# Patient Record
Sex: MALE | Race: WHITE
[De-identification: names, ages, dates, MRNs, and addresses within clinical notes are randomized per-mention and may not be internally consistent; named-entity substitution may affect disease eponyms.]

---

## 2022-08-12 ENCOUNTER — HOSPITAL ENCOUNTER (INPATIENT)
Age: 67
LOS: 6 days | Discharge: HOME HEALTH | DRG: 240 | End: 2022-08-18
Payer: MEDICARE

## 2022-08-12 VITALS
OXYGEN SATURATION: 92 % | DIASTOLIC BLOOD PRESSURE: 67 MMHG | TEMPERATURE: 98.06 F | HEART RATE: 102 BPM | SYSTOLIC BLOOD PRESSURE: 110 MMHG | RESPIRATION RATE: 20 BRPM

## 2022-08-12 VITALS
RESPIRATION RATE: 20 BRPM | OXYGEN SATURATION: 92 % | HEART RATE: 108 BPM | DIASTOLIC BLOOD PRESSURE: 60 MMHG | TEMPERATURE: 97.88 F | SYSTOLIC BLOOD PRESSURE: 110 MMHG

## 2022-08-12 VITALS
RESPIRATION RATE: 19 BRPM | SYSTOLIC BLOOD PRESSURE: 122 MMHG | DIASTOLIC BLOOD PRESSURE: 74 MMHG | OXYGEN SATURATION: 95 % | TEMPERATURE: 99.32 F | HEART RATE: 99 BPM

## 2022-08-12 VITALS
SYSTOLIC BLOOD PRESSURE: 101 MMHG | DIASTOLIC BLOOD PRESSURE: 66 MMHG | OXYGEN SATURATION: 92 % | RESPIRATION RATE: 24 BRPM | HEART RATE: 109 BPM

## 2022-08-12 VITALS
OXYGEN SATURATION: 95 % | RESPIRATION RATE: 20 BRPM | HEART RATE: 108 BPM | SYSTOLIC BLOOD PRESSURE: 123 MMHG | DIASTOLIC BLOOD PRESSURE: 73 MMHG

## 2022-08-12 VITALS
RESPIRATION RATE: 16 BRPM | OXYGEN SATURATION: 90 % | SYSTOLIC BLOOD PRESSURE: 93 MMHG | HEART RATE: 104 BPM | DIASTOLIC BLOOD PRESSURE: 37 MMHG

## 2022-08-12 VITALS
HEART RATE: 109 BPM | SYSTOLIC BLOOD PRESSURE: 107 MMHG | RESPIRATION RATE: 27 BRPM | OXYGEN SATURATION: 89 % | DIASTOLIC BLOOD PRESSURE: 60 MMHG

## 2022-08-12 VITALS
TEMPERATURE: 97.9 F | SYSTOLIC BLOOD PRESSURE: 112 MMHG | RESPIRATION RATE: 18 BRPM | HEART RATE: 98 BPM | DIASTOLIC BLOOD PRESSURE: 65 MMHG | OXYGEN SATURATION: 94 %

## 2022-08-12 VITALS — HEART RATE: 96 BPM

## 2022-08-12 VITALS
SYSTOLIC BLOOD PRESSURE: 115 MMHG | OXYGEN SATURATION: 92 % | HEART RATE: 105 BPM | DIASTOLIC BLOOD PRESSURE: 65 MMHG | TEMPERATURE: 98.1 F | RESPIRATION RATE: 18 BRPM

## 2022-08-12 VITALS
HEART RATE: 99 BPM | DIASTOLIC BLOOD PRESSURE: 47 MMHG | OXYGEN SATURATION: 94 % | RESPIRATION RATE: 21 BRPM | SYSTOLIC BLOOD PRESSURE: 116 MMHG

## 2022-08-12 VITALS
SYSTOLIC BLOOD PRESSURE: 114 MMHG | OXYGEN SATURATION: 96 % | TEMPERATURE: 99.32 F | RESPIRATION RATE: 18 BRPM | DIASTOLIC BLOOD PRESSURE: 66 MMHG | HEART RATE: 104 BPM

## 2022-08-12 VITALS
RESPIRATION RATE: 16 BRPM | DIASTOLIC BLOOD PRESSURE: 52 MMHG | HEART RATE: 107 BPM | SYSTOLIC BLOOD PRESSURE: 91 MMHG | OXYGEN SATURATION: 91 %

## 2022-08-12 VITALS — RESPIRATION RATE: 21 BRPM | OXYGEN SATURATION: 93 %

## 2022-08-12 VITALS
OXYGEN SATURATION: 90 % | HEART RATE: 107 BPM | DIASTOLIC BLOOD PRESSURE: 62 MMHG | RESPIRATION RATE: 18 BRPM | SYSTOLIC BLOOD PRESSURE: 110 MMHG

## 2022-08-12 VITALS — HEART RATE: 108 BPM

## 2022-08-12 VITALS — BODY MASS INDEX: 35.2 KG/M2 | BODY MASS INDEX: 35 KG/M2 | BODY MASS INDEX: 35.9 KG/M2

## 2022-08-12 VITALS — SYSTOLIC BLOOD PRESSURE: 132 MMHG | DIASTOLIC BLOOD PRESSURE: 72 MMHG | HEART RATE: 105 BPM

## 2022-08-12 VITALS
DIASTOLIC BLOOD PRESSURE: 78 MMHG | OXYGEN SATURATION: 95 % | SYSTOLIC BLOOD PRESSURE: 114 MMHG | HEART RATE: 99 BPM | RESPIRATION RATE: 20 BRPM | TEMPERATURE: 99.5 F

## 2022-08-12 VITALS — OXYGEN SATURATION: 92 % | RESPIRATION RATE: 19 BRPM

## 2022-08-12 VITALS — OXYGEN SATURATION: 94 %

## 2022-08-12 VITALS — HEART RATE: 102 BPM

## 2022-08-12 VITALS — HEART RATE: 101 BPM

## 2022-08-12 VITALS — OXYGEN SATURATION: 92 %

## 2022-08-12 VITALS — HEART RATE: 106 BPM

## 2022-08-12 DIAGNOSIS — E66.9: ICD-10-CM

## 2022-08-12 DIAGNOSIS — E78.5: ICD-10-CM

## 2022-08-12 DIAGNOSIS — F32.A: ICD-10-CM

## 2022-08-12 DIAGNOSIS — N18.31: ICD-10-CM

## 2022-08-12 DIAGNOSIS — E11.622: ICD-10-CM

## 2022-08-12 DIAGNOSIS — Z95.5: ICD-10-CM

## 2022-08-12 DIAGNOSIS — E11.22: ICD-10-CM

## 2022-08-12 DIAGNOSIS — I50.32: ICD-10-CM

## 2022-08-12 DIAGNOSIS — E11.51: ICD-10-CM

## 2022-08-12 DIAGNOSIS — Z87.891: ICD-10-CM

## 2022-08-12 DIAGNOSIS — I25.10: ICD-10-CM

## 2022-08-12 DIAGNOSIS — L97.829: ICD-10-CM

## 2022-08-12 DIAGNOSIS — Z92.3: ICD-10-CM

## 2022-08-12 DIAGNOSIS — J44.9: ICD-10-CM

## 2022-08-12 DIAGNOSIS — E87.70: ICD-10-CM

## 2022-08-12 DIAGNOSIS — I74.3: Primary | ICD-10-CM

## 2022-08-12 DIAGNOSIS — Z79.82: ICD-10-CM

## 2022-08-12 DIAGNOSIS — Z79.02: ICD-10-CM

## 2022-08-12 DIAGNOSIS — E11.42: ICD-10-CM

## 2022-08-12 DIAGNOSIS — Z95.1: ICD-10-CM

## 2022-08-12 DIAGNOSIS — Z79.01: ICD-10-CM

## 2022-08-12 DIAGNOSIS — I95.9: ICD-10-CM

## 2022-08-12 DIAGNOSIS — K21.9: ICD-10-CM

## 2022-08-12 DIAGNOSIS — Z79.84: ICD-10-CM

## 2022-08-12 DIAGNOSIS — I13.0: ICD-10-CM

## 2022-08-12 DIAGNOSIS — Z85.118: ICD-10-CM

## 2022-08-12 LAB
ANION GAP: 7 (ref 5–15)
APTT PPP: 45.7 SECONDS (ref 24.1–36.2)
APTT PPP: 48.4 SECONDS (ref 24.1–36.2)
APTT PPP: 52.9 SECONDS (ref 24.1–36.2)
APTT PPP: 55.6 SECONDS (ref 24.1–36.2)
BUN SERPL-MCNC: 15 MG/DL (ref 7–18)
BUN/CREAT RATIO: 11.5 RATIO (ref 10–20)
CALCIUM SERPL-MCNC: 8.6 MG/DL (ref 8.5–10.1)
CARBON DIOXIDE: 22 MMOL/L (ref 21–32)
CHLORIDE: 104 MMOL/L (ref 98–107)
CHOLEST SERPL-MCNC: 134 MG/DL
DEPRECATED RDW RBC: 52.4 FL (ref 35.1–43.9)
ERYTHROCYTE [DISTWIDTH] IN BLOOD: 19.1 % (ref 11.6–14.6)
EST GLOM FILT RATE - AFR AMER: 71 ML/MIN (ref 60–?)
ESTIMATED CREATININE CLEARANCE: 51.12 ML/MIN
GLUCOSE: 174 MG/DL (ref 74–106)
HCT VFR BLD AUTO: 36 % (ref 40–54)
HEMOGLOBIN: 11 G/DL (ref 13–16.5)
HGB BLD-MCNC: 11 G/DL (ref 13–16.5)
IMMATURE GRANULOCYTES COUNT: 0.03 X10^3/UL (ref 0–0)
MAGNESIUM: 2.3 MG/DL (ref 1.6–2.6)
MCV RBC: 76.4 FL (ref 80–94)
MEAN CORP HGB CONC: 30.6 G/DL (ref 32–36)
MEAN PLATELET VOL.: 9.8 FL (ref 6.2–12)
NRBC FLAGGED BY ANALYZER: 0 % (ref 0–5)
PLATELET # BLD: 163 K/MM3 (ref 150–450)
PLATELET COUNT: 163 K/MM3 (ref 150–450)
POTASSIUM: 4.3 MMOL/L (ref 3.5–5.1)
PROTHROMBIN TIME (PROTIME)PT.: 14.7 SECONDS (ref 11.7–14.9)
RBC # BLD AUTO: 4.71 M/MM3 (ref 4.6–6.2)
RBC DISTRIBUTION WIDTH CV: 19.1 % (ref 11.6–14.6)
RBC DISTRIBUTION WIDTH SD: 52.4 FL (ref 35.1–43.9)
TRIGLYCERIDES: 208 MG/DL
VLDLC SERPL-MCNC: 42 MG/DL (ref 5–40)
WBC # BLD AUTO: 8.1 K/MM3 (ref 4.4–11)
WHITE BLOOD COUNT: 8.1 K/MM3 (ref 4.4–11)

## 2022-08-12 PROCEDURE — 80048 BASIC METABOLIC PNL TOTAL CA: CPT

## 2022-08-12 PROCEDURE — 80053 COMPREHEN METABOLIC PANEL: CPT

## 2022-08-12 PROCEDURE — 86900 BLOOD TYPING SEROLOGIC ABO: CPT

## 2022-08-12 PROCEDURE — 88307 TISSUE EXAM BY PATHOLOGIST: CPT

## 2022-08-12 PROCEDURE — 88311 DECALCIFY TISSUE: CPT

## 2022-08-12 PROCEDURE — 94762 N-INVAS EAR/PLS OXIMTRY CONT: CPT

## 2022-08-12 PROCEDURE — 85025 COMPLETE CBC W/AUTO DIFF WBC: CPT

## 2022-08-12 PROCEDURE — 97162 PT EVAL MOD COMPLEX 30 MIN: CPT

## 2022-08-12 PROCEDURE — 71045 X-RAY EXAM CHEST 1 VIEW: CPT

## 2022-08-12 PROCEDURE — 36415 COLL VENOUS BLD VENIPUNCTURE: CPT

## 2022-08-12 PROCEDURE — A4216 STERILE WATER/SALINE, 10 ML: HCPCS

## 2022-08-12 PROCEDURE — 36600 WITHDRAWAL OF ARTERIAL BLOOD: CPT

## 2022-08-12 PROCEDURE — 83036 HEMOGLOBIN GLYCOSYLATED A1C: CPT

## 2022-08-12 PROCEDURE — 86901 BLOOD TYPING SEROLOGIC RH(D): CPT

## 2022-08-12 PROCEDURE — 80061 LIPID PANEL: CPT

## 2022-08-12 PROCEDURE — 82803 BLOOD GASES ANY COMBINATION: CPT

## 2022-08-12 PROCEDURE — 86920 COMPATIBILITY TEST SPIN: CPT

## 2022-08-12 PROCEDURE — 86965 POOLING BLOOD PLATELETS: CPT

## 2022-08-12 PROCEDURE — 83880 ASSAY OF NATRIURETIC PEPTIDE: CPT

## 2022-08-12 PROCEDURE — 86922 COMPATIBILITY TEST ANTIGLOB: CPT

## 2022-08-12 PROCEDURE — 93005 ELECTROCARDIOGRAM TRACING: CPT

## 2022-08-12 PROCEDURE — 82962 GLUCOSE BLOOD TEST: CPT

## 2022-08-12 PROCEDURE — 86850 RBC ANTIBODY SCREEN: CPT

## 2022-08-12 PROCEDURE — P9035 PLATELET PHERES LEUKOREDUCED: HCPCS

## 2022-08-12 PROCEDURE — 85610 PROTHROMBIN TIME: CPT

## 2022-08-12 PROCEDURE — 94002 VENT MGMT INPAT INIT DAY: CPT

## 2022-08-12 PROCEDURE — 97802 MEDICAL NUTRITION INDIV IN: CPT

## 2022-08-12 PROCEDURE — 97530 THERAPEUTIC ACTIVITIES: CPT

## 2022-08-12 PROCEDURE — 84100 ASSAY OF PHOSPHORUS: CPT

## 2022-08-12 PROCEDURE — 87641 MR-STAPH DNA AMP PROBE: CPT

## 2022-08-12 PROCEDURE — 94003 VENT MGMT INPAT SUBQ DAY: CPT

## 2022-08-12 PROCEDURE — 94640 AIRWAY INHALATION TREATMENT: CPT

## 2022-08-12 PROCEDURE — 97803 MED NUTRITION INDIV SUBSEQ: CPT

## 2022-08-12 PROCEDURE — 97166 OT EVAL MOD COMPLEX 45 MIN: CPT

## 2022-08-12 PROCEDURE — 97110 THERAPEUTIC EXERCISES: CPT

## 2022-08-12 PROCEDURE — 83735 ASSAY OF MAGNESIUM: CPT

## 2022-08-12 PROCEDURE — 85730 THROMBOPLASTIN TIME PARTIAL: CPT

## 2022-08-13 VITALS
RESPIRATION RATE: 22 BRPM | OXYGEN SATURATION: 92 % | DIASTOLIC BLOOD PRESSURE: 70 MMHG | SYSTOLIC BLOOD PRESSURE: 129 MMHG | TEMPERATURE: 99.14 F | HEART RATE: 113 BPM

## 2022-08-13 VITALS
RESPIRATION RATE: 18 BRPM | TEMPERATURE: 98.78 F | DIASTOLIC BLOOD PRESSURE: 53 MMHG | OXYGEN SATURATION: 94 % | SYSTOLIC BLOOD PRESSURE: 101 MMHG | HEART RATE: 105 BPM

## 2022-08-13 VITALS — RESPIRATION RATE: 23 BRPM | OXYGEN SATURATION: 93 %

## 2022-08-13 VITALS
RESPIRATION RATE: 21 BRPM | OXYGEN SATURATION: 89 % | SYSTOLIC BLOOD PRESSURE: 106 MMHG | HEART RATE: 116 BPM | DIASTOLIC BLOOD PRESSURE: 92 MMHG | TEMPERATURE: 99.14 F

## 2022-08-13 VITALS
RESPIRATION RATE: 24 BRPM | HEART RATE: 105 BPM | OXYGEN SATURATION: 92 % | DIASTOLIC BLOOD PRESSURE: 37 MMHG | SYSTOLIC BLOOD PRESSURE: 99 MMHG

## 2022-08-13 VITALS
TEMPERATURE: 99 F | HEART RATE: 114 BPM | DIASTOLIC BLOOD PRESSURE: 49 MMHG | SYSTOLIC BLOOD PRESSURE: 130 MMHG | RESPIRATION RATE: 18 BRPM | OXYGEN SATURATION: 96 %

## 2022-08-13 VITALS
OXYGEN SATURATION: 95 % | DIASTOLIC BLOOD PRESSURE: 65 MMHG | RESPIRATION RATE: 19 BRPM | HEART RATE: 106 BPM | SYSTOLIC BLOOD PRESSURE: 107 MMHG

## 2022-08-13 VITALS
RESPIRATION RATE: 22 BRPM | OXYGEN SATURATION: 89 % | SYSTOLIC BLOOD PRESSURE: 127 MMHG | HEART RATE: 114 BPM | DIASTOLIC BLOOD PRESSURE: 112 MMHG | TEMPERATURE: 98.7 F

## 2022-08-13 VITALS
SYSTOLIC BLOOD PRESSURE: 103 MMHG | HEART RATE: 108 BPM | OXYGEN SATURATION: 94 % | DIASTOLIC BLOOD PRESSURE: 41 MMHG | RESPIRATION RATE: 21 BRPM

## 2022-08-13 VITALS
OXYGEN SATURATION: 96 % | HEART RATE: 116 BPM | TEMPERATURE: 98.6 F | DIASTOLIC BLOOD PRESSURE: 65 MMHG | RESPIRATION RATE: 18 BRPM | SYSTOLIC BLOOD PRESSURE: 117 MMHG

## 2022-08-13 VITALS — HEART RATE: 113 BPM | SYSTOLIC BLOOD PRESSURE: 114 MMHG | DIASTOLIC BLOOD PRESSURE: 62 MMHG

## 2022-08-13 VITALS
SYSTOLIC BLOOD PRESSURE: 109 MMHG | OXYGEN SATURATION: 94 % | TEMPERATURE: 99.8 F | RESPIRATION RATE: 18 BRPM | HEART RATE: 104 BPM | DIASTOLIC BLOOD PRESSURE: 54 MMHG

## 2022-08-13 VITALS
RESPIRATION RATE: 23 BRPM | OXYGEN SATURATION: 93 % | SYSTOLIC BLOOD PRESSURE: 101 MMHG | DIASTOLIC BLOOD PRESSURE: 70 MMHG | HEART RATE: 109 BPM

## 2022-08-13 VITALS
DIASTOLIC BLOOD PRESSURE: 60 MMHG | HEART RATE: 101 BPM | OXYGEN SATURATION: 90 % | RESPIRATION RATE: 20 BRPM | SYSTOLIC BLOOD PRESSURE: 100 MMHG

## 2022-08-13 VITALS
HEART RATE: 118 BPM | DIASTOLIC BLOOD PRESSURE: 68 MMHG | OXYGEN SATURATION: 96 % | SYSTOLIC BLOOD PRESSURE: 116 MMHG | RESPIRATION RATE: 17 BRPM | TEMPERATURE: 98.9 F

## 2022-08-13 VITALS
DIASTOLIC BLOOD PRESSURE: 62 MMHG | SYSTOLIC BLOOD PRESSURE: 114 MMHG | RESPIRATION RATE: 25 BRPM | OXYGEN SATURATION: 92 % | HEART RATE: 113 BPM

## 2022-08-13 VITALS
TEMPERATURE: 99.32 F | SYSTOLIC BLOOD PRESSURE: 92 MMHG | RESPIRATION RATE: 20 BRPM | DIASTOLIC BLOOD PRESSURE: 69 MMHG | HEART RATE: 113 BPM | OXYGEN SATURATION: 92 %

## 2022-08-13 VITALS — RESPIRATION RATE: 19 BRPM | OXYGEN SATURATION: 90 %

## 2022-08-13 VITALS
SYSTOLIC BLOOD PRESSURE: 116 MMHG | DIASTOLIC BLOOD PRESSURE: 69 MMHG | TEMPERATURE: 99.1 F | HEART RATE: 118 BPM | RESPIRATION RATE: 26 BRPM | OXYGEN SATURATION: 89 %

## 2022-08-13 VITALS — RESPIRATION RATE: 29 BRPM | OXYGEN SATURATION: 95 %

## 2022-08-13 VITALS
RESPIRATION RATE: 17 BRPM | HEART RATE: 111 BPM | OXYGEN SATURATION: 94 % | TEMPERATURE: 98.96 F | DIASTOLIC BLOOD PRESSURE: 60 MMHG | SYSTOLIC BLOOD PRESSURE: 122 MMHG

## 2022-08-13 VITALS — RESPIRATION RATE: 24 BRPM | OXYGEN SATURATION: 91 %

## 2022-08-13 VITALS — OXYGEN SATURATION: 91 % | RESPIRATION RATE: 27 BRPM

## 2022-08-13 VITALS — HEART RATE: 118 BPM

## 2022-08-13 VITALS — HEART RATE: 107 BPM | RESPIRATION RATE: 20 BRPM | OXYGEN SATURATION: 92 %

## 2022-08-13 VITALS — OXYGEN SATURATION: 90 % | RESPIRATION RATE: 24 BRPM

## 2022-08-13 VITALS — HEART RATE: 108 BPM | DIASTOLIC BLOOD PRESSURE: 41 MMHG | SYSTOLIC BLOOD PRESSURE: 103 MMHG

## 2022-08-13 VITALS — RESPIRATION RATE: 20 BRPM | OXYGEN SATURATION: 92 %

## 2022-08-13 VITALS — HEART RATE: 109 BPM

## 2022-08-13 VITALS — OXYGEN SATURATION: 93 %

## 2022-08-13 LAB
APTT PPP: 57.5 SECONDS (ref 24.1–36.2)
APTT PPP: 60.8 SECONDS (ref 24.1–36.2)

## 2022-08-14 VITALS
DIASTOLIC BLOOD PRESSURE: 80 MMHG | SYSTOLIC BLOOD PRESSURE: 130 MMHG | HEART RATE: 102 BPM | RESPIRATION RATE: 22 BRPM | OXYGEN SATURATION: 92 %

## 2022-08-14 VITALS
RESPIRATION RATE: 23 BRPM | SYSTOLIC BLOOD PRESSURE: 121 MMHG | OXYGEN SATURATION: 92 % | TEMPERATURE: 98.8 F | HEART RATE: 106 BPM | DIASTOLIC BLOOD PRESSURE: 70 MMHG

## 2022-08-14 VITALS
RESPIRATION RATE: 20 BRPM | DIASTOLIC BLOOD PRESSURE: 82 MMHG | HEART RATE: 114 BPM | OXYGEN SATURATION: 95 % | SYSTOLIC BLOOD PRESSURE: 143 MMHG | TEMPERATURE: 98.96 F

## 2022-08-14 VITALS — RESPIRATION RATE: 22 BRPM | OXYGEN SATURATION: 90 %

## 2022-08-14 VITALS
OXYGEN SATURATION: 91 % | TEMPERATURE: 99.1 F | RESPIRATION RATE: 19 BRPM | DIASTOLIC BLOOD PRESSURE: 68 MMHG | HEART RATE: 102 BPM | SYSTOLIC BLOOD PRESSURE: 118 MMHG

## 2022-08-14 VITALS — HEART RATE: 119 BPM | DIASTOLIC BLOOD PRESSURE: 82 MMHG | SYSTOLIC BLOOD PRESSURE: 143 MMHG

## 2022-08-14 VITALS — RESPIRATION RATE: 24 BRPM | OXYGEN SATURATION: 92 %

## 2022-08-14 VITALS
SYSTOLIC BLOOD PRESSURE: 119 MMHG | RESPIRATION RATE: 21 BRPM | TEMPERATURE: 98.9 F | DIASTOLIC BLOOD PRESSURE: 80 MMHG | HEART RATE: 101 BPM | OXYGEN SATURATION: 91 %

## 2022-08-14 VITALS
RESPIRATION RATE: 23 BRPM | HEART RATE: 113 BPM | DIASTOLIC BLOOD PRESSURE: 105 MMHG | OXYGEN SATURATION: 96 % | TEMPERATURE: 99.14 F | SYSTOLIC BLOOD PRESSURE: 131 MMHG

## 2022-08-14 VITALS
SYSTOLIC BLOOD PRESSURE: 141 MMHG | DIASTOLIC BLOOD PRESSURE: 85 MMHG | HEART RATE: 113 BPM | OXYGEN SATURATION: 91 % | RESPIRATION RATE: 20 BRPM | TEMPERATURE: 98.78 F

## 2022-08-14 VITALS
SYSTOLIC BLOOD PRESSURE: 121 MMHG | RESPIRATION RATE: 17 BRPM | OXYGEN SATURATION: 95 % | HEART RATE: 114 BPM | TEMPERATURE: 99.3 F | DIASTOLIC BLOOD PRESSURE: 106 MMHG

## 2022-08-14 VITALS
OXYGEN SATURATION: 95 % | RESPIRATION RATE: 19 BRPM | HEART RATE: 112 BPM | SYSTOLIC BLOOD PRESSURE: 123 MMHG | DIASTOLIC BLOOD PRESSURE: 53 MMHG | TEMPERATURE: 97.7 F

## 2022-08-14 VITALS
SYSTOLIC BLOOD PRESSURE: 123 MMHG | DIASTOLIC BLOOD PRESSURE: 68 MMHG | HEART RATE: 110 BPM | OXYGEN SATURATION: 96 % | TEMPERATURE: 98.6 F | RESPIRATION RATE: 19 BRPM

## 2022-08-14 VITALS
HEART RATE: 102 BPM | DIASTOLIC BLOOD PRESSURE: 80 MMHG | RESPIRATION RATE: 22 BRPM | SYSTOLIC BLOOD PRESSURE: 130 MMHG | OXYGEN SATURATION: 93 % | TEMPERATURE: 99 F

## 2022-08-14 VITALS — HEART RATE: 129 BPM

## 2022-08-14 VITALS
DIASTOLIC BLOOD PRESSURE: 61 MMHG | SYSTOLIC BLOOD PRESSURE: 107 MMHG | TEMPERATURE: 98.24 F | OXYGEN SATURATION: 98 % | HEART RATE: 126 BPM | RESPIRATION RATE: 22 BRPM

## 2022-08-14 VITALS
OXYGEN SATURATION: 93 % | TEMPERATURE: 99.32 F | SYSTOLIC BLOOD PRESSURE: 118 MMHG | HEART RATE: 108 BPM | RESPIRATION RATE: 19 BRPM | DIASTOLIC BLOOD PRESSURE: 59 MMHG

## 2022-08-14 VITALS — RESPIRATION RATE: 21 BRPM | OXYGEN SATURATION: 91 %

## 2022-08-14 VITALS
TEMPERATURE: 98.9 F | HEART RATE: 96 BPM | RESPIRATION RATE: 23 BRPM | SYSTOLIC BLOOD PRESSURE: 123 MMHG | OXYGEN SATURATION: 92 % | DIASTOLIC BLOOD PRESSURE: 53 MMHG

## 2022-08-14 VITALS — RESPIRATION RATE: 23 BRPM | OXYGEN SATURATION: 90 %

## 2022-08-14 VITALS
RESPIRATION RATE: 21 BRPM | TEMPERATURE: 99.32 F | OXYGEN SATURATION: 93 % | SYSTOLIC BLOOD PRESSURE: 121 MMHG | DIASTOLIC BLOOD PRESSURE: 106 MMHG | HEART RATE: 118 BPM

## 2022-08-14 VITALS
TEMPERATURE: 98.9 F | SYSTOLIC BLOOD PRESSURE: 112 MMHG | RESPIRATION RATE: 21 BRPM | OXYGEN SATURATION: 91 % | HEART RATE: 101 BPM | DIASTOLIC BLOOD PRESSURE: 75 MMHG

## 2022-08-14 VITALS — RESPIRATION RATE: 27 BRPM | HEART RATE: 119 BPM | OXYGEN SATURATION: 91 %

## 2022-08-14 VITALS — RESPIRATION RATE: 24 BRPM | OXYGEN SATURATION: 97 %

## 2022-08-14 VITALS — OXYGEN SATURATION: 92 %

## 2022-08-14 VITALS — HEART RATE: 107 BPM

## 2022-08-14 VITALS — HEART RATE: 105 BPM

## 2022-08-14 VITALS
HEART RATE: 106 BPM | OXYGEN SATURATION: 92 % | TEMPERATURE: 98.9 F | RESPIRATION RATE: 22 BRPM | DIASTOLIC BLOOD PRESSURE: 77 MMHG | SYSTOLIC BLOOD PRESSURE: 123 MMHG

## 2022-08-14 VITALS — OXYGEN SATURATION: 96 %

## 2022-08-14 VITALS — HEART RATE: 98 BPM

## 2022-08-14 VITALS — OXYGEN SATURATION: 97 %

## 2022-08-14 VITALS — HEART RATE: 111 BPM

## 2022-08-14 VITALS — OXYGEN SATURATION: 91 %

## 2022-08-14 LAB
ANION GAP: 8 (ref 5–15)
APTT PPP: 59.6 SECONDS (ref 24.1–36.2)
BUN SERPL-MCNC: 12 MG/DL (ref 7–18)
BUN/CREAT RATIO: 10.6 RATIO (ref 10–20)
CALCIUM SERPL-MCNC: 8.6 MG/DL (ref 8.5–10.1)
CARBON DIOXIDE: 21 MMOL/L (ref 21–32)
CHLORIDE: 105 MMOL/L (ref 98–107)
DEPRECATED RDW RBC: 50.2 FL (ref 35.1–43.9)
ERYTHROCYTE [DISTWIDTH] IN BLOOD: 18.7 % (ref 11.6–14.6)
EST GLOM FILT RATE - AFR AMER: 84 ML/MIN (ref 60–?)
ESTIMATED CREATININE CLEARANCE: 58.81 ML/MIN
GLUCOSE: 158 MG/DL (ref 74–106)
HCT VFR BLD AUTO: 31.8 % (ref 40–54)
HEMOGLOBIN: 10 G/DL (ref 13–16.5)
HGB BLD-MCNC: 10 G/DL (ref 13–16.5)
IMMATURE GRANULOCYTES COUNT: 0.04 X10^3/UL (ref 0–0)
MCV RBC: 73.6 FL (ref 80–94)
MEAN CORP HGB CONC: 31.4 G/DL (ref 32–36)
MEAN PLATELET VOL.: 8.8 FL (ref 6.2–12)
NRBC FLAGGED BY ANALYZER: 0 % (ref 0–5)
PLATELET # BLD: 239 K/MM3 (ref 150–450)
PLATELET COUNT: 239 K/MM3 (ref 150–450)
POTASSIUM: 3.8 MMOL/L (ref 3.5–5.1)
RBC # BLD AUTO: 4.32 M/MM3 (ref 4.6–6.2)
RBC DISTRIBUTION WIDTH CV: 18.7 % (ref 11.6–14.6)
RBC DISTRIBUTION WIDTH SD: 50.2 FL (ref 35.1–43.9)
WBC # BLD AUTO: 8.9 K/MM3 (ref 4.4–11)
WHITE BLOOD COUNT: 8.9 K/MM3 (ref 4.4–11)

## 2022-08-15 VITALS
OXYGEN SATURATION: 92 % | TEMPERATURE: 98.4 F | RESPIRATION RATE: 18 BRPM | SYSTOLIC BLOOD PRESSURE: 107 MMHG | DIASTOLIC BLOOD PRESSURE: 67 MMHG | HEART RATE: 96 BPM

## 2022-08-15 VITALS
TEMPERATURE: 98.3 F | OXYGEN SATURATION: 98 % | RESPIRATION RATE: 20 BRPM | DIASTOLIC BLOOD PRESSURE: 69 MMHG | SYSTOLIC BLOOD PRESSURE: 122 MMHG | HEART RATE: 108 BPM

## 2022-08-15 VITALS
TEMPERATURE: 100.58 F | HEART RATE: 113 BPM | SYSTOLIC BLOOD PRESSURE: 122 MMHG | DIASTOLIC BLOOD PRESSURE: 68 MMHG | OXYGEN SATURATION: 92 % | RESPIRATION RATE: 17 BRPM

## 2022-08-15 VITALS
DIASTOLIC BLOOD PRESSURE: 83 MMHG | SYSTOLIC BLOOD PRESSURE: 134 MMHG | OXYGEN SATURATION: 90 % | RESPIRATION RATE: 24 BRPM | HEART RATE: 111 BPM

## 2022-08-15 VITALS — DIASTOLIC BLOOD PRESSURE: 69 MMHG | HEART RATE: 110 BPM | SYSTOLIC BLOOD PRESSURE: 114 MMHG

## 2022-08-15 VITALS — RESPIRATION RATE: 25 BRPM | OXYGEN SATURATION: 97 %

## 2022-08-15 VITALS
SYSTOLIC BLOOD PRESSURE: 143 MMHG | TEMPERATURE: 98.42 F | DIASTOLIC BLOOD PRESSURE: 77 MMHG | RESPIRATION RATE: 16 BRPM | HEART RATE: 109 BPM | OXYGEN SATURATION: 95 %

## 2022-08-15 VITALS — RESPIRATION RATE: 16 BRPM | OXYGEN SATURATION: 96 % | HEART RATE: 105 BPM

## 2022-08-15 VITALS
RESPIRATION RATE: 24 BRPM | TEMPERATURE: 97.9 F | OXYGEN SATURATION: 97 % | SYSTOLIC BLOOD PRESSURE: 99 MMHG | HEART RATE: 94 BPM | DIASTOLIC BLOOD PRESSURE: 68 MMHG

## 2022-08-15 VITALS — RESPIRATION RATE: 20 BRPM | OXYGEN SATURATION: 92 %

## 2022-08-15 VITALS
RESPIRATION RATE: 18 BRPM | OXYGEN SATURATION: 96 % | DIASTOLIC BLOOD PRESSURE: 76 MMHG | HEART RATE: 113 BPM | SYSTOLIC BLOOD PRESSURE: 133 MMHG

## 2022-08-15 VITALS — HEART RATE: 113 BPM | RESPIRATION RATE: 20 BRPM | OXYGEN SATURATION: 93 % | TEMPERATURE: 97.88 F

## 2022-08-15 VITALS — HEART RATE: 116 BPM

## 2022-08-15 VITALS — RESPIRATION RATE: 21 BRPM | HEART RATE: 81 BPM

## 2022-08-15 VITALS — OXYGEN SATURATION: 99 %

## 2022-08-15 VITALS
TEMPERATURE: 98.3 F | SYSTOLIC BLOOD PRESSURE: 97 MMHG | OXYGEN SATURATION: 97 % | HEART RATE: 110 BPM | RESPIRATION RATE: 18 BRPM | DIASTOLIC BLOOD PRESSURE: 56 MMHG

## 2022-08-15 VITALS — HEART RATE: 106 BPM

## 2022-08-15 VITALS
DIASTOLIC BLOOD PRESSURE: 82 MMHG | RESPIRATION RATE: 18 BRPM | HEART RATE: 112 BPM | SYSTOLIC BLOOD PRESSURE: 118 MMHG | OXYGEN SATURATION: 95 %

## 2022-08-15 VITALS
SYSTOLIC BLOOD PRESSURE: 128 MMHG | HEART RATE: 112 BPM | RESPIRATION RATE: 24 BRPM | DIASTOLIC BLOOD PRESSURE: 67 MMHG | OXYGEN SATURATION: 99 % | TEMPERATURE: 98.06 F

## 2022-08-15 VITALS
HEART RATE: 110 BPM | OXYGEN SATURATION: 95 % | DIASTOLIC BLOOD PRESSURE: 76 MMHG | SYSTOLIC BLOOD PRESSURE: 110 MMHG | RESPIRATION RATE: 18 BRPM

## 2022-08-15 VITALS — HEART RATE: 113 BPM

## 2022-08-15 VITALS
HEART RATE: 112 BPM | OXYGEN SATURATION: 86 % | DIASTOLIC BLOOD PRESSURE: 70 MMHG | SYSTOLIC BLOOD PRESSURE: 124 MMHG | RESPIRATION RATE: 24 BRPM

## 2022-08-15 VITALS — OXYGEN SATURATION: 87 %

## 2022-08-15 VITALS — OXYGEN SATURATION: 92 % | RESPIRATION RATE: 18 BRPM

## 2022-08-15 VITALS — OXYGEN SATURATION: 95 %

## 2022-08-15 VITALS — OXYGEN SATURATION: 93 % | HEART RATE: 112 BPM | RESPIRATION RATE: 20 BRPM

## 2022-08-15 VITALS — OXYGEN SATURATION: 96 %

## 2022-08-15 VITALS — OXYGEN SATURATION: 91 %

## 2022-08-15 LAB
ANION GAP: 8 (ref 5–15)
APTT PPP: 48.2 SECONDS (ref 24.1–36.2)
BASE EXCESS BLDV CALC-SCNC: -3 MMOL/L
BNP,B-TYPE NATRIURETIC PEPTIDE: 6.9 PG/ML (ref 0–100)
BUN SERPL-MCNC: 13 MG/DL (ref 7–18)
BUN/CREAT RATIO: 11.2 RATIO (ref 10–20)
CALCIUM SERPL-MCNC: 8.3 MG/DL (ref 8.5–10.1)
CARBON DIOXIDE: 22 MMOL/L (ref 21–32)
CHLORIDE: 106 MMOL/L (ref 98–107)
DEPRECATED RDW RBC: 51.2 FL (ref 35.1–43.9)
ERYTHROCYTE [DISTWIDTH] IN BLOOD: 18.6 % (ref 11.6–14.6)
EST GLOM FILT RATE - AFR AMER: 81 ML/MIN (ref 60–?)
ESTIMATED CREATININE CLEARANCE: 57.29 ML/MIN
FI02: 55
GLUCOSE: 150 MG/DL (ref 74–106)
HCT VFR BLD AUTO: 33.6 % (ref 40–54)
HEMOGLOBIN: 10.4 G/DL (ref 13–16.5)
HGB BLD-MCNC: 10.4 G/DL (ref 13–16.5)
IMMATURE GRANULOCYTES COUNT: 0.05 X10^3/UL (ref 0–0)
MCV RBC: 75.3 FL (ref 80–94)
MEAN CORP HGB CONC: 31 G/DL (ref 32–36)
MEAN PLATELET VOL.: 9.2 FL (ref 6.2–12)
NRBC FLAGGED BY ANALYZER: 0 % (ref 0–5)
PLATELET # BLD: 283 K/MM3 (ref 150–450)
PLATELET COUNT: 283 K/MM3 (ref 150–450)
PO2 BLDA: 85 MMHG (ref 75–100)
POTASSIUM: 3.8 MMOL/L (ref 3.5–5.1)
RBC # BLD AUTO: 4.46 M/MM3 (ref 4.6–6.2)
RBC DISTRIBUTION WIDTH CV: 18.6 % (ref 11.6–14.6)
RBC DISTRIBUTION WIDTH SD: 51.2 FL (ref 35.1–43.9)
SO2: 96 % (ref 95–99)
WBC # BLD AUTO: 8.7 K/MM3 (ref 4.4–11)
WHITE BLOOD COUNT: 8.7 K/MM3 (ref 4.4–11)

## 2022-08-15 PROCEDURE — 0Y6J0Z3 DETACHMENT AT LEFT LOWER LEG, LOW, OPEN APPROACH: ICD-10-PCS | Performed by: SURGERY

## 2022-08-16 VITALS
HEART RATE: 116 BPM | RESPIRATION RATE: 26 BRPM | SYSTOLIC BLOOD PRESSURE: 119 MMHG | TEMPERATURE: 99.68 F | OXYGEN SATURATION: 95 % | DIASTOLIC BLOOD PRESSURE: 76 MMHG

## 2022-08-16 VITALS
SYSTOLIC BLOOD PRESSURE: 115 MMHG | HEART RATE: 99 BPM | DIASTOLIC BLOOD PRESSURE: 78 MMHG | OXYGEN SATURATION: 98 % | RESPIRATION RATE: 23 BRPM

## 2022-08-16 VITALS
DIASTOLIC BLOOD PRESSURE: 62 MMHG | SYSTOLIC BLOOD PRESSURE: 117 MMHG | TEMPERATURE: 98.42 F | OXYGEN SATURATION: 96 % | HEART RATE: 113 BPM | RESPIRATION RATE: 20 BRPM

## 2022-08-16 VITALS
RESPIRATION RATE: 21 BRPM | OXYGEN SATURATION: 94 % | HEART RATE: 112 BPM | TEMPERATURE: 99.1 F | SYSTOLIC BLOOD PRESSURE: 101 MMHG | DIASTOLIC BLOOD PRESSURE: 80 MMHG

## 2022-08-16 VITALS
DIASTOLIC BLOOD PRESSURE: 59 MMHG | OXYGEN SATURATION: 96 % | TEMPERATURE: 97.88 F | RESPIRATION RATE: 20 BRPM | HEART RATE: 107 BPM | SYSTOLIC BLOOD PRESSURE: 106 MMHG

## 2022-08-16 VITALS
HEART RATE: 107 BPM | DIASTOLIC BLOOD PRESSURE: 63 MMHG | RESPIRATION RATE: 26 BRPM | SYSTOLIC BLOOD PRESSURE: 93 MMHG | OXYGEN SATURATION: 95 %

## 2022-08-16 VITALS
RESPIRATION RATE: 21 BRPM | DIASTOLIC BLOOD PRESSURE: 96 MMHG | SYSTOLIC BLOOD PRESSURE: 122 MMHG | HEART RATE: 110 BPM | OXYGEN SATURATION: 98 %

## 2022-08-16 VITALS
OXYGEN SATURATION: 96 % | HEART RATE: 104 BPM | DIASTOLIC BLOOD PRESSURE: 57 MMHG | RESPIRATION RATE: 19 BRPM | SYSTOLIC BLOOD PRESSURE: 111 MMHG | TEMPERATURE: 97.6 F

## 2022-08-16 VITALS
TEMPERATURE: 99.1 F | HEART RATE: 117 BPM | SYSTOLIC BLOOD PRESSURE: 103 MMHG | RESPIRATION RATE: 25 BRPM | DIASTOLIC BLOOD PRESSURE: 72 MMHG | OXYGEN SATURATION: 92 %

## 2022-08-16 VITALS
HEART RATE: 94 BPM | OXYGEN SATURATION: 96 % | TEMPERATURE: 97.9 F | RESPIRATION RATE: 24 BRPM | DIASTOLIC BLOOD PRESSURE: 69 MMHG | SYSTOLIC BLOOD PRESSURE: 95 MMHG

## 2022-08-16 VITALS — HEART RATE: 109 BPM

## 2022-08-16 VITALS
SYSTOLIC BLOOD PRESSURE: 82 MMHG | OXYGEN SATURATION: 95 % | HEART RATE: 106 BPM | TEMPERATURE: 97.52 F | RESPIRATION RATE: 12 BRPM | DIASTOLIC BLOOD PRESSURE: 50 MMHG

## 2022-08-16 VITALS — OXYGEN SATURATION: 93 %

## 2022-08-16 VITALS — RESPIRATION RATE: 14 BRPM | OXYGEN SATURATION: 94 % | HEART RATE: 115 BPM

## 2022-08-16 VITALS — HEART RATE: 114 BPM | OXYGEN SATURATION: 93 % | RESPIRATION RATE: 14 BRPM

## 2022-08-16 VITALS — RESPIRATION RATE: 22 BRPM | OXYGEN SATURATION: 95 % | HEART RATE: 105 BPM

## 2022-08-16 VITALS — HEART RATE: 108 BPM | RESPIRATION RATE: 19 BRPM

## 2022-08-16 VITALS — HEART RATE: 113 BPM | SYSTOLIC BLOOD PRESSURE: 117 MMHG | DIASTOLIC BLOOD PRESSURE: 62 MMHG

## 2022-08-16 VITALS — OXYGEN SATURATION: 89 % | SYSTOLIC BLOOD PRESSURE: 89 MMHG | DIASTOLIC BLOOD PRESSURE: 43 MMHG

## 2022-08-16 VITALS — OXYGEN SATURATION: 80 %

## 2022-08-16 VITALS — SYSTOLIC BLOOD PRESSURE: 94 MMHG | DIASTOLIC BLOOD PRESSURE: 62 MMHG

## 2022-08-16 VITALS — HEART RATE: 110 BPM | OXYGEN SATURATION: 95 % | RESPIRATION RATE: 23 BRPM

## 2022-08-16 VITALS — OXYGEN SATURATION: 97 %

## 2022-08-16 VITALS — HEART RATE: 105 BPM

## 2022-08-16 LAB
ANION GAP: 9 (ref 5–15)
BUN SERPL-MCNC: 17 MG/DL (ref 7–18)
BUN/CREAT RATIO: 12.1 RATIO (ref 10–20)
CALCIUM SERPL-MCNC: 8.5 MG/DL (ref 8.5–10.1)
CARBON DIOXIDE: 23 MMOL/L (ref 21–32)
CHLORIDE: 105 MMOL/L (ref 98–107)
DEPRECATED RDW RBC: 51.1 FL (ref 35.1–43.9)
ERYTHROCYTE [DISTWIDTH] IN BLOOD: 18.8 % (ref 11.6–14.6)
EST GLOM FILT RATE - AFR AMER: 65 ML/MIN (ref 60–?)
ESTIMATED CREATININE CLEARANCE: 47.13 ML/MIN
GLUCOSE: 136 MG/DL (ref 74–106)
HCT VFR BLD AUTO: 28.5 % (ref 40–54)
HEMOGLOBIN: 9 G/DL (ref 13–16.5)
HGB BLD-MCNC: 9 G/DL (ref 13–16.5)
IMMATURE GRANULOCYTES COUNT: 0.04 X10^3/UL (ref 0–0)
MCV RBC: 75.2 FL (ref 80–94)
MEAN CORP HGB CONC: 31.6 G/DL (ref 32–36)
MEAN PLATELET VOL.: 9.2 FL (ref 6.2–12)
NRBC FLAGGED BY ANALYZER: 0 % (ref 0–5)
PLATELET # BLD: 363 K/MM3 (ref 150–450)
PLATELET COUNT: 363 K/MM3 (ref 150–450)
POTASSIUM: 3.7 MMOL/L (ref 3.5–5.1)
RBC # BLD AUTO: 3.79 M/MM3 (ref 4.6–6.2)
RBC DISTRIBUTION WIDTH CV: 18.8 % (ref 11.6–14.6)
RBC DISTRIBUTION WIDTH SD: 51.1 FL (ref 35.1–43.9)
WBC # BLD AUTO: 8.2 K/MM3 (ref 4.4–11)
WHITE BLOOD COUNT: 8.2 K/MM3 (ref 4.4–11)

## 2022-08-17 VITALS
DIASTOLIC BLOOD PRESSURE: 53 MMHG | RESPIRATION RATE: 13 BRPM | TEMPERATURE: 98.4 F | SYSTOLIC BLOOD PRESSURE: 114 MMHG | OXYGEN SATURATION: 96 % | HEART RATE: 117 BPM

## 2022-08-17 VITALS
TEMPERATURE: 98.78 F | OXYGEN SATURATION: 96 % | DIASTOLIC BLOOD PRESSURE: 58 MMHG | RESPIRATION RATE: 18 BRPM | SYSTOLIC BLOOD PRESSURE: 108 MMHG | HEART RATE: 112 BPM

## 2022-08-17 VITALS — HEART RATE: 118 BPM

## 2022-08-17 VITALS — DIASTOLIC BLOOD PRESSURE: 57 MMHG | SYSTOLIC BLOOD PRESSURE: 117 MMHG | HEART RATE: 117 BPM

## 2022-08-17 VITALS
HEART RATE: 115 BPM | TEMPERATURE: 99.6 F | SYSTOLIC BLOOD PRESSURE: 115 MMHG | RESPIRATION RATE: 19 BRPM | OXYGEN SATURATION: 95 % | DIASTOLIC BLOOD PRESSURE: 68 MMHG

## 2022-08-17 VITALS
RESPIRATION RATE: 22 BRPM | HEART RATE: 107 BPM | DIASTOLIC BLOOD PRESSURE: 67 MMHG | OXYGEN SATURATION: 97 % | SYSTOLIC BLOOD PRESSURE: 106 MMHG | TEMPERATURE: 97.52 F

## 2022-08-17 VITALS
TEMPERATURE: 97.6 F | SYSTOLIC BLOOD PRESSURE: 110 MMHG | HEART RATE: 109 BPM | OXYGEN SATURATION: 94 % | DIASTOLIC BLOOD PRESSURE: 72 MMHG | RESPIRATION RATE: 25 BRPM

## 2022-08-17 VITALS — HEART RATE: 113 BPM

## 2022-08-17 VITALS
OXYGEN SATURATION: 91 % | DIASTOLIC BLOOD PRESSURE: 60 MMHG | SYSTOLIC BLOOD PRESSURE: 107 MMHG | TEMPERATURE: 97.52 F | RESPIRATION RATE: 23 BRPM | HEART RATE: 114 BPM

## 2022-08-17 VITALS
TEMPERATURE: 98.96 F | OXYGEN SATURATION: 94 % | DIASTOLIC BLOOD PRESSURE: 72 MMHG | HEART RATE: 117 BPM | SYSTOLIC BLOOD PRESSURE: 103 MMHG | RESPIRATION RATE: 19 BRPM

## 2022-08-17 VITALS — HEART RATE: 115 BPM | OXYGEN SATURATION: 95 % | RESPIRATION RATE: 25 BRPM

## 2022-08-17 VITALS — HEART RATE: 111 BPM | OXYGEN SATURATION: 91 % | RESPIRATION RATE: 27 BRPM

## 2022-08-17 VITALS
DIASTOLIC BLOOD PRESSURE: 57 MMHG | RESPIRATION RATE: 18 BRPM | TEMPERATURE: 98.96 F | OXYGEN SATURATION: 91 % | HEART RATE: 117 BPM | SYSTOLIC BLOOD PRESSURE: 117 MMHG

## 2022-08-17 VITALS — RESPIRATION RATE: 12 BRPM | OXYGEN SATURATION: 100 % | HEART RATE: 115 BPM

## 2022-08-17 VITALS — DIASTOLIC BLOOD PRESSURE: 66 MMHG | SYSTOLIC BLOOD PRESSURE: 116 MMHG | HEART RATE: 114 BPM

## 2022-08-17 VITALS — HEART RATE: 112 BPM

## 2022-08-17 VITALS — HEART RATE: 110 BPM | RESPIRATION RATE: 12 BRPM

## 2022-08-17 VITALS — HEART RATE: 114 BPM

## 2022-08-17 VITALS
OXYGEN SATURATION: 97 % | TEMPERATURE: 98 F | HEART RATE: 95 BPM | RESPIRATION RATE: 16 BRPM | DIASTOLIC BLOOD PRESSURE: 57 MMHG | SYSTOLIC BLOOD PRESSURE: 103 MMHG

## 2022-08-17 VITALS
TEMPERATURE: 99.68 F | SYSTOLIC BLOOD PRESSURE: 120 MMHG | OXYGEN SATURATION: 93 % | HEART RATE: 113 BPM | DIASTOLIC BLOOD PRESSURE: 73 MMHG | RESPIRATION RATE: 19 BRPM

## 2022-08-17 VITALS — HEART RATE: 101 BPM

## 2022-08-17 VITALS — HEART RATE: 112 BPM | RESPIRATION RATE: 22 BRPM

## 2022-08-17 VITALS — RESPIRATION RATE: 22 BRPM | OXYGEN SATURATION: 95 %

## 2022-08-17 VITALS — OXYGEN SATURATION: 93 %

## 2022-08-17 LAB
ALANINE AMINOTRANSFER ALT/SGPT: 24 U/L (ref 16–61)
ALBUMIN SERPL-MCNC: 2.3 G/DL (ref 3.2–5)
ALKALINE PHOSPHATASE: 63 U/L (ref 45–117)
ANION GAP: 8 (ref 5–15)
APTT PPP: 42.7 SECONDS (ref 24.1–36.2)
APTT PPP: 65.5 SECONDS (ref 24.1–36.2)
AST(SGOT): 36 U/L (ref 15–37)
BUN SERPL-MCNC: 18 MG/DL (ref 7–18)
BUN/CREAT RATIO: 14.4 RATIO (ref 10–20)
CALCIUM SERPL-MCNC: 8.7 MG/DL (ref 8.5–10.1)
CARBON DIOXIDE: 26 MMOL/L (ref 21–32)
CHLORIDE: 103 MMOL/L (ref 98–107)
DEPRECATED RDW RBC: 49.4 FL (ref 35.1–43.9)
ERYTHROCYTE [DISTWIDTH] IN BLOOD: 18.5 % (ref 11.6–14.6)
EST GLOM FILT RATE - AFR AMER: 74 ML/MIN (ref 60–?)
ESTIMATED CREATININE CLEARANCE: 53.17 ML/MIN
GLOBULIN: 4.6 G/DL (ref 2.2–4.2)
GLUCOSE: 150 MG/DL (ref 74–106)
HCT VFR BLD AUTO: 28.2 % (ref 40–54)
HEMOGLOBIN: 8.9 G/DL (ref 13–16.5)
HGB BLD-MCNC: 8.9 G/DL (ref 13–16.5)
IMMATURE GRANULOCYTES COUNT: 0.08 X10^3/UL (ref 0–0)
MCV RBC: 74.2 FL (ref 80–94)
MEAN CORP HGB CONC: 31.6 G/DL (ref 32–36)
MEAN PLATELET VOL.: 9.2 FL (ref 6.2–12)
NRBC FLAGGED BY ANALYZER: 0 % (ref 0–5)
PLATELET # BLD: 409 K/MM3 (ref 150–450)
PLATELET COUNT: 409 K/MM3 (ref 150–450)
POTASSIUM: 3.4 MMOL/L (ref 3.5–5.1)
PROTHROMBIN TIME (PROTIME)PT.: 14.9 SECONDS (ref 11.7–14.9)
RBC # BLD AUTO: 3.8 M/MM3 (ref 4.6–6.2)
RBC DISTRIBUTION WIDTH CV: 18.5 % (ref 11.6–14.6)
RBC DISTRIBUTION WIDTH SD: 49.4 FL (ref 35.1–43.9)
WBC # BLD AUTO: 10.3 K/MM3 (ref 4.4–11)
WHITE BLOOD COUNT: 10.3 K/MM3 (ref 4.4–11)

## 2022-08-18 VITALS
HEART RATE: 98 BPM | OXYGEN SATURATION: 96 % | RESPIRATION RATE: 14 BRPM | DIASTOLIC BLOOD PRESSURE: 67 MMHG | TEMPERATURE: 97.9 F | SYSTOLIC BLOOD PRESSURE: 107 MMHG

## 2022-08-18 VITALS
RESPIRATION RATE: 15 BRPM | DIASTOLIC BLOOD PRESSURE: 85 MMHG | HEART RATE: 100 BPM | OXYGEN SATURATION: 97 % | SYSTOLIC BLOOD PRESSURE: 105 MMHG | TEMPERATURE: 97.9 F

## 2022-08-18 VITALS
SYSTOLIC BLOOD PRESSURE: 118 MMHG | HEART RATE: 106 BPM | TEMPERATURE: 98.06 F | DIASTOLIC BLOOD PRESSURE: 41 MMHG | OXYGEN SATURATION: 95 % | RESPIRATION RATE: 20 BRPM

## 2022-08-18 VITALS — OXYGEN SATURATION: 94 %

## 2022-08-18 VITALS — OXYGEN SATURATION: 86 %

## 2022-08-18 VITALS — HEART RATE: 101 BPM

## 2022-08-18 VITALS — HEART RATE: 99 BPM

## 2022-08-18 VITALS — RESPIRATION RATE: 14 BRPM | OXYGEN SATURATION: 95 %

## 2022-08-18 VITALS — HEART RATE: 103 BPM

## 2022-08-18 LAB
ALANINE AMINOTRANSFER ALT/SGPT: 25 U/L (ref 16–61)
ALBUMIN SERPL-MCNC: 2.2 G/DL (ref 3.2–5)
ALKALINE PHOSPHATASE: 65 U/L (ref 45–117)
ANION GAP: 7 (ref 5–15)
APTT PPP: 47.2 SECONDS (ref 24.1–36.2)
APTT PPP: 51.9 SECONDS (ref 24.1–36.2)
AST(SGOT): 37 U/L (ref 15–37)
BUN SERPL-MCNC: 19 MG/DL (ref 7–18)
BUN/CREAT RATIO: 14.4 RATIO (ref 10–20)
CALCIUM SERPL-MCNC: 9.1 MG/DL (ref 8.5–10.1)
CARBON DIOXIDE: 26 MMOL/L (ref 21–32)
CHLORIDE: 104 MMOL/L (ref 98–107)
DEPRECATED RDW RBC: 51.7 FL (ref 35.1–43.9)
ERYTHROCYTE [DISTWIDTH] IN BLOOD: 18.6 % (ref 11.6–14.6)
EST GLOM FILT RATE - AFR AMER: 70 ML/MIN (ref 60–?)
ESTIMATED CREATININE CLEARANCE: 50.35 ML/MIN
GLOBULIN: 4.5 G/DL (ref 2.2–4.2)
GLUCOSE: 138 MG/DL (ref 74–106)
HCT VFR BLD AUTO: 27.7 % (ref 40–54)
HEMOGLOBIN: 8.9 G/DL (ref 13–16.5)
HGB BLD-MCNC: 8.9 G/DL (ref 13–16.5)
IMMATURE GRANULOCYTES COUNT: 0.09 X10^3/UL (ref 0–0)
MCV RBC: 75.3 FL (ref 80–94)
MEAN CORP HGB CONC: 32.1 G/DL (ref 32–36)
MEAN PLATELET VOL.: 9.1 FL (ref 6.2–12)
NRBC FLAGGED BY ANALYZER: 0 % (ref 0–5)
PLATELET # BLD: 421 K/MM3 (ref 150–450)
PLATELET COUNT: 421 K/MM3 (ref 150–450)
POTASSIUM: 3.6 MMOL/L (ref 3.5–5.1)
RBC # BLD AUTO: 3.68 M/MM3 (ref 4.6–6.2)
RBC DISTRIBUTION WIDTH CV: 18.6 % (ref 11.6–14.6)
RBC DISTRIBUTION WIDTH SD: 51.7 FL (ref 35.1–43.9)
WBC # BLD AUTO: 11 K/MM3 (ref 4.4–11)
WHITE BLOOD COUNT: 11 K/MM3 (ref 4.4–11)

## 2022-08-29 ENCOUNTER — HOSPITAL ENCOUNTER (OUTPATIENT)
Age: 67
Setting detail: OBSERVATION
LOS: 1 days | Discharge: HOME HEALTH | DRG: 863 | End: 2022-08-30
Payer: MEDICARE

## 2022-08-29 VITALS
RESPIRATION RATE: 20 BRPM | DIASTOLIC BLOOD PRESSURE: 64 MMHG | TEMPERATURE: 98.3 F | SYSTOLIC BLOOD PRESSURE: 125 MMHG | OXYGEN SATURATION: 94 % | HEART RATE: 98 BPM

## 2022-08-29 VITALS
SYSTOLIC BLOOD PRESSURE: 111 MMHG | RESPIRATION RATE: 16 BRPM | TEMPERATURE: 97.6 F | DIASTOLIC BLOOD PRESSURE: 74 MMHG | OXYGEN SATURATION: 94 % | HEART RATE: 106 BPM

## 2022-08-29 VITALS
OXYGEN SATURATION: 98 % | DIASTOLIC BLOOD PRESSURE: 64 MMHG | HEART RATE: 98 BPM | SYSTOLIC BLOOD PRESSURE: 125 MMHG | RESPIRATION RATE: 18 BRPM | TEMPERATURE: 97.88 F

## 2022-08-29 VITALS — OXYGEN SATURATION: 96 %

## 2022-08-29 VITALS — BODY MASS INDEX: 31.4 KG/M2

## 2022-08-29 VITALS — HEART RATE: 97 BPM

## 2022-08-29 DIAGNOSIS — F32.A: ICD-10-CM

## 2022-08-29 DIAGNOSIS — J44.9: ICD-10-CM

## 2022-08-29 DIAGNOSIS — Z95.1: ICD-10-CM

## 2022-08-29 DIAGNOSIS — I25.10: ICD-10-CM

## 2022-08-29 DIAGNOSIS — T81.41XA: Primary | ICD-10-CM

## 2022-08-29 DIAGNOSIS — Z87.891: ICD-10-CM

## 2022-08-29 DIAGNOSIS — E11.51: ICD-10-CM

## 2022-08-29 DIAGNOSIS — K21.9: ICD-10-CM

## 2022-08-29 DIAGNOSIS — Z89.512: ICD-10-CM

## 2022-08-29 DIAGNOSIS — E11.22: ICD-10-CM

## 2022-08-29 DIAGNOSIS — Z79.02: ICD-10-CM

## 2022-08-29 DIAGNOSIS — E66.9: ICD-10-CM

## 2022-08-29 DIAGNOSIS — F41.9: ICD-10-CM

## 2022-08-29 DIAGNOSIS — N18.31: ICD-10-CM

## 2022-08-29 DIAGNOSIS — Z79.899: ICD-10-CM

## 2022-08-29 DIAGNOSIS — Z79.84: ICD-10-CM

## 2022-08-29 LAB
ANION GAP: 9 (ref 5–15)
BUN SERPL-MCNC: 23 MG/DL (ref 7–18)
BUN/CREAT RATIO: 14 RATIO (ref 10–20)
CALCIUM SERPL-MCNC: 9.5 MG/DL (ref 8.5–10.1)
CARBON DIOXIDE: 23 MMOL/L (ref 21–32)
CHLORIDE: 102 MMOL/L (ref 98–107)
EST GLOM FILT RATE - AFR AMER: 54 ML/MIN (ref 60–?)
ESTIMATED CREATININE CLEARANCE: 41.98 ML/MIN
GLUCOSE: 98 MG/DL (ref 74–106)
POTASSIUM: 4.4 MMOL/L (ref 3.5–5.1)

## 2022-08-29 PROCEDURE — 36415 COLL VENOUS BLD VENIPUNCTURE: CPT

## 2022-08-29 PROCEDURE — 96376 TX/PRO/DX INJ SAME DRUG ADON: CPT

## 2022-08-29 PROCEDURE — 96375 TX/PRO/DX INJ NEW DRUG ADDON: CPT

## 2022-08-29 PROCEDURE — 71045 X-RAY EXAM CHEST 1 VIEW: CPT

## 2022-08-29 PROCEDURE — 80048 BASIC METABOLIC PNL TOTAL CA: CPT

## 2022-08-29 PROCEDURE — 96366 THER/PROPH/DIAG IV INF ADDON: CPT

## 2022-08-29 PROCEDURE — 97162 PT EVAL MOD COMPLEX 30 MIN: CPT

## 2022-08-29 PROCEDURE — 99218: CPT

## 2022-08-29 PROCEDURE — 82962 GLUCOSE BLOOD TEST: CPT

## 2022-08-29 PROCEDURE — 96365 THER/PROPH/DIAG IV INF INIT: CPT

## 2022-08-29 PROCEDURE — 85025 COMPLETE CBC W/AUTO DIFF WBC: CPT

## 2022-08-29 PROCEDURE — 99251: CPT

## 2022-08-29 PROCEDURE — G0378 HOSPITAL OBSERVATION PER HR: HCPCS

## 2022-08-29 PROCEDURE — 96367 TX/PROPH/DG ADDL SEQ IV INF: CPT

## 2022-08-29 PROCEDURE — 97166 OT EVAL MOD COMPLEX 45 MIN: CPT

## 2022-08-29 PROCEDURE — 97802 MEDICAL NUTRITION INDIV IN: CPT

## 2022-08-29 PROCEDURE — 96372 THER/PROPH/DIAG INJ SC/IM: CPT

## 2022-08-29 PROCEDURE — G0463 HOSPITAL OUTPT CLINIC VISIT: HCPCS

## 2022-08-29 PROCEDURE — 36569 INSJ PICC 5 YR+ W/O IMAGING: CPT

## 2022-08-30 VITALS
DIASTOLIC BLOOD PRESSURE: 71 MMHG | TEMPERATURE: 98.06 F | SYSTOLIC BLOOD PRESSURE: 131 MMHG | RESPIRATION RATE: 18 BRPM | OXYGEN SATURATION: 95 % | HEART RATE: 78 BPM

## 2022-08-30 VITALS
OXYGEN SATURATION: 96 % | TEMPERATURE: 98.24 F | DIASTOLIC BLOOD PRESSURE: 68 MMHG | SYSTOLIC BLOOD PRESSURE: 91 MMHG | RESPIRATION RATE: 16 BRPM | HEART RATE: 99 BPM

## 2022-08-30 VITALS — HEART RATE: 95 BPM

## 2022-08-30 VITALS
RESPIRATION RATE: 16 BRPM | OXYGEN SATURATION: 96 % | HEART RATE: 95 BPM | SYSTOLIC BLOOD PRESSURE: 105 MMHG | TEMPERATURE: 98.2 F | DIASTOLIC BLOOD PRESSURE: 75 MMHG

## 2022-08-30 VITALS
OXYGEN SATURATION: 96 % | RESPIRATION RATE: 16 BRPM | SYSTOLIC BLOOD PRESSURE: 131 MMHG | DIASTOLIC BLOOD PRESSURE: 76 MMHG | HEART RATE: 66 BPM | TEMPERATURE: 98.6 F

## 2022-08-30 LAB
ANION GAP: 8 (ref 5–15)
BUN SERPL-MCNC: 21 MG/DL (ref 7–18)
BUN/CREAT RATIO: 13.6 RATIO (ref 10–20)
CALCIUM SERPL-MCNC: 9.5 MG/DL (ref 8.5–10.1)
CARBON DIOXIDE: 20 MMOL/L (ref 21–32)
CHLORIDE: 107 MMOL/L (ref 98–107)
DEPRECATED RDW RBC: 53.1 FL (ref 35.1–43.9)
ERYTHROCYTE [DISTWIDTH] IN BLOOD: 20 % (ref 11.6–14.6)
EST GLOM FILT RATE - AFR AMER: 59 ML/MIN (ref 60–?)
ESTIMATED CREATININE CLEARANCE: 44.71 ML/MIN
GLUCOSE: 116 MG/DL (ref 74–106)
HCT VFR BLD AUTO: 37.1 % (ref 40–54)
HEMOGLOBIN: 11.3 G/DL (ref 13–16.5)
HGB BLD-MCNC: 11.3 G/DL (ref 13–16.5)
IMMATURE GRANULOCYTES COUNT: 0.07 X10^3/UL (ref 0–0)
MCV RBC: 76.2 FL (ref 80–94)
MEAN CORP HGB CONC: 30.5 G/DL (ref 32–36)
MEAN PLATELET VOL.: 8.5 FL (ref 6.2–12)
NRBC FLAGGED BY ANALYZER: 0 % (ref 0–5)
PLATELET # BLD: 548 K/MM3 (ref 150–450)
PLATELET COUNT: 548 K/MM3 (ref 150–450)
POTASSIUM: 4.2 MMOL/L (ref 3.5–5.1)
RBC # BLD AUTO: 4.87 M/MM3 (ref 4.6–6.2)
RBC DISTRIBUTION WIDTH CV: 20 % (ref 11.6–14.6)
RBC DISTRIBUTION WIDTH SD: 53.1 FL (ref 35.1–43.9)
WBC # BLD AUTO: 10.4 K/MM3 (ref 4.4–11)
WHITE BLOOD COUNT: 10.4 K/MM3 (ref 4.4–11)

## 2022-12-28 ENCOUNTER — HOSPITAL ENCOUNTER (OUTPATIENT)
Dept: HOSPITAL 100 - SDC | Age: 67
End: 2022-12-28
Payer: MEDICARE

## 2022-12-28 VITALS — HEART RATE: 105 BPM | RESPIRATION RATE: 12 BRPM | OXYGEN SATURATION: 75 %

## 2022-12-28 VITALS
SYSTOLIC BLOOD PRESSURE: 157 MMHG | TEMPERATURE: 97.52 F | RESPIRATION RATE: 16 BRPM | DIASTOLIC BLOOD PRESSURE: 67 MMHG | OXYGEN SATURATION: 91 % | HEART RATE: 106 BPM

## 2022-12-28 VITALS
SYSTOLIC BLOOD PRESSURE: 166 MMHG | HEART RATE: 120 BPM | DIASTOLIC BLOOD PRESSURE: 105 MMHG | OXYGEN SATURATION: 75 % | RESPIRATION RATE: 35 BRPM

## 2022-12-28 VITALS
HEART RATE: 136 BPM | RESPIRATION RATE: 20 BRPM | DIASTOLIC BLOOD PRESSURE: 136 MMHG | SYSTOLIC BLOOD PRESSURE: 131 MMHG | OXYGEN SATURATION: 74 %

## 2022-12-28 VITALS
DIASTOLIC BLOOD PRESSURE: 56 MMHG | OXYGEN SATURATION: 81 % | RESPIRATION RATE: 104 BRPM | SYSTOLIC BLOOD PRESSURE: 74 MMHG | HEART RATE: 104 BPM

## 2022-12-28 VITALS
SYSTOLIC BLOOD PRESSURE: 97 MMHG | RESPIRATION RATE: 18 BRPM | HEART RATE: 111 BPM | OXYGEN SATURATION: 95 % | DIASTOLIC BLOOD PRESSURE: 74 MMHG | TEMPERATURE: 96.98 F

## 2022-12-28 VITALS — HEART RATE: 105 BPM | RESPIRATION RATE: 16 BRPM | OXYGEN SATURATION: 77 %

## 2022-12-28 VITALS
RESPIRATION RATE: 25 BRPM | OXYGEN SATURATION: 95 % | DIASTOLIC BLOOD PRESSURE: 95 MMHG | HEART RATE: 83 BPM | SYSTOLIC BLOOD PRESSURE: 174 MMHG

## 2022-12-28 VITALS — HEART RATE: 101 BPM | RESPIRATION RATE: 18 BRPM

## 2022-12-28 VITALS — BODY MASS INDEX: 33.9 KG/M2 | BODY MASS INDEX: 33.8 KG/M2

## 2022-12-28 VITALS
DIASTOLIC BLOOD PRESSURE: 148 MMHG | OXYGEN SATURATION: 79 % | HEART RATE: 105 BPM | SYSTOLIC BLOOD PRESSURE: 175 MMHG | RESPIRATION RATE: 27 BRPM

## 2022-12-28 VITALS — HEART RATE: 105 BPM

## 2022-12-28 VITALS
OXYGEN SATURATION: 80 % | HEART RATE: 77 BPM | DIASTOLIC BLOOD PRESSURE: 84 MMHG | SYSTOLIC BLOOD PRESSURE: 107 MMHG | RESPIRATION RATE: 26 BRPM

## 2022-12-28 VITALS
RESPIRATION RATE: 12 BRPM | SYSTOLIC BLOOD PRESSURE: 157 MMHG | OXYGEN SATURATION: 77 % | DIASTOLIC BLOOD PRESSURE: 90 MMHG | HEART RATE: 112 BPM

## 2022-12-28 VITALS — HEART RATE: 131 BPM | DIASTOLIC BLOOD PRESSURE: 99 MMHG | SYSTOLIC BLOOD PRESSURE: 131 MMHG

## 2022-12-28 DIAGNOSIS — I12.9: ICD-10-CM

## 2022-12-28 DIAGNOSIS — J32.9: ICD-10-CM

## 2022-12-28 DIAGNOSIS — J96.01: ICD-10-CM

## 2022-12-28 DIAGNOSIS — Z89.519: ICD-10-CM

## 2022-12-28 DIAGNOSIS — I25.2: ICD-10-CM

## 2022-12-28 DIAGNOSIS — I49.9: ICD-10-CM

## 2022-12-28 DIAGNOSIS — Z85.118: ICD-10-CM

## 2022-12-28 DIAGNOSIS — I46.9: ICD-10-CM

## 2022-12-28 DIAGNOSIS — T87.44: Primary | ICD-10-CM

## 2022-12-28 DIAGNOSIS — Z98.61: ICD-10-CM

## 2022-12-28 DIAGNOSIS — I25.10: ICD-10-CM

## 2022-12-28 DIAGNOSIS — Z95.1: ICD-10-CM

## 2022-12-28 DIAGNOSIS — B95.62: ICD-10-CM

## 2022-12-28 DIAGNOSIS — J44.9: ICD-10-CM

## 2022-12-28 DIAGNOSIS — E11.51: ICD-10-CM

## 2022-12-28 DIAGNOSIS — Z87.891: ICD-10-CM

## 2022-12-28 DIAGNOSIS — N18.30: ICD-10-CM

## 2022-12-28 DIAGNOSIS — Z86.718: ICD-10-CM
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
## 2022-12-28 DIAGNOSIS — E78.5: ICD-10-CM

## 2022-12-28 LAB
ALANINE AMINOTRANSFER ALT/SGPT: 80 U/L (ref 16–61)
ALBUMIN SERPL-MCNC: 2.8 G/DL (ref 3.2–5)
ALKALINE PHOSPHATASE: 81 U/L (ref 45–117)
ANION GAP: 10 (ref 5–15)
ANION GAP: 14 (ref 5–15)
AST(SGOT): 102 U/L (ref 15–37)
BASE EXCESS BLDV CALC-SCNC: -15 MMOL/L
BASE EXCESS BLDV CALC-SCNC: -15 MMOL/L
BUN SERPL-MCNC: 20 MG/DL (ref 7–18)
BUN SERPL-MCNC: 21 MG/DL (ref 7–18)
BUN/CREAT RATIO: 12.4 RATIO (ref 10–20)
BUN/CREAT RATIO: 14.1 RATIO (ref 10–20)
CALCIUM SERPL-MCNC: 8.5 MG/DL (ref 8.5–10.1)
CALCIUM SERPL-MCNC: 9.6 MG/DL (ref 8.5–10.1)
CARBON DIOXIDE: 18 MMOL/L (ref 21–32)
CARBON DIOXIDE: 22 MMOL/L (ref 21–32)
CHLORIDE: 102 MMOL/L (ref 98–107)
CHLORIDE: 106 MMOL/L (ref 98–107)
D-DIMER QUANTITATIVE (DVT/PE): 2.76 FEU/UG/M (ref 0.27–0.49)
DEPRECATED RDW RBC: 49.9 FL (ref 35.1–43.9)
DEPRECATED RDW RBC: 52.3 FL (ref 35.1–43.9)
ERYTHROCYTE [DISTWIDTH] IN BLOOD: 18.2 % (ref 11.6–14.6)
ERYTHROCYTE [DISTWIDTH] IN BLOOD: 18.6 % (ref 11.6–14.6)
EST GLOM FILT RATE - AFR AMER: 56 ML/MIN (ref 60–?)
EST GLOM FILT RATE - AFR AMER: 61 ML/MIN (ref 60–?)
ESTIMATED CREATININE CLEARANCE: 42.77 ML/MIN
ESTIMATED CREATININE CLEARANCE: 46.21 ML/MIN
FI02: 100
GLOBULIN: 3.4 G/DL (ref 2.2–4.2)
GLUCOSE: 165 MG/DL (ref 74–106)
GLUCOSE: 255 MG/DL (ref 74–106)
HCT VFR BLD AUTO: 41.9 % (ref 40–54)
HCT VFR BLD AUTO: 42.8 % (ref 40–54)
HEMOGLOBIN: 13.2 G/DL (ref 13–16.5)
HEMOGLOBIN: 13.7 G/DL (ref 13–16.5)
HGB BLD-MCNC: 13.2 G/DL (ref 13–16.5)
HGB BLD-MCNC: 13.7 G/DL (ref 13–16.5)
IMMATURE GRANULOCYTES COUNT: 0.51 X10^3/UL (ref 0–0)
MCV RBC: 76.9 FL (ref 80–94)
MCV RBC: 80.5 FL (ref 80–94)
MEAN CORP HGB CONC: 30.8 G/DL (ref 32–36)
MEAN CORP HGB CONC: 32.7 G/DL (ref 32–36)
MEAN PLATELET VOL.: 8.9 FL (ref 6.2–12)
MEAN PLATELET VOL.: 9.1 FL (ref 6.2–12)
NRBC FLAGGED BY ANALYZER: 0.7 % (ref 0–5)
PEEP: 13
PLATELET # BLD: 243 K/MM3 (ref 150–450)
PLATELET # BLD: 333 K/MM3 (ref 150–450)
PLATELET COUNT: 243 K/MM3 (ref 150–450)
PLATELET COUNT: 333 K/MM3 (ref 150–450)
PO2 BLDA: 125 MMHG (ref 75–100)
PO2 BLDA: 53 MMHG (ref 75–100)
POTASSIUM: 3.5 MMOL/L (ref 3.5–5.1)
POTASSIUM: 4.8 MMOL/L (ref 3.5–5.1)
RBC # BLD AUTO: 5.32 M/MM3 (ref 4.6–6.2)
RBC # BLD AUTO: 5.45 M/MM3 (ref 4.6–6.2)
RBC DISTRIBUTION WIDTH CV: 18.2 % (ref 11.6–14.6)
RBC DISTRIBUTION WIDTH CV: 18.6 % (ref 11.6–14.6)
RBC DISTRIBUTION WIDTH SD: 49.9 FL (ref 35.1–43.9)
RBC DISTRIBUTION WIDTH SD: 52.3 FL (ref 35.1–43.9)
RR: 12
SO2: 70 % (ref 95–99)
SO2: 97 % (ref 95–99)
TROPONIN-I HS: 23 PG/ML (ref 3–78)
WBC # BLD AUTO: 11.9 K/MM3 (ref 4.4–11)
WBC # BLD AUTO: 9.5 K/MM3 (ref 4.4–11)
WHITE BLOOD COUNT: 11.9 K/MM3 (ref 4.4–11)
WHITE BLOOD COUNT: 9.5 K/MM3 (ref 4.4–11)

## 2022-12-28 PROCEDURE — U0005 INFEC AGEN DETEC AMPLI PROBE: HCPCS

## 2022-12-28 PROCEDURE — 93005 ELECTROCARDIOGRAM TRACING: CPT

## 2022-12-28 PROCEDURE — 36620 INSERTION CATHETER ARTERY: CPT

## 2022-12-28 PROCEDURE — 87635 SARS-COV-2 COVID-19 AMP PRB: CPT

## 2022-12-28 PROCEDURE — 94640 AIRWAY INHALATION TREATMENT: CPT

## 2022-12-28 PROCEDURE — 82962 GLUCOSE BLOOD TEST: CPT

## 2022-12-28 PROCEDURE — 87102 FUNGUS ISOLATION CULTURE: CPT

## 2022-12-28 PROCEDURE — 94799 UNLISTED PULMONARY SVC/PX: CPT

## 2022-12-28 PROCEDURE — 31720 CLEARANCE OF AIRWAYS: CPT

## 2022-12-28 PROCEDURE — 87205 SMEAR GRAM STAIN: CPT

## 2022-12-28 PROCEDURE — 80048 BASIC METABOLIC PNL TOTAL CA: CPT

## 2022-12-28 PROCEDURE — 85027 COMPLETE CBC AUTOMATED: CPT

## 2022-12-28 PROCEDURE — 87070 CULTURE OTHR SPECIMN AEROBIC: CPT

## 2022-12-28 PROCEDURE — 85025 COMPLETE CBC W/AUTO DIFF WBC: CPT

## 2022-12-28 PROCEDURE — 92950 HEART/LUNG RESUSCITATION CPR: CPT

## 2022-12-28 PROCEDURE — 83036 HEMOGLOBIN GLYCOSYLATED A1C: CPT

## 2022-12-28 PROCEDURE — 87015 SPECIMEN INFECT AGNT CONCNTJ: CPT

## 2022-12-28 PROCEDURE — 87116 MYCOBACTERIA CULTURE: CPT

## 2022-12-28 PROCEDURE — 36600 WITHDRAWAL OF ARTERIAL BLOOD: CPT

## 2022-12-28 PROCEDURE — U0003 INFECTIOUS AGENT DETECTION BY NUCLEIC ACID (DNA OR RNA); SEVERE ACUTE RESPIRATORY SYNDROME CORONAVIRUS 2 (SARS-COV-2) (CORONAVIRUS DISEASE [COVID-19]), AMPLIFIED PROBE TECHNIQUE, MAKING USE OF HIGH THROUGHPUT TECHNOLOGIES AS DESCRIBED BY CMS-2020-01-R: HCPCS

## 2022-12-28 PROCEDURE — 82803 BLOOD GASES ANY COMBINATION: CPT

## 2022-12-28 PROCEDURE — 87186 SC STD MICRODIL/AGAR DIL: CPT

## 2022-12-28 PROCEDURE — 87075 CULTR BACTERIA EXCEPT BLOOD: CPT

## 2022-12-28 PROCEDURE — 84484 ASSAY OF TROPONIN QUANT: CPT

## 2022-12-28 PROCEDURE — 87206 SMEAR FLUORESCENT/ACID STAI: CPT

## 2022-12-28 PROCEDURE — 80053 COMPREHEN METABOLIC PANEL: CPT

## 2022-12-28 PROCEDURE — 94002 VENT MGMT INPAT INIT DAY: CPT

## 2022-12-28 PROCEDURE — 85379 FIBRIN DEGRADATION QUANT: CPT

## 2022-12-28 PROCEDURE — 11044 DBRDMT BONE 1ST 20 SQ CM/<: CPT

## 2022-12-28 PROCEDURE — 71045 X-RAY EXAM CHEST 1 VIEW: CPT

## 2022-12-28 PROCEDURE — 87077 CULTURE AEROBIC IDENTIFY: CPT

## 2022-12-28 PROCEDURE — 31500 INSERT EMERGENCY AIRWAY: CPT

## 2022-12-28 RX ADMIN — PROPOFOL 5.9 MG: 10 INJECTION, EMULSION INTRAVENOUS at 15:00

## 2022-12-28 RX ADMIN — AMIODARONE HYDROCHLORIDE 33.3 MG: 50 INJECTION, SOLUTION INTRAVENOUS at 15:44

## 2022-12-28 RX ADMIN — VANCOMYCIN HYDROCHLORIDE 1000 MG: 1 INJECTION, POWDER, LYOPHILIZED, FOR SOLUTION INTRAVENOUS at 13:03

## 2022-12-28 RX ADMIN — CEFAZOLIN 150 GM: 10 INJECTION, POWDER, FOR SOLUTION INTRAVENOUS at 12:31

## 2022-12-28 NOTE — PCM.OP.PRO
Procedure Report
Date of Procedure: 12/28/22
Arterial line placement

Date of procedure 12/28/2022

Timeout: Emergent

Diagnosis: CPA

Right femoral artery was identified with ultrasound.  Area cleaned with chlorhexidine.  Sterile gloves placed after hands washed.  Sterile dressing placed in area reprepped with chlorhexidine.  Probe cover placed on ultrasound.  Right femoral artery 
was reidentified with ultrasound and finder needle placed into right femoral artery with pulsatile flow.  Using Seldinger technique wire was placed and femoral catheter placed over top with removal of wire.  Pulsatile flow continue to be noted.  Art 
line was sutured in an area was recleaned after was placed over end of arterial line.  Area preprepped and dressing placed sterilely after art line was hooked up to pressure bag with good arterial waveform.

Procedures Hospitalists
Procedures: [id number] Insertion Catheter Artery

## 2022-12-28 NOTE — CHAPLAIN
Type of Pastoral Visit

___ Initial Visit
___ Follow-up Visit
___ On-call Visit
___ General Patient Visit
___ Spiritual Assessment
___ Family Conference
___ Bereavement
___ Rapid Response
_x__ Code Blue
___ Other (describe below)



Pastoral Care Referral From

___ Patient
___ Family
___ Nurse
___ Physician
___ 
___ 
_x__ Other (describe below)



Sacrament/Intervention

_x__ Active listening
___ Anointing
___ Anabaptist
___ Bereavement
___ Communion
___ Anum exploration
___ 
_x__ Life review
_x__ Prayer
___ Reconciliation
___ Sacrament of Sick
_x__ Supportive presence
___ Wedding
___ Other (describe below)


Pastoral Comments
was in ICU when Code Blue was called for this patient; went into waiting room to escort spouse back to room; gave supportive presence and offer of prayer for patient; was with spouse along with SW to assist in support as team worked on patient and 
in follow through; pt pulse recovered; assisted in spouse calling her children to come to the hospital; offered water and food as desired; SW notified security of arrival of other family members in about an hour

## 2022-12-28 NOTE — RAD_ITS
STUDY:   X-RAY CHEST  
  
REASON FOR EXAM:   Male, 67 years old.  post CPR  
  
TECHNIQUE:   Single frontal view of the chest.  
  
COMPARISON:   December 28, 2022  
___________________________________  
  
FINDINGS:  
Enteric tube in the stomach unchanged. Fractured sternotomy wires noted  
again.  
  
Mild to moderate bilateral interstitial infiltrates unchanged.  There is no  
demonstrated pleural abnormality.  
  
Cardiomegaly.   Normal mediastinum and jessica.  Normal visualized pulmonary  
arteries.  Normal visualized aortic arch and descending thoracic aorta.  
  
Normal visualized thoracic spine.  Normal visualized ribs, clavicles, and  
shoulders.  
  
There is no demonstrated abnormality of the visualized soft tissue  
structures of the upper abdomen.  
___________________________________  
  
ORDER #: [id number] RAD/Chest 1 View (Portable)  
IMPRESSION:  
Moderate bilateral interstitial infiltrates unchanged  
 
  
Electronically Signed:  
Irwin Coleman MD  
2022/12/28 at 17:16 EST  
Reading Location ID and State: [id number] / [place]  
Tel [phone], Service support  [phone], Fax [phone]

## 2022-12-28 NOTE — PCM.OPRPT
Report of Operation
Date of Procedure: 12/28/22
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
Post-Operative Diagnosis: same
Surgery/Procedure Performed:: Irrigation/debridement left below knee amputation 5mm x 5mm, bone biopsy, placement Axiofill
Description of Surgical Findings:: 
purulent drainage, sinus tract down to tibia
Surgeon: Ed Mendoza
Type of Anesthesia: General
Specimen's removed: bone
Estimated Blood Loss (mL): 5
Description of Procedure: 
HPI: Patient is a 67-year-old male with extensive peripheral vascular disease history of prior left below-knee amputation.  He had a chronic draining sinus from the mid aspect of the wound that has intermittently been positive for MRSA.  He had 
plain x-ray which revealed a sinus tract that appeared to extend down to the cut edge of the tibia.  He is taken now for debridement irrigation and application of axiofil in hopes of promoting health the healing environment.  Plan is also to biopsy 
the bone and if positive for anaerobic growth plan for longer-term antibiotics.

Description of procedure: Upon obtaining form consent and verification.  Patient procedure site he was taken to the operating room where he was placed under general anesthesia.  He was then positioned prepped and draped in usual sterile fashion a 
time was performed.  A curette was used to sharply debride the sinus tract down to the bone.  All nonviable and exudative tissue were removed, and healthy bleeding edge visualized throughout.  A rongeur was then used to take bone sample for culture, 
and debride back flush with the base of the cavity.  The wound was then copiously irrigated with vancomycin irrigation and inspected for hemostasis. Axiofil placenta derived topical stem cell adjunct was then placed in the sinus tract and packed 
down to the base.  Adaptic nonadherent dressing was then applied followed by dry gauze.  Patient was then awake from anesthesia and taken recovery room with anticipated discharged home.

## 2022-12-28 NOTE — XMS RPT_ITS
Comprehensive CCD (C-CDA v2.1)  
                          Created on:2022  
Patient:Mr. Willow Bradshaw  
Sex:Male  
:1955  
External Reference #:CDR,PersonID:[id number]  
  
  
Demographics  
  
  
  
                          Address                   [address] [place] [address]  
   
                          Home Phone                [phone]  
   
                          Mobile Phone              [phone]  
   
                          Preferred Language        en  
   
                          Marital Status              
   
                          Yazidism Affiliation     Unknown  
   
                          Race                      White  
   
                          Ethnic Group              Not  or   
  
  
  
  
Author  
  
  
  
                          Organization              [facility]  
  
  
  
  
Care Team Providers  
  
  
  
                    Name                Role                Phone  
   
                    UNKNOWN, PROVIDER   Unavailable         Unavailable  
   
                    MIN, PAYTON L     Unavailable         Unavailable  
   
                    MIN, PAYTON L     Unavailable         Unavailable  
   
                    Min, Payton       Primary Care Provider [phone]  
   
                    Sav Servando       Primary Care Provider [phone]  
   
                    Sav Servando       Primary Care Provider [phone]  
   
                    Sav Servando       Primary Care Provider [phone]  
   
                    Tijerina APRN - CNP, Hector Primary Care Provider [phone]  
   
                    Elizabeth Miles MD Unavailable         [phone]  
   
                    Blayne Lorenz Unavailable         [phone]  
   
                    Benjamin APRN.CNP, [facility] Primary Care Provider [phone]  
   
                    Tijerina APRN - CNP, Hector J Primary Care Provider [phone]  
   
                    Elizabeth Miles MD Unavailable         [phone]  
   
                    Blayne Lorenz Unavailable         [phone]  
   
                    Benjamin APRN.CNP, [facility]ector Primary Care Provider [phone]  
   
                    Tijerina APRN - CNP, Hector J Primary Care Provider [phone]  
   
                    Blayne Lorenz Unavailable         [phone]  
   
                    Tijerina APRN.CNP, Hector Primary Care Provider [phone]  
   
                    Tijerina APRN - CNP, [facility]ector J Primary Care Provider [phone]  
   
                    ED BREEN        Attending           Unavailable  
   
                    PROVIDER, UNKNOWN   Referring           Unavailable  
   
                    Tijerina, Hector      Primary Care        Unavailable  
   
                    PROVIDER, UNKNOWN   Referring           Unavailable  
   
                    Tijerina, Hector      Primary Care        Unavailable  
   
                    Odell Bryan Attending           Unavailable  
   
                    ED BREEN        Attending           Unavailable  
   
                    PROVIDER, UNKNOWN   Referring           Unavailable  
   
                    Tijerina, Hector      Primary Care        Unavailable  
   
                    ED BREEN        Attending           Unavailable  
   
                    PROVIDER, UNKNOWN   Referring           Unavailable  
   
                    Tijerina, Hector      Primary Care        Unavailable  
   
                    JAMSHID, ED        Attending           Unavailable  
   
                    Folk, Servando       Primary Care        Unavailable  
   
                    PROVIDER, UNKNOWN   Referring           Unavailable  
   
                    PROVIDER, UNKNOWN   Referring           Unavailable  
   
                    Deng Gray     Attending           Unavailable  
   
                    Folk, Servando       Primary Care        Unavailable  
   
                    Tijerina, Hector      Primary Care        Unavailable  
   
                    PROVIDER, UNKNOWN   Referring           Unavailable  
   
                    Tramaine Fountain      Attending           Unavailable  
   
                    PROVIDER, UNKNOWN   Referring           Unavailable  
   
                    Tijerina, Hector      Primary Care        Unavailable  
   
                    Pietrolungo, Odell Attending           Unavailable  
   
                    PROVIDER, UNKNOWN   Referring           Unavailable  
   
                    Tijerina, Hector      Primary Care        Unavailable  
   
                    Pietrolungo, Odell Attending           Unavailable  
   
                    PROVIDER, UNKNOWN   Referring           Unavailable  
   
                    Pietrolungo, Odell Attending           Unavailable  
   
                    Tijerina, Hector      Primary Care        Unavailable  
   
                    PROVIDER, UNKNOWN   Referring           Unavailable  
   
                    Mariann Tan       Attending           Unavailable  
   
                    Tijerina, Hector      Primary Care        Unavailable  
   
                    JAMSHID, ED        Attending           Unavailable  
   
                    Folk, Servando       Primary Care        Unavailable  
   
                    PROVIDER, UNKNOWN   Referring           Unavailable  
   
                    JAMSHID, ED        Attending           Unavailable  
   
                    Tijerina, Hector      Primary Care        Unavailable  
   
                    PROVIDER, UNKNOWN   Referring           Unavailable  
   
                    PROVIDER, UNKNOWN   Referring           Unavailable  
   
                    Tijerina, Hector      Primary Care        Unavailable  
   
                    Pietrolungo, Odell Attending           Unavailable  
   
                    JAMSHID, ED        Attending           Unavailable  
   
                    PROVIDER, UNKNOWN   Referring           Unavailable  
   
                    Tijerina, Hector      Primary Care        Unavailable  
   
                    JAMSHID, ED        Attending           Unavailable  
   
                    Folk, Servando       Primary Care        Unavailable  
   
                    PROVIDER, UNKNOWN   Referring           Unavailable  
   
                    Tijerina, Hector      Primary Care        Unavailable  
   
                    Donald Vigil        Attending           Unavailable  
   
                    PROVIDER, UNKNOWN   Referring           Unavailable  
   
                    Bridget Mendieta      Attending           Unavailable  
   
                    Tijerina, Hector      Primary Care        Unavailable  
   
                    PROVIDER, UNKNOWN   Referring           Unavailable  
   
                    Judson Akhtar       Attending           Unavailable  
   
                    PROVIDER, UNKNOWN   Referring           Unavailable  
   
                    Tijerina, Hector      Primary Care        Unavailable  
   
                    PROVIDER, UNKNOWN   Attending           Unavailable  
   
                    Tijerina, Hector      Primary Care        Unavailable  
   
                    PROVIDER, UNKNOWN   Referring           Unavailable  
   
                    PROVIDER, UNKNOWN   Referring           Unavailable  
   
                    Deng Gray     Attending           Unavailable  
   
                    Folk, Servando       Primary Care        Unavailable  
   
                    Jd HASKINS, Elizabeth SOW Unavailable         [phone]  
   
                    Blayne Lorenz Unavailable         [phone]  
   
                    Ad LEAL Charity      Primary Care Provider [phone]  
   
                    ALLI DAWSON      Attending           Unavailable  
   
                    MAYORS, DEBORA        Attending           Unavailable  
   
                    CURT, ALLI      Attending           Unavailable  
   
                    CURT, ALLI      Attending           Unavailable  
   
                    CURT, ALLI      Attending           Unavailable  
   
                    CURT, ALLI      Attending           Unavailable  
   
                    CURT, ALLI      Attending           Unavailable  
   
                    CURT, ALLI      Attending           Unavailable  
   
                    ADAM SMITH     Attending           Unavailable  
   
                    MAYORS, DEBORA        Attending           Unavailable  
   
                    CURT, ALLI      Attending           Unavailable  
   
                    CURT, ALLI      Attending           Unavailable  
   
                    JUDSON AKHTAR       Attending           Unavailable  
   
                    MAYORS, DEBORA        Attending           Unavailable  
   
                    CURT, ALLI      Attending           Unavailable  
   
                    CURT, ALLI      Attending           Unavailable  
   
                    MAYORS, DEBORA        Attending           Unavailable  
   
                    ALLI DAWSON      Referring           Unavailable  
   
                    ALLI DAWSON      Attending           Unavailable  
   
                    NICKMONISHAODELL MARTINEZ Attending           Unavailable  
   
                    CURT, ALLI      Attending           Unavailable  
   
                    SVETA, DEBORA        Attending           Unavailable  
   
                    ALLI DAWSON      Attending           Unavailable  
   
                    CURT, ALLI      Attending           Unavailable  
   
                    SVETA, DEBORA        Attending           Unavailable  
   
                    ELIZABETH MILES Referring           Unavailable  
   
                    TIJERINA, HECTOR      Primary Care        Unavailable  
   
                    ELIZABETH MILES M Attending           Unavailable  
   
                    JD ELIZABETH M Referring           Unavailable  
   
                    TIJERINA, HECTOR      Primary Care        Unavailable  
   
                    TIJERINA, HECTOR      Attending           Unavailable  
   
                    TIJERINA, HECTOR      Referring           Unavailable  
   
                    TIJERINA, HECTOR      Primary Care        Unavailable  
   
                    AD, CHARITY         Primary Care        Unavailable  
   
                    DULCE SALVADOR     Attending           Unavailable  
   
                    JD, ELIZABETH M Referring           Unavailable  
   
                    AD, CHARITY         Primary Care        Unavailable  
   
                    ELIZABETH MILES M Attending           Unavailable  
   
                    FOLK, SERVANDO       Primary Care        Unavailable  
   
                    TIJERINA, HECTOR      Attending           Unavailable  
   
                    TIJERINA, HECTOR      Primary Care        Unavailable  
  
  
  
  
Allergies  
  
  
  
           Allergy    Reported   Allergy Type Date of    Reaction(s) Facility  
  
           Classification Allergen(s)            Onset                   
   
           Menthol    Menthol    Drug Allergy   Rash       [facility]  
  
           (5 sources)                       9                       
   
                      Menthol;   Drug Allergy   Rash       [facility]  
  
           (20 sources) Translations:            9                     [facility]  
[facility],  
  
                      [MENTHOL]                                   [place]  
   
                      Mint Chocolate Propensity to   Other (See [facility]  
  
           (20 sources) Chip Flavor; adverse    7          Comments), [facility] [place]  
,  
  
                      Translations: reactions to            Other: See [place]  
  
                      [MINT CHOCOLATE drug                  Comments     
  
                      CHIP FLAVOR]                                    
  
  
  
Medications  
Current Medications  
  
  
  
  
             Medication   Drug Class(es) Dates        Sig (Normalized) Sig (Orig  
inal)  
   
             acetaminophen 325 mg oral tablet              Start:                 
     acetaminophen  
  
             (20 sources)              2022                (TYLENOL) table  
t 650  
  
                                                                 mg  
  
  
  
  
                    Start: 2022   take 650 mg by mouth every four 650 mg,   
Oral, EVERY 4 HOURS   
PRN,  
  
                                        hours as needed, then take 4000 Starting  
 on 22 at 1132,  
  
                                        mg by mouth every twenty-four Until Disc  
ontinued, Pain Mild  
  
                                        hours as needed     (1-3), Fever, Fever   
>100.5 F (38  
  
                                                            C) Maximum dose of a  
cetaminophen  
  
                                                            is 4000 mg from all   
sources in 24  
  
                                                            hours. Recovery(Cath  
)  
   
                    Start: 2022                       acetaminophen (TYLEN  
OL) tablet  
  
                                                            1,000 mg  
   
                    Start: 2022   take 1 dose by mouth three 1,000 mg, Ora  
l, EVERY 8 HOURS  
  
                                        times daily         SCHEDULED (3 times p  
er day), First  
  
                                                            dose (after last mod  
ification) on  
  
                                                            Thu 3/3/22 at 2200 M  
aximum dose of  
  
                                                            acetaminophen is 400  
0 mg from all  
  
                                                            sources in 24 hours.  
   
                    Start: 2022   take 1000 mg by mouth every six 1,000 mg  
, Oral, EVERY 6 HOURS,  
  
                    End: 2022     hours, then take 4000 mg by First dose o  
n 22 at 2100  
  
                                        mouth every twenty-four hours Maximum do  
se of acetaminophen is  
  
                                                            4000 mg from all rios  
rces in 24  
  
                                                            hours.  
   
                    Start: 2022   take 2 tablets by mouth three acetaminop  
hen (TYLENOL) 500 MG  
  
                                        times daily as needed for pain tablet Ta  
ke 2 tablets by mouth 3  
  
                                                            times daily as neede  
d for Pain 180  
  
                                                            tablet 0 2022   
Active  
   
                    Start: 2021   take 650 mg by mouth every six 650 mg, O  
ral, EVERY 6 HOURS,   
First  
  
                                        hours, then take 4000 mg by dose on 21 at 1930  
  
                                        mouth every twenty-four hours Maximum do  
se of acetaminophen is  
  
                                                            4000 mg from all rios  
rces in 24  
  
                                                            hours.  
   
                    Start: 2020                       acetaminophen (TYLEN  
OL) tablet 650  
  
                                                            mg  
  
  
  
  
                    acetaminophen 325 mg / oxyCODONE hydrochloride 5 mg oral tab  
let Opioid Agonist        
Start: 2021                                   oxyCODONE-acetaminophen  
  
             (3 sources)               End: 2021              (PERCOCET) 5  
-325 MG per tablet  
  
                                                                 Indications: Is  
chemia of left  
  
                                                                 lower extremity  
 Take 1 tablet  
  
                                                                 by mouth every   
6 hours as  
  
                                                                 needed for Pain  
 for up to 7  
  
                                                                 days. Intended   
supply: 7 days.  
  
                                                                 Take lowest dos  
e possible to  
  
                                                                 manage pain 28   
tablet 0  
  
                                                                 2021 07/0  
2021 Active  
  
  
  
  
                    Start: 2020   take 1 tablet by mouth oxyCODONE-acetami  
nophen (PERCOCET)   
5-325  
  
                    End: 2020     every six hours as needed MG per tablet   
Indications: Gluteal  
  
                                        for pain, then take 1 abscess Take 1 tab  
let by mouth every 6  
  
                                        tablet by mouth as needed hours as neede  
d for Pain for up to 3  
  
                                        for pain            days. Intended suppl  
y: 3 days. Take  
  
                                                            lowest dose possible  
 to manage pain 12  
  
                                                            tablet 0 2020 Active  
  
  
  
  
             ALPRAZolam 0.25 mg disintegrating oral tablet Benzodiazepine Start:  
 2022                
ALPRAZolam  
  
             (1 source)                                          (NIRAVAM)  
  
                                                                 dissolvable tab  
let  
  
                                                                 0.25 mg  
   
             aspirin 81 mg chewable tablet Platelet Aggregation Start:               aspirin 81  
MG  
  
             (20 sources) Inhibitor, Nonsteroidal                           chew  
able tablet  
  
                          Anti-inflammatory Drug                           Take   
1 tablet by  
  
                                                                 mouth daily  
  
                                                                 Continue for 1   
week  
  
                                                                 only, then stop  
 30  
  
                                                                 tablet 3 2022  
  
                                                                 Active  
  
  
  
  
                    Start: 2022                       aspirin tablet 325 m  
g  
   
                    Start: 2022   take 81 mg by mouth once daily 81 mg, Or  
al, DAILY, First dose   
on  
  
                    End: 2022                         Sat 22 at 0900   
Do not crush  
  
                                                            or break.  
   
                    Start: 2021   take 81 mg by mouth once daily 81 mg, Or  
al, DAILY, First dose   
on  
  
                    End: 2022                         Wed 22 at 1200,  
 Until  
  
                                                            Discontinued, Recove  
ry(Cath)  
   
                    Start: 10-                       aspirin EC tablet 81  
 mg  
   
                    Start: 2020   take 81 mg by mouth once daily 81 mg, Or  
al, DAILY, First dose   
on  
  
                                                            Sun 20 at 1630  
   
                                        take 1 tablet by mouth once daily aspiri  
n 81 MG tablet Take 81 mg  
  
                                                            by mouth daily 0 Act  
franky  
  
  
  
  
             atorvastatin 80 mg oral tablet HMG-CoA      Start:       take 1      
   atorvastatin (LIPITOR) 80  
  
             (20 sources) Reductase    10-   tablet by    MG tablet Indic  
ations:  
  
                          Inhibitor                 mouth once   Pure hyperchole  
sterolemia  
  
                                                    daily        Take 1 tablet b  
y mouth  
  
                                                                 daily 90 tablet  
 3  
  
                                                                 2021 Acti  
ve  
  
  
  
  
                          Comment on above:         Take 80 mg by mouth once audra  
ly.  
  
  
  
  
                Blood Glucose Monitoring Suppl (ONETOUCH VERIO) w/Device KIT      
             Start: 2021   
                                        Blood Glucose Monitoring Suppl  
  
             (20 sources)                                        (ONETOUCH VERIO  
) w/Device KIT  
  
                                                                 Indications: Ty  
pe 2 diabetes  
  
                                                                 mellitus with h  
yperglycemia,  
  
                                                                 without long-te  
rm current use of  
  
                                                                 insulin (HCC) o  
nce 1 kit 0  
  
                                                                 2021 Susp  
ended  
  
  
  
  
                    Start: 2021                       Blood Glucose Monito  
ring Suppl (ONETOUCH VERIO) w/Device KIT  
  
                                                            Indications: Type 2   
diabetes mellitus with hyperglycemia, without  
  
                                                            long-term current us  
e of insulin (HCC) once 1 kit 0 2021  
  
                                                            Active  
  
  
  
  
                    cholecalciferol 9.52 unt/ml / glucose 357 mg/ml oral gel Vit  
amin D           Start:   
2022                                          Glucose (TRUEPLUS) oral  
  
             (3 sources)                                         gel 15 g  
  
  
  
  
                    Start: 2022                       Glucose (TRUEPLUS) o  
ral gel 15 g  
   
                    Start: 2022                       15 g, Oral, PRN, Low  
 blood sugar, Starting on Thu 22 at   
4  
  
                                                            If blood glucose les  
s than 50 mg/dL and patient ALERT and  
  
                                                            TOLERATING PO, give   
2 tubes glucose gel.&nbsp;If blood glucose  
  
                                                            less than 70 mg/dL a  
nd patient ALERT and TOLERATING PO, give 1  
  
                                                            tube glucose gel.&nb  
sp;Repeat blood glucose in 15 minutes. If  
  
                                                            blood glucose is les  
s than 70 mg/dL, repeat treatment and recheck  
  
                                                            blood glucose in 15   
minutes x2 and notify provider.  
  
  
  
  
             collagenase 0.25 unt/mg topical ointment Collagen-specific Start:    
                  collagenase  
  
             (1 source)   Enzyme       2022                ointment  
   
             dabigatran etexilate 150 mg oral capsule              Start:         
take 15-30 mL ANTICOAGULANT!  
  
             (20 sources)              2022   by mouth at  Renal dose is 7  
5  
  
                                                    mealtime     mg for CrCl 15-  
30  
  
                                                                 mL/min. 150 mg,  
  
                                                                 Oral, 2 TIMES  
  
                                                                 DAILY, First do  
se  
  
                                                                 on Thu 22   
at  
  
                                                                 0900, Until  
  
                                                                 Discontinued  
  
                                                                 Administer with  
 a  
  
                                                                 full glass of  
  
                                                                 water without  
  
                                                                 regard to meals  
.  
  
                                                                 Do not break,  
  
                                                                 chew, or open  
  
                                                                 capsules, as th  
is  
  
                                                                 will lead to 75  
%  
  
                                                                 increase in  
  
                                                                 absorption and  
  
                                                                 potentially  
  
                                                                 serious adverse  
  
                                                                 reactions.  
  
                                                                 Recovery(Cath)  
  
  
  
  
                    Start: 2022   take 1 capsule by mouth twice dabigatran  
 (PRADAXA) 150 MG   
capsule  
  
                                        daily               Take 1 capsule by mo  
uth 2 times  
  
                                                            daily 180 capsule 3   
2022  
  
                                                            Active  
   
                    Start: 2022   take 1 capsule by mouth twice dabigatran  
 (PRADAXA) 150 MG   
capsule  
  
                                        daily               Take 1 capsule by mo  
uth 2 times  
  
                                                            daily 60 capsule 1 0  
3/21/2022  
  
                                                            Active  
   
                    Start: 2022   take 1 capsule by mouth twice dabigatran  
 (PRADAXA) 150 MG   
capsule  
  
                                        daily               Take 1 capsule by mo  
uth 2 times  
  
                                                            daily 60 capsule 1 0  
3/21/2022  
  
                                                            Active  
   
                                        take 150 mg by mouth twice daily dabigat  
ran etexilate (PRADAXA) 150  
  
                                                            mg Take 150 mg by mo  
uth twice  
  
                                                            daily. 0 Active  
  
  
  
  
                          Comment on above:         Take 150 mg by mouth twice d  
aily.  
  
  
  
  
                    1 ml diphenhydrAMINE hydrochloride 50 mg/ml cartridge Histam  
ine-1         Start:   
2022                                          diphenhydrAMINE  
  
             (3 sources)  Receptor Antagonist End: 2022              (BLU  
DRYL) injection  
  
                                                                 12.5 mg  
  
  
  
  
                    Start: 2022                       25 mg, IntraVENous,   
EVERY 6 HOURS PRN, Itching, Starting on   
Sun  
  
                                                            22 at 0152  
   
                    Start: 2022                       25 mg, IntraVENous,   
ONCE, On Sat 22 at 1815, For 1 dose  
  
                    End: 2022                           
  
  
  
  
                    doxycycline hyclate 100 mg oral tablet Tetracycline-class Dr  
nawaf Start: 2020  
                          take 1                    doxycycline  
  
             (1 source)                End: 2020 tablet by    hyclate  
  
                                                    mouth twice  (VIBRA-TABS) 10  
0  
  
                                                    daily        MG tablet Take   
1  
  
                                                                 tablet by mouth  
 2  
  
                                                                 times daily for  
 10  
  
                                                                 days 20 tablet   
0  
  
                                                                 2020 Acti  
ve  
   
             Drug or medicament (substance)              Start:                   
     
  
             (1 source)                2021                  
   
             Elastic Bandages & Supports MISC              Start:                 
     Elastic Bandages &  
  
             (20 sources)              2019                Supports MISC  
  
                                                                 Indications:  
  
                                                                 Venous  
  
                                                                 insufficiency K  
nee  
  
                                                                 high stockings  
  
                                                                 15-20 mm Hg 1 e  
ach  
  
                                                                 1 2019  
  
                                                                 Suspended  
  
  
  
  
                    Start: 2019                       Elastic Bandages & S  
upports MISC Indications: Venous  
  
                                                            insufficiency Knee h  
igh stocking 15-20 mm Hg 1 each 1 2019  
  
                                                            Suspended  
   
                    Start: 2019                       Elastic Bandages & S  
upports MISC Indications: Venous  
  
                                                            insufficiency Knee h  
igh stockings 15-20 mm Hg 1 each 1 2019  
  
                                                            Active  
   
                    Start: 2019                       Elastic Bandages & S  
upports MISC Indications: Venous  
  
                                                            insufficiency Knee h  
igh stocking 15-20 mm Hg 1 each 1 2019  
  
                                                            Active  
  
  
  
  
             empagliflozin 25 mg oral tablet Sodium-Glucose Start:       take 1   
      empagliflozin  
  
             (20 sources) Cotransporter 2 2022   tablet by    (JARDIANCE)   
25 MG  
  
                          Inhibitor                 mouth once   tablet Indicati  
ons:  
  
                                                    daily        Type 2 diabetes  
  
                                                                 mellitus with  
  
                                                                 hyperglycemia,  
  
                                                                 without long-te  
rm  
  
                                                                 current use of  
  
                                                                 insulin (HCC) T  
jadon  
  
                                                                 1 tablet by al  
th  
  
                                                                 daily 90 tablet  
 3  
  
                                                                 2022 Acti  
ve  
  
  
  
  
                    Start: 2021   take 25 mg by mouth once 25 mg, Oral, DA  
AJMES, First dose on  
  
                                        daily               22 at 1200,  
 Recovery(Cath)  
   
                    Start: 2021   take 1 tablet by mouth once empagliflozi  
n (JARDIANCE) 25 MG  
  
                                        daily               tablet Indications:   
Type 2 diabetes  
  
                                                            mellitus with hyperg  
lycemia, without  
  
                                                            long-term current us  
e of insulin  
  
                                                            (HCC) Take 25 mg by   
mouth daily 90  
  
                                                            tablet 3 2021   
Active  
   
                    Start: 2021   take 1 tablet by mouth once empagliflozi  
n (JARDIANCE) 25 MG  
  
                    End: 2021     daily               tablet Take 25 mg by  
 mouth daily 90  
  
                                                            tablet 3 2021   
Active  
   
                    Start: 2021                       JARDIANCE 10 mg tabl  
et  
  
                    End: 2022                           
  
  
  
  
                          Comment on above:         Take 25 mg by mouth daily wi  
th breakfast.  
  
  
  
  
             fenofibrate 160 mg oral tablet Peroxisome   Start:       take 1      
   fenofibrate (TRIGLIDE)  
  
             (20 sources) Proliferator 2019   tablet by    160 MG tablet  
  
                          Receptor alpha              mouth once   Indications:   
Coronary  
  
                          Agonist                   daily        artery disease   
involving  
  
                                                                 native coronary  
 artery of  
  
                                                                 native heart wi  
thout  
  
                                                                 angina pectoris  
 , Pure  
  
                                                                 hypercholestero  
lemia TAKE  
  
                                                                 ONE (1) TABLET   
BY MOUTH  
  
                                                                 DAILY 90 tablet  
 3  
  
                                                                 2022 Acti  
ve  
  
  
  
  
                          Comment on above:         Take 160 mg by mouth.  
   
                                                    Take 160 mg by mouth once da  
james.  
  
  
  
  
             2 ml fentaNYL 0.05 mg/ml injection Opioid Agonist Start: 2022  
              fentaNYL   
(SUBLIMAZE)  
  
             (2 sources)                                         injection 50 mc  
g  
  
  
  
  
                    Start: 2022                       fentaNYL (SUBLIMAZE)  
 injection 25 mcg  
  
  
  
  
                    fluticasone propionate 0.05 mg/actuat metered dose nasal spr  
ay Corticosteroid        
Start: 2018                                   fluticasone (FLONASE)  
  
             (12 sources)                                        50 MCG/ACT nasa  
l  
  
                                                                 spray 2 sprays   
by  
  
                                                                 Each Nare route  
 daily  
  
                                                                 3 Bottle 0   
  
                                                                 Active  
   
             gabapentin 300 mg oral capsule Anti-epileptic Agent Start: 20                
gabapentin  
  
             (18 sources)              End: 2022              (NEURONTIN)   
300 MG  
  
                                                                 capsule Indicat  
ions:  
  
                                                                 Ischemia of lef  
t  
  
                                                                 lower extremity  
 , H/O  
  
                                                                 fasciotomy Take  
 1  
  
                                                                 capsule by mout  
h 3  
  
                                                                 times daily for  
 30  
  
                                                                 days. Intended  
  
                                                                 supply: 30 days  
 90  
  
                                                                 capsule 1   
  
                                                                 Active  
  
  
  
  
                    Start: 2022                       gabapentin (NEURONTI  
N) capsule 100  
  
                    End: 2022                         mg  
   
                    Start: 2022   take 100 mg by mouth three 100 mg, Oral,  
 3 TIMES DAILY, First  
  
                                        times daily         dose on Thu 3/3/22 a  
t 1545  
  
  
  
  
             glucagon (rdna) 1 mg injection Antihypoglycemic Agent Start: 2022                
glucagon (rDNA)  
  
             (4 sources)                                         injection 1 mg  
  
  
  
  
                    Start: 2022                       glucagon (rDNA) inje  
ction 1 mg  
   
                    Start: 2022   take 1 mL intravenously every hour 1 mg,  
 IntraMUSCular, PRN,   
Low  
  
                                                            blood sugar, Blood g  
lucose less  
  
                                                            than 70 mg/dL and pa  
tient NOT  
  
                                                            ALERT or NPO and rivera  
s not have  
  
                                                            IV access., Starting  
 on 22 at  Afte  
r  
  
                                                            administration, atte  
mpt  
  
                                                            intravenous access a  
nd start D5W  
  
                                                            at 100 mL/hr. Repeat  
 blood  
  
                                                            glucose in 15 minute  
s x2 and  
  
                                                            notify provider.  
   
                    Start: 2021                       glucagon (rDNA) inje  
ction 1 mg  
  
  
  
  
             150 ml glucose 50 mg/ml injection              Start: 2022     
           dextrose 50 % IV solution  
  
             (9 sources)                                           
  
  
  
  
                    Start: 2022                       dextrose 5 % solutio  
n  
   
                    Start: 2022                       dextrose 50 % IV sol  
ution  
   
                    Start: 2022                       dextrose 5 % solutio  
n  
   
                    Start: 2022                       12.5 g, IntraVENous,  
 PRN, Low blood sugar, Blood glucose less  
  
                                                            than 70 mg/dL and pa  
tient NOT ALERT or NPO., Starting on 22 at  If p  
atient does not respond within 5 minutes,  
  
                                                            repeat dose x1. Star  
t D5W at 100 mL/hour until ordering provider  
  
                                                            can be reached. Repe  
at blood glucose in 15 minutes. If blood  
  
                                                            glucose is less than  
 70 mg/dL, repeat treatment and recheck blood  
  
                                                            glucose in 15 minute  
s x2. If using Glucostabilizer, dose as  
  
                                                            instructed per feroz rodriguez  
   
                    Start: 2022                       100 mL/hr, IntraVENo  
us, PRN, Low blood sugar, Starting on 22 at 4 Star  
t infusion following administration of  
  
                                                            dextrose 50% or gluc  
agon.  
   
                    Start: 2021                       glucose (GLUTOSE) 40  
 % oral gel 15 g  
   
                    Start: 2021                       dextrose 50 % IV sol  
ution  
   
                    Start: 2021                       dextrose 5 % solutio  
n  
  
  
  
  
             Handicap Placard MISC              Start: 2021              H  
andicap Placard MISC by Does not   
apply  
  
             (20 sources)                                        route Expires  1 each 0 2021  
  
                                                                 Suspended  
  
  
  
  
                    Start: 2021                       Handicap Placard MIS  
C by Does not apply route Expires 2026   
1  
  
                                                            each 0 2021 Ac  
tive  
  
  
  
  
                    250 ml heparin sodium, porcine 100 unt/ml injection Unfracti  
onated Heparin,   
Start: 08-                                   heparin (porcine)  
  
             (17 sources) Anti-coagulant                           injection 4,0  
70  
  
                                                                 Units  
  
  
  
  
                    Start: 08-                       heparin (porcine) in  
jection  
  
                                                            8,140 Units  
   
                    Start: 2022                       heparin 25,000 units  
 in  
  
                    End: 2022                         dextrose 5 % 250 mL   
infusion  
  
                                                            (rate based)  
   
                    Start: 2022                       4,000 Units, IntraVE  
Nous, ONCE,  
  
                    End: 2022                         On Wed 3/2/22 at 101  
5, For 1  
  
                                                            dose Initial one enoch  
e bolus  
   
                    Start: 2022                       4,000 Units, IntraVE  
Nous, PRN,  
  
                    End: 2022                         Other, heparin dosin  
g  
  
                                                            algorithm, Starting   
on Wed  
  
                                                            3/2/22 at 0953 Full   
dose  
  
                                                            re-bolus based on ph  
armacy  
  
                                                            algorithm  
   
                    Start: 2022                       4,000 Units, IntraVE  
Nous, ONCE,  
  
                    End: 2022                         On u 22 at 21  
00, For 1  
  
                                                            dose Initial one enoch  
e bolus  
   
                    Start: 2022   take 11 [IU] intravenously every 11 Unit  
s/kg/hr 90.7 kg (9.977  
  
                    End: 2022     hour                mL/hr, rounded to 10  
 mL/hr),  
  
                                                            IntraVENous, CONTINU  
OUS,  
  
                                                            Starting on Wed 3/2/  
22 at 1015  
  
                                                            LOW Dose Heparin Tuan  
ght Based  
  
                                                            Dosing  
  
                                                            (ACS/MI/ECMO)&nbsp;&  
nbsp;Bolus  
  
                                                            60 units/kg IV x 1 (  
max 4000  
  
                                                            units), then 12 unit  
s/kg/hr  
  
                                                            UNLESS due to patien  
t weight  
  
                                                            rate exceeds 1000 un  
its/hour  
  
                                                            (max initial rate)&n  
bsp;Initial  
  
                                                            rate for this  
  
                                                            patient:&nbsp;&nbsp;  
  
                                                            11&nbsp;&nbsp;units/  
kg/hr&nbsp;  
  
                                                            &nbsp;aPTT < 30 Hepa  
rin Full  
  
                                                            re-bolus Increase in  
fusion by 2  
  
                                                            units/kg/hr; notify  
  
                                                            prescriber&nbsp;aPTT  
 30-49.9  
  
                                                            Heparin Half re-bolu  
s Increase  
  
                                                            infusion by 1  
  
                                                            unit/kg/hr&amp;nbsp;  
aPTT  
  
                                                            50-70.9 No bolus No  
  
                                                            change&nbsp;aPTT  
  
                                                            71-95.9&nbsp;&nbsp;   
Hold  
  
                                                            heparin for 60 min D  
ecrease  
  
                                                            infusion by 1  
  
                                                            units/kg/hr&nbsp;aPT  
T > 95.9  
  
                                                            Hold heparin for 60   
min  
  
                                                            Decrease infusion by  
 2  
  
                                                            units/kg/hr; notify   
prescriber  
  
                                                            &nbsp;&nbsp;Check aP  
TT 6 hours  
  
                                                            after initiation and  
 6 hours  
  
                                                            after every dose good  
nge; every  
  
                                                            12 hrs x 1 day after  
 2  
  
                                                            consecutive therapeu  
tic aPTT;  
  
                                                            daily after every  
  
                                                            12&nbsp;&nbsp;hrs x   
1 day is  
  
                                                            completed.  
   
                    Start: 2022                       2,000 Units, IntraVE  
Nous, PRN,  
  
                    End: 2022                         Other, heparin dosin  
g  
  
                                                            algorithm, Starting   
on Wed  
  
                                                            3/2/22 at 0953 Half   
dose  
  
                                                            re-bolus based on ph  
armacy  
  
                                                            algorithm  
   
                    Start: 2021                       heparin 25,000 units  
 in  
  
                    End: 2021                         dextrose 5 % 250 mL   
infusion  
  
                                                            (rate based)  
   
                    Start: 2021                       heparin (porcine) in  
jection  
  
                    End: 2021                         7,910 Units  
  
  
  
  
             1 ml hydrALAZINE hydrochloride 20 mg/ml injection Arteriolar   Star  
t:                    10 mg,  
  
             (1 source)   Vasodilator  2022                IntraVENous,  
  
                                                                 EVERY 1 HOUR OH  
N,  
  
                                                                 High Blood  
  
                                                                 Pressure,  
  
                                                                 Starting on Th22 at   
  
                                                                 2nd Line: Give  
  
                                                                 for SBP greater  
  
                                                                 than 140 mmHG a  
nd  
  
                                                                 HR <110 BPM  
   
             HYDROmorphone (DILAUDID) 30 mg in sodium chloride 0.9 % 30 mL PCA    
            Start:                      
HYDROmorphone  
  
             (1 source)                2022                (DILAUDID) 30 m  
g  
  
                                                                 in sodium  
  
                                                                 chloride 0.9 %   
30  
  
                                                                 mL PCA  
   
                1 ml hydrOXYzine hydrochloride 50 mg/ml injection Antihistamine   
  Start:          inject 25  
 mg by                                  25 mg,  
  
             (1 source)                2022   intramuscular IntraMUSCular,  
  
                                                    injection every EVERY 6 HOUR  
S  
  
                                                    six hours as PRN, Anxiety,  
  
                                                    needed for   Starting on Sun  
  
                                                    anxiety      22 at 1220  
   
             insulin lispro 100 unt/ml injectable solution Insulin Analog Start:  
                    insulin   
lispro  
  
             (11 sources)              2022                (HUMALOG)  
  
                                                                 injection vial  
  
                                                                 0-4 Units  
  
  
  
  
                    Start: 2022                       insulin lispro (LISA  
LOG) injection vial 0-3 Units  
   
                    Start: 2022                       insulin lispro (LISA  
LOG) injection vial 0-12 Units  
   
                    Start: 2022                       0-6 Units, SubCUTAne  
ous, 3 TIMES DAILY WITH MEALS, First dose  
 on  
  
                                                            Sat 22 at 0800   
**Corrective Low Dose  
  
                                                            Algorithm**&nbsp;Glu  
cose:  
  
                                                            Dose:&nbsp;&nb  
sp;&nbsp;&nbsp;&nbsp;&amp;nbsp;&nbsp;&nbsp;&n  
  
                                                            bsp;&nbsp;&nbsp;&nbs  
p;&nbsp; No Insulin&nbsp;140-199 1  
  
                                                            Unit&nbsp;200-249 2   
Units&nbsp;250-299 3 Units&nbsp;300-349 4  
  
                                                            Units&nbsp;350-399 5  
 Units&nbsp;Over 399 6 Units  
   
                    Start: 2022                       0-3 Units, SubCUTAne  
ous, NIGHTLY, First dose on 22   
at  
  
                                                            2100 If continuous t  
ube feedings/TPN/NPO, give correction dose  
  
                                                            based on result, no   
reduction in dose.&nbsp;&nbsp;If eating or  
  
                                                            bolus tube feeding:   
&nbsp;**Corrective Bedtime (50%) Low Dose  
  
                                                            Algorithm**&nbsp;Glu  
cose:  
  
                                                            Dose:&nbsp;&nb  
sp;&nbsp;&nbsp;&nbsp;&nbsp;&nbsp;&nbsp;&nbsp;  
  
                                                            &nbsp;&nbsp;&nbsp;&n  
bsp; No Insulin&nbsp;140-249 1  
  
                                                            Unit&nbsp;250-349 2   
Units&nbsp;Over 350 3 Units  
   
                    Start: 06-                       0-6 Units, Subcutane  
ous, NIGHTLY, First dose on 21   
at  
  
                                                            2100 If continuous t  
ube feedings/TPN/NPO, give correction dose  
  
                                                            based on result, no   
reduction in dose.&nbsp;&nbsp;If eating or  
  
                                                            bolus tube feeding:&  
nbsp;**Medium Dose Corrective  
  
                                                            Algorithm**&nbsp;Glu  
cose: Dose:&nbsp;If  
  
                                                            <139&nbsp;&nbsp;&nbs  
p;&nbsp;&nbsp;&nbsp;&nbsp;&nbsp;&nbsp;&nbsp;&  
  
                                                            nbsp;&nbsp; No  
  
                                                            Insulin&nbsp;140-199  
&nbsp;&nbsp;&nbsp;&nbsp;&nbsp;&amp;nbsp;&nbsp  
  
                                                            ;&nbsp;&nbsp;&nbsp;   
1  
  
                                                            Unit&nbsp;200-249&nb  
sp;&nbsp;&nbsp;&nbsp;&nbsp;&nbsp;&nbsp;&nbsp;  
  
                                                            &nbsp;&nbsp; 2  
  
                                                            Units&nbsp;250-299&a  
mp;nbsp;&nbsp;&nbsp;&nbsp;&nbsp;&nbsp;&nbsp;&  
  
                                                            nbsp;&nbsp;&nbsp; 3  
  
                                                            Units&nbsp;300-349&n  
bsp;&nbsp;&nbsp;&nbsp;&nbsp;&nbsp;&nbsp;&nbsp  
  
                                                            ;&amp;nbsp;&nbsp; 4  
  
                                                            Units&nbsp;350-400&n  
bsp;&nbsp;&nbsp;&nbsp;&nbsp;&nbsp;&nbsp;&nbsp  
  
                                                            ;&nbsp;&nbsp; 5 Unit  
s&nbsp;Above  
  
                                                            400&nbsp;&nbsp;&nbsp  
;&amp;nbsp;&nbsp;&nbsp; 6 Units  
   
                    Start: 2021                       0-12 Units, Subcutan  
eous, 3 TIMES DAILY WITH MEALS, First   
dose on  
  
                                                            21 at 1930   
**Medium Dose Corrective  
  
                                                            Algorithm**&nbsp;Glu  
cose: Dose:&nbsp;If  
  
                                                            <139&nbsp;&nbsp;&nbs  
p;&nbsp;&nbsp;&nbsp;&nbsp;&nbsp;&nbsp;&nbsp;&  
  
                                                            nbsp;&nbsp; No Insul  
in&nbsp;140-199 2 Units&nbsp;200-249 4  
  
                                                            Units&nbsp;250-299 6  
 Units&nbsp;300-349 8 Units&nbsp;350-400 10  
  
                                                            Units&nbsp;Above 400  
&nbsp;&nbsp;&nbsp;&nbsp;&nbsp;&nbsp; 12 Units  
   
                    Start: 2020                       0-3 Units, Subcutane  
ous, NIGHTLY, First dose on Sun 20   
at  
  
                                                            2100 If continuous t  
ube feedings/TPN/NPO, give correction dose  
  
                                                            based on result, no   
reduction in dose.&nbsp;&nbsp;If eating or  
  
                                                            bolus tube feeding:   
&nbsp;**Corrective Bedtime (50%) Low Dose  
  
                                                            Algorithm**&nbsp;Glu  
cose:  
  
                                                            Dose:&nbsp;&nb  
sp;&nbsp;&nbsp;&nbsp;&nbsp;&nbsp;&nbsp;&nbsp;  
  
                                                            &nbsp;&nbsp;&nbsp;&n  
bsp; No Insulin&nbsp;140-249 1  
  
                                                            Unit&nbsp;250-349 2   
Units&nbsp;Over 350 3 Units  
   
                    Start: 2020                       0-6 Units, Subcutane  
ous, 3 TIMES DAILY WITH MEALS, First dose  
 on  
  
                                                            Sun 20 at 1700   
**Corrective Low Dose  
  
                                                            Algorithm**&nbsp;Glu  
cose:  
  
                                                            Dose:&nbsp;&nb  
sp;&nbsp;&nbsp;&nbsp;&amp;nbsp;&nbsp;&nbsp;&n  
  
                                                            bsp;&nbsp;&nbsp;&nbs  
p;&nbsp; No Insulin&nbsp;140-199 1  
  
                                                            Unit&nbsp;200-249 2   
Units&nbsp;250-299 3 Units&nbsp;300-349 4  
  
                                                            Units&nbsp;350-399 5  
 Units&nbsp;Over 399 6 Units  
  
  
  
  
                    labetalol hydrochloride 5 mg/ml injectable solution beta-Adr  
energic Blocker   
Start: 2022                                   10 mg, IntraVENous,  
  
             (1 source)                                          EVERY 2 HOURS P  
RN,  
  
                                                                 High Blood Pres  
sure,  
  
                                                                 Starting on u  
  
                                                                 22 at   
 1st  
  
                                                                 Line: Give for   
SBP  
  
                                                                 greater than 14  
0  
  
                                                                 mmHG and HR >60  
 BPM  
   
             labetalol (NORMODYNE;TRANDATE) injection 10 mg              Start:   
2022              labetalol  
  
             (1 source)                                          (NORMODYNE;MONTEJO  
DATE)  
  
                                                                 injection 10 mg  
   
             lidocaine 0.04 mg/mg medicated patch Antiarrhythmic, Amide Start: 0  
2022                
lidocaine 4 %  
  
             (2 sources)  Local Anesthetic                           external pa  
tch 2  
  
                                                                 patch  
  
  
  
  
                    Start: 2022                       lidocaine PF 1 % inj  
ection 1 mL  
  
                    End: 2022                           
  
  
  
  
                    1 ml meperidine hydrochloride 25 mg/ml cartridge Opioid Agon  
ist      Start:   
2022                                          meperidine (DEMEROL)  
  
             (1 source)                                          injection 12.5   
mg  
   
                    24 hr metFORMIN hydrochloride 500 mg extended release oral t  
ablet Biguanide             
Start: 2021                                     
  
             (20 sources)                                          
  
  
  
  
                    Start: 2021   take 2 tablets by mouth at metFORMIN (GL  
UCOPHAGE-XR) 500 MG  
  
                    End: 2022     breakfast, then take 1 tablet extended r  
elease tablet  
  
                                        by mouth at dinner  Indications: Type 2   
diabetes  
  
                                                            mellitus with hyperg  
lycemia,  
  
                                                            without long-term cu  
rrent use of  
  
                                                            insulin (HCC) Take 1  
000mg by mouth  
  
                                                            with breakfast and 5  
00mg by mouth  
  
                                                            with dinner. Resume   
Saturday. 270  
  
                                                            tablet 5 2022   
Active  
   
                    Start: 2021   take 2 tablets by mouth at metFORMIN (GL  
UCOPHAGE-XR) 500 MG  
  
                                        breakfast, then take 1 tablet extended r  
elease tablet  
  
                                        by mouth at dinner  Indications: Type 2   
diabetes  
  
                                                            mellitus with hyperg  
lycemia,  
  
                                                            without long-term cu  
rrent use of  
  
                                                            insulin (HCC) Take 1  
000mg by mouth  
  
                                                            with breakfast and 5  
00mg by mouth  
  
                                                            with dinner --Resume  
 on 21 270  
  
                                                            tablet 1 2021   
Active  
   
                    Start: 2021   take 2 tablets by mouth at metFORMIN (GL  
UCOPHAGE-XR) 500 MG  
  
                                        breakfast, then take 1 tablet extended r  
elease tablet  
  
                                        by mouth at dinner  Indications: Type 2   
diabetes  
  
                                                            mellitus with hyperg  
lycemia,  
  
                                                            without long-term cu  
rrent use of  
  
                                                            insulin (HCC) Take 1  
000mg by mouth  
  
                                                            with breakfast and 5  
00mg by mouth  
  
                                                            with dinner 270 tabl  
et 1 2021  
  
                                                            Active  
   
                    Start: 2021   take 2 tablets by mouth at metFORMIN (GL  
UCOPHAGE-XR) 500 MG  
  
                                        breakfast, then take 1 tablet extended r  
elease tablet Take 1000mg  
  
                                        by mouth at dinner  by mouth with breakf  
ast and 500mg  
  
                                                            by mouth with dinner  
 270 tablet 1  
  
                                                            2021 Active  
   
                    Start: 10-   take 2 tablets by mouth at metFORMIN (GL  
UCOPHAGE-XR) 500 MG  
  
                                        breakfast, then take 1 tablet extended r  
elease tablet Take 1000mg  
  
                                        by mouth at dinner  by mouth with breakf  
ast and 500mg  
  
                                                            by mouth with dinner  
 270 tablet 1  
  
                                                            10/25/2020 Active  
   
                    Start: 10-   take 2 tablets by mouth at metFORMIN (GL  
UCOPHAGE-XR) 500 MG  
  
                                        breakfast, then take 1 tablet extended r  
elease tablet Take 1000mg  
  
                                        by mouth at dinner  by mouth with breakf  
ast and 500mg  
  
                                                            by mouth with dinner  
 270 tablet 1  
  
                                                            10/25/2020 Active  
   
                    Start: 2020   take 2 tablets by mouth at metFORMIN (GL  
UCOPHAGE-XR) 500 MG  
  
                    End: 10-     breakfast, then take 1 tablet extended r  
elease tablet Take   
1000mg  
  
                                        by mouth at dinner  by mouth with breakf  
ast and 500mg  
  
                                                            by mouth with dinner  
 270 tablet 1  
  
                                                            2020 10/23/202  
0 Discontinued  
  
                                                            (REORDER)  
   
                    Start: 2020   take 2 tablets by mouth at metFORMIN (GL  
UCOPHAGE-XR) 500 MG  
  
                                        breakfast, then take 1 tablet extended r  
elease tablet Take 1000mg  
  
                                        by mouth at dinner  by mouth with breakf  
ast and 500mg  
  
                                                            by mouth with dinner  
 270 tablet 1  
  
                                                            2020 Active  
   
                    Start: 2019   take 2 tablets by mouth at metFORMIN (GL  
UCOPHAGE-XR) 500 MG  
  
                                        breakfast, then take 1 tablet extended r  
elease tablet Take 1000mg  
  
                                        by mouth at dinner  by mouth with breakf  
ast and 500mg  
  
                                                            by mouth with dinner  
 270 tablet 1  
  
                                                            2019 Active  
  
  
  
  
                          Comment on above:         Take 1000mg by mouth with br  
eakfast and 500mg by mouth with   
dinner  
  
  
  
  
             methocarbamol 500 mg oral tablet Muscle Relaxant Start: 2022   
               
methocarbamol  
  
             (1 source)                                          (ROBAXIN) table  
t  
  
                                                                 1,000 mg  
   
                metoprolol tartrate 25 mg oral tablet beta-Adrenergic Start:  take 1   
tablet                                  metoprolol tartrate  
  
             (20 sources) Blocker                   by mouth     (LOPRESSOR) 25   
MG  
  
                                                    twice daily  tablet Take 1 t  
ablet  
  
                                                                 by mouth 2 time  
s  
  
                                                                 daily 180 table  
t 3  
  
                                                                 2021 Acti  
ve  
  
  
  
  
                    Start: 11-   take 1 tablet by mouth twice metoprolol   
tartrate (LOPRESSOR)   
25  
  
                                        daily               MG tablet Take 1 tab  
let by mouth 2  
  
                                                            times daily 180 tabl  
et 3 11/10/2020  
  
                                                            Active  
   
                    Start: 2020   take 25 mg by mouth twice daily 25 mg, O  
ral, 2 TIMES DAILY,   
First  
  
                                                            dose on Sun 20   
at 2100  
   
                    Start: 2019                       metoprolol tartrate   
(LOPRESSOR)  
  
                                                            tablet 25 mg  
   
                    Start: 10-   take 1 tablet by mouth twice metoprolol   
tartrate (LOPRESSOR)   
25  
  
                                        daily               MG tablet Take 1 tab  
let by mouth 2  
  
                                                            times daily 180 tabl  
et 3 10/24/2018  
  
                                                            Active  
   
                                        take 1 tablet by mouth twice metoprolol   
succinate ER (TOPROL XL)  
  
                                        daily               25 mg 24 hr tablet T  
jadon 25 mg by  
  
                                                            mouth twice daily. 0  
 Active  
   
                                        take 1 tablet by mouth every metoprolol   
succinate ER (TOPROL XL)  
  
                                        twenty-four hours   25 mg 24 hr tablet T  
jadon 25 mg by  
  
                                                            mouth. 0 Active  
  
  
  
  
                          Comment on above:         Take 25 mg by mouth.  
   
                                                    Take 25 mg by mouth twice da  
james.  
  
  
  
  
             morphine injection 2 mg              Start: 2021               
 morphine injection 2  
  
             (1 source)                                          mg  
   
             naloxone 0.4 mg in 10 mL sodium chloride syringe              Start  
: 2022              naloxone   
0.4 mg in 10  
  
             (1 source)                                          mL sodium chlor  
symone  
  
                                                                 syringe  
   
             nicotine 2 mg oral lozenge Cholinergic Nicotinic Start: 2021   
             nicotine   
polacrilex  
  
             (3 sources)  Agonist                                (NICORETTE) 2 M  
G  
  
                                                                 lozenge Take 1  
  
                                                                 lozenge by mout  
h as  
  
                                                                 needed for Smok  
ing  
  
                                                                 cessation 100 e  
ach 3  
  
                                                                 2021 Acti  
ve  
  
  
  
  
                    Start: 2021   apply 1 dose transdermal route nicotine   
(NICODERM CQ) 14  
  
                    End: 2021     once daily at bedtime MG/24HR Place 1 pa  
tch onto the  
  
                                                            skin daily Remove at  
 bedtime 30  
  
                                                            patch 0 2021 0  
2021  
  
                                                            Active  
  
  
  
  
             2 ml ondansetron 2 mg/ml injection Serotonin-3 Receptor Start:                 
ondansetron (ZOFRAN)  
  
             (1 source)   Antagonist   End: 2022              injection 4   
mg  
   
             ondansetron (ZOFRAN-ODT) disintegrating tablet 4 mg              St  
art: 2022                
ondansetron  
  
             (2 sources)                                         (ZOFRAN-ODT)  
  
                                                                 disintegrating   
tablet  
  
                                                                 4 mg  
  
  
  
  
                    Start: 2021                       ondansetron (ZOFRAN-  
ODT) disintegrating tablet 4 mg  
  
  
  
  
             oxyCODONE hydrochloride 5 mg oral tablet Opioid Agonist Start:                 
oxyCODONE (ROXICODONE) 5  
  
             (20 sources)              End: 2022              MG immediate  
 release  
  
                                                                 tablet Indicati  
ons:  
  
                                                                 Fall, initial e  
ncounter  
  
                                                                 , S/P below kne  
e  
  
                                                                 amputation, lef  
t (HCC)  
  
                                                                 Take 1 tablet b  
y mouth  
  
                                                                 every 6 hours a  
s needed  
  
                                                                 for Pain for up  
 to 3  
  
                                                                 days. Intended   
supply: 3  
  
                                                                 days. Take lowe  
st dose  
  
                                                                 possible to man  
age pain  
  
                                                                 12 tablet 0 2022 Acti  
ve  
  
  
  
  
                    Start: 2022                       oxyCODONE (ROXICODON  
E) immediate  
  
                    End: 2022                         release tablet 5 mg  
   
                    Start: 2022   take 1 tablet by mouth every oxyCODONE I  
R (ROXICODONE) 5 mg  
  
                    End: 2022     six hours as needed immediate release ta  
blet Take 5 mg  
  
                                                            by mouth every 6 ayla  
rs as needed. 0  
  
                                                            2022 Active  
   
                    Start: 2021                       oxyCODONE (ROXICODON  
E) immediate  
  
                                                            release tablet 5 mg  
  
  
  
  
                          Comment on above:         Take 5 mg by mouth every 6 h  
ours as needed.  
  
  
  
  
                    polyethylene glycol 3350 [id number] mg powder for oral solution O  
smotic Laxative      
Start: 2022                                   polyethylene glycol  
  
             (6 sources)                                         (GLYCOLAX) pack  
et 17 g  
  
  
  
  
                    Start: 2022                       polyethylene glycol   
3350 (MIRALAX, GLYCOLAX) 17 gram/dose   
powder  
  
                    End: 2022                         Take 17 g by mouth.   
0 2022 Active  
   
                    Start: 2020                       17 g, Oral, DAILY OH  
N, Constipation, Starting Sun 20 at  
  
                                                            1608 First line ther  
apy for constipation  
  
  
  
  
                          Comment on above:         Take 17 g by mouth.  
  
  
  
  
                    microencapsulated potassium chloride 10 meq extended release  
 oral tablet                     Start:  
                                                    40 mEq, Oral, 2  
  
             (1 source)                2022                TIMES DAILY,  
  
                                                                 First dose on T  
hu  
  
                                                                 3/3/22 at 0900   
Do  
  
                                                                 not crush, chew  
,  
  
                                                                 or suck on  
  
                                                                 tablet. Tablet  
  
                                                                 may also be  
  
                                                                 broken in half  
  
                                                                 and each half  
  
                                                                 swallowed  
  
                                                                 separately.  
   
             prasugrel 10 mg oral tablet [id number] Platelet Start: 2022 take 1  
       prasugrel  
  
             (20 sources) Inhibitor    End: 2023 tablet by    (EFFIENT) 10  
 MG  
  
                                                    mouth once   TABS Take 1  
  
                                                    daily        tablet by mouth  
  
                                                                 daily 90 tablet  
 3  
  
                                                                 2022 Acti  
ve  
  
  
  
  
                    Start: 2022   take 60 mg by mouth once 60 mg, Oral, ON  
CE, On Wed 3/2/22 at   
0800,  
  
                                                            For 1 dose  
  
  
  
  
                          Comment on above:         Take 10 mg by mouth once audra  
ly.  
  
  
  
  
             Promethazine Phenothiazine Start:                    promethazine  
  
             (1 source)                2020                (PHENERGAN) tab  
let  
  
                                                                 12.5 mg  
   
                rivaroxaban 15 mg oral tablet Factor Xa Inhibitor Start:  take 1   
tablet                                  rivaroxaban  
  
             (9 sources)               End: 2021 by mouth twice (XARELTO)   
15 MG  
  
                                                    daily at     TABS tablet Adeel  
e 1  
  
                                                    mealtime     tablet by mouth  
 2  
  
                                                                 times daily (wi  
th  
  
                                                                 meals) for 21 d  
ays  
  
                                                                 Then will begin  
  
                                                                 Xarelto 20 mg  
  
                                                                 daily on   
1  
  
                                                                 (will need new  
  
                                                                 prescription at  
  
                                                                 that time) 42  
  
                                                                 tablet 0  
  
                                                                 2021 Acti  
ve  
  
  
  
  
                    Start: 2021   take 1 tablet by mouth twice rivaroxaban  
 (XARELTO) 2.5 MG TABS  
  
                                        daily               tablet Take 1 tablet  
 by mouth 2  
  
                                                            times daily 180 tabl  
et 3 2021  
  
                                                            Active  
   
                    Start: 2021                       rivaroxaban 15 & 20   
MG Starter Pack  
  
                    End: 2021                         Take as directed on   
package. 1  
  
                                                            Package 0 2021  
  
                                                            Discontinued (Stop T  
aking at  
  
                                                            Discharge)  
   
                    Start: 2021   take 1 tablet by mouth twice rivaroxaban  
 (XARELTO) 2.5 MG TABS  
  
                                        daily               tablet Take 1 tablet  
 by mouth 2  
  
                                                            times daily 60 table  
t 1 2021  
  
                                                            Active  
   
                                        take 1 tablet by mouth once rivaroxaban   
(XARELTO) 15 MG TABS  
  
                                        daily               tablet Take 15 mg by  
 mouth daily 0  
  
                                                            Active  
  
  
  
  
             5 ml sodium chloride 9 mg/ml injection              Start: 20              0.9 % sodium chloride  
 infusion  
  
             (20 sources)                                          
  
  
  
  
                    Start: 2022                       sodium chloride flus  
h 0.9 %  
  
                                                            injection 5-40 mL  
   
                    Start: 2022                       0.9 % sodium chlorid  
e infusion  
  
                    End: 08-                           
   
                    Start: 2022   take 1 dose intravenously twice 5-40 mL,  
 IntraVENous, EVERY 12  
  
                                        daily               HOURS SCHEDULED (2 t  
imes per  
  
                                                            day), First dose on   
22  
  
                                                            at 2100, Until Disco  
ntinued For  
  
                                                            Line Patency: Periph  
eral IV = 5  
  
                                                            mL; Midline or Centr  
al Line = 10  
  
                                                            mL/lumen.&amp;nbsp;&  
nbsp;If  
  
                                                            following IV push me  
dication,  
  
                                                            administer flush at   
same rate as  
  
                                                            the IV push. Flush v  
olume is  
  
                                                            determined by type o  
f infusion  
  
                                                            therapy being  
  
                                                            given.&nbsp;&nbsp;Fo  
r  
  
                                                            non-viscous solution  
s  
  
                                                            use:&nbsp;Peripheral  
 IV = 5  
  
                                                            mL&nbsp;Midline or C  
entral Line  
  
                                                            = 10 mL/lumen&nbsp;&  
amp;nbsp;For  
  
                                                            viscous solutions (i  
.e. blood  
  
                                                            components, parenter  
al  
  
                                                            nutrition, contrast   
media, or  
  
                                                            after obtaining bloo  
d sample)  
  
                                                            use:&nbsp;Peripheral  
 IV = 10  
  
                                                            mL&nbsp;Midline or C  
entral Line  
  
                                                            = 20 mL/lumen Recove  
ry(Cath)  
   
                    Start: 2022   take 25 mL intravenously every 25 mL, In  
traVENous, at 100  
  
                                        hour as needed      mL/hr, PRN, If patie  
nt receiving  
  
                                                            piggyback infusions   
without  
  
                                                            ordered maintenance   
IV fluids or  
  
                                                            with frequent/long d  
uration  
  
                                                            piggyback infusions,  
 Starting on  
  
                                                            22 at 1132   
Administer  
  
                                                            at the same rate as   
the  
  
                                                            piggyback being infu  
sed.  
  
                                                            Recovery(Cath)  
   
                    Start: 2022                       0.9 % sodium chlorid  
e infusion  
   
                    Start: 2022                       5-40 mL, IntraVENous  
, PRN,  
  
                                                            Starting on  at 1132,  
  
                                                            Until Discontinued,   
Line Care  
  
                                                            For Line Patency: Pe  
ripheral IV  
  
                                                            = 5 mL; Midline or C  
entral Line  
  
                                                            = 10 mL/lumen.&nbsp;  
&nbsp;If  
  
                                                            following IV push me  
dication,  
  
                                                            administer flush at   
same rate as  
  
                                                            the IV push. Flush v  
olume is  
  
                                                            determined by type o  
f infusion  
  
                                                            therapy being  
  
                                                            given.&nbsp;&nbsp;Fo  
r  
  
                                                            non-viscous solution  
s  
  
                                                            use:&nbsp;Peripheral  
 IV = 5  
  
                                                            mL&nbsp;Midline or C  
entral Line  
  
                                                            = 10 mL/lumen&nbsp;&  
nbsp;For  
  
                                                            viscous solutions (i  
.e. blood  
  
                                                            components, parenter  
al  
  
                                                            nutrition, contrast   
media, or  
  
                                                            after obtaining bloo  
d sample)  
  
                                                            use:&amp;nbsp;Periph  
eral IV = 10  
  
                                                            mL&nbsp;Midline or C  
entral Line  
  
                                                            = 20 mL/lumen Recove  
ry(Cath)  
   
                    Start: 2022                       0.9 % sodium chlorid  
e bolus  
   
                    Start: 2022                       0.9 % sodium chlorid  
e infusion  
   
                    Start: 2022                       sodium chloride flus  
h 0.9 %  
  
                                                            injection 5-40 mL  
   
                    Start: 2022                       1,000 mL, Irrigation  
, CONTINUOUS  
  
                                                            PRN, for irrigation   
with Wound  
  
                                                            V.A.C device, Starti  
ng on Wed  
  
                                                            3/2/22 at 1210  
   
                    Start: 2022                       5-40 mL, IntraVENous  
, PRN, Line  
  
                    End: 2022                         Care, Starting on Tu  
e 3/1/22 at  
  
                                                            2310 For Line Patenc  
y:  
  
                                                            Peripheral IV = 5 mL  
; Midline or  
  
                                                            Central Line = 10  
  
                                                            mL/lumen.&nbsp;&nbsp  
;If  
  
                                                            following IV push me  
dication,  
  
                                                            administer flush at   
same rate as  
  
                                                            the IV push. Flush v  
olume is  
  
                                                            determined by type o  
f infusion  
  
                                                            therapy being  
  
                                                            given.&nbsp;&nbsp;Fo  
r  
  
                                                            non-viscous solution  
s  
  
                                                            use:&nbsp;Peripheral  
 IV = 5  
  
                                                            mL&nbsp;Midline or C  
entral Line  
  
                                                            = 10 mL/lumen&nbsp;&  
nbsp;For  
  
                                                            viscous solutions (i  
.e. blood  
  
                                                            components, parenter  
al  
  
                                                            nutrition, contrast   
media, or  
  
                                                            after obtaining bloo  
d sample)  
  
                                                            use:&nbsp;Peripheral  
 IV = 10  
  
                                                            mL&nbsp;Midline or C  
entral Line  
  
                                                            = 20 mL/lumen Recove  
ry(Cath)  
   
                    Start: 2022   take 25 mL intravenously every 25 mL, In  
traVENous, at 100  
  
                                        hour as needed      mL/hr, PRN, If patie  
nt receiving  
  
                                                            piggyback infusions   
without  
  
                                                            ordered maintenance   
IV fluids or  
  
                                                            with frequent/long d  
uration  
  
                                                            piggyback infusions,  
 Starting on  
  
                                                            u 22 at    
Administer  
  
                                                            at the same rate as   
the  
  
                                                            piggyback being infu  
sed.  
   
                    Start: 2022   take 1 dose intravenously twice 5-40 mL,  
 IntraVENous, EVERY 12  
  
                    End: 2022     daily               HOURS SCHEDULED (2 t  
imes per  
  
                                                            day), First dose on   
Tue 3/1/22  
  
                                                            at 2330 For Line Pat  
ency:  
  
                                                            Peripheral IV = 5 mL  
; Midline or  
  
                                                            Central Line = 10  
  
                                                            mL/lumen.&nbsp;&nbsp  
;If  
  
                                                            following IV push me  
dication,  
  
                                                            administer flush at   
same rate as  
  
                                                            the IV push. Flush v  
olume is  
  
                                                            determined by type o  
f infusion  
  
                                                            therapy being  
  
                                                            given.&nbsp;&nbsp;Fo  
r  
  
                                                            non-viscous solution  
s  
  
                                                            use:&nbsp;Peripheral  
 IV = 5  
  
                                                            mL&nbsp;Midline or C  
entral Line  
  
                                                            = 10 mL/lumen&nbsp;&  
nbsp;For  
  
                                                            viscous solutions (i  
.e. blood  
  
                                                            components, parenter  
al  
  
                                                            nutrition, contrast   
media, or  
  
                                                            after obtaining bloo  
d sample)  
  
                                                            use:&nbsp;Peripheral  
 IV = 10  
  
                                                            mL&nbsp;Midline or C  
entral Line  
  
                                                            = 20 mL/lumen Recove  
ry(Cath)  
   
                    Start: 2022   take 10 mL intravenously once 10 mL, Int  
raVENous, PRN, Line  
  
                                                            Care, Starting on   
u 22 at  
  
                                                            4 After every IV   
line use  
   
                    Start: 2021                       sodium chloride flus  
h 0.9 %  
  
                                                            injection 5-40 mL  
   
                    Start: 2021                       0.9 % sodium chlorid  
e infusion  
  
                    End: 2021                           
   
                    Start: 2021                       10 mL, Intravenous,   
EVERY 12  
  
                                                            HOURS SCHEDULED (2 t  
imes per  
  
                                                            day), First dose on   
21  
  
                                                            at 2100  
   
                    Start: 2021   take 10 mL intravenously once as 10 mL,   
Intravenous, PRN, Line  
  
                                        needed              Care, After every IV  
 line use,  
  
                                                            Starting on  at 1906  
   
                    Start: 10-                       sodium chloride flus  
h 0.9 %  
  
                                                            injection 10 mL  
   
                    Start: 10-                       0.9 % sodium chlorid  
e infusion  
  
                    End: 10-                           
   
                    Start: 2020                       10 mL, Intravenous,   
EVERY 12  
  
                                                            HOURS SCHEDULED (2 t  
imes per  
  
                                                            day), First dose on   
Sun 20  
  
                                                            at 2100  
   
                    Start: 2020   take 10 mL intravenous route once 10 mL,  
 Intravenous, PRN,   
Line  
  
                                        as needed           Care, After every IV  
 line use,  
  
                                                            Starting Sun 20  
 at 1608  
  
  
  
  
                                                                   
  
             (1 source)                                            
   
                                       Start: 2021                
  
             (1 source)                                            
   
                                       Start: 2021                
  
             (1 source)                                            
   
                                       Start: 2021                
  
             (1 source)                                            
   
                                       Start: 2022              [Order 1 S  
tart] Name: oxyCODONE (ROXICODONE)  
  
             (2 sources)                                         immediate relea  
se tablet 5 mg Signed Summary: 5  
  
                                                                 mg, Oral, EVERY  
 4 HOURS PRN, Pain Moderate (4-6),  
  
                                                                 Starting on Thu  
 22 at  [Order 1 End]  
  
                                                                 [Order 2 Start]  
 Name: oxyCODONE (ROXICODONE)  
  
                                                                 immediate relea  
se tablet 10 mg Signed Summary: 10  
  
                                                                 mg, Oral, EVERY  
 4 HOURS PRN, Pain Severe (7-10),  
  
                                                                 Starting on Thu  
 22 at  [Order 2 End]  
  
  
  
  
                    Start: 2022                       [Order 1 Start] Name  
: HYDROmorphone (DILAUDID) injection 0.5   
mg  
  
                                                            Signed Summary: 0.5   
mg, IntraVENous, EVERY 3 HOURS PRN, Pain  
  
                                                            Moderate (4-6), Star  
ting on Thu 22 at  If oral and IV  
  
                                                            narcotics ordered, u  
se oral first and only use IV if oral is  
  
                                                            ineffective or canno  
t take oral.&nbsp;&nbsp;Do Not give oral and  
  
                                                            IV within 1 hour of   
each other unless specifically ordered.  
  
                                                            [Order 1 End] [Order  
 2 Start] Name: HYDROmorphone (DILAUDID)  
  
                                                            injection 1 mg Dipti  
d Summary: 1 mg, IntraVENous, EVERY 3 HOURS  
  
                                                            PRN, Pain Severe (7-  
10), Starting on Thu 22 at  If oral  
  
                                                            and IV narcotics ord  
ered, use oral first and only use IV if oral  
  
                                                            is ineffective or ca  
nnot take oral.&nbsp;&nbsp;Do Not give oral  
  
                                                            and IV within 1 hour  
 of each other unless specifically ordered.  
  
                                                            [Order 2 End]  
  
  
  
Completed/Discontinued Medications  
  
  
  
  
             Medication   Drug Class(es) Dates        Sig (Normalized) Sig (Orig  
inal)  
   
             1 ml alirocumab 75 mg/ml auto-injector [id number] Inhibitor Start:        
 inject 75 mg by   
alirocumab (PRALUENT  
  
             (20 sources)              2021   subcutaneous PEN) 75 mg/mL p  
en  
  
                                                    injection every Inject 75 mg  
  
                                                    other week   subcutaneously   
every  
  
                                                                 other week. 0  
  
                                                                 2021 Acti  
ve  
  
  
  
  
                    Start: 2021                       alirocumab (PRALUENT  
) 75 MG/ML SOAJ injection pen Inject 1 mL  
  
                    End: 10-                         into the skin every   
14 days 6 mL 3 2021 10/14/2022  
  
                                                            Discontinued (CHRISTINE EUGENE)  
  
  
  
  
                          Comment on above:         Inject 75 mg subcutaneously.  
   
                                                    Inject 75 mg subcutaneously   
every other week.  
  
  
  
  
                    alteplase (CATHFLO) 12.5 mg in sodium chloride 0.9 % 250 mL   
infusion                     Start:   
2022                                          alteplase  
  
             (1 source)                End: 08-              (CATHFLO) 12  
.5 mg  
  
                                                                 in sodium chlor  
symone  
  
                                                                 0.9 % 250 mL  
  
                                                                 infusion  
   
                    12 hr buPROPion hydrochloride 150 mg extended release oral t  
ablet Aminoketone           
Start: 12-         take 1 tablet             buPROPion SR  
  
             (20 sources)                           by mouth once (ZYBAN SR;  
  
                                                    daily        WELLBUTRIN SR)   
150  
  
                                                                 mg 12 hr tablet  
  
                                                                 TAKE 1 TABLET B  
Y  
  
                                                                 MOUTH ONCE DEJA  
Y  
  
                                                                 90 tablet 3  
  
                                                                 12/15/2022 Acti  
ve  
  
  
  
  
                    Start: 2022                       buPROPion (WELLBUTRI  
N XL) extended  
  
                                                            release tablet 150 m  
g  
   
                    Start: 2022   take 150 mg by mouth twice 150 mg, Oral,  
 2 TIMES DAILY, First  
  
                                        daily               dose on 22   
at 1200, Until  
  
                                                            Discontinued, Recove  
ry(Cath)  
   
                    Start: 2022   take 1 tablet by mouth once buPROPion HC  
L, smoking deter, 150  
  
                    End: 2023     daily               mg tab ER 12 hr Take  
 1 tablet by  
  
                                                            mouth once daily. 90  
 tablet 3  
  
                                                            2022 03/30/202  
3 Active  
   
                    Start: 2022   take 150 mg by mouth twice 150 mg, Oral,  
 2 TIMES DAILY, First  
  
                                        daily               dose on 22   
at 2100  
   
                    Start: 2021   take 1 tablet by mouth twice buPROPion (  
ZYBAN) 150 MG extended  
  
                    End: 2022     daily               release tablet Take   
1 tablet by  
  
                                                            mouth 2 times daily   
180 tablet 1  
  
                                                            2021 Active  
   
                                        take 1 tablet by mouth twice buPROPion (  
WELLBUTRIN SR) 150 MG  
  
                                        daily               extended release tab  
let Take 150 mg  
  
                                                            by mouth 2 times audra  
ly 0 Suspended  
  
  
  
  
                          Comment on above:         Take 1 tablet by mouth once   
daily.  
   
                                                    Take 150 mg by mouth.  
   
                                                    TAKE 1 TABLET BY MOUTH ONCE   
DAILY  
  
  
  
  
                                        calcium chloride 0.0014 meq/ml / potassi  
um chloride 0.004 meq/ml / sodium   
chloride 0.103 meq/ml / sodium lactate 0.028 meq/ml injectable solution           
                  Start:   
2022                                          IntraVENous, at 100 mL/hr,  
  
             (3 sources)               End: 2022              CONTINUOUS,   
Starting on Tue  
  
                                                                 3/1/22 at 0800  
  
  
  
  
                    Start: 2021                       Intravenous, at 100   
mL/hr, CONTINUOUS, Starting on 21  
  
                    End: 2021                         at 1930  
  
  
  
  
             ceFAZolin 2000 mg injection Cephalosporin Start: 2022          
      ceFAZolin (ANCEF)  
  
             (2 sources)  Antibacterial End: 2022              2000 mg in   
dextrose  
  
                                                                 4 % 100 mL IVPB  
  
                                                                 (premix)  
  
  
  
  
                    Start: 2022                       2,000 mg, IntraVENou  
s, EVERY 8 HOURS, First dose on 22  
  
                                                            at 1700, Until Disco  
ntinued  
  
  
  
  
             cilostazol 100 mg oral tablet Phosphodiesterase 3 Start:       take  
 1       cilostazol  
  
             (20 sources) Inhibitor    10-   tablet by    (PLETAL) 100 mg  
  
                                                    mouth once   tablet Take 100  
  
                                                    daily        mg by mouth onc  
e  
  
                                                                 daily. 0  
  
                                                                 10/24/2018  
  
                                                                 Active  
  
  
  
  
                    Start: 10-   take 100 mg by mouth twice 100 mg, Oral,  
 2 TIMES DAILY BEFORE  
  
                                        daily 30 minutes after mealtime MEALS, F  
irst dose on 22 at  
  
                                                            1600, Until Disconti  
nued give 30mins  
  
                                                            before or 2 hours af  
ter meals  
  
                                                            Recovery(Cath)  
  
  
  
  
                          Comment on above:         Take 100 mg by mouth.  
   
                                                    Take 100 mg by mouth once da  
james.  
  
  
  
  
             clopidogrel 75 mg oral tablet [id number] Platelet Start: 2021 take  
 75 mg by 75   
mg, Oral,  
  
             (20 sources) Inhibitor    End: 2022 mouth once   DAILY, First  
  
                                                    daily        dose on 22 at 0900  
  
  
  
  
                    Start: 2020   take 1 tablet by mouth once clopidogrel   
(PLAVIX) 75 MG tablet  
  
                                        daily               Indications: Coronar  
y artery disease  
  
                                                            involving native cor  
onary artery of  
  
                                                            native heart without  
 angina pectoris  
  
                                                            Take 1 tablet by al  
th daily 90  
  
                                                            tablet 3 2020   
Active  
   
                    Start: 10-                       clopidogrel (PLAVIX)  
 tablet 600 mg  
   
                    Start: 2019                       clopidogrel (PLAVIX)  
 tablet 75 mg  
   
                    Start: 02-   take 1 tablet by mouth once clopidogrel   
(PLAVIX) 75 MG tablet  
  
                                        daily               Take 1 tablet by al  
th daily 90  
  
                                                            tablet 3 02/15/2019   
Active  
  
  
  
  
             ubidecarenone 10 mg oral capsule                                     
     ubidecarenone Q-10 (COENZYME Q-10) 10 mg cap  
  
             (20 sources)                                        Take by mouth o  
nce daily. 0 Active  
  
  
  
  
                                        take 10 tablets by mouth once daily Flor  
zyme Q10 100 MG TABS Take 100 mg by   
mouth  
  
                                                            daily 0 Active  
  
  
  
  
                          Comment on above:         Take by mouth.  
   
                                                    Take by mouth once daily.  
  
  
  
  
                    1 ml enoxaparin sodium 100 mg/ml prefilled syringe Low Molec  
ular Weight Start:   
2022                                          90 mg (rounded from 90.7  
  
             (16 sources) Heparin                                mg = 1 mg/kg 90  
.7 kg),  
  
                                                                 SubCUTAneous, 2  
 TIMES  
  
                                                                 DAILY, First do  
se on Thu  
  
                                                                 3/3/22 at 1445  
  
  
  
  
                    Start: 2022                       enoxaparin (LOVENOX)  
 100 mg/mL  
  
                    End: 2022                         syrg INJECT 1 ML INT  
O THE SKIN 2  
  
                                                            TIMES DAILY 0 2022 Discontin  
ued  
   
                    Start: 2020   inject 40 mg by subcutaneous 40 mg, Subc  
utaneous, DAILY, First  
  
                                        injection once daily dose on Sun 20  
 at 1630  
  
  
  
  
                          Comment on above:         INJECT 1 ML INTO THE SKIN 2   
TIMES DAILY  
  
  
  
  
             famotidine 20 mg oral tablet Histamine-2 Receptor Start: 2022  
              famotidine   
(PEPCID)  
  
             (1 source)   Antagonist   End: 2022              tablet 20 mg  
  
  
  
  
                    Start: 2022                       famotidine (PEPCID)   
tablet 20 mg  
  
                    End: 2022                           
  
  
  
  
             4 ml furosemide 10 mg/ml injection Loop Diuretic Start: 2022   
             40 mg,   
IntraVENous, ONCE,  
  
             (3 sources)               End: 2022              On Fri 3/4/2  
2 at 1330,  
  
                                                                 For 1 dose  
  
  
  
  
                    Start: 2022                       40 mg, IntraVENous,   
ONCE, On Thu 3/3/22 at 1445, For 1 dose  
  
                    End: 2022                         Hold for SBP<100  
   
                    Start: 2022                       40 mg, IntraVENous,   
ONCE, On Wed 3/2/22 at 1315, For 1 dose  
  
                    End: 2022                         Hold for SBP<100  
  
  
  
  
                    1 ml HYDROmorphone hydrochloride 1 mg/ml cartridge Opioid Ag  
onist      Start:   
2022                                          HYDROmorphone  
  
             (1 source)                End: 08-              (DILAUDID)  
  
                                                                 injection 0.5 m  
g  
   
             icosapent ethyl 1000 mg oral capsule              Start: 2021  
 take 1 capsule VASCEPA   
capsule  
  
             (20 sources)                           by mouth twice Take 2 g by m  
outh  
  
                                                    daily at     twice daily wit  
h  
  
                                                    mealtime     meals. 0 2021  
  
                                                                 Active  
  
  
  
  
                    Start: 2020                       Icosapent Ethyl (VAS  
CEPA) 1 g CAPS  
  
                                                            capsule Indications:  
 Coronary artery  
  
                                                            disease involving na  
tive coronary  
  
                                                            artery of native hea  
rt without angina  
  
                                                            pectoris , Pure hype  
rcholesterolemia  
  
                                                            Take 2 capsules by m  
outh 2 times daily  
  
                                                            360 capsule 3 2022 Active  
   
                    Start: 2020   take 2 capsules by mouth Icosapent Ethyl  
 (VASCEPA) 1 g CAPS  
  
                                        twice daily, then take 1 capsule Indicat  
ions: Coronary artery  
  
                                        capsule by mouth    disease involving na  
tive coronary  
  
                                                            artery of native hea  
rt without angina  
  
                                                            pectoris , Pure hype  
rcholesterolemia  
  
                                                            Take 2 capsules by m  
outh 2 times daily  
  
                                                            120 capsule 3 2020 Active  
  
  
  
  
                          Comment on above:         TAKE 2 CAPSULES BY MOUTH 2 T  
IMES DAILY  
   
                                                    Take 2 g by mouth twice deja  
y with meals.  
  
  
  
  
             Iopamidol    Radiographic Contrast Start: 08-              i  
opamidol  
  
             (1 source)   Agent        End: 08-              (ISOVUE-370)  
 76 %  
  
                                                                 injection 75 mL  
   
             iopamidol (ISOVUE-370) 76 % injection 100 mL              Start:               iopamidol  
  
             (1 source)                End: 2021              (ISOVUE-370)  
 76 %  
  
                                                                 injection 100 m  
L  
   
             1 ml ketorolac tromethamine 15 mg/ml cartridge Nonsteroidal Start:   
2020                
ketorolac  
  
             (1 source)   Anti-inflammatory End: 2020              (TORADO  
L)  
  
                          Drug, Cyclooxygenase                           injecti  
on 15 mg  
  
                          Inhibitor                                
   
             lisinopril 5 mg oral tablet Angiotensin  Start:       take 2.5 mg    
2.5 mg, Oral,  
  
             (20 sources) Converting Enzyme 2022   by mouth     DAILY, Fir  
st dose  
  
                          Inhibitor                 once daily   on 22   
at  
  
                                                                 1200, Until  
  
                                                                 Discontinued,  
  
                                                                 Recovery(Cath)  
  
  
  
  
                    Start: 2021   take 1 tablet by mouth once lisinopril (  
PRINIVIL;ZESTRIL) 2.5   
MG  
  
                                        daily               tablet Take 1 tablet  
 by mouth daily 90  
  
                                                            tablet 3 2021   
Suspended  
   
                    Start: 11-   take 1 tablet by mouth once lisinopril (  
PRINIVIL;ZESTRIL) 2.5   
MG  
  
                                        daily               tablet Take 1 tablet  
 by mouth daily 90  
  
                                                            tablet 3 11/10/2020   
Active  
   
                    Start: 10-   take 1 tablet by mouth once lisinopril (  
PRINIVIL;ZESTRIL) 5 MG  
  
                                        daily               tablet Take 1 tablet  
 by mouth daily 30  
  
                                                            tablet 3 10/24/2020   
Active  
   
                    Start: 10-                       lisinopril (PRINIVIL  
;ZESTRIL) tablet 5  
  
                                                            mg  
   
                    Start: 2020   take 2.5 mg by mouth once 2.5 mg, Oral,   
DAILY, First dose on   
Sun  
  
                                        daily               20 at 1630  
   
                    Start: 2019   take 1 tablet by mouth once lisinopril (  
PRINIVIL;ZESTRIL) 2.5   
MG  
  
                    End: 10-     daily               tablet Take 1 tablet  
 by mouth daily 90  
  
                                                            tablet 3 2019   
10/23/2020  
  
                                                            Discontinued (Stop T  
aking at Discharge)  
  
  
  
  
                          Comment on above:         Take 2.5 mg by mouth.  
   
                                                    Take 2.5 mg by mouth once da  
james.  
  
  
  
  
             Magnesium                              take 1 tablet by mouth once   
Magnesium 250 mg tab Take 250 mg by  
  
             (20 sources)                           daily        mouth once deja  
y. 0 Active  
  
  
  
  
                                                            Magnesium 250 mg tab  
 Take 250 mg by mouth. 0  
  
                                                            Active  
   
                                        take 1 tablet by mouth once daily magnes  
ium (MAGNESIUM-OXIDE) 250 MG TABS   
tablet  
  
                                                            Take 250 mg by mouth  
 daily 0 Suspended  
   
                                        take 1 tablet by mouth once daily magnes  
ium (MAGNESIUM-OXIDE) 250 MG TABS   
tablet  
  
                                                            Take 250 mg by mouth  
 daily 0 Active  
  
  
  
  
                          Comment on above:         Take 250 mg by mouth.  
   
                                                    Take 250 mg by mouth once da  
james.  
  
  
  
  
             nitroglycerin 0.4 mg sublingual tablet Nitrate Vasodilator Start:                 
nitroGLYCERIN  
  
             (20 sources)                                        (NITROSTAT) 0.4  
 MG SL  
  
                                                                 tablet  
  
  
  
  
                    Start: 10-                       nitroglycerin sublin  
gual (NITROQUICK) 0.4 mg SL tablet   
Dissolve  
  
                                                            0.4 mg under the ton  
chan every 5 minutes as needed. 0 10/13/2020  
  
                                                            Active  
  
  
  
  
                          Comment on above:         Dissolve 0.4 mg under the to  
ngue.  
   
                                                    Dissolve 0.4 mg under the to  
ngue every 5 minutes as needed.  
  
  
  
  
             ONETOUCH VERIO REFLECT METER              Start: 08-          
      ONETOUCH VERIO  
  
             (14 sources)                                        REFLECT METER  
   
                pantoprazole 40 mg delayed release oral tablet Proton Pump     S  
tart: 2021   
take 1 tablet                           pantoprazole DR  
  
             (20 sources) Inhibitor                 by mouth once (PROTONIX) 40   
mg  
  
                                                    daily        tablet Take 40   
mg  
  
                                                                 by mouth once  
  
                                                                 daily. 0 2021  
  
                                                                 Active  
  
  
  
  
                          Comment on above:         Take 40 mg by mouth once audra  
ly.  
  
  
  
  
                    piperacillin 3000 mg / tazobactam 375 mg injection Penicilli  
n-class    Start:   
2022                                          3,375 mg,  
  
             (1 source)   Antibacterial, beta End: 2022              Intra  
VENous, EVERY 8  
  
                          Lactamase Inhibitor                           HOURS, F  
irst dose on  
  
                                                                 Sun 22 at   
0845,  
  
                                                                 Until Discontin  
ued  
   
             sildenafil 100 mg oral tablet Phosphodiesterase 5 Start: 10-  
              sildenafil   
(VIAGRA)  
  
             (20 sources) Inhibitor                              100 mg tablet T  
jadon  
  
                                                                 100 mg by mouth  
 as  
  
                                                                 needed. 0 10/24  
/2018  
  
                                                                 Active  
  
  
  
  
                    Start: 10-                         
  
  
  
  
                          Comment on above:         Take 100 mg by mouth.  
   
                                                    Take 100 mg by mouth as need  
ed.  
  
  
  
  
             200 ml vancomycin 5 mg/ml injection Glycopeptide Start: 2022   
             1,000 mg (11  
  
             (1 source)   Antibacterial End: 2022              mg/kg),  
  
                                                                 IntraVENous, at  
  
                                                                 200 mL/hr,  
  
                                                                 Administer over  
 60  
  
                                                                 Minutes, EVERY   
12  
  
                                                                 HOURS, First do  
se  
  
                                                                 on Sun 22   
at  
  
                                                                 0900  
   
                varenicline 0.5 mg oral tablet Partial Cholinergic Start: 2019 take 1   
tablet                                  varenicline  
  
             (3 sources)  Nicotinic Agonist End: 2020 by mouth     (CHANTI  
X STARTING  
  
                                                    once, then   MONTH LIZET) 0.5   
MG  
  
                                                    take 2       X 11 & 1 MG X 4  
2  
  
                                                    tablets by   tablet  
  
                                                    mouth        Indications:  
  
                                                                 Tobacco abuse T  
jadon  
  
                                                                 by mouth. 1 box  
 0  
  
                                                                 2019  
  
                                                                 Discontinued (L  
IST  
  
                                                                 CLEANUP)  
  
  
  
  
                    Start: 10-   take 2 tablets by mouth twice vareniclin  
e (CHANTIX) 0.5 MG   
tablet  
  
                                        daily               Take 1-2 tablets by   
mouth See Admin  
  
                                                            Instructions 0.5mg D  
AILY for 3 days  
  
                                                            followed by 0.5mg TW  
ICE DAILY for 4  
  
                                                            days followed by 1mg  
 TWICE DAILY 57  
  
                                                            tablet 0 10/02/2019   
Active  
  
  
  
  
             VITAMIN E ORAL                                        VITAMIN E ORA  
L Take by mouth once daily. 0 Active  
  
             (5 sources)                                           
  
  
  
  
                          Comment on above:         Take by mouth once daily.  
  
  
  
Problems  
Active Problems  
  
  
  
             Problem Classification Problem      Date         Documented Date Ep  
isodic/Chronic  
   
             Acute myocardial infarction Myocardial infarction;              10-  
   Chronic  
  
             (20 sources) Translations: [Acute                             
  
                          myocardial infarction,                             
  
                          unspecified]                             
   
             Administrative/social admission Persons encountering Onset:          
            Episodic  
  
             (2 sources)  health services in 03-                   
  
                          other specified 2                           
  
                          circumstances;                             
  
                          Translations: [Other                             
  
                          specified counseling]                             
   
             Allergic reactions Allergy status to Onset:                    Epis  
odic  
  
             (4 sources)  other drugs,                    
  
                          medicaments and 2                           
  
                          biological substances                             
  
                          status; Translations:                             
  
                          [Other allergy status,                             
  
                          other than to drugs                             
  
                          and biological                             
  
                          substances]                              
   
                Aortic and peripheral arterial embolism or thrombosis Acute occl  
usion of Onset:            
2022                              Chronic  
  
             (20 sources) artery of lower limb                    
  
                          due to thrombosis; 2                           
  
                          Translations:                             
  
                          [Embolism and                             
  
                          thrombosis of arteries                             
  
                          of the lower                             
  
                          extremities]                             
   
             Cancer of bronchus; lung Primary malignant Onset:       2020   
  Chronic  
  
             (20 sources) neoplasm of bronchus                    
  
                          of left upper lobe; 9                           
  
                          Translations:                             
  
                          [Malignant neoplasm of                             
  
                          upper lobe, left                             
  
                          bronchus or lung]                             
   
             Cancer of bronchus; lung Personal history of Onset:                  
    Episodic  
  
             (2 sources)  other malignant                    
  
                          neoplasm of bronchus 2                           
  
                          and lung;                                
  
                          Translations:                             
  
                          [Personal history of                             
  
                          malignant neoplasm of                             
  
                          bronchus and lung]                             
   
             Cancer; other and unspecified primary Personal history of            
    2019   Episodic  
  
             (20 sources) malignant neoplasm of                             
  
                          other organs and                             
  
                          systems; Translations:                             
  
                          [History of squamous                             
  
                          cell carcinoma]                             
   
             Chronic kidney disease Chronic kidney disease Onset:       20  
18   Chronic  
  
             (20 sources) stage 3; Translations:                    
  
                          [CKD (chronic kidney 8                           
  
                          disease), stage III]                             
   
             Chronic kidney disease Chronic kidney Onset:                      
  
             (2 sources)  disease; Translations:                    
  
                          [Chronic kidney 2                           
  
                          disease, stage 3                             
  
                          unspecified]                             
   
                    Chronic obstructive pulmonary disease and bronchiectasis Chr  
onic obstructive   
Onset:                    06-                Chronic  
  
             (20 sources) lung disease; 06-                   
  
                          Translations: [Chronic 7                           
  
                          obstructive pulmonary                             
  
                          disease, unspecified]                             
   
             Chronic ulcer of skin Necrosis of lower leg Onset:       2022  
   Chronic  
  
             (20 sources) muscle co-occurrent                    
  
                          and due to chronic 1                           
  
                          ulcer of lower leg;                             
  
                          Translations:                             
  
                          [Non-pressure chronic                             
  
                          ulcer of unspecified                             
  
                          part of left lower leg                             
  
                          with necrosis of                             
  
                          muscle]                                  
   
             Complication of device; implant or graft Arteriosclerosis of Onset:  
                    Chronic  
  
             (20 sources) coronary artery bypass                    
  
                          graft; Translations: 1                           
  
                          [Occlusion of                             
  
                          femoropopliteal bypass                             
  
                          graft]                                   
   
             Complication of device; implant or graft Breakdown (mechanical) Ons  
et:                    Episodic  
  
             (6 sources)  of other cardiac and                    
  
                          vascular devices and 2                           
  
                          implants, initial                             
  
                          encounter;                               
  
                          Translations:                             
  
                          [Stenosis of                             
  
                          peripheral vascular                             
  
                          stent, initial                             
  
                          encounter]                               
   
                Complications of surgical procedures or medical care Trauma and   
     Onset:            
06-                              Episodic  
  
             (20 sources) postoperative 06-                   
  
                          pulmonary    7                           
  
                          insufficiency;                             
  
                          Translations: [Acute                             
  
                          postprocedural                             
  
                          respiratory failure]                             
   
                Congestive heart failure; nonhypertensive Acute heart failure; O  
nset:          2022  
                                        Chronic  
  
             (20 sources) Translations: [Heart                    
  
                          failure, unspecified] 2                           
   
             Coronary atherosclerosis and other heart disease Coronary     Onset  
:       2017     
Chronic  
  
             (20 sources) arteriosclerosis in 05-                   
  
                          native artery; 7                           
  
                          Translations:                             
  
                          [Coronary                                
  
                          arteriosclerosis]                             
   
                Coronary atherosclerosis and other heart disease History of pascale  
nary Onset:            
06-                              Episodic  
  
             (18 sources) artery bypass 10-                   
  
                          grafting;    0                           
  
                          Translations: [History                             
  
                          of placement of stent                             
  
                          for coronary artery                             
  
                          disease]                                 
   
             Diabetes mellitus with complications Type 2 diabetes Onset:       0  
3-   Chronic  
  
             (20 sources) mellitus with                    
  
                          peripheral angiopathy; 9                           
  
                          Translations: [Type 2                             
  
                          diabetes mellitus with                             
  
                          diabetic peripheral                             
  
                          angiopathy without                             
  
                          gangrene]                                
   
             Diabetes mellitus without complication Type 2 diabetes Onset:        
 05-   Chronic  
  
             (20 sources) mellitus;    05-                   
  
                          Translations: 7                           
  
                          [Diabetes mellitus]                             
   
             Diseases of white blood cells Leukocytosis;              2017  
   Chronic  
  
             (20 sources) Translations:                             
  
                          [Elevated white blood                             
  
                          cell count,                              
  
                          unspecified]                             
   
             Disorders of lipid metabolism Pure         Onset:       05-   
  Chronic  
  
             (20 sources) hypercholesterolemia; 05-                   
  
                          Translations: 7                           
  
                          [Hypercholesterolemia]                             
   
             E Codes: Fall Fall; Translations: Onset:                    Episodi  
c  
  
             (3 sources)  [Unspecified fall,                    
  
                          initial encounter] 2                           
   
             Essential hypertension Hypertensive disorder; Onset: 05- 07-  
     
Chronic  
  
             (20 sources) Translations: Resolved: 2020                
  
                          [Essential                               
  
                          hypertension]                             
   
             Gangrene     Atherosclerosis of Onset:                    Chronic  
  
             (2 sources)  native arteries of                    
  
                          extremities with 1                           
  
                          gangrene, left leg;                             
  
                          Translations: [Athscl                             
  
                          native arteries of                             
  
                          extremities w                             
  
                          gangrene, left leg]                             
   
                    Hypertension with complications and secondary hypertension H  
ypertensive chronic   
Onset:                                              Chronic  
  
             (4 sources)  kidney disease with                    
  
                          stage 1 through stage 2                           
  
                          4 chronic kidney                             
  
                          disease, or                              
  
                          unspecified chronic                             
  
                          kidney disease;                             
  
                          Translations:                             
  
                          [Hypertensive heart                             
  
                          and chronic kidney                             
  
                          disease with heart                             
  
                          failure and stage 1                             
  
                          through stage 4                             
  
                          chronic kidney                             
  
                          disease, or                              
  
                          unspecified chronic                             
  
                          kidney disease]                             
   
             Immunizations and screening for infectious disease Encounter for On  
set:                      
Episodic  
  
             (1 source)   immunization;                    
  
                          Translations: 2                           
  
                          [Encounter for                             
  
                          immunization]                             
   
                Malignant neoplasm without specification of site Malignant neopl  
astic                   
2019                              Chronic  
  
             (20 sources) disease; Translations:                             
  
                          [Malignant (primary)                             
  
                          neoplasm, unspecified]                             
   
             Mood disorders Mood disorders; Onset:                      
  
             (2 sources)  Translations:                    
  
                          [Depression, 1                           
  
                          unspecified]                             
   
             Nutritional deficiencies Malnutrition Onset:                    Chr  
onic  
  
             (20 sources) (calorie);                      
  
                          Translations: 2                           
  
                          [Moderate                                
  
                          protein-calorie                             
  
                          malnutrition]                             
   
             Other aftercare Patient encounter                           Episodi  
c  
  
             (3 sources)  status; Translations:                             
  
                          [Other long term                             
  
                          (current) drug                             
  
                          therapy]                                 
   
             Other aftercare Post-discharge                           Episodic  
  
             (1 source)   follow-up;                               
  
                          Translations:                             
  
                          [Encounter for                             
  
                          follow-up examination                             
  
                          after completed                             
  
                          treatment for                             
  
                          conditions other than                             
  
                          malignant neoplasm]                             
   
             Other aftercare Encounter for other Onset:                    Episo  
dic  
  
             (2 sources)  specified aftercare;                    
  
                          Translations: 2                           
  
                          [Encounter for other                             
  
                          specified aftercare]                             
   
             Other aftercare Long term (current) Onset:                    Episo  
dic  
  
             (2 sources)  use of oral                     
  
                          hypoglycemic drugs; 2                           
  
                          Translations: [Long                             
  
                          term (current) use of                             
  
                          oral hypoglycemic                             
  
                          drugs]                                   
   
             Other aftercare Long term (current) Onset:                    Episo  
dic  
  
             (2 sources)  use of anticoagulants;                    
  
                          Translations: [Long 2                           
  
                          term (current) use of                             
  
                          anticoagulants]                             
   
                Other bone disease and musculoskeletal deformities History of am  
putation Onset:            
                                        Chronic  
  
             (8 sources)  of left leg through                    
  
                          tibia and fibula; 2                           
  
                          Translations:                             
  
                          [Acquired absence of                             
  
                          left leg below knee]                             
   
             Other bone disease and musculoskeletal deformities Acquired absence  
 of Onset:                      
Chronic  
  
             (2 sources)  left leg below knee;                    
  
                          Translations: 2                           
  
                          [Acquired absence of                             
  
                          left leg below knee]                             
   
             Other circulatory disease Peripheral vascular Onset:                 
     Chronic  
  
             (2 sources)  angioplasty status                    
  
                          with implants and 2                           
  
                          grafts; Translations:                             
  
                          [Peripheral vascular                             
  
                          angioplasty status w                             
  
                          implants and grafts]                             
   
             Other circulatory disease Low blood pressure;              20  
17   Episodic  
  
             (20 sources) Translations:                             
  
                          [Hypotension,                             
  
                          unspecified]                             
   
             Other connective tissue disease Intermittent                         
    Episodic  
  
             (1 source)   claudication;                             
  
                          Translations: [Pain in                             
  
                          leg, unspecified]                             
   
             Other gastrointestinal disorders Occult blood in                     
        Episodic  
  
             (1 source)   stools; Translations:                             
  
                          [Other fecal                             
  
                          abnormalities]                             
   
             Other gastrointestinal disorders Other fecal  Onset:                 
     Episodic  
  
             (1 source)   abnormalities;                    
  
                          Translations: 2                           
  
                          [Positive FIT (fecal                             
  
                          immunochemical test)]                             
   
             Other lower respiratory disease Cough; Translations: Onset:          
            Episodic  
  
             (3 sources)  [COUGH]                         
  
                                       8                           
   
             Other lower respiratory disease Respiratory                 
7   Episodic  
  
             (20 sources) insufficiency;                             
  
                          Translations: [Other                             
  
                          abnormalities of                             
  
                          breathing]                               
   
             Other lower respiratory disease Solitary pulmonary Onset:            
          Episodic  
  
             (2 sources)  nodule; Translations:                    
  
                          [Solitary pulmonary 2                           
  
                          nodule]                                  
   
             Other lower respiratory disease Other disorders of Onset:            
          Episodic  
  
             (2 sources)  lung; Translations:                    
  
                          [Other disorders of 2                           
  
                          lung]                                    
   
             Other nervous system disorders Postoperative pain ;                  
           Episodic  
  
             (18 sources) Translations: [Other                             
  
                          acute postprocedural                             
  
                          pain]                                    
   
                Other nutritional; endocrine; and metabolic disorders Obese clas  
s II; Onset:            
2018                              Chronic  
  
             (20 sources) Translations:                    
  
                          [Obesity, unspecified] 8                           
   
                    Other nutritional; endocrine; and metabolic disorders Obesit  
y, unspecified;   
Onset:                                              Chronic  
  
             (2 sources)  Translations:                    
  
                          [Obesity, unspecified] 2                           
   
                    Other nutritional; endocrine; and metabolic disorders Body m  
ass index (BMI)   
Onset:                                              Chronic  
  
             (2 sources)  30.0-30.9, adult;                    
  
                          Translations: [Body 2                           
  
                          mass index [BMI]                             
  
                          30.0-30.9, adult]                             
   
                    Other nutritional; endocrine; and metabolic disorders Body m  
ass index (BMI)   
Onset:                                              Chronic  
  
             (2 sources)  33.0-33.9, adult;                    
  
                          Translations: [Body 2                           
  
                          mass index [BMI]                             
  
                          33.0-33.9, adult]                             
   
                    Other nutritional; endocrine; and metabolic disorders Body m  
ass index (BMI)   
Onset:                                              Chronic  
  
             (2 sources)  31.0-31.9, adult;                    
  
                          Translations: [Body 2                           
  
                          mass index [BMI]                             
  
                          31.0-31.9, adult]                             
   
                Other nutritional; endocrine; and metabolic disorders Feeding pr  
oblem;                   
2017                              Episodic  
  
             (20 sources) Translations: [Other                             
  
                          specified health                             
  
                          status]                                  
   
                Other nutritional; endocrine; and metabolic disorders Obese clas  
s II; Onset:            
2018                                
  
             (8 sources)  Translations:                    
  
                          [Obesity, Class II, 8                           
  
                          BMI 35-39.9]                             
   
                                        Other screening for suspected conditions  
 (not mental disorders or infectious   
disease)        Thyroid function tests Onset:                          Episodic  
  
             (3 sources)  abnormal;                       
  
                          Translations: [Patient 2                           
  
                          encounter status]                             
   
             Other upper respiratory infections Acute upper                       
       Episodic  
  
             (1 source)   respiratory infection;                             
  
                          Translations: [Acute                             
  
                          upper respiratory                             
  
                          infection,                               
  
                          unspecified]                             
   
                Peripheral and visceral atherosclerosis Peripheral vascular Onse  
t: 05-   
01-                              Chronic  
  
             (20 sources) disease; Translations: Resolved: 2020             
     
  
                          [Atherosclerosis of                             
  
                          native arteries of the                             
  
                          extremities]                             
   
             Peripheral and visceral atherosclerosis Intermittent Onset:       0  
2019     
  
             (5 sources)  claudication of                    
  
                          bilateral lower limbs 9                           
  
                          co-occurrent and due                             
  
                          to atherosclerosis;                             
  
                          Translations:                             
  
                          [Intermittent                             
  
                          claudication of both                             
  
                          lower extremities due                             
  
                          to atherosclerosis                             
  
                          (HCC)]                                   
   
                Phlebitis; thrombophlebitis and thromboembolism Acute deep venou  
s Onset:            
2017                                
  
             (8 sources)  thrombosis of right                    
  
                          calf; Translations: 7                           
  
                          [Acute deep vein                             
  
                          thrombosis (DVT) of                             
  
                          distal end of right                             
  
                          lower extremity]                             
   
                          Pneumonia (except that caused by tuberculosis or sexua  
lly transmitted disease)   
Community acquired  Onset: 2018          Episodic  
  
             (20 sources) pneumonia;   Resolved: 2020                
  
                          Translations:                             
  
                          [Infective pneumonia]                             
   
             Residual codes; unclassified Tobacco user; Onset:       2018   
  Chronic  
  
             (8 sources)  Translations: [Tobacco                    
  
                          abuse]       8                           
   
             Residual codes; unclassified Restlessness and Onset:                 
     Chronic  
  
             (2 sources)  agitation;                      
  
                          Translations: 2                           
  
                          [Restlessness and                             
  
                          agitation]                               
   
             Residual codes; unclassified Postprocedural state Onset:             
         Episodic  
  
             (3 sources)  finding; Translations:                    
  
                          [Other specified 2                           
  
                          postprocedural states]                             
   
             Residual codes; unclassified Pain, unspecified; Onset:               
       Episodic  
  
             (2 sources)  Translations: [Pain,                    
  
                          unspecified] 2                           
   
             Residual codes; unclassified Altered mental status, Onset:           
           Episodic  
  
             (2 sources)  unspecified;                    
  
                          Translations: [Altered 2                           
  
                          mental status,                             
  
                          unspecified]                             
   
             Residual codes; unclassified Other specified Onset:                  
    Episodic  
  
             (2 sources)  postprocedural states;                    
  
                          Translations: [Other 2                           
  
                          specified                                
  
                          postprocedural states]                             
   
                          Screening and history of mental health and substance a  
buse codes Personal   
history of          Onset:                                  Episodic  
  
             (3 sources)  nicotine dependence;                    
  
                          Translations: 2                           
  
                          [Encounter for                             
  
                          screening for                             
  
                          depression]                              
   
             Septicemia (except in labor) Sepsis; Translations: Onset:          Episodic  
  
             (20 sources) [Pneumonia,                     
  
                          unspecified organism] 8                           
   
             Skin and subcutaneous tissue infections Abscess of buttock; Onset:   
                   Episodic  
  
             (5 sources)  Translations: [Other                    
  
                          specified local 2                           
  
                          infections of the skin                             
  
                          and subcutaneous                             
  
                          tissue]                                  
   
             Substance-related disorders Nicotine dependence, Onset:          Chronic  
  
             (20 sources) unspecified,                    
  
                          uncomplicated; 8                           
  
                          Translations: [Tobacco                             
  
                          dependence syndrome]                             
   
             Thyroid disorders Subclinical                            Chronic  
  
             (1 source)   hypothyroidism;                             
  
                          Translations:                             
  
                          [Subclinical                             
  
                          hypothyroidism]                             
   
             Unclassified Unknown / UNK(Unknown) Onset:                      
  
             (1 source)                                   
  
                                       8                           
   
             Unclassified Family history of Onset:       06-     
  
             (8 sources)  operative procedure; 06-                   
  
                          Translations: [FH: 7                           
  
                          CABG (coronary artery                             
  
                          bypass surgery)]                             
   
             Unclassified Wound Care;  Onset:                      
  
             (2 sources)  Translations: [Wound                    
  
                          Care]        2                           
   
             Unclassified Transition Of Care Onset:                      
  
             (1 source)                                   
  
                                       2                           
  
  
Past or Other Problems  
  
  
  
             Problem Classification Problem      Date         Documented Date Ep  
isodic/Chronic  
   
             Acute and unspecified renal failure Acute renal failure Onset:       
  2019     
Episodic  
  
             (20 sources) syndrome;    2019                  
  
                          Translations: [Acute                             
  
                          kidney failure,                             
  
                          unspecified]                             
   
             Acute posthemorrhagic anemia Acute posthemorrhagic Onset:            
          Episodic  
  
             (2 sources)  anemia; Translations: 2021                  
  
                          [Acute                                   
  
                          posthemorrhagic                             
  
                          anemia]                                  
   
             Crushing injury or internal injury Injury of kidney; Onset:       0  
2019   Episodic  
  
             (2 sources)  Translations: [Acute 2019                  
  
                          kidney injury                             
  
                          superimposed on                             
  
                          chronic kidney                             
  
                          disease]                                 
   
             Deficiency and other anemia Anemia, unspecified; Onset:              
        Episodic  
  
             (2 sources)  Translations: 2022                  
  
                          [Anemia, unspecified]                             
   
             Gangrene     Gangrene, not Onset:                    Episodic  
  
             (2 sources)  elsewhere classified; 2022                  
  
                          Translations:                             
  
                          [Gangrene, not                             
  
                          elsewhere classified]                             
   
             Malaise and fatigue Other malaise; Onset:                    Episod  
ic  
  
             (4 sources)  Translations: 2021                  
  
                          [Weakness]                               
   
                Neoplasms of unspecified nature or uncertain behavior Neoplasm o  
f lung ; Onset:            
                                        Episodic  
  
             (3 sources)  Translations: 2022                  
  
                          [Neoplasm of                             
  
                          unspecified behavior                             
  
                          of respiratory                             
  
                          system]                                  
   
             Nonspecific chest pain Chest pain;  Onset:       2020   Episo  
dic  
  
             (20 sources) Translations: [Chest 2020                  
  
                          pain, unspecified]                             
   
             Other aftercare Long term (current) Onset:                    Episo  
dic  
  
             (2 sources)  use of       2022                  
  
                          antithrombotics/antip                             
  
                          latelets;                                
  
                          Translations: [Long                             
  
                          term (current) use of                             
  
                          antithrombotics/antip                             
  
                          latelets]                                
   
             Other aftercare Long term (current) Onset:                    Episo  
dic  
  
             (2 sources)  use of opiate 2022                  
  
                          analgesic;                               
  
                          Translations: [Long                             
  
                          term (current) use of                             
  
                          opiate analgesic]                             
   
             Other aftercare Encounter for Onset:                    Episodic  
  
             (3 sources)  follow-up examination 2022                  
  
                          after completed                             
  
                          treatment for                             
  
                          conditions other than                             
  
                          malignant neoplasm;                             
  
                          Translations: [Encntr                             
  
                          for f/u exam aft                             
  
                          trtmt for cond oth                             
  
                          than malig neoplm]                             
   
             Other circulatory disease Ischemia of left Onset:                    
  Episodic  
  
             (20 sources) lower extremity; 2021                  
  
                          Translations: [Other                             
  
                          disorder of                              
  
                          circulatory system]                             
   
             Other circulatory disease History of peripheral Onset:       2021   Episodic  
  
             (20 sources) vascular angioplasty; 2021                  
  
                          Translations:                             
  
                          [Peripheral vascular                             
  
                          angioplasty status]                             
   
             Other circulatory disease Personal history of Onset:                 
     Episodic  
  
             (2 sources)  transient ischemic 2022                  
  
                          attack (TIA), and                             
  
                          cerebral infarction                             
  
                          without residual                             
  
                          deficits;                                
  
                          Translations: [Prsnl                             
  
                          hx of TIA (TIA), and                             
  
                          cereb infrc w/o resid                             
  
                          deficits]                                
   
             Other circulatory disease Peripheral vascular Onset:                 
     Episodic  
  
             (2 sources)  angioplasty status; 2022                  
  
                          Translations:                             
  
                          [Peripheral vascular                             
  
                          angioplasty status]                             
   
             Other circulatory disease Other disorder of Onset:                   
   Episodic  
  
             (2 sources)  circulatory system; 2022                  
  
                          Translations: [Other                             
  
                          disorder of                              
  
                          circulatory system]                             
   
             Other circulatory disease Hypotension, Onset:                    Ep  
isodic  
  
             (2 sources)  unspecified; 2021                  
  
                          Translations:                             
  
                          [Hypotension,                             
  
                          unspecified]                             
   
             Other connective tissue disease Disorder of lower                    
         Episodic  
  
             (1 source)   extremity;                               
  
                          Translations: [Muscle                             
  
                          spasms of both lower                             
  
                          extremities]                             
   
             Other lower respiratory disease Lung mass;   Onset:          Episodic  
  
             (20 sources) Translations: [Other 2017                  
  
                          nonspecific abnormal                             
  
                          finding of lung                             
  
                          field]                                   
   
             Other lower respiratory disease Other nonspecific Onset:             
         Episodic  
  
             (2 sources)  abnormal finding of 2022                  
  
                          lung field;                              
  
                          Translations: [Other                             
  
                          nonspecific abnormal                             
  
                          finding of lung                             
  
                          field]                                   
   
             Other lower respiratory disease Hypoxemia;   Onset:                  
    Episodic  
  
             (2 sources)  Translations: 2021                  
  
                          [Hypoxemia]                              
   
             Other nervous system disorders Other acute  Onset:                   
   Episodic  
  
             (2 sources)  postprocedural pain; 2021                  
  
                          Translations: [Other                             
  
                          acute postprocedural                             
  
                          pain]                                    
   
             Other non-epithelial cancer of skin Personal history of Onset:       
               Episodic  
  
             (2 sources)  other malignant 2022                  
  
                          neoplasm of skin;                             
  
                          Translations:                             
  
                          [Personal history of                             
  
                          other malignant                             
  
                          neoplasm of skin]                             
   
             Phlebitis; thrombophlebitis and thromboembolism Deep venous  Onset:  
       2017     
Episodic  
  
             (20 sources) thrombosis;  2017                  
  
                          Translations: [H/O:                             
  
                          thrombosis]                              
   
             Residual codes; unclassified Delirium;    Onset:       06-    
 Episodic  
  
             (20 sources) Translations: 06-                  
  
                          [Disorientation,                             
  
                          unspecified]                             
   
             Residual codes; unclassified Family history of Onset:       06-10-2  
017   Episodic  
  
             (20 sources) operative procedure; 06-                  
  
                          Translations: [Family                             
  
                          history of ischemic                             
  
                          heart disease and                             
  
                          other diseases of the                             
  
                          circulatory system]                             
   
             Residual codes; unclassified Tobacco user; Onset:       2018   
  Episodic  
  
             (20 sources) Translations: 2018                  
  
                          [Tobacco use]                             
   
             Residual codes; unclassified Personal history of Onset:              
        Episodic  
  
             (2 sources)  irradiation; 2022                  
  
                          Translations:                             
  
                          [Personal history of                             
  
                          irradiation]                             
   
                Respiratory failure; insufficiency; arrest (adult) Acute respira  
tory Onset:            
2018                              Episodic  
  
             (20 sources) failure;     2018                  
  
                          Translations: [Acute                             
  
                          respiratory failure                             
  
                          with hypoxia]                             
   
             Shock        Shock, unspecified; Onset:                    Episodic  
  
             (2 sources)  Translations: [Shock, 2021                  
  
                          unspecified]                             
   
             Unclassified COUGH, ADULT Onset:                      
  
             (1 source)                2018                  
  
  
  
Results  
  
  
  
             Test Name    Value        Interpretation Reference Range Facility  
  
  
  
  
                                        [facility]  on 2022  
  
  
  
  
             [facility]       Follow-up Appointments Normal                    [facility]  
  
                          Wound # 2 Location: Amputation Site - Below Knee; Left  
                             
  
                                        Return Appointment in 1 week. Should you  
 experience any significant changes in   
                                                      
  
                                                    your wound(s) or have any qu  
estions regarding your home care instructions   
please                                                        
  
                          contact the wound center at [phone] Mon-Fri 8-4  
30p. If after regular                             
  
                          business hours, please call your family doctor or [facility] emergency room.                             
  
                          Continue Antibiotics as directed                        
       
  
                          Bathing/ Shower/Hygiene                             
  
                          Cleanse affected area with antibacterial soap such as   
Dial                             
  
                          Additional Orders / Instructions                        
       
  
                          Wound # 2 Location: Amputation Site - Below Knee; Left  
                             
  
                                        Follow diet per physicians instructions   
- such as increasing protein intake to   
                                                      
  
                          promote wound healing                             
  
                                        monitor blood sugars to help with wound   
healing - high blood sugars can affect   
                                                      
  
                          wound healing                             
  
                          Other:                                   
  
                          NOTES : take pain medications as prescribed             
                  
  
                          Home Health                              
  
                          Wound # 2 Location: Amputation Site - [place]; Left  
                             
  
                          Continue [facility] fax [phone]  
[phone]                             
  
                          Provider Orders - Wound Treatment                       
        
  
                          Wound # 2 Location: Amputation Site - Below [place]nee; Left  
                             
  
                          Primary Dressing - collagen then packing gauze          
                     
  
                                        Secondary Dressing - CovaWound Silicone,  
 4x12 (in/in) - Frequency: 1 x Per Day   
                                                      
  
                                                    Compression Wrap - Single La  
phylicia Tubigrip Size G, 4.5x11 (in/yd) - Frequency: 1   
x                                                             
  
                          Per Day                                  
  
                          Add-Ons - Gauze Sponges 4x4 - Frequency: 1 x Per Day    
                           
  
  
  
  
                                        Progress Note  on 2022  
  
  
  
  
             Progress Note Subjective   Normal                    [facility]  
ystem  
  
                          Patient ID: Willow Bradshaw is a 67 y.o. male   
who presents for Wound                             
[facility]  
  
                          Care (Left BKA).                             
  
                                                    HPI Recheck stump wound. Sta  
rudy he was checked for prosthesis and told he needs  
                                                              
  
                          some physical therapy. Also, saw surgeon and is schedu  
led for a procedure to                             
  
                          evaluate wounds next week.                             
  
                          Review of Systems                             
  
                          Constitutional: Negative for chills, fatigue (no unusu  
al fatigue) and fever.                             
  
                                        Skin: Positive for wound. Negative for c  
olor change (from typical for patient)   
                                                      
  
                          and rash (no local irritation or redness).              
                 
  
                          Objective                                
  
                          Physical Exam                             
  
                          Vitals reviewed.                             
  
                          Constitutional:                             
  
                          General: He is not in acute distress.                   
            
  
                          Appearance: Normal appearance. He is obese.             
                  
  
                          HENT:                                    
  
                          Ears:                                    
  
                          Comments: Hearing to conversational voice is normal.    
                           
  
                          Pulmonary:                               
  
                          Effort: Pulmonary effort is normal.                     
          
  
                          Breath sounds: No wheezing (no audible wheeze).         
                      
  
                          Skin:                                    
  
                          General: Skin is warm and dry.                          
     
  
                          Findings: Wound present. No ecchymosis, erythema (no p  
eriwound erythema) or                             
  
                          rash (no visible rash or local irritatiion).            
                   
  
                          Nails: There is no clubbing.                            
   
  
                          Comments: Wound area unchanged. Still edema locally. S  
till palpable bone in                             
  
                          central open area. Little drainage. No odor.            
                   
  
                          Neurological:                             
  
                          Mental Status: He is alert and oriented to person, benjamin  
ce, and time.                             
  
                          Psychiatric:                             
  
                          Mood and Affect: Mood normal.                           
    
  
                          Behavior: Behavior normal.                             
  
                          Assessment/Plan                             
  
                          Wound Assessment:                             
  
                          Wound/Incision 22 Diabetic Ulcer Amputation site  
 - below knee Left                             
  
                          (Active)                                 
  
                          Wound Image 22 1318                             
  
                          Site Assessment Sloughing;Red;Pink;Pale 22 1332   
                            
  
                          Rosa-Wound Assessment Edema 22 1332               
                
  
                          Wound Length (cm) 9.5 cm 22 1330                  
             
  
                          Wound Width (cm) 0.7 cm 22 1330                   
            
  
                          Wound Surface Area (cm^2) 6.65 cm^2 22 1330       
                        
  
                          Wound Depth (cm) 1.7 cm 22 1330                   
            
  
                          Wound Volume (cm^3) 11.305 cm^3 22 1330           
                    
  
                          Wound Healing % -43 22 1330                       
        
  
                          Drainage Description Serosanguineous 22 1330      
                         
  
                          Odor None 22 1319                             
  
                          Drainage Amount Moderate 22 1330                  
             
  
                          Treatments Packing 22 1409                        
       
  
                          Primary Dressing Collagen Ag;Plain Packing strips  1413                             
  
                          Secondary Dressing 4x4 gauze 22 1413              
                 
  
                          Secured with Kerlex;Silk tape 22 1413             
                  
  
                          Compression Single layer tubigrip 22 1413         
                      
  
                          Dressing Status New dressing applied;Clean, dry & inta  
ct 22 1413                             
  
                          State of Healing Non-healing 22 1330              
                 
  
                          Wound Bed Granulation (%) 10 % 22 1400            
                   
  
                          Wound Bed Slough (%) 90 % 22 1400                 
              
  
                          Margins Attached edges;Well-defined edges 22 133  
0                             
  
                          Pt ed, reassurance to pt and wife, present for entire   
visit.                             
  
                          Reviewed xray result which doesn't show definite osteo  
.                             
  
                          They will follow with surgeon. And follow with us afte  
r upcoming surgery.                             
  
                          Recheck sooner prn                             
  
                          Pt and wife agree with plan.                            
   
  
                          Problem List Items Addressed This Visit                 
              
  
                          None                                     
  
                          Visit Diagnoses                             
  
                          Wound dehiscence                             
  
                          Diabetes mellitus with skin ulcer (HCC)                 
              
  
                          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  
 leg with fat layer                             
  
                          exposed (HCC)                             
  
  
  
  
                                        Office Visit  on 2022  
  
  
  
  
             Follow-up visit [id number] Willow Bradshaw [address]                    [facility]  
  
                          Date Provider Department Center                         
      
  
                          2022 [id number]  
[address]                             
  
                          No family history on file                             
  
                          Level of Service:40378 [place] OFFICE/OUTPATIENT LITZY UNDERWOOD MDM 20-29 MIN                             
  
                          Reason for Visit and Comments:                          
     
  
                          Follow-up [019879] - 4 mo.                             
  
                          Hospital Follow-up [832] - [facility] -, Angio   
2                             
  
  
  
  
                                        Progress Note  on 2022  
  
  
  
  
             Progress Note [facility] Normal               
       [facility]  
  
                          Cardiology Note                           [facility]  
  
                          DATE of SERVICE:22                             
  
                          TIME of SERVICE: 4:18 PM                             
  
                          Chief Complaint:                             
  
                          Chief Complaint                             
  
                          Patient presents with                             
  
                          Follow-up                                
  
                          4 mo.                                    
  
                          Hospital Follow-up                             
  
                          ACH -, Angio 22                            
   
  
                          History of PresentIllness:                             
  
                                Willow Bradshaw is a 67 y.o. male male w  
ith a history of DVT, tobacco                   
                                          
  
                                                    use, COPD, DM ll, HTN, hyper  
lipidemia,adenocarcinoma of Lung s/p radiation,   
PAD,                                                          
  
                          and CAD s/p PCI , CABG x3 2017. Coronary disease  
 became active again.                             
  
                                                    Cardiac cath 10/22/2020 with  
 occluded SVG-D1, patent LIMA-LAD/Dg, patent SVG to  
                                                              
  
                                OM3 with identified stenosis of the RCA. Interve  
ntion of the distal RCA by Dr Vyas with 3 overlapping SYDNIE and angioplasty of jailed R  
PL with good results.                             
  
                          He has significant PAD with previous bilateral iliac s  
tents.                             
  
                          New interventions to the left leg                       
        
  
                          1. Left femoral endarterectomy on 19.              
                 
  
                          2. Aortogram on 2019, bilateral TRAVIS stents were p  
atent with 30% ostial                             
  
                          LCIA disease and 50-60% R ostial stent narrowing (jeffy  
rit lesion).                             
  
                          Remainder of the iliac system and the L CF endarterect  
floresita profunda looked                             
  
                          excellent and widely patent. L distal SFA mild disease  
. Two vessel runoff L                             
  
                                                    leg. PTA L and R TRAVIS stent o  
stial up to 8 mm balloon. Repeat resting PVR's were  
                                                              
  
                          normal on 2019. However after exercise both of t  
hem dropped into the                             
  
                          severe range. Left claudication > Right.                
               
  
                          He had an occluded L SFA that was revascularized -  
21                             
  
                          Revascularization: Lesion crossed with Glide Cath and   
0.035  wire ROSS 6 EPD                             
  
                                                    deployed L P2 Pop. Minaya Rot  
orex rotational and excisional atherectomy...vessel  
                                                              
  
                          reopened. CBA Minaya Ultrascore 5X 100 balloon. 2 long   
inflations. Stenting L                             
  
                          SFA 2 Supera 5 x 120 stents with 1 cm. Perclose R CFA   
successful.                             
  
                                Had Fem Pop by pass by Dr Breen 2022 after   
acute thrombosis of the L SFA                   
                                          
  
                          atherectomy and with multiple return to OR and endovas  
cular repair of graft.                             
  
                          3-1-22: forth revascularization                         
      
  
                          Dx Procedure: LLE angiogram up to 3rd order vessel fro  
m RCFA approach                             
  
                          Findings: Occluded native SFA and popliteal artery.     
                          
  
                          Occluded SVG Fem AK popliteal bypass graft. Occluded    
                           
  
                          TPT Peroneal, AT and PT. Severe small vessel disease.   
                            
  
                          Access : Contralateral sheath placement (6F Ajay valadez  
th)                             
  
                          Native SFA crossed with 5F angled Arbuckle cath and Benst  
on                             
  
                          wire                                     
  
                          Ross 6 EPD placed L PT                             
  
                          Intervention: Rotarex 6F 2 passes entire SFA into TPT.  
                             
  
                          ROSS 6 filter packed and no flow. Filter retrieved and   
                            
  
                          removed. Lost access. Re crossed with Stiff Glide Wire  
.                             
  
                          Transitioned to 0.014 Command Wire in PT                
               
  
                          PTA of the SFA and Pop 6 x 250 mm Minaya Balloon long    
                           
  
                          inflations ( 5 min)                             
  
                          Inadequate effect in popliteal artery                   
            
  
                          Viabahn stent graft 6 x 100 mm deployed in the distal   
                            
  
                          SAF into proximal TPT. Excellent result.                
               
  
                          Angio showed residual TPT and proximal PT narrowing.    
                           
  
                          IVUS confirmed Severe in apparent disease on the TPT a  
nd                             
  
                          prox PT with small lumen.                             
  
                          SYDNIE placed Xience 3 mm x 38 mm brought to 3.2 mm with   
                            
  
                                                    excellent result. Wore withd  
rawn and used to cross into the AT brought to foot.  
                                                              
  
                          POBA with a 2 mm x 300 mm Cook [id number]. Vessel opened   
to the foot. Excellent                             
  
                          flow.                                    
  
                          Calf debiedimont performed by Dr Breen. R CFA sheat\h  
 removed and pressure                             
  
                          held.                                    
  
                                                    PVR 3/28/22 showed his resti  
ng PVR was completely normal with an NENA of 1 on   
the                                                           
  
                          left leg and 0.89 on the right.                         
      
  
                                        LHC 22 SVG to RCA occcluded and LMT   
stenosis (protected) worse. PCI to mid   
                                                      
  
                          LMCA Dr Tan 22.                             
  
                          LLE arterial duplex 22. There is a modestly eleva  
rere velocity in the                             
  
                          proximal left superficial femoral artery. NENA was 0.9.  
                             
  
                                -ACH -, Angio 22. Ultimately requir  
ed a LLE amp that was done by                   
                                          
  
                          Dr. Breen. Has been recovering. Had wound healing iss  
ues. Received                             
  
                          hyperbaric O2.                             
  
                                        Main reason for today's visit is recurre  
nt cheat pain. Comes at rest and after   
                                                      
  
                                exertion. A  pain  across the chest. Ntg relieve  
s the pain. Unsure if similar                   
                                          
  
                                        to prior anginal pain. Had another PCI i  
n  to RCA. Has been free of angina   
                                                      
  
                          since.                                   
  
                          Past Medical History:                             
  
                          Past Medical History:                             
  
                          Diagnosis Date                             
  
                          Adenocarcinoma of lung (HCC)                            
   
  
                          COPD (chronic obstructive pulmonary disease) (HCC)      
                         
  
                          Coronary artery disease                             
  
                          Diabetes mellitus (HCC)                             
  
                          DVT (deep venous thrombosis) (HCC)                      
         
  
                          Hypertension                             
  
                          Melanoma (HCC)                             
  
                          face and arms                             
  
                          Myocardial infarction (CMS/HCC) (HCC)                   
            
  
                          x2                                       
  
                          Peripheral arterial disease (HCC)                       
        
  
                          with claudication                             
  
                          Sleep apnea                              
  
                          Squamous cell carcinoma of arm, unspecified laterality  
                             
  
                          and face                                 
  
                          Past Surgical History                             
  
                          Past Surgical History:                             
  
                          Procedure Laterality Date                             
  
                          LEG AMPUTATION Left                             
  
                          Family History                             
  
                          No family history on file.                             
  
                          Social History                             
  
                          Social History                             
  
                          Tobacco Use                              
  
                          Smoking status: Former                             
  
                          Types: Cigarettes                             
  
                          Substance Use Topics                             
  
                          Alcohol use: Yes                             
  
                          Drug use: Not Currently                             
  
                          Allergies:                               
  
                          Allergies                                
  
                          Allergen Reactions                             
  
                          Field Mint [Mentha]                             
  
                          Mint Chocolate Chip Flavor Unknown                      
         
  
                          Mouth burning with mint flavor                          
     
  
                          Just MINT no chocolate chip                             
  
                          Menthol Rash                             
  
                          Topical like vicks vapor                             
  
                          Medicatio (more content not included)...                
               
  
  
  
  
                                        ADDENDUMNOTE  on 2022  
  
  
  
  
                    ADDENDUMNOTE        Encounter addended by: JABIER Ortiz on: 2022 11:07 AM   
[facility]  
  
                          Actions taken: Order Reconciliation Section accessed,   
Pharmacy for encounter                             
  
                          modified, Medication List reviewed                      
         
   
                    ADDENDUMNOTE        Encounter addended by: Dawn Boles RN on: 2022 9:10 AM   
Normal                                              [facility]  
  
                          Actions taken: Order Reconciliation Section accessed    
                           
  
  
  
  
                                        CT CHEST WO IVCON  on 2022  
  
  
  
  
             CT           * * *Final Report* * * Invalid                   [place]  
  
             CHEST        DATE OF EXAM: Dec 5 2022 8:40AM Interpretation          
      General  
  
             [facility] 0541 - CT CHEST WO IVCON / ACCESSION # [id number] C  
ode                      Medical  
  
             IVCON        PROCEDURE REASON: Neoplasm of lung                      
       Center  
  
                                                                   
  
                          * * * * Physician Interpretation * * * *                
               
  
                          EXAMINATION: CHEST CT WITHOUT CONTRAST                  
             
  
                          CLINICAL HISTORY: Neoplasm of lung                      
         
  
                          Technique: Spiral CT acquisition of the chest from the  
 thoracic inlet to                             
  
                          the upper abdomen without contrast.                     
          
  
                          MQ: [id number]                              
  
                          CT Radiation dose: Integrated Dose-length product (DLP  
) for this visit =                             
  
                          296.76 mGy*cm                             
  
                          CT Dose Reduction Employed: Automated exposure control  
(AEC) and iterative                             
  
                          recon                                    
  
                          Comparison: 2022 chest CT                           
    
  
                          RESULT:                                  
  
                          Limitations: None.                             
  
                          Lines, tubes, and devices: None.                        
       
  
                          Lung parenchyma and airways: No consolidation. The brianda  
tral airways are                             
  
                          patent. Bilateral centrilobular emphysematous changes.  
                             
  
                          Right lung:                              
  
                          * Stable 4 mm nodule in the right lateral upper lobe (  
3/77)                             
  
                          * Stable 7 mm nodule noted in the inferior right upper  
 lobe (3/109)                             
  
                          * Significant yet partial improvement of the right low  
er lobe                             
  
                          pleural-based groundglass opacity (3/122)               
                
  
                          Left lung:                               
  
                          * 10 mm nodule in the left lateral upper lobe immediat  
ely superior to                             
  
                          the area of architectural distortion. This seems to ha  
ve slightly                             
  
                          increased over multiple prior studies (3/93)            
                   
  
                          * Stable other 10 mm nodule in the left lateral upper   
lobe within the                             
  
                          area of architectural distortion (3/106).               
                
  
                          * Stable 4 mm nodule in the right lower medial lobe (3  
/154)                             
  
                          Pleural space: No pleural effusion. No pleural thicken  
ing.                             
  
                          Lower neck, lymph nodes, and mediastinum: The imaged t  
hyroid gland is                             
  
                          normal. No lymphadenopathy in the supraclavicular, axi  
llary,                             
  
                          mediastinal, or hilar regions.                          
     
  
                          Heart, pericardium, and thoracic vessels: The thoracic  
 aorta and main                             
  
                          pulmonary artery are normal in caliber. The cardiac ch  
ambers are normal                             
  
                          in size. Scattered aortic and coronary artery atherosc  
lerotic                             
  
                          calcifications are noted, although the study is not op  
timized for                             
  
                          coronary assessment. No pericardial effusion or thicke  
divine.                             
  
                          Bones and soft tissues: No destructive bone lesion. Re  
demonstrated                             
  
                          chronic deformity of the left lateral fifth rib. Degen  
erative changes                             
  
                          Upper abdomen: No abnormality in the imaged upper abdo  
men.                             
  
                           (topogram) images: No additional findings.        
                       
  
                          IMPRESSION:                              
  
                          1. A 10 mm nodule in the left lateral upper lobe immed  
iately superior to                             
  
                          the area of architecture distortion, seems to have sli  
ghtly increased in                             
  
                          size over multiple prior studies. Recommend short-term  
 follow-up CT chest                             
  
                          in 3 months, or PET/CT.                             
  
                          2. Stable other 10 mm nodule within the same area of a  
rchitectural                             
  
                          distortion.                              
  
                          3. Interval significant, yet partial, improvement in t  
he groundglass                             
  
                          opacity noted in the right lower lobe.                  
             
  
                          4. Stable other pulmonary nodules.                      
         
  
                          ACTIONABLE RESULT: FOLLOW-UP                            
   
  
                          Acuity: Actionable                             
  
                          Findings: Thoracic-Lung nodules Routing code: RI_1      
                         
  
                          Recommendation: Unlisted Recommendation (see report)    
                           
  
                          Time Frame: Additional evaluation as described in the   
impression                             
  
                          COMMUNICATION: Results will be communicated with the o  
[facility] provider                             
  
                          via Epic staff message or phone message by [employer]  
port Services                             
  
                          within 2 business days of report finalization.          
                     
  
                          ====================================                    
           
  
                          Algorithms for management of incidental imaging findin  
gs can be found on                             
  
                          the [facility] Intranet Sharepoint site at:       
                        
  
                          http://spo.cc.org/documentation/[email or web address]  
[email or web address]                             
  
                          [email or web address]                  
             
  
                          : MALLORY                             
  
                          Transcribe Date/Time: Dec 6 2022 8:37A                  
             
  
                          Dictated by : BIBI OLVERA MD                   
            
  
                          This examination was interpreted and the report review  
ed and                             
  
                          electronically signed by:                             
  
                          BIBI OLVERA MD on Dec 6 2022 8:57AM EST        
                       
  
                          [id number]                             
  
                          ACTIONABLE                               
  
  
  
  
                                        PATINS  on 2022  
  
  
  
  
             PATINS       Call Dr. Breen to schedule an appointment Normal       
               [facility]  
  
                          Follow-up Appointments                             
  
                          Wound # 2 Location: Amputation Site - Below Knee; Left  
                             
  
                                        Return Appointment in 1 week. Should you  
 experience any significant changes in   
                                                      
  
                                                    your wound(s) or have any qu  
estions regarding your home care instructions   
please                                                        
  
                          contact the wound center at [phone] Mon-Fri 8-4  
30p. If after regular                             
  
                          business hours, please call your family doctor or [facility] emergency room.                             
  
                          Get X-ray done as soon as possible                      
         
  
                          Continue Antibiotics as directed                        
       
  
                          Bathing/ Shower/Hygiene                             
  
                          Cleanse affected area with antibacterial soap such as   
Dial                             
  
                          Additional Orders / Instructions                        
       
  
                          Wound # 2 Location: Amputation Site - Below Knee; Left  
                             
  
                                        Follow diet per physicians instructions   
- such as increasing protein intake to   
                                                      
  
                          promote wound healing                             
  
                                        monitor blood sugars to help with wound   
healing - high blood sugars can affect   
                                                      
  
                          wound healing                             
  
                          Other:                                   
  
                          NOTES : take pain medications as prescribed             
                  
  
                          Home Health                              
  
                          Wound # 2 Location: Amputation Site - Below Knee; Left  
                             
  
                          Continue [facility] fax [phone]                             
  
                          Provider Orders - Wound Treatment                       
        
  
                          Wound # 2 Location: Amputation Site - Below Knee; Left  
                             
  
                          Primary Dressing - collagen then packing gauze          
                     
  
                                        Secondary Dressing - CovaWound Silicone,  
 4x12 (in/in) - Frequency: 1 x Per Day   
                                                      
  
                                                    Compression Wrap - Single La  
phylicia Tubigrip Size G, 4.5x11 (in/yd) - Frequency: 1   
x                                                             
  
                          Per Day                                  
  
                          Add-Ons - Gauze Sponges 4x4 - Frequency: 1 x Per Day    
                           
  
  
  
  
                                        Progress Note  on 2022  
  
  
  
  
             Progress Note Subjective   Normal                    [facility]  
  
                          Patient ID: Willow Bradshaw is a 67 y.o. male   
who presents for Wound                             
  
                          Care (Left BKA amp site).                             
  
                          HPI recheck L BKA stump wound. Feels main part of woun  
d is narrowing. Less                             
  
                                        drainage. Lateral portion of wound still  
 very tender. No odor. Has been taking   
                                                      
  
                          the doxycycline ordered last week. Has a few days left  
.                             
  
                          Review of Systems                             
  
                          Constitutional: Negative for chills, fatigue (no unusu  
al fatigue) and fever.                             
  
                                        Skin: Positive for wound. Negative for c  
olor change (from typical for patient)   
                                                      
  
                          and rash (no local irritation or redness).              
                 
  
                          Objective                                
  
                          Physical Exam                             
  
                          Vitals reviewed.                             
  
                          Constitutional:                             
  
                          General: He is not in acute distress.                   
            
  
                          Appearance: Normal appearance. He is normal weight.     
                          
  
                          HENT:                                    
  
                          Ears:                                    
  
                          Comments: Hearing to conversational voice is normal.    
                           
  
                          Pulmonary:                               
  
                          Effort: Pulmonary effort is normal.                     
          
  
                          Breath sounds: No wheezing (no audible wheeze).         
                      
  
                          Skin:                                    
  
                          General: Skin is warm and dry.                          
     
  
                          Findings: Wound present. No ecchymosis, erythema (no p  
eriwound erythema) or                             
  
                          rash (no visible rash or local irritatiion).            
                   
  
                          Nails: There is no clubbing.                            
   
  
                          Comments: Central portion of wound now probes to bone.  
 Little drainage.                             
  
                          Actual wound is narrower. No odor.                      
         
  
                          Lateral portion of wound is still very tender.          
                     
  
                          Neurological:                             
  
                          Mental Status: He is alert and oriented to person, benjamin  
ce, and time.                             
  
                          Psychiatric:                             
  
                          Mood and Affect: Mood normal.                           
    
  
                          Behavior: Behavior normal.                             
  
                          Thought Content: Thought content normal.                
               
  
                          Judgment: Judgment normal.                             
  
                          Assessment/Plan                             
  
                          Wound Assessment:                             
  
                          Wound/Incision 22 Diabetic Ulcer Amputation site  
 - below knee Left                             
  
                          (Active)                                 
  
                          Wound Image 22 1319                             
  
                          Site Assessment Pale;Sloughing 22 1346            
                   
  
                          Rosa-Wound Assessment Edema 22 1346               
                
  
                          Wound Length (cm) 0.3 cm 22 1346                  
             
  
                          Wound Width (cm) 0.3 cm 22 1346                   
            
  
                          Wound Surface Area (cm^2) 0.09 cm^2 22 1346       
                        
  
                          Wound Depth (cm) 1.7 cm 22 1346                   
            
  
                          Wound Volume (cm^3) 0.153 cm^3 22 1346            
                   
  
                          Wound Healing % 98 22 1346                        
       
  
                          Drainage Description Purulent 22 1346             
                  
  
                          Odor None 22 1319                             
  
                          Drainage Amount Large 22 1346                     
          
  
                          Treatments Packing 22 1409                        
       
  
                          Secondary Dressing 4x4 gauze 22 1409              
                 
  
                          Compression Single layer tubigrip 22 1409         
                      
  
                          State of Healing Non-healing 22 1346              
                 
  
                          Wound Bed Granulation (%) 10 % 22 1400            
                   
  
                          Wound Bed Slough (%) 90 % 22 1400                 
              
  
                          Margins Attached edges;Well-defined edges 22 134  
6                             
  
                          Pt ed, reassurance to pt and wife, present for entire   
visit.                             
  
                          We'll add another week of doxy since culture showed MR  
                             
  
                          We'll get an xray of the stump since new probing to john pineda.                             
  
                          We'll send copy of today's note to Dr. Breen, surgeon  
 and to his PCP, Dr. Tijerina.                                   
  
                          Submit order for Snap Vac.                             
  
                          Add collagen to wound bed.                             
  
                          Recheck one wk, sooner prn                             
  
                          Pt agrees with plan.                             
  
                          Problem List Items Addressed This Visit                 
              
  
                          Circulatory                              
  
                          Peripheral vascular disease (HCC)                       
        
  
                          Other Visit Diagnoses                             
  
                          Wound dehiscence                             
  
                          Diabetes mellitus with skin ulcer (HCC)                 
              
   
                          Progress Note             Saw patient and wife in offi  
ce. They will see their vascular   
doctor and decide   Normal                                  [facility]   
[facility]  
  
                          on MRI or not.                             
  
  
  
  
                                        CNOV  on 2022  
  
  
  
  
             CNOV         Office Visit ([facility]) Normal                    [place]  
  
                                                      
--------------------------------------------------------------------------------
                                                              
  
                          WILLOW BRADSHAW ([id number]) 1955              
                Medical  
  
                          Date Time Provider Department                           
  Center  
  
                          22 8:00 AM DULCE SALVADOR [facility]                   
            
  
                          During your visit today, we recorded the following inf  
ormation about you:                             
  
                          Temperature Pulse Blood pressure Weight                 
              
  
                          97.3 degrees 103/minute 124/68 86.2 kg                  
             
  
                          Height                                   
  
                          1.702 m                                  
  
                          Dulce Slavador PA-C 2022 9:41 AM Signed          
                     
  
                          [facility] [address] [address]                             
  
                          Phone: [phone]                             
  
                          Fax: [phone]                             
  
                          Date of Evaluation: 2022                           
    
  
                          Patient Name: Willow Bradshaw                          
     
  
                          : 1955                             
  
                          MRN: [id number]                             
  
                          Chief Complaint:                             
  
                          Patient presents with:                             
  
                          positive blood in stool                             
  
                          Nursing Intake:                             
  
                          Nursing Notes:                             
  
                          Usha Encarnacion MA 2022 8:14 AM Signed            
                   
  
                                Willow Bradshaw is a 67 year old male who prese  
nts to follow up for positive                   
                                          
  
                          blood in stool. Denies abdominal pain, diarrhea or con  
stipation.                             
  
                          Usha Encarnacion MA                             
  
                          Subjective                               
  
                                                    Mr. Bradshaw is a 67 year ol  
d male who presents with the following   
complaint(s):                                                 
  
                          HPI                                      
  
                          LOV 22 GALLITO Tijeirna - TCM - peripheral arterial disea  
se, S/P left BKA -                             
  
                          hyperbaric treatment, just completed yesterday. Hoping  
 to heal and get                             
  
                          prosthesis. Has chronic intermittent back pain as well  
.                             
  
                                                    Currently gets around house   
in wheelchair - has a knee scooter just for getting  
                                                              
  
                          into bathroom/shower.                             
  
                          Positive FIT - first time to have screening test. Lily  
ent has already made                             
  
                          arrangements to see [facility] in January.  
 Otherwise, no BRBPR,                             
  
                          melena, abdominal pain.                             
  
                          Specialists:                             
  
                          Hem/Onc - CASSANDRA neoplasm S/P radiation; Wellbutrin for s  
moking cessation                             
  
                          Pulmonology - next OV 2023                          
     
  
                                Cardiology - LOV  - CAD S/P PCI, and CABG, h  
yperlipidemia - maintained on                   
                                          
  
                          Pradaxa prior DVT, Effient, ACE, beta blocker, statin   
                            
  
                          Vascular Surg - LOV  to plan for CTA vs angio with  
 Dr. Valiente                             
  
                          Endocrinology - T2DM - [facility] provider next OV 2023  
                             
  
                          Ophthalmology - no DM retinopathy - summer 2023         
                      
  
                          Podiatry - summer 2022                             
  
                          Review of Systems                             
  
                          Constitutional: Negative for fatigue and unexpected we  
ight change.                             
  
                          HENT: Negative for nosebleeds.                          
     
  
                          Eyes: Negative for redness and visual disturbance.      
                         
  
                          Respiratory: Negative for apnea, cough and shortness o  
f breath.                             
  
                          Cardiovascular: Negative for chest pain, palpitations   
and leg swelling.                             
  
                          Genitourinary: Negative for hematuria.                  
             
  
                                        Neurological: Negative for dizziness, we  
akness, light-headedness, numbness and   
                                                      
  
                          headaches.                               
  
                          Hematological: Does not bruise/bleed easily.            
                   
  
                          Psychiatric/Behavioral: The patient is not nervous/anx  
ious.                             
  
                          PAST MEDICAL HISTORY                             
  
                          Diagnosis Date                             
  
                          Acute deep vein thrombosis (DVT) of popliteal vein of   
left lower extremity                             
  
                          (HCC)                                    
  
                          CAD (coronary artery disease)                           
    
  
                          Cerebral artery occlusion with cerebral infarction (HC  
C)                             
  
                          Controlled type 2 diabetes mellitus with diabetic rosa  
pheral angiopathy                             
  
                          without gangrene, without long-term current use of ins  
ulin (HCC)                             
  
                                Controlled type 2 diabetes mellitus, without lien  
g-term current use of insulin                   
                                          
  
                          (HCC)                                    
  
                          COPD (chronic obstructive pulmonary disease) (HCC)      
                         
  
                          Essential hypertension                             
  
                          History of squamous cell carcinoma                      
         
  
                          Hyperlipidemia                             
  
                          Lung cancer (HCC)                             
  
                          adenocarcinoma CASSANDRA                             
  
                          Peripheral vascular disease (HCC)                       
        
  
                          Personal history of DVT (deep vein thrombosis)          
                     
  
                          PAST SURGICAL HISTORY                             
  
                          Procedure Laterality Date                             
  
                          ANGIOPLASTY FEMORAL/POP Left                            
   
  
                          2019                                 
  
                          ANGIOPLASTY PCI 02/01/2014                             
  
                          x5                                       
  
                          BYPASS W/VEIN FEMORAL-POPLITEAL 2022              
                 
  
                          CORONARY ARTERY BYPASS GRAFT 2017                 
              
  
                          3 vessel                                 
  
                          VASCULAR SURGERY PROCEDURE 2019                   
            
  
                          femoral endarterectomy                             
  
                          FAMILY HISTORY                             
  
                          Problem Relation Age of Onset                           
    
  
                          Cancer Mother                             
  
                          Heart disease Father                             
  
                          Social History                             
  
                          Tobacco Use                              
  
                          Smoking status: Former                             
  
                          Packs/day: 1.00                             
  
                          Years: 50.00                             
  
                          Pack years: 50.00                             
  
                          Types: Cigars, Cigarettes                             
  
                          Quit date:                              
  
                          Years since quittin.9                             
  
                          Smokeless tobacco: Never                             
  
                          Vaping Use                               
  
                          Vaping Use: Never used                             
  
                          Substance Use Topics                             
  
                          Alcohol use: No                             
  
                          Drug use: Never                             
  
                          Current Outpatient Medications                          
     
  
                          Medication Sig Dispense Refill                          
     
  
                          atorvastatin (LIPITOR) 80 mg tablet Take 80 mg by mout  
h once daily.                             
  
                          empagliflozin (JARDIANCE) 25 mg tablet Take 25 mg by m  
outh daily with                             
  
                          breakfast.                               
  
                          dabigatran etexilate (PRADAXA) 150 mg Take 150 mg by m  
outh twice daily.                             
  
                          prasugrel (EFFIENT) 10 mg tab Take 10 mg by mouth once  
 daily.                             
  
                          VITAMIN E ORAL Take by mouth once daily.                
               
  
                                buPROPion HCL, smoking deter, 150 mg tab ER 12 h  
r Take 1 tablet by mouth once                   
                                          
  
                          daily. (Patient taking differently: Take 150 mg by al  
th twice daily.) 90                             
  
                          tablet 3                                 
  
                          oxyCODONE IR (ROXICODONE) 5 mg immediate release table  
t Take 5 mg by mouth                             
  
                          every 6 hours as needed.                             
  
                          ONETOUCH VERIO TEST STRIPS test strip                   
            
  
                          ONETOUCH VERIO REFLECT METER                            
   
  
                          ONETOUCH DELICA PLUS LANCET 33 gauge                    
           
  
                          nitroglycerin sublingual (NITROQUICK) 0.4 mg SL tablet  
 Dissolve 0.4 mg under                             
  
                          the tongue every 5 minutes as needed.                   
            
  
                          pantoprazole DR (PROTONIX) 40 mg tablet Take 40 mg by   
mouth once daily.                             
  
                          VASCEPA (more content not included)...                  
             
  
  
  
  
                                        Progress Note  on 2022  
  
  
  
  
             Progress Note Vitals were check at 1345 not 1145 [facility]  
   
             Progress Note HBO Treatment Course Details [facility]  
  
                          Treatment Course Number: 30                             
  
                          Total Treatments Ordered: 30                            
   
  
                          Ordering Physician: Dr. Dawson                           
    
  
                          HBO Treatment Start Date: 22                      
         
  
                          HBO Treatment End Date: 22                        
       
  
                          HBO Discharge Outcome: Good/Improved                    
           
  
                          Diagnosis: * No diagnoses found *                       
        
  
                          Code: E11.622                             
  
                          HBO Treatment Details:                             
  
                          In-Patient Visit: yes/no: no                            
   
  
                          Compression Rate Down: 2.0                             
  
                          Decompression Rate Up: 2.0                             
  
                          Patient Type: outpatient                             
  
                          Monoplace or Multiplace: Mono                           
    
  
                          Treatment Protocol: 2.0 ROBBIE with 90 minutes oxygen, an  
d no air breaks                             
  
                          Chamber #: [id number]                             
  
                          Pre-Treatment Ear Evaluation:                           
    
  
                          Left Ear Right Ear                             
  
                          Clear:                                   
  
                          Intact:                                  
  
                          Color:                                   
  
                          PE tubes inserted:                             
  
                          Irrigated:                               
  
                          Myringotomy performed:                             
  
                          Teed Scale:                              
  
                          Treatment Details:                             
  
                          ROBBIE Rate: 2                              
  
                          Started Compression: 1350                             
  
                          Reached Compression: 1400                             
  
                          Air Break 1 Start Time:                             
  
                          Air Break 1 End Time:                             
  
                          Air Break 2 Start Time:                             
  
                          Air Break 2 End Time:                             
  
                          Air Break 3 Start Time:                             
  
                          Air Break 3 End Time:                             
  
                          Started Decompression: 1530                             
  
                          Ended Decompression: 1539                             
  
                          Treatment Tolerated: Well                             
  
                          Treatment Completion Status: Treatment completed witho  
ut adverse event                             
  
                          Post-Treatment Teed Score:                             
  
                          Left Ear:                                
  
                          Right Ear:                               
  
                          Vital Signs:                             
  
                          HBO Glucose Reference Range: 100-350 mg/dl              
                 
  
                          Type Pre                                 
  
                          Time Vitals Taken 1345                             
  
                          Blood Pressure (mmHg) 119/68                            
   
  
                          Pulse (bpm) 85                             
  
                          Respirator Rate (breaths/min) 18                        
       
  
                          Capillary Blood Glucose (only for pre & post vitals) 2  
83                             
  
                          Temperature (F) 97.6                             
  
                          Type Post                                
  
                          Time Vitals Taken 1543                             
  
                          Blood Pressure (mmHg) 132/77                            
   
  
                          Pulse (bpm) 77                             
  
                          Respirator Rate (breaths/min) 18                        
       
  
                          Capillary Blood Glucose (only for pre & post vitals) 1  
76                             
  
                          Temperature (F) 97.1                             
  
                          Lung Sounds:                             
  
                          Pre-Treatment:                             
  
                          Post-Treatment:                             
  
                          Physician HBO Attestation:                             
  
                          I certify that I supervised this HBO treatment in acco  
rdance with Medicare                             
  
                                                    guidelines. A trained emerge  
ncy response team is readily available per hospital  
                                                              
  
                          polices and procedures.                             
  
                          Continue HBOT as ordered:                             
  
  
  
  
                                        Progress Note  on 2022  
  
  
  
  
             Progress Note HBO Treatment Course Details [facility]  
  
                          Treatment Course Number: 29                             
  
                          Total Treatments Ordered: 30                            
   
  
                          Ordering Physician: Dr. Dawson                           
    
  
                          HBO Treatment Start Date: 22                      
         
  
                          HBO Treatment End Date: 22                        
       
  
                          HBO Discharge Outcome: Treatment Complete               
                
  
                          Diagnosis: * No diagnoses found *                       
        
  
                          Code: E11.622                             
  
                          HBO Treatment Details:                             
  
                          In-Patient Visit: yes/no: no                            
   
  
                          Compression Rate Down: 2.0                             
  
                          Decompression Rate Up: 2.0                             
  
                          Patient Type: outpatient                             
  
                          Monoplace or Multiplace: Mono                           
    
  
                          Treatment Protocol: 2.0 ROBBIE with 90 minutes oxygen, an  
d no air breaks                             
  
                          Chamber #: [id number]                             
  
                          Pre-Treatment Ear Evaluation:                           
    
  
                          Left Ear Right Ear                             
  
                          Clear:                                   
  
                          Intact:                                  
  
                          Color:                                   
  
                          PE tubes inserted:                             
  
                          Irrigated:                               
  
                          Myringotomy performed:                             
  
                          Teed Scale:                              
  
                          Treatment Details:                             
  
                          ROBBIE Rate: 2                              
  
                          Started Compression: 1415                             
  
                          Reached Compression: 1425                             
  
                          Air Break 1 Start Time:                             
  
                          Air Break 1 End Time:                             
  
                          Air Break 2 Start Time:                             
  
                          Air Break 2 End Time:                             
  
                          Air Break 3 Start Time:                             
  
                          Air Break 3 End Time:                             
  
                          Started Decompression: 1555                             
  
                          Ended Decompression:1605                             
  
                          Treatment Tolerated: Well                             
  
                          Treatment Completion Status: Treatment completed witho  
ut adverse event                             
  
                          Post-Treatment Teed Score:                             
  
                          Left Ear:                                
  
                          Right Ear:                               
  
                          Vital Signs:                             
  
                          HBO Glucose Reference Range: 100-350 mg/dl              
                 
  
                          Type Pre                                 
  
                          Time Vitals Taken 1410                             
  
                          Blood Pressure (mmHg) 113/71                            
   
  
                          Pulse (bpm) 96                             
  
                          Respirator Rate (breaths/min) 18                        
       
  
                          Capillary Blood Glucose (only for pre & post vitals) 2  
95                             
  
                          Temperature (F) 96.9                             
  
                          Type Post                                
  
                          Time Vitals Taken 1605                             
  
                          Blood Pressure (mmHg) 110/65                            
   
  
                          Pulse (bpm) 82                             
  
                          Respirator Rate (breaths/min) 18                        
       
  
                          Capillary Blood Glucose (only for pre & post vitals) 2  
46                             
  
                          Temperature (F) 97.1                             
  
                          Lung Sounds:                             
  
                          Pre-Treatment:                             
  
                          Post-Treatment:                             
  
                          Physician HBO Attestation:                             
  
                          I certify that I supervised this HBO treatment in acco  
rdance with Medicare                             
  
                                                    guidelines. A trained emerge  
ncy response team is readily available per hospital  
                                                              
  
                          polices and procedures.                             
  
                          Continue HBOT as ordered:                             
  
  
  
  
                                        MARYSOL  on 2022  
  
  
  
  
             PATINS       Follow-up Appointments [facility]  
  
                          Wound # 2 Location: Amputation Site - [place]; Left  
                             
  
                                        Return Appointment in 1 week. Should you  
 experience any significant changes in   
                                                      
  
                                                    your wound(s) or have any qu  
estions regarding your home care instructions   
please                                                        
  
                          contact the wound center at [phone] Mon-Fri 8-4  
30p. If after regular                             
  
                          business hours, please call your family doctor or [facility] emergency room.                             
  
                          Bathing/ Shower/Hygiene                             
  
                          Cleanse affected area with antibacterial soap such as   
Dial                             
  
                          Additional Orders / Instructions                        
       
  
                          Wound # 2 Location: Amputation Site - [place]; Left  
                             
  
                                        Follow diet per physicians instructions   
- such as increasing protein intake to   
                                                      
  
                          promote wound healing                             
  
                                        monitor blood sugars to help with wound   
healing - high blood sugars can affect   
                                                      
  
                          wound healing                             
  
                          Other:                                   
  
                          NOTES : take pain medications as prescribed             
                  
  
                          Home Health                              
  
                          Wound # 2 Location: Amputation Site - [place]; Left  
                             
  
                          Continue [facility] fax [phone]                             
  
                          Provider Orders - Wound Treatment                       
        
  
                          Wound # 2 Location: Amputation Site - [place]; Left  
                             
  
                          Cleanser - Dakin 0.125% 16 (oz) - Frequency: 1 x Per D  
ay                             
  
                          NOTES : rinse wound with Dakin's prior to dressing good  
nges                             
  
                          Cleanser - Normal Saline - Frequency: 1 x Per Day       
                        
  
                                        NOTES : after Dakin's rinse then cleanse  
 wound with saline then apply dressing   
                                                      
  
                          Primary Dressing - Plain packing gauze                  
             
  
                                        Secondary Dressing - CovaWound Silicone,  
 4x12 (in/in) - Frequency: 1 x Per Day   
                                                      
  
                                                    Compression Wrap - Single La  
phylicia Tubigrip Size G, 4.5x11 (in/yd) - Frequency: 1   
x                                                             
  
                          Per Day                                  
  
                          Add-Ons - Gauze Sponges 4x4 - Frequency: 1 x Per Day    
                           
  
  
  
  
                                        Progress Note  on 2022  
  
  
  
  
             Progress Note HBO Treatment Course Details [facility]  
  
                          Treatment Course Number: 27                             
  
                          Total Treatments Ordered: 30                            
   
  
                          Ordering Physician: Dr Dawson                            
   
  
                          HBO Treatment Start Date: 22                      
         
  
                          HBO Treatment End Date: 22                        
       
  
                          HBO Discharge Outcome:                             
  
                          Diagnosis: * No diagnoses found *                       
        
  
                          Code: E11.621                             
  
                          HBO Treatment Details:                             
  
                          In-Patient Visit: yes/no: no                            
   
  
                          Compression Rate Down: 2.0                             
  
                          Decompression Rate Up: 2.0                             
  
                          Patient Type: outpatient                             
  
                          Monoplace or Multiplace: Mono                           
    
  
                          Treatment Protocol: 2.0 ROBBIE with 90 minutes oxygen, an  
d no air breaks                             
  
                          Chamber #: [id number]                             
  
                          Pre-Treatment Ear Evaluation:                           
    
  
                          Left Ear Right Ear                             
  
                          Clear:                                   
  
                          Intact:                                  
  
                          Color:                                   
  
                          PE tubes inserted:                             
  
                          Irrigated:                               
  
                          Myringotomy performed:                             
  
                          Teed Scale:                              
  
                          Treatment Details:                             
  
                          ROBBIE Rate: 2                              
  
                          Started Compression: 1120                             
  
                          Reached Compression: 1130                             
  
                          Started Decompression: 1300                             
  
                          Ended Decompression: 1310                             
  
                          Treatment Tolerated: Well                             
  
                          Treatment Completion Status: Complete                   
            
  
                          Post-Treatment Teed Score:                             
  
                          Left Ear:                                
  
                          Right Ear:                               
  
                          Vital Signs:                             
  
                          HBO Glucose Reference Range: 100-350 mg/dl              
                 
  
                          Type Pre                                 
  
                          Time Vitals Taken 1118                             
  
                          Blood Pressure (mmHg) 122/64                            
   
  
                          Pulse (bpm) 121                             
  
                          Respirator Rate (breaths/min) 18                        
       
  
                          Capillary Blood Glucose (only for pre & post vitals) 1  
70                             
  
                          Temperature (F) 96.8                             
  
                          Type Post                                
  
                          Time Vitals Taken 1310                             
  
                          Blood Pressure (mmHg) 122/68                            
   
  
                          Pulse (bpm) 77                             
  
                          Respirator Rate (breaths/min) 18                        
       
  
                          Capillary Blood Glucose (only for pre & post vitals) 3  
11                             
  
                          Temperature (F) 97.8                             
  
                          Lung Sounds:                             
  
                          Pre-Treatment:                             
  
                          Post-Treatment:                             
  
                          Physician HBO Attestation:                             
  
                          I certify that I supervised this HBO treatment in acco  
rdance with Medicare                             
  
                                                    guidelines. A trained emerge  
ncy response team is readily available per hospital  
                                                              
  
                          polices and procedures.                             
  
                          Continue HBOT as ordered:                             
  
  
  
  
                                        ADDENDUMNOTE  on 2022  
  
  
  
  
             ADDENDUMNOTE Encounter addended by: Chet Ewing on: 2022 11:22  
 AM [facility]  
  
                          Actions taken: Visit diagnoses modified                 
              
  
  
  
  
                                        PATINS  on 2022  
  
  
  
  
             PATINS       Follow-up Appointments [facility]  
  
                          Wound # 2 Location: Amputation Site - [place]; Left  
                             
  
                                        Return Appointment in 1 week. Should you  
 experience any significant changes in   
                                                      
  
                                                    your wound(s) or have any qu  
estions regarding your home care instructions   
please                                                        
  
                          contact the wound center at [phone] Mon-Fri 8-  
30p. If after regular                             
  
                          business hours, please call your family doctor or [facility] emergency room.                             
  
                          Bathing/ Shower/Hygiene                             
  
                          Cleanse affected area with antibacterial soap such as   
Dial                             
  
                          Additional Orders / Instructions                        
       
  
                          Wound # 2 Location: Amputation Site - [place]; Left  
                             
  
                                        Follow diet per physicians instructions   
- such as increasing protein intake to   
                                                      
  
                          promote wound healing                             
  
                                        monitor blood sugars to help with wound   
healing - high blood sugars can affect   
                                                      
  
                          wound healing                             
  
                          Other:                                   
  
                          NOTES : take pain medications as prescribed             
                  
  
                          Home Health                              
  
                          Wound # 2 Location: Amputation Site - [place]; Left  
                             
  
                          Continue [facility] fax [phone]  
[phone]                             
  
                          Provider Orders - Wound Treatment                       
        
  
                          Wound # 2 Location: Amputation Site - [place]; Left  
                             
  
                          Cleanser - Dakin 0.125% 16 (oz) - Frequency: 1 x Per D  
ay                             
  
                          NOTES : rinse wound with Dakin's prior to dressing good  
nges                             
  
                          Cleanser - Normal Saline - Frequency: 1 x Per Day       
                        
  
                                        NOTES : after Dakin's rinse then cleanse  
 wound with saline then apply dressing   
                                                      
  
                          Primary Dressing - Plain packing gauze                  
             
  
                                        Secondary Dressing - CovaWound Silicone,  
 4x12 (in/in) - Frequency: 1 x Per Day   
                                                      
  
                                                    Compression Wrap - Single La  
phylicia Tubigrip Size G, 4.5x11 (in/yd) - Frequency: 1   
x                                                             
  
                          Per Day                                  
  
                          Add-Ons - Gauze Sponges 4x4 - Frequency: 1 x Per Day    
                           
  
  
  
  
                                        36  on 2022  
  
  
  
  
             36           OV /3/22    [facility]  
   
             36           OV 8/3/22    [facility]  
  
  
  
  
                                        ADDENDUMNOTE  on 2022  
  
  
  
  
             ADDENDUMNOTE Encounter addended by: Chet Ewing on: 2022 11:24  
 AM [facility]  
  
                          Actions taken: Visit diagnoses modified                 
              
  
  
  
  
                                        36   on 2022  
  
  
  
  
             36           OV 8/3/22    [facility]  
  
                          Labs current                             
  
  
  
  
                                        ADDENDUMNOTE  on 2022  
  
  
  
  
             ADDENDUMNOTE Encounter addended by: Chet Ewing on: 2022 11:27  
 AM [facility]  
  
                          Actions taken: Visit diagnoses modified                 
              
  
  
  
  
                                        36  on 2022  
  
  
  
  
             36           Pt scheduled for 23 Normal                    [facility]  
  
  
  
  
                                        ADDENDUMNOTE  on 2022  
  
  
  
  
             ADDENDUMNOTE Encounter addended by: Chet Ewing on: 2022 11:29  
 AM [facility]  
  
                          Actions taken: Visit diagnoses modified                 
              
  
  
  
  
                                        ADDENDUMNOTE  on 2022  
  
  
  
  
             ADDENDUMNOTE Encounter addended by: Chet Ewing on: 2022 11:33  
 AM [facility]  
  
                          Actions taken: Visit diagnoses modified                 
              
  
  
  
  
                                        Progress Note  on 2022  
  
  
  
  
             Progress Note HBO Treatment Course Details [facility]  
  
                          Treatment Course Number: 22                             
  
                          Total Treatments Ordered: 30                            
   
  
                          Ordering Physician: Dr Dawson                            
   
  
                          HBO Treatment Start Date: 22                      
         
  
                          HBO Treatment End Date: 22                        
       
  
                          HBO Discharge Outcome: No Change                        
       
  
                          Diagnosis: * No diagnoses found *                       
        
  
                          Code: E11.621                             
  
                          HBO Treatment Details:                             
  
                          In-Patient Visit: yes/no: no                            
   
  
                          Compression Rate Down: 2.0                             
  
                          Decompression Rate Up: 2.0                             
  
                          Patient Type: outpatient                             
  
                          Monoplace or Multiplace: Mono                           
    
  
                          Treatment Protocol: 2.0 ROBBIE with 90 minutes oxygen, an  
d no air breaks                             
  
                          Chamber #: [id number]                             
  
                          Pre-Treatment Ear Evaluation:                           
    
  
                          Left Ear Right Ear                             
  
                          Clear:                                   
  
                          Intact:                                  
  
                          Color:                                   
  
                          PE tubes inserted:                             
  
                          Irrigated:                               
  
                          Myringotomy performed:                             
  
                          Teed Scale:                              
  
                          Treatment Details:                             
  
                          ROBBIE Rate: 2                              
  
                          Started Compression: 1400                             
  
                          Reached Compression: 1410                             
  
                          Started Decompression: 1540                             
  
                          Ended Decompression: 1550                             
  
                          Treatment Tolerated: well                             
  
                          Treatment Completion Status: complete                   
            
  
                          Post-Treatment Teed Score:                             
  
                          Left Ear:                                
  
                          Right Ear:                               
  
                          Vital Signs:                             
  
                          HBO Glucose Reference Range: 100-350 mg/dl              
                 
  
                          Type Pre                                 
  
                          Time Vitals Taken 1355                             
  
                          Blood Pressure (mmHg) 121/72                            
   
  
                          Pulse (bpm) 97                             
  
                          Respirator Rate (breaths/min) 18                        
       
  
                          Capillary Blood Glucose (only for pre & post vitals) 1  
57                             
  
                          Temperature (F) 96.7                             
  
                          Type Post                                
  
                          Time Vitals Taken 1551                             
  
                          Blood Pressure (mmHg) 121/83                            
   
  
                          Pulse (bpm) 75                             
  
                          Respirator Rate (breaths/min) 18                        
       
  
                          Capillary Blood Glucose (only for pre & post vitals) 1  
23                             
  
                          Temperature (F) 96.9                             
  
                          Lung Sounds:                             
  
                          Pre-Treatment:                             
  
                          Post-Treatment:                             
  
                          Physician HBO Attestation:                             
  
                          I certify that I supervised this HBO treatment in acco  
rdance with Medicare                             
  
                                                    guidelines. A trained emerge  
ncy response team is readily available per hospital  
                                                              
  
                          polices and procedures.                             
  
                          Continue HBOT as ordered:                             
  
  
  
  
                                        ADDENDUMNOTE  on 11-  
  
  
  
  
             ADDENDUMNOTE Encounter addended by: Chet Ewing on: 2022 11:36  
 AM [facility]  
  
                          Actions taken: Visit diagnoses modified                 
              
  
  
  
  
                                        Progress Note  on 11-  
  
  
  
  
             Progress Note HBO Treatment Course Details [facility]  
  
                          Treatment Course Number: 21                             
  
                          Total Treatments Ordered: 30                            
   
  
                          Ordering Physician: Dr Dawson                            
   
  
                          HBO Treatment Start Date: 11/15/22                      
         
  
                          HBO Treatment End Date: 11/15/22                        
       
  
                          HBO Discharge Outcome: No Change                        
       
  
                          Diagnosis: * No diagnoses found *                       
        
  
                          Code: E11.621                             
  
                          HBO Treatment Details:                             
  
                          In-Patient Visit: yes/no: no                            
   
  
                          Compression Rate Down: 2.5                             
  
                          Decompression Rate Up: 2.5                             
  
                          Patient Type: outpatient                             
  
                          Monoplace or Multiplace: Mono                           
    
  
                          Treatment Protocol: 2.0 ROBBIE with 90 minutes oxygen, an  
d no air breaks                             
  
                          Chamber #: [id number]                             
  
                          Pre-Treatment Ear Evaluation:                           
    
  
                          Left Ear Right Ear                             
  
                          Clear:                                   
  
                          Intact:                                  
  
                          Color:                                   
  
                          PE tubes inserted:                             
  
                          Irrigated:                               
  
                          Myringotomy performed:                             
  
                          Teed Scale:                              
  
                          Treatment Details:                             
  
                          ROBBIE Rate: 2                              
  
                          Started Compression: 1235                             
  
                          Reached Compression: 1245                             
  
                          Air Break 1 Start Time:                             
  
                          Air Break 1 End Time:                             
  
                          Air Break 2 Start Time:                             
  
                          Air Break 2 End Time:                             
  
                          Air Break 3 Start Time:                             
  
                          Air Break 3 End Time:                             
  
                          Started Decompression: 1400                             
  
                          Ended Decompression: 1409                             
  
                          Treatment Tolerated: Well                             
  
                          Treatment Completion Status: Completed                  
             
  
                          As per patients request: finish by 2pm                  
             
  
                          Post-Treatment Teed Score:                             
  
                          Left Ear:                                
  
                          Right Ear:                               
  
                          Vital Signs:                             
  
                          HBO Glucose Reference Range: 100-350 mg/dl              
                 
  
                          Type Pre                                 
  
                          Time Vitals Taken 1230                             
  
                          Blood Pressure (mmHg) 121/81                            
   
  
                          Pulse (bpm) 72                             
  
                          Respirator Rate (breaths/min) 18                        
       
  
                          Capillary Blood Glucose (only for pre & post vitals) 1  
69                             
  
                          Temperature (F) 98.1                             
  
                          Type Post                                
  
                          Time Vitals Taken 1410                             
  
                          Blood Pressure (mmHg) 113/78                            
   
  
                          Pulse (bpm) 81                             
  
                          Respirator Rate (breaths/min) 18                        
       
  
                          Capillary Blood Glucose (only for pre & post vitals) 1  
29                             
  
                          Temperature (F) 97.6                             
  
                          Lung Sounds:                             
  
                          Pre-Treatment:                             
  
                          Post-Treatment:                             
  
                          Physician HBO Attestation:                             
  
                          I certify that I supervised this HBO treatment in acco rdance with Medicare                             
  
                                                    guidelines. A trained emerge  
ncy response team is readily available per hospital  
                                                              
  
                          polices and procedures.                             
  
                          Continue HBOT as ordered:                             
  
  
  
  
                                        ADDENDUMNOTE  on 2022  
  
  
  
  
             ADDENDUMNOTE Encounter addended by: Chet Ewing on: 2022 11:39  
 AM [facility]  
  
                          Actions taken: Visit diagnoses modified                 
              
  
  
  
  
                                        ADDENDUMNOTE  on 2022  
  
  
  
  
             ADDENDUMNOTE Encounter addended by: Chet Ewing on: 2022 11:42  
 AM [facility]  
  
                          Actions taken: Visit diagnoses modified                 
              
  
  
  
  
                                        Progress Note  on 2022  
  
  
  
  
             Progress Note HBO Treatment Course Details [facility]  
  
                          Treatment Course Number: 19                             
  
                          Total Treatments Ordered: 30                            
   
  
                          Ordering Physician: Dr Dawson                            
   
  
                          HBO Treatment Start Date: 22                       
        
  
                          HBO Treatment End Date: 22                         
      
  
                          HBO Discharge Outcome: No Change                        
       
  
                          Diagnosis: @DX@                             
  
                          Code: E11.622                             
  
                          HBO Treatment Details:                             
  
                          In-Patient Visit: yes/no: no                            
   
  
                          Travel Rate:                             
  
                          Compression Rate Down: [Enter Dropdown Here]            
                   
  
                          Decompression Rate Up: [Enter Dropdown Here]            
                   
  
                          Patient Type: outpatient                             
  
                          Chamber Serial #:                             
  
                          Monoplace or Multiplace:                             
  
                          Treatment Protocol: [Enter Dropdown Here]               
                
  
                          Chamber #: [id number]                             
  
                          Pre-Treatment Ear Evaluation:                           
    
  
                          Left Ear Right Ear                             
  
                          Clear: y y                               
  
                          Intact: y y                              
  
                          Color: jose jose                             
  
                          PE tubes inserted: n n                             
  
                          Irrigated: n n                             
  
                          Myringotomy performed: n n                             
  
                          Teed Scale: 0 0                             
  
                          Treatment Details:                             
  
                          ROBBIE Rate: 2                              
  
                          Started Compression: 1400                             
  
                          Reached Compression: 1410                             
  
                          Start Decompression: 1540                             
  
                          Ended Decompression: 1550                             
  
                          Treatment Tolerated: well                             
  
                          Post-Treatment Teed Score:                             
  
                          Left Ear: 0                              
  
                          Right Ear:0                              
  
                          Vital Signs:                             
  
                          HBO Glucose Reference Range: 100-350 mg/dl              
                 
  
                          Type Pre                                 
  
                          Time Vitals Taken 1350                             
  
                          Blood Pressure (mmHg) 101/67                            
   
  
                          Pulse (bpm) 95                             
  
                          Respirator Rate (breaths/min) 18                        
       
  
                          Capillary Blood Glucose (only for pre & post vitals) 1  
96                             
  
                          Temperature (F) 96.8                             
  
                          Type Post                                
  
                          Time Vitals Taken 1555                             
  
                          Blood Pressure (mmHg) 118/73                            
   
  
                          Pulse (bpm) 93                             
  
                          Respirator Rate (breaths/min) 18                        
       
  
                          Capillary Blood Glucose (only for pre & post vitals) 1  
25                             
  
                          Temperature (F) 97.3                             
  
                          Lung Sounds:                             
  
                          Pre-Treatment: cta                             
  
                          Post-Treatment: cta                             
  
                          Physician HBO Attestation:                             
  
                          I certify that I supervised this HBO treatment in acco rdance with Medicare                             
  
                                                    guidelines. A trained emerge  
ncy response team is readily available per hospital  
                                                              
  
                          polices and procedures.                             
  
                          yes/no: yes Continue HBOT as ordered:                   
            
  
                          yes/no: yes                              
  
  
  
  
                                        ADDENDUMNOTE  on 2022  
  
  
  
  
                          ADDENDUMNOTE              Encounter addended by: Jo Boles RN on: 2022 11:23   
AM                  [facility]  
  
                          Actions taken: Flowsheet accepted, LDA properties acce  
pted                             
  
  
  
  
                                        ADDENDUMNOTE  on 2022  
  
  
  
  
                    ADDENDUMNOTE        Encounter addended by: Maura riojas on: 2022 2:13 PM   
[facility]  
  
                          Actions taken: Visit diagnoses modified                 
              
  
  
  
  
                                        Progress Note  on 2022  
  
  
  
  
             Progress Note HBO Treatment Course Details [facility]  
  
                          Treatment Course Number: 16                             
  
                          Total Treatments Ordered: 30                            
   
  
                          Ordering Physician: Dr Dawson                            
   
  
                          HBO Treatment Start Date: 22                       
        
  
                          HBO Treatment End Date: 22                         
      
  
                          HBO Discharge Outcome: Good/Improved                    
           
  
                          Diagnosis: @DX@                             
  
                          Code: E11.622                             
  
                          HBO Treatment Details:                             
  
                          In-Patient Visit: yes/no: no                            
   
  
                          Travel Rate: 2                             
  
                          Compression Rate Down: [Enter Dropdown Here]            
                   
  
                          Decompression Rate Up: [Enter Dropdown Here]            
                   
  
                          Patient Type: outpatient                             
  
                          Chamber Serial #:                             
  
                          Monoplace or Multiplace:                             
  
                          Treatment Protocol: [Enter Dropdown Here]               
                
  
                          Chamber #: [id number]                             
  
                          Pre-Treatment Ear Evaluation:                           
    
  
                          Left Ear Right Ear                             
  
                          Clear: y y                               
  
                          Intact: y y                              
  
                          Color: jose jose                             
  
                          PE tubes inserted: n n                             
  
                          Irrigated: n n                             
  
                          Myringotomy performed: n n                             
  
                          Teed Scale: 0 0                             
  
                          Treatment Details:                             
  
                          ROBBIE Rate: 2                              
  
                          Started Compression: 1355                             
  
                          Reached Compression: 1405                             
  
                          Air Break 1 Start Time:                             
  
                          Air Break 1 End Time:                             
  
                          Air Break 2 Start Time:                             
  
                          Air Break 2 End Time:                             
  
                          Air Break 3 Start Time:                             
  
                          Air Break 3 End Time:                             
  
                          Started Decompression: 1535                             
  
                          Ended Decompression: 1545                             
  
                          Treatment Tolerated: well                             
  
                          Treatment Completion Status: ok                         
      
  
                          Post-Treatment Teed Score:                             
  
                          Left Ear: 0                              
  
                          Right Ear:0                              
  
                          Vital Signs:                             
  
                          HBO Glucose Reference Range: 100-350 mg/dl              
                 
  
                          Type Pre                                 
  
                          Time Vitals Taken 1150                             
  
                          Blood Pressure (mmHg) 99/63                             
  
                          Pulse (bpm) 81                             
  
                          Respirator Rate (breaths/min) 18                        
       
  
                          Capillary Blood Glucose (only for pre & post vitals) 1  
75                             
  
                          Temperature (F) 97.4                             
  
                          Type Post                                
  
                          Time Vitals Taken 1546                             
  
                          Blood Pressure (mmHg) 102/67                            
   
  
                          Pulse (bpm) 71                             
  
                          Respirator Rate (breaths/min) 18                        
       
  
                          Capillary Blood Glucose (only for pre & post vitals) 1  
38                             
  
                          Temperature (F) 96.8                             
  
                          Lung Sounds:                             
  
                          Pre-Treatment: cta                             
  
                          Post-Treatment: cta                             
  
                          Physician HBO Attestation:                             
  
                          I certify that I supervised this HBO treatment in acco  
rdance with Medicare                             
  
                                                    guidelines. A trained emerge  
ncy response team is readily available per hospital  
                                                              
  
                          polices and procedures.                             
  
                          yes/no: yes Continue HBOT as ordered:                   
            
  
                          yes/no: yes                              
  
  
  
  
                                        Progress Note  on 10-  
  
  
  
  
                          Progress Note             LDA/wound information entere  
d for visit from documentation by   
Dinorah Lantigua,     Normal                                  [facility]  
  
                          JOSE.                                      
  
                                                                   
  
                          JOSE Kinsey                             
  
  
  
  
                                        CULT/STAIN - AEROBIC AND ANAEROBIC  on   
  
  
  
  
             CULT/STAIN - AEROBIC AND ANAEROBIC              Normal               
       [facility]  
  
  
  
  
                          Comment on above:         Performed By: #### CXAAN ###  
[address] [id number] [id number]  
  
  
  
  
                                        FECAL IMMUNOCHEMICAL TEST (FIT)  on 10-2  
7-2022  
  
  
  
  
             FECAL OCCULT BLOOD Positive                  negative     [facility]  
  
  
  
  
                                        CULT/STAIN - AEROBIC AND ANAEROBIC  on 0  
2022  
  
  
  
  
             CULT/STAIN - AEROBIC AND ANAEROBIC              [facility]  
  
  
  
  
                          Comment on above:         Performed By: #### CXAAN ###  
[id number] [place] [address] [place], [place] [id number]  
  
  
  
  
                                        No Panel Information  on 2022  
  
  
  
  
             Aerobic Culture Serratia marcescens Abnormal                  [facility]  
   
             Aerobic Culture Moderate                               [facility]  
  
                          For serious infections outside of the urinary tract,    
                           
  
                          third generation cephalosporins may not be effective,   
                            
  
                          even if test results indicate the organism is sensitiv  
e.                             
   
             Anaerobic Culture No growth of anaerobes at 5                        
     [facility]  
  
                          days.                                    
   
             Gram Stain Result Many polymorphonuclear cells/lpf.                  
           [facility]  
  
                          Many gram positive bacilli.                             
  
                          Few gram positive cocci in pairs and chains.            
                   
  
                          Rare gram negative bacilli.                             
   
             Interpretation and review Abnormal                               [facility]  
[facility]  
  
             of laboratory results                                          
   
                          Test Performed by [facility]                            
 [place] [address]                           [facility]   
[facility]  
   
                                                                 [facility]  
  
  
  
  
                                        CR Knee 3 Views Left  on 2022  
  
  
  
  
             CR Knee 3 Views Left              Normal                    [facility]  
  
  
  
  
                                        XR KNEE LEFT (3 VIEWS)   on 2022  
  
  
  
  
                          Patient Name: WILLOW BRADSHAW III [facility]  
  
                                                                   
  
                                                                   
  
                          MRN: [id number]                             
  
                                                                   
  
                                                                   
  
                          Acct#: [id number]                             
  
                                                                   
  
                                                                   
  
                          Diagnostic Radiology                             
  
                                                                   
  
                                                                   
  
                          ACCESSION EXAM DATE/TIME PROCEDURE ORDERING PROVIDER    
                           
  
                          -[id number] 2022 11:37 EDT CR Knee 3 Views Left  
 [id number] -BRIDGET MENDIETA                                   
  
                                                                   
  
                                                                   
  
                          CPT code                                 
  
                          32471                                    
  
                                                                   
  
                                                                   
  
                          Reason For Exam                             
  
                                                    (CR Knee 3 Views Left) Post   
left BKA X 4 weeks ago, fell down onto stump this   
morning. Now with                                             
  
                          pain and swelling in the distal aspect of the stump.    
                           
  
                                                                   
  
                          Report                                   
  
                                                                   
  
                          Examination:                             
  
                          Left knee                                
  
                                                                   
  
                          Clinical Indication:                             
  
                          Post left BKA X 4 weeks ago, fell down onto stump this  
 morning. Now                             
  
                          with pain and swelling in the distal aspect of the elsy  
mp.                             
  
                                                                   
  
                          Comparison:                              
  
                          None                                     
  
                                                                   
  
                          Findings:                                
  
                          Three views left knee. Mild osteopenia. There is below  
 the knee                             
  
                          amputation. No evidence of fracture or subluxation. No  
 gross evidence                             
  
                          of infection. Adequate soft tissue coverage at the elsy  
mp with only                             
  
                          minimal thinning posteriorly.                           
    
  
                                                                   
  
                          Vascular stents extending along the femoral and poplit  
eal arteries.                             
  
                          Some significant atherosclerotic calcification. Surgic  
al clips seen at                             
  
                          the level of the tibia.                             
  
                                                                   
  
                          Mild medial and patellofemoral compartment joint space  
 narrowing with                             
  
                          at least mild osteoarthropathy. No sizable knee joint   
effusion. Small                             
  
                          enthesophyte superior and inferior patellar margins.    
                           
  
                                                                   
  
                          Impression:                              
  
                          Below the knee amputation. Mild osteoarthropathy. Adva  
nced                             
  
                          atherosclerosis with large vascular stents. No acute f  
inding.                             
  
                                                                   
  
                          Report Dictated on Workstation: [id number]           
                    
  
                          ---** Final ---**                             
  
                                                                   
  
                          Dictated: 2022 11:39 am                           
    
  
                          Dictating Physician: MD LARA ANTHONY J         
                      
  
                          Signed Date and Time: 2022 11:41 am               
                
  
                          Signed by: MD LARA ANTHONY J                   
            
  
                          Transcribed Date and Time: 2022 11:39             
                  
   
                                                    Alexei Lara MD - 0  
2022 Formatting of this note might be   
different from the original.                                         [facility]  
  
                          Patient Name: WILLOW BRADSHAW III              
               Work Phone: [phone]  
  
                                                                   
  
                                                                   
  
                          MRN: [id number]                             
  
                                                                   
  
                                                                   
  
                          [facility]#: [id number]                             
  
                                                                   
  
                                                                   
  
                          Diagnostic Radiology                             
  
                                                                   
  
                                                                   
  
                          ACCESSION EXAM DATE/TIME PROCEDURE ORDERING PROVIDER    
                           
  
                          -[id number] 2022 11:37 EDT CR Knee 3 Views Left  
 [id number] -BRIDGET MENDIETA                                   
  
                                                                   
  
                                                                   
  
                          CPT code                                 
  
                          64338                                    
  
                                                                   
  
                                                                   
  
                          Reason For Exam                             
  
                                                    (CR Knee 3 Views Left) Post   
left BKA X 4 weeks ago, fell down onto stump this   
morning. Now with                                             
  
                          pain and swelling in the distal aspect of the stump.    
                           
  
                                                                   
  
                          Report                                   
  
                                                                   
  
                          Examination:                             
  
                          Left knee                                
  
                                                                   
  
                          Clinical Indication:                             
  
                          Post left BKA X 4 weeks ago, fell down onto stump this  
 morning. Now                             
  
                          with pain and swelling in the distal aspect of the elsy  
mp.                             
  
                                                                   
  
                          Comparison:                              
  
                          None                                     
  
                                                                   
  
                          Findings:                                
  
                          Three views left knee. Mild osteopenia. There is below  
 the knee                             
  
                          amputation. No evidence of fracture or subluxation. No  
 gross evidence                             
  
                          of infection. Adequate soft tissue coverage at the elsy  
mp with only                             
  
                          minimal thinning posteriorly.                           
    
  
                                                                   
  
                          Vascular stents extending along the femoral and poplit  
eal arteries.                             
  
                          Some significant atherosclerotic calcification. Surgic  
al clips seen at                             
  
                          the level of the tibia.                             
  
                                                                   
  
                          Mild medial and patellofemoral compartment joint space  
 narrowing with                             
  
                          at least mild osteoarthropathy. No sizable knee joint   
effusion. Small                             
  
                          enthesophyte superior and inferior patellar margins.    
                           
  
                                                                   
  
                          Impression:                              
  
                          Below the knee amputation. Mild osteoarthropathy. Adva  
nced                             
  
                          atherosclerosis with large vascular stents. No acute f  
inding.                             
  
                                                                   
  
                          Report Dictated on Workstation: [id number]           
                    
  
                          ---** Final ---**                             
  
                                                                   
  
                          Dictated: 2022 11:39 am                           
    
  
                          Dictating Physician: MD LARA ANTHONY J         
                      
  
                          Signed Date and Time: 2022 11:41 am               
                
  
                          Signed by: MD LARA ANTHONY J                   
            
  
                          Transcribed Date and Time: 2022 11:39             
                  
   
             Radiology Study observation                                          
[facility]  
  
             (narrative)                                         Work Phone: [phone]  
  
  
  
  
                                        XR KNEE LEFT (3 VIEWS)  Ordered By: Yamila Lara on 2022  
  
  
  
  
                                                                 [facility]  
  
                                                                 Work Phone: [phone]  
  
  
  
  
                                        CNOV   on 2022  
  
  
  
  
             CNOV         [facility]  
  
                                                      
--------------------------------------------------------------------------------
                                                              
  
                          WILLOW BRADSHAW ([id number]) 1955 M             
                Medical  
  
                          Date Time Provider Department                           
  Center  
  
                          22 10:20 [facility]                    
           
  
                          During your visit today, we recorded the following inf  
ormation about you:                             
  
                          Temperature Pulse Blood pressure Weight                 
              
  
                          97.3 degrees 98/minute 124/78 90.7 kg                   
            
  
                          Height                                   
  
                          1.702 m                                  
  
                          MIGUEL A MooreCNP 2022 12:26 PM Signed        
                       
  
                          Transitional Care Management                            
   
  
                          TCM Eligibility Documentation                           
    
  
                          The following information was gathered during the init  
ial Patient Outreach                             
  
                          Encounter.                               
  
                          Date of Outreach: 2022                             
  
                          Outreach Attempt 1: Contact Made                        
       
  
                          Date of Discharge 2022                             
  
                          Some recent data might be hidden                        
       
  
                          Summary                                  
  
                          Discharged from: [facility]          
                     
  
                          Admit Date: 21, multiple admissions                
               
  
                          Admitted for: PAD, left BKA                             
  
                          CARSON Moore.CNP                             
  
                          Provider Documentation                             
  
                          Willow Bradshaw is a 67 year old male here today for   
a follow up to recent                             
  
                          hospitalization. I have reviewed the patient's hospita  
l course including                             
  
                          diagnostic testing performed during this hospitalizati  
on, their discharge                             
  
                                                    medications, and my assessme  
nt and plan with the patient and any family members  
                                                              
  
                          present at today's visit.                             
  
                          HPI:                                     
  
                          He was admitted for altered mental status and concern   
for lower extremity                             
  
                          wound. He has history of bilateral iliac stents and fe  
moral stents, LLE                             
  
                          embolectomy with fasciotomy, left femoral-popliteal by  
pass with RSVG which                             
  
                                                    later occluded and required   
stents. He recently had aortogram which showed poor  
                                                              
  
                                        single vessel runoff. He had a nonhealin  
g fasciotomy wound. All of this led to   
                                                      
  
                          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 He was diagnosed with infection o  
f the amputation site.                             
  
                                        WBC elevated at 13.4 but lactic acid lev  
el normal. He was transported to [facility]   
                                                      
  
                                ER for worsening agitation and slurred speech si  
nce being discharge after the                   
                                          
  
                          BKA. Patient was to be admitted for IV antibiotics but  
 left AMA due to poor                             
  
                                        care, per patient. He states he is doing  
 well considering the fact he lost his   
                                                      
  
                                                    leg. He is in good spirits a  
nd determined to continue doing everything he wants  
                                                              
  
                                                    to do with his life. He is g  
etting home PT and OT and tolerating this well. The  
                                                              
  
                                plan is to get a prosthetic and continue working  
 as a . He denies                   
                                          
  
                          any pain. He admits to having some phantom pain at enoch  
es. He denies any                             
  
                          redness, swelling, fever, chills, or foul-smelling henrik  
inage from his wound.                             
  
                          Review of Systems                             
  
                          Constitutional: Negative for chills, fever, malaise/fa  
tigue and weight loss.                             
  
                          HENT: Negative for congestion, ear pain, nosebleeds, s  
inus pain and sore                             
  
                          throat.                                  
  
                          Respiratory: Negative for cough, sputum production, sh  
ortness of breath and                             
  
                          wheezing.                                
  
                          Cardiovascular: Negative for chest pain, palpitations   
and leg swelling.                             
  
                                Gastrointestinal: Negative for abdominal pain, c  
onstipation, diarrhea, nausea                   
                                          
  
                          and vomiting.                             
  
                          Musculoskeletal: Negative for back pain, joint pain an  
d myalgias.                             
  
                          Skin: Negative for rash.                             
  
                          Neurological: Negative for dizziness, weakness and hea  
daches.                             
  
                          Endo/Heme/Allergies: Does not bruise/bleed easily.      
                         
  
                          Psychiatric/Behavioral: Negative for depression. The p  
atient is not                             
  
                          nervous/anxious and does not have insomnia.             
                  
  
                          Vitals                                   
  
                                                    /78   Pulse 98   Temp   
97.3   Ht 5' 7  (1.70m)   Wt 200 lb (90.7kg)   SpO2  
                                                              
  
                          93[ra]%   BMI 31.32 kg/(m2).                            
   
  
                          Physical Exam                             
  
                          Vitals and nursing note reviewed.                       
        
  
                          Constitutional:                             
  
                          Appearance: Normal appearance.                          
     
  
                          HENT:                                    
  
                          Head: Normocephalic and atraumatic.                     
          
  
                          Nose: Nose normal.                             
  
                          Mouth/Throat:                             
  
                          Mouth: Mucous membranes are moist.                      
         
  
                          Eyes:                                    
  
                          Conjunctiva/sclera: Conjunctivae normal.                
               
  
                          Cardiovascular:                             
  
                          Rate and Rhythm: Normal rate and regular rhythm.        
                       
  
                          Heart sounds: No murmur heard.                          
     
  
                          Pulmonary:                               
  
                          Effort: Pulmonary effort is normal.                     
          
  
                          Breath sounds: Normal breath sounds. No wheezing.       
                        
  
                          Skin:                                    
  
                          General: Skin is warm and dry.                          
     
  
                          Comments: Surgical site, left BKA covered in clean ban  
dage with no signs of                             
  
                                        drainage. Skin surrounding the surgical   
site normal in color without swelling.   
                                                      
  
                          No tenderness to palpation.                             
  
                          Neurological:                             
  
                          General: No focal deficit present.                      
         
  
                          Mental Status: He is alert and oriented to person, benjamin  
ce, and time.                             
  
                          Psychiatric:                             
  
                          Mood and Affect: Mood normal.                           
    
  
                          Behavior: Behavior normal.                             
  
                          Thought Content: Thought content normal.                
               
  
                          Judgment: Judgment normal.                             
  
                          ASSESSMENT/PLAN:                             
  
                                        1. Hospital discharge follow-up - ICD9:   
V67.59, ICD10: Z09 (primary diagnosis)   
                                                      
  
                          - multiple recent hospitalizations due to PAD           
                    
  
                          - doing well                             
  
                          2. Peripheral vascular disease (HCC) - ICD9: 443.9, IC  
D10: I73.9                             
  
                          - poorly controlled                             
  
                          - Continue all lipid lowering medications               
                
  
                          - It is important to have good blood sugar control      
                         
  
                          - Regular follow-ups with vascular surgeon              
                 
  
                          3. S/P below knee amputation, left (HCC) - ICD9: V49.7  
5, ICD10: Z89.512                             
  
                          - doing well                             
  
                                        - No signs of infection present. Advised  
 to report any signs on infection such   
                                                      
  
                          as foul odor, purulent drainage, warmth and redness, f  
ever, chills, pain.                             
  
                          CARSON Moore.CNP                             
  
                          , (more content not included)...             
                  
  
  
  
  
                                        CNPN  on 2022  
  
  
  
  
             CNPN         Telephone ([facility]) Normal                    [place]  
  
                                                      
--------------------------------------------------------------------------------
                                                            General BRADSHAWWILLOW ([id number]) 1955              
                Medical  
  
                          Date Time Provider Department                           
  Center  
  
                          22 [facility]                             
  
                          During your visit today, we recorded the following inf  
ormation about you:                             
  
                          Sandra Chawla 2022 3:27 PM Signed                      
         
  
                          Hoa from [employer] called stating Willow's wife was t  
old by the home care                             
  
                                        physical therapist that Willow needs a   
stump support for his wheelchair ASAP.   
                                                      
  
                          Jose De Jesus's wife was requesting this be done today.            
                   
  
                          Ph:[phone]                             
  
                          Fax: [phone]                             
  
                          Please advise                             
  
                          CARSON Chi.CNP 2022 4:19 PM Signed         
                      
  
                          This will not be able to be completed today since I am  
 not even sure how to                             
  
                                                    order this. More information  
 is needed so I know how to even enter an order for  
                                                              
  
                          this. It is not in Epic.                             
  
                          Denise Abrams LPN 2022 10:24 AM Signed          
                     
  
                          LVM with [employer] requesting more information or an order   
to be faxed.                             
  
                          .me                                      
  
                          Allergies As of Date: 2022 Noted Allergy Reactio  
n                             
  
                          MENTHOL 2019 2 - Rash                             
  
                          Comments: Topical like vicks vapor                      
         
  
                          MINT CHOCOLATE CHIP FLAVOR 2017 14 - Other: See   
Comments                             
  
                          Comments: Mouth burning with mint flavor                
               
  
                          Just MINT no chocolate chip                             
  
                          Date Reviewed: 03/10/2022                             
  
                          Reviewed by: CARSON Moore.CNP - Fully Assessed   
                            
  
                          Reason for Visit:                             
  
                          Patient Question [4997]                             
  
                          Prescriptions as of 2022                          
     
  
                          - atorvastatin (LIPITOR) 80 mg tablet                   
            
  
                          Take 80 mg by mouth once daily.                         
      
  
                          - empagliflozin (JARDIANCE) 25 mg tablet                
               
  
                          Take 25 mg by mouth daily with breakfast.               
                
  
                          - dabigatran etexilate (PRADAXA) 150 mg                 
              
  
                          Take 150 mg by mouth twice daily.                       
        
  
                          - prasugrel (EFFIENT) 10 mg tab                         
      
  
                          Take 10 mg by mouth once daily.                         
      
  
                          - VITAMIN E ORAL                             
  
                          Take by mouth once daily.                             
  
                          - buPROPion HCL, smoking deter, 150 mg tab ER 12 hr     
                          
  
                          Take 1 tablet by mouth once daily.                      
         
  
                          - oxyCODONE IR (ROXICODONE) 5 mg immediate release tab  
let                             
  
                          Take 5 mg by mouth every 6 hours as needed.             
                  
  
                          - ONETOUCH VERIO TEST STRIPS test strip                 
              
  
                          - ONETOUCH VERIO REFLECT METER                          
     
  
                          - ONETOUCH DELICA PLUS LANCET 33 gauge                  
             
  
                          - nitroglycerin sublingual (NITROQUICK) 0.4 mg SL tabl  
et                             
  
                          Dissolve 0.4 mg under the tongue every 5 minutes as ne  
eded.                             
  
                          - pantoprazole DR (PROTONIX) 40 mg tablet               
                
  
                          Take 40 mg by mouth once daily.                         
      
  
                          - JARDIANCE 10 mg tablet                             
  
                          - VASCEPA capsule                             
  
                          Take 2 g by mouth twice daily with meals.               
                
  
                          - alirocumab (PRALUENT PEN) 75 mg/mL pen                
               
  
                          Inject 75 mg subcutaneously every other week.           
                    
  
                          - ubidecarenone Q-10 (COENZYME Q-10) 10 mg cap          
                     
  
                          Take by mouth once daily.                             
  
                          - Magnesium 250 mg tab                             
  
                          Take 250 mg by mouth.                             
  
                          - sildenafil (VIAGRA) 100 mg tablet                     
          
  
                          Take 100 mg by mouth as needed.                         
      
  
                          - metFORMIN ER (GLUCOPHAGE XR) 500 mg 24 hr tablet      
                         
  
                          Take 1000mg by mouth with breakfast and 500mg by mouth  
 with dinner                             
  
                          - Fenofibrate (LOFIBRA) 160 mg tablet                   
            
  
                          Take 160 mg by mouth once daily.                        
       
  
                          - lisinopril 2.5 mg tablet                             
  
                          Take 2.5 mg by mouth once daily.                        
       
  
                          - metoprolol succinate ER (TOPROL XL) 25 mg 24 hr tabl  
et                             
  
                          Take 25 mg by mouth twice daily.                        
       
  
                          - cilostazol (PLETAL) 100 mg tablet                     
          
  
                          Take 100 mg by mouth once daily.                        
       
  
                          Problem List As Of Date 2022 Noted Resolved       
                        
  
                          Adenocarcinoma of left lung (HCC) [C34.92] 2019                             
  
                          Peripheral vascular disease (HCC) [I73.9] 2019    
                           
  
                          Centrilobular emphysema (HCC) [J43.2] 2019        
                       
  
                          Controlled type 2 diabetes mellitus with diabet*2019                             
  
                          Primary malignant neoplasm of bronchus of left *2019                             
  
                          Essential hypertension [I10] 2022                 
              
  
                          Hyperlipidemia [E78.5] 2022                       
        
  
                          Personal history of DVT (deep vein thrombosis) *2022                             
  
                          Lung cancer (HCC) [C34.90] 2022                   
            
  
                          COPD (chronic obstructive pulmonary disease) (H*2022                             
  
                          Controlled type 2 diabetes mellitus, without lo*2022                             
  
                          Encounter Number: [id number]                             
  
                          Encounter Status:Closed by SANDRA CHAWLA on 22       
                        
  
  
  
  
                                        CULT/STAIN - AEROBIC AND ANAEROBIC  on 0  
2022  
  
  
  
  
             CULT/STAIN - AEROBIC AND ANAEROBIC              Normal               
       [facility]  
  
  
  
  
                          Comment on above:         Performed By: #### S/GRM ###  
#[address] [id number]##  
## CXAAN ####38 Herrera Street 60320  
38 Herrera Street 441854549  
  
  
  
  
                                        CULTURE BLOOD (Two)  on 2022  
  
  
  
  
                          Microscopic examination   1 Organism (Coagulase-negati  
ve) Staphylococcus   
epidermidis         Normal                                  Mercy Health St. Vincent Medical Center System  
  
             of blood, culture Isolated:                                
  
                          For identification and/or sensitivity, refer to cultur  
e                             
  
                          collected on: 2022 at 19:22 (R5034081).           
                    
  
  
  
  
                          Comment on above:         Performed By: #### C/BLT ###  
#Munson Healthcare Cadillac Hospital525 E. Elizabeth, OH   
  
  
  
  
                                        STAIN GRAM  on 2022  
  
  
  
  
             STAIN GRAM   STAIN GRAM --> Status: F Normal                    Beaumont Hospital  
  
                          Few polymorphonuclear cells/lpf.                        
       
  
                          No organisms seen.                             
  
                          No organisms seen.                             
  
  
  
  
                          Comment on above:         Performed By: #### S/GRM ###  
#Briana Ville 771835 E. Elizabeth, OH ##  
## CXAAN ####Briana Ville 771835 E.  
  
                                                    Elizabeth, OH SuBellevue Hospital525 E. Elizabeth, OH   
  
  
  
  
                                        CR Chest Portable  on 2022  
  
  
  
  
             CR Chest Portable              Normal                    McLaren Oakland  
  
  
  
  
                                        CTA Chest w/ + w/o Contrast  on 20  
  
  
  
  
             CTA Chest w/ + w/o Contrast              Normal                      
Munson Healthcare Cadillac Hospital  
  
  
  
  
                                        Comp Panel with Mg Reflex  on 2022  
  
  
  
  
             ALP [Catalytic activity/Vol] 101 U/L      Normal               
 Munson Healthcare Cadillac Hospital  
  
  
  
  
                          Comment on above:         Performed By: #### HEMDF, LA  
CTS, CMP3M, LIPA4 ####Briana Ville 771835 E. Gause, OH   
  
  
  
  
             ALT [Catalytic activity/Vol] 45 U/L       Normal       0-49          
 Munson Healthcare Cadillac Hospital  
  
  
  
  
                          Comment on above:         Result Comment: The ALT test  
 is performed by an updated assay  
  
                                                    method.Please note that the   
reference intervals have beenchanged  
  
                                                    and are now sex specific.  
   
                                                    Performed By: #### HEMDF, LA  
CTS, CMP3M, LIPA4 ####Briana Ville 771835 E. Gause, OH   
  
  
  
  
             Anion gap [Moles/Vol] 11 mmol/L    Normal       3-13         Munson Healthcare Cadillac Hospital  
  
  
  
  
                          Comment on above:         Performed By: #### HEMDF, LA  
CTS, CMP3M, LIPA4 ####Briana Ville 771835 Allison, OH   
  
  
  
  
             AST [Catalytic activity/Vol] 91 U/L       High         15-46         
 Munson Healthcare Cadillac Hospital  
  
  
  
  
                          Comment on above:         Performed By: #### HEMDF, LA  
CTS, CMP3M, LIPA4 ####Briana Ville 771835 EYork, OH   
  
  
  
  
             Bilirubin [Mass/Vol] 0.6 mg/dL    Normal       0.2-1.3      Summa H  
ealth System  
  
  
  
  
                          Comment on above:         Performed By: #### HEMDF, LA  
CTS, CMP3M, LIPA4 ####Munson Healthcare Cadillac Hospital525 E. ECU Health Duplin Hospital  
HANNAH, OH   
  
  
  
  
             Calcium [Mass/Vol] 9.5 mg/dL    Normal       8.4-10.4     The Jewish Hospital System  
  
  
  
  
                          Comment on above:         Performed By: #### HEMDF, LA  
CTS, CMP3M, LIPA4 ####Briana Ville 771835 E. Gause, OH   
  
  
  
  
             CO2 [Moles/Vol] 20 mmol/L    Low          22-30        Munson Healthcare Cadillac Hospital  
  
  
  
  
                          Comment on above:         Performed By: #### HEMDF, LA  
CTS, CMP3M, LIPA4 ####Briana Ville 771835 E. Gause, OH   
  
  
  
  
             Glucose [Mass/Vol] 146 mg/dL    High                Munson Healthcare Otsego Memorial Hospital  
  
  
  
  
                          Comment on above:         Performed By: #### HEMDF, LA  
CTS, CMP3M, LIPA4 ####Briana Ville 771835 E. Gause, OH   
  
  
  
  
             Protein [Mass/Vol] 7.9 g/dL     Normal       6.3-8.2      Munson Healthcare Otsego Memorial Hospital  
  
  
  
  
                          Comment on above:         Performed By: #### HEMDF, LA  
CTS, CMP3M, LIPA4 ####Briana Ville 771835 E. Gause, OH   
  
  
  
  
             Urea nitrogen [Mass/Vol] 30 mg/dL     High         7-17         Beaumont Hospital  
  
  
  
  
                          Comment on above:         Performed By: #### HEMDF, LA  
CTS, CMP3M, LIPA4 ####Munson Healthcare Cadillac Hospital525 E. Gause, OH   
  
  
  
  
             Creatinine [Mass/Vol] 1.85 mg/dL   High         0.52-1.25    Munson Healthcare Cadillac Hospital  
  
  
  
  
                          Comment on above:         Performed By: #### HEMDF, LA  
CTS, CMP3M, LIPA4 ####Briana Ville 771835 E. Gause, OH   
  
  
  
  
             GFR/1.73 sq M.predicted among 43.2 mL/min/{1.73_m2} Abnormal     >6  
0          Munson Healthcare Cadillac Hospital  
  
             blacks MDRD (S/P/Bld) [Vol                                          
  
             rate/Area]                                            
  
  
  
  
                          Comment on above:         Performed By: #### HEMDF, LA  
CTS, CMP3M, LIPA4 ####Briana Ville 771835 Allison, OH 89006-4640  
  
  
  
  
             GFR/1.73 sq M.predicted among 37.2 mL/min/{1.73_m2} Abnormal     >6  
0          Munson Healthcare Cadillac Hospital  
  
             non-blacks MDRD (S/P/Bld) [Vol                                       
     
  
             rate/Area]                                            
  
  
  
  
                          Comment on above:         Result Comment: KDIGO guidel  
zoë provide the following GFR  
  
                                                    categories:Stage GFR(ml/min/  
1.73 m2) TermsG1 >=90 Normal or highG2  
  
                                                    60-89 Mildly decreased*G3a 4  
5-59 Mildly to moderately epxsuhsydZ4k  
  
                                                    30-44 Moderately to severely  
 decreasedG4 15-29 Severely decreasedG5  
  
                                                    <15 Kidney failure*Relative   
to young adult level.In the absence of  
  
                                                    evidence of kidney damage, n  
either GFRcategory G1 nor G2 fulfill  
  
                                                    the criteria for CKD.The CKD  
-EPI equation is validated in  
  
                                                    individuals 18 yearsof age a  
nd older. Currently the best equation  
  
                                                    forestimating glomerular beto  
tration rate (GFR) from serumcreatinine  
  
                                                    in children is the Bedside S  
kristiwartz equation.It is less accurate in  
  
                                                    patients with extremes of mu  
sclemass, restriction of dietary  
  
                                                    protein, ingestion of creati  
ne,extra-renal metabolism of  
  
                                                    creatinine, or treatment wit  
hmedications that affect renal tubular  
  
                                                    creatinine secretion.  
   
                                                    Performed By: #### HEMDF, LA  
CTS, CMP3M, LIPA4 ####Briana Ville 771835 Allison, OH 37840-5334  
  
  
  
  
             Albumin [Mass/Vol] 3.7 g/dL     Normal       3.5-5.0      The Jewish Hospital System  
  
  
  
  
                          Comment on above:         Performed By: #### HEMDF, LA  
CTS, CMP3M, LIPA4 ####Briana Ville 771835 Allison, OH 96997-2321  
  
  
  
  
             Chloride [Moles/Vol] 106 mmol/L   Normal              Mercy Health St. Joseph Warren Hospital System  
  
  
  
  
                          Comment on above:         Performed By: #### HEMDF, LA  
CTS, CMP3M, LIPA4 ####Kettering Health Greene Memorial M2 Connections  
  
                                                    Pljynk630 E. Gause, OH   
  
  
  
  
             Potassium [Moles/Vol] 3.9 mmol/L   Normal       3.5-5.1      Munson Healthcare Cadillac Hospital  
  
  
  
  
                          Comment on above:         Performed By: #### HEMDF, LA  
CTS, CMP3M, LIPA4 ####Mercy Health St. Vincent Medical Center  
  
                                                    Sgspmh685 E. Gause, OH   
  
  
  
  
             Sodium [Moles/Vol] 138 mmol/L   Normal       135-145      The Jewish Hospital System  
  
  
  
  
                          Comment on above:         Performed By: #### HEMDF, LA  
CTS, CMP3M, LIPA4 ####Kettering Health Greene Memorial M2 Connections  
  
                                                    Ypqycf295 E. Gause, OH   
  
  
  
  
                                        Complete Urinalysis  on 2022  
  
  
  
  
             Appearance (U) Clear        Normal       Clear        Munson Healthcare Cadillac Hospital  
  
  
  
  
                          Comment on above:         Result Comment: .  
   
                                                    Performed By: #### CUA2 ####  
Briana Ville 771835 E. Elizabeth, OH   
  
  
  
  
             Bilirubin,Urine Negative     Normal       Negative     Munson Healthcare Cadillac Hospital  
  
  
  
  
                          Comment on above:         Result Comment: .  
   
                                                    Performed By: #### CUA2 ####  
Kettering Health Greene Memorial M2 Connections Oawtaj185 E. Elizabeth, OH   
  
  
  
  
             Color (U)    Yellow       Normal       Lt. Yellow   Kettering Health Greene Memorial Health   
stem  
  
  
  
  
                          Comment on above:         Result Comment: .  
   
                                                    Performed By: #### CUA2 ####  
Kettering Health Greene Memorial M2 Connections Pcdngl308 E. Elizabeth, OH   
  
  
  
  
             Glucose Ql (U) > 1,000      Abnormal     Normal (<70) Munson Healthcare Cadillac Hospital  
  
  
  
  
                          Comment on above:         Result Comment: .  
   
                                                    Performed By: #### CUA2 ####  
Kettering Health Greene Memorial M2 Connections Bzkqlo442 E. Elizabeth, OH   
  
  
  
  
             Ketone,Urine Negative     Normal       Negative     Salem City Hospital  
stem  
  
  
  
  
                          Comment on above:         Result Comment: .  
   
                                                    Performed By: #### CUA2 ####  
Kettering Health Greene Memorial M2 Connections Qgjlcz401 E. Mary Free Bed Rehabilitation Hospital, OH   
  
  
  
  
             Leukocytes,Urine Negative     Normal       Negative     Mercy Health Perrysburg Hospital System  
  
  
  
  
                          Comment on above:         Result Comment: .  
   
                                                    Performed By: #### CUA2 ####  
Kettering Health Greene Memorial M2 Connections Rglgui814 E. Elizabeth, OH   
  
  
  
  
             Nitrites,Urine Negative     Normal       Negative     Summa Health   
System  
  
  
  
  
                          Comment on above:         Result Comment: .  
   
                                                    Performed By: #### CUA2 ####  
38 Herrera Street   
  
  
  
  
             Occult Blood,Urine Negative     Normal       Negative     The Jewish Hospital System  
  
  
  
  
                          Comment on above:         Result Comment: .  
   
                                                    Performed By: #### CUA2 ####  
38 Herrera Street   
  
  
  
  
             pH,Urine     5.5          Normal       5.0-8.0      Kettering Health Greene Memorial Health   
stem  
  
  
  
  
                          Comment on above:         Result Comment: .  
   
                                                    Performed By: #### CUA2 ####  
38 Herrera Street   
  
  
  
  
             Specific Gravity,Urine > 1.030      Abnormal     1.005 - 1.030 University of Michigan Hospital  
  
  
  
  
                          Comment on above:         Result Comment: .  
   
                                                    Performed By: #### CUA2 ####  
38 Herrera Street   
  
  
  
  
             Total Protein,Urine Negative     Normal       Negative     OhioHealth Berger Hospital System  
  
  
  
  
                          Comment on above:         Result Comment: .  
   
                                                    Performed By: #### CUA2 ####  
Kettering Health Greene Memorial M2 Connections 99 Hogan Street   
  
  
  
  
             Urobilinogen,Urine 6 mg/dL      Abnormal     Normal (0-1) The Jewish Hospital System  
  
  
  
  
                          Comment on above:         Result Comment: .  
   
                                                    Performed By: #### CUA2 ####  
38 Herrera Street   
  
  
  
  
                                        ED Provider Note  on 2022  
  
  
  
  
             ED Provider Note              Normal                    Mercy Health Perrysburg Hospital System  
  
  
  
  
                                        Hemogram w/ Autodiff  on 2022  
  
  
  
  
             Abs Baso Cnt 0.1 10*3/uL  Normal       0.0-0.2      Salem City Hospital  
stem  
  
  
  
  
                          Comment on above:         Performed By: #### HEMDF, LA  
CTS, CMP3M, LIPA4 ####Kettering Health Greene Memorial M2 Connections  
  
                                                    59 Thomas Street   
  
  
  
  
             Abs Neutrophile Cnt 10.3 10*3/uL High         1.8-7.0      OhioHealth Berger Hospital System  
  
  
  
  
                          Comment on above:         Performed By: #### HEMDF, LA  
CTS, CMP3M, LIPA4 ####Kettering Health Greene Memorial M2 Connections  
  
                                                    59 Thomas Street   
  
  
  
  
             Basophils/100 WBC (Bld) 0.9 %        Normal       0.0-2.0      Summ  
a Health System  
  
  
  
  
                          Comment on above:         Performed By: #### HEMDF, LA  
CTS, CMP3M, LIPA4 ####Briana Ville 771835 E. Gause, OH   
  
  
  
  
             Eosinophils (Bld) [#/Vol] 0.2 10*3/uL  Normal       0.0-0.5      Harper University Hospital  
  
  
  
  
                          Comment on above:         Performed By: #### HEMDF, LA  
CTS, CMP3M, LIPA4 ####Briana Ville 771835 E. Gause, OH   
  
  
  
  
             Eosinophils/100 WBC (Bld) 1.3 %        Normal       1.0-6.0      Harper University Hospital  
  
  
  
  
                          Comment on above:         Performed By: #### HEMDF, LA  
CTS, CMP3M, LIPA4 ####Briana Ville 771835 E. Gause, OH   
  
  
  
  
             Erythrocyte distribution width (RBC) 19.4 %       High         11.5  
-14.5    Munson Healthcare Cadillac Hospital  
  
             [Ratio]                                               
  
  
  
  
                          Comment on above:         Performed By: #### HEMDF, LA  
CTS, CMP3M, LIPA4 ####Briana Ville 771835 E. Gause, OH   
  
  
  
  
             Granulocytes/100 WBC (Bld) 76.8 %       Normal       40.0-80.0    Insight Surgical Hospital  
  
  
  
  
                          Comment on above:         Performed By: #### HEMDF, LA  
CTS, CMP3M, LIPA4 ####Briana Ville 771835 E. Gause, OH   
  
  
  
  
             Hematocrit (Bld) [Volume fraction] 33.6 %       Low          40.0-5  
2.0    Munson Healthcare Cadillac Hospital  
  
  
  
  
                          Comment on above:         Performed By: #### HEMDF, LA  
CTS, CMP3M, LIPA4 ####Briana Ville 771835 . Gause, OH   
  
  
  
  
             Hemoglobin (Bld) [Mass/Vol] 10.3 g/dL    Low          13.0-18.0      
Munson Healthcare Cadillac Hospital  
  
  
  
  
                          Comment on above:         Performed By: #### HEMDF, LA  
CTS, CMP3M, LIPA4 ####Briana Ville 771835 EYork, OH   
  
  
  
  
             Lymphocytes (Bld) [#/Vol] 1.9 10*3/uL  Normal       1.0-4.3      Harper University Hospital  
  
  
  
  
                          Comment on above:         Performed By: #### HEMDF, LA  
CTS, CMP3M, LIPA4 ####04 Allen Street   
  
  
  
  
             Lymphocytes/100 WBC (Bld) 14.4 %       Low          20.0-40.0    Harper University Hospital  
  
  
  
  
                          Comment on above:         Performed By: #### HEMDF, LA  
CTS, CMP3M, LIPA4 ####04 Allen Street   
  
  
  
  
             MCH (RBC) [Entitic mass] 22.1 pg      Low          26.0-34.0    Beaumont Hospital  
  
  
  
  
                          Comment on above:         Performed By: #### HEMDF, LA  
CTS, CMP3M, LIPA4 ####04 Allen Street   
  
  
  
  
             MCHC         30.6 %       Low          32.0-36.0    Salem City Hospital  
stem  
  
  
  
  
                          Comment on above:         Performed By: #### HEMDF, LA  
CTS, CMP3M, LIPA4 ####04 Allen Street   
  
  
  
  
             MCV (RBC) [Entitic vol] 72.2 fL      Low          80.0-98.0    Summ  
a Health System  
  
  
  
  
                          Comment on above:         Performed By: #### HEMDF, LA  
CTS, CMP3M, LIPA4 ####04 Allen Street   
  
  
  
  
             Monocytes (Bld) [#/Vol] 0.9 10*3/uL  High         0.0-0.8      Summ  
a Health System  
  
  
  
  
                          Comment on above:         Performed By: #### HEMDF, LA  
CTS, CMP3M, LIPA4 ####04 Allen Street   
  
  
  
  
             Monocytes/100 WBC (Bld) 6.6 %        Normal       2.0-10.0     Summ  
a Health System  
  
  
  
  
                          Comment on above:         Performed By: #### HEMDF, LA  
CTS, CMP3M, LIPA4 ####04 Allen Street   
  
  
  
  
             Platelet mean volume (Bld) [Entitic vol] 6.9 fL       Low            
7.4-12.4     Munson Healthcare Cadillac Hospital  
  
  
  
  
                          Comment on above:         Result Comment: MPV is a tenisha  
culated measurement using platelet  
  
                                                    volume ratio.  
   
                                                    Performed By: #### HEMDF, LA  
CTS, CMP3M, LIPA4 ####Briana Ville 771835 E. Gause, OH   
  
  
  
  
             Platelets (Bld) [#/Vol] 698 10*3/uL  High         140-440      University of Michigan Hospital  
  
  
  
  
                          Comment on above:         Performed By: #### HEMDF, LA  
CTS, CMP3M, LIPA4 ####Briana Ville 771835 E. Gause, OH   
  
  
  
  
             RBC (Bld) [#/Vol] 4.65 10*6/uL Normal       4.40-5.90    McLaren Oakland  
  
  
  
  
                          Comment on above:         Performed By: #### HEMDF, LA  
CTS, CMP3M, LIPA4 ####Kettering Health Greene Memorial M2 Connections  
  
                                                    Gelzcq031 E. Gause, OH   
  
  
  
  
             WBC (Bld) [#/Vol] 13.4 10*3/uL High         3.6-10.7     McLaren Oakland  
  
  
  
  
                          Comment on above:         Performed By: #### HEMDF, LA  
CTS, CMP3M, LIPA4 ####Briana Ville 771835 E. Gause, OH   
  
  
  
  
                                        Lactic Acid, Sepsis  on 2022  
  
  
  
  
             Lactate [Moles/Vol] 1.2 mmol/L   Normal       0.7-2.0      ProMedica Coldwater Regional Hospital  
  
  
  
  
                          Comment on above:         Performed By: #### HEMDF, LA  
CTS, CMP3M, LIPA4 ####Briana Ville 771835 . Gause, OH   
  
  
  
  
                                        Lipase  on 2022  
  
  
  
  
             Lipase [Catalytic activity/Vol] 87 U/L       Normal            
    Munson Healthcare Cadillac Hospital  
  
  
  
  
                          Comment on above:         Performed By: #### HEMDF, LA  
CTS, CMP3M, LIPA4 ####Briana Ville 771835 E. Gause, OH   
  
  
  
  
                                        ACT,Whole Blood  on 2022  
  
  
  
  
             ACT,Whole Blood 145 s        High                Munson Healthcare Cadillac Hospital  
  
  
  
  
                          Comment on above:         Result Comment: ACTBPerforme  
d by HemCatheter Connections Sig EliteCLIA ID:  
  
                                                    40G1018630JothwSelect Medical Specialty Hospital - Youngstown  
n, OHACT testing is not intended for  
  
                                                    patients taking aprotonin,pa  
tients with hematocrits of <20% or  
  
                                                    >55%, patients usingother ty  
pes of anticoagulation medications, and  
  
                                                    patients withLupus Anticoagu  
lant.  
   
                                                    Performed By: #### ACTB ####  
38 Herrera Street   
  
  
  
  
             ACT,Whole Blood OutOfRange   Critically abnormal        Munson Healthcare Cadillac Hospital  
  
  
  
  
                          Comment on above:         Result Comment: ACTBOPerform  
ed by Hemochron Sig EliteCLIA ID:  
  
                                                    77X7371488AslfzSelect Medical Specialty Hospital - Youngstown  
n, OHACT testing is not intended for  
  
                                                    patients taking aprotonin,pa  
tients with hematocrits of <20% or  
  
                                                    >55%, patients usingother ty  
pes of anticoagulation medications, and  
  
                                                    patients withLupus Anticoagu  
lant.  
   
                                                    Performed By: #### ACTBO ###  
#38 Herrera Street   
  
  
  
  
                                        APTT  on 2022  
  
  
  
  
             aPTT Coag (Bld) [Time] 31.5 s       High         20.0-30.5    Munson Healthcare Cadillac Hospital  
  
  
  
  
                          Comment on above:         Result Comment: NOTE: The th  
erapeutic time for Heparin  
  
                                                    anticoagulation,based on Xa   
activity inhibition, is an APTT of  
  
                                                    46-80seconds.  
   
                                                    Performed By: #### APTT ####  
38 Herrera Street   
  
  
  
  
             aPTT Coag (Bld) [Time] 31.5 s       High         20 - 30.5 s  Corey Hospital  
  
  
  
  
                          Comment on above:         NOTE: The therapeutic time f  
or Heparin anticoagulation,  
  
                                                    based on Xa activity inhibit  
ion, is an APTT of 46-80  
  
                                                    seconds.  
  
  
  
  
             Interpretation and review of Abnormal                                
 Corey Hospital  
  
             laboratory results                                          
   
                          Test Performed by Aurora Sinai Medical Center– Milwaukee, 51 Roth Street Lufkin, TX 75901 LAB  
  
                          Pedro Bay, OH                             
  
                          84539                                    
   
                                                                 Corey Hospital  
   
             aPTT Coag (Bld) [Time] 39.3 s       High         20.0-30.5    Munson Healthcare Cadillac Hospital  
  
  
  
  
                          Comment on above:         Result Comment: NOTE: The th  
erapeutic time for Heparin  
  
                                                    anticoagulation,based on Xa   
activity inhibition, is an APTT of  
  
                                                    46-80seconds.  
   
                                                    Performed By: #### APTT ####  
Briana Ville 771835 Rio Rancho, OH   
  
  
  
  
             aPTT Coag (Bld) [Time] 39.3 s       High         20 - 30.5 s  Corey Hospital  
  
  
  
  
                          Comment on above:         NOTE: The therapeutic time f  
or Heparin anticoagulation,  
  
                                                    based on Xa activity inhibit  
ion, is an APTT of 46-80  
  
                                                    seconds.  
  
  
  
  
             Interpretation and review of Abnormal                                
 Corey Hospital  
  
             laboratory results                                          
   
                          Test Performed by Westfields Hospital and Clinic, 525 E.                           Herrick Campus LAB  
  
                          Pedro Bay, OH                             
  
                          22837                                    
   
                                                                 Corey Hospital  
  
  
  
  
                                        Activated clotting time  on 2022  
  
  
  
  
             Activated Clotting Time OutOfRange   Critically abnormal 90 - 134 s  
   Corey Hospital  
  
  
  
  
                          Comment on above:         ACTBO  
  
                                                    Performed by goDog Fetch Sig ELIZABETH AriasLIMONIQUE ID: 01M4740189  
  
                                                    Georgetown, OH  
  
                                                    ACT testing is not intended   
for patients taking aprotonin,  
  
                                                    patients with hematocrits of  
 <20% or >55%, patients using  
  
                                                    other types of anticoagulati  
on medications, and patients with  
  
                                                    Lupus Anticoagulant.  
  
  
  
  
             Interpretation and review of Abnormal                                
 Corey Hospital  
  
             laboratory results                                          
   
                          Test Performed by Westfields Hospital and Clinic, 525 E.                           Liberty, OH                             
  
                          58060                                    
   
                                                                 Corey Hospital  
  
  
  
  
                                        Basic Metabolic Panel  on 2022  
  
  
  
  
             Calcium [Mass/Vol] 8.2 mg/dL    Low          8.4-10.4     The Jewish Hospital System  
  
  
  
  
                          Comment on above:         Performed By: #### BMP3 ####  
Briana Ville 771835 Rio Rancho, OH   
  
  
  
  
             Anion gap [Moles/Vol] 5 mmol/L     Normal       3-13         Munson Healthcare Cadillac Hospital  
  
  
  
  
                          Comment on above:         Performed By: #### BMP3 ####  
Briana Ville 771835 Rio Rancho, OH   
  
  
  
  
             CO2 [Moles/Vol] 22 mmol/L    Normal       -        Munson Healthcare Cadillac Hospital  
  
  
  
  
                          Comment on above:         Performed By: #### BMP3 ####  
Briana Ville 771835 Rio Rancho, OH   
  
  
  
  
             Creatinine [Mass/Vol] 1.24 mg/dL   Normal       0.52-1.25    Munson Healthcare Cadillac Hospital  
  
  
  
  
                          Comment on above:         Performed By: #### BMP3 ####  
Kettering Health Greene Memorial M2 Connections Xqduwu896 Rio Rancho, OH 47887-2204  
  
  
  
  
             GFR/1.73 sq M.predicted among 70.0 mL/min/{1.73_m2} Normal       >6  
0          Munson Healthcare Cadillac Hospital  
  
             blacks MDRD (S/P/Bld) [Vol                                          
  
             rate/Area]                                            
  
  
  
  
                          Comment on above:         Performed By: #### BMP3 ####  
Briana Ville 771835 EDexter, OH 62177-4399  
  
  
  
  
             GFR/1.73 sq M.predicted among 60.4 mL/min/{1.73_m2} Normal       >6  
0          Munson Healthcare Cadillac Hospital  
  
             non-blacks MDRD (S/P/Bld) [Vol                                       
     
  
             rate/Area]                                            
  
  
  
  
                          Comment on above:         Result Comment: KDIGO guidel  
zoë provide the following GFR  
  
                                                    categories:Stage GFR(ml/min/  
1.73 m2) TermsG1 >=90 Normal or highG2  
  
                                                    60-89 Mildly decreased*G3a 4  
5-59 Mildly to moderately zbhzaoegdB8s  
  
                                                    30-44 Moderately to severely  
 decreasedG4 15-29 Severely decreasedG5  
  
                                                    <15 Kidney failure*Relative   
to young adult level.In the absence of  
  
                                                    evidence of kidney damage, n  
either GFRcategory G1 nor G2 fulfill  
  
                                                    the criteria for CKD.The CKD  
-EPI equation is validated in  
  
                                                    individuals 18 yearsof age a  
nd older. Currently the best equation  
  
                                                    forestimating glomerular beto  
tration rate (GFR) from serumcreatinine  
  
                                                    in children is the Bedside S  
chwartz equation.It is less accurate in  
  
                                                    patients with extremes of mu  
sclemass, restriction of dietary  
  
                                                    protein, ingestion of creati  
ne,extra-renal metabolism of  
  
                                                    creatinine, or treatment wit  
hmedications that affect renal tubular  
  
                                                    creatinine secretion.  
   
                                                    Performed By: #### BMP3 ####  
Kettering Health Greene Memorial M2 Connections Rivzge109 Rio Rancho, OH 47026-2685  
  
  
  
  
             Glucose [Mass/Vol] 150 mg/dL    High                The Jewish Hospital System  
  
  
  
  
                          Comment on above:         Performed By: #### BMP3 ####  
Briana Ville 771835 Rio Rancho, OH 90176-8058  
  
  
  
  
             Urea nitrogen [Mass/Vol] 15 mg/dL     Normal       7-17         Beaumont Hospital  
  
  
  
  
                          Comment on above:         Performed By: #### BMP3 ####  
Briana Ville 771835 Rio Rancho, OH 49035-7536  
  
  
  
  
             Chloride [Moles/Vol] 109 mmol/L   High                Corey Hospitala Salem Regional Medical Center System  
  
  
  
  
                          Comment on above:         Performed By: #### BMP3 ####  
Briana Ville 771835 Rio Rancho, OH 38116-7547  
  
  
  
  
             Potassium [Moles/Vol] 3.8 mmol/L   Normal       3.5-5.1      Mercy Health St. Vincent Medical Center System  
  
  
  
  
                          Comment on above:         Performed By: #### BMP3 ####  
Briana Ville 771835 Rio Rancho, OH 30582-2631  
  
  
  
  
             Sodium [Moles/Vol] 136 mmol/L   Normal       135-145      Corey Hospitala Grant Hospital System  
  
  
  
  
                          Comment on above:         Performed By: #### BMP3 ####  
Briana Ville 771835 Rio Rancho, OH 92870-4345  
  
  
  
  
             Anion gap [Moles/Vol] 5 mmol/L                  3 - 13 mmol/L SUMMA  
   
             Calcium [Mass/Vol] 8.2 mg/dL    Low          8.4 - 10.4 mg/dL SUMMA  
   
             Chloride [Moles/Vol] 109 mmol/L   High         98 - 107 mmol/L SUMM  
A  
   
             CO2 [Moles/Vol] 22 mmol/L                 22 - 30 mmol/L SUMMA  
   
             Creatinine [Mass/Vol] 1.24 mg/dL                0.52 - 1.25 mg/dL S  
UMMA  
   
             EGFR IF NonAfrican American 60.4 mL/min               60 - PINF mL/  
min SUMMA  
  
  
  
  
                          Comment on above:         KDIGO guidelines provide the  
 following GFR categories:  
  
                                                    Stage GFR(ml/min/1.73 m2) Te  
iggy  
  
                                                    G1 >=90 Normal or high  
  
                                                    G2 60-89 Mildly decreased*  
  
                                                    G3a 45-59 Mildly to moderate  
ly decreased  
  
                                                    G3b 30-44 Moderately to koby  
rely decreased  
  
                                                    G4 15-29 Severely decreased  
  
                                                    G5 <15 Kidney failure  
  
                                                      
  
                                                    *Relative to young adult lev  
el.  
  
                                                    In the absence of evidence o  
f kidney damage, neither GFR  
  
                                                    category G1 nor G2 fulfill t  
he criteria for CKD.  
  
                                                      
  
                                                    The CKD-EPI equation is margarita  
dated in individuals 18 years  
  
                                                    of age and older. Currently   
the best equation for  
  
                                                    estimating glomerular filtra  
tion rate (GFR) from serum  
  
                                                    creatinine in children is th  
e Bedside Keller equation.  
  
                                                    It is less accurate in patie  
nts with extremes of muscle  
  
                                                    mass, restriction of dietary  
 protein, ingestion of creatine,  
  
                                                    extra-renal metabolism of cr  
eatinine, or treatment with  
  
                                                    medications that affect main  
l tubular creatinine secretion.  
  
  
  
  
             GFR/1.73 sq M.predicted 70.0 mL/min/{1.73_m2}              60 - PIN  
F    SUMMA  
  
             among blacks MDRD                           mL/min         
  
             (S/P/Bld) [Vol                                          
  
             rate/Area]                                            
   
             Glucose [Mass/Vol] 150 mg/dL    High         70 - 100 mg/dL SUMMA  
   
             Interpretation and Abnormal                               SUMMA  
  
             review of laboratory                                          
  
             results                                               
   
             Potassium [Moles/Vol] 3.8 mmol/L                3.5 - 5.1    SUMMA  
  
                                                    mmol/L         
   
             Sodium [Moles/Vol] 136 mmol/L                135 - 145    SUMMA  
  
                                                    mmol/L         
   
             Urea nitrogen (BldV) 15 mg/dL                  7 - 17 mg/dL SUMMA  
  
             [Mass/Vol]                                            
   
                          Test Performed by ProMedica Toledo Hospital, 93 Berry Street Penitas, TX 78576                           CAMPUS  
 LAB  
  
                          37129                                    
   
                                                                 Corey Hospital  
  
  
  
  
                                        CBC  on 2022  
  
  
  
  
             Hematocrit (Bld) [Volume fraction] 37.3 %       Low          40 - 5  
2 %    SUMMA  
   
             Hemoglobin (Bld) [Mass/Vol] 12.1 g/dL    Low          13 - 18 g/dL   
SUMMA  
   
             Interpretation and review of laboratory results Abnormal             
                    SUMMA  
   
             MCH (RBC) [Entitic mass] 23.7 pg      Low          26 - 34 pg   SUM  
MA  
   
             MCHC (RBC) [Mass/Vol] 32.4 %                    32 - 36 %    SUMMA  
   
             MCV (RBC) [Entitic vol] 73.1 fL      Low          80 - 98 fL   SUMM  
A  
   
             Platelet distribution width (Bld) [Ratio] 20.4 %       High          
 11.5 - 14.5 % SUMMA  
   
             Platelet mean volume (Bld) [Entitic vol] 7.1 fL       Low            
7.4 - 12.4 fL SUMMA  
  
  
  
  
                          Comment on above:         MPV is a calculated measurem  
ent using platelet volume ratio.  
  
  
  
  
             Platelets (Bld) [#/Vol] 158 10*3/uL               140 - 440 10*3/uL  
 SUMMA  
   
             RBC (Bld) [#/Vol] 5.10 10*6/uL              4.4 - 5.9 10*6/uL SUMMA  
   
             WBC (Bld) [#/Vol] 7.4 10*3/uL               3.6 - 10.7 10*3/uL SUMM  
A  
   
                          Test Performed by                           SUMMA HEAL  
TH SYSTEM  
  
                          Summa Health System,                           - 23 Sweeney Street 46528                             
   
                                                                 Corey Hospital  
  
  
  
  
                                        Catheterization and angiography procedur  
e details panel  on 2022  
  
  
  
  
                          Odell Bryan DO 2022 9:58 AM                
             Northwest Rural Health Network CARDIOLOGY  
  
                          DX: Critical Limb Ischemia L leg                        
       
  
                                                                   
  
                          DX Procedure:                             
  
                          LLE angio nonselective (L CFA) and selective (3rd orde  
r L PT)                             
  
                                                                   
  
                          Intervention: None                             
  
                                                                   
  
                          Findings:                                
  
                           The LSFA remains occluded. Catheter in the popliteal   
artery                             
  
                          a, TPT, and PT artery reveal patent vessels. Some resi  
dual clot                             
  
                          in the SFA. There is severe small vessel disease with   
no flow                             
  
                          (contrast penetration) into the foot despite a patent   
vessel (no                             
  
                          reflow). Arterial blood is very dark consistent with a  
 very low                             
  
                          oxy Hb level. This likely indicates stagnation of bloo  
d due to                             
  
                          irreversibly damaged microvasculature due to chronic r  
ecurrent                             
  
                          ischemia and his vascular disease.                      
         
  
                                                                   
  
                          I feel that additional revascularization is futile. Charline mendez need                             
  
                          amp. Discussed with Dr. Breen his lien standing Vascul  
ar                             
  
                          Surgeon. The pt requests transfer to his service in Island Hospital for                             
  
                          the procedure.                             
  
                                                                   
  
                          Plan pull sheath when ACT normalizes, wait 2 hors and   
restart the                             
  
                          heparin.                                 
   
                                                                 Corey Hospital  
  
                                                                 Work Phone: 1(4 33)360-8311  
   
                          Corey Hospital CARDIOVASCULAR INSTITUTE                          
   Northwest Rural Health Network CARDIOLOGY  
  
                                                                   
  
                          ------------------------------------------------------  
-----------------                             
  
                          CARDIAC CATHETERIZATION                             
  
                                                                   
  
                          Patient: Willow Bradshaw                       
        
  
                          Procedure Date: 2022                             
  
                          : 1957                             
  
                          Age: 65                                  
  
                          Gender: M                                
  
                          MRN: 08205020                             
  
                          Patient Type: Inpatient                             
  
                          Account#: 724254662853                             
  
                          Accession#: 49003421301                             
  
                          Procedure physician: Odell Bryan DO, FSVM, FAC C                             
  
                          Fellow:                                  
  
                          Referring Physician: Odell Bryan DO, FSVM, FAC C                             
  
                                                                   
  
                          ------------------------------------------------------  
-----------------                             
  
                          INDICATIONS: (Acute on chronic limb ischemia).          
                     
  
                                                                   
  
                          ------------------------------------------------------  
-----------------                             
  
                          Procedures performed:                             
  
                                                                   
  
                          # Left common femoral angiography.                      
         
  
                          # Left posterior tibial angiography.                    
           
  
                                                                   
  
                          ------------------------------------------------------  
-----------------                             
  
                          SUMMARY: See dictated procedure report.                 
              
  
                                                                   
  
                          ------------------------------------------------------  
-----------------                             
  
                          HISTORY: Risk factors: The patient is a former tobacco  
 user.                             
  
                          Hypertension. Diabetes mellitus. Hyperlipidemia.        
                       
  
                                                                   
  
                          ------------------------------------------------------  
-----------------                             
  
                          LABS, PRIOR TESTS, PROCEDURES, and SURGERY:             
                  
  
                          Hemoglobin (pre-procedure) of 12.1 g/dl. Platelet coun  
t of 158 th/ul.                             
  
                          Serum creatinine (current admission) of 1.24 mg/dl.  angioplas                             
  
                          ty and stenting left SFA and Popliteal Artery.   
022 angioplasty of                             
  
                          left anterior and posterior tibial arteries.            
                   
  
                                                                   
  
                          ------------------------------------------------------  
-----------------                             
  
                          PROCEDURE IN DETAIL: Study status: Cardiac cath: urgen  
t. Consent:                             
  
                          The risks, benefits, and alternatives to the procedure  
 and sedation were                             
  
                          explained to the patient and informed consent was obta  
ined.                             
  
                          Radiation exposure: Fluoroscopy time: 39.6 min. Total   
air KERMA: 936                             
  
                          mGy. Location: Catheterization laboratory.              
                 
  
                          PROCEDURE:                               
  
                                                                   
  
                          1. Initial setup. The patient was brought to the labor  
Naval Hospital Jacksonville. A                             
  
                          baseline ECG was recorded. Intravenous access was obta  
ined. Surface                             
  
                          ECG leads, blood pressure measurements, and pulse oxim  
etric signals                             
  
                          were monitored.                             
  
                          2. Skin preparation. The planned puncture sites were p  
repped and                             
  
                          draped in the usual sterile manner.                     
          
  
                          3. Right femoral artery access. A 6Fr/12cm Engage shea  
th was advanced                             
  
                          into the vessel.                             
  
                          4. Local anesthesia. 1% lidocaine was administered to   
the right groin.                             
  
                          5. Sheath exchange. The right femoral artery sheath wa  
s exchanged for                             
  
                          a 6Fr/12cm Engage sheath.                             
  
                          6. A .035/260cm Benston wire was placed.                
               
  
                          7. A 5Fr x 100cm IM catheter was placed.                
               
  
                          8. A 5Fr x 65cm Angled Taper Glidecath catheter was pl  
aced.                             
  
                          9. A .035/260cm Glidewire Angled Stiff wire was placed  
.                             
  
                          10. A 5Fr x 100cm MPB2 catheter was placed.             
                  
  
                          11. The catheter was exchanged for a 5Fr x 100cm IM ca  
theter.                             
  
                          Satisfactory position was not obtained and the cathete  
r was                             
  
                          sequentially exchanged for a 5Fr x 100cm JR4 catheter   
and a 5Fr x                             
  
                          100cm AR I MOD catheter.                             
  
                          12. Sheath exchange. The right femoral artery sheath w  
as exchanged for                             
  
                          a 6Fr x 45cm Flexor Check-Zeferino Ajay Mod .018  sheath.   
                            
  
                          13. A 5Fr x 100cm Angled Taper Glidecath catheter was   
placed.                             
  
                          14. Left common femoral angiography, under fluoroscopi  
c guidance. A 5Fr                             
  
                          x 100cm Angled Taper Glidecath catheter was introduced  
. Contrast                             
  
                          was injected.                             
  
                          15. Left posterior tibial angiography, under fluorosco  
pic guidance. A                             
  
                          5Fr x 100cm Angled Taper Glidecath catheter was introd  
uced.                             
  
                          Contrast was injected.                             
  
                          16. A .035/260cm Glidewire Angled Stiff wire was place  
d.                             
  
                          17. Right femoral artery hemostasis. The sheath was kramer  
tured in place.                             
  
                          STUDY COMPLETION: All catheters inserted during the pr  
ocedure were                             
  
                          removed. The patient tolerated the procedure well and   
was discharged fro                             
  
                           the lab. There were no complications. Administered m  
edications: Hep                             
  
                          yandel 19 units/kg/hr infusion IV. Hydromorphone 0.3 mg   
IV. IV Hydration                             
  
                          (Standard) 100 ml/hr infusion IV. Contrast: 4. Visipaq  
ue 320MG/CC 61                             
  
                          ml (total dose). 61 ml.                             
  
                                                                   
  
                          ------------------------------------------------------  
-----------------                             
  
                          CORONARY ARTERIES: The left main bifurcates normally i  
nto the LAD and                             
  
                          circumflex.                              
  
                                                                   
  
                          ------------------------------------------------------  
-----------------                             
  
                          Hemodynamics:                             
  
                          Circulatory function:                             
  
                                                                   
  
                          +-------------------------+---------------------+       
                        
  
                          +Stage description +Condition1:Room Air -+              
                 
  
                          +-------------------------+---------------------+       
                        
  
                          +Arterial pressure s/d (m)+114/68 (87) +                
               
  
                          +-------------------------+---------------------+       
                        
  
                          Prepared and electronically signed by                   
            
  
                                                                   
  
                          Odell Bryan DO, Cass Medical Center, Northwest Rural Health Network                      
         
  
                          2022 09:44                             
   
                                                    Odell Bryan DO -  Formatting of this note might be different  
 from the original.                                         Parkview Health CARDIOVASCULAR INSTITUTE                          
   Work Phone: 4(879)921-1697  
  
                                                                   
  
                          ------------------------------------------------------  
-----------------                             
  
                          CARDIAC CATHETERIZATION                             
  
                                                                   
  
                          Patient: Willow Bradshaw                       
        
  
                          Procedure Date: 2022                             
  
                          : 1957                             
  
                          Age: 65                                  
  
                          Gender: M                                
  
                          MRN: 18669291                             
  
                          Patient Type: Inpatient                             
  
                          Account#: 264719737821                             
  
                          Accession#: 53492248608                             
  
                          Procedure physician: Odell Bryan DO, FSVM, FAC C                             
  
                          Fellow:                                  
  
                          Referring Physician: Odell Bryan DO, FSVM, FAC C                             
  
                                                                   
  
                          ------------------------------------------------------  
-----------------                             
  
                          INDICATIONS: (Acute on chronic limb ischemia).          
                     
  
                                                                   
  
                          ------------------------------------------------------  
-----------------                             
  
                          Procedures performed:                             
  
                                                                   
  
                          # Left common femoral angiography.                      
         
  
                          # Left posterior tibial angiography.                    
           
  
                                                                   
  
                          ------------------------------------------------------  
-----------------                             
  
                          SUMMARY: See dictated procedure report.                 
              
  
                                                                   
  
                          ------------------------------------------------------  
-----------------                             
  
                          HISTORY: Risk factors: The patient is a former tobacco  
 user.                             
  
                          Hypertension. Diabetes mellitus. Hyperlipidemia.        
                       
  
                                                                   
  
                          ------------------------------------------------------  
-----------------                             
  
                          LABS, PRIOR TESTS, PROCEDURES, and SURGERY:             
                  
  
                          Hemoglobin (pre-procedure) of 12.1 g/dl. Platelet coun  
t of 158 th/ul.                             
  
                          Serum creatinine (current admission) of 1.24 mg/dl.  angioplas                             
  
                          ty and stenting left SFA and Popliteal Artery.   
022 angioplasty of                             
  
                          left anterior and posterior tibial arteries.            
                   
  
                                                                   
  
                          ------------------------------------------------------  
-----------------                             
  
                          PROCEDURE IN DETAIL: Study status: Cardiac cath: kallie meza. Consent:                             
  
                          The risks, benefits, and alternatives to the procedure  
 and sedation were                             
  
                          explained to the patient and informed consent was obta  
ined.                             
  
                          Radiation exposure: Fluoroscopy time: 39.6 min. Total   
air KERMA: 936                             
  
                          mGy. Location: Catheterization laboratory.              
                 
  
                          PROCEDURE:                               
  
                                                                   
  
                          1. Initial setup. The patient was brought to the labor  
atory. A                             
  
                          baseline ECG was recorded. Intravenous access was obta  
ined. Surface                             
  
                          ECG leads, blood pressure measurements, and pulse oxim  
etric signals                             
  
                          were monitored.                             
  
                          2. Skin preparation. The planned puncture sites were p  
repped and                             
  
                          draped in the usual sterile manner.                     
          
  
                          3. Right femoral artery access. A 6Fr/12cm Engage shea  
th was advanced                             
  
                          into the vessel.                             
  
                          4. Local anesthesia. 1% lidocaine was administered to   
the right groin.                             
  
                          5. Sheath exchange. The right femoral artery sheath wa  
s exchanged for                             
  
                          a 6Fr/12cm Engage sheath.                             
  
                          6. A .035/260cm Benston wire was placed.                
               
  
                          7. A 5Fr x 100cm IM catheter was placed.                
               
  
                          8. A 5Fr x 65cm Angled Taper Glidecath catheter was pl  
aced.                             
  
                          9. A .035/260cm Glidewire Angled Stiff wire was placed  
.                             
  
                          10. A 5Fr x 100cm MPB2 catheter was placed.             
                  
  
                          11. The catheter was exchanged for a 5Fr x 100cm IM ca  
theter.                             
  
                          Satisfactory position was not obtained and the cathete  
r was                             
  
                          sequentially exchanged for a 5Fr x 100cm JR4 catheter   
and a 5Fr x                             
  
                          100cm AR I MOD catheter.                             
  
                          12. Sheath exchange. The right femoral artery sheath w  
as exchanged for                             
  
                          a 6Fr x 45cm Flexor Check-Zeferino Ajay Mod .018  sheath.   
                            
  
                          13. A 5Fr x 100cm Angled Taper Glidecath catheter was   
placed.                             
  
                          14. Left common femoral angiography, under fluoroscopi  
c guidance. A 5Fr                             
  
                          x 100cm Angled Taper Glidecath catheter was introduced  
. Contrast                             
  
                          was injected.                             
  
                          15. Left posterior tibial angiography, under fluorosco  
pic guidance. A                             
  
                          5Fr x 100cm Angled Taper Glidecath catheter was introd  
uced.                             
  
                          Contrast was injected.                             
  
                          16. A .035/260cm Glidewire Angled Stiff wire was place  
d.                             
  
                          17. Right femoral artery hemostasis. The sheath was kramer  
tured in place.                             
  
                          STUDY COMPLETION: All catheters inserted during the pr  
ocedure were                             
  
                          removed. The patient tolerated the procedure well and   
was discharged fro                             
  
                          m the lab. There were no complications. Administered m  
edications: Hep                             
  
                          yandel 19 units/kg/hr infusion IV. Hydromorphone 0.3 mg   
IV. IV Hydration                             
  
                          (Standard) 100 ml/hr infusion IV. Contrast: 4. Visipaq  
ue 320MG/CC 61                             
  
                          ml (total dose). 61 ml.                             
  
                                                                   
  
                          ------------------------------------------------------  
-----------------                             
  
                          CORONARY ARTERIES: The left main bifurcates normally i  
nto the LAD and                             
  
                          circumflex.                              
  
                                                                   
  
                          ------------------------------------------------------  
-----------------                             
  
                          Hemodynamics:                             
  
                          Circulatory function:                             
  
                                                                   
  
                          +-------------------------+---------------------+       
                        
  
                          +Stage description +Condition1:Room Air -+              
                 
  
                          +-------------------------+---------------------+       
                        
  
                          +Arterial pressure s/d (m)+114/68 (87) +                
               
  
                          +-------------------------+---------------------+       
                        
  
                          Prepared and electronically signed by                   
            
  
                                                                   
  
                          Odell Bryan DO, Cass Medical Center, Northwest Rural Health Network                      
         
  
                          2022 09:44                             
   
                                                                 SUMMA  
  
                                                                 Work Phone: 1(5 97)333-9518  
  
  
  
  
                                        Glucose,Bedside  on 2022  
  
  
  
  
             Glucose [Mass/Vol] 139 mg/dL    High                Summa Hea  
lth System  
  
  
  
  
                          Comment on above:         Result Comment: Test perform  
ed by glucose meter. Results may   
be  
  
                                                    10%-15% lowerthan serum/plas  
ma values. (CLIA ID 07B1113498)  
   
                                                    Performed By: #### BGLU ####  
K94 Discoveries Rbpifz192 Soundwave. Elizabeth, OH 15080-2994  
  
  
  
  
             Glucose [Mass/Vol] 149 mg/dL    High                Summa Hea  
lth System  
  
  
  
  
                          Comment on above:         Result Comment: Test perform  
ed by glucose meter. Results may   
be  
  
                                                    10%-15% lowerthan serum/plas  
ma values. (CLIA ID 30Q6025746)  
   
                                                    Performed By: #### BGLU ####  
K94 Discoveries Hpkmym164 EFreeMonee  
  
                                                    RidgefieldData Storage Group, OH 83297-6848  
  
  
  
  
             Glucose [Mass/Vol] 141 mg/dL    High                Summa Hea  
lth System  
  
  
  
  
                          Comment on above:         Result Comment: Test perform  
ed by glucose meter. Results may   
be  
  
                                                    10%-15% lowerthan serum/plas  
ma values. (CLIA ID 71O2880570)  
   
                                                    Performed By: #### BGLU ####  
K94 Discoveries Nrtpsv682 EFreeMonee  
  
                                                    Lunenburg, OH 42814-4410  
  
  
  
  
             Glucose [Mass/Vol] 131 mg/dL    High                The Jewish Hospital System  
  
  
  
  
                          Comment on above:         Result Comment: Test perform  
ed by glucose meter. Results may   
be  
  
                                                    10%-15% lowerthan serum/plas  
ma values. (CLIA ID 46F7936862)  
   
                                                    Performed By: #### BGLU ####  
38 Herrera Street   
  
  
  
  
                                        Hemogram  on 2022  
  
  
  
  
             Erythrocyte distribution width (RBC) 20.4 %       High         11.5  
-14.5    Munson Healthcare Cadillac Hospital  
  
             [Ratio]                                               
  
  
  
  
                          Comment on above:         Performed By: #### HEMOG ###  
#38 Herrera Street   
  
  
  
  
             Hematocrit (Bld) [Volume fraction] 37.3 %       Low          40.0-5  
2.0    Munson Healthcare Cadillac Hospital  
  
  
  
  
                          Comment on above:         Performed By: #### HEMOG ###  
#Briana Ville 771835 Rio Rancho, OH   
  
  
  
  
             Hemoglobin (Bld) [Mass/Vol] 12.1 g/dL    Low          13.0-18.0      
Munson Healthcare Cadillac Hospital  
  
  
  
  
                          Comment on above:         Performed By: #### HEMOG ###  
#Briana Ville 771835 Rio Rancho, OH   
  
  
  
  
             MCH (RBC) [Entitic mass] 23.7 pg      Low          26.0-34.0    Beaumont Hospital  
  
  
  
  
                          Comment on above:         Performed By: #### HEMOG ###  
#Briana Ville 771835 Rio Rancho, OH   
  
  
  
  
             MCHC         32.4 %       Normal       32.0-36.0    Salem City Hospital  
stem  
  
  
  
  
                          Comment on above:         Performed By: #### HEMOG ###  
#38 Herrera Street   
  
  
  
  
             MCV (RBC) [Entitic vol] 73.1 fL      Low          80.0-98.0    Summ  
a Health System  
  
  
  
  
                          Comment on above:         Performed By: #### HEMOG ###  
#Briana Ville 771835 Rio Rancho, OH   
  
  
  
  
             Platelet mean volume (Bld) [Entitic vol] 7.1 fL       Low            
7.4-12.4     Munson Healthcare Cadillac Hospital  
  
  
  
  
                          Comment on above:         Result Comment: MPV is a tenisha  
culated measurement using platelet  
  
                                                    volume ratio.  
   
                                                    Performed By: #### HEMOG ###  
#Munson Healthcare Cadillac Hospital525 E. Elizabeth, OH   
  
  
  
  
             Platelets (Bld) [#/Vol] 158 10*3/uL  Normal       140-440      University of Michigan Hospital  
  
  
  
  
                          Comment on above:         Performed By: #### HEMOG ###  
#Briana Ville 771835 E. Elizabeth, OH   
  
  
  
  
             RBC (Bld) [#/Vol] 5.10 10*6/uL Normal       4.40-5.90    McLaren Oakland  
  
  
  
  
                          Comment on above:         Performed By: #### HEMOG ###  
#Briana Ville 771835 E. Elizabeth, OH   
  
  
  
  
             WBC (Bld) [#/Vol] 7.4 10*3/uL  Normal       3.6-10.7     McLaren Oakland  
  
  
  
  
                          Comment on above:         Performed By: #### HEMOG ###  
#Briana Ville 771835 E. Elizabeth, OH   
  
  
  
  
                                        Op Note  on 2022  
  
  
  
  
             Op Note                   Normal                    Salem City Hospital  
stem  
  
  
  
  
                                        POCT Glucose   on 2022  
  
  
  
  
             Glucose [Mass/Vol] 139 mg/dL    High         70 - 100 mg/dL Corey Hospital  
  
                                                                 Work Phone: 1(5 02)072-3949  
  
  
  
  
                          Comment on above:         Test performed by glucose me  
ter. Results may be 10%-15% lower  
  
                                                    than serum/plasma values. (C  
NIALL ID 72C4605444)  
  
  
  
  
             Interpretation and Abnormal                               Corey Hospital  
  
             review of laboratory                                        Work Ph  
one: 3(966)931-5553  
  
             results                                               
   
                          Test Performed by                           WVUMedicine Barnesville Hospital SYSTEM -  
  
                          University Hospitals Geneva Medical Center  
  
                          System, Lawrence Memorial Hospital EFrench Creek, OH 73830                                 
   
                                                                 SUMMA  
  
                                                                 Work Phone: 1(1 62)675-3000  
   
                          Test Performed by                           WVUMedicine Barnesville Hospital SYSTEM -  
  
                          University Hospitals Geneva Medical Center  
  
                          System, 525 EFrench Creek, OH 39286                                 
   
             Glucose [Mass/Vol] 141 mg/dL    High         70 - 100     SUMMA  
  
                                                    mg/dL        Work Phone: 1(6 60)760-8997  
  
  
  
  
                          Comment on above:         Test performed by glucose me  
ter. Results may be 10%-15% lower  
  
                                                    than serum/plasma values. (C  
NIALL ID 55O1287800)  
  
  
  
  
             Interpretation and review of Abnormal                                
 SUMMA  
  
             laboratory results                                        Work Phon  
e: 3(526)202-2947  
   
                          Test Performed by Gulf Coast Medical Center - Main Campus Medical Center, 525 E.                           Fairview  
 LAB  
  
                          Pedro Bay, OH                             
  
                          59672                                    
   
                                                                 Corey Hospital  
  
                                                                 Work Phone: 1(4 20)376-2230  
   
                          Test Performed by Gulf Coast Medical Center - Main Campus Medical Center, 525 E.                           Fairview  
 LAB  
  
                          Pedro Bay, OH                             
  
                          70407                                    
  
  
  
  
                                        POCT Glucose   Ordered By: Rahel castellon on 2022  
  
  
  
  
             Glucose [Mass/Vol] 149 mg/dL    High         70 - 100 mg/dL Corey Hospital  
  
  
  
  
                          Comment on above:         Test performed by glucose me  
ter. Results may be 10%-15% lower  
  
                                                    than serum/plasma values. (C  
NIALL ID 87E2074169)  
  
  
  
  
             Interpretation and review of laboratory results Abnormal             
                    Parkview Health  
  
  
  
  
                                        POCT Glucose  Ordered By: Naeem gr 2022  
  
  
  
  
             Glucose [Mass/Vol] 131 mg/dL    High         70 - 100 mg/dL Corey Hospital  
  
                                                                 Work Phone: 1(8 92)525-4973  
  
  
  
  
                          Comment on above:         Test performed by glucose me  
ter. Results may be 10%-15% lower  
  
                                                    than serum/plasma values. (C  
NIALL ID 65X6589184)  
  
  
  
  
             Interpretation and review of laboratory Abnormal                     
            Corey Hospital  
  
             results                                             Work Phone: 1(5 05)676-1365  
   
                                                                 SUMMA  
  
                                                                 Work Phone: 1(3 26)914-1568  
  
  
  
  
                                        POCT activated clotting time  on   
  
  
  
  
             Activated Clotting Time 145 s        High         90 - 134 s   SUMM  
A  
  
  
  
  
                          Comment on above:         ACTB  
  
                                                    Performed by Hemochron Sig E  
liteCLIA ID: 16D4291860  
  
                                                    Georgetown, OH  
  
                                                    ACT testing is not intended   
for patients taking aprotonin,  
  
                                                    patients with hematocrits of  
 <20% or >55%, patients using  
  
                                                    other types of anticoagulati  
on medications, and patients with  
  
                                                    Lupus Anticoagulant.  
  
  
  
  
             Interpretation and review of Abnormal                                
 Corey Hospital  
  
             laboratory results                                          
   
                          Test Performed by Westfields Hospital and Clinic, Lawrence Memorial Hospital E.                           Herrick Campus LAB  
  
                          Pedro Bay, OH                             
  
                          16425                                    
   
                                                                 Corey Hospital  
  
  
  
  
                                        ACT,Whole Blood  on 08-  
  
  
  
  
             ACT,Whole Blood 337 s        High                Munson Healthcare Cadillac Hospital  
  
  
  
  
                          Comment on above:         Result Comment: ACTBOPerform  
ed by Hemochron Sig EliteCLIA ID:  
  
                                                    13W2948256Georq Health, Akro  
n, OHACT testing is not intended for  
  
                                                    patients taking aprotonin,pa  
tients with hematocrits of <20% or  
  
                                                    >55%, patients usingother ty  
pes of anticoagulation medications, and  
  
                                                    patients withLupus Anticoagu  
lant.  
   
                                                    Performed By: #### ACTBO ###  
#Kettering Health Greene Memorial M2 Connections Iulxjr036 Rio Rancho, OH   
  
  
  
  
             ACT,Whole Blood OutOfRange   Critically abnormal        Munson Healthcare Cadillac Hospital  
  
  
  
  
                          Comment on above:         Result Comment: ACTBOPerform  
ed by Hemochron Sig EliteCLIA ID:  
  
                                                    81B9656298Ogrcl St. John of God Hospital, Akro  
n, OHACT testing is not intended for  
  
                                                    patients taking aprotonin,pa  
tients with hematocrits of <20% or  
  
                                                    >55%, patients usingother ty  
pes of anticoagulation medications, and  
  
                                                    patients withLupus Anticoagu  
lant.  
   
                                                    Performed By: #### ACTBO ###  
#Kettering Health Greene Memorial M2 Connections Ymcjuv169 Rio Rancho, OH   
  
  
  
  
             ACT,Whole Blood 322 s        High                Munson Healthcare Cadillac Hospital  
  
  
  
  
                          Comment on above:         Result Comment: ACTBOPerform  
ed by HemStemron Sig EliteCLIA ID:  
  
                                                    27J9239364Xxyjb St. John of God Hospital, Akro  
n, OHACT testing is not intended for  
  
                                                    patients taking aprotonin,pa  
tients with hematocrits of <20% or  
  
                                                    >55%, patients usingother ty  
pes of anticoagulation medications, and  
  
                                                    patients withLupus Anticoagu  
lant.  
   
                                                    Performed By: #### ACTBO ###  
#Kettering Health Greene Memorial M2 Connections Uhtmmf124 Rio Rancho, OH   
  
  
  
  
             ACT,Whole Blood 208 s        High                Munson Healthcare Cadillac Hospital  
  
  
  
  
                          Comment on above:         Result Comment: ACTBOPerform  
ed by HemStemron Sig EliteCLIA ID:  
  
                                                    31X9722520Cicvd Health, Akro  
n, OHACT testing is not intended for  
  
                                                    patients taking aprotonin,pa  
tients with hematocrits of <20% or  
  
                                                    >55%, patients usingother ty  
pes of anticoagulation medications, and  
  
                                                    patients withLupus Anticoagu  
lant.  
   
                                                    Performed By: #### ACTBO ###  
#Kettering Health Greene Memorial M2 Connections Jloqhc083 Rio Rancho, OH   
  
  
  
  
             ACT,Whole Blood 204 s        High                Munson Healthcare Cadillac Hospital  
  
  
  
  
                          Comment on above:         Result Comment: ACTBOPerform  
ed by HemStemron Sig EliteCLIA ID:  
  
                                                    91U3785143SausrNorth Walpole, OHACT testing is not intended for  
  
                                                    patients taking aprotonin,pa  
tients with hematocrits of <20% or  
  
                                                    >55%, patients usingother ty  
pes of anticoagulation medications, and  
  
                                                    patients withLupus Anticoagu  
lant.  
   
                                                    Performed By: #### ACTBO ###  
#38 Herrera Street 13056-1050  
  
  
  
  
                                        APTT  on 08-  
  
  
  
  
             aPTT Coag (Bld) [Time] 46.7 s       High         20.0-30.5    Munson Healthcare Cadillac Hospital  
  
  
  
  
                          Comment on above:         Result Comment: NOTE: The th  
erapeutic time for Heparin  
  
                                                    anticoagulation,based on Xa   
activity inhibition, is an APTT of  
  
                                                    46-80seconds.  
   
                                                    Performed By: #### APTT ####  
38 Herrera Street   
  
  
  
  
             aPTT Coag (Bld) [Time] 46.7 s       High         20 - 30.5 s  Corey Hospital  
  
  
  
  
                          Comment on above:         NOTE: The therapeutic time f  
or Heparin anticoagulation,  
  
                                                    based on Xa activity inhibit  
ion, is an APTT of 46-80  
  
                                                    seconds.  
  
  
  
  
             Interpretation and review of Abnormal                                
 Corey Hospital  
  
             laboratory results                                          
   
                          Test Performed by Aurora Sinai Medical Center– Milwaukee, 75 Braun Street Winchester, VA 22603                             
  
                          17681                                    
   
                                                                 Corey Hospital  
   
             aPTT Coag (Bld) [Time] 36.3 s       High         20.0-30.5    Munson Healthcare Cadillac Hospital  
  
  
  
  
                          Comment on above:         Result Comment: NOTE: The th  
erapeutic time for Heparin  
  
                                                    anticoagulation,based on Xa   
activity inhibition, is an APTT of  
  
                                                    46-80seconds.  
   
                                                    Performed By: #### APTT, FIB  
GN ####38 Herrera Street   
  
  
  
  
             aPTT Coag (Bld) [Time] 36.3 s       High         20 - 30.5 s  Corey Hospital  
  
  
  
  
                          Comment on above:         NOTE: The therapeutic time f  
or Heparin anticoagulation,  
  
                                                    based on Xa activity inhibit  
ion, is an APTT of 46-80  
  
                                                    seconds.  
  
  
  
  
             aPTT Coag (Bld) [Time] 35.6 s       High         20.0-30.5    Munson Healthcare Cadillac Hospital  
  
  
  
  
                          Comment on above:         Result Comment: NOTE: The th  
erapeutic time for Heparin  
  
                                                    anticoagulation,based on Xa   
activity inhibition, is an APTT of  
  
                                                    46-80seconds.  
   
                                                    Performed By: #### APTT, FIB  
GN, HEMOG, BMP3 ####Corey HospitalLaru Technologies  
  
                                                    Czevqg483 Allison, OH 90667-7802  
  
  
  
  
             aPTT Coag (Bld) [Time] 35.6 s       High         20 - 30.5 s  SUMMA  
  
  
  
  
                          Comment on above:         NOTE: The therapeutic time f  
or Heparin anticoagulation,  
  
                                                    based on Xa activity inhibit  
ion, is an APTT of 46-80  
  
                                                    seconds.  
  
  
  
  
                                        Activated clotting time  on 08-  
  
  
  
  
             Activated Clotting Time 208 s        High         90 - 134 s   SUMM  
A  
  
  
  
  
                          Comment on above:         ACTBO  
  
                                                    Performed by Science ExchangeLIA ID: 99B5018149  
  
                                                    K94 DiscoveriesHerbster, OH  
  
                                                    ACT testing is not intended   
for patients taking aprotonin,  
  
                                                    patients with hematocrits of  
 <20% or >55%, patients using  
  
                                                    other types of anticoagulati  
on medications, and patients with  
  
                                                    Lupus Anticoagulant.  
  
  
  
  
             Activated Clotting Time 204 s        High         90 - 134 s   SUMM  
A  
  
  
  
  
                          Comment on above:         ACTBO  
  
                                                    Performed by Science ExchangeLIHosted America ID: 82C0981185  
  
                                                    K94 DiscoveriesHerbster, OH  
  
                                                    ACT testing is not intended   
for patients taking aprotonin,  
  
                                                    patients with hematocrits of  
 <20% or >55%, patients using  
  
                                                    other types of anticoagulati  
on medications, and patients with  
  
                                                    Lupus Anticoagulant.  
  
  
  
  
             Activated Clotting Time OutOfRange   Critically abnormal 90 - 134 s  
   SUMMA  
  
  
  
  
                          Comment on above:         ACTBO  
  
                                                    Performed by Science ExchangeLIA ID: 93Q2685065  
  
                                                    K94 DiscoveriesHerbster, OH  
  
                                                    ACT testing is not intended   
for patients taking aprotonin,  
  
                                                    patients with hematocrits of  
 <20% or >55%, patients using  
  
                                                    other types of anticoagulati  
on medications, and patients with  
  
                                                    Lupus Anticoagulant.  
  
  
  
  
             Activated Clotting Time 337 s        High         90 - 134 s   SUMM  
A  
  
  
  
  
                          Comment on above:         ACTBO  
  
                                                    Performed by Science ExchangeLIA ID: 72Y5752191  
  
                                                    K94 DiscoveriesHerbster, OH  
  
                                                    ACT testing is not intended   
for patients taking aprotonin,  
  
                                                    patients with hematocrits of  
 <20% or >55%, patients using  
  
                                                    other types of anticoagulati  
on medications, and patients with  
  
                                                    Lupus Anticoagulant.  
  
  
  
  
             Activated Clotting Time 322 s        High         90 - 134 s   SUMM  
A  
  
  
  
  
                          Comment on above:         ACTBO  
  
                                                    Performed by Hemochron Charles Ryan ID: 93I5357924  
  
                                                    Georgetown, OH  
  
                                                    ACT testing is not intended   
for patients taking aprotonin,  
  
                                                    patients with hematocrits of  
 <20% or >55%, patients using  
  
                                                    other types of anticoagulati  
on medications, and patients with  
  
                                                    Lupus Anticoagulant.  
  
  
  
  
                                        Basic Metabolic Panel  on 08-  
  
  
  
  
             Anion gap [Moles/Vol] 10 mmol/L    Normal       3-13         Munson Healthcare Cadillac Hospital  
  
  
  
  
                          Comment on above:         Performed By: #### APTT, FIB  
GN, HEMOG, BMP3 ####Briana Ville 771835 Allison, OH 77778-2459  
  
  
  
  
             Calcium [Mass/Vol] 9.0 mg/dL    Normal       8.4-10.4     Munson Healthcare Otsego Memorial Hospital  
  
  
  
  
                          Comment on above:         Performed By: #### APTT, FIB  
GN, HEMOG, BMP3 ####04 Allen Street 23608-6811  
  
  
  
  
             CO2 [Moles/Vol] 19 mmol/L    Low          22-30        Munson Healthcare Cadillac Hospital  
  
  
  
  
                          Comment on above:         Performed By: #### APTT, FIB  
GN, HEMOG, BMP3 ####Kettering Health Greene Memorial M2 Connections  
  
                                                    Rvulnj469 Allison, OH 84614-9697  
  
  
  
  
             Glucose [Mass/Vol] 169 mg/dL    High                Munson Healthcare Otsego Memorial Hospital  
  
  
  
  
                          Comment on above:         Performed By: #### APTT, FIB  
GN, HEMOG, BMP3 ####Briana Ville 771835 Allison, OH 66845-1318  
  
  
  
  
             Urea nitrogen [Mass/Vol] 18 mg/dL     High         7-17         Beaumont Hospital  
  
  
  
  
                          Comment on above:         Performed By: #### APTT, FIB  
GN, HEMOG, BMP3 ####04 Allen Street 81945-5027  
  
  
  
  
             Creatinine [Mass/Vol] 1.35 mg/dL   High         0.52-1.25    Munson Healthcare Cadillac Hospital  
  
  
  
  
                          Comment on above:         Performed By: #### APTT, FIB  
GN, HEMOG, BMP3 ####04 Allen Street 05855-6763  
  
  
  
  
             GFR/1.73 sq M.predicted among 63.2 mL/min/{1.73_m2} Normal       >6  
0          Munson Healthcare Cadillac Hospital  
  
             blacks MDRD (S/P/Bld) [Vol                                          
  
             rate/Area]                                            
  
  
  
  
                          Comment on above:         Performed By: #### APTT, FIB  
GN, HEMOG, BMP3 ####Kettering Health Greene Memorial M2 Connections  
  
                                                    Hvudyh200 Allison, OH   
  
  
  
  
             GFR/1.73 sq M.predicted among 54.5 mL/min/{1.73_m2} Abnormal     >6  
0          Munson Healthcare Cadillac Hospital  
  
             non-blacks MDRD (S/P/Bld) [Vol                                       
     
  
             rate/Area]                                            
  
  
  
  
                          Comment on above:         Result Comment: KDIGO guidel  
zoë provide the following GFR  
  
                                                    categories:Stage GFR(ml/min/  
1.73 m2) TermsG1 >=90 Normal or highG2  
  
                                                    60-89 Mildly decreased*G3a 4  
5-59 Mildly to moderately nfapkwszmG5a  
  
                                                    30-44 Moderately to severely  
 decreasedG4 15-29 Severely decreasedG5  
  
                                                    <15 Kidney failure*Relative   
to young adult level.In the absence of  
  
                                                    evidence of kidney damage, n  
either GFRcategory G1 nor G2 fulfill  
  
                                                    the criteria for CKD.The CKD  
-EPI equation is validated in  
  
                                                    individuals 18 yearsof age a  
nd older. Currently the best equation  
  
                                                    forestimating glomerular beto  
tration rate (GFR) from serumcreatinine  
  
                                                    in children is the Bedside S  
chwartz equation.It is less accurate in  
  
                                                    patients with extremes of mu  
sclemass, restriction of dietary  
  
                                                    protein, ingestion of creati  
ne,extra-renal metabolism of  
  
                                                    creatinine, or treatment wit  
hmedications that affect renal tubular  
  
                                                    creatinine secretion.  
   
                                                    Performed By: #### APTT, FIB  
GN, HEMOG, BMP3 ####Kettering Health Greene Memorial M2 Connections  
  
                                                    Qgigro099 Allison, OH   
  
  
  
  
             Potassium [Moles/Vol] 4.6 mmol/L   Normal       3.5-5.1      Munson Healthcare Cadillac Hospital  
  
  
  
  
                          Comment on above:         Performed By: #### APTT, FIB  
GN, HEMOG, BMP3 ####Kettering Health Greene Memorial M2 Connections  
  
                                                    Ujmeju401 Allison, OH   
  
  
  
  
             Chloride [Moles/Vol] 106 mmol/L   Normal              Munising Memorial Hospital  
  
  
  
  
                          Comment on above:         Performed By: #### APTT, FIB  
GN, HEMOG, BMP3 ####Kettering Health Greene Memorial M2 Connections  
  
                                                    Ilbwzf671 Allison, OH   
  
  
  
  
             Sodium [Moles/Vol] 135 mmol/L   Normal       135-145      Summa a  
Lima City Hospital System  
  
  
  
  
                          Comment on above:         Performed By: #### APTT, FIB  
GN, HEMOG, BMP3 ####Mercy Health St. Vincent Medical Center  
  
                                                    Ycwyrz264 ÁNGEL MITCHELL Amonate, OH 99560-3186  
  
  
  
  
             Anion gap [Moles/Vol] 10 mmol/L                 3 - 13 mmol/L SUMMA  
   
             Calcium [Mass/Vol] 9.0 mg/dL                 8.4 - 10.4 mg/dL SUMMA  
   
             Chloride [Moles/Vol] 106 mmol/L                98 - 107 mmol/L SUMM  
A  
   
             CO2 [Moles/Vol] 19 mmol/L    Low          22 - 30 mmol/L SUMMA  
   
             Creatinine [Mass/Vol] 1.35 mg/dL   High         0.52 - 1.25 mg/dL S  
Barnesville Hospital  
   
             EGFR IF NonAfrican American 54.5 mL/min  Abnormal     60 - PINF mL/  
min SUMMA  
  
  
  
  
                          Comment on above:         KDIGO guidelines provide the  
 following GFR categories:  
  
                                                    Stage GFR(ml/min/1.73 m2) Te  
iggy  
  
                                                    G1 >=90 Normal or high  
  
                                                    G2 60-89 Mildly decreased*  
  
                                                    G3a 45-59 Mildly to moderate  
ly decreased  
  
                                                    G3b 30-44 Moderately to koby  
rely decreased  
  
                                                    G4 15-29 Severely decreased  
  
                                                    G5 <15 Kidney failure  
  
                                                      
  
                                                    *Relative to young adult lev  
el.  
  
                                                    In the absence of evidence o  
f kidney damage, neither GFR  
  
                                                    category G1 nor G2 fulfill t  
he criteria for CKD.  
  
                                                      
  
                                                    The CKD-EPI equation is margarita  
dated in individuals 18 years  
  
                                                    of age and older. Currently   
the best equation for  
  
                                                    estimating glomerular filtra  
tion rate (GFR) from serum  
  
                                                    creatinine in children is th  
e Bedside Keller equation.  
  
                                                    It is less accurate in patie  
nts with extremes of muscle  
  
                                                    mass, restriction of dietary  
 protein, ingestion of creatine,  
  
                                                    extra-renal metabolism of cr  
eatinine, or treatment with  
  
                                                    medications that affect main  
l tubular creatinine secretion.  
  
  
  
  
             GFR/1.73 sq M.predicted 63.2 mL/min/{1.73_m2}              60 - PIN  
F    SUMMA  
  
             among blacks MDRD                           mL/min         
  
             (S/P/Bld) [Vol                                          
  
             rate/Area]                                            
   
             Glucose [Mass/Vol] 169 mg/dL    High         70 - 100 mg/dL SUMMA  
   
             Interpretation and Abnormal                               SUMMA  
  
             review of laboratory                                          
  
             results                                               
   
             Potassium [Moles/Vol] 4.6 mmol/L                3.5 - 5.1    SUMMA  
  
                                                    mmol/L         
   
             Sodium [Moles/Vol] 135 mmol/L                135 - 145    Corey Hospital  
  
                                                    mmol/L         
   
             Urea nitrogen (BldV) 18 mg/dL     High         7 - 17 mg/dL SUMMA  
  
             [Mass/Vol]                                            
   
                          Test Performed by ProMedica Toledo Hospital, Lawrence Memorial Hospital E                           SYSTEM  
 - Roachdale, OH                           CAMPUS  
 LAB  
  
                          03512                                    
   
                                                                 Corey Hospital  
   
             Calcium [Mass/Vol] 9.5 mg/dL    Normal       8.4-10.4     The Jewish Hospital  
  
                                                                 System  
  
  
  
  
                          Comment on above:         Performed By: #### BMP3, MG3  
 ####Briana Ville 771835 Rio Rancho, OH 96707-8247  
  
  
  
  
             Anion gap [Moles/Vol] 11 mmol/L    Normal       3-13         Munson Healthcare Cadillac Hospital  
  
  
  
  
                          Comment on above:         Performed By: #### BMP3, MG3  
 ####38 Herrera Street 31662-8216  
  
  
  
  
             CO2 [Moles/Vol] 18 mmol/L    Low          22-30        Munson Healthcare Cadillac Hospital  
  
  
  
  
                          Comment on above:         Performed By: #### BMP3, MG3  
 ####38 Herrera Street 74339-0986  
  
  
  
  
             Creatinine [Mass/Vol] 1.49 mg/dL   High         0.52-1.25    Munson Healthcare Cadillac Hospital  
  
  
  
  
                          Comment on above:         Performed By: #### BMP3, MG3  
 ####38 Herrera Street 16383-7704  
  
  
  
  
             GFR/1.73 sq M.predicted among 56.1 mL/min/{1.73_m2} Abnormal     >6  
0          Munson Healthcare Cadillac Hospital  
  
             blacks MDRD (S/P/Bld) [Vol                                          
  
             rate/Area]                                            
  
  
  
  
                          Comment on above:         Performed By: #### BMP3, MG3  
 ####38 Herrera Street 73648-3718  
  
  
  
  
             GFR/1.73 sq M.predicted among 48.4 mL/min/{1.73_m2} Abnormal     >6  
0          Munson Healthcare Cadillac Hospital  
  
             non-blacks MDRD (S/P/Bld) [Vol                                       
     
  
             rate/Area]                                            
  
  
  
  
                          Comment on above:         Result Comment: KDIGO guidel  
ozë provide the following GFR  
  
                                                    categories:Stage GFR(ml/min/  
1.73 m2) TermsG1 >=90 Normal or highG2  
  
                                                    60-89 Mildly decreased*G3a 4  
5-59 Mildly to moderately fygyrynvvK6w  
  
                                                    30-44 Moderately to severely  
 decreasedG4 15-29 Severely decreasedG5  
  
                                                    <15 Kidney failure*Relative   
to young adult level.In the absence of  
  
                                                    evidence of kidney damage, n  
either GFRcategory G1 nor G2 fulfill  
  
                                                    the criteria for CKD.The CKD  
-EPI equation is validated in  
  
                                                    individuals 18 yearsof age a  
nd older. Currently the best equation  
  
                                                    forestimating glomerular beto  
tration rate (GFR) from serumcreatinine  
  
                                                    in children is the Bedside S  
kristiwartz equation.It is less accurate in  
  
                                                    patients with extremes of mu  
sclemass, restriction of dietary  
  
                                                    protein, ingestion of creati  
ne,extra-renal metabolism of  
  
                                                    creatinine, or treatment wit  
hmedications that affect renal tubular  
  
                                                    creatinine secretion.  
   
                                                    Performed By: #### DONNA3 MG3  
 ####Briana Ville 771835 Rio Rancho, OH   
  
  
  
  
             Glucose [Mass/Vol] 185 mg/dL    High                The Jewish Hospital System  
  
  
  
  
                          Comment on above:         Performed By: #### IVETH MG3  
 ####38 Herrera Street   
  
  
  
  
             Urea nitrogen [Mass/Vol] 19 mg/dL     High         7-17         Beaumont Hospital  
  
  
  
  
                          Comment on above:         Performed By: #### IVETH MG3  
 ####38 Herrera Street   
  
  
  
  
             Chloride [Moles/Vol] 104 mmol/L   Normal              Mercy Health St. Joseph Warren Hospital System  
  
  
  
  
                          Comment on above:         Performed By: #### DONNA3, MG3  
 ####38 Herrera Street   
  
  
  
  
             Potassium [Moles/Vol] 5.4 mmol/L   High         3.5-5.1      Munson Healthcare Cadillac Hospital  
  
  
  
  
                          Comment on above:         Performed By: #### DONNA3, MG3  
 ####Kettering Health Greene Memorial M2 Connections 99 Hogan Street   
  
  
  
  
             Sodium [Moles/Vol] 133 mmol/L   Low          135-145      The Jewish Hospital System  
  
  
  
  
                          Comment on above:         Performed By: #### DONNA3, MG3  
 ####38 Herrera Street   
  
  
  
  
             Anion gap [Moles/Vol] 11 mmol/L                 3 - 13 mmol/L SUMMA  
   
             Calcium [Mass/Vol] 9.5 mg/dL                 8.4 - 10.4 mg/dL SUMMA  
   
             Chloride [Moles/Vol] 104 mmol/L                98 - 107 mmol/L SUMM  
A  
   
             CO2 [Moles/Vol] 18 mmol/L    Low          22 - 30 mmol/L SUMMA  
   
             Creatinine [Mass/Vol] 1.49 mg/dL   High         0.52 - 1.25 mg/dL S  
Barnesville Hospital  
   
             EGFR IF NonAfrican American 48.4 mL/min  Abnormal     60 - PINF mL/  
min SUMMA  
  
  
  
  
                          Comment on above:         KDIGO guidelines provide the  
 following GFR categories:  
  
                                                    Stage GFR(ml/min/1.73 m2) Te  
iggy  
  
                                                    G1 >=90 Normal or high  
  
                                                    G2 60-89 Mildly decreased*  
  
                                                    G3a 45-59 Mildly to moderate  
ly decreased  
  
                                                    G3b 30-44 Moderately to koby  
rely decreased  
  
                                                    G4 15-29 Severely decreased  
  
                                                    G5 <15 Kidney failure  
  
                                                      
  
                                                    *Relative to young adult lev  
el.  
  
                                                    In the absence of evidence o  
f kidney damage, neither GFR  
  
                                                    category G1 nor G2 fulfill t  
he criteria for CKD.  
  
                                                      
  
                                                    The CKD-EPI equation is margarita  
dated in individuals 18 years  
  
                                                    of age and older. Currently   
the best equation for  
  
                                                    estimating glomerular filtra  
tion rate (GFR) from serum  
  
                                                    creatinine in children is th  
e Bedside Keller equation.  
  
                                                    It is less accurate in patie  
nts with extremes of muscle  
  
                                                    mass, restriction of dietary  
 protein, ingestion of creatine,  
  
                                                    extra-renal metabolism of cr  
eatinine, or treatment with  
  
                                                    medications that affect main  
l tubular creatinine secretion.  
  
  
  
  
             GFR/1.73 sq M.predicted among 56.1 mL/min/{1.73_m2} Abnormal     60  
 - PINF mL/min   
SUMMA  
  
             blacks MDRD (S/P/Bld) [Vol                                          
  
             rate/Area]                                            
   
             Glucose [Mass/Vol] 185 mg/dL    High         70 - 100 mg/dL SUMMA  
   
             Interpretation and review of Abnormal                                
 SUMMA  
  
             laboratory results                                          
   
             Potassium [Moles/Vol] 5.4 mmol/L   High         3.5 - 5.1 mmol/L KRAMER  
MMA  
   
             Sodium [Moles/Vol] 133 mmol/L   Low          135 - 145 mmol/L SUMMA  
   
             Urea nitrogen (BldV) [Mass/Vol] 19 mg/dL     High         7 - 17 mg  
/dL SUMMA  
  
  
  
  
                                        CARDIAC CATH NURSING LOG  on 08-  
  
  
  
  
                          Ordered by an unspecified provider.                     
        SUMMA  
   
                                                                 SUMMA  
  
  
  
  
                                        CBC  on 08-  
  
  
  
  
             Hematocrit (Bld) [Volume fraction] 39.4 %       Low          40 - 5  
2 %    SUMMA  
   
             Hemoglobin (Bld) [Mass/Vol] 12.8 g/dL    Low          13 - 18 g/dL   
SUMMA  
   
             Interpretation and review of laboratory results Abnormal             
                    SUMMA  
   
             MCH (RBC) [Entitic mass] 23.7 pg      Low          26 - 34 pg   SUM  
MA  
   
             MCHC (RBC) [Mass/Vol] 32.5 %                    32 - 36 %    SUMMA  
   
             MCV (RBC) [Entitic vol] 73.0 fL      Low          80 - 98 fL   SUMM  
A  
   
             Platelet distribution width (Bld) [Ratio] 20.6 %       High          
 11.5 - 14.5 % SUMMA  
   
             Platelet mean volume (Bld) [Entitic vol] 6.7 fL       Low            
7.4 - 12.4 fL SUMMA  
  
  
  
  
                          Comment on above:         MPV is a calculated measurem  
ent using platelet volume ratio.  
  
  
  
  
             Platelets (Bld) [#/Vol] 161 10*3/uL               140 - 440 10*3/uL  
 SUMMA  
   
             RBC (Bld) [#/Vol] 5.40 10*6/uL              4.4 - 5.9 10*6/uL SUMMA  
   
             WBC (Bld) [#/Vol] 9.3 10*3/uL               3.6 - 10.7 10*3/uL SUMM  
A  
   
                          Test Performed by                           Sanford Children's Hospital Bismarck,                           SYSTEM   
- 80 Levine Street 64751                             
   
                                                                 SUMMA  
   
             Hematocrit (Bld) [Volume 40.7 %                    40 - 52 %    SUM  
MA  
  
             fraction]                                             
   
             Hemoglobin (Bld) 13.1 g/dL                 13 - 18 g/dL SUMMA  
  
             [Mass/Vol]                                            
   
             Interpretation and review Abnormal                               KRAMER  
MMA  
  
             of laboratory results                                          
   
             MCH (RBC) [Entitic mass] 23.5 pg      Low          26 - 34 pg   SUM  
MA  
   
             MCHC (RBC) [Mass/Vol] 32.1 %                    32 - 36 %    SUMMA  
   
             MCV (RBC) [Entitic vol] 73.1 fL      Low          80 - 98 fL   SUMM  
A  
   
             Platelet distribution 20.0 %       High         11.5 - 14.5 % SUMMA  
  
             width (Bld) [Ratio]                                          
   
             Platelet mean volume 8.1 fL                    7.4 - 12.4 fL SUMMA  
  
             (Bld) [Entitic vol]                                          
  
  
  
  
                          Comment on above:         MPV is a calculated measurem  
ent using platelet volume ratio.  
  
  
  
  
             Platelets (Bld) [#/Vol] 170 10*3/uL               140 - 440 10*3/uL  
 SUMMA  
   
             RBC (Bld) [#/Vol] 5.57 10*6/uL              4.4 - 5.9 10*6/uL SUMMA  
   
             WBC (Bld) [#/Vol] 8.4 10*3/uL               3.6 - 10.7 10*3/uL SUMM  
A  
   
                          Test Performed by                           WVUMedicine Barnesville Hospital  
  
                          Cerus Corporation,                           SYSTEM   
- 80 Levine Street 03827                             
   
                                                                 SUMMA  
   
             Hematocrit (Bld) [Volume 44.0 %                    40 - 52 %    SUM  
MA  
  
             fraction]                                             
   
             Hemoglobin (Bld) 13.9 g/dL                 13 - 18 g/dL SUMMA  
  
             [Mass/Vol]                                            
   
             MCH (RBC) [Entitic mass] 23.2 pg      Low          26 - 34 pg   SUM  
MA  
   
             MCHC (RBC) [Mass/Vol] 31.5 %       Low          32 - 36 %    SUMMA  
   
             MCV (RBC) [Entitic vol] 73.6 fL      Low          80 - 98 fL   SUMM  
A  
   
             Platelet distribution 21.2 %       High         11.5 - 14.5 % SUMMA  
  
             width (Bld) [Ratio]                                          
   
             Platelet mean volume 6.6 fL       Low          7.4 - 12.4 fL SUMMA  
  
             (Bld) [Entitic vol]                                          
  
  
  
  
                          Comment on above:         MPV is a calculated measurem  
ent using platelet volume ratio.  
  
  
  
  
             Platelets (Bld) [#/Vol] 208 10*3/uL               140 - 440 10*3/uL  
 SUMMA  
   
             RBC (Bld) [#/Vol] 5.97 10*6/uL High         4.4 - 5.9 10*6/uL SUMMA  
   
             WBC (Bld) [#/Vol] 11.5 10*3/uL High         3.6 - 10.7 10*3/uL SUMM  
A  
  
  
  
  
                                        Fibrinogen  on 08-  
  
  
  
  
             Fibrinogen   126 mg/dL    Low          200-400      Corey HospitalapiOmat  
stem  
  
  
  
  
                          Comment on above:         Performed By: #### APTT, FIB  
GN ####Cerus Corporation80 Walton Street Linden, PA 17744 33539-7297  
  
  
  
  
             Fibrinogen   126 mg/dL    Low          200 - 400 mg/dL SUMMA  
   
             Fibrinogen   134 mg/dL    Low          200-400      Kettering Health Greene Memorial CambridgeSoft  
stem  
  
  
  
  
                          Comment on above:         Performed By: #### APTT, FIB  
GN, HEMOG, BMP3 ####Cerus Corporation525 E. Gause, OH   
  
  
  
  
             Fibrinogen   134 mg/dL    Low          200 - 400 mg/dL Corey Hospital  
  
  
  
  
                                        Glucose,Bedside  on 08-  
  
  
  
  
             Glucose [Mass/Vol] 188 mg/dL    High                Corey Hospitala a  
Lima City Hospital System  
  
  
  
  
                          Comment on above:         Result Comment: Test perform  
ed by glucose meter. Results may   
be  
  
                                                    10%-15% lowerthan serum/plas  
ma values. (CLIA ID 82Q4436125)  
   
                                                    Performed By: #### BGLU ####  
Kettering Health Greene Memorial M2 Connections Mcerif404 E. Elizabeth, OH   
  
  
  
  
             Glucose [Mass/Vol] 178 mg/dL    High                Corey Hospitala Grant Hospital System  
  
  
  
  
                          Comment on above:         Result Comment: Test perform  
ed by glucose meter. Results may   
be  
  
                                                    10%-15% lowerthan serum/plas  
ma values. (CLIA ID 69G5364700)  
   
                                                    Performed By: #### BGLU ####  
Kettering Health Greene Memorial M2 Connections Paiaec806 EDexter, OH   
  
  
  
  
             Glucose [Mass/Vol] 152 mg/dL    High                Corey Hospitala Grant Hospital System  
  
  
  
  
                          Comment on above:         Result Comment: Test perform  
ed by glucose meter. Results may   
be  
  
                                                    10%-15% lowerthan serum/plas  
ma values. (CLIA ID 92E0464708)  
   
                                                    Performed By: #### BGLU ####  
K94 Discoveries Llqkqr650 Rio Rancho, OH   
  
  
  
  
             Glucose [Mass/Vol] 150 mg/dL    High                The Jewish Hospital System  
  
  
  
  
                          Comment on above:         Result Comment: Test perform  
ed by glucose meter. Results may   
be  
  
                                                    10%-15% lowerthan serum/plas  
ma values. (CLIA ID 19X2104474)  
   
                                                    Performed By: #### BGLU ####  
Kettering Health Greene Memorial M2 Connections 99 Hogan Street   
  
  
  
  
                                        Hemogram   on 08-  
  
  
  
  
             Erythrocyte distribution width (RBC) 20.6 %       High         11.5  
-14.5    Munson Healthcare Cadillac Hospital  
  
             [Ratio]                                               
  
  
  
  
                          Comment on above:         Performed By: #### HEMOG ###  
#Kettering Health Greene Memorial M2 Connections Fsemfl020 Rio Rancho, OH   
  
  
  
  
             Hematocrit (Bld) [Volume fraction] 39.4 %       Low          40.0-5  
2.0    Munson Healthcare Cadillac Hospital  
  
  
  
  
                          Comment on above:         Performed By: #### HEMOG ###  
#Briana Ville 771835 Rio Rancho, OH   
  
  
  
  
             Hemoglobin (Bld) [Mass/Vol] 12.8 g/dL    Low          13.0-18.0      
Munson Healthcare Cadillac Hospital  
  
  
  
  
                          Comment on above:         Performed By: #### HEMOG ###  
#Briana Ville 771835 Rio Rancho, OH   
  
  
  
  
             MCH (RBC) [Entitic mass] 23.7 pg      Low          26.0-34.0    Beaumont Hospital  
  
  
  
  
                          Comment on above:         Performed By: #### HEMOG ###  
#Briana Ville 771835 Rio Rancho, OH   
  
  
  
  
             MCHC         32.5 %       Normal       32.0-36.0    Salem City Hospital  
stem  
  
  
  
  
                          Comment on above:         Performed By: #### HEMOG ###  
#Briana Ville 771835 Rio Rancho, OH   
  
  
  
  
             MCV (RBC) [Entitic vol] 73.0 fL      Low          80.0-98.0    Summ  
a Health System  
  
  
  
  
                          Comment on above:         Performed By: #### HEMOG ###  
#Briana Ville 771835 Rio Rancho, OH   
  
  
  
  
             Platelet mean volume (Bld) [Entitic vol] 6.7 fL       Low            
7.4-12.4     Munson Healthcare Cadillac Hospital  
  
  
  
  
                          Comment on above:         Result Comment: MPV is a tenisha  
culated measurement using platelet  
  
                                                    volume ratio.  
   
                                                    Performed By: #### HEMOG ###  
#Briana Ville 771835 Rio Rancho, OH   
  
  
  
  
             Platelets (Bld) [#/Vol] 161 10*3/uL  Normal       140-440      University of Michigan Hospital  
  
  
  
  
                          Comment on above:         Performed By: #### HEMOG ###  
#Briana Ville 771835 Rio Rancho, OH   
  
  
  
  
             RBC (Bld) [#/Vol] 5.40 10*6/uL Normal       4.40-5.90    McLaren Oakland  
  
  
  
  
                          Comment on above:         Performed By: #### HEMOG ###  
#Briana Ville 771835 Rio Rancho, OH   
  
  
  
  
             WBC (Bld) [#/Vol] 9.3 10*3/uL  Normal       3.6-10.7     McLaren Oakland  
  
  
  
  
                          Comment on above:         Performed By: #### HEMOG ###  
#Briana Ville 771835 MountainStar Healthcare  
  
                                                    JORGE, OH   
  
  
  
  
             Erythrocyte distribution width (RBC) 20.0 %       High         11.5  
-14.5    Munson Healthcare Cadillac Hospital  
  
             [Ratio]                                               
  
  
  
  
                          Comment on above:         Performed By: #### APTT, FIB  
GN, HEMOG, BMP3 ####Briana Ville 771835 MountainStar Healthcare NICOAK  
HANNAH, OH   
  
  
  
  
             Hematocrit (Bld) [Volume fraction] 40.7 %       Normal       40.0-5  
2.0    Munson Healthcare Cadillac Hospital  
  
  
  
  
                          Comment on above:         Performed By: #### APTT, FIB  
GN, HEMOG, BMP3 ####Briana Ville 771835 Alta View Hospital  
HANNAH, OH   
  
  
  
  
             Hemoglobin (Bld) [Mass/Vol] 13.1 g/dL    Normal       13.0-18.0      
Munson Healthcare Cadillac Hospital  
  
  
  
  
                          Comment on above:         Performed By: #### APTT, FIB  
GN, HEMOG, BMP3 ####03 Johnson Street NICOAK  
HANNAH, OH   
  
  
  
  
             MCH (RBC) [Entitic mass] 23.5 pg      Low          26.0-34.0    Beaumont Hospital  
  
  
  
  
                          Comment on above:         Performed By: #### APTT, FIB  
GN, HEMOG, BMP3 ####94 Mullins Street  
HANNAH, OH   
  
  
  
  
             MCHC         32.1 %       Normal       32.0-36.0    Salem City Hospital  
stem  
  
  
  
  
                          Comment on above:         Performed By: #### APTT, FIB  
GN, HEMOG, BMP3 ####Briana Ville 771835 Alta View Hospital  
HANNAH, OH   
  
  
  
  
             MCV (RBC) [Entitic vol] 73.1 fL      Low          80.0-98.0    Summ  
a Health System  
  
  
  
  
                          Comment on above:         Performed By: #### APTT, FIB  
GN, HEMOG, BMP3 ####Briana Ville 771835 Alta View Hospital  
HANNAH, OH   
  
  
  
  
             Platelet mean volume (Bld) [Entitic vol] 8.1 fL       Normal         
7.4-12.4     Munson Healthcare Cadillac Hospital  
  
  
  
  
                          Comment on above:         Result Comment: MPV is a tenisha  
culated measurement using platelet  
  
                                                    volume ratio.  
   
                                                    Performed By: #### APTT, FIB  
GN, HEMOG, BMP3 ####Briana Ville 771835 Allison, OH   
  
  
  
  
             Platelets (Bld) [#/Vol] 170 10*3/uL  Normal       140-440      University of Michigan Hospital  
  
  
  
  
                          Comment on above:         Performed By: #### APTT, FIB  
GN, HEMOG, BMP3 ####04 Allen Street   
  
  
  
  
             RBC (Bld) [#/Vol] 5.57 10*6/uL Normal       4.40-5.90    McLaren Oakland  
  
  
  
  
                          Comment on above:         Performed By: #### APTT, FIB  
GN, HEMOG, BMP3 ####04 Allen Street   
  
  
  
  
             WBC (Bld) [#/Vol] 8.4 10*3/uL  Normal       3.6-10.7     McLaren Oakland  
  
  
  
  
                          Comment on above:         Performed By: #### APTT, FIB  
GN, HEMOG, BMP3 ####Briana Ville 771835 Allison, OH   
  
  
  
  
             Erythrocyte distribution width (RBC) 21.2 %       High         11.5  
-14.5    Munson Healthcare Cadillac Hospital  
  
             [Ratio]                                               
  
  
  
  
                          Comment on above:         Performed By: #### APTT, FIB  
GN, BMP3, HEMOG ####04 Allen Street   
  
  
  
  
             Hematocrit (Bld) [Volume fraction] 44.0 %       Normal       40.0-5  
2.0    Munson Healthcare Cadillac Hospital  
  
  
  
  
                          Comment on above:         Performed By: #### APTT, FIB  
GN, BMP3, HEMOG ####04 Allen Street   
  
  
  
  
             Hemoglobin (Bld) [Mass/Vol] 13.9 g/dL    Normal       13.0-18.0      
Munson Healthcare Cadillac Hospital  
  
  
  
  
                          Comment on above:         Performed By: #### APTT, FIB  
GN, BMP3, HEMOG ####04 Allen Street   
  
  
  
  
             MCH (RBC) [Entitic mass] 23.2 pg      Low          26.0-34.0    Beaumont Hospital  
  
  
  
  
                          Comment on above:         Performed By: #### APTT, FIB  
GN, BMP3, HEMOG ####Briana Ville 771835 Allison, OH   
  
  
  
  
             MCHC         31.5 %       Low          32.0-36.0    Salem City Hospital  
stem  
  
  
  
  
                          Comment on above:         Performed By: #### APTT, FIB  
GN, BMP3, HEMOG ####Briana Ville 771835 Allison, OH   
  
  
  
  
             MCV (RBC) [Entitic vol] 73.6 fL      Low          80.0-98.0    Summ  
a Health System  
  
  
  
  
                          Comment on above:         Performed By: #### APTT, FIB  
GN, BMP3, HEMOG ####Briana Ville 771835 Allison, OH   
  
  
  
  
             Platelet mean volume (Bld) [Entitic vol] 6.6 fL       Low            
7.4-12.4     Munson Healthcare Cadillac Hospital  
  
  
  
  
                          Comment on above:         Result Comment: MPV is a tenisha  
culated measurement using platelet  
  
                                                    volume ratio.  
   
                                                    Performed By: #### APTT, FIB  
GN, BMP3, HEMOG ####Briana Ville 771835 Allison, OH   
  
  
  
  
             Platelets (Bld) [#/Vol] 208 10*3/uL  Normal       140-440      University of Michigan Hospital  
  
  
  
  
                          Comment on above:         Performed By: #### APTT, FIB  
GN, BMP3, HEMOG ####Briana Ville 771835 Allison, OH   
  
  
  
  
             RBC (Bld) [#/Vol] 5.97 10*6/uL High         4.40-5.90    McLaren Oakland  
  
  
  
  
                          Comment on above:         Performed By: #### APTT, FIB  
GN, BMP3, HEMOG ####Briana Ville 771835 Allison, OH   
  
  
  
  
             WBC (Bld) [#/Vol] 11.5 10*3/uL High         3.6-10.7     McLaren Oakland  
  
  
  
  
                          Comment on above:         Performed By: #### APTT, FIB  
GN, BMP3, HEMOG ####Briana Ville 771835 E. MARKET Amonate, OH 87318-3216  
  
  
  
  
                                        Magnesium  on 08-  
  
  
  
  
             Magnesium [Mass/Vol] 1.9 mg/dL    Normal       1.6-2.3      Mercy Health St. Joseph Warren Hospital System  
  
  
  
  
                          Comment on above:         Performed By: #### BMP3, MG3  
 ####Munson Healthcare Cadillac Hospital525 E.   
MARKET  
  
                                                    Bourbon Community Hospital, OH 87020-8681  
  
  
  
  
             Magnesium [Mass/Vol] 1.9 mg/dL                 1.6 - 2.3 mg/dL SUMM  
A  
  
  
  
  
                                        No Panel Information   on 08-  
  
  
  
  
             Interpretation and review of Abnormal                                
 Corey Hospital  
  
             laboratory results                                          
   
                          Test Performed by Westfields Hospital and Clinic, Lawrence Memorial Hospital EUpper Valley Medical Center LAB  
  
                          Pedro Bay, OH                             
  
                          06654                                    
   
                                                                 Corey Hospital  
   
             Interpretation and review of Abnormal                                
 Corey Hospital  
  
             laboratory results                                          
   
                          Test Performed by Westfields Hospital and Clinic, Lawrence Memorial Hospital EUpper Valley Medical Center LAB  
  
                          Pedro Bay, OH                             
  
                          63509                                    
   
                                                                 SUMMA  
   
                          Test Performed by Westfields Hospital and Clinic, Lawrence Memorial Hospital E.                           Herrick Campus LAB  
  
                          Pedro Bay, OH                             
  
                          42998                                    
   
                                                                 Corey Hospital  
   
             Interpretation and review of Abnormal                                
 Corey Hospital  
  
             laboratory results                                          
   
                          Test Performed by Westfields Hospital and Clinic, Lawrence Memorial Hospital E.                           Herrick Campus LAB  
  
                          Pedro Bay, OH                             
  
                          70780                                    
   
                                                                 Corey Hospital  
   
             Interpretation and review of Abnormal                                
 Corey Hospital  
  
             laboratory results                                          
   
                          Test Performed by Westfields Hospital and Clinic, Lawrence Memorial Hospital E.                           Herrick Campus LAB  
  
                          Pedro Bay, OH                             
  
                          48541                                    
   
                                                                 SUMMA  
  
  
  
  
                                        Op Note  on 08-  
  
  
  
  
             Op Note                   Normal                    Mercy Health St. Vincent Medical Center Sy  
stem  
  
  
  
  
                                        POCT Glucose  on 08-  
  
  
  
  
             Glucose [Mass/Vol] 188 mg/dL    High         70 - 100 mg/dL Corey Hospital  
  
                                                                 Work Phone: 1(4 89)902-2015  
  
  
  
  
                          Comment on above:         Test performed by glucose me  
ter. Results may be 10%-15% lower  
  
                                                    than serum/plasma values. (C  
NIALL ID 64D8631826)  
  
  
  
  
             Interpretation and Abnormal                               Corey Hospital  
  
             review of laboratory                                        Work Ph  
one: 1(282) 963-1180  
  
             results                                               
   
                          Test Performed by                           Garden City Hospital -  
  
                          City Hospital LA  
B  
  
                          System, 525 E.                             
  
                          Pedro Bay, OH 01756                                 
   
                                                                 SUMMA  
  
                                                                 Work Phone: 1(7 26)866-9361  
   
                          Test Performed by                           Garden City Hospital -  
  
                          University Hospitals Lake West Medical Center, 31 Smith Street West Palm Beach, FL 33417 26462                                 
   
             Glucose [Mass/Vol] 152 mg/dL    High         70 - 100     Corey Hospital  
  
                                                    mg/dL          
  
  
  
  
                          Comment on above:         Test performed by glucose me  
ter. Results may be 10%-15% lower  
  
                                                    than serum/plasma values. (C  
NIALL ID 44Q2743021)  
  
  
  
  
             Interpretation and review Abnormal                                 
MMA  
  
             of laboratory results                                          
   
                          Test Performed by                           75 Greene Street 75047                             
   
                                                                 Corey Hospital  
   
             Glucose [Mass/Vol] 150 mg/dL    High         70 - 100 mg/dL Corey Hospital  
  
  
  
  
                          Comment on above:         Test performed by glucose me  
ter. Results may be 10%-15% lower  
  
                                                    than serum/plasma values. (C  
NIALL ID 46G5420972)  
  
  
  
  
             Interpretation and review of Abnormal                                
 Corey Hospital  
  
             laboratory results                                          
   
                          Test Performed by 29 Stokes Street                             
  
                          62657                                    
   
                                                                 Corey Hospital  
  
  
  
  
                                        POCT Glucose  Ordered By: Alexei gr on 08-  
  
  
  
  
             Glucose [Mass/Vol] 178 mg/dL    High         70 - 100 mg/dL Corey Hospital  
  
                                                                 Work Phone: 1(6 51)515-1247  
  
  
  
  
                          Comment on above:         Test performed by glucose me  
ter. Results may be 10%-15% lower  
  
                                                    than serum/plasma values. (C  
NIALL ID 23N8820608)  
  
  
  
  
             Interpretation and review of laboratory Abnormal                     
            Corey Hospital  
  
             results                                             Work Phone: 1(5 58)804-5322  
   
                                                                 Corey Hospital  
  
                                                                 Work Phone: 1(9 10)3901706  
  
  
  
  
                                        ACT,Whole Blood   on 2022  
  
  
  
  
             ACT,Whole Blood 387 s        High                Munson Healthcare Cadillac Hospital  
  
  
  
  
                          Comment on above:         Result Comment: ACTBOPerform  
ed by Hemochron Sig EliteCLIA ID:  
  
                                                    39D8393143Fvgnl Health, Encompass Health Valley of the Sun Rehabilitation Hospital  
n, OHACT testing is not intended for  
  
                                                    patients taking aprotonin,pa  
tients with hematocrits of <20% or  
  
                                                    >55%, patients usingother ty  
pes of anticoagulation medications, and  
  
                                                    patients withLupus Anticoagu  
lant.  
   
                                                    Performed By: #### ACTBO ###  
#Munson Healthcare Cadillac Hospital525 EDexter, OH 44816-2336  
  
  
  
  
             ACT,Whole Blood 158 s        High                Munson Healthcare Cadillac Hospital  
  
  
  
  
                          Comment on above:         Result Comment: ACTBOPerform  
ed by Hemochron Sig EliteCLIA ID:  
  
                                                    84M1246493Ljabe St. John of God Hospital, Akro  
n, OHACT testing is not intended for  
  
                                                    patients taking aprotonin,pa  
tients with hematocrits of <20% or  
  
                                                    >55%, patients usingother ty  
pes of anticoagulation medications, and  
  
                                                    patients withLupus Anticoagu  
lant.  
   
                                                    Performed By: #### ACTBO ###  
#Corey HospitalIndependent Space525 Rio Rancho, OH   
  
  
  
  
             ACT,Whole Blood OutOfRange   Critically abnormal -134       Munson Healthcare Cadillac Hospital  
  
  
  
  
                          Comment on above:         Result Comment: ACTBOPerform  
ed by Hemochron Sig EliteCLIA ID:  
  
                                                    32X2629059Egbjt St. John of God Hospital, Akro  
n, OHACT testing is not intended for  
  
                                                    patients taking aprotonin,pa  
tients with hematocrits of <20% or  
  
                                                    >55%, patients usingother ty  
pes of anticoagulation medications, and  
  
                                                    patients withLupus Anticoagu  
lant.  
   
                                                    Performed By: #### ACTBO ###  
#Cerus Corporation525 Rio Rancho, OH   
  
  
  
  
             ACT,Whole Blood 258 s        High                Munson Healthcare Cadillac Hospital  
  
  
  
  
                          Comment on above:         Result Comment: ACTBOPerform  
ed by Hemochron Sig EliteCLIA ID:  
  
                                                    20Q7600387Stomg St. John of God Hospital, Akro  
n, OHACT testing is not intended for  
  
                                                    patients taking aprotonin,pa  
tients with hematocrits of <20% or  
  
                                                    >55%, patients usingother ty  
pes of anticoagulation medications, and  
  
                                                    patients withLupus Anticoagu  
lant.  
   
                                                    Performed By: #### ACTBO ###  
#Kettering Health Greene Memorial Stottler Henke Associates525 Rio Rancho, OH   
  
  
  
  
                                        APTT   on 2022  
  
  
  
  
             aPTT Coag (Bld) [Time] 28.4 s       Normal       20.0-30.5    Munson Healthcare Cadillac Hospital  
  
  
  
  
                          Comment on above:         Result Comment: NOTE: The th  
erapeutic time for Heparin  
  
                                                    anticoagulation,based on Xa   
activity inhibition, is an APTT of  
  
                                                    46-80seconds.  
   
                                                    Performed By: #### APTT, FIB  
GN, BMP3, HEMOG ####Cerus Corporation525 Allison, OH   
  
  
  
  
             aPTT Coag (Bld) [Time] 28.4 s                    20 - 30.5 s  Corey Hospital  
  
  
  
  
                          Comment on above:         NOTE: The therapeutic time f  
or Heparin anticoagulation,  
  
                                                    based on Xa activity inhibit  
ion, is an APTT of 46-80  
  
                                                    seconds.  
  
  
  
  
                                        Activated clotting time  on 2022  
  
  
  
  
             Activated Clotting Time 158 s        High         90 - 134 s   SUMM  
A  
  
  
  
  
                          Comment on above:         ACTBO  
  
                                                    Performed by Kaonetics Technologies E  
liteCLIA ID: 87T1404792  
  
                                                    Kettering Health Greene Memorial M2 ConnectionsHerbster, OH  
  
                                                    ACT testing is not intended   
for patients taking aprotonin,  
  
                                                    patients with hematocrits of  
 <20% or >55%, patients using  
  
                                                    other types of anticoagulati  
on medications, and patients with  
  
                                                    Lupus Anticoagulant.  
  
  
  
  
             Activated Clotting Time OutOfRange   Critically abnormal 90 - 134 s  
   Corey Hospital  
  
  
  
  
                          Comment on above:         ACTBO  
  
                                                    Performed by Blaze Bioscience  
liteCLIA ID: 20S0175683  
  
                                                    Kettering Health Greene Memorial M2 ConnectionsHerbster, OH  
  
                                                    ACT testing is not intended   
for patients taking aprotonin,  
  
                                                    patients with hematocrits of  
 <20% or >55%, patients using  
  
                                                    other types of anticoagulati  
on medications, and patients with  
  
                                                    Lupus Anticoagulant.  
  
  
  
  
             Activated Clotting Time 387 s        High         90 - 134 s   SUMM  
A  
  
  
  
  
                          Comment on above:         ACTBO  
  
                                                    Performed by Kaonetics Technologies E  
liteCLIA ID: 12N2514012  
  
                                                    Georgetown, OH  
  
                                                    ACT testing is not intended   
for patients taking aprotonin,  
  
                                                    patients with hematocrits of  
 <20% or >55%, patients using  
  
                                                    other types of anticoagulati  
on medications, and patients with  
  
                                                    Lupus Anticoagulant.  
  
  
  
  
             Activated Clotting Time 258 s        High         90 - 134 s   SUMM  
A  
  
  
  
  
                          Comment on above:         ACTBO  
  
                                                    Performed by Hemochron Sig E  
liteCLIA ID: 51C7882349  
  
                                                    Georgetown, OH  
  
                                                    ACT testing is not intended   
for patients taking aprotonin,  
  
                                                    patients with hematocrits of  
 <20% or >55%, patients using  
  
                                                    other types of anticoagulati  
on medications, and patients with  
  
                                                    Lupus Anticoagulant.  
  
  
  
  
             Interpretation and review of Abnormal                                
 Corey Hospital  
  
             laboratory results                                          
   
                          Test Performed by Westfields Hospital and Clinic, 525 EUpper Valley Medical Center LAB  
  
                          Pedro Bay, OH                             
  
                          56424                                    
   
                                                                 Corey Hospital  
  
  
  
  
                                        Basic Metabolic Panel  on 2022  
  
  
  
  
             Calcium [Mass/Vol] 9.3 mg/dL    Normal       8.4-10.4     Nationwide Children's HospitalTorch Technologies  
Lima City Hospital System  
  
  
  
  
                          Comment on above:         Performed By: #### APTT, FIB  
GN, BMP3, HEMOG ####Briana Ville 771835 E. McLaren Caro Region OH   
  
  
  
  
             Glucose [Mass/Vol] 154 mg/dL    High                Munson Healthcare Otsego Memorial Hospital  
  
  
  
  
                          Comment on above:         Performed By: #### APTT, FIB  
GN, BMP3, HEMOG ####Munson Healthcare Cadillac Hospital525 E. McLaren Caro Region OH   
  
  
  
  
             Urea nitrogen [Mass/Vol] 18 mg/dL     High         7-17         Beaumont Hospital  
  
  
  
  
                          Comment on above:         Performed By: #### APTT, FIB  
GN, BMP3, HEMOG ####Briana Ville 771835 E. McLaren Caro Region OH   
  
  
  
  
             Anion gap [Moles/Vol] 9 mmol/L     Normal       3-13         Munson Healthcare Cadillac Hospital  
  
  
  
  
                          Comment on above:         Performed By: #### APTT, FIB  
GN, BMP3, HEMOG ####Thomas Ville 45855 EHenry Ford Kingswood Hospital OH   
  
  
  
  
             CO2 [Moles/Vol] 16 mmol/L    Low          22-30        Munson Healthcare Cadillac Hospital  
  
  
  
  
                          Comment on above:         Performed By: #### APTT, FIB  
GN, BMP3, HEMOG ####Briana Ville 771835 E. McLaren Caro Region OH   
  
  
  
  
             Creatinine [Mass/Vol] 1.34 mg/dL   High         0.52-1.25    Munson Healthcare Cadillac Hospital  
  
  
  
  
                          Comment on above:         Performed By: #### APTT, FIB  
GN, BMP3, HEMOG ####Briana Ville 771835 . McLaren Caro Region OH   
  
  
  
  
             GFR/1.73 sq M.predicted among 63.7 mL/min/{1.73_m2} Normal       >6  
0          Munson Healthcare Cadillac Hospital  
  
             blacks MDRD (S/P/Bld) [Vol                                          
  
             rate/Area]                                            
  
  
  
  
                          Comment on above:         Performed By: #### APTT, FIB  
GN, BMP3, HEMOG ####Briana Ville 771835 E. McLaren Caro Region OH   
  
  
  
  
             GFR/1.73 sq M.predicted among 55.0 mL/min/{1.73_m2} Abnormal     >6  
0          Munson Healthcare Cadillac Hospital  
  
             non-blacks MDRD (S/P/Bld) [Vol                                       
     
  
             rate/Area]                                            
  
  
  
  
                          Comment on above:         Result Comment: KDIGO guidel  
zoë provide the following GFR  
  
                                                    categories:Stage GFR(ml/min/  
1.73 m2) TermsG1 >=90 Normal or highG2  
  
                                                    60-89 Mildly decreased*G3a 4  
5-59 Mildly to moderately dezxrpuszD8c  
  
                                                    30-44 Moderately to severely  
 decreasedG4 15-29 Severely decreasedG5  
  
                                                    <15 Kidney failure*Relative   
to young adult level.In the absence of  
  
                                                    evidence of kidney damage, n  
either GFRcategory G1 nor G2 fulfill  
  
                                                    the criteria for CKD.The CKD  
-EPI equation is validated in  
  
                                                    individuals 18 yearsof age a  
nd older. Currently the best equation  
  
                                                    forestimating glomerular beto  
tration rate (GFR) from serumcreatinine  
  
                                                    in children is the Bedside S  
kristiwartz equation.It is less accurate in  
  
                                                    patients with extremes of mu  
sclemass, restriction of dietary  
  
                                                    protein, ingestion of creati  
ne,extra-renal metabolism of  
  
                                                    creatinine, or treatment wit  
hmedications that affect renal tubular  
  
                                                    creatinine secretion.  
   
                                                    Performed By: #### APTT, FIB  
GN, BMP3, HEMOG ####Cerus Corporation525 Allison, OH   
  
  
  
  
             Chloride [Moles/Vol] 108 mmol/L   High                Mercy Health St. Joseph Warren Hospital System  
  
  
  
  
                          Comment on above:         Performed By: #### APTT, FIB  
GN, BMP3, HEMOG ####MyoKardia M2 Connections  
  
                                                    Usmpkc529 Allison, OH   
  
  
  
  
             Potassium [Moles/Vol] 4.2 mmol/L   Normal       3.5-5.1      Munson Healthcare Cadillac Hospital  
  
  
  
  
                          Comment on above:         Performed By: #### APTT, FIB  
GN, BMP3, HEMOG ####K94 Discoveries  
  
                                                    Xsxyqd364 Allison, OH   
  
  
  
  
             Sodium [Moles/Vol] 133 mmol/L   Low          135-145      The Jewish Hospital System  
  
  
  
  
                          Comment on above:         Performed By: #### APTT, FIB  
GN, BMP3, HEMOG ####Cerus Corporation525 Allison, OH   
  
  
  
  
             Anion gap [Moles/Vol] 9 mmol/L                  3 - 13 mmol/L SUMMA  
   
             Calcium [Mass/Vol] 9.3 mg/dL                 8.4 - 10.4 mg/dL SUMMA  
   
             Chloride [Moles/Vol] 108 mmol/L   High         98 - 107 mmol/L SUMM  
A  
   
             CO2 [Moles/Vol] 16 mmol/L    Low          22 - 30 mmol/L SUMMA  
   
             Creatinine [Mass/Vol] 1.34 mg/dL   High         0.52 - 1.25 mg/dL S  
Barnesville Hospital  
   
             EGFR IF NonAfrican American 55.0 mL/min  Abnormal     60 - PINF mL/  
min SUMMA  
  
  
  
  
                          Comment on above:         KDIGO guidelines provide the  
 following GFR categories:  
  
                                                    Stage  GFR(ml/min/1.73 m2) T  
erms  
  
                                                    G1 >=90 Normal or high  
  
                                                    G2 60-89 Mildly decreased*  
  
                                                    G3a  45-59 Mildly to moderat  
ely decreased  
  
                                                    G3b 30-44 Moderately to koby  
rely decreased  
  
                                                    G4 15-29 Severely decreased  
  
                                                    G5 <15 Kidney failure  
  
                                                      
  
                                                    *Relative to young adult lev  
el.  
  
                                                    In the absence of evidence o  
f kidney damage, neither GFR  
  
                                                    category G1 nor G2 fulfill t  
he criteria for CKD.  
  
                                                      
  
                                                    The CKD-EPI equation is margarita  
dated in individuals 18 years  
  
                                                    of age and older. Currently   
the best equation for  
  
                                                    estimating glomerular filtra  
tion rate (GFR) from serum  
  
                                                    creatinine in children is   
e Bedside Keller equation.  
  
                                                    It is less accurate in patie  
nts with extremes of muscle  
  
                                                    mass, restriction of dietary  
 protein, ingestion of creatine,  
  
                                                    extra-renal metabolism of cr  
eatinine, or treatment with  
  
                                                    medications that affect main  
l tubular creatinine secretion.  
  
  
  
  
             GFR/1.73 sq M.predicted 63.7 mL/min/{1.73_m2}              60 - PIN  
F    SUMMA  
  
             among blacks MDRD                           mL/min         
  
             (S/P/Bld) [Vol                                          
  
             rate/Area]                                            
   
             Glucose [Mass/Vol] 154 mg/dL    High         70 - 100 mg/dL SUMMA  
   
             Interpretation and Abnormal                               SUMMA  
  
             review of laboratory                                          
  
             results                                               
   
             Potassium [Moles/Vol] 4.2 mmol/L                3.5 - 5.1    SUMMA  
  
                                                    mmol/L         
   
             Sodium [Moles/Vol] 133 mmol/L   Low          135 - 145    SUMMA  
  
                                                    mmol/L         
   
             Urea nitrogen (BldV) 18 mg/dL     High         7 - 17 mg/dL SUMMA  
  
             [Mass/Vol]                                            
   
                          Test Performed by Kettering Health Greene Memorial                           Blueprint Labs  
 Preview Networks  
  
                          Forest View Hospital, 525 Garnet Health Medical CenterMoqizone Holding Michigantown, OH                           CAMPUS  
 LAB  
  
                          65843                                    
   
                                                                 Corey Hospital  
   
             Creatinine [Mass/Vol] 1.36 mg/dL   High         0.52-1.25    Kettering Health Greene Memorial   
Stottler Henke Associates  
  
  
  
  
                          Comment on above:         Performed By: #### BMP3 ####  
Kettering Health Greene Memorial M2 Connections Xejoes592 Rio Rancho, OH   
  
  
  
  
             GFR/1.73 sq M.predicted among 62.6 mL/min/{1.73_m2} Normal       >6  
0          Munson Healthcare Cadillac Hospital  
  
             blacks MDRD (S/P/Bld) [Vol                                          
  
             rate/Area]                                            
  
  
  
  
                          Comment on above:         Performed By: #### BMP3 ####  
Briana Ville 771835 Rio Rancho, OH   
  
  
  
  
             GFR/1.73 sq M.predicted among 54.0 mL/min/{1.73_m2} Abnormal     >6  
0          Munson Healthcare Cadillac Hospital  
  
             non-blacks MDRD (S/P/Bld) [Vol                                       
     
  
             rate/Area]                                            
  
  
  
  
                          Comment on above:         Result Comment: KDIGO guidel  
zoë provide the following GFR  
  
                                                    categories:Stage GFR(ml/min/  
1.73 m2) TermsG1 >=90 Normal or highG2  
  
                                                    60-89 Mildly decreased*G3a 4  
5-59 Mildly to moderately kimjzdwvzG6p  
  
                                                    30-44 Moderately to severely  
 decreasedG4 15-29 Severely decreasedG5  
  
                                                    <15 Kidney failure*Relative   
to young adult level.In the absence of  
  
                                                    evidence of kidney damage, n  
either GFRcategory G1 nor G2 fulfill  
  
                                                    the criteria for CKD.The CKD  
-EPI equation is validated in  
  
                                                    individuals 18 yearsof age a  
nd older. Currently the best equation  
  
                                                    forestimating glomerular beto  
tration rate (GFR) from serumcreatinine  
  
                                                    in children is the Bedside S  
kristiwartz equation.It is less accurate in  
  
                                                    patients with extremes of mu  
sclemass, restriction of dietary  
  
                                                    protein, ingestion of creati  
ne,extra-renal metabolism of  
  
                                                    creatinine, or treatment wit  
hmedications that affect renal tubular  
  
                                                    creatinine secretion.  
   
                                                    Performed By: #### BMP3 ####  
Briana Ville 771835 Rio Rancho, OH   
  
  
  
  
             Calcium [Mass/Vol] 9.2 mg/dL    Normal       8.4-10.4     The Jewish Hospital System  
  
  
  
  
                          Comment on above:         Performed By: #### BMP3 ####  
Briana Ville 771835 Rio Rancho, OH   
  
  
  
  
             Anion gap [Moles/Vol] 10 mmol/L    Normal       3-13         Munson Healthcare Cadillac Hospital  
  
  
  
  
                          Comment on above:         Performed By: #### BMP3 ####  
38 Herrera Street   
  
  
  
  
             CO2 [Moles/Vol] 16 mmol/L    Low          22-30        Munson Healthcare Cadillac Hospital  
  
  
  
  
                          Comment on above:         Performed By: #### BMP3 ####  
Kettering Health Greene Memorial M2 Connections Hnplhn396 Rio Rancho, OH   
  
  
  
  
             Glucose [Mass/Vol] 174 mg/dL    High                Munson Healthcare Otsego Memorial Hospital  
  
  
  
  
                          Comment on above:         Performed By: #### BMP3 ####  
Kettering Health Greene Memorial M2 Connections Dymvhw398 EDexter, OH   
  
  
  
  
             Urea nitrogen [Mass/Vol] 21 mg/dL     High         7-17         Beaumont Hospital  
  
  
  
  
                          Comment on above:         Performed By: #### BMP3 ####  
Briana Ville 771835 Rio Rancho, OH   
  
  
  
  
             Chloride [Moles/Vol] 107 mmol/L   Normal              Mercy Health St. Joseph Warren Hospital System  
  
  
  
  
                          Comment on above:         Performed By: #### BMP3 ####  
Briana Ville 771835 Rio Rancho, OH   
  
  
  
  
             Potassium [Moles/Vol] 5.1 mmol/L   Normal       3.5-5.1      Munson Healthcare Cadillac Hospital  
  
  
  
  
                          Comment on above:         Result Comment: Slightly hem  
olysed, interpret with caution.  
   
                                                    Performed By: #### BMP3 ####  
Kettering Health Greene Memorial M2 Connections Wfmxxw787 Rio Rancho, OH   
  
  
  
  
             Sodium [Moles/Vol] 133 mmol/L   Low          135-145      The Jewish Hospital System  
  
  
  
  
                          Comment on above:         Performed By: #### BMP3 ####  
Briana Ville 771835 Rio Rancho, OH   
  
  
  
  
             Anion gap [Moles/Vol] 10 mmol/L                 3 - 13 mmol/L SUMMA  
   
             Calcium [Mass/Vol] 9.2 mg/dL                 8.4 - 10.4 mg/dL SUMMA  
   
             Chloride [Moles/Vol] 107 mmol/L                98 - 107 mmol/L SUMM  
A  
   
             CO2 [Moles/Vol] 16 mmol/L    Low          22 - 30 mmol/L SUMMA  
   
             Creatinine [Mass/Vol] 1.36 mg/dL   High         0.52 - 1.25 mg/dL S  
UMMA  
   
             EGFR IF NonAfrican American 54.0 mL/min  Abnormal     60 - PINF mL/  
min SUMMA  
  
  
  
  
                          Comment on above:         KDIGO guidelines provide the  
 following GFR categories:  
  
                                                    Stage GFR(ml/min/1.73 m2) Te  
iggy  
  
                                                    G1 >=90 Normal or high  
  
                                                    G2 60-89 Mildly decreased*  
  
                                                    G3a 45-59 Mildly to moderate  
ly decreased  
  
                                                    G3b 30-44 Moderately to koby  
rely decreased  
  
                                                    G4 15-29 Severely decreased  
  
                                                    G5 <15 Kidney failure  
  
                                                      
  
                                                    *Relative to young adult joselyn pimentel.  
  
                                                    In the absence of evidence o  
f kidney damage, neither GFR  
  
                                                    category G1 nor G2 fulfill t  
he criteria for CKD.  
  
                                                      
  
                                                    The CKD-EPI equation is margarita  
dated in individuals 18 years  
  
                                                    of age and older. Currently   
the best equation for  
  
                                                    estimating glomerular filtra  
tion rate (GFR) from serum  
  
                                                    creatinine in children is University of Vermont Health Network Bedside Keller equation.  
  
                                                    It is less accurate in patie  
nts with extremes of muscle  
  
                                                    mass, restriction of dietary  
 protein, ingestion of creatine,  
  
                                                    extra-renal metabolism of cr  
eatinine, or treatment with  
  
                                                    medications that affect main  
l tubular creatinine secretion.  
  
  
  
  
             GFR/1.73 sq M.predicted among 62.6 mL/min/{1.73_m2}              60  
 - PINF mL/min Corey Hospital  
  
             blacks MDRD (S/P/Bld) [Vol                                          
  
             rate/Area]                                            
   
             Glucose [Mass/Vol] 174 mg/dL    High         70 - 100 mg/dL Corey Hospital  
   
             Interpretation and review of Abnormal                                
 Corey Hospital  
  
             laboratory results                                          
   
             Potassium [Moles/Vol] 5.1 mmol/L                3.5 - 5.1 mmol/L KRAMER  
MMA  
  
  
  
  
                          Comment on above:         Slightly hemolysed, interpre  
t with caution.  
  
  
  
  
             Sodium [Moles/Vol] 133 mmol/L   Low          135 - 145 mmol/L SUMMA  
   
             Urea nitrogen (BldV) 21 mg/dL     High         7 - 17 mg/dL SUMMA  
  
             [Mass/Vol]                                            
   
                          Test Performed by                           UnityPoint Health-Marshalltown System,                           SYSTEM   
- 80 Levine Street 9820616 Lyons Street Floris, IA 52560  
  
  
  
  
                                        CBC  on 2022  
  
  
  
  
             Hematocrit (Bld) [Volume fraction] 44.3 %                    40 - 5  
2 %    SUMMA  
   
             Hemoglobin (Bld) [Mass/Vol] 14.1 g/dL                 13 - 18 g/dL   
Corey Hospital  
   
             Interpretation and review of laboratory results Abnormal             
                    SUMMA  
   
             MCH (RBC) [Entitic mass] 23.3 pg      Low          26 - 34 pg   SUM  
MA  
   
             MCHC (RBC) [Mass/Vol] 31.8 %       Low          32 - 36 %    SUMMA  
   
             MCV (RBC) [Entitic vol] 73.1 fL      Low          80 - 98 fL   SUMM  
A  
   
             Platelet distribution width (Bld) [Ratio] 20.8 %       High          
 11.5 - 14.5 % SUMMA  
   
             Platelet mean volume (Bld) [Entitic vol] 7.9 fL                      
7.4 - 12.4 fL SUMMA  
  
  
  
  
                          Comment on above:         MPV is a calculated measurem  
ent using platelet volume ratio.  
  
  
  
  
             Platelets (Bld) 236 10*3/uL               140 - 440 10*3/uL SUMMA  
  
             [#/Vol]                                               
   
             RBC (Bld) [#/Vol] 6.06 10*6/uL High         4.4 - 5.9 10*6/uL SUMMA  
   
             WBC (Bld) [#/Vol] 11.8 10*3/uL High         3.6 - 10.7 10*3/uL SUMM  
A  
   
                          Test Performed by                           Sanford Children's Hospital Bismarck,                           SYSTEM   
- 80 Levine Street  25755                             
   
                                                                 Corey Hospital  
  
  
  
  
                                        Catheterization and angiography procedur  
e details panel  on 2022  
  
  
  
  
                          Odell Bryan DO 2022 5:15 PM                 
            Northwest Rural Health Network CARDIOLOGY  
  
                          DX:                                      
  
                          S/p Complex L LE revascularization (multiple times). A  
bnormal                             
  
                          follow-up arterial duplex and recurrent LLE ischmic pa  
in.                             
  
                                                                   
  
                          Dx Procedure:                             
  
                          Selective LLE angio 2nd and 3rd order.                  
             
  
                                                                   
  
                          Findings:                                
  
                          L CFA and PFA patent.                             
  
                          L SFA-pop (previously intervened upon with Viabahn hector  
nt graft                             
  
                          Occluded with recent thrombus. P2 and P3 SYDNIE occluded.  
 Stent                             
  
                          fracture of the P3 stent. Reconstitutes a distal PT fa  
int                             
  
                          filling into foot.                             
  
                          AT occluded with fresh thrombus.                        
       
  
                                                                   
  
                          Intervention: Complex 3.5 hrs duration.                 
              
  
                          Occluded SFA pop crossed with Holloway wire and 5F angle  
d glide                             
  
                          cath.                                    
  
                          Minaya Rotarex Suction atherectomy performed. Results s  
ub                             
  
                          optimal                                  
  
                          PTA entire SFA Pop 6 x 300 mm Balloon. Long inflations  
. Some                             
  
                          improved flow.                             
  
                          PTA P2 and P3 Pop TPT and PT prox with 3 x 30 balloon   
followed                             
  
                          by Repeat stenting of the P3 pop and TPT 3 mm stents.   
1m g                             
  
                          intra arterial t-PA into tibials. Outflow improved.     
                          
  
                          Occluded SFA remains/ EKOS 50 cm catheter placed dista  
l SFA-Pop.                             
  
                          Rt-PA started at 0.5 mg/hr. Heparin through sheath 500  
 u/hr.                             
  
                                                                   
  
                          Plan TLX overnight. (< 1 % risk CNS blled discussed wi  
th Pt and                             
  
                          family as well as bleeding from vessels that were inte  
rvened                             
  
                          upon, and RPB. Agreed to risks. Then re look angio in   
am.                             
  
                                                                   
  
                          This ould result in a LLE amp. Pt and family wish to h  
ave done                             
  
                          by Dr. Breen at Austin. Continue prasugrel for now w  
ith                             
  
                          heparin.                                 
  
                                                                   
  
                          Fully dictated note to follow                           
    
   
                                                                 Corey Hospital  
  
                                                                 Work Phone: 7(9 52)609-4638  
  
  
  
  
                                        Fibrinogen   on 2022  
  
  
  
  
             Fibrinogen   238 mg/dL    Normal       200-400      Salem City Hospital  
stem  
  
  
  
  
                          Comment on above:         Performed By: #### APTT, FIB  
GN, BMP3, HEMOG ####Cerus Corporation525 E. Gause, OH   
  
  
  
  
             Fibrinogen   238 mg/dL                 200 - 400 mg/dL Corey Hospital  
  
  
  
  
                                        Glucose,Bedside  Ordered By: Amanda ramos on 2022  
  
  
  
  
             Glucose [Mass/Vol] 158 mg/dL    High                Corey Hospital  
  
  
  
  
                          Comment on above:         Test performed by glucose me  
ter. Results may be 10%-15% lower  
  
                                                    than serum/plasma values. (C  
NIALL ID 05C3159981)  
   
                                                    Result Comment: Test perform  
ed by glucose meter. Results may be  
  
                                                    10%-15% lowerthan serum/plas  
ma values. (CLIA ID 77Z9712910)  
   
                                                    Performed By: #### BGLU ####  
Cerus Corporation525 EDexter, OH   
  
  
  
  
                                        Glucose,Bedside  on 2022  
  
  
  
  
             Glucose [Mass/Vol] 128 mg/dL    High                The Jewish Hospital System  
  
  
  
  
                          Comment on above:         Result Comment: Test perform  
ed by glucose meter. Results may   
be  
  
                                                    10%-15% lowerthan serum/plas  
ma values. (CLIA ID 72W3963172)  
   
                                                    Performed By: #### BGLU ####  
Cerus Corporation525 EDexter, OH   
  
  
  
  
                                        Hemogram  on 2022  
  
  
  
  
             Erythrocyte distribution width (RBC) 20.8 %       High         11.5  
-14.5    Munson Healthcare Cadillac Hospital  
  
             [Ratio]                                               
  
  
  
  
                          Comment on above:         Performed By: #### HEMOG ###  
#Cerus Corporation525 Rio Rancho, OH   
  
  
  
  
             Hematocrit (Bld) [Volume fraction] 44.3 %       Normal       40.0-5  
2.0    Munson Healthcare Cadillac Hospital  
  
  
  
  
                          Comment on above:         Performed By: #### HEMOG ###  
#Briana Ville 771835 E. Elizabeth, OH   
  
  
  
  
             Hemoglobin (Bld) [Mass/Vol] 14.1 g/dL    Normal       13.0-18.0      
Munson Healthcare Cadillac Hospital  
  
  
  
  
                          Comment on above:         Performed By: #### HEMOG ###  
#Briana Ville 771835 EDexter, OH   
  
  
  
  
             MCH (RBC) [Entitic mass] 23.3 pg      Low          26.0-34.0    Beaumont Hospital  
  
  
  
  
                          Comment on above:         Performed By: #### HEMOG ###  
#Briana Ville 771835 EDexter, OH   
  
  
  
  
             MCHC         31.8 %       Low          32.0-36.0    Salem City Hospital  
stem  
  
  
  
  
                          Comment on above:         Performed By: #### HEMOG ###  
#Briana Ville 771835 Rio Rancho, OH   
  
  
  
  
             MCV (RBC) [Entitic vol] 73.1 fL      Low          80.0-98.0    Summ  
a Health System  
  
  
  
  
                          Comment on above:         Performed By: #### HEMOG ###  
#38 Herrera Street   
  
  
  
  
             Platelet mean volume (Bld) [Entitic vol] 7.9 fL       Normal         
7.4-12.4     Munson Healthcare Cadillac Hospital  
  
  
  
  
                          Comment on above:         Result Comment: MPV is a tenisha  
culated measurement using platelet  
  
                                                    volume ratio.  
   
                                                    Performed By: #### HEMOG ###  
#Briana Ville 771835 Rio Rancho, OH   
  
  
  
  
             Platelets (Bld) [#/Vol] 236 10*3/uL  Normal       140-440      University of Michigan Hospital  
  
  
  
  
                          Comment on above:         Performed By: #### HEMOG ###  
#Briana Ville 771835 Rio Rancho, OH   
  
  
  
  
             RBC (Bld) [#/Vol] 6.06 10*6/uL High         4.40-5.90    McLaren Oakland  
  
  
  
  
                          Comment on above:         Performed By: #### HEMOG ###  
#Briana Ville 771835 Rio Rancho, OH   
  
  
  
  
             WBC (Bld) [#/Vol] 11.8 10*3/uL High         3.6-10.7     Holmes County Joel Pomerene Memorial Hospital System  
  
  
  
  
                          Comment on above:         Performed By: #### HEMOG ###  
#Munson Healthcare Cadillac Hospital525 E. Elizabeth, OH 43393-0777  
  
  
  
  
                                        No Panel Information  on 2022  
  
  
  
  
                          Test Performed by Westfields Hospital and Clinic, Lawrence Memorial Hospital EUpper Valley Medical Center LAB  
  
                          Pedro Bay, OH                             
  
                          5555016 Lyons Street Floris, IA 52560  
   
             Interpretation and review of Abnormal                                
 Corey Hospital  
  
             laboratory results                                          
   
                          Test Performed by Westfields Hospital and Clinic, Lawrence Memorial Hospital E.                           Herrick Campus LAB  
  
                          77 Nguyen Street  
  
  
  
  
                                        POCT Glucose  Ordered By: Jae Hurtado on  
 2022  
  
  
  
  
             Glucose [Mass/Vol] 128 mg/dL    High         70 - 100 mg/dL Corey Hospital  
  
                                                                 Work Phone: 5(5 41)994-2640  
  
  
  
  
                          Comment on above:         Test performed by glucose me  
ter. Results may be 10%-15% lower  
  
                                                    than serum/plasma values. (C  
NIALL ID 55F5658243)  
  
  
  
  
             Interpretation and review of laboratory Abnormal                     
            Corey Hospital  
  
             results                                             Work Phone: 1(4 09)283-1672  
   
                                                                 Corey Hospital  
  
                                                                 Work Phone: 1(0 38)116-0420  
  
  
  
  
                                        POCT Glucose  on 2022  
  
  
  
  
                          Test Performed by Munson Healthcare Cadillac Hospital,                  
           Valdez, AK 99686                      
         
  
  
  
  
                                        CT CHEST WO IVCON  on 2022  
  
  
  
  
             CT           * * *Final Report* * * Invalid                   Ararat  
  
             CHEST        DATE OF EXAM: Aug 5 2022 11:46AM Interpretation         
       General  
  
             WO           Jefferson Lansdale Hospital 0541 - CT CHEST WO IVCON / ACCESSION # 341672338 C  
ode                      Medical  
  
             IVCON        PROCEDURE REASON: Neoplasm of lung                      
       Center  
  
                                                                   
  
                          * * * * Physician Interpretation * * * *                
               
  
                          EXAMINATION: CHEST CT WITHOUT CONTRAST                  
             
  
                          CLINICAL HISTORY: Neoplasm of lung                      
         
  
                          Technique: Spiral CT acquisition of the chest from the  
 thoracic inlet to                             
  
                          the upper abdomen without contrast.                     
          
  
                          MQ: CTCWO_6                              
  
                          CT Radiation dose: Integrated Dose-length product (DLP  
) for this visit =                             
  
                          184.82 mGy*cm                             
  
                          CT Dose Reduction Employed: Automated exposure control  
(AEC) and iterative                             
  
                          recon                                    
  
                          Comparison: Chest CT 2021, May 29, 2021,   
2020                             
  
                          and May 7, 2019                             
  
                          RESULT:                                  
  
                          Limitations: None.                             
  
                          Lines, tubes, and devices: None.                        
       
  
                          Lung parenchyma and airways: Moderate centrilobular em  
physematous changes.                             
  
                          Right lung:                              
  
                          -3 mm nodule right upper lobe (3:63) stable in size.    
                           
  
                          -calcified granulomas in the right upper lobe measurin  
g 2 to 3 mm of                             
  
                          (3:64)                                   
  
                          -Irregular shaped consolidative opacity in the right l  
ower lobe measuring                             
  
                          3.4 x 2 cm increased in size and prominence compared w  
ith prior chest CT.                             
  
                          Left lung:                               
  
                          -Stable 1 cm nodule with adjacent architectural distor  
tion in the left                             
  
                          upper lobe with adjacent metallic clips (3:101).        
                       
  
                          -4 mm nodule left lower lobe (3:148) stable.            
                   
  
                          Pleural space: No pleural effusion. No pleural thicken  
ing.                             
  
                          Lower neck, lymph nodes, and mediastinum: The imaged t  
hyroid gland is                             
  
                          normal. No lymphadenopathy in the supraclavicular, axi  
llary,                             
  
                          mediastinal, or hilar regions.                          
     
  
                          Heart, pericardium, and thoracic vessels: The thoracic  
 aorta and main                             
  
                          pulmonary artery are normal in caliber. The cardiac ch  
ambers are normal                             
  
                          in size. Coronary artery atherosclerotic calcification  
s are noted,                             
  
                          although the study is not optimized for coronary asses  
sment. No                             
  
                          pericardial effusion or thickening.                     
          
  
                          Bones and soft tissues: Nonunion of prior median leon  
otomy.                             
  
                          Upper abdomen: No abnormality in the imaged upper abdo  
men.                             
  
                           (topogram) images: No additional findings.        
                       
  
                          IMPRESSION:                              
  
                          There is an irregular shaped consolidative opacity in   
the right lower                             
  
                          lobe which has increased in size compared with the joy  
or chest CT of                             
  
                          2021 This could be inflammatory, infectio  
us or neoplastic in                             
  
                          etiology. Consider further evaluation with PET/CT or C  
T-guided biopsy.                             
  
                          Stable 1 cm left upper lobe pulmonary nodule and adjac  
ent area of                             
  
                          architectural distortion.                             
  
                          Additional stable subcentimeter pulmonary nodules       
                        
  
                          ACTIONABLE RESULT: FOLLOW-UP                            
   
  
                          Acuity: Actionable                             
  
                          Findings: Thoracic-Other Routing Code: CT_1             
                  
  
                          Recommendation: Unlisted Recommendation (see report)    
                           
  
                          Time Frame: At the discretion of the clinical team.     
                          
  
                          COMMUNICATION: Results will be communicated with the Children's Hospital Colorado South Campus provider                             
  
                          via Epic staff message or phone message by The Veteran Asset                             
  
                          within 2 business days of report finalization.          
                     
  
                          ====================================                    
           
  
                          Algorithms for management of incidental imaging findin  
gs can be found on                             
  
                          the Trinity Health System Intranet Sharepoint site at:       
                        
  
                          http://spo.ccf.org/documentation/mychartlinks/Managing  
%20Incidental%20Findi                             
  
                          ngs%20at%20Imaging/Forms/AllItems.aspx                  
             
  
                          : MALLORY                             
  
                          Transcribe Date/Time: Aug 9 2022 12:02P                 
              
  
                          Dictated by : BETH YEPEZ MD                         
      
  
                          This examination was interpreted and the report review  
ed and                             
  
                          electronically signed by:                             
  
                          BETH YEPEZ MD on Aug 9 2022 12:18PM EST             
                  
  
                          129664564AGFA_IDCSIACN                             
  
                          ACTIONABLE                               
  
  
  
  
                                        VL Arterial Duplex US Lower Ext Left   o  
n 2022  
  
  
  
  
             VL Arterial Duplex US Lower Ext Left              Normal             
         Munson Healthcare Cadillac Hospital  
  
  
  
  
                                        VL Lower Extremity Arteries Left  on   
  
  
  
  
                          Wyandot Memorial Hospital HEART AND VASCULAR INSTITUTE               
              ACH  
  
                                                                 CARDIOLOGY  
  
                          ------------------------------------------------------  
-----------------                             
  
                          Left Lower Extremity Native Arterial Duplex Report      
                         
  
                                                                   
  
                          Patient Augustine, : 1957 Study 2022      
                         
  
                          Name: Willow Guidry  (65yrs) Date:                     
          
  
                          Patient 31049688 Age: 65 Account: 674893264910          
                     
  
                          ID:                                      
  
                          Gender: M  Loc: Accession: 84778221828                  
             
  
                          BP:                                      
  
                          Ordering Physician: Ed Breen M.D.                   
            
  
                          Sonographer: Ralph Bajwa RVT                         
      
  
                          Interpreting Physician: Maxim Colindres MD                  
             
  
                                                                   
  
                          ------------------------------------------------------  
-----------------                             
  
                                                                   
  
                          Location: 15 Price Street                   
            
  
                                                                   
  
                          ------------------------------------------------------  
-----------------                             
  
                          Indications: PVD with rest pain. Post-op evaluation.    
                           
  
                                                                   
  
                          ------------------------------------------------------  
-----------------                             
  
                                                                   
  
                          Conclusions                              
  
                                                                   
  
                          1. Right resting NENA is 0.90. This is within the issa  
l range. PVR                             
  
                          waveforms appear normal at rest                         
      
  
                          2. Left resting NENA is 0.85. These findings show mild   
arterial                             
  
                          insufficiency. PVR waveforms appear normal at rest      
                         
  
                          3. Study shows a > 50%, hemodynamically significant st  
enosis involving                             
  
                          the proximalleft superficial femoral artery.            
                   
  
                                                                   
  
                          ------------------------------------------------------  
-----------------                             
  
                          History: Risk factors: Former tobacco use. Hypertensio  
n. Diabetes                             
  
                          mellitus. Hyperlipidemia. Age over 65 years.            
                   
  
                                                                   
  
                          Labs, prior tests, procedures, and surgery:             
                  
  
                          2022 angioplasty and stenting left SFA and Popli  
teal Artery.                             
  
                          2022 angioplasty of left anterior and posterior   
tibial arteries.                             
  
                                                                   
  
                          ------------------------------------------------------  
-----------------                             
  
                                                                   
  
                          Study data: Left lower extremity native arterial duple  
x.                             
  
                          Ankle-brachial index, duplex scan, grayscale 2D imagin  
g, color Doppler                             
  
                          imaging, and spectral Doppler analysis. Location: PeaceHealth. Procedure: A vascular evaluation was perfo  
rmed with the                             
  
                          patient in the supine position. Images were obtained u  
sing a GE Logic                             
  
                          E10s vascular ultrasound machine.                       
        
  
                                                                   
  
                          ------------------------------------------------------  
-----------------                             
  
                          Arterial pressure indices:                             
  
                                                                   
  
                          +----------+----------------+------------+------------  
---------+                             
  
                          +Location +Pressure (REST)*+Index (REST)+Comment +      
                         
  
                          +----------+----------------+------------+------------  
---------+                             
  
                          +R brachial+98 +------------+---------------------+     
                          
  
                          +----------+----------------+------------+------------  
---------+                             
  
                          +L brachial+110 +------------+---------------------+    
                           
  
                          +----------+----------------+------------+------------  
---------+                             
  
                          +R DP +83 +0.75 +---------------------+                 
              
  
                          +----------+----------------+------------+------------  
---------+                             
  
                          +R PT +99 +0.90 +2022 R NENA 0.89+                 
              
  
                          +----------+----------------+------------+------------  
---------+                             
  
                          +L DP +74 +0.67 +---------------------+                 
              
  
                          +----------+----------------+------------+------------  
---------+                             
  
                          +L PT +94 +0.85 +2022 L NENA 1.01+                 
              
  
                          +----------+----------------+------------+------------  
---------+                             
  
                                                                   
  
                          ------------------------------------------------------  
-----------------                             
  
                                                                   
  
                          Arterial flow:                             
  
                                                                   
  
                          +--------------------+-----------+-----------+--------  
----------------+                             
  
                          +Location +PSV(cm/sec)+EDV(cm/sec)+Comment +            
                   
  
                          +--------------------+-----------+-----------+--------  
----------------+                             
  
                          +L CFA , distal +35 +6 +------------------------+       
                        
  
                          +--------------------+-----------+-----------+--------  
----------------+                             
  
                          +L DFA , prox +73 +0 +------------------------+         
                      
  
                          +--------------------+-----------+-----------+--------  
----------------+                             
  
                          +L SFA , prox +184 +37 +Pre-stenosis PSV 70 +           
                    
  
                          + + + +cm/sec, poststenotic +                           
    
  
                          + + + +flow is turbulent. +                             
  
                          +--------------------+-----------+-----------+--------  
----------------+                             
  
                          +L SFA , Mid, +63 +11 +------------------------+        
                       
  
                          +prestent + + +  +                             
  
                          +--------------------+-----------+-----------+--------  
----------------+                             
  
                          +L SFA , Mid, +57 +19 +------------------------+        
                       
  
                          +proximal stent + + + +                             
  
                          +--------------------+-----------+-----------+--------  
----------------+                             
  
                          +L SFA , Mid, mid +74 +24 +------------------------+    
                           
  
                          +stent + + + +                             
  
                          +--------------------+-----------+-----------+--------  
----------------+                             
  
                          +L SFA , Distal, +106 +24 +Velocities elevated, do +    
                           
  
                          +distal stent + + +not double. Pre elevated+            
                   
  
                          + + + +segment PS (more content not included)...        
                       
   
                                                    Maxim Colindres MD -   
022 Formatting of this note might be different from  
 the original.                                              Mary Rutan Hospital HEART AND VASCULAR INSTITUTE               
              Work Phone: 1(549) 425-8908  
  
                                                                   
  
                          ------------------------------------------------------  
-----------------                             
  
                          Left Lower Extremity Native Arterial Duplex Report      
                         
  
                                                                   
  
                          Patient DO AugustineB: 1957 Study 2022      
                         
  
                          Name: Willow Guidry (65yrs) Date:                      
         
  
                          Patient 05227562 Age: 65 Account: 880939485673          
                     
  
                          ID:                                      
  
                          Gender: M Loc: Accession: 20384082771                   
            
  
                          BP:                                      
  
                          Ordering Physician: Ed Breen M.D.                   
            
  
                          Sonographer: Ralph Bajwa RVT                         
      
  
                          Interpreting Physician: Maxim Colindres MD                  
             
  
                                                                   
  
                          ------------------------------------------------------  
-----------------                             
  
                                                                   
  
                          Location: 15 Price Street                   
            
  
                                                                   
  
                          ------------------------------------------------------  
-----------------                             
  
                          Indications: PVD with rest pain. Post-op evaluation.    
                           
  
                                                                   
  
                          ------------------------------------------------------  
-----------------                             
  
                                                                   
  
                          Conclusions                              
  
                                                                   
  
                          1. Right resting NENA is 0.90. This is within the issa  
l range. PVR                             
  
                          waveforms appear normal at rest                         
      
  
                          2. Left resting NENA is 0.85. These findings show mild   
arterial                             
  
                          insufficiency. PVR waveforms appear normal at rest      
                         
  
                          3. Study shows a > 50%, hemodynamically significant st  
enosis involving                             
  
                          the proximalleft superficial femoral artery.            
                   
  
                                                                   
  
                          ------------------------------------------------------  
-----------------                             
  
                          History: Risk factors: Former tobacco use. Hypertensio  
n. Diabetes                             
  
                          mellitus. Hyperlipidemia. Age over 65 years.            
                   
  
                                                                   
  
                          Labs, prior tests, procedures, and surgery:             
                  
  
                          2022 angioplasty and stenting left SFA and Popli  
teal Artery.                             
  
                          2022 angioplasty of left anterior and posterior   
tibial arteries.                             
  
                                                                   
  
                          ------------------------------------------------------  
-----------------                             
  
                                                                   
  
                          Study data: Left lower extremity native arterial duple  
x.                             
  
                          Ankle-brachial index, duplex scan, grayscale 2D imagin  
g, color Doppler                             
  
                          imaging, and spectral Doppler analysis. Location: PeaceHealth. Procedure: A vascular evaluation was perfo  
rmed with the                             
  
                          patient in the supine position. Images were obtained u  
sing a GE Logic                             
  
                          E10s vascular ultrasound machine.                       
        
  
                                                                   
  
                          ------------------------------------------------------  
-----------------                             
  
                          Arterial pressure indices:                             
  
                                                                   
  
                          +----------+----------------+------------+------------  
---------+                             
  
                          +Location +Pressure (REST)*+Index (REST)+Comment +      
                         
  
                          +----------+----------------+------------+------------  
---------+                             
  
                          +R brachial+98 +------------+---------------------+     
                          
  
                          +----------+----------------+------------+------------  
---------+                             
  
                          +L brachial+110 +------------+---------------------+    
                           
  
                          +----------+----------------+------------+------------  
---------+                             
  
                          +R DP +83 +0.75 +---------------------+                 
              
  
                          +----------+----------------+------------+------------  
---------+                             
  
                          +R PT +99 +0.90 +2022 R NENA 0.89+                 
              
  
                          +----------+----------------+------------+------------  
---------+                             
  
                          +L DP +74 +0.67 +---------------------+                 
              
  
                          +----------+----------------+------------+------------  
---------+                             
  
                          +L PT +94 +0.85 +2022 L NENA 1.01+                 
              
  
                          +----------+----------------+------------+------------  
---------+                             
  
                                                                   
  
                          ------------------------------------------------------  
-----------------                             
  
                                                                   
  
                          Arterial flow:                             
  
                                                                   
  
                          +--------------------+-----------+-----------+--------  
----------------+                             
  
                          +Location +PSV(cm/sec)+EDV(cm/sec)+Comment +            
                   
  
                          +--------------------+-----------+-----------+--------  
----------------+                             
  
                          +L CFA , distal +35 +6 +------------------------+       
                        
  
                          +--------------------+-----------+-----------+--------  
----------------+                             
  
                          +L DFA , prox +73 +0 +------------------------+         
                      
  
                          +--------------------+-----------+-----------+--------  
----------------+                             
  
                          +L SFA , prox +184 +37 +Pre-stenosis PSV 70 +           
                    
  
                          + + + +cm/sec, poststenotic +                           
    
  
                          + + + +flow is turbulent. +                             
  
                          +--------------------+-----------+-----------+--------  
----------------+                             
  
                          +L SFA , Mid, +63 +11 +------------------------+        
                       
  
                          +prestent + + + +                             
  
                          +--------------------+-----------+-----------+--------  
----------------+                             
  
                          +L SFA , Mid, +57 +19 +------------------------+        
                       
  
                          +proximal stent + + + +                             
  
                          +--------------------+-----------+-----------+--------  
----------------+                             
  
                          +L SFA , Mid, mid +74 +24 +------------------------+    
                           
  
                          +stent + + + +                             
  
                          +--------------------+-----------+-----------+--------  
----------------+                             
  
                          +L SFA , Distal, +106 +24 +Velocities elevated, do +    
                           
  
                          +distal stent + + +not double. Pre elevated+            
                   
  
                          + + + +segment PSV 72 cm/sec. +                         
      
  
                          +--------------------+-----------+-----------+--------  
----------------+                             
  
                          +L popliteal, distal +52 +18 +Stent appears to end +    
                           
  
                          +stent + + +with popliteal artery, +                    
           
  
                          + + + +no post-stent +                             
  
                          + + + +measurement available. +                         
      
  
                          +--------------------+-----------+-----------+--------  
----------------+                             
  
                          +L tibio-peroneal +115 +35 +------------------------+   
                            
  
                          +--------------------+-----------+-----------+--------  
----------------+                             
  
                          +L PTA , prox +195 +64 +Velocities elevated, do +       
                        
  
                          + + + +not double. Pre elevated+                        
       
  
                          + + + +segment  cm/sec. +                        
       
  
                          +--------------------+-----------+-----------+--------  
----------------+                             
  
                          +L PTA , mid +78 +26 + (more content not included)...   
                            
   
                                                                 Corey Hospital  
  
                                                                 Work Phone: 4(6 69)482-1476  
   
             R                                                   SUMM  
  
             a                                                   Work Phone: 8(3 38)434-7369  
  
             d                                                     
  
             i                                                     
  
             o                                                     
  
             l                                                     
  
             o                                                     
  
             g                                                     
  
             y                                                     
  
             S                                                     
  
             t                                                     
  
             u                                                     
  
             d                                                     
  
             y                                                     
  
             o                                                     
  
             b                                                     
  
             s                                                     
  
             e                                                     
  
             r                                                     
  
             v                                                     
  
             a                                                     
  
             t                                                     
  
             i                                                     
  
             o                                                     
  
             n                                                     
  
             (                                                     
  
             n                                                     
  
             a                                                     
  
             r                                                     
  
             r                                                     
  
             a                                                     
  
             t                                                     
  
             i                                                     
  
             v                                                     
  
             e                                                     
  
             )                                                     
  
  
  
  
                                        ACT,Whole Blood  on 2022  
  
  
  
  
             ACT,Whole Blood 155 s        High                Munson Healthcare Cadillac Hospital  
  
  
  
  
                          Comment on above:         Result Comment: ACTBOPerform  
ed by Kaonetics Technologies EliteCLIA ID:  
  
                                                    40O9881376Zhtgj Health, Children's Hospital of Michigan, OHACT testing is not intended for  
  
                                                    patients taking aprotonin,pa  
tients with hematocrits of <20% or  
  
                                                    >55%, patients usingother ty  
pes of anticoagulation medications, and  
  
                                                    patients withLupus Anticoagu  
lant.  
   
                                                    Performed By: #### ACTBO ###  
#Cerus Corporation525 LonoCloud  
  
                                                    Lunenburg, OH 79089-4903  
  
  
  
  
             ACT,Whole Blood 299 s        High                Munson Healthcare Cadillac Hospital  
  
  
  
  
                          Comment on above:         Result Comment: ACTBOPerform  
ed by Kaonetics Technologies EliteCLIA ID:  
  
                                                    09Z9039089Drxms Health, Children's Hospital of Michigan, OHACT testing is not intended for  
  
                                                    patients taking aprotonin,pa  
tients with hematocrits of <20% or  
  
                                                    >55%, patients usingother ty  
pes of anticoagulation medications, and  
  
                                                    patients withLupus Anticoagu  
lant.  
   
                                                    Performed By: #### ACTBO ###  
#Cerus Corporation525 LonoCloud  
  
                                                    Lunenburg, OH 12448-8649  
  
  
  
  
                                        Activated clotting time  on 2022  
  
  
  
  
             Activated Clotting Time 155 s        High         90 - 134 s   SUMM  
A  
  
  
  
  
                          Comment on above:         ACTBO  
  
                                                    Performed by Hemochron Sig E  
liteCLIA ID: 28B4862820  
  
                                                    Georgetown, OH  
  
                                                    ACT testing is not intended   
for patients taking aprotonin,  
  
                                                    patients with hematocrits of  
 <20% or >55%, patients using  
  
                                                    other types of anticoagulati  
on medications, and patients with  
  
                                                    Lupus Anticoagulant.  
  
  
  
  
             Activated Clotting Time 299 s        High         90 - 134 s   SUMM  
A  
  
  
  
  
                          Comment on above:         ACTBO  
  
                                                    Performed by goDog Fetch Sig E  
liteCLIA ID: 69R6202284  
  
                                                    Kettering Health Greene Memorial M2 ConnectionsHerbster, OH  
  
                                                    ACT testing is not intended   
for patients taking aprotonin,  
  
                                                    patients with hematocrits of  
 <20% or >55%, patients using  
  
                                                    other types of anticoagulati  
on medications, and patients with  
  
                                                    Lupus Anticoagulant.  
  
  
  
  
                                        CBC  on 2022  
  
  
  
  
             Hematocrit (Bld) [Volume 32.4 %       Low          40.0 - 52.0 % KRAMER  
MMA  
  
             fraction]                                             
   
             Hemoglobin.gastrointestina 10.2 g/dL    Low          13.0 - 18.0 g/  
dL Corey Hospital  
  
             l spec 1 Ql (Stl)                                          
   
             Interpretation and review Abnormal                               KRAMER  
MMA  
  
             of laboratory results                                          
   
             MCH (RBC) [Entitic mass] 22.9 pg      Low          26.0 - 34.0 pg S  
UMMA  
   
             MCHC (RBC) [Mass/Vol] 31.3 %       Low          32.0 - 36.0 % SUMMA  
   
             MCV (RBC) [Entitic vol] 73.3 fL      Low          80.0 - 98.0 fL KRAMER  
MMA  
   
             Platelet distribution 19.3 %       High         11.5 - 14.5 % SUMMA  
  
             width (Bld) [Ratio]                                          
   
             Platelet mean volume (Bld) 6.8 fL       Low          7.4 - 10.4 fL   
SUMMA  
  
             [Entitic vol]                                          
   
             Platelets (Bld) [#/Vol] 410 10*3/uL               140 - 440    SUMM  
A  
  
                                                    10*3/uL        
   
             RBC (Bld) [#/Vol] 4.42 10*6/uL              4.40 - 5.90  SUMMA  
  
                                                    10*6/uL        
   
             WBC (Bld) [#/Vol] 10.3 10*3/uL              3.6 - 10.7   SUMMA  
  
                                                    10*3/uL        
   
                          Test Performed by                           Sanford Children's Hospital Bismarck,                           SYSTEM   
- Longport  
  
                          525 E. Pocatello, OH 59285                             
   
                                                                 Corey Hospital  
  
  
  
  
                                        Comp Panel with Mg Reflex  on 2022  
  
  
  
  
             ALP [Catalytic activity/Vol] 63 U/L       Normal               
 Munson Healthcare Cadillac Hospital  
  
  
  
  
                          Comment on above:         Performed By: #### HALIE SCHNEIDER  
P3M ####Briana Ville 771835 E.  
  
                                                    Roswell Park Comprehensive Cancer CenterAKRON, OH   
  
  
  
  
             ALT [Catalytic activity/Vol] 18 U/L       Normal       0-49          
 Munson Healthcare Cadillac Hospital  
  
  
  
  
                          Comment on above:         Result Comment: The ALT test  
 is performed by an updated assay  
  
                                                    method.Please note that the   
reference intervals have beenchanged  
  
                                                    and are now sex specific.  
   
                                                    Performed By: #### HALIE SCHNEIDER  
P3M ####Briana Ville 771835 E.  
  
                                                    Roswell Park Comprehensive Cancer CenterAKRON, OH   
  
  
  
  
             AST [Catalytic activity/Vol] 27 U/L       Normal       15-46         
 Munson Healthcare Cadillac Hospital  
  
  
  
  
                          Comment on above:         Performed By: #### HALIE SCHNEIDER  
P3M ####Briana Ville 771835 E.  
  
                                                    Roswell Park Comprehensive Cancer CenterAKRON, OH   
  
  
  
  
             Calcium [Mass/Vol] 9.4 mg/dL    Normal       8.4-10.4     Munson Healthcare Otsego Memorial Hospital  
  
  
  
  
                          Comment on above:         Performed By: #### HALIE SCHNEIDER  
P3M ####Thomas Ville 45855 E.  
  
                                                    ECU Health Duplin HospitalRON, OH   
  
  
  
  
             Glucose [Mass/Vol] 117 mg/dL    High                Munson Healthcare Otsego Memorial Hospital  
  
  
  
  
                          Comment on above:         Performed By: #### HALIE SCHNEIDER  
P3M ####Briana Ville 771835 E.  
  
                                                    ECU Health Duplin HospitalRON, OH   
  
  
  
  
             Protein [Mass/Vol] 6.7 g/dL     Normal       6.3-8.2      Munson Healthcare Otsego Memorial Hospital  
  
  
  
  
                          Comment on above:         Performed By: #### HALIE SCHNEIDER  
P3M ####Briana Ville 771835 E.  
  
                                                    Roswell Park Comprehensive Cancer CenterAKRON, OH   
  
  
  
  
             Urea nitrogen [Mass/Vol] 18 mg/dL     High         7-17         Beaumont Hospital  
  
  
  
  
                          Comment on above:         Performed By: #### HALIE SCHNEIDER  
P3M ####Briana Ville 771835 E.  
  
                                                    Roswell Park Comprehensive Cancer CenterAKRON, OH   
  
  
  
  
             Anion gap [Moles/Vol] 9 mmol/L     Normal       3-13         Munson Healthcare Cadillac Hospital  
  
  
  
  
                          Comment on above:         Performed By: #### HALIE CSHNEIDER  
P3M ####Briana Ville 771835 E.  
  
                                                    Elizabeth, OH   
  
  
  
  
             Bilirubin [Mass/Vol] 0.5 mg/dL    Normal       0.2-1.3      Mercy Health St. Joseph Warren Hospital System  
  
  
  
  
                          Comment on above:         Performed By: #### HALIE SCHNEIDER  
P3M ####Kettering Health Greene Memorial M2 Connections Qfnopv124 E.  
  
                                                    Elizabeth, OH   
  
  
  
  
             CO2 [Moles/Vol] 18 mmol/L    Low          22-30        Munson Healthcare Cadillac Hospital  
  
  
  
  
                          Comment on above:         Performed By: #### HALIE SCHNEIDER  
P3M ####Munson Healthcare Cadillac Hospital525 E.  
  
                                                    Elizabeth, OH   
  
  
  
  
             Creatinine [Mass/Vol] 1.45 mg/dL   High         0.52-1.25    Munson Healthcare Cadillac Hospital  
  
  
  
  
                          Comment on above:         Performed By: #### HALIE SCHNEIDER  
P3M ####Kettering Health Greene Memorial M2 Connections Lckmia700 EDexter, OH   
  
  
  
  
             GFR/1.73 sq M.predicted among 58.1 mL/min/{1.73_m2} Abnormal     >6  
0          Munson Healthcare Cadillac Hospital  
  
             blacks MDRD (S/P/Bld) [Vol                                          
  
             rate/Area]                                            
  
  
  
  
                          Comment on above:         Performed By: #### HALIE SCHNEIDER  
P3M ####Kettering Health Greene Memorial M2 Connections Jbxfwl454 E.  
  
                                                    Elizabeth, OH   
  
  
  
  
             GFR/1.73 sq M.predicted among 50.1 mL/min/{1.73_m2} Abnormal     >6  
0          Munson Healthcare Cadillac Hospital  
  
             non-blacks MDRD (S/P/Bld) [Vol                                       
     
  
             rate/Area]                                            
  
  
  
  
                          Comment on above:         Result Comment: KDIGO guidel  
zoë provide the following GFR  
  
                                                    categories:Stage GFR(ml/min/  
1.73 m2) TermsG1 >=90 Normal or highG2  
  
                                                    60-89 Mildly decreased*G3a 4  
5-59 Mildly to moderately gzxxmwygxQ0l  
  
                                                    30-44 Moderately to severely  
 decreasedG4 15-29 Severely decreasedG5  
  
                                                    <15 Kidney failure*Relative   
to young adult level.In the absence of  
  
                                                    evidence of kidney damage, n  
either GFRcategory G1 nor G2 fulfill  
  
                                                    the criteria for CKD.The CKD  
-EPI equation is validated in  
  
                                                    individuals 18 yearsof age a  
nd older. Currently the best equation  
  
                                                    forestimating glomerular beto  
tration rate (GFR) from serumcreatinine  
  
                                                    in children is the Bedside S  
reynaldotz equation.It is less accurate in  
  
                                                    patients with extremes of mu  
sclemass, restriction of dietary  
  
                                                    protein, ingestion of creati  
ne,extra-renal metabolism of  
  
                                                    creatinine, or treatment wit  
hmedications that affect renal tubular  
  
                                                    creatinine secretion.  
   
                                                    Performed By: #### HALIE SCHNEIDER  
P3M ####Briana Ville 771835 Rio Rancho, OH   
  
  
  
  
             Albumin [Mass/Vol] 3.6 g/dL     Normal       3.5-5.0      The Jewish Hospital System  
  
  
  
  
                          Comment on above:         Performed By: #### HALIE SCHNEIDER  
P3M ####Briana Ville 771835 Rio Rancho, OH   
  
  
  
  
             Chloride [Moles/Vol] 109 mmol/L   High                Mercy Health St. Joseph Warren Hospital System  
  
  
  
  
                          Comment on above:         Performed By: #### HALIE SCHNEIDER  
P3M ####Briana Ville 771835 Rio Rancho, OH   
  
  
  
  
             Potassium [Moles/Vol] 4.3 mmol/L   Normal       3.5-5.1      Munson Healthcare Cadillac Hospital  
  
  
  
  
                          Comment on above:         Performed By: #### HALIE SCHNEIDER  
P3M ####Briana Ville 771835 Rio Rancho, OH   
  
  
  
  
             Sodium [Moles/Vol] 135 mmol/L   Normal       135-145      The Jewish Hospital System  
  
  
  
  
                          Comment on above:         Performed By: #### HALIE SCHNEIDER  
P3M ####38 Herrera Street   
  
  
  
  
                                        Comprehensive Metabolic Panel w/ Reflex   
to MG  on 2022  
  
  
  
  
             Albumin [Mass/Vol] 3.6 g/dL                  3.5 - 5.0 g/dL SUMMA  
   
             ALP (Bld) [Catalytic activity/Vol] 63 U/L                    38 - 1  
26 U/L SUMMA  
   
             ALT [Catalytic activity/Vol] 18 U/L                    0 - 49 U/L    
 SUMMA  
  
  
  
  
                          Comment on above:         The ALT test is performed by  
 an updated assay method.  
  
                                                    Please note that the referen  
ce intervals have been  
  
                                                    changed and are now sex spec  
ific.  
  
  
  
  
             Anion gap [Moles/Vol] 9 mmol/L                  3 - 13 mmol/L SUMMA  
   
             AST [Catalytic activity/Vol] 27 U/L                    15 - 46 U/L   
 SUMMA  
   
             Bilirubin [Mass/Vol] 0.5 mg/dL                 0.2 - 1.3 mg/dL SUMM  
A  
   
             Calcium [Mass/Vol] 9.4 mg/dL                 8.4 - 10.4 mg/dL SUMMA  
   
             Chloride [Moles/Vol] 109 mmol/L   High         98 - 107 mmol/L SUMM  
A  
   
             CO2 [Moles/Vol] 18 mmol/L    Low          22 - 30 mmol/L SUMMA  
   
             Creatinine [Mass/Vol] 1.45 mg/dL   High         0.52 - 1.25 mg/dL S  
UMMA  
   
             EGFR IF NonAfrican American 50.1 mL/min  Abnormal     >60            
SUMMA  
  
  
  
  
                          Comment on above:         KDIGO guidelines provide the  
 following GFR categories:  
  
                                                    Stage GFR(ml/min/1.73 m2) Te  
iggy  
  
                                                    G1 >=90 Normal or high  
  
                                                    G2 60-89 Mildly decreased*  
  
                                                    G3a 45-59 Mildly to moderate  
ly decreased  
  
                                                    G3b 30-44 Moderately to koby  
rely decreased  
  
                                                    G4 15-29 Severely decreased  
  
                                                    G5 <15 Kidney failure  
  
                                                      
  
                                                    *Relative to young adult lev  
el.  
  
                                                    In the absence of evidence o  
f kidney damage, neither GFR  
  
                                                    category G1 nor G2 fulfill t  
he criteria for CKD.  
  
                                                      
  
                                                    The CKD-EPI equation is margarita  
dated in individuals 18 years  
  
                                                    of age and older. Currently   
the best equation for  
  
                                                    estimating glomerular filtra  
tion rate (GFR) from serum  
  
                                                    creatinine in children is University of Vermont Health Network Bedside Keller equation.  
  
                                                    It is less accurate in patie  
nts with extremes of muscle  
  
                                                    mass, restriction of dietary  
 protein, ingestion of creatine,  
  
                                                    extra-renal metabolism of cr  
eatinine, or treatment with  
  
                                                    medications that affect main  
l tubular creatinine secretion.  
  
  
  
  
             Free PSA/Total PSA [Mass 6.7 g/dL                  6.3 - 8.2 g/dL S  
UMMA  
  
             fraction]                                             
   
             GFR/1.73 sq M.predicted 58.1         Abnormal     >60          SUMM  
A  
  
             among blacks MDRD mL/min/{1.73_m2}                             
  
             (S/P/Bld) [Vol rate/Area]                                          
   
             Glucose [Mass/Vol] 117 mg/dL    High         70 - 100 mg/dL SUMMA  
   
             Interpretation and review Abnormal                               KRAMER  
MMA  
  
             of laboratory results                                          
   
             Potassium [Moles/Vol] 4.3 mmol/L                3.5 - 5.1    SUMMA  
  
                                                    mmol/L         
   
             Sodium [Moles/Vol] 135 mmol/L                135 - 145    SUMMA  
  
                                                    mmol/L         
   
             Urea nitrogen (BldV) 18 mg/dL     High         7 - 17 mg/dL SUMMA  
  
             [Mass/Vol]                                            
   
                          Test Performed by                           UnityPoint Health-Marshalltown System,                           SYSTEM   
19 Lambert Street 23656                             
   
                                                                 Corey Hospital  
  
  
  
  
                                        EKG 12 lead  on 2022  
  
  
  
  
                            Munson Healthcare Cadillac Hospital                           ACH CA  
RDIOLOGY  
  
                                                                   
  
                           Test Date: 2022                             
  
                          Pat Name: WILLOW BRADSHAW  Department: 1A1C            
                   
  
                          Patient ID: 05984971 Room: 133                          
     
  
                          Gender: M Technician: MARGARITA LANDERS                          
     
  
                          : 1957 Requested By: MARIANN TAN              
                 
  
                          Order Number: 0556257462 Reading MD: Odell Starkey  
o                             
  
                           Measurements                             
  
                          Intervals Axis                             
  
                          Rate: 75  P: 60                             
  
                          OH: 144 QRS: 71                             
  
                          QRSD: 105 T: 27                             
  
                          QT: 397                                  
  
                          QTc: 444                                 
  
                           Interpretive Statements                             
  
                          Sinus rhythm                             
  
                          Electronically Signed On 2022 10:18:42 EDT by Odell Mcgee DO -  Formatting of this note might be different  
 from the original.                                         Formerly Oakwood Heritage Hospital                           Work Vandana  
ne: 1(984) 120-1043  
  
                                                                   
  
                          Test Date: 2022                             
  
                          Pat Name: WILLOW BRADSHAW Department: 1A             
                  
  
                          Patient ID: 59891308 Room: 133                          
     
  
                          Gender: M Technician: MARGARITA LANDERS                          
     
  
                          : 1957 Requested By: MARIANN TAN              
                 
  
                          Order Number: 4202810668 Reading MD: Odell Starkey  
o                             
  
                          Measurements                             
  
                          Intervals Axis                             
  
                          Rate: 75 P: 60                             
  
                          OH: 144 QRS: 71                             
  
                          QRSD: 105 T: 27                             
  
                          QT: 397                                  
  
                          QTc: 444                                 
  
                          Interpretive Statements                             
  
                          Sinus rhythm                             
  
                          Electronically Signed On 2022 10:18:42 EDT by Terence Bryan                             
   
                                                                 SUMMMONIQUE  
  
                                                                 Work Phone: 3(3 36)818-4259  
  
  
  
  
                                        Glucose,Bedside  on 2022  
  
  
  
  
             Glucose [Mass/Vol] 132 mg/dL    High                The Jewish Hospital System  
  
  
  
  
                          Comment on above:         Result Comment: Test perform  
ed by glucose meter. Results may   
be  
  
                                                    10%-15% lowerthan serum/plas  
ma values. (CLIA ID 49U8043740)  
   
                                                    Performed By: #### BGLU ####  
Briana Ville 771835 Rio Rancho, OH 33334-6759  
  
  
  
  
                                        Hemogram  on 2022  
  
  
  
  
             Erythrocyte distribution width (RBC) 19.3 %       High         11.5  
-14.5    Munson Healthcare Cadillac Hospital  
  
             [Ratio]                                               
  
  
  
  
                          Comment on above:         Performed By: #### HEMOG, CM  
P3M ####Briana Ville 771835 Rio Rancho, OH   
  
  
  
  
             Hematocrit (Bld) [Volume fraction] 32.4 %       Low          40.0-5  
2.0    Munson Healthcare Cadillac Hospital  
  
  
  
  
                          Comment on above:         Performed By: #### HEMHALIE WINSTON  
P3M ####Briana Ville 771835 Rio Rancho, OH   
  
  
  
  
             Hemoglobin (Bld) [Mass/Vol] 10.2 g/dL    Low          13.0-18.0      
Munson Healthcare Cadillac Hospital  
  
  
  
  
                          Comment on above:         Performed By: #### HEMHALIE WINSTON  
P3M ####Briana Ville 771835 Rio Rancho, OH   
  
  
  
  
             MCH (RBC) [Entitic mass] 22.9 pg      Low          26.0-34.0    Beaumont Hospital  
  
  
  
  
                          Comment on above:         Performed By: #### HALIE SCHNEIDER  
P3M ####38 Herrera Street   
  
  
  
  
             MCHC         31.3 %       Low          32.0-36.0    Salem City Hospital  
stem  
  
  
  
  
                          Comment on above:         Performed By: #### HALIE SCHNEIDER  
P3M ####38 Herrera Street   
  
  
  
  
             MCV (RBC) [Entitic vol] 73.3 fL      Low          80.0-98.0    Summ  
a Health System  
  
  
  
  
                          Comment on above:         Performed By: #### HEMHALIE WINSTON  
P3M ####Briana Ville 771835 Rio Rancho, OH   
  
  
  
  
             Platelet mean volume (Bld) [Entitic vol] 6.8 fL       Low            
7.4-10.4     Munson Healthcare Cadillac Hospital  
  
  
  
  
                          Comment on above:         Performed By: #### HEMHALIE WINSTON  
P3M ####Briana Ville 771835 Rio Rancho, OH   
  
  
  
  
             Platelets (Bld) [#/Vol] 410 10*3/uL  Normal       140-440      University of Michigan Hospital  
  
  
  
  
                          Comment on above:         Performed By: #### HEMHALIE WINSTON  
P3M ####Briana Ville 771835 Rio Rancho, OH   
  
  
  
  
             RBC (Bld) [#/Vol] 4.42 10*6/uL Normal       4.40-5.90    McLaren Oakland  
  
  
  
  
                          Comment on above:         Performed By: #### HALIE SCHNEIDER  
P3M ####Briana Ville 771835 Rio Rancho, OH   
  
  
  
  
             WBC (Bld) [#/Vol] 10.3 10*3/uL Normal       3.6-10.7     McLaren Oakland  
  
  
  
  
                          Comment on above:         Performed By: #### HALIE SCHNEIDER  
P3M ####Briana Ville 771835 Rio Rancho, OH   
  
  
  
  
                                        No Panel Information  on 2022  
  
  
  
  
             Interpretation and review of Abnormal                                
 Corey Hospital  
  
             laboratory results                                          
   
                          Test Performed by 29 Stokes Street                             
  
                          8689016 Lyons Street Floris, IA 52560  
  
  
  
  
                                        POCT Glucose  Ordered By: Savannah Mendez  
is on 2022  
  
  
  
  
             Glucose [Mass/Vol] 132 mg/dL    High         70 - 100 mg/dL Corey Hospital  
  
  
  
  
                          Comment on above:         Test performed by glucose me  
ter. Results may be 10%-15% lower  
  
                                                    than serum/plasma values. (C  
NIALL ID 03H9822899)  
  
  
  
  
             Interpretation and review of laboratory results Abnormal             
                    Parkview Health  
  
  
  
  
                                        POCT Glucose  on 2022  
  
  
  
  
                          Test Performed by Munson Healthcare Cadillac Hospital,                  
           12 Elliott Street 50990                      
         
  
  
  
  
                                        ACT,Whole Blood  on 2022  
  
  
  
  
             ACT,Whole Blood 164 s        High                Munson Healthcare Cadillac Hospital  
  
  
  
  
                          Comment on above:         Result Comment: ACTBPerforme  
d by HemCatheter Connections Sig EliteCLIA ID:  
  
                                                    19I7652238BgssxSelect Medical Specialty Hospital - Youngstown  
n, OHACT testing is not intended for  
  
                                                    patients taking aprotonin,pa  
tients with hematocrits of <20% or  
  
                                                    >55%, patients usingother ty  
pes of anticoagulation medications, and  
  
                                                    patients withLupus Anticoagu  
lant.  
   
                                                    Performed By: #### ACTB ####  
Briana Ville 771835 Rio Rancho, OH   
  
  
  
  
             ACT,Whole Blood 180 s        High                Munson Healthcare Cadillac Hospital  
  
  
  
  
                          Comment on above:         Result Comment: ACTBPerforme  
d by HemCatheter Connections Sig EliteCLIA ID:  
  
                                                    07U3302060UkhtpSelect Medical Specialty Hospital - Youngstown  
n, OHACT testing is not intended for  
  
                                                    patients taking aprotonin,pa  
tients with hematocrits of <20% or  
  
                                                    >55%, patients usingother ty  
pes of anticoagulation medications, and  
  
                                                    patients withLupus Anticoagu  
lant.  
   
                                                    Performed By: #### ACTB ####  
Briana Ville 771835 Rio Rancho, OH 56180-1075  
  
  
  
  
                                        Basic Metabolic Panel  on 2022  
  
  
  
  
             Calcium [Mass/Vol] 9.4 mg/dL    Normal       8.4-10.4     Munson Healthcare Otsego Memorial Hospital  
  
  
  
  
                          Comment on above:         Performed By: #### BMP3 ####  
Briana Ville 771835 EDexter, OH   
  
  
  
  
             Anion gap [Moles/Vol] 10 mmol/L    Normal       3-13         Munson Healthcare Cadillac Hospital  
  
  
  
  
                          Comment on above:         Performed By: #### BMP3 ####  
Briana Ville 771835 Rio Rancho, OH   
  
  
  
  
             CO2 [Moles/Vol] 19 mmol/L    Low          22-30        Munson Healthcare Cadillac Hospital  
  
  
  
  
                          Comment on above:         Performed By: #### BMP3 ####  
Briana Ville 771835 Rio Rancho, OH   
  
  
  
  
             Glucose [Mass/Vol] 144 mg/dL    High                Munson Healthcare Otsego Memorial Hospital  
  
  
  
  
                          Comment on above:         Performed By: #### BMP3 ####  
Briana Ville 771835 Rio Rancho, OH   
  
  
  
  
             Urea nitrogen [Mass/Vol] 21 mg/dL     High         7-17         Beaumont Hospital  
  
  
  
  
                          Comment on above:         Performed By: #### BMP3 ####  
38 Herrera Street   
  
  
  
  
             GFR/1.73 sq M.predicted among 44.8 mL/min/{1.73_m2} Abnormal     >6  
0          Munson Healthcare Cadillac Hospital  
  
             non-blacks MDRD (S/P/Bld) [Vol                                       
     
  
             rate/Area]                                            
  
  
  
  
                          Comment on above:         Result Comment: KDIGO guidel  
zoë provide the following GFR  
  
                                                    categories:Stage GFR(ml/min/  
1.73 m2) TermsG1 >=90 Normal or highG2  
  
                                                    60-89 Mildly decreased*G3a 4  
5-59 Mildly to moderately hzyenfeaqP1e  
  
                                                    30-44 Moderately to severely  
 decreasedG4 15-29 Severely decreasedG5  
  
                                                    <15 Kidney failure*Relative   
to young adult level.In the absence of  
  
                                                    evidence of kidney damage, n  
either GFRcategory G1 nor G2 fulfill  
  
                                                    the criteria for CKD.The CKD  
-EPI equation is validated in  
  
                                                    individuals 18 yearsof age a  
nd older. Currently the best equation  
  
                                                    forestimating glomerular beto  
tration rate (GFR) from serumcreatinine  
  
                                                    in children is the Bedside S  
chwartz equation.It is less accurate in  
  
                                                    patients with extremes of mu  
sclemass, restriction of dietary  
  
                                                    protein, ingestion of creati  
ne,extra-renal metabolism of  
  
                                                    creatinine, or treatment wit  
hmedications that affect renal tubular  
  
                                                    creatinine secretion.  
   
                                                    Performed By: #### BMP3 ####  
Xplornet5 Santa Maria Biotherapeutics Elizabeth, OH   
  
  
  
  
             Chloride [Moles/Vol] 105 mmol/L   Normal              Mercy Health St. Joseph Warren Hospital System  
  
  
  
  
                          Comment on above:         Performed By: #### BMP3 ####  
Xplornet5 SoundwaveDexter, OH 75282-1837  
  
  
  
  
             Potassium [Moles/Vol] 4.5 mmol/L   Normal       3.5-5.1      Munson Healthcare Cadillac Hospital  
  
  
  
  
                          Comment on above:         Performed By: #### BMP3 ####  
Xplornet5 Santa Maria Biotherapeutics Elizabeth, OH 59805-0651  
  
  
  
  
             Sodium [Moles/Vol] 135 mmol/L   Normal       135-145      The Jewish Hospital System  
  
  
  
  
                          Comment on above:         Performed By: #### BMP3 ####  
Xplornet5 Santa Maria Biotherapeutics Elizabeth, OH   
  
  
  
  
             Creatinine [Mass/Vol] 1.59 mg/dL   High         0.52-1.25    Corey Hospital  
  
                                                                 Work Phone: 3(1 54)115-1818  
  
  
  
  
                          Comment on above:         Performed By: #### BMP3 ####  
Xplornet5 Santa Maria Biotherapeutics Elizabeth, OH   
  
  
  
  
             GFR/1.73 sq M.predicted 52.0 mL/min/{1.73_m2} Abnormal     >60       
     SUMMA  
  
             among blacks MDRD                                        Work Phone  
: 1(499) 960-6724  
  
             (S/P/Bld) [Vol                                          
  
             rate/Area]                                            
  
  
  
  
                          Comment on above:         Performed By: #### BMP3 ####  
Xplornet5 Santa Maria Biotherapeutics Elizabeth, OH   
  
  
  
  
             Anion gap [Moles/Vol] 10 mmol/L                 3 - 13 mmol/L SUMMA  
  
                                                                 Work Phone: 1  
34)931-3026  
   
             Calcium [Mass/Vol] 9.4 mg/dL                 8.4 - 10.4 mg/dL SUMMA  
  
                                                                 Work Phone: 1(  
34)732-4642  
   
             Chloride [Moles/Vol] 105 mmol/L                98 - 107 mmol/L SUMM  
A  
  
                                                                 Work Phone: 1(  
34)075-3716  
   
             CO2 [Moles/Vol] 19 mmol/L    Low          22 - 30 mmol/L SUMMA  
  
                                                                 Work Phone: 1(  
34)399-8775  
   
             EGFR IF NonAfrican 44.8 mL/min  Abnormal     >60          SUMMA  
  
             American                                            Work Phone: 1  
34)223-7760  
  
  
  
  
                          Comment on above:         KDIGO guidelines provide the  
 following GFR categories:  
  
                                                    Stage GFR(ml/min/1.73 m2) Te  
iggy  
  
                                                    G1 >=90 Normal or high  
  
                                                    G2 60-89 Mildly decreased*  
  
                                                    G3a 45-59 Mildly to moderate  
ly decreased  
  
                                                    G3b 30-44 Moderately to koby  
rely decreased  
  
                                                    G4 15-29 Severely decreased  
  
                                                    G5 <15 Kidney failure  
  
                                                      
  
                                                    *Relative to young adult lev  
el.  
  
                                                    In the absence of evidence o  
f kidney damage, neither GFR  
  
                                                    category G1 nor G2 fulfill t  
he criteria for CKD.  
  
                                                      
  
                                                    The CKD-EPI equation is margarita  
dated in individuals 18 years  
  
                                                    of age and older. Currently   
the best equation for  
  
                                                    estimating glomerular filtra  
tion rate (GFR) from serum  
  
                                                    creatinine in children is th  
e Bedside Keller equation.  
  
                                                    It is less accurate in patie  
nts with extremes of muscle  
  
                                                    mass, restriction of dietary  
 protein, ingestion of creatine,  
  
                                                    extra-renal metabolism of cr  
eatinine, or treatment with  
  
                                                    medications that affect main  
l tubular creatinine secretion.  
  
  
  
  
             Glucose [Mass/Vol] 144 mg/dL    High         70 - 100 mg/dL SUMMA  
  
                                                                 Work Phone: 1(  
34)165-3610  
   
             Interpretation and Abnormal                               SUMMA  
  
             review of laboratory                                        Work Ph  
one: 1(998) 333-9335  
  
             results                                               
   
             Potassium [Moles/Vol] 4.5 mmol/L                3.5 - 5.1    SUMMA  
  
                                                    mmol/L       Work Phone: 1  
46)839-8655  
   
             Sodium [Moles/Vol] 135 mmol/L                135 - 145    SUMMA  
  
                                                    mmol/L       Work Phone: 1  
62)223-7017  
   
             Urea nitrogen (BldV) 21 mg/dL     High         7 - 17 mg/dL SUMMA  
  
             [Mass/Vol]                                          Work Phone: 1  
34)312-5222  
   
                          Test Performed by                           Halton SYSTEM -  
  
                          K94 Discoveries                           Mercy Medical Center  
  
                          System, Lawrence Memorial Hospital EFrench Creek, OH 6650816 Lyons Street Floris, IA 52560  
  
                                                                 Work Phone: 3(5 44)572-9411  
  
  
  
  
                                        CARDIAC CATH NURSING LOG  on 2022  
  
  
  
  
                          Ordered by an unspecified provider.                     
        Parkview Health  
  
  
  
  
                                        Catheterization and angiography procedur  
e details panel  on 2022  
  
  
  
  
                          Corey Hospital CARDIOVASCULAR INSTITUTE                          
   ACH  
  
                                                                 CARDIOLOGY  
  
                          ------------------------------------------------------  
-----------------                             
  
                          CARDIAC CATHETERIZATION                             
  
                                                                   
  
                          Patient: Willow Bradshaw                       
        
  
                          Procedure Date: 2022                             
  
                          : 1957                             
  
                          Age: 65                                  
  
                          Gender: M                                
  
                          MRN: 15120587                             
  
                          Patient Type: Outpatient                             
  
                          Account#: 136822655787                             
  
                          Accession#: 29042528947                             
  
                          Procedure physician: Mariann Tan MD                  
             
  
                          Fellow:                                  
  
                          Referring Physician: Mariann Tan MD Camtronics_betoi  
malinda,                             
  
                          Camtronics_modified, ---                             
  
                                                                   
  
                          ------------------------------------------------------  
-----------------                             
  
                          INDICATIONS: Angina refractory to medical management.   
                            
  
                          Atherosclerotic coronary artery disease.                
               
  
                                                                   
  
                          ------------------------------------------------------  
-----------------                             
  
                          Procedures performed:                             
  
                                                                   
  
                          # Percutaneous intervention on the 90% de lexie stenosi  
s in the mid left                             
  
                          main. Interventional IVUS. Stent placement. Balloon an  
gioplasty.                             
  
                                                                   
  
                          ------------------------------------------------------  
-----------------                             
  
                          SUMMARY:                                 
  
                                                                   
  
                          1. Left main: Mid-vessel lesion: The diagnostic study   
demonstrated a                             
  
                          90%de lexie stenosis. Stent placement was performed, re  
sulting in an                             
  
                          excellent angiographic appearance (see 1st lesion). Fo  
llowing                             
  
                          intervention, there is a residual 5% stenosis with ENOCH  
I grade 3 flow                             
  
                          (brisk flow).                             
  
                          2. LAD: Mid-vessel lesion: There is a 95% stenosis.     
                          
  
                          3. Left circumflex: Prior intervention: stent, in the   
ostial left                             
  
                          circumflex. Ostial lesion: There is a 100%in-stent rec  
urrent .                             
  
                          IMPRESSIONS:                             
  
                                                                   
  
                          1. Severe coronary artery disease as described, predom  
inantly involving                             
  
                          the left main, LAD, and circumflex.                     
          
  
                          2. Successful SYDNIE mid LMCA with IVUS guidance           
                    
  
                          Subtotal occlusion mid LAD after takeoff of D1          
                     
  
                          Chronically occluded ostial LCF in-stent                
               
  
                          Left to right collaterals.                             
  
                          RECOMMENDATIONS:                             
  
                                                                   
  
                          1. Patient management should include aggressive risk f  
actor                             
  
                          modification and a cardiac rehabilitation program.      
                         
  
                          2. Add optimal medical therapy of the patient's diseas  
e.                             
  
                          3. Prasugrel (Effient), with a standing dose of 10mgPO  
daily12                             
  
                          month(s)minimum.                             
  
                          4. Aspirin, with a standing dose of 81mgPOdaily.        
                       
  
                                                                   
  
                          ------------------------------------------------------  
-----------------                             
  
                          HISTORY: PMH: No history of cardiac arrest.             
                  
  
                                                                   
  
                          ------------------------------------------------------  
-----------------                             
  
                          LABS, PRIOR TESTS, PROCEDURES, and SURGERY:             
                  
  
                          Hemoglobin (pre-procedure) of 10.9 g/dl. Platelet coun  
t of 378 th/ul.                             
  
                          Serum creatinine (current admission) of 1.41 mg/dl. He  
matocrit of 35.2%                             
  
                          . Serum potassium (K) of 4.6 mEq/l.                     
          
  
                                                                   
  
                          ------------------------------------------------------  
-----------------                             
  
                          PROCEDURE IN DETAIL: Study status: Cardiac cath: elect  
franky. Consent:                             
  
                          The risks, benefits, and alternatives to the procedure  
 and sedation wer                             
  
                          e explained to the patient and informed consent was ob  
tained.                             
  
                          Radiation exposure: Fluoroscopy time: 11 min. Total ai  
r KERMA: 452                             
  
                          mGy. Location: Catheterization laboratory.              
                 
  
                          PROCEDURE:                               
  
                                                                   
  
                          1. Initial setup. The patient was brought to the labor  
atory. A baseline                             
  
                          ECG was recorded. Intravenous access was obtained. Leyda  
face ECG                             
  
                          leads, blood pressure measurements, and pulse oximetri  
c signals were                             
  
                          monitored.                               
  
                          2. Skin preparation. The planned puncture sites were p  
repped and draped                             
  
                          in the usual sterile manner.                            
   
  
                          3. Local anesthesia. 1% lidocaine was administered to   
the right groin.                             
  
                          4. Right femoral artery access. A 6Fr/12cm Engage shea  
th was advanced                             
  
                          into the vessel.                             
  
                          5. A 5Fr x 100cm JR4 catheter was placed.               
                
  
                          6. A stent was placed in the stenosis in the distal le  
ft main. See                             
  
                          detailed description below (1st lesion intervention).   
                            
  
                          7. ACT was 299 sec.                             
  
                          8. Right femoral artery hemostasis. The sheath was sut  
ured in place.                             
  
                          1st lesion:                              
  
                          Percutaneous intervention on the 90% de lexie stenosis   
in the mid left                             
  
                          main.                                    
  
                                                                   
  
                          1. Guider placement: a 6Fr x 100cm Launcher EBU3.75 gu  
iding catheter                             
  
                          was placed.                              
  
                          2. Catheter exchange. The catheter was exchanged for a  
 6Fr x 100cm                             
  
                          Launcher EBU3.5 catheter.                             
  
                          3. Intravascular ultrasound evaluation, using a .014 E  
agle Eye Platinum                             
  
                          catheter.                                
  
                          4. Successful balloon angioplasty. A 3 mm (D) x 15 mm   
(L), NC Trek RX                             
  
                          balloon was employed. The balloon was placed across th  
e lesion and                             
  
                          given a single inflation with a maximum inflation pres  
sure of 20                             
  
                          bella.                                     
  
                          5. Stent placement. A 3.5 mm (D) x 18 mm (L), Xience S  
kypoint RX (drug                             
  
                          eluting) stent was used. The stent was advanced across  
 the lesion                             
  
                          and deployed with a single inflation and a maximum pre  
ssure of 20                             
  
                          bella.                                     
  
                          6. Successful balloon angioplasty. A 4 mm (D) x 12 mm   
(L), NC Trek RX                             
  
                          balloon was employed. The balloon was placed across th  
e lesion and                             
  
                          given a single inflation with a maximum inflation pres  
sure of 23                             
  
                          bella.                                     
  
                          STUDY COMPLETION: All catheters inserted during the pr  
ocedure were                             
  
                          removed. The patient tolerated the procedure well and   
was discharged fro                             
  
                          m the lab. There were no complications (more content n  
ot included)...                             
   
                                                    Mariann Tan MD - 2022 Formatting of this note might be different   
from the original.                                          Parkview Health CARDIOVASCULAR INSTITUTE                          
   Work Phone: 1(203) 677-2546  
  
                                                                   
  
                          ------------------------------------------------------  
-----------------                             
  
                          CARDIAC CATHETERIZATION                             
  
                                                                   
  
                          Patient: Willow Bradshaw                       
        
  
                          Procedure Date: 2022                             
  
                          : 1957                             
  
                          Age: 65                                  
  
                          Gender: M                                
  
                          MRN: 20625028                             
  
                          Patient Type: Outpatient                             
  
                          Account#: 967551451414                             
  
                          Accession#: 84616600312                             
  
                          Procedure physician: Mariann Tan MD                  
             
  
                          Fellow:                                  
  
                          Referring Physician: Mariann Tan MD Camtronics_Lamont gaines_modified, ---                             
  
                                                                   
  
                          ------------------------------------------------------  
-----------------                             
  
                          INDICATIONS: Angina refractory to medical management.   
                            
  
                          Atherosclerotic coronary artery disease.                
               
  
                                                                   
  
                          ------------------------------------------------------  
-----------------                             
  
                          Procedures performed:                             
  
                                                                   
  
                          # Percutaneous intervention on the 90% de lexie stenosi  
s in the mid left                             
  
                          main. Interventional IVUS. Stent placement. Balloon an  
gioplasty.                             
  
                                                                   
  
                          ------------------------------------------------------  
-----------------                             
  
                          SUMMARY:                                 
  
                                                                   
  
                          1. Left main: Mid-vessel lesion: The diagnostic study   
demonstrated a                             
  
                          90%de lexie stenosis. Stent placement was performed, re  
sulting in an                             
  
                          excellent angiographic appearance (see 1st lesion). Fo  
llowing                             
  
                          intervention, there is a residual 5% stenosis with ENOCH  
I grade 3 flow                             
  
                          (brisk flow).                             
  
                          2. LAD: Mid-vessel lesion: There is a 95% stenosis.     
                          
  
                          3. Left circumflex: Prior intervention: stent, in the   
ostial left                             
  
                          circumflex. Ostial lesion: There is a 100%in-stent rec  
urrent .                             
  
                          IMPRESSIONS:                             
  
                                                                   
  
                          1. Severe coronary artery disease as described, predom  
inantly involving                             
  
                          the left main, LAD, and circumflex.                     
          
  
                          2. Successful SYDNIE mid LMCA with IVUS guidance           
                    
  
                          Subtotal occlusion mid LAD after takeoff of D1          
                     
  
                          Chronically occluded ostial LCF in-stent                
               
  
                          Left to right collaterals.                             
  
                          RECOMMENDATIONS:                             
  
                                                                   
  
                          1. Patient management should include aggressive risk f  
actor                             
  
                          modification and a cardiac rehabilitation program.      
                         
  
                          2. Add optimal medical therapy of the patient's diseas  
e.                             
  
                          3. Prasugrel (Effient), with a standing dose of 10mgPO  
daily12                             
  
                          month(s)minimum.                             
  
                          4. Aspirin, with a standing dose of 81mgPOdaily.        
                       
  
                                                                   
  
                          ------------------------------------------------------  
-----------------                             
  
                          HISTORY: PMH: No history of cardiac arrest.             
                  
  
                                                                   
  
                          ------------------------------------------------------  
-----------------                             
  
                          LABS, PRIOR TESTS, PROCEDURES, and SURGERY:             
                  
  
                          Hemoglobin (pre-procedure) of 10.9 g/dl. Platelet coun  
t of 378 th/ul.                             
  
                          Serum creatinine (current admission) of 1.41 mg/dl. He  
matocrit of 35.2%                             
  
                          . Serum potassium (K) of 4.6 mEq/l.                     
          
  
                                                                   
  
                          ------------------------------------------------------  
-----------------                             
  
                          PROCEDURE IN DETAIL: Study status: Cardiac cath: elect  
franky. Consent:                             
  
                          The risks, benefits, and alternatives to the procedure  
 and sedation wer                             
  
                          e explained to the patient and informed consent was ob  
tained.                             
  
                          Radiation exposure: Fluoroscopy time: 11 min. Total ai  
r KERMA: 452                             
  
                          mGy. Location: Catheterization laboratory.              
                 
  
                          PROCEDURE:                               
  
                                                                   
  
                          1. Initial setup. The patient was brought to the labor  
atory. A baseline                             
  
                          ECG was recorded. Intravenous access was obtained. Leyda  
face ECG                             
  
                          leads, blood pressure measurements, and pulse oximetri  
c signals were                             
  
                          monitored.                               
  
                          2. Skin preparation. The planned puncture sites were p  
repped and draped                             
  
                          in the usual sterile manner.                            
   
  
                          3. Local anesthesia. 1% lidocaine was administered to   
the right groin.                             
  
                          4. Right femoral artery access. A 6Fr/12cm Engage shea  
th was advanced                             
  
                          into the vessel.                             
  
                          5. A 5Fr x 100cm JR4 catheter was placed.               
                
  
                          6. A stent was placed in the stenosis in the distal le  
ft main. See                             
  
                          detailed description below (1st lesion intervention).   
                            
  
                          7. ACT was 299 sec.                             
  
                          8. Right femoral artery hemostasis. The sheath was sut  
ured in place.                             
  
                          1st lesion:                              
  
                          Percutaneous intervention on the 90% de lexie stenosis   
in the mid left                             
  
                          main.                                    
  
                                                                   
  
                          1. Guider placement: a 6Fr x 100cm Launcher EBU3.75 gu  
iding catheter                             
  
                          was placed.                              
  
                          2. Catheter exchange. The catheter was exchanged for a  
 6Fr x 100cm                             
  
                          Launcher EBU3.5 catheter.                             
  
                          3. Intravascular ultrasound evaluation, using a .014 E  
agle Eye Platinum                             
  
                          catheter.                                
  
                          4. Successful balloon angioplasty. A 3 mm (D) x 15 mm   
(L), NC Trek RX                             
  
                          balloon was employed. The balloon was placed across th  
e lesion and                             
  
                          given a single inflation with a maximum inflation pres  
sure of 20                             
  
                          bella.                                     
  
                          5. Stent placement. A 3.5 mm (D) x 18 mm (L), Xience S  
kypoint RX (drug                             
  
                          eluting) stent was used. The stent was advanced across  
 the lesion                             
  
                          and deployed with a single inflation and a maximum pre  
ssure of 20                             
  
                          bella.                                     
  
                          6. Successful balloon angioplasty. A 4 mm (D) x 12 mm   
(L), NC Trek RX                             
  
                          balloon was employed. The balloon was placed across th  
e lesion and                             
  
                          given a single inflation with a maximum inflation pres  
sure of 23                             
  
                          bella.                                     
  
                          STUDY COMPLETION: All catheters inserted during the pr  
ocedure were                             
  
                          removed. The patient tolerated the procedure well and   
was discharged fro                             
  
                           the lab. There were no complications. Administered m  
edications: KI MIRANDA ( (more content not included)...                  
             
   
                                                                 SUMMA  
  
                                                                 Work Phone: 1(0 46)907-7343  
  
  
  
  
                                        Glucose,Bedside  on 2022  
  
  
  
  
             Glucose [Mass/Vol] 204 mg/dL    High                Summa Hea  
lth System  
  
  
  
  
                          Comment on above:         Result Comment: Test perform  
ed by glucose meter. Results may   
be  
  
                                                    10%-15% lowerthan serum/plas  
ma values. (CLIA ID 79B8093555)  
   
                                                    Performed By: #### BGLU ####  
K94 Discoveries Siqkxz819 Santa Maria Biotherapeutics Elizabeth, OH 10536-0577  
  
  
  
  
             Glucose [Mass/Vol] 156 mg/dL    High                Summa Hea  
lth System  
  
  
  
  
                          Comment on above:         Result Comment: Test perform  
ed by glucose meter. Results may   
be  
  
                                                    10%-15% lowerthan serum/plas  
ma values. (CLIA ID 33J8526543)  
   
                                                    Performed By: #### BGLU ####  
K94 Discoveries Vryzgi331 ECatheter Connections Elizabeth, OH 40975-5946  
  
  
  
  
             Glucose [Mass/Vol] 126 mg/dL    High                Summa Hea  
lth System  
  
  
  
  
                          Comment on above:         Result Comment: Test perform  
ed by glucose meter. Results may   
be  
  
                                                    10%-15% lowerthan serum/plas  
ma values. (CLIA ID 36S7590310)  
   
                                                    Performed By: #### BGLU ####  
K94 Discoveries Cxclmo425 ECatheter Connections Elizabeth, OH 98908-4706  
  
  
  
  
             Glucose [Mass/Vol] 151 mg/dL    High                Summa Hea  
lth System  
  
  
  
  
                          Comment on above:         Result Comment: Test perform  
ed by glucose meter. Results may   
be  
  
                                                    10%-15% lowerthan serum/plas  
ma values. (CLIA ID 72D6739503)  
   
                                                    Performed By: #### BGLU ####  
Munson Healthcare Cadillac Hospital525 E. MARKET  
  
                                                    Lunenburg, OH 57457-8933  
  
  
  
  
                                        POCT Glucose  Ordered By: Valerio gr 2022  
  
  
  
  
             Glucose [Mass/Vol] 204 mg/dL    High         70 - 100 mg/dL Corey Hospital  
  
                                                                 Work Phone: 1  
17)504-7030  
  
  
  
  
                          Comment on above:         Test performed by glucose me  
ter. Results may be 10%-15% lower  
  
                                                    than serum/plasma values. (C  
NIALL ID 18L1883495)  
  
  
  
  
             Interpretation and review of laboratory Abnormal                     
            Corey Hospital  
  
             results                                             Work Phone: 1  
69)367-1080  
   
                                                                 Corey Hospital  
  
                                                                 Work Phone: 1)612-7656  
  
  
  
  
                                        POCT Glucose  on 2022  
  
  
  
  
                          Test Performed by Westfields Hospital and Clinic, Lawrence Memorial Hospital E.                           Herrick Campus LAB  
  
                          Pedro Bay, OH                             
  
                          17404                                    
   
             Glucose [Mass/Vol] 156 mg/dL    High         70 - 100 mg/dL Corey Hospital  
  
                                                                 Work Phone: 1(8 07)132-7197  
  
  
  
  
                          Comment on above:         Test performed by glucose me  
ter. Results may be 10%-15% lower  
  
                                                    than serum/plasma values. (C  
NIALL ID 97X1437256)  
  
  
  
  
             Interpretation and Abnormal                               Corey Hospital  
  
             review of laboratory                                        Work Ph  
one: 2(640)942-6774  
  
             results                                               
   
                          Test Performed by                           Garden City Hospital -  
  
                          University Hospitals Lake West Medical Center, Lawrence Memorial Hospital E.                             
  
                          Pedro Bay, OH 96816                                 
   
                                                                 Corey Hospital  
  
                                                                 Work Phone: 1(9 57)820-7821  
   
                          Test Performed by                           Garden City Hospital -  
  
                          University Hospitals Geneva Medical Center  
  
                          System, 525 E.                             
  
                          Seton Medical Center,                             
  
                          OH 51930                                 
   
             Glucose [Mass/Vol] 151 mg/dL    High         70 - 100     SUMMA  
  
                                                    mg/dL          
  
  
  
  
                          Comment on above:         Test performed by glucose me  
ter. Results may be 10%-15% lower  
  
                                                    than serum/plasma values. (C  
NIALL ID 97X8229095)  
  
  
  
  
             Interpretation and review of Abnormal                                
 Corey Hospital  
  
             laboratory results                                          
   
                          Test Performed by Westfields Hospital and Clinic, 525 E.                           Herrick Campus LAB  
  
                          Pedro Bay, OH                             
  
                          41528                                    
   
                                                                 Corey Hospital  
  
  
  
  
                                        POCT Glucose  Ordered By: Woody lucio  
n 2022  
  
  
  
  
             Glucose [Mass/Vol] 126 mg/dL    High         70 - 100 mg/dL Corey Hospital  
  
  
  
  
                          Comment on above:         Test performed by glucose me  
ter. Results may be 10%-15% lower  
  
                                                    than serum/plasma values. (C  
NIALL ID 14U8984410)  
  
  
  
  
             Interpretation and review of laboratory results Abnormal             
                    Parkview Health  
  
  
  
  
                                        POCT activated clotting time  on   
022  
  
  
  
  
             Activated Clotting Time 164 s        High         90 - 134 s   SUMM  
A  
  
  
  
  
                          Comment on above:         ACTB  
  
                                                    Performed by goDog Fetch Sig E  
liteCLIA ID: 82W0797805  
  
                                                    Georgetown, OH  
  
                                                    ACT testing is not intended   
for patients taking aprotonin,  
  
                                                    patients with hematocrits of  
 <20% or >55%, patients using  
  
                                                    other types of anticoagulati  
on medications, and patients with  
  
                                                    Lupus Anticoagulant.  
  
  
  
  
             Interpretation and review of Abnormal                                
 Corey Hospital  
  
             laboratory results                                          
   
                          Test Performed by                           Midwest Orthopedic Specialty Hospital,                           - Century City Hospital  
  
                          525 EFrench Creek, OH 03751                             
   
                                                                 Corey Hospital  
   
             Activated Clotting Time 180 s        High         90 - 134 s   SUMM  
A  
  
  
  
  
                          Comment on above:         ACTB  
  
                                                    Performed by Kaonetics Technologies E  
liteCLIA ID: 82Z1871160  
  
                                                    Georgetown, OH  
  
                                                    ACT testing is not intended   
for patients taking aprotonin,  
  
                                                    patients with hematocrits of  
 <20% or >55%, patients using  
  
                                                    other types of anticoagulati  
on medications, and patients with  
  
                                                    Lupus Anticoagulant.  
  
  
  
  
             Interpretation and review of Abnormal                                
 Corey Hospital  
  
             laboratory results                                          
   
                          Test Performed by Westfields Hospital and Clinic, 525 E.                           Liberty, OH                             
  
                          94500                                    
   
                                                                 Corey Hospital  
  
  
  
  
                                        Glucose,Bedside  on 2022  
  
  
  
  
             Glucose [Mass/Vol] 132 mg/dL    High                The Jewish Hospital System  
  
  
  
  
                          Comment on above:         Result Comment: Test perform  
ed by glucose meter. Results may   
be  
  
                                                    10%-15% lowerthan serum/plas  
ma values. (CLIA ID 35I2276480)  
   
                                                    Performed By: #### BGLU ####  
Munson Healthcare Cadillac Hospital525 EDexter, OH 87805-1517  
  
  
  
  
                                        Glucose,Bedside  on 2022  
  
  
  
  
             Glucose [Mass/Vol] 134 mg/dL    High                The Jewish Hospital System  
  
  
  
  
                          Comment on above:         Result Comment: Test perform  
ed by glucose meter. Results may   
be  
  
                                                    10%-15% lowerthan serum/plas  
ma values. (CLIA ID 17F1035375)  
   
                                                    Performed By: #### BGLU ####  
Kettering Health Greene Memorial M2 Connections Kmzgvj289 EDexter, OH 64715-7905  
  
  
  
  
             Glucose [Mass/Vol] 118 mg/dL    High                The Jewish Hospital System  
  
  
  
  
                          Comment on above:         Result Comment: Test perform  
ed by glucose meter. Results may   
be  
  
                                                    10%-15% lowerthan serum/plas  
ma values. (CLIA ID 70B4015822)  
   
                                                    Performed By: #### BGLU ####  
Munson Healthcare Cadillac Hospital525 EDexter, OH 49507-8718  
  
  
  
  
                                        OPERATIVE REPORT  on 2022  
  
  
  
  
                          Ordered by an unspecified provider.                     
        Parkview Health  
  
  
  
  
                                        Op Note  on 2022  
  
  
  
  
             Op Note                   Normal                    Select Specialty Hospital  
  
  
  
  
                                        POCT Glucose  on 2022  
  
  
  
  
             Glucose [Mass/Vol] 134 mg/dL    High         70 - 100 mg/dL Corey Hospital  
  
  
  
  
                          Comment on above:         Test performed by glucose me  
ter. Results may be 10%-15% lower  
  
                                                    than serum/plasma values. (C  
NIALL ID 04J3335829)  
  
  
  
  
             Interpretation and review of Abnormal                                
 Corey Hospital  
  
             laboratory results                                          
   
                          Test Performed by Westfields Hospital and Clinic, 23 Chen Street Stirling City, CA 95978 LAB  
  
                          Pedro Bay, OH                             
  
                          44100                                    
   
                                                                 Corey Hospital  
   
                          Test Performed by Westfields Hospital and Clinic, Lawrence Memorial Hospital EUpper Valley Medical Center LAB  
  
                          Pedro Bay, OH                             
  
                          87577                                    
  
  
  
  
                                        POCT Glucose  Ordered By: Yariel Kirby on   
2022  
  
  
  
  
             Glucose [Mass/Vol] 118 mg/dL    High         70 - 100 mg/dL Corey Hospital  
  
                                                                 Work Phone: 1(0 03)998-2385  
  
  
  
  
                          Comment on above:         Test performed by glucose me  
ter. Results may be 10%-15% lower  
  
                                                    than serum/plasma values. (C  
NIALL ID 22F6300243)  
  
  
  
  
             Interpretation and review of laboratory Abnormal                     
            Corey Hospital  
  
             results                                             Work Phone: 1(4 08)227-6074  
   
                                                                 Corey Hospital  
  
                                                                 Work Phone: 1(5 65)205-3584  
  
  
  
  
                                        VL PVR Arterial Doppler Lwr w/o Exercise  
  on 2022  
  
  
  
  
             VL PVR Arterial Doppler Lwr w/o Exercise              Normal         
             Munson Healthcare Cadillac Hospital  
  
  
  
  
                                        Heparin Anti-Xa, LMW  on 2022  
  
  
  
  
             Heparin Anti-Xa Lmwh 1.06 U/mL    Normal       0.50-1.10    UpverterLima City Hospital System  
  
  
  
  
                          Comment on above:         Result Comment: INTERPRETIVE  
 INFORMATION: HEPARIN ANTI-Xa  
  
                                                    LMWHTherapeutic range based   
on enoxaparin brand low molecular  
  
                                                    weightheparin.Performed By:   
Captain Wise39 Woods Street Mesilla Park, NM 88047 91739Yjilfuifi  
y Director: Tonie Tran MD  
   
                                                    Performed By: #### HEPLO ###  
#The performing lab is in the report.  
  
  
  
  
                                        CNOV  on 03-  
  
  
  
  
             CNOV         Office Visit (AGGPC) Normal                    Ararat  
  
                                                      
--------------------------------------------------------------------------------
                                                              
  
                          WILLOW BRADSHAW (78834557385) 1957 M             
                Medical  
  
                          Date Time Provider Department                           
  Center  
  
                          3/10/22 10:00 AM HECTOR TIJERINA AGGPC                   
            
  
                          During your visit today, we recorded the following inf  
ormation about you:                             
  
                          Temperature Pulse Blood pressure Weight                 
              
  
                          98.3 degrees 81/minute 100/64 91.6 kg                   
            
  
                          Height                                   
  
                          1.702 m                                  
  
                          Elen Soriano MA 3/10/2022 10:04 AM Signed           
                    
  
                          Willow Bradshaw is a 65 year old male who presents fo  
r Establish Care and                             
  
                                        Hospital Follow Up. The patient states t  
hat he would like to get establish and   
                                                      
  
                          has no concerns.                             
  
                          MARKO Douglas APRN.CNP 3/11/2022 3:18 PM Addendum      
                         
  
                          Bethesda North Hospital Primary Care 41 Griffin Street 66174                             
  
                          Phone: 831.337.9591                             
  
                          Fax: 400.557.7789                             
  
                          Date of Evaluation: 3/10/2022                           
    
  
                          Patient Name: Willow Bradshaw                          
     
  
                          : 1957                             
  
                          MRN: 02997111813                             
  
                          Chief Complaint:                             
  
                          Patient presents with:                             
  
                          Establish Care                             
  
                          Hospital Follow Up                             
  
                          Nursing Intake:                             
  
                          Nursing Notes:                             
  
                          Elen Soriano MA 3/10/2022 10:04 AM Signed           
                    
  
                          Willow Bradshaw is a 65 year old male who presents fo  
r Establish Care and                             
  
                                        Hospital Follow Up. The patient states t  
hat he would like to get establish and   
                                                      
  
                          has no concerns.                             
  
                          Elen Soriano MA                             
  
                          Subjective                               
  
                          Mr. Bradshaw is a 65 year old male who presents with t  
he following                             
  
                          complaint(s): hospital follow-up                        
       
  
                          HPI                                      
  
                          Patient is being seen today for hospital follow-up. He  
 was admitted to Cleveland Clinic Children's Hospital for Rehabilitation                             
  
                          on 2022 and discharged on 3/7/2022. He was admitt  
ed for thrombosis of                             
  
                          left lower extremity bypass. The performed a thrombect  
floresita which was                             
  
                          successful. He was started on Effient and Plavix was d  
iscontinued. He was                             
  
                          sent home with St. Anthony's Hospital for wound care and to  
 manage wound vac.                             
  
                          Patient had a revascularization surgery on 21 an  
d has been having                             
  
                                complications since then. He underwent a left fe  
m- pop bypass on 2022. He                   
                                          
  
                          then underwent left lower extremity fasciotomies on  and left lower                             
  
                          extremity fasciotomy debridement and wound VAC on 2022 due to necrotic                             
  
                                        tissue. CTA with bilateral lower extremi  
ty runoff on 2022 showed occluded   
                                                      
  
                          left SFA and popliteal artery, occluded left femoropop  
liteal bypass graft.                             
  
                          He states the pain is  tolerable  with the oxycodone p  
rescribed at the                             
  
                                hospital. He has been on warfarin, Xarelto, and   
Plavix and formed a clot with                   
                                          
  
                          all of those.                             
  
                          Review of Systems                             
  
                          Constitutional: Negative for appetite change, chills,   
fatigue and fever.                             
  
                          HENT: Negative for congestion, ear pain, hearing loss,  
 postnasal drip, sinus                             
  
                          pressure, sinus pain and sore throat.                   
            
  
                          Eyes: Negative for visual disturbance.                  
             
  
                          Respiratory: Negative for cough, chest tightness, shor  
tness of breath and                             
  
                          wheezing.                                
  
                          Cardiovascular: Negative for chest pain, palpitations   
and leg swelling.                             
  
                                Gastrointestinal: Negative for abdominal pain, c  
onstipation, diarrhea, nausea                   
                                          
  
                          and vomiting.                             
  
                          Genitourinary: Negative for dysuria, frequency, hematu  
micki and urgency.                             
  
                                        Musculoskeletal: Negative for arthralgia  
s, back pain, joint swelling, myalgias   
                                                      
  
                          and neck pain.                             
  
                          Pain at surgical site                             
  
                          Skin: Negative for rash.                             
  
                                                    Neurological: Negative for d  
izziness, weakness, light-headedness and headaches.  
                                                              
  
                          Hematological: Negative for adenopathy.                 
              
  
                                        Psychiatric/Behavioral: Negative for sle  
ep disturbance and suicidal ideas. The   
                                                      
  
                          patient is not nervous/anxious.                         
      
  
                          PAST MEDICAL HISTORY                             
  
                          Diagnosis Date                             
  
                          - CAD (coronary artery disease)                         
      
  
                          - COPD (chronic obstructive pulmonary disease) (HCC)    
                           
  
                          - Hyperlipidemia                             
  
                          - Lung cancer (HCC)                             
  
                          adenocarcinoma CASSANDRA                             
  
                          - Peripheral vascular disease (HCC)                     
          
  
                          - Personal history of DVT (deep vein thrombosis)        
                       
  
                          PAST SURGICAL HISTORY                             
  
                          Procedure Laterality Date                             
  
                          - ANGIOPLASTY FEMORAL/POP Left                          
     
  
                          2019                                 
  
                          - CORONARY ARTERY BYPASS GRAFT 2017               
                
  
                          Dr. Fulton                              
  
                          FAMILY HISTORY                             
  
                          Problem Relation Age of Onset                           
    
  
                          - Cancer Mother                             
  
                          Social History                             
  
                          Tobacco Use                              
  
                          - Smoking status: Current Every Day Smoker              
                 
  
                          Years: 50.00                             
  
                          Types: Cigars                             
  
                          - Smokeless tobacco: Never Used                         
      
  
                          - Tobacco comment: quit cigarettes 20 years ago, smoke  
s cigars daily now 2-3                             
  
                          Substance Use Topics                             
  
                          - Alcohol use: No                             
  
                          - Drug use: Not on file                             
  
                          Current Outpatient Medications                          
     
  
                          Medication Sig Dispense Refill                          
     
  
                                - enoxaparin (LOVENOX) 100 mg/mL syrg INJECT 1 M  
L INTO THE SKIN 2 TIMES DAILY                   
                                          
  
                                                    - polyethylene glycol 3350 (  
MIRALAX, GLYCOLAX) 17 gram/dose powder Take 17 g by  
                                                              
  
                          mouth.                                   
  
                          - oxyCODONE IR (ROXICODONE) 5 mg immediate release tab  
let Take 5 mg by mouth                             
  
                          every 6 hours as needed.                             
  
                          - ONETOUCH VERIO TEST STRIPS test strip                 
              
  
                          - ONETOUCH VERIO REFLECT METER                          
     
  
                          - buPROPion HCL, smoking deter, 150 mg tab ER 12 hr Ta  
ke 150 mg by mouth.                             
  
                          - ONETOUCH DELICA PLUS LANCET 33 gauge                  
             
  
                                        - nitroglycerin sublingual (NITROQUICK)   
0.4 mg SL tablet Dissolve 0.4 mg under   
                                                      
  
                          the tongue.                              
  
                          - pantoprazole DR (PROTONIX) 40 mg tablet               
                
  
                          - JARDIANCE 10 mg tablet                             
  
                          - VASCEPA capsule TAKE 2 CAPSULES BY MOUTH 2 TIMES AUDRA  
LY                             
  
                          - alirocumab (PRALUENT PEN) 75 mg/mL (more content not  
 included)...                             
  
  
  
  
                                        Basic Metabolic Panel  on 2022  
  
  
  
  
             Anion gap [Moles/Vol] 4 mmol/L     Normal       3-13         Kettering Health Greene Memorial   
M2 Connections Insight Surgical Hospital  
  
  
  
  
                          Comment on above:         Performed By: #### HEMDF, BM  
P3M ####Corey HospitalIndependent Space525 LonoCloud Lunenburg, OH 75138 -1173  
  
  
  
  
             Calcium [Mass/Vol] 9.6 mg/dL    Normal       8.4-10.4     The Jewish Hospital System  
  
  
  
  
                          Comment on above:         Performed By: #### HEMDF, BM  
P3M ####Kettering Health Greene Memorial M2 Connections Lzcluk699 LonoCloud Lunenburg, OH 11534 -4430  
  
  
  
  
             CO2 [Moles/Vol] 24 mmol/L    Normal       22-30        Munson Healthcare Cadillac Hospital  
  
  
  
  
                          Comment on above:         Performed By: #### HEMDF, BM  
P3M ####Kettering Health Greene Memorial M2 Connections Goxjtc319 Rio Rancho, OH   
  
  
  
  
             Creatinine [Mass/Vol] 1.01 mg/dL   Normal       0.52-1.25    Munson Healthcare Cadillac Hospital  
  
  
  
  
                          Comment on above:         Performed By: #### HEMDF, BM  
P3M ####Kettering Health Greene Memorial M2 Connections Ajyxav577 EDexter, OH   
  
  
  
  
             GFR/1.73 sq M.predicted among 90.0 mL/min/{1.73_m2} Normal       >6  
0          Munson Healthcare Cadillac Hospital  
  
             blacks MDRD (S/P/Bld) [Vol                                          
  
             rate/Area]                                            
  
  
  
  
                          Comment on above:         Performed By: #### HEMDF, BM  
P3M ####Kettering Health Greene Memorial M2 Connections Pogoke386 Rio Rancho, OH   
  
  
  
  
             GFR/1.73 sq M.predicted among 77.6 mL/min/{1.73_m2} Normal       >6  
0          Munson Healthcare Cadillac Hospital  
  
             non-blacks MDRD (S/P/Bld) [Vol                                       
     
  
             rate/Area]                                            
  
  
  
  
                          Comment on above:         Result Comment: KDIGO guidel  
zoë provide the following GFR  
  
                                                    categories:Stage GFR(ml/min/  
1.73 m2) TermsG1 >=90 Normal or highG2  
  
                                                    60-89 Mildly decreased*G3a 4  
5-59 Mildly to moderately olmovwgldA1c  
  
                                                    30-44 Moderately to severely  
 decreasedG4 15-29 Severely decreasedG5  
  
                                                    <15 Kidney failure*Relative   
to young adult level.In the absence of  
  
                                                    evidence of kidney damage, n  
either GFRcategory G1 nor G2 fulfill  
  
                                                    the criteria for CKD.The CKD  
-EPI equation is validated in  
  
                                                    individuals 18 yearsof age a  
nd older. Currently the best equation  
  
                                                    forestimating glomerular beto  
tration rate (GFR) from serumcreatinine  
  
                                                    in children is the Bedside S  
kristiwartz equation.It is less accurate in  
  
                                                    patients with extremes of mu  
sclemass, restriction of dietary  
  
                                                    protein, ingestion of creati  
ne,extra-renal metabolism of  
  
                                                    creatinine, or treatment wit  
hmedications that affect renal tubular  
  
                                                    creatinine secretion.  
   
                                                    Performed By: #### HEMDF, BM  
P3M ####Kettering Health Greene Memorial M2 Connections Nysgxd945 Rio Rancho, OH   
  
  
  
  
             Glucose [Mass/Vol] 117 mg/dL    High                The Jewish Hospital System  
  
  
  
  
                          Comment on above:         Performed By: #### HEMDF, BM  
P3M ####Kettering Health Greene Memorial M2 Connections Inbbns391 Rio Rancho, OH   
  
  
  
  
             Urea nitrogen [Mass/Vol] 17 mg/dL     Normal       7-17         Beaumont Hospital  
  
  
  
  
                          Comment on above:         Performed By: #### HEMDF, BM  
P3M ####Corey HospitalLaru Technologies Rpmvom460 EDexter, OH   
  
  
  
  
             Chloride [Moles/Vol] 103 mmol/L   Normal              Mercy Health St. Joseph Warren Hospital System  
  
  
  
  
                          Comment on above:         Performed By: #### HEMDF, BM  
P3M ####Kettering Health Greene Memorial M2 Connections Ffwuvv974 Rio Rancho, OH   
  
  
  
  
             Potassium [Moles/Vol] 4.6 mmol/L   Normal       3.5-5.1      Munson Healthcare Cadillac Hospital  
  
  
  
  
                          Comment on above:         Performed By: #### HEMDF, BM  
P3M ####Kettering Health Greene Memorial M2 Connections Neeped729 EDexter, OH   
  
  
  
  
             Sodium [Moles/Vol] 132 mmol/L   Low          135-145      The Jewish Hospital System  
  
  
  
  
                          Comment on above:         Performed By: #### HEMDF, BM  
P3M ####Kettering Health Greene Memorial M2 Connections Uiyxxe812 Rio Rancho, OH   
  
  
  
  
                                        Basic Metabolic Panel w/ Reflex to MG  o  
n 2022  
  
  
  
  
             Anion gap [Moles/Vol] 4 mmol/L                  3 - 13 mmol/L SUMMA  
   
             Calcium [Mass/Vol] 9.6 mg/dL                 8.4 - 10.4 mg/dL SUMMA  
   
             Chloride [Moles/Vol] 103 mmol/L                98 - 107 mmol/L SUMM  
A  
   
             CO2 [Moles/Vol] 24 mmol/L                 22 - 30 mmol/L SUMMA  
   
             Creatinine [Mass/Vol] 1.01 mg/dL                0.52 - 1.25 mg/dL S  
UMMA  
   
             EGFR IF NonAfrican 77.6 mL/min               >60          SUMMA  
  
             American                                              
   
             GFR/1.73 sq M.predicted 90.0                      >60          SUMM  
A  
  
             among blacks MDRD mL/min/{1.73_m2}                             
  
             (S/P/Bld) [Vol rate/Area]                                          
   
             Glucose [Mass/Vol] 117 mg/dL    High         70 - 100 mg/dL SUMMA  
   
             Interpretation and review Abnormal                               KRAMER  
MMA  
  
             of laboratory results                                          
   
             Potassium [Moles/Vol] 4.6 mmol/L                3.5 - 5.1 mmol/L KRAMER  
MMA  
   
             Sodium [Moles/Vol] 132 mmol/L   Low          135 - 145 mmol/L SUMMA  
   
             Urea nitrogen (BldV) 17 mg/dL                  7 - 17 mg/dL SUMMA  
  
             [Mass/Vol]                                            
   
                                                                 Kettering Health Miamisburg LAB  
   
                                                                 SUMMA  
  
  
  
  
                                        CBC auto differential  on 2022  
  
  
  
  
             Absolute Baso # 0.1 10*3/uL               0.0 - 0.2 10*3/uL SUMMA  
   
             Absolute Neut # 9.1 10*3/uL  High         1.8 - 7.0 10*3/uL SUMMA  
   
             Basophils/100 WBC (Bld) 0.8 %                     0.0 - 2.0 %  SUMM  
A  
   
             Eosinophils (Bld) [#/Vol] 0.2 10*3/uL               0.0 - 0.5 10*3/  
uL SUMMA  
   
             Eosinophils/100 WBC (Bld) 1.9 %                     1.0 - 6.0 %  KRAMER  
MMA  
   
             Granulocytes/100 WBC (Bld) 73.0 %                    40.0 - 80.0 %   
SUMMA  
   
             Hematocrit (Bld) [Volume 30.5 %       Low          40.0 - 52.0 % KRAMER  
MMA  
  
             fraction]                                             
   
             Hemoglobin.gastrointestinal 9.7 g/dL     Low          13.0 - 18.0 g  
/dL SUMMA  
  
             spec 1 Ql (Stl)                                          
   
             Interpretation and review of Abnormal                                
 SUMMA  
  
             laboratory results                                          
   
             Lymphocytes (Bld) [#/Vol] 2.3 10*3/uL               1.0 - 4.3 10*3/  
uL SUMMA  
   
             Lymphocytes/100 WBC (Bld) 18.3 %       Low          20.0 - 40.0 % S  
UMMA  
   
             MCH (RBC) [Entitic mass] 23.7 pg      Low          26.0 - 34.0 pg S  
UMMA  
   
             MCHC (RBC) [Mass/Vol] 31.7 %       Low          32.0 - 36.0 % SUMMA  
   
             MCV (RBC) [Entitic vol] 74.9 fL      Low          80.0 - 98.0 fL KRAMER  
MMA  
   
             Monocytes (Bld) [#/Vol] 0.8 10*3/uL               0.0 - 0.8 10*3/uL  
 SUMMA  
   
             Monocytes/100 WBC (Bld) 6.0 %                     2.0 - 10.0 % SUMM  
A  
   
             Platelet distribution width 17.7 %       High         11.5 - 14.5 %  
 SUMMA  
  
             (Bld) [Ratio]                                          
   
             Platelet mean volume (Bld) 6.8 fL       Low          7.4 - 10.4 fL   
SUMMA  
  
             [Entitic vol]                                          
   
             Platelets (Bld) [#/Vol] 472 10*3/uL  High         140 - 440 10*3/uL  
 SUMMA  
   
             RBC (Bld) [#/Vol] 4.08 10*6/uL Low          4.40 - 5.90 10*6/uL SUM  
MA  
   
             WBC (Bld) [#/Vol] 12.5 10*3/uL High         3.6 - 10.7 10*3/uL SUMM  
A  
   
                                                                 Apliiq Manhattan Psychiatric Center  
  
                                                                 - Herrick Campus   
LAB  
   
                                                                 Corey Hospital  
  
  
  
  
                                        Glucose,Bedside  on 2022  
  
  
  
  
             Glucose [Mass/Vol] 166 mg/dL    High                Corey Hospitala a  
Lima City Hospital System  
  
  
  
  
                          Comment on above:         Result Comment: Test perform  
ed by glucose meter. Results may   
be  
  
                                                    10%-15% lowerthan serum/plas  
ma values. (CLIA ID 13I9307785)  
   
                                                    Performed By: #### BGLU ####  
Cerus Corporation525 Soundwave. Elizabeth, OH   
  
  
  
  
             Glucose [Mass/Vol] 106 mg/dL    High                Corey Hospitala Grant Hospital System  
  
  
  
  
                          Comment on above:         Result Comment: Test perform  
ed by glucose meter. Results may   
be  
  
                                                    10%-15% lowerthan serum/plas  
ma values. (CLIA ID 81E3698595)  
   
                                                    Performed By: #### BGLU ####  
Xplornet5 EFreeMonee  
  
                                                    Lunenburg, OH   
  
  
  
  
                                        Hemogram w/ Autodiff  on 2022  
  
  
  
  
             Abs Baso Cnt 0.1 10*3/uL  Normal       0.0-0.2      Kettering Health Greene Memorial M2 Connections Long Island Jewish Medical Center  
  
  
  
  
                          Comment on above:         Performed By: #### HEMDF, BM  
P3M ####Xplornet5 Santa Maria Biotherapeutics  
  
                                                    Elizabeth, OH   
  
  
  
  
             Abs Neutrophile Cnt 9.1 10*3/uL  High         1.8-7.0      OhioHealth Berger Hospital System  
  
  
  
  
                          Comment on above:         Performed By: #### HEMDF, BM  
P3M ####Cerus Corporation525 Santa Maria Biotherapeutics  
  
                                                    Elizabeth, OH   
  
  
  
  
             Basophils/100 WBC (Bld) 0.8 %        Normal       0.0-2.0      Summ  
a Health System  
  
  
  
  
                          Comment on above:         Performed By: #### HEMYANCI BM  
P3M ####Briana Ville 771835 Rio Rancho, OH   
  
  
  
  
             Eosinophils (Bld) [#/Vol] 0.2 10*3/uL  Normal       0.0-0.5      Harper University Hospital  
  
  
  
  
                          Comment on above:         Performed By: #### HEMYANCI BM  
P3M ####Briana Ville 771835 Rio Rancho, OH   
  
  
  
  
             Eosinophils/100 WBC (Bld) 1.9 %        Normal       1.0-6.0      Harper University Hospital  
  
  
  
  
                          Comment on above:         Performed By: #### KACEY BM  
P3M ####Briana Ville 771835 Rio Rancho, OH   
  
  
  
  
             Erythrocyte distribution width (RBC) 17.7 %       High         11.5  
-14.5    Munson Healthcare Cadillac Hospital  
  
             [Ratio]                                               
  
  
  
  
                          Comment on above:         Performed By: #### HEMYANCI BM  
P3M ####38 Herrera Street   
  
  
  
  
             Granulocytes/100 WBC (Bld) 73.0 %       Normal       40.0-80.0    Insight Surgical Hospital  
  
  
  
  
                          Comment on above:         Performed By: #### HEMYANCI BM  
P3M ####Briana Ville 771835 Rio Rancho, OH   
  
  
  
  
             Hematocrit (Bld) [Volume fraction] 30.5 %       Low          40.0-5  
2.0    Munson Healthcare Cadillac Hospital  
  
  
  
  
                          Comment on above:         Performed By: #### HEMYANCI BM  
P3M ####Kettering Health Greene Memorial M2 Connections Ewtddh152 Rio Rancho, OH   
  
  
  
  
             Hemoglobin (Bld) [Mass/Vol] 9.7 g/dL     Low          13.0-18.0      
Munson Healthcare Cadillac Hospital  
  
  
  
  
                          Comment on above:         Performed By: #### HEMYANCI BM  
P3M ####Briana Ville 771835 Rio Rancho, OH   
  
  
  
  
             Lymphocytes (Bld) [#/Vol] 2.3 10*3/uL  Normal       1.0-4.3      Harper University Hospital  
  
  
  
  
                          Comment on above:         Performed By: #### HEMYANCI BM  
P3M ####Briana Ville 771835 E.  
  
                                                    Mary Free Bed Rehabilitation Hospital, OH 82117  
  
  
  
  
  
             Lymphocytes/100 WBC (Bld) 18.3 %       Low          20.0-40.0    Harper University Hospital  
  
  
  
  
                          Comment on above:         Performed By: #### HEMYANCI BM  
P3M ####Briana Ville 771835 E.  
  
                                                    Mary Free Bed Rehabilitation Hospital, OH 83839  
  
  
  
  
             MCH (RBC) [Entitic mass] 23.7 pg      Low          26.0-34.0    Beaumont Hospital  
  
  
  
  
                          Comment on above:         Performed By: #### HEMYANCI BM  
P3M ####Briana Ville 771835 E.  
  
                                                    Mary Free Bed Rehabilitation Hospital, OH 01372  
  
  
  
  
  
             MCHC         31.7 %       Low          32.0-36.0    Salem City Hospital  
stem  
  
  
  
  
                          Comment on above:         Performed By: #### HEMYANCI BM  
P3M ####Briana Ville 771835 E.  
  
                                                    Mary Free Bed Rehabilitation Hospital, OH   
  
  
  
  
             MCV (RBC) [Entitic vol] 74.9 fL      Low          80.0-98.0    Summ  
a Health System  
  
  
  
  
                          Comment on above:         Performed By: #### HEMYANCI BM  
P3M ####Briana Ville 771835 E.  
  
                                                    Mary Free Bed Rehabilitation Hospital, OH   
  
  
  
  
             Monocytes (Bld) [#/Vol] 0.8 10*3/uL  Normal       0.0-0.8      Summ  
a Health System  
  
  
  
  
                          Comment on above:         Performed By: #### HEMYANCI, BM  
P3M ####Briana Ville 771835 E.  
  
                                                    Mary Free Bed Rehabilitation Hospital, OH 90502  
  
  
  
  
  
             Monocytes/100 WBC (Bld) 6.0 %        Normal       2.0-10.0     Summ  
a Health System  
  
  
  
  
                          Comment on above:         Performed By: #### HEMYANCI BM  
P3M ####Briana Ville 771835 E.  
  
                                                    Mary Free Bed Rehabilitation Hospital, OH 95734  
  
  
  
  
  
             Platelet mean volume (Bld) [Entitic vol] 6.8 fL       Low            
7.4-10.4     Munson Healthcare Cadillac Hospital  
  
  
  
  
                          Comment on above:         Performed By: #### HEMYANCI, BM  
P3M ####Briana Ville 771835 E.  
  
                                                    Mary Free Bed Rehabilitation Hospital, OH 32837  
  
  
  
  
  
             Platelets (Bld) [#/Vol] 472 10*3/uL  High         140-440      University of Michigan Hospital  
  
  
  
  
                          Comment on above:         Performed By: #### HEMDF, BM  
P3M ####Kettering Health Greene Memorial M2 Connections Uojprg795 E.  
  
                                                    Elizabeth, OH   
  
  
  
  
             RBC (Bld) [#/Vol] 4.08 10*6/uL Low          4.40-5.90    McLaren Oakland  
  
  
  
  
                          Comment on above:         Performed By: #### HEMDF, BM  
P3M ####Kettering Health Greene Memorial M2 Connections Opwocb058 E.  
  
                                                    Elizabeth, OH   
  
  
  
  
             WBC (Bld) [#/Vol] 12.5 10*3/uL High         3.6-10.7     McLaren Oakland  
  
  
  
  
                          Comment on above:         Performed By: #### HEMDF, BM  
P3M ####Kettering Health Greene Memorial M2 Connections Auwtwm728 E.  
  
                                                    Elizabeth, OH   
  
  
  
  
                                        POCT Glucose  Ordered By: Donald Dugan   
on 2022  
  
  
  
  
             Glucose [Mass/Vol] 166 mg/dL    High         70 - 100 mg/dL Corey Hospital  
  
                                                                 Work Phone: 4(2 52)489-1622  
   
             Interpretation and review of Abnormal                                
 Corey Hospital  
  
             laboratory results                                        Work Phon  
e: 1(698) 645-9270  
   
                                                                 Corey Hospital  
  
                                                                 Work Phone: 4(6 63)087-5589  
  
  
  
  
                                        POCT Glucose  on 2022  
  
  
  
  
                                                                 Lima City Hospital  
   
             Glucose [Mass/Vol] 106 mg/dL    High         70 - 100 mg/dL Corey Hospital  
   
             Interpretation and review of Abnormal                                
 Corey Hospital  
  
             laboratory results                                          
   
                                                                 TriHealth McCullough-Hyde Memorial Hospital  
  
  
  
  
                                        Basic Metabolic Panel   on 2022  
  
  
  
  
             Calcium [Mass/Vol] 9.3 mg/dL    Normal       8.4-10.4     The Jewish Hospital System  
  
  
  
  
                          Comment on above:         Performed By: #### HEMDF, BM  
P3M ####Kettering Health Greene Memorial M2 Connections Ivogsb137 E.  
  
                                                    Elizabeth, OH   
  
  
  
  
             Anion gap [Moles/Vol] 8 mmol/L     Normal       3-13         Munson Healthcare Cadillac Hospital  
  
  
  
  
                          Comment on above:         Performed By: #### HEMDF, BM  
P3M ####Kettering Health Greene Memorial M2 Connections Rofvmm465 E.  
  
                                                    Elizabeth, OH   
  
  
  
  
             CO2 [Moles/Vol] 23 mmol/L    Normal       22-30        Munson Healthcare Cadillac Hospital  
  
  
  
  
                          Comment on above:         Performed By: #### HEMDF, BM  
P3M ####Kettering Health Greene Memorial M2 Connections Gfqkxt754 Rio Rancho, OH 95054  
0  
  
  
  
  
             Creatinine [Mass/Vol] 1.34 mg/dL   High         0.52-1.25    Munson Healthcare Cadillac Hospital  
  
  
  
  
                          Comment on above:         Performed By: #### HEMDF BM  
P3M ####Kettering Health Greene Memorial M2 Connections Loljtd707 Rio Rancho, OH 69066  
0  
  
  
  
  
             GFR/1.73 sq M.predicted among 63.9 mL/min/{1.73_m2} Normal       >6  
0          Munson Healthcare Cadillac Hospital  
  
             blacks MDRD (S/P/Bld) [Vol                                          
  
             rate/Area]                                            
  
  
  
  
                          Comment on above:         Performed By: #### HEMYANCI BM  
P3M ####Kettering Health Greene Memorial M2 Connections Itsjop405 Rio Rancho, OH 29439  
0  
  
  
  
  
             GFR/1.73 sq M.predicted among 55.2 mL/min/{1.73_m2} Abnormal     >6  
0          Munson Healthcare Cadillac Hospital  
  
             non-blacks MDRD (S/P/Bld) [Vol                                       
     
  
             rate/Area]                                            
  
  
  
  
                          Comment on above:         Result Comment: KDIGO guidel  
zoë provide the following GFR  
  
                                                    categories:Stage GFR(ml/min/  
1.73 m2) TermsG1 >=90 Normal or highG2  
  
                                                    60-89 Mildly decreased*G3a 4  
5-59 Mildly to moderately uaezazekuD0f  
  
                                                    30-44 Moderately to severely  
 decreasedG4 15-29 Severely decreasedG5  
  
                                                    <15 Kidney failure*Relative   
to young adult level.In the absence of  
  
                                                    evidence of kidney damage, n  
either GFRcategory G1 nor G2 fulfill  
  
                                                    the criteria for CKD.The CKD  
-EPI equation is validated in  
  
                                                    individuals 18 yearsof age a  
nd older. Currently the best equation  
  
                                                    forestimating glomerular beto  
tration rate (GFR) from serumcreatinine  
  
                                                    in children is the Bedside S  
kristiwartz equation.It is less accurate in  
  
                                                    patients with extremes of mu  
sclemass, restriction of dietary  
  
                                                    protein, ingestion of creati  
ne,extra-renal metabolism of  
  
                                                    creatinine, or treatment wit  
hmedications that affect renal tubular  
  
                                                    creatinine secretion.  
   
                                                    Performed By: #### HEMDF, BM  
P3M ####Kettering Health Greene Memorial M2 Connections Lgaqvd654 Rio Rancho, OH 16666  
0  
  
  
  
  
             Glucose [Mass/Vol] 118 mg/dL    High                The Jewish Hospital System  
  
  
  
  
                          Comment on above:         Performed By: #### HEMDF, BM  
P3M ####Kettering Health Greene Memorial M2 Connections Zpzjzu654 Rio Rancho, OH 50525  
  
  
  
  
  
             Urea nitrogen [Mass/Vol] 14 mg/dL     Normal       7-17         Beaumont Hospital  
  
  
  
  
                          Comment on above:         Performed By: #### HEMYANCI, BM  
P3M ####Kettering Health Greene Memorial M2 Connections Siuxuo181 EDexter, OH 75619  
  
  
  
  
  
             Chloride [Moles/Vol] 106 mmol/L   Normal              Munising Memorial Hospital  
  
  
  
  
                          Comment on above:         Performed By: #### HEMYANCI, BM  
P3M ####Kettering Health Greene Memorial M2 Connections Wltopf011 EDexter, OH 12088  
  
  
  
  
  
             Potassium [Moles/Vol] 4.6 mmol/L   Normal       3.5-5.1      Munson Healthcare Cadillac Hospital  
  
  
  
  
                          Comment on above:         Performed By: #### HEMYANCI, BM  
P3M ####Kettering Health Greene Memorial M2 Connections Gassbj523 Rio Rancho, OH 33876  
0  
  
  
  
  
             Sodium [Moles/Vol] 137 mmol/L   Normal       135-145      The Jewish Hospital System  
  
  
  
  
                          Comment on above:         Performed By: #### HEMYANCI, BM  
P3M ####Kettering Health Greene Memorial M2 Connections Xhcudt910 Rio Rancho, OH 12154  
  
  
  
  
  
                                        Basic Metabolic Panel w/ Reflex to MG     
on 2022  
  
  
  
  
             Anion gap [Moles/Vol] 8 mmol/L                  3 - 13 mmol/L SUMMA  
   
             Calcium [Mass/Vol] 9.3 mg/dL                 8.4 - 10.4 mg/dL SUMMA  
   
             Chloride [Moles/Vol] 106 mmol/L                98 - 107 mmol/L SUMM  
A  
   
             CO2 [Moles/Vol] 23 mmol/L                 22 - 30 mmol/L SUMMA  
   
             Creatinine [Mass/Vol] 1.34 mg/dL   High         0.52 - 1.25  SUMMA  
  
                                                    mg/dL          
   
             EGFR IF NonAfrican 55.2 mL/min  Abnormal     >60          SUMMA  
  
             American                                              
   
             GFR/1.73 sq M.predicted 63.9                      >60          SUMM  
A  
  
             among blacks MDRD mL/min/{1.73_m2}                             
  
             (S/P/Bld) [Vol rate/Area]                                          
   
             Glucose [Mass/Vol] 118 mg/dL    High         70 - 100 mg/dL SUMMA  
   
             Interpretation and review Abnormal                               KRAMER  
MMA  
  
             of laboratory results                                          
   
             Potassium [Moles/Vol] 4.6 mmol/L                3.5 - 5.1 mmol/L KRAMER  
MMA  
   
             Sodium [Moles/Vol] 137 mmol/L                135 - 145 mmol/L SUMMA  
   
             Urea nitrogen (BldV) 14 mg/dL                  7 - 17 mg/dL SUMMA  
  
             [Mass/Vol]                                            
   
                                                                 Kettering Health Miamisburg LAB  
   
                                                                 SUMMA  
  
  
  
  
                                        CBC auto differential  on 2022  
  
  
  
  
             Absolute Baso # 0.1 10*3/uL               0.0 - 0.2 10*3/uL SUMMA  
   
             Absolute Neut # 7.7 10*3/uL  High         1.8 - 7.0 10*3/uL SUMMA  
   
             Basophils/100 WBC (Bld) 0.9 %                     0.0 - 2.0 %  SUMM  
A  
   
             Eosinophils (Bld) [#/Vol] 0.3 10*3/uL               0.0 - 0.5 10*3/  
uL SUMMA  
   
             Eosinophils/100 WBC (Bld) 2.6 %                     1.0 - 6.0 %  KRAMER  
MMA  
   
             Granulocytes/100 WBC (Bld) 70.1 %                    40.0 - 80.0 %   
SUMMA  
   
             Hematocrit (Bld) [Volume 30.5 %       Low          40.0 - 52.0 % KRAMER  
MMA  
  
             fraction]                                             
   
             Hemoglobin.gastrointestinal 9.5 g/dL     Low          13.0 - 18.0 g  
/dL SUMMA  
  
             spec 1 Ql (Stl)                                          
   
             Interpretation and review of Abnormal                                
 Corey Hospital  
  
             laboratory results                                          
   
             Lymphocytes (Bld) [#/Vol] 2.4 10*3/uL               1.0 - 4.3 10*3/  
uL SUMMA  
   
             Lymphocytes/100 WBC (Bld) 21.9 %                    20.0 - 40.0 % S  
UMMA  
   
             MCH (RBC) [Entitic mass] 23.3 pg      Low          26.0 - 34.0 pg S  
UMMA  
   
             MCHC (RBC) [Mass/Vol] 31.1 %       Low          32.0 - 36.0 % SUMMA  
   
             MCV (RBC) [Entitic vol] 74.8 fL      Low          80.0 - 98.0 fL KRAMER  
MMA  
   
             Monocytes (Bld) [#/Vol] 0.5 10*3/uL               0.0 - 0.8 10*3/uL  
 SUMMA  
   
             Monocytes/100 WBC (Bld) 4.5 %                     2.0 - 10.0 % SUMM  
A  
   
             Platelet distribution width 18.3 %       High         11.5 - 14.5 %  
 SUMMA  
  
             (Bld) [Ratio]                                          
   
             Platelet mean volume (Bld) 6.8 fL       Low          7.4 - 10.4 fL   
SUMMA  
  
             [Entitic vol]                                          
   
             Platelets (Bld) [#/Vol] 436 10*3/uL               140 - 440 10*3/uL  
 SUMMA  
   
             RBC (Bld) [#/Vol] 4.08 10*6/uL Low          4.40 - 5.90 10*6/uL SUM  
MA  
   
             WBC (Bld) [#/Vol] 11.0 10*3/uL High         3.6 - 10.7 10*3/uL SUMM  
A  
   
                                                                 University Hospitals Geneva Medical Center   
LAB  
   
                                                                 Corey Hospital  
  
  
  
  
                                        Glucose,Bedside  on 2022  
  
  
  
  
             Glucose [Mass/Vol] 138 mg/dL    High                Corey Hospitala Grant Hospital System  
  
  
  
  
                          Comment on above:         Result Comment: Test perform  
ed by glucose meter. Results may   
be  
  
                                                    10%-15% lowerthan serum/plas  
ma values. (CLIA ID 17V7580803)  
   
                                                    Performed By: #### BGLU ####  
Cerus Corporation525 Soundwave. Elizabeth, OH 00307-7519  
  
  
  
  
             Glucose [Mass/Vol] 121 mg/dL    High                Corey Hospitala Grant Hospital System  
  
  
  
  
                          Comment on above:         Result Comment: Test perform  
ed by glucose meter. Results may   
be  
  
                                                    10%-15% lowerthan serum/plas  
ma values. (CLIA ID 22A1634651)  
   
                                                    Performed By: #### BGLU ####  
Xplornet5 ECatheter Connections Elizabeth, OH 88744-5636  
  
  
  
  
             Glucose [Mass/Vol] 111 mg/dL    High                Corey Hospitala Grant Hospital System  
  
  
  
  
                          Comment on above:         Result Comment: Test perform  
ed by glucose meter. Results may   
be  
  
                                                    10%-15% lowerthan serum/plas  
ma values. (CLIA ID 42C3886763)  
   
                                                    Performed By: #### BGLU ####  
Cerus Corporation525 Santa Maria Biotherapeutics Elizabeth, OH 44166-0443  
  
  
  
  
             Glucose [Mass/Vol] 109 mg/dL    High                Corey Hospitala Grant Hospital System  
  
  
  
  
                          Comment on above:         Result Comment: Test perform  
ed by glucose meter. Results may   
be  
  
                                                    10%-15% lowerthan serum/plas  
ma values. (CLIA ID 22S1591411)  
   
                                                    Performed By: #### BGLU ####  
Cerus Corporation525 E. Elizabeth, OH   
  
  
  
  
                                        Hemogram w/ Autodiff  on 2022  
  
  
  
  
             Abs Baso Cnt 0.1 10*3/uL  Normal       0.0-0.2      Salem City Hospital  
stem  
  
  
  
  
                          Comment on above:         Performed By: #### HEMDF, BM  
P3M ####Kettering Health Greene Memorial M2 Connections Tapzqv994 E.  
  
                                                    Mary Free Bed Rehabilitation Hospital, OH   
  
  
  
  
             Abs Neutrophile Cnt 7.7 10*3/uL  High         1.8-7.0      ProMedica Coldwater Regional Hospital  
  
  
  
  
                          Comment on above:         Performed By: #### HEMDF, BM  
P3M ####Kettering Health Greene Memorial M2 Connections Uyqokf930 EDexter, OH   
  
  
  
  
             Basophils/100 WBC (Bld) 0.9 %        Normal       0.0-2.0      Summ  
a Health System  
  
  
  
  
                          Comment on above:         Performed By: #### HEMDF, BM  
P3M ####Kettering Health Greene Memorial M2 Connections Wpvmgu523 EDexter, OH   
  
  
  
  
             Eosinophils (Bld) [#/Vol] 0.3 10*3/uL  Normal       0.0-0.5      Harper University Hospital  
  
  
  
  
                          Comment on above:         Performed By: #### HEMDF, BM  
P3M ####Kettering Health Greene Memorial M2 Connections Nzkncd736 .  
  
                                                    Elizabeth, OH   
  
  
  
  
             Eosinophils/100 WBC (Bld) 2.6 %        Normal       1.0-6.0      Harper University Hospital  
  
  
  
  
                          Comment on above:         Performed By: #### HEMDF, BM  
P3M ####Kettering Health Greene Memorial M2 Connections Rngtlu775 E.  
  
                                                    Elizabeth, OH   
  
  
  
  
             Erythrocyte distribution width (RBC) 18.3 %       High         11.5  
-14.5    Munson Healthcare Cadillac Hospital  
  
             [Ratio]                                               
  
  
  
  
                          Comment on above:         Performed By: #### HEMDF, BM  
P3M ####Kettering Health Greene Memorial M2 Connections Iljjtp317 .  
  
                                                    Elizabeth, OH   
  
  
  
  
             Granulocytes/100 WBC (Bld) 70.1 %       Normal       40.0-80.0    S  
University of Michigan Health  
  
  
  
  
                          Comment on above:         Performed By: #### HEMDF, BM  
P3M ####Kettering Health Greene Memorial M2 Connections Hambgs248 Rio Rancho, OH   
  
  
  
  
             Hematocrit (Bld) [Volume fraction] 30.5 %       Low          40.0-5  
2.0    Munson Healthcare Cadillac Hospital  
  
  
  
  
                          Comment on above:         Performed By: #### KACEY BM  
P3M ####Briana Ville 771835 Rio Rancho, OH   
  
  
  
  
             Hemoglobin (Bld) [Mass/Vol] 9.5 g/dL     Low          13.0-18.0      
Munson Healthcare Cadillac Hospital  
  
  
  
  
                          Comment on above:         Performed By: #### KACEY BM  
P3M ####38 Herrera Street   
  
  
  
  
             Lymphocytes (Bld) [#/Vol] 2.4 10*3/uL  Normal       1.0-4.3      Harper University Hospital  
  
  
  
  
                          Comment on above:         Performed By: #### KACEY BM  
P3M ####38 Herrera Street   
  
  
  
  
             Lymphocytes/100 WBC (Bld) 21.9 %       Normal       20.0-40.0    Harper University Hospital  
  
  
  
  
                          Comment on above:         Performed By: #### KACEY BM  
P3M ####38 Herrera Street   
  
  
  
  
             MCH (RBC) [Entitic mass] 23.3 pg      Low          26.0-34.0    Beaumont Hospital  
  
  
  
  
                          Comment on above:         Performed By: #### KACEY BM  
P3M ####38 Herrera Street   
  
  
  
  
             MCHC         31.1 %       Low          32.0-36.0    Mercy Health St. Vincent Medical Center Sy  
stem  
  
  
  
  
                          Comment on above:         Performed By: #### KACEY BM  
P3M ####38 Herrera Street   
  
  
  
  
             MCV (RBC) [Entitic vol] 74.8 fL      Low          80.0-98.0    Summ  
a Health System  
  
  
  
  
                          Comment on above:         Performed By: #### KACYE BM  
P3M ####38 Herrera Street   
  
  
  
  
             Monocytes (Bld) [#/Vol] 0.5 10*3/uL  Normal       0.0-0.8      Summ  
a Health System  
  
  
  
  
                          Comment on above:         Performed By: #### KACEY BM  
P3M ####Briana Ville 771835 E.  
  
                                                    Elizabeth, OH 26303  
-209  
  
  
  
  
             Monocytes/100 WBC (Bld) 4.5 %        Normal       2.0-10.0     Summ  
a Health System  
  
  
  
  
                          Comment on above:         Performed By: #### HEMDF, BM  
P3M ####Kettering Health Greene Memorial M2 Connections Erdbsk007 E.  
  
                                                    Elizabeth, OH 12404  
-  
  
  
  
  
             Platelet mean volume (Bld) [Entitic vol] 6.8 fL       Low            
7.4-10.4     Munson Healthcare Cadillac Hospital  
  
  
  
  
                          Comment on above:         Performed By: #### HEMDF, BM  
P3M ####Kettering Health Greene Memorial M2 Connections Kkxbny080 E.  
  
                                                    Elizabeth, OH 93449  
-  
  
  
  
  
             Platelets (Bld) [#/Vol] 436 10*3/uL  Normal       140-440      University of Michigan Hospital  
  
  
  
  
                          Comment on above:         Performed By: #### HEMDF, BM  
P3M ####Kettering Health Greene Memorial M2 Connections Uqasyo882 E.  
  
                                                    Elizabeth, OH 91198  
-  
  
  
  
  
             RBC (Bld) [#/Vol] 4.08 10*6/uL Low          4.40-5.90    McLaren Oakland  
  
  
  
  
                          Comment on above:         Performed By: #### HEMDF, BM  
P3M ####Kettering Health Greene Memorial M2 Connections Xuoihg010 E.  
  
                                                    Elizabeth, OH 44085  
  
  
  
  
  
             WBC (Bld) [#/Vol] 11.0 10*3/uL High         3.6-10.7     McLaren Oakland  
  
  
  
  
                          Comment on above:         Performed By: #### HEMDF, BM  
P3M ####Kettering Health Greene Memorial M2 Connections Hwocjg563 E.  
  
                                                    Elizabeth, OH 68581  
  
  
  
  
  
                                        POCT Glucose  on 2022  
  
  
  
  
             Glucose [Mass/Vol] 138 mg/dL    High         70 - 100 mg/dL SUMMA  
  
                                                                 Work Phone: 1(3 77)953-3784  
   
             Interpretation and review of Abnormal                                
 Corey Hospital  
  
             laboratory results                                        Work Phon  
e: 1(974) 118-2346  
   
                                                                 University Hospitals Geneva Medical Center LAB  
   
                                                                 SUMMA  
  
                                                                 Work Phone: 1(8 77)119-7126  
   
             Glucose [Mass/Vol] 121 mg/dL    High         70 - 100 mg/dL SUMMA  
  
                                                                 Work Phone: 1(5 67)781-4023  
   
             Interpretation and review of Abnormal                                
 Corey Hospital  
  
             laboratory results                                        Work Phon  
e: 6(413)235-7784  
   
                                                                 University Hospitals Geneva Medical Center LAB  
   
                                                                 SUMMA  
  
                                                                 Work Phone: 12  
83)299-7549  
   
             Glucose [Mass/Vol] 111 mg/dL    High         70 - 100 mg/dL SUMMA  
  
                                                                 Work Phone: 12  
88)238-2274  
   
             Interpretation and review of Abnormal                                
 SUMMA  
  
             laboratory results                                        Work Phon  
e: 2(519)959-8275  
   
                                                                 University Hospitals Geneva Medical Center LAB  
   
                                                                 SUMMA  
  
                                                                 Work Phone: 12  
28)166-1725  
   
             Glucose [Mass/Vol] 109 mg/dL    High         70 - 100 mg/dL SUMMA  
  
                                                                 Work Phone: 12  
45)596-3230  
   
             Interpretation and review of Abnormal                                
 SUMMA  
  
             laboratory results                                        Work Phon  
e: 5(245)772-7077  
   
                                                                 University Hospitals Geneva Medical Center LAB  
   
                                                                 SUMMA  
  
                                                                 Work Phone: 12  
87)746-4257  
  
  
  
  
                                        APTT   on 2022  
  
  
  
  
             aPTT Coag (Bld) [Time] 34.7 s       High         20.0-30.5    Munson Healthcare Cadillac Hospital  
  
  
  
  
                          Comment on above:         Result Comment: NOTE: The th  
erapeutic time for Heparin  
  
                                                    anticoagulation,based on Xa   
activity inhibition, is an APTT of  
  
                                                    46-80seconds.  
   
                                                    Performed By: #### HEMDF, BM  
P3M, APTT ####Corey HospitalIndependent Space525 ECatheter Connections  
  
                                                    Elizabeth, OH 74878112 -  
  
  
  
  
             aPTT Coag (Bld) [Time] 34.7 s       High         20.0 - 30.5 s SUMM  
A  
   
             Interpretation and review of Abnormal                                
 Corey Hospital  
  
             laboratory results                                          
   
                                                                 University Hospitals Geneva Medical Center LA  
B  
   
                                                                 Corey Hospital  
  
  
  
  
                                        Basic Metabolic Panel  on 2022  
  
  
  
  
             Anion gap [Moles/Vol] 4 mmol/L     Normal       3-13         Munson Healthcare Cadillac Hospital  
  
  
  
  
                          Comment on above:         Performed By: #### HEMDF, BM  
P3M, APTT ####Cerus Corporation525 LonoCloud Lunenburg, OH 64203  
2090  
  
  
  
  
             Calcium [Mass/Vol] 9.1 mg/dL    Normal       8.4-10.4     The Jewish Hospital System  
  
  
  
  
                          Comment on above:         Performed By: #### HEMDF, BM  
P3M, APTT ####Corey HospitalIndependent Space525 ECatheter Connections  
  
                                                    Elizabeth, OH 85651  
2090  
  
  
  
  
             CO2 [Moles/Vol] 27 mmol/L    Normal       22-30        Munson Healthcare Cadillac Hospital  
  
  
  
  
                          Comment on above:         Performed By: #### HEMDF, BM  
P3M, APTT ####Kettering Health Greene Memorial M2 Connections   
Txkjgn920 Rio Rancho, OH   
  
  
  
  
             Creatinine [Mass/Vol] 1.22 mg/dL   Normal       0.52-1.25    Munson Healthcare Cadillac Hospital  
  
  
  
  
                          Comment on above:         Performed By: #### HEMDF, BM  
P3M, APTT ####Kettering Health Greene Memorial M2 Connections   
Mlohkm546 EDexter, OH   
  
  
  
  
             GFR/1.73 sq M.predicted among 71.6 mL/min/{1.73_m2} Normal       >6  
0          Munson Healthcare Cadillac Hospital  
  
             blacks MDRD (S/P/Bld) [Vol                                          
  
             rate/Area]                                            
  
  
  
  
                          Comment on above:         Performed By: #### HEMDF, BM  
P3M, APTT ####Kettering Health Greene Memorial M2 Connections   
Vaoybn669 Rio Rancho, OH   
  
  
  
  
             GFR/1.73 sq M.predicted among 61.8 mL/min/{1.73_m2} Normal       >6  
0          Munson Healthcare Cadillac Hospital  
  
             non-blacks MDRD (S/P/Bld) [Vol                                       
     
  
             rate/Area]                                            
  
  
  
  
                          Comment on above:         Result Comment: KDIGO guidel  
zoë provide the following GFR  
  
                                                    categories:Stage GFR(ml/min/  
1.73 m2) TermsG1 >=90 Normal or highG2  
  
                                                    60-89 Mildly decreased*G3a 4  
5-59 Mildly to moderately ksjyvcgrmL8d  
  
                                                    30-44 Moderately to severely  
 decreasedG4 15-29 Severely decreasedG5  
  
                                                    <15 Kidney failure*Relative   
to young adult level.In the absence of  
  
                                                    evidence of kidney damage, n  
either GFRcategory G1 nor G2 fulfill  
  
                                                    the criteria for CKD.The CKD  
-EPI equation is validated in  
  
                                                    individuals 18 yearsof age a  
nd older. Currently the best equation  
  
                                                    forestimating glomerular beto  
tration rate (GFR) from serumcreatinine  
  
                                                    in children is the Bedside S  
kristiwartz equation.It is less accurate in  
  
                                                    patients with extremes of mu  
sclemass, restriction of dietary  
  
                                                    protein, ingestion of creati  
ne,extra-renal metabolism of  
  
                                                    creatinine, or treatment wit  
hmedications that affect renal tubular  
  
                                                    creatinine secretion.  
   
                                                    Performed By: #### HEMDF, BM  
P3M, APTT ####Kettering Health Greene Memorial M2 Connections Odugac573 Rio Rancho, OH   
  
  
  
  
             Glucose [Mass/Vol] 149 mg/dL    High                The Jewish Hospital System  
  
  
  
  
                          Comment on above:         Performed By: #### HEMDF, BM  
P3M, APTT ####Kettering Health Greene Memorial M2 Connections   
Oaydgw644 Rio Rancho, OH   
  
  
  
  
             Urea nitrogen [Mass/Vol] 14 mg/dL     Normal       7-17         Beaumont Hospital  
  
  
  
  
                          Comment on above:         Performed By: #### HEMDF, BM  
P3M, APTT ####Kettering Health Greene Memorial M2 Connections   
Cezzle813 Rio Rancho, OH   
  
  
  
  
             Chloride [Moles/Vol] 103 mmol/L   Normal              Mercy Health St. Joseph Warren Hospital System  
  
  
  
  
                          Comment on above:         Performed By: #### HEMDF, BM  
P3M, APTT ####Kettering Health Greene Memorial M2 Connections   
Yghfhz898 Rio Rancho, OH   
  
  
  
  
             Potassium [Moles/Vol] 4.3 mmol/L   Normal       3.5-5.1      Munson Healthcare Cadillac Hospital  
  
  
  
  
                          Comment on above:         Performed By: #### HEMDF, BM  
P3M, APTT ####Kettering Health Greene Memorial M2 Connections   
Iicnvy244 Rio Rancho, OH   
  
  
  
  
             Sodium [Moles/Vol] 134 mmol/L   Low          135-145      The Jewish Hospital System  
  
  
  
  
                          Comment on above:         Performed By: #### HEMDF, BM  
P3M, APTT ####Kettering Health Greene Memorial M2 Connections   
Xyiwaq648 Rio Rancho, OH   
  
  
  
  
                                        Basic Metabolic Panel w/ Reflex to MG  o  
n 2022  
  
  
  
  
             Anion gap [Moles/Vol] 4 mmol/L                  3 - 13 mmol/L SUMMA  
   
             Calcium [Mass/Vol] 9.1 mg/dL                 8.4 - 10.4 mg/dL SUMMA  
   
             Chloride [Moles/Vol] 103 mmol/L                98 - 107 mmol/L SUMM  
A  
   
             CO2 [Moles/Vol] 27 mmol/L                 22 - 30 mmol/L SUMMA  
   
             Creatinine [Mass/Vol] 1.22 mg/dL                0.52 - 1.25 mg/dL S  
Barnesville Hospital  
   
             EGFR IF NonAfrican 61.8 mL/min               >60          SUMMA  
  
             American                                              
   
             GFR/1.73 sq M.predicted 71.6                      >60          SUMM  
A  
  
             among blacks MDRD mL/min/{1.73_m2}                             
  
             (S/P/Bld) [Vol rate/Area]                                          
   
             Glucose [Mass/Vol] 149 mg/dL    High         70 - 100 mg/dL SUMMA  
   
             Interpretation and review Abnormal                               KRAMER  
MMA  
  
             of laboratory results                                          
   
             Potassium [Moles/Vol] 4.3 mmol/L                3.5 - 5.1 mmol/L KRAMER  
MMA  
   
             Sodium [Moles/Vol] 134 mmol/L   Low          135 - 145 mmol/L SUMMA  
   
             Urea nitrogen (BldV) 14 mg/dL                  7 - 17 mg/dL SUMMA  
  
             [Mass/Vol]                                            
   
                                                                 Kettering Health Miamisburg LAB  
   
                                                                 SUMMA  
  
  
  
  
                                        CBC auto differential  on 2022  
  
  
  
  
             Absolute Baso # 0.1 10*3/uL               0.0 - 0.2 10*3/uL SUMMA  
   
             Absolute Neut # 7.5 10*3/uL  High         1.8 - 7.0 10*3/uL SUMMA  
   
             Basophils/100 WBC (Bld) 0.7 %                     0.0 - 2.0 %  SUMM  
A  
   
             Eosinophils (Bld) [#/Vol] 0.3 10*3/uL               0.0 - 0.5 10*3/  
uL SUMMA  
   
             Eosinophils/100 WBC (Bld) 2.5 %                     1.0 - 6.0 %  KRAMER  
MMA  
   
             Granulocytes/100 WBC (Bld) 70.7 %                    40.0 - 80.0 %   
SUMMA  
   
             Hematocrit (Bld) [Volume 30.4 %       Low          40.0 - 52.0 % KRAMER  
MMA  
  
             fraction]                                             
   
             Hemoglobin.gastrointestinal 9.5 g/dL     Low          13.0 - 18.0 g  
/dL Corey Hospital  
  
             spec 1 Ql (Stl)                                          
   
             Interpretation and review of Abnormal                                
 SUMMA  
  
             laboratory results                                          
   
             Lymphocytes (Bld) [#/Vol] 2.1 10*3/uL               1.0 - 4.3 10*3/  
uL SUMMA  
   
             Lymphocytes/100 WBC (Bld) 19.9 %       Low          20.0 - 40.0 % S  
UMMA  
   
             MCH (RBC) [Entitic mass] 23.6 pg      Low          26.0 - 34.0 pg S  
UMMA  
   
             MCHC (RBC) [Mass/Vol] 31.3 %       Low          32.0 - 36.0 % SUMMA  
   
             MCV (RBC) [Entitic vol] 75.4 fL      Low          80.0 - 98.0 fL KRAMER  
MMA  
   
             Monocytes (Bld) [#/Vol] 0.7 10*3/uL               0.0 - 0.8 10*3/uL  
 SUMMA  
   
             Monocytes/100 WBC (Bld) 6.2 %                     2.0 - 10.0 % SUMM  
A  
   
             Platelet distribution width 17.6 %       High         11.5 - 14.5 %  
 SUMMA  
  
             (Bld) [Ratio]                                          
   
             Platelet mean volume (Bld) 7.0 fL       Low          7.4 - 10.4 fL   
SUMMA  
  
             [Entitic vol]                                          
   
             Platelets (Bld) [#/Vol] 426 10*3/uL               140 - 440 10*3/uL  
 SUMMA  
   
             RBC (Bld) [#/Vol] 4.04 10*6/uL Low          4.40 - 5.90 10*6/uL SUM  
MA  
   
             WBC (Bld) [#/Vol] 10.6 10*3/uL              3.6 - 10.7 10*3/uL SUMM  
A  
   
                                                                 University Hospitals Geneva Medical Center   
LAB  
   
                                                                 Corey Hospital  
  
  
  
  
                                        Glucose,Bedside  on 2022  
  
  
  
  
             Glucose [Mass/Vol] 195 mg/dL    High                Corey Hospitala Hea  
lth System  
  
  
  
  
                          Comment on above:         Result Comment: Test perform  
ed by glucose meter. Results may   
be  
  
                                                    10%-15% lowerthan serum/plas  
ma values. (CLIA ID 43Q1515502)  
   
                                                    Performed By: #### BGLU ####  
K94 Discoveries Hwwaxf723 E. Elizabeth, OH 02901-8660  
  
  
  
  
             Glucose [Mass/Vol] 130 mg/dL    High                Corey Hospitala Hea  
lt System  
  
  
  
  
                          Comment on above:         Result Comment: Test perform  
ed by glucose meter. Results may   
be  
  
                                                    10%-15% lowerthan serum/plas  
ma values. (CLIA ID 00I1998246)  
   
                                                    Performed By: #### BGLU ####  
K94 Discoveries Ilrdwf997 E. Elizabeth, OH 42605-2994  
  
  
  
  
             Glucose [Mass/Vol] 144 mg/dL    High                Corey Hospitala Hea  
lt System  
  
  
  
  
                          Comment on above:         Result Comment: Test perform  
ed by glucose meter. Results may   
be  
  
                                                    10%-15% lowerthan serum/plas  
ma values. (CLIA ID 48B6761954)  
   
                                                    Performed By: #### BGLU ####  
K94 Discoveries Coxptg320 ECatheter Connections Elizabeth, OH 22558-2232  
  
  
  
  
             Glucose [Mass/Vol] 125 mg/dL    High                Corey Hospitala Hea  
lt System  
  
  
  
  
                          Comment on above:         Result Comment: Test perform  
ed by glucose meter. Results may   
be  
  
                                                    10%-15% lowerthan serum/plas  
ma values. (CLIA ID 17V4452602)  
   
                                                    Performed By: #### BGLU ####  
Kettering Health Greene Memorial M2 Connections Fwobzd975 Rio Rancho, OH   
  
  
  
  
                                        Hemogram w/ Autodiff  on 2022  
  
  
  
  
             Abs Baso Cnt 0.1 10*3/uL  Normal       0.0-0.2      Mercy Health St. Vincent Medical Center Sy  
stem  
  
  
  
  
                          Comment on above:         Performed By: #### HEMDF, BM  
P3M, APTT ####Kettering Health Greene Memorial M2 Connections   
Ivozro639 .  
  
                                                    Elizabeth, OH   
  
  
  
  
             Abs Neutrophile Cnt 7.5 10*3/uL  High         1.8-7.0      ProMedica Coldwater Regional Hospital  
  
  
  
  
                          Comment on above:         Performed By: #### HEMDF, BM  
P3M, APTT ####Kettering Health Greene Memorial M2 Connections   
Tyxpwe708 Rio Rancho, OH   
  
  
  
  
             Basophils/100 WBC (Bld) 0.7 %        Normal       0.0-2.0      Summ  
a Health System  
  
  
  
  
                          Comment on above:         Performed By: #### HEMDF, BM  
P3M, APTT ####Kettering Health Greene Memorial M2 Connections   
Emzrrp705 Rio Rancho, OH   
  
  
  
  
             Eosinophils (Bld) [#/Vol] 0.3 10*3/uL  Normal       0.0-0.5      Harper University Hospital  
  
  
  
  
                          Comment on above:         Performed By: #### HEMDF, BM  
P3M, APTT ####Kettering Health Greene Memorial M2 Connections   
Tbkufo040 Rio Rancho, OH   
  
  
  
  
             Eosinophils/100 WBC (Bld) 2.5 %        Normal       1.0-6.0      Harper University Hospital  
  
  
  
  
                          Comment on above:         Performed By: #### HEMDF, BM  
P3M, APTT ####Kettering Health Greene Memorial M2 Connections   
Hxpshj660 Rio Rancho, OH   
  
  
  
  
             Erythrocyte distribution width (RBC) 17.6 %       High         11.5  
-14.5    Munson Healthcare Cadillac Hospital  
  
             [Ratio]                                               
  
  
  
  
                          Comment on above:         Performed By: #### HEMDF, BM  
P3M, APTT ####Kettering Health Greene Memorial M2 Connections   
Rbyrpv402 Rio Rancho, OH   
  
  
  
  
             Granulocytes/100 WBC (Bld) 70.7 %       Normal       40.0-80.0    Insight Surgical Hospital  
  
  
  
  
                          Comment on above:         Performed By: #### HEMDF, BM  
P3M, APTT ####Briana Ville 771835 Rio Rancho, OH   
  
  
  
  
             Hematocrit (Bld) [Volume fraction] 30.4 %       Low          40.0-5  
2.0    Munson Healthcare Cadillac Hospital  
  
  
  
  
                          Comment on above:         Performed By: #### HEMDF, BM  
P3M, APTT ####38 Herrera Street   
  
  
  
  
             Hemoglobin (Bld) [Mass/Vol] 9.5 g/dL     Low          13.0-18.0      
Munson Healthcare Cadillac Hospital  
  
  
  
  
                          Comment on above:         Performed By: #### HEMDF, BM  
P3M, APTT ####38 Herrera Street   
  
  
  
  
             Lymphocytes (Bld) [#/Vol] 2.1 10*3/uL  Normal       1.0-4.3      Harper University Hospital  
  
  
  
  
                          Comment on above:         Performed By: #### HEMDF, BM  
P3M, APTT ####38 Herrera Street   
  
  
  
  
             Lymphocytes/100 WBC (Bld) 19.9 %       Low          20.0-40.0    Harper University Hospital  
  
  
  
  
                          Comment on above:         Performed By: #### HEMDF, BM  
P3M, APTT ####38 Herrera Street   
  
  
  
  
             MCH (RBC) [Entitic mass] 23.6 pg      Low          26.0-34.0    Beaumont Hospital  
  
  
  
  
                          Comment on above:         Performed By: #### HEMDF, BM  
P3M, APTT ####38 Herrera Street   
  
  
  
  
             MCHC         31.3 %       Low          32.0-36.0    Salem City Hospital  
stem  
  
  
  
  
                          Comment on above:         Performed By: #### HEMDF, BM  
P3M, APTT ####Briana Ville 771835 Rio Rancho, OH   
  
  
  
  
             MCV (RBC) [Entitic vol] 75.4 fL      Low          80.0-98.0    Summ  
a Health System  
  
  
  
  
                          Comment on above:         Performed By: #### HEMDF, BM  
P3M, APTT ####Briana Ville 771835 E.  
  
                                                    Elizabeth, OH   
  
  
  
  
             Monocytes (Bld) [#/Vol] 0.7 10*3/uL  Normal       0.0-0.8      Summ  
a Health System  
  
  
  
  
                          Comment on above:         Performed By: #### HEMDF, BM  
P3M, APTT ####Briana Ville 771835 .  
  
                                                    Elizabeth, OH   
  
  
  
  
             Monocytes/100 WBC (Bld) 6.2 %        Normal       2.0-10.0     Summ  
a Health System  
  
  
  
  
                          Comment on above:         Performed By: #### HEMDF, BM  
P3M, APTT ####38 Herrera Street   
  
  
  
  
             Platelet mean volume (Bld) [Entitic vol] 7.0 fL       Low            
7.4-10.4     Munson Healthcare Cadillac Hospital  
  
  
  
  
                          Comment on above:         Performed By: #### HEMDF, BM  
P3M, APTT ####88 Johnson Street.  
  
                                                    Elizabeth, OH   
  
  
  
  
             Platelets (Bld) [#/Vol] 426 10*3/uL  Normal       140-440      University of Michigan Hospital  
  
  
  
  
                          Comment on above:         Performed By: #### HEMDF, BM  
P3M, APTT ####Briana Ville 771835 .  
  
                                                    Elizabeth, OH   
  
  
  
  
             RBC (Bld) [#/Vol] 4.04 10*6/uL Low          4.40-5.90    McLaren Oakland  
  
  
  
  
                          Comment on above:         Performed By: #### HEMDF, BM  
P3M, APTT ####Briana Ville 771835 .  
  
                                                    Elizabeth, OH   
  
  
  
  
             WBC (Bld) [#/Vol] 10.6 10*3/uL Normal       3.6-10.7     McLaren Oakland  
  
  
  
  
                          Comment on above:         Performed By: #### HEMDF, BM  
P3M, APTT ####Briana Ville 771835 Rio Rancho, OH   
  
  
  
  
                                        POCT Glucose  on 2022  
  
  
  
  
             Glucose [Mass/Vol] 195 mg/dL    High         70 - 100 mg/dL Corey Hospital  
  
                                                                 Work Phone: 4(8 37)324-4169  
   
             Interpretation and review of Abnormal                                
 SUMMA  
  
             laboratory results                                        Work Phon  
e: 0(704)950-9463  
   
                                                                 University Hospitals Geneva Medical Center LAB  
   
                                                                 SUMMA  
  
                                                                 Work Phone: 12  
30)463-3731  
   
             Glucose [Mass/Vol] 130 mg/dL    High         70 - 100 mg/dL SUMMA  
  
                                                                 Work Phone: 12  
24)032-9196  
   
             Interpretation and review of Abnormal                                
 SUMMA  
  
             laboratory results                                        Work Phon  
e: 7(020)413-0456  
   
                                                                 University Hospitals Geneva Medical Center LAB  
   
                                                                 SUMMA  
  
                                                                 Work Phone: 12  
88)312-4622  
   
                                                                 University Hospitals Geneva Medical Center LAB  
   
             Glucose [Mass/Vol] 125 mg/dL    High         70 - 100 mg/dL SUMMA  
  
                                                                 Work Phone: 12  
94)749-1436  
   
             Interpretation and review of Abnormal                                
 SUMMA  
  
             laboratory results                                        Work Phon  
e: 0(848)383-7677  
   
                                                                 University Hospitals Geneva Medical Center LAB  
   
                                                                 SUMMA  
  
                                                                 Work Phone: 12  
46)409-5759  
  
  
  
  
                                        POCT Glucose  Ordered By: Damion Bennett   
on 2022  
  
  
  
  
             Glucose [Mass/Vol] 144 mg/dL    High         70 - 100 mg/dL SUMMA  
  
                                                                 Work Phone: 12  
16)741-8688  
   
             Interpretation and review of Abnormal                                
 Corey Hospital  
  
             laboratory results                                        Work Phon  
e: 2(090)935-9391  
   
                                                                 SUMMA  
  
                                                                 Work Phone: 1(9 55)893-7800  
  
  
  
  
                                        Basic Metabolic Panel   on 2022  
  
  
  
  
             Calcium [Mass/Vol] 8.7 mg/dL    Normal       8.4-10.4     The Jewish Hospital System  
  
  
  
  
                          Comment on above:         Performed By: #### HEMDF, BM  
P3M ####Cerus Corporation525 E.  
  
                                                    Elizabeth, OH 89644  
  
  
  
  
  
             Glucose [Mass/Vol] 129 mg/dL    High                The Jewish Hospital System  
  
  
  
  
                          Comment on above:         Performed By: #### HEMDF, BM  
P3M ####Corey HospitalIndependent Space525 ECatheter Connections  
  
                                                    Elizabeth, OH 72666  
0  
  
  
  
  
             Anion gap [Moles/Vol] 5 mmol/L     Normal       3-13         Munson Healthcare Cadillac Hospital  
  
  
  
  
                          Comment on above:         Performed By: #### HEMDF, BM  
P3M ####Corey HospitalIndependent Space525 ECatheter Connections  
  
                                                    Elizabeth, OH 29362  
  
  
  
  
  
             CO2 [Moles/Vol] 24 mmol/L    Normal       22-30        Munson Healthcare Cadillac Hospital  
  
  
  
  
                          Comment on above:         Performed By: #### HEMDF BM  
P3M ####Kettering Health Greene Memorial M2 Connections Xlocnf121 Rio Rancho, OH   
  
  
  
  
             Creatinine [Mass/Vol] 0.97 mg/dL   Normal       0.52-1.25    Munson Healthcare Cadillac Hospital  
  
  
  
  
                          Comment on above:         Performed By: #### HEMDF, BM  
P3M ####Kettering Health Greene Memorial M2 Connections Lboede580 Rio Rancho, OH 0  
  
  
  
  
             GFR/1.73 sq M.predicted among blacks mL/min/{1.73_m2} Normal         
>60          Munson Healthcare Cadillac Hospital  
  
             MDRD (S/P/Bld) [Vol rate/Area]                                       
     
  
  
  
  
                          Comment on above:         Performed By: #### HEMYANCI BM  
P3M ####Kettering Health Greene Memorial M2 Connections Lxeaob728 Rio Rancho, OH   
  
  
  
  
             GFR/1.73 sq M.predicted among 81.5 mL/min/{1.73_m2} Normal       >6  
0          Munson Healthcare Cadillac Hospital  
  
             non-blacks MDRD (S/P/Bld) [Vol                                       
     
  
             rate/Area]                                            
  
  
  
  
                          Comment on above:         Result Comment: KDIGO guidel  
zoë provide the following GFR  
  
                                                    categories:Stage GFR(ml/min/  
1.73 m2) TermsG1 >=90 Normal or highG2  
  
                                                    60-89 Mildly decreased*G3a 4  
5-59 Mildly to moderately ahfnboalgU6a  
  
                                                    30-44 Moderately to severely  
 decreasedG4 15-29 Severely decreasedG5  
  
                                                    <15 Kidney failure*Relative   
to young adult level.In the absence of  
  
                                                    evidence of kidney damage, n  
either GFRcategory G1 nor G2 fulfill  
  
                                                    the criteria for CKD.The CKD  
-EPI equation is validated in  
  
                                                    individuals 18 yearsof age a  
nd older. Currently the best equation  
  
                                                    forestimating glomerular beto  
tration rate (GFR) from serumcreatinine  
  
                                                    in children is the Bedside S  
kristiwartz equation.It is less accurate in  
  
                                                    patients with extremes of mu  
sclemass, restriction of dietary  
  
                                                    protein, ingestion of creati  
ne,extra-renal metabolism of  
  
                                                    creatinine, or treatment wit  
hmedications that affect renal tubular  
  
                                                    creatinine secretion.  
   
                                                    Performed By: #### HEMDF, BM  
P3M ####Kettering Health Greene Memorial M2 Connections Yusduw188 Rio Rancho, OH   
  
  
  
  
             Urea nitrogen [Mass/Vol] 11 mg/dL     Normal       7-17         Sum  
ma Health System  
  
  
  
  
                          Comment on above:         Performed By: #### HEMYANCI, BM  
P3M ####MyoKardiaa M2 Connections Biposi152 LonoCloud Lunenburg, OH   
  
  
  
  
             Chloride [Moles/Vol] 107 mmol/L   Normal              Corey Hospitala Salem Regional Medical Center System  
  
  
  
  
                          Comment on above:         Performed By: #### ASADYANCI, BM  
P3M ####MyoKardiaa M2 Connections Kvrbvk815 Santa Maria Biotherapeutics  
  
                                                    Elizabeth, OH   
  
  
  
  
             Potassium [Moles/Vol] 3.8 mmol/L   Normal       3.5-5.1      Mercy Health St. Vincent Medical Center System  
  
  
  
  
                          Comment on above:         Performed By: #### ASADYANCI, BM  
P3M ####K94 Discoveries Awrgsr238 Santa Maria Biotherapeutics  
  
                                                    Elizabeth, OH   
  
  
  
  
             Sodium [Moles/Vol] 136 mmol/L   Normal       135-145      Corey Hospitala Grant Hospital System  
  
  
  
  
                          Comment on above:         Performed By: #### ASADYANCI, BM  
P3M ####K94 Discoveries Ycqwmq402 Santa Maria Biotherapeutics  
  
                                                    Elizabeth, OH   
  
  
  
  
                                        Basic Metabolic Panel w/ Reflex to MG     
on 2022  
  
  
  
  
             Anion gap [Moles/Vol] 5 mmol/L                  3 - 13 mmol/L SUMMA  
   
             Calcium [Mass/Vol] 8.7 mg/dL                 8.4 - 10.4 mg/dL SUMMA  
   
             Chloride [Moles/Vol] 107 mmol/L                98 - 107 mmol/L SUMM  
A  
   
             CO2 [Moles/Vol] 24 mmol/L                 22 - 30 mmol/L SUMMA  
   
             Creatinine [Mass/Vol] 0.97 mg/dL                0.52 - 1.25 mg/dL S  
Barnesville Hospital  
   
             EGFR IF NonAfrican American 81.5 mL/min               >60            
SUMMA  
   
             GFR/1.73 sq M.predicted mL/min/{1.73_m2}              >60 mL/min     
SUMMA  
  
             among blacks MDRD (S/P/Bld)                                          
  
             [Vol rate/Area]                                          
   
             Glucose [Mass/Vol] 129 mg/dL    High         70 - 100 mg/dL SUMMA  
   
             Interpretation and review Abnormal                               KRAMER  
MMA  
  
             of laboratory results                                          
   
             Potassium [Moles/Vol] 3.8 mmol/L                3.5 - 5.1 mmol/L KRAMER  
MMA  
   
             Sodium [Moles/Vol] 136 mmol/L                135 - 145 mmol/L SUMMA  
   
             Urea nitrogen (BldV) 11 mg/dL                  7 - 17 mg/dL SUMMA  
  
             [Mass/Vol]                                            
   
                                                                 SUMMA HEALTH  
  
                                                                 SYSTEM - AKRON  
  
                                                                 CAMPUS LAB  
   
                                                                 SUMMA  
  
  
  
  
                                        CBC auto differential  on 2022  
  
  
  
  
             Absolute Baso # 0.1 10*3/uL               0.0 - 0.2 10*3/uL SUMMA  
   
             Absolute Neut # 6.9 10*3/uL               1.8 - 7.0 10*3/uL SUMMA  
   
             Basophils/100 WBC (Bld) 1.2 %                     0.0 - 2.0 %  SUMM  
A  
   
             Eosinophils (Bld) [#/Vol] 0.3 10*3/uL               0.0 - 0.5 10*3/  
uL SUMMA  
   
             Eosinophils/100 WBC (Bld) 3.1 %                     1.0 - 6.0 %  KRAMER  
MMA  
   
             Granulocytes/100 WBC (Bld) 68.4 %                    40.0 - 80.0 %   
SUMMA  
   
             Hematocrit (Bld) [Volume 27.4 %       Low          40.0 - 52.0 % KRAMER  
MMA  
  
             fraction]                                             
   
             Hemoglobin.gastrointestinal 8.8 g/dL     Low          13.0 - 18.0 g  
/dL Corey Hospital  
  
             spec 1 Ql (Stl)                                          
   
             Interpretation and review of Abnormal                                
 Corey Hospital  
  
             laboratory results                                          
   
             Lymphocytes (Bld) [#/Vol] 2.1 10*3/uL               1.0 - 4.3 10*3/  
uL SUMMA  
   
             Lymphocytes/100 WBC (Bld) 20.9 %                    20.0 - 40.0 % S  
UMMA  
   
             MCH (RBC) [Entitic mass] 24.1 pg      Low          26.0 - 34.0 pg S  
UMMA  
   
             MCHC (RBC) [Mass/Vol] 32.1 %                    32.0 - 36.0 % SUMMA  
   
             MCV (RBC) [Entitic vol] 75.1 fL      Low          80.0 - 98.0 fL KRAMER  
MMA  
   
             Monocytes (Bld) [#/Vol] 0.6 10*3/uL               0.0 - 0.8 10*3/uL  
 SUMMA  
   
             Monocytes/100 WBC (Bld) 6.4 %                     2.0 - 10.0 % SUMM  
A  
   
             Platelet distribution width 18.1 %       High         11.5 - 14.5 %  
 SUMMA  
  
             (Bld) [Ratio]                                          
   
             Platelet mean volume (Bld) 6.8 fL       Low          7.4 - 10.4 fL   
SUMMA  
  
             [Entitic vol]                                          
   
             Platelets (Bld) [#/Vol] 380 10*3/uL               140 - 440 10*3/uL  
 SUMMA  
   
             RBC (Bld) [#/Vol] 3.64 10*6/uL Low          4.40 - 5.90 10*6/uL SUM  
MA  
   
             WBC (Bld) [#/Vol] 10.1 10*3/uL              3.6 - 10.7 10*3/uL SUMM  
A  
   
                                                                 SUMMA Preview Networks Mercy Health St. Joseph Warren Hospital   
LAB  
   
                                                                 JANELLE  
  
  
  
  
                                        Glucose,Bedside  on 2022  
  
  
  
  
             Glucose [Mass/Vol] 131 mg/dL    High                Corey Hospitala a  
Lima City Hospital System  
  
  
  
  
                          Comment on above:         Result Comment: Test perform  
ed by glucose meter. Results may   
be  
  
                                                    10%-15% lowerthan serum/plas  
ma values. (CLIA ID 11B0111739)  
   
                                                    Performed By: #### BGLU ####  
Xplornet5 Soundwave. Elizabeth, OH 04215-4383  
  
  
  
  
             Glucose [Mass/Vol] 147 mg/dL    High                Corey Hospitala a  
Lima City Hospital System  
  
  
  
  
                          Comment on above:         Result Comment: Test perform  
ed by glucose meter. Results may   
be  
  
                                                    10%-15% lowerthan serum/plas  
ma values. (CLIA ID 06P8530377)  
   
                                                    Performed By: #### BGLU ####  
Xplornet5 E. Able Imaging  
  
                                                    Lunenburg, OH 30361-5298  
  
  
  
  
             Glucose [Mass/Vol] 152 mg/dL    High                Corey Hospitala Grant Hospital System  
  
  
  
  
                          Comment on above:         Result Comment: Test perform  
ed by glucose meter. Results may   
be  
  
                                                    10%-15% lowerthan serum/plas  
ma values. (CLIA ID 78V4233950)  
   
                                                    Performed By: #### BGLU ####  
Xplornet5 Soundwave. Able Imaging  
  
                                                    Lunenburg, OH 62214-8279  
  
  
  
  
             Glucose [Mass/Vol] 110 mg/dL    High                The Jewish Hospital System  
  
  
  
  
                          Comment on above:         Result Comment: Test perform  
ed by glucose meter. Results may   
be  
  
                                                    10%-15% lowerthan serum/plas  
ma values. (CLIA ID 57O9823785)  
   
                                                    Performed By: #### BGLU ####  
Xplornet5 LonoCloud  
  
                                                    Lunenburg, OH 26679-4009  
  
  
  
  
                                        Hemogram w/ Autodiff  on 2022  
  
  
  
  
             Abs Baso Cnt 0.1 10*3/uL  Normal       0.0-0.2      Salem City Hospital  
stem  
  
  
  
  
                          Comment on above:         Performed By: #### HEMDF, BM  
P3M ####Mercy Health St. Vincent Medical Center Zupvfx566 E.  
  
                                                    Elizabeth, OH   
  
  
  
  
             Abs Neutrophile Cnt 6.9 10*3/uL  Normal       1.8-7.0      ProMedica Coldwater Regional Hospital  
  
  
  
  
                          Comment on above:         Performed By: #### HEMDF, BM  
P3M ####Mercy Health St. Vincent Medical Center Zcgpbd902 E.  
  
                                                    Elizabeth, OH   
  
  
  
  
             Basophils/100 WBC (Bld) 1.2 %        Normal       0.0-2.0      Summ  
a Health System  
  
  
  
  
                          Comment on above:         Performed By: #### HEMDF, BM  
P3M ####Briana Ville 771835 E.  
  
                                                    Elizabeth, OH   
  
  
  
  
             Eosinophils (Bld) [#/Vol] 0.3 10*3/uL  Normal       0.0-0.5      Harper University Hospital  
  
  
  
  
                          Comment on above:         Performed By: #### HEMDF, BM  
P3M ####Briana Ville 771835 E.  
  
                                                    Elizabeth, OH   
  
  
  
  
             Eosinophils/100 WBC (Bld) 3.1 %        Normal       1.0-6.0      Harper University Hospital  
  
  
  
  
                          Comment on above:         Performed By: #### HEMDF, BM  
P3M ####Briana Ville 771835 Rio Rancho, OH   
  
  
  
  
             Erythrocyte distribution width (RBC) 18.1 %       High         11.5  
-14.5    Munson Healthcare Cadillac Hospital  
  
             [Ratio]                                               
  
  
  
  
                          Comment on above:         Performed By: #### HEMDF, BM  
P3M ####Kettering Health Greene Memorial M2 Connections Kaixrx872 E.  
  
                                                    Elizabeth, OH   
  
  
  
  
             Granulocytes/100 WBC (Bld) 68.4 %       Normal       40.0-80.0    Insight Surgical Hospital  
  
  
  
  
                          Comment on above:         Performed By: #### HEMDF, BM  
P3M ####Kettering Health Greene Memorial M2 Connections Jqznka329 Rio Rancho, OH   
  
  
  
  
             Hematocrit (Bld) [Volume fraction] 27.4 %       Low          40.0-5  
2.0    Munson Healthcare Cadillac Hospital  
  
  
  
  
                          Comment on above:         Performed By: #### HEMDF, BM  
P3M ####Kettering Health Greene Memorial M2 Connections Zgngyx241 Rio Rancho, OH   
  
  
  
  
             Hemoglobin (Bld) [Mass/Vol] 8.8 g/dL     Low          13.0-18.0      
Munson Healthcare Cadillac Hospital  
  
  
  
  
                          Comment on above:         Performed By: #### KACEY BM  
P3M ####Briana Ville 771835 Rio Rancho, OH   
  
  
  
  
             Lymphocytes (Bld) [#/Vol] 2.1 10*3/uL  Normal       1.0-4.3      Harper University Hospital  
  
  
  
  
                          Comment on above:         Performed By: #### KACEY BM  
P3M ####88 Johnson Street.  
  
                                                    Elizabeth, OH   
  
  
  
  
             Lymphocytes/100 WBC (Bld) 20.9 %       Normal       20.0-40.0    Harper University Hospital  
  
  
  
  
                          Comment on above:         Performed By: #### KACEY BM  
P3M ####38 Herrera Street   
  
  
  
  
             MCH (RBC) [Entitic mass] 24.1 pg      Low          26.0-34.0    Beaumont Hospital  
  
  
  
  
                          Comment on above:         Performed By: #### KACEY BM  
P3M ####38 Herrera Street   
  
  
  
  
             MCHC         32.1 %       Normal       32.0-36.0    Salem City Hospital  
stem  
  
  
  
  
                          Comment on above:         Performed By: #### KACEY BM  
P3M ####38 Herrera Street   
  
  
  
  
             MCV (RBC) [Entitic vol] 75.1 fL      Low          80.0-98.0    Summ  
a Health System  
  
  
  
  
                          Comment on above:         Performed By: #### KACEY BM  
P3M ####38 Herrera Street   
  
  
  
  
             Monocytes (Bld) [#/Vol] 0.6 10*3/uL  Normal       0.0-0.8      Summ  
a Health System  
  
  
  
  
                          Comment on above:         Performed By: #### KACEY BM  
P3M ####38 Herrera Street 50549  
  
  
  
  
  
             Monocytes/100 WBC (Bld) 6.4 %        Normal       2.0-10.0     Summ  
a Health System  
  
  
  
  
                          Comment on above:         Performed By: #### KACEY BM  
P3M ####Kettering Health Greene Memorial M2 Connections Brnmyg632 E.  
  
                                                    Elizabeth, OH   
  
  
  
  
             Platelet mean volume (Bld) [Entitic vol] 6.8 fL       Low            
7.4-10.4     Munson Healthcare Cadillac Hospital  
  
  
  
  
                          Comment on above:         Performed By: #### HEMDF, BM  
P3M ####Corey HospitalLaru Technologies Rkxvji111 E.  
  
                                                    Elizabeth, OH   
  
  
  
  
             Platelets (Bld) [#/Vol] 380 10*3/uL  Normal       140-440      University of Michigan Hospital  
  
  
  
  
                          Comment on above:         Performed By: #### HEMDF, BM  
P3M ####Kettering Health Greene Memorial M2 Connections Njbagq769 E.  
  
                                                    Elizabeth, OH   
  
  
  
  
             RBC (Bld) [#/Vol] 3.64 10*6/uL Low          4.40-5.90    McLaren Oakland  
  
  
  
  
                          Comment on above:         Performed By: #### HEMDF, BM  
P3M ####Kettering Health Greene Memorial M2 Connections Zaunpz402 E.  
  
                                                    Elizabeth, OH   
  
  
  
  
             WBC (Bld) [#/Vol] 10.1 10*3/uL Normal       3.6-10.7     McLaren Oakland  
  
  
  
  
                          Comment on above:         Performed By: #### HEMDF, BM  
P3M ####Kettering Health Greene Memorial M2 Connections Hbiwda849 E.  
  
                                                    Elizabeth, OH   
  
  
  
  
                                        Op Note   on 2022  
  
  
  
  
             Op Note                   Normal                    Select Specialty Hospital  
  
  
  
  
                                        POCT Glucose  Ordered By: Payton gr 2022  
  
  
  
  
             Glucose [Mass/Vol] 131 mg/dL    High         70 - 100 mg/dL SUMMA  
   
             Interpretation and review of laboratory results Abnormal             
                    SUMMA  
   
                                                                 SUMMA  
  
  
  
  
                                        POCT Glucose  on 2022  
  
  
  
  
                                                                 Corey Hospital HEALTH Mercy Health St. Joseph Warren Hospital LA  
B  
   
                                                                 Corey Hospital HEALTH Mercy Health St. Joseph Warren Hospital LA  
B  
   
                                                                 University Hospitals Geneva Medical Center LA  
B  
   
             Glucose [Mass/Vol] 110 mg/dL    High         70 - 100 mg/dL SUMMA  
   
             Interpretation and review of Abnormal                                
 Corey Hospital  
  
             laboratory results                                          
   
                                                                 Corey Hospital HEALTH Mercy Health St. Joseph Warren Hospital LA  
B  
   
                                                                 SUMMA  
  
  
  
  
                                        POCT Glucose  Ordered By: Pastor Dela Cruz on 0  
3-  
  
  
  
  
             Glucose [Mass/Vol] 147 mg/dL    High         70 - 100 mg/dL SUMMA  
   
             Interpretation and review of laboratory results Abnormal             
                    SUMMA  
   
                                                                 SUMMA  
  
  
  
  
                                        POCT Glucose  Ordered By: Bria Mac   
on 2022  
  
  
  
  
             Glucose [Mass/Vol] 152 mg/dL    High         70 - 100 mg/dL Corey Hospital  
   
             Interpretation and review of laboratory results Abnormal             
                    Parkview Health  
  
  
  
  
                                        APTT   on 2022  
  
  
  
  
             aPTT Coag (Bld) [Time] 40.6 s       High         20.0-30.5    Munson Healthcare Cadillac Hospital  
  
  
  
  
                          Comment on above:         Result Comment: NOTE: The th  
erapeutic time for Heparin  
  
                                                    anticoagulation,based on Xa   
activity inhibition, is an APTT of  
  
                                                    46-80seconds.  
   
                                                    Performed By: #### HEMDF, BM  
P3M, MG3, APTT ####Kettering Health Greene Memorial M2 Connections  
  
                                                    Ztqnbq589 Allison, OH 96437-2013  
  
  
  
  
             aPTT Coag (Bld) [Time] 40.6 s       High         20.0 - 30.5 s SUMM  
A  
   
             Interpretation and review of Abnormal                                
 Corey Hospital  
  
             laboratory results                                          
   
                                                                 University Hospitals Geneva Medical Center LA  
B  
   
                                                                 Corey Hospital  
   
             aPTT Coag (Bld) [Time] 26.2 s       Normal       20.0-30.5    Munson Healthcare Cadillac Hospital  
  
  
  
  
                          Comment on above:         Result Comment: NOTE: The th  
erapeutic time for Heparin  
  
                                                    anticoagulation,based on Xa   
activity inhibition, is an APTT of  
  
                                                    46-80seconds.  
   
                                                    Performed By: #### APTT ####  
Corey HospitalIndependent Space525 Rio Rancho, OH 56932-9699  
  
  
  
  
             aPTT Coag (Bld) [Time] 26.2 s                    20.0 - 30.5 s SUMM  
A  
  
                                                                 Work Phone: 6(3 64)567-5833  
   
                                                                 University Hospitals Geneva Medical Center LAB  
   
                                                                 Corey Hospital  
  
                                                                 Work Phone: 0(0 03)849-4193  
  
  
  
  
                                        Basic Metabolic Panel  on 2022  
  
  
  
  
             Anion gap [Moles/Vol] 5 mmol/L     Normal       3-13         Munson Healthcare Cadillac Hospital  
  
  
  
  
                          Comment on above:         Performed By: #### HEMDF, BM  
P3M, MG3, APTT ####Cerus Corporation525 . Gause, OH 91805-3192  
  
  
  
  
             Calcium [Mass/Vol] 8.4 mg/dL    Normal       8.4-10.4     The Jewish Hospital System  
  
  
  
  
                          Comment on above:         Performed By: #### HEMDF, BM  
P3M, MG3, APTT ####Briana Ville 771835 E. Gause, OH   
  
  
  
  
             CO2 [Moles/Vol] 25 mmol/L    Normal       22-30        Munson Healthcare Cadillac Hospital  
  
  
  
  
                          Comment on above:         Performed By: #### HEMDF, BM  
P3M, MG3, APTT ####Briana Ville 771835 Allison, OH   
  
  
  
  
             Glucose [Mass/Vol] 143 mg/dL    High                Munson Healthcare Otsego Memorial Hospital  
  
  
  
  
                          Comment on above:         Performed By: #### HEMDF, BM  
P3M, MG3, APTT ####Briana Ville 771835 E. Gause, OH   
  
  
  
  
             Urea nitrogen [Mass/Vol] 15 mg/dL     Normal       7-17         Beaumont Hospital  
  
  
  
  
                          Comment on above:         Performed By: #### HEMDF, BM  
P3M, MG3, APTT ####Briana Ville 771835 EYork, OH   
  
  
  
  
             Creatinine [Mass/Vol] 1.18 mg/dL   Normal       0.52-1.25    Munson Healthcare Cadillac Hospital  
  
  
  
  
                          Comment on above:         Performed By: #### HEMDF, BM  
P3M, MG3, APTT ####Briana Ville 771835 EYork, OH   
  
  
  
  
             GFR/1.73 sq M.predicted among 74.6 mL/min/{1.73_m2} Normal       >6  
0          Munson Healthcare Cadillac Hospital  
  
             blacks MDRD (S/P/Bld) [Vol                                          
  
             rate/Area]                                            
  
  
  
  
                          Comment on above:         Performed By: #### HEMDF, BM  
P3M, MG3, APTT ####Briana Ville 771835 . Gause, OH   
  
  
  
  
             GFR/1.73 sq M.predicted among 64.3 mL/min/{1.73_m2} Normal       >6  
0          Munson Healthcare Cadillac Hospital  
  
             non-blacks MDRD (S/P/Bld) [Vol                                       
     
  
             rate/Area]                                            
  
  
  
  
                          Comment on above:         Result Comment: KDIGO guidel  
zoë provide the following GFR  
  
                                                    categories:Stage GFR(ml/min/  
1.73 m2) TermsG1 >=90 Normal or highG2  
  
                                                    60-89 Mildly decreased*G3a 4  
5-59 Mildly to moderately swqxgbqjdC4b  
  
                                                    30-44 Moderately to severely  
 decreasedG4 15-29 Severely decreasedG5  
  
                                                    <15 Kidney failure*Relative   
to young adult level.In the absence of  
  
                                                    evidence of kidney damage, n  
either GFRcategory G1 nor G2 fulfill  
  
                                                    the criteria for CKD.The CKD  
-EPI equation is validated in  
  
                                                    individuals 18 yearsof age a  
nd older. Currently the best equation  
  
                                                    forestimating glomerular beto  
tration rate (GFR) from serumcreatinine  
  
                                                    in children is the Bedside S  
chwartz equation.It is less accurate in  
  
                                                    patients with extremes of mu  
sclemass, restriction of dietary  
  
                                                    protein, ingestion of creati  
ne,extra-renal metabolism of  
  
                                                    creatinine, or treatment wit  
hmedications that affect renal tubular  
  
                                                    creatinine secretion.  
   
                                                    Performed By: #### HEMDF, BM  
P3M, MG3, APTT ####Corey HospitalLaru Technologies  
  
                                                    Ditsto484 EYork, OH   
  
  
  
  
             Chloride [Moles/Vol] 104 mmol/L   Normal              Mercy Health St. Joseph Warren Hospital System  
  
  
  
  
                          Comment on above:         Performed By: #### HEMDF, BM  
P3M, MG3, APTT ####Kettering Health Greene Memorial M2 Connections  
  
                                                    Fcezew707 Allison, OH   
  
  
  
  
             Potassium [Moles/Vol] 3.2 mmol/L   Low          3.5-5.1      Munson Healthcare Cadillac Hospital  
  
  
  
  
                          Comment on above:         Performed By: #### HEMDF, BM  
P3M, MG3, APTT ####Cerus Corporation525 E. Gause, OH   
  
  
  
  
             Sodium [Moles/Vol] 134 mmol/L   Low          135-145      The Jewish Hospital System  
  
  
  
  
                          Comment on above:         Performed By: #### HEMDF, BM  
P3M, MG3, APTT ####Kettering Health Greene Memorial M2 Connections  
  
                                                    Jzymkb824 . Gause, OH   
  
  
  
  
                                        Basic Metabolic Panel w/ Reflex to MG  o  
n 2022  
  
  
  
  
             Anion gap [Moles/Vol] 5 mmol/L                  3 - 13 mmol/L SUMMA  
   
             Calcium [Mass/Vol] 8.4 mg/dL                 8.4 - 10.4 mg/dL SUMMA  
   
             Chloride [Moles/Vol] 104 mmol/L                98 - 107 mmol/L SUMM  
A  
   
             CO2 [Moles/Vol] 25 mmol/L                 22 - 30 mmol/L SUMMA  
   
             Creatinine [Mass/Vol] 1.18 mg/dL                0.52 - 1.25 mg/dL S  
Barnesville Hospital  
   
             EGFR IF NonAfrican 64.3 mL/min               >60          SUMMA  
  
             American                                              
   
             GFR/1.73 sq M.predicted 74.6                      >60          SUMM  
A  
  
             among blacks MDRD mL/min/{1.73_m2}                             
  
             (S/P/Bld) [Vol rate/Area]                                          
   
             Glucose [Mass/Vol] 143 mg/dL    High         70 - 100 mg/dL SUMMA  
   
             Interpretation and review Abnormal                               KRAMER  
MMA  
  
             of laboratory results                                          
   
             Potassium [Moles/Vol] 3.2 mmol/L   Low          3.5 - 5.1 mmol/L KRAMER  
MMA  
   
             Sodium [Moles/Vol] 134 mmol/L   Low          135 - 145 mmol/L SUMMA  
   
             Urea nitrogen (BldV) 15 mg/dL                  7 - 17 mg/dL SUMMA  
  
             [Mass/Vol]                                            
   
                                                                 Kettering Health Miamisburg LAB  
   
                                                                 SUMMA  
  
  
  
  
                                        CBC auto differential  on 2022  
  
  
  
  
             Absolute Baso # 0.1 10*3/uL               0.0 - 0.2 10*3/uL SUMMA  
   
             Absolute Neut # 6.2 10*3/uL               1.8 - 7.0 10*3/uL SUMMA  
   
             Basophils/100 WBC (Bld) 0.7 %                     0.0 - 2.0 %  SUMM  
A  
   
             Eosinophils (Bld) [#/Vol] 0.3 10*3/uL               0.0 - 0.5 10*3/  
uL SUMMA  
   
             Eosinophils/100 WBC (Bld) 3.2 %                     1.0 - 6.0 %  KRAMER  
MMA  
   
             Granulocytes/100 WBC (Bld) 65.7 %                    40.0 - 80.0 %   
SUMMA  
   
             Hematocrit (Bld) [Volume 27.2 %       Low          40.0 - 52.0 % KRAMER  
MMA  
  
             fraction]                                             
   
             Hemoglobin.gastrointestinal 8.7 g/dL     Low          13.0 - 18.0 g  
/dL SUMMA  
  
             spec 1 Ql (Stl)                                          
   
             Interpretation and review of Abnormal                                
 SUMMA  
  
             laboratory results                                          
   
             Lymphocytes (Bld) [#/Vol] 2.3 10*3/uL               1.0 - 4.3 10*3/  
uL SUMMA  
   
             Lymphocytes/100 WBC (Bld) 24.4 %                    20.0 - 40.0 % S  
UMMA  
   
             MCH (RBC) [Entitic mass] 24.3 pg      Low          26.0 - 34.0 pg S  
UMMA  
   
             MCHC (RBC) [Mass/Vol] 32.2 %                    32.0 - 36.0 % SUMMA  
   
             MCV (RBC) [Entitic vol] 75.5 fL      Low          80.0 - 98.0 fL KRAMER  
MMA  
   
             Monocytes (Bld) [#/Vol] 0.6 10*3/uL               0.0 - 0.8 10*3/uL  
 SUMMA  
   
             Monocytes/100 WBC (Bld) 6.0 %                     2.0 - 10.0 % SUMM  
A  
   
             Platelet distribution width 17.7 %       High         11.5 - 14.5 %  
 SUMMA  
  
             (Bld) [Ratio]                                          
   
             Platelet mean volume (Bld) 6.9 fL       Low          7.4 - 10.4 fL   
SUMMA  
  
             [Entitic vol]                                          
   
             Platelets (Bld) [#/Vol] 382 10*3/uL               140 - 440 10*3/uL  
 SUMMA  
   
             RBC (Bld) [#/Vol] 3.60 10*6/uL Low          4.40 - 5.90 10*6/uL SUM  
MA  
   
             WBC (Bld) [#/Vol] 9.4 10*3/uL               3.6 - 10.7 10*3/uL SUMM  
A  
   
                                                                 University Hospitals Geneva Medical Center   
LAB  
   
                                                                 Corey Hospital  
  
  
  
  
                                        Glucose,Bedside   on 2022  
  
  
  
  
             Glucose [Mass/Vol] 163 mg/dL    High                The Jewish Hospital System  
  
  
  
  
                          Comment on above:         Result Comment: Test perform  
ed by glucose meter. Results may   
be  
  
                                                    10%-15% lowerthan serum/plas  
ma values. (CLIA ID 56P2170198)  
   
                                                    Performed By: #### BGLU ####  
K94 Discoveries Oofdlh372 EDexter, OH 47656-8443  
  
  
  
  
             Glucose [Mass/Vol] 130 mg/dL    High                Corey Hospitala a  
Lima City Hospital System  
  
  
  
  
                          Comment on above:         Result Comment: Test perform  
ed by glucose meter. Results may   
be  
  
                                                    10%-15% lowerthan serum/plas  
ma values. (CLIA ID 95I2496601)  
   
                                                    Performed By: #### BGLU ####  
K94 Discoveries Fzljzu398 Rio Rancho, OH 49563-0537  
  
  
  
  
             Glucose [Mass/Vol] 114 mg/dL    High                Corey Hospitala a  
Lima City Hospital System  
  
  
  
  
                          Comment on above:         Result Comment: Test perform  
ed by glucose meter. Results may   
be  
  
                                                    10%-15% lowerthan serum/plas  
ma values. (CLIA ID 38Y4482252)  
   
                                                    Performed By: #### BGLU ####  
Kettering Health Greene Memorial M2 Connections Deqgty508 Rio Rancho, OH   
  
  
  
  
             Glucose [Mass/Vol] 102 mg/dL    High                The Jewish Hospital System  
  
  
  
  
                          Comment on above:         Result Comment: Test perform  
ed by glucose meter. Results may   
be  
  
                                                    10%-15% lowerthan serum/plas  
ma values. (CLIA ID 90E8478421)  
   
                                                    Performed By: #### BGLU ####  
Kettering Health Greene Memorial M2 Connections Ntxwao831 Rio Rancho, OH   
  
  
  
  
                                        Hemogram w/ Autodiff  on 2022  
  
  
  
  
             Abs Baso Cnt 0.1 10*3/uL  Normal       0.0-0.2      Salem City Hospital  
stem  
  
  
  
  
                          Comment on above:         Performed By: #### HEMDF, BM  
P3M, MG3, APTT ####Kettering Health Greene Memorial M2 Connections  
  
                                                    Obybtk873 Allison, OH   
  
  
  
  
             Abs Neutrophile Cnt 6.2 10*3/uL  Normal       1.8-7.0      ProMedica Coldwater Regional Hospital  
  
  
  
  
                          Comment on above:         Performed By: #### HEMDF, BM  
P3M, MG3, APTT ####Kettering Health Greene Memorial M2 Connections  
  
                                                    Neklwv594 Allison, OH   
  
  
  
  
             Basophils/100 WBC (Bld) 0.7 %        Normal       0.0-2.0      Summ  
a Health System  
  
  
  
  
                          Comment on above:         Performed By: #### HEMDF, BM  
P3M, MG3, APTT ####Kettering Health Greene Memorial M2 Connections  
  
                                                    Clinton Ville 25743 EYork, OH   
  
  
  
  
             Eosinophils (Bld) [#/Vol] 0.3 10*3/uL  Normal       0.0-0.5      Harper University Hospital  
  
  
  
  
                          Comment on above:         Performed By: #### HEMDF, BM  
P3M, MG3, APTT ####Kettering Health Greene Memorial M2 Connections  
  
                                                    Nlrxns043 Allison, OH   
  
  
  
  
             Eosinophils/100 WBC (Bld) 3.2 %        Normal       1.0-6.0      Harper University Hospital  
  
  
  
  
                          Comment on above:         Performed By: #### HEMDF, BM  
P3M, MG3, APTT ####Kettering Health Greene Memorial M2 Connections  
  
                                                    Lozadu967 Allison, OH   
  
  
  
  
             Erythrocyte distribution width (RBC) 17.7 %       High         11.5  
-14.5    Munson Healthcare Cadillac Hospital  
  
             [Ratio]                                               
  
  
  
  
                          Comment on above:         Performed By: #### HEMDF, BM  
P3M, MG3, APTT ####Briana Ville 771835 EYork, OH   
  
  
  
  
             Granulocytes/100 WBC (Bld) 65.7 %       Normal       40.0-80.0    Insight Surgical Hospital  
  
  
  
  
                          Comment on above:         Performed By: #### HEMDF, BM  
P3M, MG3, APTT ####Briana Ville 771835 E. Gause, OH   
  
  
  
  
             Hematocrit (Bld) [Volume fraction] 27.2 %       Low          40.0-5  
2.0    Munson Healthcare Cadillac Hospital  
  
  
  
  
                          Comment on above:         Performed By: #### HEMDF, BM  
P3M, MG3, APTT ####Briana Ville 771835 Allison, OH   
  
  
  
  
             Hemoglobin (Bld) [Mass/Vol] 8.7 g/dL     Low          13.0-18.0      
Munson Healthcare Cadillac Hospital  
  
  
  
  
                          Comment on above:         Performed By: #### HEMDF, BM  
P3M, MG3, APTT ####04 Allen Street   
  
  
  
  
             Lymphocytes (Bld) [#/Vol] 2.3 10*3/uL  Normal       1.0-4.3      Harper University Hospital  
  
  
  
  
                          Comment on above:         Performed By: #### HEMDF, BM  
P3M, MG3, APTT ####Thomas Ville 45855 E. Gause, OH   
  
  
  
  
             Lymphocytes/100 WBC (Bld) 24.4 %       Normal       20.0-40.0    Harper University Hospital  
  
  
  
  
                          Comment on above:         Performed By: #### HEMDF, BM  
P3M, MG3, APTT ####88 Johnson Street. Gause, OH   
  
  
  
  
             MCH (RBC) [Entitic mass] 24.3 pg      Low          26.0-34.0    Beaumont Hospital  
  
  
  
  
                          Comment on above:         Performed By: #### HEMDF, BM  
P3M, MG3, APTT ####04 Allen Street   
  
  
  
  
             MCHC         32.2 %       Normal       32.0-36.0    Salem City Hospital  
stem  
  
  
  
  
                          Comment on above:         Performed By: #### HEMDF, BM  
P3M, MG3, APTT ####Briana Ville 771835 Allison, OH   
  
  
  
  
             MCV (RBC) [Entitic vol] 75.5 fL      Low          80.0-98.0    Summ  
a Health System  
  
  
  
  
                          Comment on above:         Performed By: #### HEMDF, BM  
P3M, MG3, APTT ####Thomas Ville 45855 E. Gause, OH   
  
  
  
  
             Monocytes (Bld) [#/Vol] 0.6 10*3/uL  Normal       0.0-0.8      Summ  
a Health System  
  
  
  
  
                          Comment on above:         Performed By: #### HEMDF, BM  
P3M, MG3, APTT ####04 Allen Street   
  
  
  
  
             Monocytes/100 WBC (Bld) 6.0 %        Normal       2.0-10.0     Summ  
a Health System  
  
  
  
  
                          Comment on above:         Performed By: #### HEMDF, BM  
P3M, MG3, APTT ####04 Allen Street   
  
  
  
  
             Platelet mean volume (Bld) [Entitic vol] 6.9 fL       Low            
7.4-10.4     Munson Healthcare Cadillac Hospital  
  
  
  
  
                          Comment on above:         Performed By: #### HEMDF, BM  
P3M, MG3, APTT ####04 Allen Street   
  
  
  
  
             Platelets (Bld) [#/Vol] 382 10*3/uL  Normal       140-440      University of Michigan Hospital  
  
  
  
  
                          Comment on above:         Performed By: #### HEMDF, BM  
P3M, MG3, APTT ####04 Allen Street   
  
  
  
  
             RBC (Bld) [#/Vol] 3.60 10*6/uL Low          4.40-5.90    McLaren Oakland  
  
  
  
  
                          Comment on above:         Performed By: #### HEMDF, BM  
P3M, MG3, APTT ####Briana Ville 771835 Allison, OH   
  
  
  
  
             WBC (Bld) [#/Vol] 9.4 10*3/uL  Normal       3.6-10.7     Holmes County Joel Pomerene Memorial Hospital System  
  
  
  
  
                          Comment on above:         Performed By: #### HEMDF, BM  
P3M, MG3, APTT ####Kettering Health Greene Memorial M2 Connections  
  
                                                    Dtvjkw943 . Gause, OH 26410-6286  
  
  
  
  
                                        Magnesium  on 2022  
  
  
  
  
             Magnesium [Mass/Vol] 1.8 mg/dL    Normal       1.6-2.3      Mercy Health St. Joseph Warren Hospital System  
  
  
  
  
                          Comment on above:         Performed By: #### HEMDF, BM  
P3M, MG3, APTT ####K94 Discoveries  
  
                                                    Gyelxf770 E. Gause, OH 22617-9672  
  
  
  
  
             Magnesium [Mass/Vol] 1.8 mg/dL                 1.6 - 2.3 mg/dL Toledo Hospital  
  
                                                                 LAB  
   
                                                                 SUMMA  
  
  
  
  
                                        Op Note  on 2022  
  
  
  
  
             Op Note                   Normal                    Select Specialty Hospital  
  
  
  
  
                                        POCT Glucose   on 2022  
  
  
  
  
             Glucose [Mass/Vol] 163 mg/dL    High         70 - 100 mg/dL SUMMA  
   
             Interpretation and review of Abnormal                                
 Corey Hospital  
  
             laboratory results                                          
   
                                                                 University Hospitals Geneva Medical Center LA  
B  
   
                                                                 SUMMA  
   
             Glucose [Mass/Vol] 130 mg/dL    High         70 - 100 mg/dL SUMMA  
   
             Interpretation and review of Abnormal                                
 SUMMA  
  
             laboratory results                                          
   
                                                                 University Hospitals Geneva Medical Center LA  
B  
   
                                                                 SUMMA  
   
             Glucose [Mass/Vol] 114 mg/dL    High         70 - 100 mg/dL SUMMA  
  
                                                                 Work Phone: 4(4 53)484-2077  
   
             Interpretation and review of Abnormal                                
 SUMMA  
  
             laboratory results                                        Work Phon  
e: 1(900) 105-3977  
   
                                                                 University Hospitals Geneva Medical Center LA  
B  
   
                                                                 SUMMA  
  
                                                                 Work Phone: 9(0 61)535-9379  
   
                                                                 Galion Hospital  
B  
  
  
  
  
                                        POCT Glucose  Ordered By: Savannah Mendez  
is on 2022  
  
  
  
  
             Glucose [Mass/Vol] 102 mg/dL    High         70 - 100 mg/dL SUMMA  
   
             Interpretation and review of laboratory results Abnormal             
                    SUMMA  
   
                                                                 SUMMA  
  
  
  
  
                                        ACT,Whole Blood  on 2022  
  
  
  
  
             ACT,Whole Blood 206 s        High                Munson Healthcare Cadillac Hospital  
  
  
  
  
                          Comment on above:         Result Comment: ACTBPerforme  
d by Hemochron Sig EliteCLIA ID:  
  
                                                    11F4235042Gnxec Health, Akro  
n, OHACT testing is not intended for  
  
                                                    patients taking aprotonin,pa  
tients with hematocrits of <20% or  
  
                                                    >55%, patients usingother ty  
pes of anticoagulation medications, and  
  
                                                    patients withLupus Anticoagu  
lant.  
   
                                                    Performed By: #### ACTB ####  
Kettering Health Greene Memorial M2 Connections Ljdcxy005 Rio Rancho, OH   
  
  
  
  
             ACT,Whole Blood OutOfRange   Critically abnormal        Munson Healthcare Cadillac Hospital  
  
  
  
  
                          Comment on above:         Result Comment: ACTBPerforme  
d by Hemochron Sig EliteCLIA ID:  
  
                                                    05B5491356Gfuzk St. John of God Hospital, Akro  
n, OHACT testing is not intended for  
  
                                                    patients taking aprotonin,pa  
tients with hematocrits of <20% or  
  
                                                    >55%, patients usingother ty  
pes of anticoagulation medications, and  
  
                                                    patients withLupus Anticoagu  
lant.  
   
                                                    Performed By: #### ACTB ####  
Briana Ville 771835 Rio Rancho, OH   
  
  
  
  
             ACT,Whole Blood 127 s        Normal              Munson Healthcare Cadillac Hospital  
  
  
  
  
                          Comment on above:         Result Comment: ACTBPerforme  
d by HemStemron Sig EliteCLIA ID:  
  
                                                    38N1218418Bwciu St. John of God Hospital, Akro  
n, OHACT testing is not intended for  
  
                                                    patients taking aprotonin,pa  
tients with hematocrits of <20% or  
  
                                                    >55%, patients usingother ty  
pes of anticoagulation medications, and  
  
                                                    patients withLupus Anticoagu  
lant.  
   
                                                    Performed By: #### ACTB ####  
Kettering Health Greene Memorial M2 Connections Mqxppq265 Rio Rancho, OH   
  
  
  
  
             ACT,Whole Blood 235 s        High                Munson Healthcare Cadillac Hospital  
  
  
  
  
                          Comment on above:         Result Comment: ACTBPerforme  
d by HemStemron Sig EliteCLIA ID:  
  
                                                    84D4002474Najje St. John of God Hospital, Akro  
n, OHACT testing is not intended for  
  
                                                    patients taking aprotonin,pa  
tients with hematocrits of <20% or  
  
                                                    >55%, patients usingother ty  
pes of anticoagulation medications, and  
  
                                                    patients withLupus Anticoagu  
lant.  
   
                                                    Performed By: #### ACTB ####  
Kettering Health Greene Memorial M2 Connections Iwwfnf840 Rio Rancho, OH   
  
  
  
  
             ACT,Whole Blood 206 s        High                Munson Healthcare Cadillac Hospital  
  
  
  
  
                          Comment on above:         Result Comment: ACTBPerforme  
d by HemCatheter Connections Sig EliteCLIA ID:  
  
                                                    08O4849503Scszt Health, Encompass Health Valley of the Sun Rehabilitation Hospital  
n, OHACT testing is not intended for  
  
                                                    patients taking aprotonin,pa  
tients with hematocrits of <20% or  
  
                                                    >55%, patients usingother ty  
pes of anticoagulation medications, and  
  
                                                    patients withLupus Anticoagu  
lant.  
   
                                                    Performed By: #### ACTB ####  
Kettering Health Greene Memorial M2 Connections Izhtkv271 Rio Rancho, OH   
  
  
  
  
             ACT,Whole Blood 236 s        High                Munson Healthcare Cadillac Hospital  
  
  
  
  
                          Comment on above:         Result Comment: ACTBPerforme  
d by goDog Fetch Sig EliteCLIA ID:  
  
                                                    77X3407855Lytqt Health, Akro  
n, OHACT testing is not intended for  
  
                                                    patients taking aprotonin,pa  
tients with hematocrits of <20% or  
  
                                                    >55%, patients usingother ty  
pes of anticoagulation medications, and  
  
                                                    patients withLupus Anticoagu  
lant.  
   
                                                    Performed By: #### ACTB ####  
Kettering Health Greene Memorial M2 Connections Auvrbu275 Rio Rancho, OH   
  
  
  
  
                                        APTT  on 2022  
  
  
  
  
             aPTT Coag (Bld) [Time] 30.9 s       High         20.0-30.5    Munson Healthcare Cadillac Hospital  
  
  
  
  
                          Comment on above:         Result Comment: NOTE: The th  
erapeutic time for Heparin  
  
                                                    anticoagulation,based on Xa   
activity inhibition, is an APTT of  
  
                                                    46-80seconds.  
   
                                                    Performed By: #### APTT ####  
Kettering Health Greene Memorial M2 Connections Nfmedd100 Rio Rancho, OH   
  
  
  
  
             aPTT Coag (Bld) [Time] 30.9 s       High         20.0 - 30.5 s SUMM  
A  
   
             Interpretation and review of Abnormal                                
 Corey Hospital  
  
             laboratory results                                          
   
                                                                 TriHealth McCullough-Hyde Memorial Hospital  
   
             aPTT Coag (Bld) [Time] 32.5 s       High         20.0-30.5    Munson Healthcare Cadillac Hospital  
  
  
  
  
                          Comment on above:         Result Comment: NOTE: The th  
erapeutic time for Heparin  
  
                                                    anticoagulation,based on Xa   
activity inhibition, is an APTT of  
  
                                                    46-80seconds.  
   
                                                    Performed By: #### APTT ####  
Kettering Health Greene Memorial M2 Connections Zednsj238 Rio Rancho, OH 52065-9987  
  
  
  
  
             aPTT Coag (Bld) [Time] 32.5 s       High         20.0 - 30.5 s SUMM  
A  
   
             Interpretation and review of Abnormal                                
 Corey Hospital  
  
             laboratory results                                          
   
                                                                 Galion Hospital  
B  
   
                                                                 Corey Hospital  
  
  
  
  
                                        Basic Metabolic Panel  on 2022  
  
  
  
  
             Calcium [Mass/Vol] 8.9 mg/dL    Normal       8.4-10.4     The Jewish Hospital System  
  
  
  
  
                          Comment on above:         Performed By: #### HEMDF BM  
P3M ####Corey HospitalLaru Technologies Oojhmc889 Rio Rancho, OH 51411  
  
  
  
  
  
             Anion gap [Moles/Vol] 8 mmol/L     Normal       3-13         Munson Healthcare Cadillac Hospital  
  
  
  
  
                          Comment on above:         Performed By: #### HEMDF, BM  
P3M ####Corey HospitalLaru Technologies Xyztbh767 SoundwaveDexter, OH   
  
  
  
  
             CO2 [Moles/Vol] 23 mmol/L    Normal       22-30        Munson Healthcare Cadillac Hospital  
  
  
  
  
                          Comment on above:         Performed By: #### HEMDF, BM  
P3M ####Corey HospitalLaru Technologies Iqpldx560 Rio Rancho, OH   
  
  
  
  
             Creatinine [Mass/Vol] 1.10 mg/dL   Normal       0.52-1.25    Munson Healthcare Cadillac Hospital  
  
  
  
  
                          Comment on above:         Performed By: #### HEMDF, BM  
P3M ####Cerus Corporation525 Rio Rancho, OH 39611  
  
  
  
  
  
             GFR/1.73 sq M.predicted among 81.2 mL/min/{1.73_m2} Normal       >6  
0          Munson Healthcare Cadillac Hospital  
  
             blacks MDRD (S/P/Bld) [Vol                                          
  
             rate/Area]                                            
  
  
  
  
                          Comment on above:         Performed By: #### HEMDF, BM  
P3M ####Corey HospitalLaru Technologies Szwfqe113 Rio Rancho, OH 03553  
  
  
  
  
  
             GFR/1.73 sq M.predicted among 70.0 mL/min/{1.73_m2} Normal       >6  
0          Munson Healthcare Cadillac Hospital  
  
             non-blacks MDRD (S/P/Bld) [Vol                                       
     
  
             rate/Area]                                            
  
  
  
  
                          Comment on above:         Result Comment: KDIGO guidel  
zoë provide the following GFR  
  
                                                    categories:Stage GFR(ml/min/  
1.73 m2) TermsG1 >=90 Normal or highG2  
  
                                                    60-89 Mildly decreased*G3a 4  
5-59 Mildly to moderately mbbfucjxiM1w  
  
                                                    30-44 Moderately to severely  
 decreasedG4 15-29 Severely decreasedG5  
  
                                                    <15 Kidney failure*Relative   
to young adult level.In the absence of  
  
                                                    evidence of kidney damage, n  
either GFRcategory G1 nor G2 fulfill  
  
                                                    the criteria for CKD.The CKD  
-EPI equation is validated in  
  
                                                    individuals 18 yearsof age a  
nd older. Currently the best equation  
  
                                                    forestimating glomerular beto  
tration rate (GFR) from serumcreatinine  
  
                                                    in children is the Bedside S  
kristiwartz equation.It is less accurate in  
  
                                                    patients with extremes of mu  
sclemass, restriction of dietary  
  
                                                    protein, ingestion of creati  
ne,extra-renal metabolism of  
  
                                                    creatinine, or treatment wit  
hmedications that affect renal tubular  
  
                                                    creatinine secretion.  
   
                                                    Performed By: #### HEMDF BM  
P3M ####Xplornet5 SoundwaveDexter, OH   
  
  
  
  
             Glucose [Mass/Vol] 153 mg/dL    High                The Jewish Hospital System  
  
  
  
  
                          Comment on above:         Performed By: #### HEMDF BM  
P3M ####Xplornet5 SoundwaveDexter, OH   
  
  
  
  
             Urea nitrogen [Mass/Vol] 10 mg/dL     Normal       7-17         Beaumont Hospital  
  
  
  
  
                          Comment on above:         Performed By: #### HEMDF BM  
P3M ####Xplornet5 Santa Maria Biotherapeutics  
  
                                                    Elizabeth, OH   
  
  
  
  
             Chloride [Moles/Vol] 104 mmol/L   Normal              Munising Memorial Hospital  
  
  
  
  
                          Comment on above:         Performed By: #### HEMDF, BM  
P3M ####Xplornet5 Santa Maria Biotherapeutics  
  
                                                    Elizabeth, OH   
  
  
  
  
             Potassium [Moles/Vol] 4.2 mmol/L   Normal       3.5-5.1      Munson Healthcare Cadillac Hospital  
  
  
  
  
                          Comment on above:         Performed By: #### HEMDF, BM  
P3M ####Xplornet5 Santa Maria Biotherapeutics  
  
                                                    Elizabeth, OH   
  
  
  
  
             Sodium [Moles/Vol] 135 mmol/L   Normal       135-145      The Jewish Hospital System  
  
  
  
  
                          Comment on above:         Performed By: #### HEMDF, BM  
P3M ####Xplornet5 Santa Maria Biotherapeutics  
  
                                                    Elizabeth, OH   
  
  
  
  
                                        Basic Metabolic Panel w/ Reflex to MG  o  
n 2022  
  
  
  
  
             Anion gap [Moles/Vol] 8 mmol/L                  3 - 13 mmol/L SUMMA  
   
             Calcium [Mass/Vol] 8.9 mg/dL                 8.4 - 10.4 mg/dL SUMMA  
   
             Chloride [Moles/Vol] 104 mmol/L                98 - 107 mmol/L SUMM  
A  
   
             CO2 [Moles/Vol] 23 mmol/L                 22 - 30 mmol/L SUMMA  
   
             Creatinine [Mass/Vol] 1.1 mg/dL                 0.52 - 1.25 mg/dL S  
Barnesville Hospital  
   
             EGFR IF NonAfrican 70.0 mL/min               >60          SUMMA  
  
             American                                              
   
             GFR/1.73 sq M.predicted 81.2                      >60          SUMM  
A  
  
             among blacks MDRD mL/min/{1.73_m2}                             
  
             (S/P/Bld) [Vol rate/Area]                                          
   
             Glucose [Mass/Vol] 153 mg/dL    High         70 - 100 mg/dL SUMMA  
   
             Interpretation and review Abnormal                               KRAMER  
MMA  
  
             of laboratory results                                          
   
             Potassium [Moles/Vol] 4.2 mmol/L                3.5 - 5.1 mmol/L KRAMER  
MMA  
   
             Sodium [Moles/Vol] 135 mmol/L                135 - 145 mmol/L SUMMA  
   
             Urea nitrogen (BldV) 10 mg/dL                  7 - 17 mg/dL SUMMA  
  
             [Mass/Vol]                                            
   
                                                                 Kettering Health Miamisburg LAB  
   
                                                                 SUMMA  
  
  
  
  
                                        CBC auto differential  on 2022  
  
  
  
  
             Absolute Baso # 0.0 10*3/uL               0.0 - 0.2 10*3/uL SUMMA  
   
             Absolute Neut # 7.0 10*3/uL               1.8 - 7.0 10*3/uL SUMMA  
   
             Basophils/100 WBC (Bld) 0.3 %                     0.0 - 2.0 %  SUMM  
A  
   
             Eosinophils (Bld) [#/Vol] 0.0 10*3/uL               0.0 - 0.5 10*3/  
uL SUMMA  
   
             Eosinophils/100 WBC (Bld) 0.4 %        Low          1.0 - 6.0 %  KRAMER  
MMA  
   
             Granulocytes/100 WBC (Bld) 86.8 %       High         40.0 - 80.0 %   
SUMMA  
   
             Hematocrit (Bld) [Volume 30.4 %       Low          40.0 - 52.0 % KRAMER  
MMA  
  
             fraction]                                             
   
             Hemoglobin.gastrointestinal 9.6 g/dL     Low          13.0 - 18.0 g  
/dL SUMMA  
  
             spec 1 Ql (Stl)                                          
   
             Interpretation and review of Abnormal                                
 SUMMA  
  
             laboratory results                                          
   
             Lymphocytes (Bld) [#/Vol] 0.7 10*3/uL  Low          1.0 - 4.3 10*3/  
uL SUMMA  
   
             Lymphocytes/100 WBC (Bld) 8.4 %        Low          20.0 - 40.0 % S  
UMMA  
   
             MCH (RBC) [Entitic mass] 23.8 pg      Low          26.0 - 34.0 pg S  
UMMA  
   
             MCHC (RBC) [Mass/Vol] 31.5 %       Low          32.0 - 36.0 % SUMMA  
   
             MCV (RBC) [Entitic vol] 75.6 fL      Low          80.0 - 98.0 fL McKitrick Hospital  
   
             Monocytes (Bld) [#/Vol] 0.3 10*3/uL               0.0 - 0.8 10*3/uL  
 SUMMA  
   
             Monocytes/100 WBC (Bld) 4.1 %                     2.0 - 10.0 % SUMM  
A  
   
             Platelet distribution width 17.9 %       High         11.5 - 14.5 %  
 SUMMA  
  
             (Bld) [Ratio]                                          
   
             Platelet mean volume (Bld) 6.8 fL       Low          7.4 - 10.4 fL   
SUMMA  
  
             [Entitic vol]                                          
   
             Platelets (Bld) [#/Vol] 365 10*3/uL               140 - 440 10*3/uL  
 SUMMA  
   
             RBC (Bld) [#/Vol] 4.02 10*6/uL Low          4.40 - 5.90 10*6/uL SUM  
MA  
   
             WBC (Bld) [#/Vol] 8.1 10*3/uL               3.6 - 10.7 10*3/uL Toledo Hospital   
LAB  
   
                                                                 Corey Hospital  
  
  
  
  
                                        CULT/STAIN - AEROBIC AND ANAEROBIC  on 0  
3-  
  
  
  
  
             CULT/STAIN - AEROBIC AND ANAEROBIC              Normal               
       Munson Healthcare Cadillac Hospital  
  
  
  
  
                          Comment on above:         Performed By: #### CXAAN ###  
#Briana Ville 771835 Rio Rancho, OH 11643-3876QcStanley Ville 549385 Rio Rancho, OH 513603694  
  
  
  
  
                                        Culture, Anaerobic and Aerobic  on   
  
  
  
  
             Aerobic Culture Staphylococcus aureus Abnormal                  SUM  
MA  
   
             Aerobic Culture Klebsiella pneumoniae ss. Abnormal                   
 SUMMA  
  
                          pneumoniae                               
   
             Aerobic Culture Rare                                   Corey Hospital  
   
             Aerobic Culture Group B streptococcus Abnormal                  SUM  
MA  
   
             Anaerobic Culture No growth of anaerobes at 5                        
     Corey Hospital  
  
                          days.                                    
   
             Gram Stain Result                                        Corey Hospital  
   
             Interpretation and review of Abnormal                                
 Corey Hospital  
  
             laboratory results                                          
   
                                                                 University Hospitals Geneva Medical Center   
LAB  
   
                                                                 Corey Hospital  
  
  
  
  
                                        Glucose,Bedside  on 2022  
  
  
  
  
             Glucose [Mass/Vol] 171 mg/dL    High                Corey Hospitala Hea  
Lima City Hospital System  
  
  
  
  
                          Comment on above:         Result Comment: Test perform  
ed by glucose meter. Results may   
be  
  
                                                    10%-15% lowerthan serum/plas  
ma values. (CLIA ID 96C0429182)  
   
                                                    Performed By: #### BGLU ####  
K94 Discoveries Wzlgyz561 E. Roswell Park Comprehensive Cancer CenterAKRON, OH 01068-0708  
  
  
  
  
             Glucose [Mass/Vol] 144 mg/dL    High                Corey Hospitala Hea  
lt System  
  
  
  
  
                          Comment on above:         Result Comment: Test perform  
ed by glucose meter. Results may   
be  
  
                                                    10%-15% lowerthan serum/plas  
ma values. (CLIA ID 61Z2459059)  
   
                                                    Performed By: #### BGLU ####  
Cerus Corporation525 E. Roswell Park Comprehensive Cancer CenterAKRON, OH 33277-8833  
  
  
  
  
             Glucose [Mass/Vol] 140 mg/dL    High                Corey Hospitala Hea  
lt System  
  
  
  
  
                          Comment on above:         Result Comment: Test perform  
ed by glucose meter. Results may   
be  
  
                                                    10%-15% lowerthan serum/plas  
ma values. (CLIA ID 35D0780511)  
   
                                                    Performed By: #### BGLU ####  
Xplornet5 E. ECU Health Duplin HospitalRON, OH 96762-1455  
  
  
  
  
             Glucose [Mass/Vol] 107 mg/dL    High                Corey Hospitala Hea  
Lima City Hospital System  
  
  
  
  
                          Comment on above:         Result Comment: Test perform  
ed by glucose meter. Results may   
be  
  
                                                    10%-15% lowerthan serum/plas  
ma values. (CLIA ID 17T3433180)  
   
                                                    Performed By: #### BGLU ####  
K94 Discoveries Pfvasy521 E. Able Imaging  
  
                                                    RidgefieldAKRON, OH 97843-8874  
  
  
  
  
             Glucose [Mass/Vol] 147 mg/dL    High                Corey Hospitala Hea  
Lima City Hospital System  
  
  
  
  
                          Comment on above:         Result Comment: Test perform  
ed by glucose meter. Results may   
be  
  
                                                    10%-15% lowerthan serum/plas  
ma values. (CLIA ID 32C7481946)  
   
                                                    Performed By: #### BGLU ####  
K94 Discoveries Jnmnnd114 E. Munson Healthcare Manistee Hospital  
  
                                                    STREETAKRON, OH 39688-9772  
  
  
  
  
             Glucose [Mass/Vol] 164 mg/dL    High                The Jewish Hospital System  
  
  
  
  
                          Comment on above:         Result Comment: Test perform  
ed by glucose meter. Results may   
be  
  
                                                    10%-15% lowerthan serum/plas  
ma values. (CLIA ID 50V1960004)  
   
                                                    Performed By: #### BGLU ####  
Kettering Health Greene Memorial M2 Connections Ruqbgv872 Rio Rancho, OH   
  
  
  
  
                                        Hemogram w/ Autodiff  on 2022  
  
  
  
  
             Abs Baso Cnt 0.0 10*3/uL  Normal       0.0-0.2      Salem City Hospital  
stem  
  
  
  
  
                          Comment on above:         Performed By: #### HEMDF, BM  
P3M ####Kettering Health Greene Memorial M2 Connections 99 Hogan Street   
  
  
  
  
             Abs Neutrophile Cnt 7.0 10*3/uL  Normal       1.8-7.0      ProMedica Coldwater Regional Hospital  
  
  
  
  
                          Comment on above:         Performed By: #### HEMDF, BM  
P3M ####Kettering Health Greene Memorial M2 Connections Tumhyw483 Rio Rancho, OH   
  
  
  
  
             Basophils/100 WBC (Bld) 0.3 %        Normal       0.0-2.0      Summ  
a Health System  
  
  
  
  
                          Comment on above:         Performed By: #### HEMDF, BM  
P3M ####Kettering Health Greene Memorial M2 Connections Hkfscl591 Rio Rancho, OH   
  
  
  
  
             Eosinophils (Bld) [#/Vol] 0.0 10*3/uL  Normal       0.0-0.5      Harper University Hospital  
  
  
  
  
                          Comment on above:         Performed By: #### HEMDF, BM  
P3M ####Kettering Health Greene Memorial M2 Connections Dswosl429 Rio Rancho, OH   
  
  
  
  
             Eosinophils/100 WBC (Bld) 0.4 %        Low          1.0-6.0      Harper University Hospital  
  
  
  
  
                          Comment on above:         Performed By: #### HEMDF, BM  
P3M ####Kettering Health Greene Memorial M2 Connections 99 Hogan Street   
  
  
  
  
             Erythrocyte distribution width (RBC) 17.9 %       High         11.5  
-14.5    Munson Healthcare Cadillac Hospital  
  
             [Ratio]                                               
  
  
  
  
                          Comment on above:         Performed By: #### HEMDF, BM  
P3M ####Kettering Health Greene Memorial M2 Connections 99 Hogan Street   
  
  
  
  
             Granulocytes/100 WBC (Bld) 86.8 %       High         40.0-80.0    Insight Surgical Hospital  
  
  
  
  
                          Comment on above:         Performed By: #### HEMYANCI BM  
P3M ####Briana Ville 771835 Rio Rancho, OH   
  
  
  
  
             Hematocrit (Bld) [Volume fraction] 30.4 %       Low          40.0-5  
2.0    Munson Healthcare Cadillac Hospital  
  
  
  
  
                          Comment on above:         Performed By: #### HEMYANCI BM  
P3M ####38 Herrera Street   
  
  
  
  
             Hemoglobin (Bld) [Mass/Vol] 9.6 g/dL     Low          13.0-18.0      
Munson Healthcare Cadillac Hospital  
  
  
  
  
                          Comment on above:         Performed By: #### HEMYANCI BM  
P3M ####38 Herrera Street   
  
  
  
  
             Lymphocytes (Bld) [#/Vol] 0.7 10*3/uL  Low          1.0-4.3      Harper University Hospital  
  
  
  
  
                          Comment on above:         Performed By: #### HEMYANCI BM  
P3M ####38 Herrera Street   
  
  
  
  
             Lymphocytes/100 WBC (Bld) 8.4 %        Low          20.0-40.0    Harper University Hospital  
  
  
  
  
                          Comment on above:         Performed By: #### HEMYANCI BM  
P3M ####Briana Ville 771835 Rio Rancho, OH   
  
  
  
  
             MCH (RBC) [Entitic mass] 23.8 pg      Low          26.0-34.0    Beaumont Hospital  
  
  
  
  
                          Comment on above:         Performed By: #### HEMYANCI BM  
P3M ####38 Herrera Street   
  
  
  
  
             MCHC         31.5 %       Low          32.0-36.0    Salem City Hospital  
stem  
  
  
  
  
                          Comment on above:         Performed By: #### HEMYANCI BM  
P3M ####Briana Ville 771835 Rio Rancho, OH   
  
  
  
  
             MCV (RBC) [Entitic vol] 75.6 fL      Low          80.0-98.0    Summ  
a Health System  
  
  
  
  
                          Comment on above:         Performed By: #### HEMYANCI BM  
P3M ####99 Mills Street  
  
                                                    Elizabeth, OH   
  
  
  
  
             Monocytes (Bld) [#/Vol] 0.3 10*3/uL  Normal       0.0-0.8      Summ  
a Health System  
  
  
  
  
                          Comment on above:         Performed By: #### HEMDF, BM  
P3M ####Briana Ville 771835 E.  
  
                                                    Elizabeth, OH   
  
  
  
  
             Monocytes/100 WBC (Bld) 4.1 %        Normal       2.0-10.0     Summ  
a Health System  
  
  
  
  
                          Comment on above:         Performed By: #### HEMDF, BM  
P3M ####Briana Ville 771835 E.  
  
                                                    Elizabeth, OH   
  
  
  
  
             Platelet mean volume (Bld) [Entitic vol] 6.8 fL       Low            
7.4-10.4     Munson Healthcare Cadillac Hospital  
  
  
  
  
                          Comment on above:         Performed By: #### HEMDF, BM  
P3M ####Briana Ville 771835 EDexter, OH   
  
  
  
  
             Platelets (Bld) [#/Vol] 365 10*3/uL  Normal       140-440      University of Michigan Hospital  
  
  
  
  
                          Comment on above:         Performed By: #### HEMDF, BM  
P3M ####Briana Ville 771835 E.  
  
                                                    Elizabeth, OH   
  
  
  
  
             RBC (Bld) [#/Vol] 4.02 10*6/uL Low          4.40-5.90    McLaren Oakland  
  
  
  
  
                          Comment on above:         Performed By: #### HEMDF, BM  
P3M ####Briana Ville 771835 EDexter, OH   
  
  
  
  
             WBC (Bld) [#/Vol] 8.1 10*3/uL  Normal       3.6-10.7     McLaren Oakland  
  
  
  
  
                          Comment on above:         Performed By: #### HEMDF, BM  
P3M ####38 Herrera Street   
  
  
  
  
                                        No Panel Information  on 2022  
  
  
  
  
             Interpretation and review of laboratory Abnormal                     
            SUMMA  
  
             results                                               
   
                                                                 University Hospitals Geneva Medical Center LAB  
   
                                                                 SUMMA  
   
             Interpretation and review of laboratory Abnormal                     
            SUMMA  
  
             results                                               
   
                                                                 University Hospitals Geneva Medical Center LAB  
   
                                                                 SUMMA  
   
             Aerobic Culture                                        SUMMA  
  
  
  
  
                                        OPERATIVE REPORT   Ordered By: Criss Rosue  
rnest on 2022  
  
  
  
  
                                                                 SUMMA  
  
  
  
  
                                        POCT Glucose  on 2022  
  
  
  
  
             Glucose [Mass/Vol] 171 mg/dL    High         70 - 100 mg/dL SUMMA  
   
             Interpretation and review of Abnormal                                
 Corey Hospital  
  
             laboratory results                                          
   
                                                                 University Hospitals Geneva Medical Center LA  
B  
   
                                                                 Corey Hospital  
   
             Glucose [Mass/Vol] 144 mg/dL    High         70 - 100 mg/dL SUMMA  
  
                                                                 Work Phone: 1(6 97)633-3402  
   
             Interpretation and review of Abnormal                                
 Corey Hospital  
  
             laboratory results                                        Work Phon  
e: 1(459) 662-7127  
   
                                                                 University Hospitals Geneva Medical Center LA  
B  
   
                                                                 SUMMA  
  
                                                                 Work Phone: 4(3 96)955-5554  
   
                                                                 University Hospitals Geneva Medical Center LA  
B  
   
                                                                 Galion Hospital  
B  
  
  
  
  
                                        POCT Glucose  Ordered By: Gonzalez Sierra on 0  
3-  
  
  
  
  
             Glucose [Mass/Vol] 140 mg/dL    High         70 - 100 mg/dL SUMMA  
   
             Interpretation and review of laboratory results Abnormal             
                    Parkview Health  
  
  
  
  
                                        POCT Glucose  Ordered By: Rosina Reyes   
on 2022  
  
  
  
  
             Glucose [Mass/Vol] 107 mg/dL    High         70 - 100 mg/dL SUMMA  
  
                                                                 Work Phone: 1(7 62)655-3303  
   
             Interpretation and review of Abnormal                                
 Corey Hospital  
  
             laboratory results                                        Work Phon  
e: 1(965) 467-8465  
   
                                                                 SUMMA  
  
                                                                 Work Phone: 2(7 97)595-9725  
  
  
  
  
                                        POCT activated clotting time  on   
022  
  
  
  
  
             Activated Clotting Time OutOfRange   Critically abnormal 90 - 134 s  
   SUMMA  
   
             Activated Clotting Time 127 s                     90 - 134 s   SUMM  
A  
   
             Activated Clotting Time 235 s        High         90 - 134 s   SUMM  
A  
   
             Activated Clotting Time 206 s        High         90 - 134 s   SUMM  
A  
   
             Activated Clotting Time 236 s        High         90 - 134 s   SUMM  
A  
   
             Activated Clotting Time 206 s        High         90 - 134 s   SUMM  
A  
  
  
  
  
                                        ACT,Whole Blood  on 2022  
  
  
  
  
             ACT,Whole Blood 206 s        High                Munson Healthcare Cadillac Hospital  
  
  
  
  
                          Comment on above:         Result Comment: ACT testing   
is not intended for patients   
taking  
  
                                                    aprotonin,patients with hema  
tocrits of <20% or >55%, patients  
  
                                                    usingother types of anticoag  
ulation medications, and patients  
  
                                                    withLupus Anticoagulant.  
   
                                                    Performed By: #### ACTB ####  
Briana Ville 771835 Rio Rancho, OH 83689-1322  
  
  
  
  
             ACT,Whole Blood 206 s        High                Summa Health  
 System  
  
  
  
  
                          Comment on above:         Result Comment: ACT testing   
is not intended for patients   
taking  
  
                                                    aprotonin,patients with hema  
tocrits of <20% or >55%, patients  
  
                                                    usingother types of anticoag  
ulation medications, and patients  
  
                                                    withLupus Anticoagulant.  
   
                                                    Performed By: #### ACTB ####  
Briana Ville 771835 Rio Rancho, OH   
  
  
  
  
             ACT,Whole Blood 236 s        High                Munson Healthcare Cadillac Hospital  
  
  
  
  
                          Comment on above:         Result Comment: ACT testing   
is not intended for patients   
taking  
  
                                                    aprotonin,patients with hema  
tocrits of <20% or >55%, patients  
  
                                                    usingother types of anticoag  
ulation medications, and patients  
  
                                                    withLupus Anticoagulant.  
   
                                                    Performed By: #### ACTB ####  
Kettering Health Greene Memorial M2 Connections Jxycam254 Rio Rancho, OH   
  
  
  
  
             ACT,Whole Blood 235 s        High                Munson Healthcare Cadillac Hospital  
  
  
  
  
                          Comment on above:         Result Comment: ACT testing   
is not intended for patients   
taking  
  
                                                    aprotonin,patients with hema  
tocrits of <20% or >55%, patients  
  
                                                    usingother types of anticoag  
ulation medications, and patients  
  
                                                    withLupus Anticoagulant.  
   
                                                    Performed By: #### ACTB ####  
Kettering Health Greene Memorial M2 Connections 99 Hogan Street   
  
  
  
  
             ACT,Whole Blood 127 s        Normal              Munson Healthcare Cadillac Hospital  
  
  
  
  
                          Comment on above:         Result Comment: ACT testing   
is not intended for patients   
taking  
  
                                                    aprotonin,patients with hema  
tocrits of <20% or >55%, patients  
  
                                                    usingother types of anticoag  
ulation medications, and patients  
  
                                                    withLupus Anticoagulant.  
   
                                                    Performed By: #### ACTB ####  
Kettering Health Greene Memorial M2 Connections Zgciql083 Rio Rancho, OH   
  
  
  
  
             ACT,Whole Blood > 400        High                Munson Healthcare Cadillac Hospital  
  
  
  
  
                          Comment on above:         Result Comment: ACT testing   
is not intended for patients   
taking  
  
                                                    aprotonin,patients with hema  
tocrits of <20% or >55%, patients  
  
                                                    usingother types of anticoag  
ulation medications, and patients  
  
                                                    withLupus Anticoagulant.  
   
                                                    Performed By: #### ACTB ####  
Kettering Health Greene Memorial M2 Connections Shgvad817 Rio Rancho, OH   
  
  
  
  
                                        APTT  on 2022  
  
  
  
  
             aPTT Coag (Bld) [Time] 40.9 s       High         20.0-30.5    Munson Healthcare Cadillac Hospital  
  
  
  
  
                          Comment on above:         Result Comment: NOTE: The th  
erapeutic time for Heparin  
  
                                                    anticoagulation,based on Xa   
activity inhibition, is an APTT of  
  
                                                    46-80seconds.  
   
                                                    Performed By: #### APTT ####  
Briana Ville 771835 Rio Rancho, OH   
  
  
  
  
             aPTT Coag (Bld) [Time] 40.9 s       High         20.0 - 30.5 s SUMM  
A  
   
             Interpretation and review of Abnormal                                
 Corey Hospital  
  
             laboratory results                                          
   
                                                                 TriHealth McCullough-Hyde Memorial Hospital  
   
             aPTT Coag (Bld) [Time] 44.1 s       High         20.0-30.5    Munson Healthcare Cadillac Hospital  
  
  
  
  
                          Comment on above:         Result Comment: NOTE: The th  
erapeutic time for Heparin  
  
                                                    anticoagulation,based on Xa   
activity inhibition, is an APTT of  
  
                                                    46-80seconds.  
   
                                                    Performed By: #### APTT ####  
Briana Ville 771835 Rio Rancho, OH   
  
  
  
  
             aPTT Coag (Bld) [Time] 44.1 s       High         20.0 - 30.5 s SUMM  
A  
   
             Interpretation and review of Abnormal                                
 Corey Hospital  
  
             laboratory results                                          
   
                                                                 TriHealth McCullough-Hyde Memorial Hospital  
  
  
  
  
                                        Basic Metabolic Panel  on 2022  
  
  
  
  
             Anion gap [Moles/Vol] 8 mmol/L     Normal       3-13         Munson Healthcare Cadillac Hospital  
  
  
  
  
                          Comment on above:         Performed By: #### HEMDF, BM  
P3M ####Briana Ville 771835 EDexter, OH   
  
  
  
  
             Calcium [Mass/Vol] 8.8 mg/dL    Normal       8.4-10.4     Munson Healthcare Otsego Memorial Hospital  
  
  
  
  
                          Comment on above:         Performed By: #### HEMDF, BM  
P3M ####Kettering Health Greene Memorial M2 Connections Ctwusg900 Rio Rancho, OH   
  
  
  
  
             CO2 [Moles/Vol] 20 mmol/L    Low          22-30        Munson Healthcare Cadillac Hospital  
  
  
  
  
                          Comment on above:         Performed By: #### HEMDF, BM  
P3M ####Briana Ville 771835 Rio Rancho, OH 04754  
  
  
  
  
  
             Glucose [Mass/Vol] 121 mg/dL    High                Summa Hea  
lth System  
  
  
  
  
                          Comment on above:         Performed By: #### HEMDF, BM  
P3M ####Kettering Health Greene Memorial M2 Connections Qsnmrn916 E.  
  
                                                    Elizabeth, OH 48127  
0  
  
  
  
  
             Urea nitrogen [Mass/Vol] 10 mg/dL     Normal       7-17         Beaumont Hospital  
  
  
  
  
                          Comment on above:         Performed By: #### HEMDF, BM  
P3M ####Kettering Health Greene Memorial M2 Connections Pwjcwq078 EDexter, OH 43117  
0  
  
  
  
  
             Creatinine [Mass/Vol] 1.17 mg/dL   Normal       0.52-1.25    Munson Healthcare Cadillac Hospital  
  
  
  
  
                          Comment on above:         Performed By: #### HEMDF, BM  
P3M ####Kettering Health Greene Memorial M2 Connections Ilgdxx459 EDexter, OH 54204  
0  
  
  
  
  
             GFR/1.73 sq M.predicted among 75.3 mL/min/{1.73_m2} Normal       >6  
0          Munson Healthcare Cadillac Hospital  
  
             blacks MDRD (S/P/Bld) [Vol                                          
  
             rate/Area]                                            
  
  
  
  
                          Comment on above:         Performed By: #### HEMDF, BM  
P3M ####Kettering Health Greene Memorial M2 Connections Geobfx875 EDexter, OH 33045  
0  
  
  
  
  
             GFR/1.73 sq M.predicted among 65.0 mL/min/{1.73_m2} Normal       >6  
0          Munson Healthcare Cadillac Hospital  
  
             non-blacks MDRD (S/P/Bld) [Vol                                       
     
  
             rate/Area]                                            
  
  
  
  
                          Comment on above:         Result Comment: KDIGO guidel  
zoë provide the following GFR  
  
                                                    categories:Stage GFR(ml/min/  
1.73 m2) TermsG1 >=90 Normal or highG2  
  
                                                    60-89 Mildly decreased*G3a 4  
5-59 Mildly to moderately ilwdonpiqG3f  
  
                                                    30-44 Moderately to severely  
 decreasedG4 15-29 Severely decreasedG5  
  
                                                    <15 Kidney failure*Relative   
to young adult level.In the absence of  
  
                                                    evidence of kidney damage, n  
either GFRcategory G1 nor G2 fulfill  
  
                                                    the criteria for CKD.The CKD  
-EPI equation is validated in  
  
                                                    individuals 18 yearsof age a  
nd older. Currently the best equation  
  
                                                    forestimating glomerular beto  
tration rate (GFR) from serumcreatinine  
  
                                                    in children is the Bedside S  
kristiwartz equation.It is less accurate in  
  
                                                    patients with extremes of mu  
sclemass, restriction of dietary  
  
                                                    protein, ingestion of creati  
ne,extra-renal metabolism of  
  
                                                    creatinine, or treatment wit  
hmedications that affect renal tubular  
  
                                                    creatinine secretion.  
   
                                                    Performed By: #### HEMYANCI, BM  
P3M ####K94 Discoveries Yzkwdz205 Santa Maria Biotherapeutics  
  
                                                    Elizabeth, OH   
  
  
  
  
             Chloride [Moles/Vol] 111 mmol/L   High                Corey Hospitala Salem Regional Medical Center System  
  
  
  
  
                          Comment on above:         Performed By: #### ASADYANCI, BM  
P3M ####K94 Discoveries Skitfm068 Santa Maria Biotherapeutics  
  
                                                    Elizabeth, OH   
  
  
  
  
             Potassium [Moles/Vol] 3.7 mmol/L   Normal       3.5-5.1      Munson Healthcare Cadillac Hospital  
  
  
  
  
                          Comment on above:         Performed By: #### HEMYANCI, BM  
P3M ####K94 Discoveries Ujqlkm545 Santa Maria Biotherapeutics  
  
                                                    Elizabeth, OH   
  
  
  
  
             Sodium [Moles/Vol] 139 mmol/L   Normal       135-145      The Jewish Hospital System  
  
  
  
  
                          Comment on above:         Performed By: #### HEMYANCI, BM  
P3M ####K94 Discoveries Dzgitw866 Santa Maria Biotherapeutics  
  
                                                    Elizabeth, OH   
  
  
  
  
                                        Basic Metabolic Panel w/ Reflex to MG  o  
n 2022  
  
  
  
  
             Anion gap [Moles/Vol] 8 mmol/L                  3 - 13 mmol/L SUMMA  
   
             Calcium [Mass/Vol] 8.8 mg/dL                 8.4 - 10.4 mg/dL SUMMA  
   
             Chloride [Moles/Vol] 111 mmol/L   High         98 - 107 mmol/L SUMM  
A  
   
             CO2 [Moles/Vol] 20 mmol/L    Low          22 - 30 mmol/L SUMMA  
   
             Creatinine [Mass/Vol] 1.17 mg/dL                0.52 - 1.25 mg/dL S  
Barnesville Hospital  
   
             EGFR IF NonAfrican 65.0 mL/min               >60          SUMMA  
  
             American                                              
   
             GFR/1.73 sq M.predicted 75.3                      >60          SUMM  
A  
  
             among blacks MDRD mL/min/{1.73_m2}                             
  
             (S/P/Bld) [Vol rate/Area]                                          
   
             Glucose [Mass/Vol] 121 mg/dL    High         70 - 100 mg/dL SUMMA  
   
             Interpretation and review Abnormal                               KRAMER  
MMA  
  
             of laboratory results                                          
   
             Potassium [Moles/Vol] 3.7 mmol/L                3.5 - 5.1 mmol/L KRAMER  
MMA  
   
             Sodium [Moles/Vol] 139 mmol/L                135 - 145 mmol/L SUMMA  
   
             Urea nitrogen (BldV) 10 mg/dL                  7 - 17 mg/dL SUMMA  
  
             [Mass/Vol]                                            
   
                                                                 Kettering Health Miamisburg LAB  
   
                                                                 SUMMA  
  
  
  
  
                                        CBC auto differential  on 2022  
  
  
  
  
             Absolute Baso # 0.0 10*3/uL               0.0 - 0.2 10*3/uL SUMMA  
   
             Absolute Neut # 5.8 10*3/uL               1.8 - 7.0 10*3/uL SUMMA  
   
             Basophils/100 WBC (Bld) 0.6 %                     0.0 - 2.0 %  SUMM  
A  
   
             Eosinophils (Bld) [#/Vol] 0.2 10*3/uL               0.0 - 0.5 10*3/  
uL SUMMA  
   
             Eosinophils/100 WBC (Bld) 3.0 %                     1.0 - 6.0 %  KRAMER  
MMA  
   
             Granulocytes/100 WBC (Bld) 72.1 %                    40.0 - 80.0 %   
SUMMA  
   
             Hematocrit (Bld) [Volume 28.7 %       Low          40.0 - 52.0 % KRAMER  
MMA  
  
             fraction]                                             
   
             Hemoglobin.gastrointestinal 9.2 g/dL     Low          13.0 - 18.0 g  
/dL Corey Hospital  
  
             spec 1 Ql (Stl)                                          
   
             Interpretation and review of Abnormal                                
 Corey Hospital  
  
             laboratory results                                          
   
             Lymphocytes (Bld) [#/Vol] 1.4 10*3/uL               1.0 - 4.3 10*3/  
uL SUMMA  
   
             Lymphocytes/100 WBC (Bld) 18.0 %       Low          20.0 - 40.0 % S  
UMMA  
   
             MCH (RBC) [Entitic mass] 24.4 pg      Low          26.0 - 34.0 pg S  
UMMA  
   
             MCHC (RBC) [Mass/Vol] 32.1 %                    32.0 - 36.0 % SUMMA  
   
             MCV (RBC) [Entitic vol] 76.1 fL      Low          80.0 - 98.0 fL KRAMER  
MMA  
   
             Monocytes (Bld) [#/Vol] 0.5 10*3/uL               0.0 - 0.8 10*3/uL  
 SUMMA  
   
             Monocytes/100 WBC (Bld) 6.3 %                     2.0 - 10.0 % SUMM  
A  
   
             Platelet distribution width 17.8 %       High         11.5 - 14.5 %  
 SUMMA  
  
             (Bld) [Ratio]                                          
   
             Platelet mean volume (Bld) 6.9 fL       Low          7.4 - 10.4 fL   
SUMMA  
  
             [Entitic vol]                                          
   
             Platelets (Bld) [#/Vol] 386 10*3/uL               140 - 440 10*3/uL  
 SUMMA  
   
             RBC (Bld) [#/Vol] 3.77 10*6/uL Low          4.40 - 5.90 10*6/uL SUM  
MA  
   
             WBC (Bld) [#/Vol] 8.0 10*3/uL               3.6 - 10.7 10*3/uL SUMM  
A  
   
                                                                 SUMMA Preview Networks Mercy Health St. Joseph Warren Hospital   
LAB  
   
                                                                 JANELLE  
  
  
  
  
                                        Glucose,Bedside   on 2022  
  
  
  
  
             Glucose [Mass/Vol] 97 mg/dL     Normal              Corey Hospitala Grant Hospital System  
  
  
  
  
                          Comment on above:         Result Comment: Test perform  
ed by glucose meter. Results may   
be  
  
                                                    10%-15% lowerthan serum/plas  
ma values. (CLIA ID 61G8266428)  
   
                                                    Performed By: #### BGLU ####  
Cerus Corporation525 Soundwave. Elizabeth, OH   
  
  
  
  
             Glucose [Mass/Vol] 113 mg/dL    High                Corey Hospitala Hea  
Lima City Hospital System  
  
  
  
  
                          Comment on above:         Result Comment: Test perform  
ed by glucose meter. Results may   
be  
  
                                                    10%-15% lowerthan serum/plas  
ma values. (CLIA ID 85Y0748115)  
   
                                                    Performed By: #### BGLU ####  
Cerus Corporation525 Santa Maria Biotherapeutics Elizabeth, OH   
  
  
  
  
             Glucose [Mass/Vol] 114 mg/dL    High                The Jewish Hospital System  
  
  
  
  
                          Comment on above:         Result Comment: Test perform  
ed by glucose meter. Results may   
be  
  
                                                    10%-15% lowerthan serum/plas  
ma values. (CLIA ID 54Y5411453)  
   
                                                    Performed By: #### BGLU ####  
Cerus Corporation525 Santa Maria Biotherapeutics Elizabeth, OH   
  
  
  
  
                                        Hemogram w/ Autodiff  on 2022  
  
  
  
  
             Abs Baso Cnt 0.0 10*3/uL  Normal       0.0-0.2      Kettering Health Greene Memorial M2 Connections Long Island Jewish Medical Center  
  
  
  
  
                          Comment on above:         Performed By: #### HEMDF, BM  
P3M ####Cerus Corporation525 Santa Maria Biotherapeutics  
  
                                                    Elizabeth, OH   
  
  
  
  
             Abs Neutrophile Cnt 5.8 10*3/uL  Normal       1.8-7.0      OhioHealth Berger Hospital System  
  
  
  
  
                          Comment on above:         Performed By: #### HEMDF, BM  
P3M ####Xplornet5 E.  
  
                                                    Roswell Park Comprehensive Cancer CenterAKRON, OH 64590  
  
  
  
  
  
             Basophils/100 WBC (Bld) 0.6 %        Normal       0.0-2.0      Summ  
a Health System  
  
  
  
  
                          Comment on above:         Performed By: #### HEMYANCI, BM  
P3M ####Mercy Health St. Vincent Medical Center Ghhtvb123 E.  
  
                                                    Mary Free Bed Rehabilitation Hospital, OH 33410  
  
  
  
  
  
             Eosinophils (Bld) [#/Vol] 0.2 10*3/uL  Normal       0.0-0.5      Harper University Hospital  
  
  
  
  
                          Comment on above:         Performed By: #### HEMDF, BM  
P3M ####Kettering Health Greene Memorial M2 Connections Qoedth396 E.  
  
                                                    Roswell Park Comprehensive Cancer CenterAKProMedica Coldwater Regional Hospital, OH 20249  
  
  
  
  
  
             Eosinophils/100 WBC (Bld) 3.0 %        Normal       1.0-6.0      Harper University Hospital  
  
  
  
  
                          Comment on above:         Performed By: #### HEMYANCI, BM  
P3M ####Kettering Health Greene Memorial M2 Connections Fnbrzp032 University of Utah Hospital, OH 19578  
  
  
  
  
  
             Erythrocyte distribution width (RBC) 17.8 %       High         11.5  
-14.5    Munson Healthcare Cadillac Hospital  
  
             [Ratio]                                               
  
  
  
  
                          Comment on above:         Performed By: #### HEMDF, BM  
P3M ####Kettering Health Greene Memorial M2 Connections Euwmod404 E.  
  
                                                    Mary Free Bed Rehabilitation Hospital, OH 04127  
  
  
  
  
  
             Granulocytes/100 WBC (Bld) 72.1 %       Normal       40.0-80.0    Insight Surgical Hospital  
  
  
  
  
                          Comment on above:         Performed By: #### HEMDF, BM  
P3M ####Kettering Health Greene Memorial M2 Connections Ofqyty241 ELayton Hospital, OH 02285  
  
  
  
  
  
             Hematocrit (Bld) [Volume fraction] 28.7 %       Low          40.0-5  
2.0    Munson Healthcare Cadillac Hospital  
  
  
  
  
                          Comment on above:         Performed By: #### HEMDF, BM  
P3M ####Kettering Health Greene Memorial M2 Connections Yoshni794 E.  
  
                                                    Mary Free Bed Rehabilitation Hospital, OH 87297  
  
  
  
  
  
             Hemoglobin (Bld) [Mass/Vol] 9.2 g/dL     Low          13.0-18.0      
Munson Healthcare Cadillac Hospital  
  
  
  
  
                          Comment on above:         Performed By: #### HEMDF, BM  
P3M ####Kettering Health Greene Memorial M2 Connections Atxshm980 E.  
  
                                                    Roswell Park Comprehensive Cancer CenterAKProMedica Coldwater Regional Hospital, OH 04824  
  
  
  
  
  
             Lymphocytes (Bld) [#/Vol] 1.4 10*3/uL  Normal       1.0-4.3      Harper University Hospital  
  
  
  
  
                          Comment on above:         Performed By: #### HEMDF, BM  
P3M ####Briana Ville 771835 Rio Rancho, OH   
  
  
  
  
             Lymphocytes/100 WBC (Bld) 18.0 %       Low          20.0-40.0    Harper University Hospital  
  
  
  
  
                          Comment on above:         Performed By: #### HEMDF, BM  
P3M ####Briana Ville 771835 Rio Rancho, OH   
  
  
  
  
             MCH (RBC) [Entitic mass] 24.4 pg      Low          26.0-34.0    Beaumont Hospital  
  
  
  
  
                          Comment on above:         Performed By: #### HEMDF, BM  
P3M ####38 Herrera Street   
  
  
  
  
             MCHC         32.1 %       Normal       32.0-36.0    Mercy Health St. Vincent Medical Center Sy  
stem  
  
  
  
  
                          Comment on above:         Performed By: #### HEMYANCI, BM  
P3M ####38 Herrera Street   
  
  
  
  
             MCV (RBC) [Entitic vol] 76.1 fL      Low          80.0-98.0    Summ  
a Health System  
  
  
  
  
                          Comment on above:         Performed By: #### HEMDF, BM  
P3M ####Briana Ville 771835 Rio Rancho, OH   
  
  
  
  
             Monocytes (Bld) [#/Vol] 0.5 10*3/uL  Normal       0.0-0.8      Summ  
a Health System  
  
  
  
  
                          Comment on above:         Performed By: #### HEMDF, BM  
P3M ####38 Herrera Street   
  
  
  
  
             Monocytes/100 WBC (Bld) 6.3 %        Normal       2.0-10.0     Summ  
a Health System  
  
  
  
  
                          Comment on above:         Performed By: #### HEMDF, BM  
P3M ####38 Herrera Street   
  
  
  
  
             Platelet mean volume (Bld) [Entitic vol] 6.9 fL       Low            
7.4-10.4     Munson Healthcare Cadillac Hospital  
  
  
  
  
                          Comment on above:         Performed By: #### HEMDF, BM  
P3M ####38 Herrera Street   
  
  
  
  
             Platelets (Bld) [#/Vol] 386 10*3/uL  Normal       140-440      TriHealth Good Samaritan Hospital System  
  
  
  
  
                          Comment on above:         Performed By: #### HEMYANCI BM  
P3M ####K94 Discoveries Oakyho324 E.  
  
                                                    Elizabeth, OH   
  
  
  
  
             RBC (Bld) [#/Vol] 3.77 10*6/uL Low          4.40-5.90    Holmes County Joel Pomerene Memorial Hospital System  
  
  
  
  
                          Comment on above:         Performed By: #### HEMYANCI BM  
P3M ####K94 Discoveries Jxgiwu710 E.  
  
                                                    Elizabeth, OH   
  
  
  
  
             WBC (Bld) [#/Vol] 8.0 10*3/uL  Normal       3.6-10.7     Holmes County Joel Pomerene Memorial Hospital System  
  
  
  
  
                          Comment on above:         Performed By: #### HEMDF BM  
P3M ####K94 Discoveries Xbucbf505 E.  
  
                                                    Elizabeth, OH   
  
  
  
  
                                        POCT Glucose  on 2022  
  
  
  
  
             Glucose [Mass/Vol] 147 mg/dL    High         70 - 100 mg/dL SUMMA  
   
             Interpretation and review of Abnormal                                
 SUMMA  
  
             laboratory results                                          
   
                                                                 Galion Hospital  
B  
   
                                                                 SUMMA  
   
             Glucose [Mass/Vol] 164 mg/dL    High         70 - 100 mg/dL SUMMA  
   
             Interpretation and review of Abnormal                                
 SUMMA  
  
             laboratory results                                          
   
                                                                 University Hospitals Geneva Medical Center LA  
B  
   
                                                                 SUMMA  
   
             Glucose [Mass/Vol] 97 mg/dL                  70 - 100 mg/dL Select Medical Specialty Hospital - Youngstown LA  
B  
   
                                                                 SUMMA  
   
             Glucose [Mass/Vol] 113 mg/dL    High         70 - 100 mg/dL SUMMA  
   
             Interpretation and review of Abnormal                                
 SUMMA  
  
             laboratory results                                          
   
                                                                 University Hospitals Geneva Medical Center LA  
B  
   
                                                                 Select Medical Specialty Hospital - Youngstown LA  
B  
  
  
  
  
                                        POCT Glucose  Ordered By: Elizabeth Banks  
 on 2022  
  
  
  
  
             Glucose [Mass/Vol] 114 mg/dL    High         70 - 100 mg/dL SUMMA  
   
             Interpretation and review of laboratory results Abnormal             
                    SUMMA  
   
                                                                 SUMMA  
  
  
  
  
                                        POCT activated clotting time  on   
022  
  
  
  
  
             Activated Clotting Time 206 s        High         90 - 134 s   SUMM  
A  
   
             Interpretation and review of Abnormal                                
 SUMMA  
  
             laboratory results                                          
   
                                                                 University Hospitals Geneva Medical Center LA  
B  
   
                                                                 SUMMA  
   
             Activated Clotting Time 236 s        High         90 - 134 s   SUMM  
A  
   
             Interpretation and review of Abnormal                                
 SUMMA  
  
             laboratory results                                          
   
                                                                 Lima City Hospital  
   
                                                                 SUMMA  
   
             Activated Clotting Time 235 s        High         90 - 134 s   SUMM  
A  
   
             Interpretation and review of Abnormal                                
 SUMMA  
  
             laboratory results                                          
   
                                                                 Galion Hospital  
B  
   
                                                                 SUMMA  
   
             Activated Clotting Time 127 s                     90 - 134 s   SUMM  
A  
   
                                                                 Lima City Hospital  
   
                                                                 SUMMA  
   
             Activated Clotting Time >400         High         90 - 134 s   SUMM  
A  
   
             Interpretation and review of Abnormal                                
 SUMMA  
  
             laboratory results                                          
   
                                                                 Lima City Hospital  
   
                                                                 SUMMA  
  
  
  
  
                                        APTT  on 2022  
  
  
  
  
             aPTT Coag (Bld) [Time] 51.7 s       High         20.0-30.5    Munson Healthcare Cadillac Hospital  
  
  
  
  
                          Comment on above:         Result Comment: NOTE: The th  
erapeutic time for Heparin  
  
                                                    anticoagulation,based on Xa   
activity inhibition, is an APTT of  
  
                                                    46-80seconds.  
   
                                                    Performed By: #### APTT ####  
Cerus Corporation525 Rio Rancho, OH   
  
  
  
  
             aPTT Coag (Bld) [Time] 51.7 s       High         20.0 - 30.5 s SUMM  
A  
   
             Interpretation and review of Abnormal                                
 SUMMA  
  
             laboratory results                                          
   
                                                                 Lima City Hospital  
   
                                                                 SUMMA  
   
             aPTT Coag (Bld) [Time] 50.7 s       High         20.0-30.5    Munson Healthcare Cadillac Hospital  
  
  
  
  
                          Comment on above:         Result Comment: NOTE: The th  
erapeutic time for Heparin  
  
                                                    anticoagulation,based on Xa   
activity inhibition, is an APTT of  
  
                                                    46-80seconds.  
   
                                                    Performed By: #### APTT ####  
Cerus Corporation525 Rio Rancho, OH   
  
  
  
  
             aPTT Coag (Bld) [Time] 50.7 s       High         20.0 - 30.5 s SUMM  
A  
   
             Interpretation and review of Abnormal                                
 SUMMA  
  
             laboratory results                                          
   
                                                                 Lima City Hospital  
   
                                                                 SUMMA  
   
             aPTT Coag (Bld) [Time] 75.4 s       High         20.0-30.5    Munson Healthcare Cadillac Hospital  
  
  
  
  
                          Comment on above:         Result Comment: NOTE: The th  
erapeutic time for Heparin  
  
                                                    anticoagulation,based on Xa   
activity inhibition, is an APTT of  
  
                                                    46-80seconds.  
   
                                                    Performed By: #### APTT ####  
Cerus Corporation525 Rio Rancho, OH   
  
  
  
  
                                        Basic Metabolic Panel  on 2022  
  
  
  
  
             Calcium [Mass/Vol] 8.4 mg/dL    Normal       8.4-10.4     Munson Healthcare Otsego Memorial Hospital  
  
  
  
  
                          Comment on above:         Performed By: #### KACEY BM  
P3M ####Cerus Corporation525 EDexter, OH 61219  
  
  
  
  
  
             Glucose [Mass/Vol] 144 mg/dL    High                The Jewish Hospital System  
  
  
  
  
                          Comment on above:         Performed By: #### KACEY BM  
P3M ####Cerus Corporation525 E.  
  
                                                    Elizabeth, OH 37438  
  
  
  
  
  
             Anion gap [Moles/Vol] 5 mmol/L     Normal       3-13         Munson Healthcare Cadillac Hospital  
  
  
  
  
                          Comment on above:         Performed By: #### KACEY BM  
P3M ####Corey HospitalIndependent Space525 SoundwaveDexter, OH 00677  
  
  
  
  
  
             CO2 [Moles/Vol] 22 mmol/L    Normal       22-30        Munson Healthcare Cadillac Hospital  
  
  
  
  
                          Comment on above:         Performed By: #### KACEY BM  
P3M ####Corey HospitalIndependent Space525 E.  
  
                                                    Elizabeth, OH 28572  
  
  
  
  
  
             Creatinine [Mass/Vol] 1.24 mg/dL   Normal       0.52-1.25    Munson Healthcare Cadillac Hospital  
  
  
  
  
                          Comment on above:         Performed By: #### HEMYANCI BM  
P3M ####Cerus Corporation525 SoundwaveDexter, OH 14384  
  
  
  
  
  
             GFR/1.73 sq M.predicted among 70.2 mL/min/{1.73_m2} Normal       >6  
0          Munson Healthcare Cadillac Hospital  
  
             blacks MDRD (S/P/Bld) [Vol                                          
  
             rate/Area]                                            
  
  
  
  
                          Comment on above:         Performed By: #### HEMYANCI BM  
P3M ####Corey HospitalLaru Technologies Dbqlbr286 E.  
  
                                                    Elizabeth, OH 78462  
2090  
  
  
  
  
             GFR/1.73 sq M.predicted among 60.6 mL/min/{1.73_m2} Normal       >6  
0          Munson Healthcare Cadillac Hospital  
  
             non-blacks MDRD (S/P/Bld) [Vol                                       
     
  
             rate/Area]                                            
  
  
  
  
                          Comment on above:         Result Comment: KDIGO guidel  
zoë provide the following GFR  
  
                                                    categories:Stage GFR(ml/min/  
1.73 m2) TermsG1 >=90 Normal or highG2  
  
                                                    60-89 Mildly decreased*G3a 4  
5-59 Mildly to moderately ibdfsqjlwZ7f  
  
                                                    30-44 Moderately to severely  
 decreasedG4 15-29 Severely decreasedG5  
  
                                                    <15 Kidney failure*Relative   
to young adult level.In the absence of  
  
                                                    evidence of kidney damage, n  
either GFRcategory G1 nor G2 fulfill  
  
                                                    the criteria for CKD.The CKD  
-EPI equation is validated in  
  
                                                    individuals 18 yearsof age a  
nd older. Currently the best equation  
  
                                                    forestimating glomerular beto  
tration rate (GFR) from serumcreatinine  
  
                                                    in children is the Bedside S  
kristiwartz equation.It is less accurate in  
  
                                                    patients with extremes of mu  
sclemass, restriction of dietary  
  
                                                    protein, ingestion of creati  
ne,extra-renal metabolism of  
  
                                                    creatinine, or treatment wit  
hmedications that affect renal tubular  
  
                                                    creatinine secretion.  
   
                                                    Performed By: #### JESÚS ERAZO  
P3M ####Briana Ville 771835 Rio Rancho, OH   
  
  
  
  
             Urea nitrogen [Mass/Vol] 12 mg/dL     Normal       7-17         Beaumont Hospital  
  
  
  
  
                          Comment on above:         Performed By: #### JESÚS ERAZO  
P3M ####Kettering Health Greene Memorial M2 Connections Rdmbms648 Rio Rancho, OH   
  
  
  
  
             Chloride [Moles/Vol] 107 mmol/L   Normal              Mercy Health St. Joseph Warren Hospital System  
  
  
  
  
                          Comment on above:         Performed By: #### KACEY BM  
P3M ####Kettering Health Greene Memorial M2 Connections Hgiyqm769 Rio Rancho, OH 42454  
  
  
  
  
             Potassium [Moles/Vol] 3.7 mmol/L   Normal       3.5-5.1      Munson Healthcare Cadillac Hospital  
  
  
  
  
                          Comment on above:         Performed By: #### KACEY BM  
P3M ####Kettering Health Greene Memorial M2 Connections Ymtvbb815 Rio Rancho, OH 70026  
  
  
  
  
  
             Sodium [Moles/Vol] 134 mmol/L   Low          135-145      The Jewish Hospital System  
  
  
  
  
                          Comment on above:         Performed By: #### KACEY BM  
P3M ####Kettering Health Greene Memorial M2 Connections Nlsicb577 Rio Rancho, OH 84200  
  
  
  
  
  
                                        Glucose,Bedside  on 2022  
  
  
  
  
             Glucose [Mass/Vol] 150 mg/dL    High                The Jewish Hospital System  
  
  
  
  
                          Comment on above:         Result Comment: Test perform  
ed by glucose meter. Results may   
be  
  
                                                    10%-15% lowerthan serum/plas  
ma values. (CLIA ID 97D4721620)  
   
                                                    Performed By: #### BGLU ####  
Cerus Corporation525 E. Elizabeth, OH 50523-8343  
  
  
  
  
             Glucose [Mass/Vol] 107 mg/dL    High                The Jewish Hospital System  
  
  
  
  
                          Comment on above:         Result Comment: Test perform  
ed by glucose meter. Results may   
be  
  
                                                    10%-15% lowerthan serum/plas  
ma values. (CLIA ID 13M5392153)  
   
                                                    Performed By: #### BGLU ####  
Cerus Corporation525 E. Elizabeth, OH 68801-5143  
  
  
  
  
             Glucose [Mass/Vol] 137 mg/dL    High                The Jewish Hospital System  
  
  
  
  
                          Comment on above:         Result Comment: Test perform  
ed by glucose meter. Results may   
be  
  
                                                    10%-15% lowerthan serum/plas  
ma values. (CLIA ID 71J2865262)  
   
                                                    Performed By: #### BGLU ####  
Cerus Corporation525 E. Elizabeth, OH   
  
  
  
  
             Glucose [Mass/Vol] 127 mg/dL    High                The Jewish Hospital System  
  
  
  
  
                          Comment on above:         Result Comment: Test perform  
ed by glucose meter. Results may   
be  
  
                                                    10%-15% lowerthan serum/plas  
ma values. (CLIA ID 74F7438574)  
   
                                                    Performed By: #### BGLU ####  
Cerus Corporation525 SoundwaveDexter, OH   
  
  
  
  
                                        Hemogram w/ Autodiff  on 2022  
  
  
  
  
             Abs Baso Cnt 0.1 10*3/uL  Normal       0.0-0.2      Salem City Hospital  
stem  
  
  
  
  
                          Comment on above:         Performed By: #### HEMDF, BM  
P3M ####Cerus Corporation525 Santa Maria Biotherapeutics  
  
                                                    Elizabeth, OH 29106  
  
  
  
  
  
             Abs Neutrophile Cnt 6.9 10*3/uL  Normal       1.8-7.0      ProMedica Coldwater Regional Hospital  
  
  
  
  
                          Comment on above:         Performed By: #### HEMDF, BM  
P3M ####K94 Discoveries 99 Hogan Street 60793  
  
  
  
  
  
             Basophils/100 WBC (Bld) 0.6 %        Normal       0.0-2.0      Summ  
a Health System  
  
  
  
  
                          Comment on above:         Performed By: #### HEMDF BM  
P3M ####Briana Ville 771835 Rio Rancho, OH   
  
  
  
  
             Eosinophils (Bld) [#/Vol] 0.2 10*3/uL  Normal       0.0-0.5      Harper University Hospital  
  
  
  
  
                          Comment on above:         Performed By: #### HEMDF BM  
P3M ####Briana Ville 771835 Rio Rancho, OH   
  
  
  
  
             Eosinophils/100 WBC (Bld) 2.1 %        Normal       1.0-6.0      Harper University Hospital  
  
  
  
  
                          Comment on above:         Performed By: #### HEMYANCI BM  
P3M ####38 Herrera Street   
  
  
  
  
             Erythrocyte distribution width (RBC) 17.6 %       High         11.5  
-14.5    Munson Healthcare Cadillac Hospital  
  
             [Ratio]                                               
  
  
  
  
                          Comment on above:         Performed By: #### HEMYANCI BM  
P3M ####38 Herrera Street   
  
  
  
  
             Granulocytes/100 WBC (Bld) 80.0 %       Normal       40.0-80.0    Insight Surgical Hospital  
  
  
  
  
                          Comment on above:         Performed By: #### HEMYANCI BM  
P3M ####Briana Ville 771835 Rio Rancho, OH   
  
  
  
  
             Hematocrit (Bld) [Volume fraction] 27.9 %       Low          40.0-5  
2.0    Munson Healthcare Cadillac Hospital  
  
  
  
  
                          Comment on above:         Performed By: #### HEMDF BM  
P3M ####38 Herrera Street   
  
  
  
  
             Hemoglobin (Bld) [Mass/Vol] 8.9 g/dL     Low          13.0-18.0      
Munson Healthcare Cadillac Hospital  
  
  
  
  
                          Comment on above:         Performed By: #### HEMDF BM  
P3M ####38 Herrera Street   
  
  
  
  
             Lymphocytes (Bld) [#/Vol] 1.1 10*3/uL  Normal       1.0-4.3      Harper University Hospital  
  
  
  
  
                          Comment on above:         Performed By: #### HEMDF BM  
P3M ####Summ05 Oconnor Street   
  
  
  
  
             Lymphocytes/100 WBC (Bld) 12.5 %       Low          20.0-40.0    Madison Health System  
  
  
  
  
                          Comment on above:         Performed By: #### JESÚS ERAZO  
P3M ####Briana Ville 771835 Rio Rancho, OH   
  
  
  
  
             MCH (RBC) [Entitic mass] 24.0 pg      Low          26.0-34.0    Beaumont Hospital  
  
  
  
  
                          Comment on above:         Performed By: #### JESÚS ERAZO  
P3M ####38 Herrera Street   
  
  
  
  
             MCHC         31.9 %       Low          32.0-36.0    Salem City Hospital  
stem  
  
  
  
  
                          Comment on above:         Performed By: #### JESÚS ERAZO  
P3M ####38 Herrera Street   
  
  
  
  
             MCV (RBC) [Entitic vol] 75.1 fL      Low          80.0-98.0    Summ  
a Health System  
  
  
  
  
                          Comment on above:         Performed By: #### JESÚS ERAZO  
P3M ####38 Herrera Street   
  
  
  
  
             Monocytes (Bld) [#/Vol] 0.4 10*3/uL  Normal       0.0-0.8      Summ  
a Health System  
  
  
  
  
                          Comment on above:         Performed By: #### JESÚS ERAZO  
P3M ####38 Herrera Street   
  
  
  
  
             Monocytes/100 WBC (Bld) 4.8 %        Normal       2.0-10.0     Summ  
a Health System  
  
  
  
  
                          Comment on above:         Performed By: #### KACEY BM  
P3M ####38 Herrera Street   
  
  
  
  
             Platelet mean volume (Bld) [Entitic vol] 6.8 fL       Low            
7.4-10.4     Munson Healthcare Cadillac Hospital  
  
  
  
  
                          Comment on above:         Performed By: #### KACEY BM  
P3M ####38 Herrera Street   
  
  
  
  
             Platelets (Bld) [#/Vol] 382 10*3/uL  Normal       140-440      University of Michigan Hospital  
  
  
  
  
                          Comment on above:         Performed By: #### HEMDF, BM  
P3M ####K94 Discoveries Mirfkg554 E.  
  
                                                    Elizabeth, OH   
  
  
  
  
             RBC (Bld) [#/Vol] 3.72 10*6/uL Low          4.40-5.90    McLaren Oakland  
  
  
  
  
                          Comment on above:         Performed By: #### HEMDF, BM  
P3M ####Corey HospitalLaru Technologies Lcweuz105 E.  
  
                                                    Elizabeth, OH   
  
  
  
  
             WBC (Bld) [#/Vol] 8.7 10*3/uL  Normal       3.6-10.7     Holmes County Joel Pomerene Memorial Hospital System  
  
  
  
  
                          Comment on above:         Performed By: #### HEMDF, BM  
P3M ####Corey HospitalLaru Technologies Uongxl986 E.  
  
                                                    Elizabeth, OH   
  
  
  
  
                                        POCT Glucose  on 2022  
  
  
  
  
             Glucose [Mass/Vol] 150 mg/dL    High         70 - 100 mg/dL SUMMA  
  
                                                                 Work Phone: 1(9 71)507-7882  
   
             Interpretation and review of Abnormal                                
 SUMMA  
  
             laboratory results                                        Work Phon  
e: 1(524) 407-7986  
   
                                                                 University Hospitals Geneva Medical Center LAB  
   
                                                                 SUMMA  
  
                                                                 Work Phone: 1(8 27)945-9397  
   
             Glucose [Mass/Vol] 107 mg/dL    High         70 - 100 mg/dL SUMMA  
   
             Interpretation and review of Abnormal                                
 SUMMA  
  
             laboratory results                                          
   
                                                                 University Hospitals Geneva Medical Center LAB  
   
                                                                 SUMMA  
   
             Glucose [Mass/Vol] 137 mg/dL    High         70 - 100 mg/dL SUMMA  
  
                                                                 Work Phone: 1(9 67)569-1090  
   
             Interpretation and review of Abnormal                                
 SUMMA  
  
             laboratory results                                        Work Phon  
e: 1(543) 645-7839  
   
                                                                 University Hospitals Geneva Medical Center LAB  
   
                                                                 SUMMA  
  
                                                                 Work Phone: 1(2 74)666-2106  
   
                                                                 University Hospitals Geneva Medical Center LAB  
  
  
  
  
                                        POCT Glucose  Ordered By: Pau Mathews on 0  
2022  
  
  
  
  
             Glucose [Mass/Vol] 127 mg/dL    High         70 - 100 mg/dL SUMMA  
   
             Interpretation and review of laboratory results Abnormal             
                    SUMMA  
   
                                                                 SUMMA  
  
  
  
  
                                        APTT  on 2022  
  
  
  
  
             aPTT Coag (Bld) [Time] 75.4 s       High         20.0 - 30.5 s SUMM  
A  
   
             Interpretation and review of Abnormal                                
 SUMMA  
  
             laboratory results                                          
   
                                                                 University Hospitals Geneva Medical Center LA  
B  
   
                                                                 SUMMA  
   
             aPTT Coag (Bld) [Time] 36.5 s       High         20.0-30.5    Munson Healthcare Cadillac Hospital  
  
  
  
  
                          Comment on above:         Result Comment: NOTE: The th  
erapeutic time for Heparin  
  
                                                    anticoagulation,based on Xa   
activity inhibition, is an APTT of  
  
                                                    46-80seconds.  
   
                                                    Performed By: #### APTT ####  
Kettering Health Greene Memorial M2 Connections Jipinb354 Rio Rancho, OH   
  
  
  
  
             aPTT Coag (Bld) [Time] 36.5 s       High         20.0 - 30.5 s SUMM  
A  
   
             Interpretation and review of Abnormal                                
 SUMMA  
  
             laboratory results                                          
   
                                                                 Mercy Health Perrysburg HospitalA  
   
             aPTT Coag (Bld) [Time] 48.5 s       High         20.0-30.5    Munson Healthcare Cadillac Hospital  
  
  
  
  
                          Comment on above:         Result Comment: NOTE: The th  
erapeutic time for Heparin  
  
                                                    anticoagulation,based on Xa   
activity inhibition, is an APTT of  
  
                                                    46-80seconds.  
   
                                                    Performed By: #### APTT ####  
38 Herrera Street   
  
  
  
  
             aPTT Coag (Bld) [Time] 48.5 s       High         20.0 - 30.5 s SUMM  
A  
   
             Interpretation and review of Abnormal                                
 SUMMA  
  
             laboratory results                                          
   
                                                                 Mercy Health Perrysburg HospitalA  
   
             aPTT Coag (Bld) [Time] 65.5 s       High         20.0-30.5    Munson Healthcare Cadillac Hospital  
  
  
  
  
                          Comment on above:         Result Comment: NOTE: The th  
erapeutic time for Heparin  
  
                                                    anticoagulation,based on Xa   
activity inhibition, is an APTT of  
  
                                                    46-80seconds.  
   
                                                    Performed By: #### APTT ####  
Briana Ville 771835 Rio Rancho, OH   
  
  
  
  
             aPTT Coag (Bld) [Time] 65.5 s       High         20.0 - 30.5 s SUMM  
A  
   
             Interpretation and review of Abnormal                                
 SUMMA  
  
             laboratory results                                          
   
                                                                 TriHealth McCullough-Hyde Memorial Hospital  
  
  
  
  
                                        Basic Metabolic Panel  on 2022  
  
  
  
  
             Anion gap [Moles/Vol] 8 mmol/L     Normal       3-13         Munson Healthcare Cadillac Hospital  
  
  
  
  
                          Comment on above:         Performed By: #### HEMDF, BM  
P3M ####Briana Ville 771835 Rio Rancho, OH   
  
  
  
  
             Calcium [Mass/Vol] 8.7 mg/dL    Normal       8.4-10.4     Munson Healthcare Otsego Memorial Hospital  
  
  
  
  
                          Comment on above:         Performed By: #### HEMYANCI BM  
P3M ####Corey HospitalLaru Technologies Tzvttn826 SoundwaveDexter, OH   
  
  
  
  
             CO2 [Moles/Vol] 22 mmol/L    Normal       22-30        Munson Healthcare Cadillac Hospital  
  
  
  
  
                          Comment on above:         Performed By: #### HEMYANCI BM  
P3M ####Corey HospitalIndependent Space525 SoundwaveDexter, OH   
  
  
  
  
             Glucose [Mass/Vol] 116 mg/dL    High                Munson Healthcare Otsego Memorial Hospital  
  
  
  
  
                          Comment on above:         Performed By: #### HEMDF BM  
P3M ####Kettering Health Greene Memorial M2 Connections Sktkzi385 SoundwaveDexter, OH   
  
  
  
  
             Urea nitrogen [Mass/Vol] 15 mg/dL     Normal       7-17         Beaumont Hospital  
  
  
  
  
                          Comment on above:         Performed By: #### HEMDF BM  
P3M ####Kettering Health Greene Memorial M2 Connections Xhtszb096 SoundwaveDexter, OH   
  
  
  
  
             Creatinine [Mass/Vol] 1.32 mg/dL   High         0.52-1.25    Munson Healthcare Cadillac Hospital  
  
  
  
  
                          Comment on above:         Performed By: #### HEMDF, BM  
P3M ####Corey HospitalIndependent Space525 Rio Rancho, OH   
  
  
  
  
             GFR/1.73 sq M.predicted among 65.1 mL/min/{1.73_m2} Normal       >6  
0          Munson Healthcare Cadillac Hospital  
  
             blacks MDRD (S/P/Bld) [Vol                                          
  
             rate/Area]                                            
  
  
  
  
                          Comment on above:         Performed By: #### HEMDF, BM  
P3M ####Kettering Health Greene Memorial M2 Connections Edzguh442 SoundwaveDexter, OH 21872  
  
  
  
  
  
             GFR/1.73 sq M.predicted among 56.2 mL/min/{1.73_m2} Abnormal     >6  
0          Munson Healthcare Cadillac Hospital  
  
             non-blacks MDRD (S/P/Bld) [Vol                                       
     
  
             rate/Area]                                            
  
  
  
  
                          Comment on above:         Result Comment: KDIGO guidel  
zoë provide the following GFR  
  
                                                    categories:Stage GFR(ml/min/  
1.73 m2) TermsG1 >=90 Normal or highG2  
  
                                                    60-89 Mildly decreased*G3a 4  
5-59 Mildly to moderately qfywqmszeX0r  
  
                                                    30-44 Moderately to severely  
 decreasedG4 15-29 Severely decreasedG5  
  
                                                    <15 Kidney failure*Relative   
to young adult level.In the absence of  
  
                                                    evidence of kidney damage, n  
either GFRcategory G1 nor G2 fulfill  
  
                                                    the criteria for CKD.The CKD  
-EPI equation is validated in  
  
                                                    individuals 18 yearsof age a  
nd older. Currently the best equation  
  
                                                    forestimating glomerular beto  
tration rate (GFR) from serumcreatinine  
  
                                                    in children is the Bedside S  
kristiwartz equation.It is less accurate in  
  
                                                    patients with extremes of mu  
sclemass, restriction of dietary  
  
                                                    protein, ingestion of creati  
ne,extra-renal metabolism of  
  
                                                    creatinine, or treatment wit  
hmedications that affect renal tubular  
  
                                                    creatinine secretion.  
   
                                                    Performed By: #### KACEY BM  
P3M ####Cerus Corporation525 Rio Rancho, OH   
  
  
  
  
             Chloride [Moles/Vol] 106 mmol/L   Normal              Mercy Health St. Joseph Warren Hospital System  
  
  
  
  
                          Comment on above:         Performed By: #### KACEY BM  
P3M ####Cerus Corporation525 Rio Rancho, OH   
  
  
  
  
             Potassium [Moles/Vol] 4.0 mmol/L   Normal       3.5-5.1      Munson Healthcare Cadillac Hospital  
  
  
  
  
                          Comment on above:         Performed By: #### KAECY BM  
P3M ####Cerus Corporation525 Rio Rancho, OH   
  
  
  
  
             Sodium [Moles/Vol] 136 mmol/L   Normal       135-145      The Jewish Hospital System  
  
  
  
  
                          Comment on above:         Performed By: #### KACEY BM  
P3M ####Cerus Corporation525 Rio Rancho, OH   
  
  
  
  
                                        Basic Metabolic Panel w/ Reflex to MG  o  
n 2022  
  
  
  
  
             Anion gap [Moles/Vol] 5 mmol/L                  3 - 13 mmol/L SUMMA  
   
             Calcium [Mass/Vol] 8.4 mg/dL                 8.4 - 10.4 mg/dL SUMMA  
   
             Chloride [Moles/Vol] 107 mmol/L                98 - 107 mmol/L SUMM  
A  
   
             CO2 [Moles/Vol] 22 mmol/L                 22 - 30 mmol/L SUMMA  
   
             Creatinine [Mass/Vol] 1.24 mg/dL                0.52 - 1.25  SUMMA  
  
                                                    mg/dL          
   
             EGFR IF NonAfrican 60.6 mL/min               >60          SUMMA  
  
             American                                              
   
             GFR/1.73 sq M.predicted 70.2                      >60          SUMM  
A  
  
             among blacks MDRD mL/min/{1.73_m2}                             
  
             (S/P/Bld) [Vol rate/Area]                                          
   
             Glucose [Mass/Vol] 144 mg/dL    High         70 - 100 mg/dL SUMMA  
   
             Interpretation and review Abnormal                               KRAMER  
MMA  
  
             of laboratory results                                          
   
             Potassium [Moles/Vol] 3.7 mmol/L                3.5 - 5.1 mmol/L KRAMER  
MMA  
   
             Sodium [Moles/Vol] 134 mmol/L   Low          135 - 145 mmol/L SUMMA  
   
             Urea nitrogen (BldV) 12 mg/dL                  7 - 17 mg/dL SUMMA  
  
             [Mass/Vol]                                            
   
                                                                 Kettering Health Miamisburg LAB  
   
                                                                 SUMMA  
   
             Anion gap [Moles/Vol] 8 mmol/L                  3 - 13 mmol/L SUMMA  
   
             Calcium [Mass/Vol] 8.7 mg/dL                 8.4 - 10.4 mg/dL SUMMA  
   
             Chloride [Moles/Vol] 106 mmol/L                98 - 107 mmol/L SUMM  
A  
   
             CO2 [Moles/Vol] 22 mmol/L                 22 - 30 mmol/L SUMMA  
   
             Creatinine [Mass/Vol] 1.32 mg/dL   High         0.52 - 1.25  SUMMA  
  
                                                    mg/dL          
   
             EGFR IF NonAfrican 56.2 mL/min  Abnormal     >60          SUMMA  
  
             American                                              
   
             GFR/1.73 sq M.predicted 65.1                      >60          SUMM  
A  
  
             among blacks MDRD mL/min/{1.73_m2}                             
  
             (S/P/Bld) [Vol rate/Area]                                          
   
             Glucose [Mass/Vol] 116 mg/dL    High         70 - 100 mg/dL SUMMA  
   
             Interpretation and review Abnormal                               KRAMER  
MMA  
  
             of laboratory results                                          
   
             Potassium [Moles/Vol] 4.0 mmol/L                3.5 - 5.1 mmol/L KRAMER  
MMA  
   
             Sodium [Moles/Vol] 136 mmol/L                135 - 145 mmol/L SUMMA  
   
             Urea nitrogen (BldV) 15 mg/dL                  7 - 17 mg/dL SUMMA  
  
             [Mass/Vol]                                            
   
                                                                 Kettering Health Miamisburg LAB  
   
                                                                 SUMMA  
  
  
  
  
                                        CBC auto differential  on 2022  
  
  
  
  
             Absolute Baso # 0.1 10*3/uL               0.0 - 0.2 10*3/uL SUMMA  
   
             Absolute Neut # 6.9 10*3/uL               1.8 - 7.0 10*3/uL SUMMA  
   
             Basophils/100 WBC (Bld) 0.6 %                     0.0 - 2.0 %  SUMM  
A  
   
             Eosinophils (Bld) [#/Vol] 0.2 10*3/uL               0.0 - 0.5 10*3/  
uL SUMMA  
   
             Eosinophils/100 WBC (Bld) 2.1 %                     1.0 - 6.0 %  KRAMER  
MMA  
   
             Granulocytes/100 WBC (Bld) 80.0 %                    40.0 - 80.0 %   
SUMMA  
   
             Hematocrit (Bld) [Volume 27.9 %       Low          40.0 - 52.0 % KRAMER  
MMA  
  
             fraction]                                             
   
             Hemoglobin.gastrointestinal 8.9 g/dL     Low          13.0 - 18.0 g  
/dL SUMMA  
  
             spec 1 Ql (Stl)                                          
   
             Interpretation and review of Abnormal                                
 SUMMA  
  
             laboratory results                                          
   
             Lymphocytes (Bld) [#/Vol] 1.1 10*3/uL               1.0 - 4.3 10*3/  
uL SUMMA  
   
             Lymphocytes/100 WBC (Bld) 12.5 %       Low          20.0 - 40.0 % S  
UMMA  
   
             MCH (RBC) [Entitic mass] 24.0 pg      Low          26.0 - 34.0 pg S  
UMMA  
   
             MCHC (RBC) [Mass/Vol] 31.9 %       Low          32.0 - 36.0 % SUMMA  
   
             MCV (RBC) [Entitic vol] 75.1 fL      Low          80.0 - 98.0 fL KRAMER  
MMA  
   
             Monocytes (Bld) [#/Vol] 0.4 10*3/uL               0.0 - 0.8 10*3/uL  
 SUMMA  
   
             Monocytes/100 WBC (Bld) 4.8 %                     2.0 - 10.0 % SUMM  
A  
   
             Platelet distribution width 17.6 %       High         11.5 - 14.5 %  
 SUMMA  
  
             (Bld) [Ratio]                                          
   
             Platelet mean volume (Bld) 6.8 fL       Low          7.4 - 10.4 fL   
SUMMA  
  
             [Entitic vol]                                          
   
             Platelets (Bld) [#/Vol] 382 10*3/uL               140 - 440 10*3/uL  
 SUMMA  
   
             RBC (Bld) [#/Vol] 3.72 10*6/uL Low          4.40 - 5.90 10*6/uL SUM  
MA  
   
             WBC (Bld) [#/Vol] 8.7 10*3/uL               3.6 - 10.7 10*3/uL SUMM  
A  
   
                                                                 SUMMA HEALTH Mercy Health St. Joseph Warren Hospital   
LAB  
   
                                                                 SUMMA  
   
             Absolute Baso # 0.0 10*3/uL               0.0 - 0.2 10*3/uL SUMMA  
   
             Absolute Neut # 6.4 10*3/uL               1.8 - 7.0 10*3/uL SUMMA  
   
             Basophils/100 WBC (Bld) 0.6 %                     0.0 - 2.0 %  SUMM  
A  
   
             Eosinophils (Bld) [#/Vol] 0.1 10*3/uL               0.0 - 0.5 10*3/  
uL SUMMA  
   
             Eosinophils/100 WBC (Bld) 1.4 %                     1.0 - 6.0 %  KRAMER  
MMA  
   
             Granulocytes/100 WBC (Bld) 76.3 %                    40.0 - 80.0 %   
SUMMA  
   
             Hematocrit (Bld) [Volume 31.0 %       Low          40.0 - 52.0 % KRAMER  
MMA  
  
             fraction]                                             
   
             Hemoglobin.gastrointestinal 10.0 g/dL    Low          13.0 - 18.0 g  
/dL SUMMA  
  
             spec 1 Ql (Stl)                                          
   
             Interpretation and review of Abnormal                                
 SUMMA  
  
             laboratory results                                          
   
             Lymphocytes (Bld) [#/Vol] 1.4 10*3/uL               1.0 - 4.3 10*3/  
uL SUMMA  
   
             Lymphocytes/100 WBC (Bld) 17.3 %       Low          20.0 - 40.0 % S  
UMMA  
   
             MCH (RBC) [Entitic mass] 24.5 pg      Low          26.0 - 34.0 pg S  
UMMA  
   
             MCHC (RBC) [Mass/Vol] 32.1 %                    32.0 - 36.0 % SUMMA  
   
             MCV (RBC) [Entitic vol] 76.3 fL      Low          80.0 - 98.0 fL KRAMER  
MMA  
   
             Monocytes (Bld) [#/Vol] 0.4 10*3/uL               0.0 - 0.8 10*3/uL  
 SUMMA  
   
             Monocytes/100 WBC (Bld) 4.4 %                     2.0 - 10.0 % SUMM  
A  
   
             Platelet distribution width 17.8 %       High         11.5 - 14.5 %  
 SUMMA  
  
             (Bld) [Ratio]                                          
   
             Platelet mean volume (Bld) 6.8 fL       Low          7.4 - 10.4 fL   
SUMMA  
  
             [Entitic vol]                                          
   
             Platelets (Bld) [#/Vol] 407 10*3/uL               140 - 440 10*3/uL  
 SUMMA  
   
             RBC (Bld) [#/Vol] 4.07 10*6/uL Low          4.40 - 5.90 10*6/uL SUM  
MA  
   
             WBC (Bld) [#/Vol] 8.3 10*3/uL               3.6 - 10.7 10*3/uL SUMM  
A  
   
                                                                 Apliiq Mercy Health St. Joseph Warren Hospital   
LAB  
   
                                                                 JANELLE  
  
  
  
  
                                        Glucose,Bedside  on 2022  
  
  
  
  
             Glucose [Mass/Vol] 177 mg/dL    High                Corey Hospitala Hea  
lt System  
  
  
  
  
                          Comment on above:         Result Comment: Test perform  
ed by glucose meter. Results may   
be  
  
                                                    10%-15% lowerthan serum/plas  
ma values. (CLIA ID 64D7390402)  
   
                                                    Performed By: #### BGLU ####  
UrbanSitter  
  
                                                    Lunenburg, OH 35693-2000  
  
  
  
  
             Glucose [Mass/Vol] 118 mg/dL    High                Corey Hospitala Hea  
lt System  
  
  
  
  
                          Comment on above:         Result Comment: Test perform  
ed by glucose meter. Results may   
be  
  
                                                    10%-15% lowerthan serum/plas  
ma values. (CLIA ID 37S4538024)  
   
                                                    Performed By: #### BGLU ####  
UrbanSitter  
  
                                                    Lunenburg, OH 50110-9530  
  
  
  
  
             Glucose [Mass/Vol] 230 mg/dL    High                Corey Hospitala Hea  
lt System  
  
  
  
  
                          Comment on above:         Result Comment: Test perform  
ed by glucose meter. Results may   
be  
  
                                                    10%-15% lowerthan serum/plas  
ma values. (CLIA ID 35N0995277)  
   
                                                    Performed By: #### BGLU ####  
UrbanSitter  
  
                                                    Lunenburg, OH 10949-5319  
  
  
  
  
             Glucose [Mass/Vol] 191 mg/dL    High                The Jewish Hospital System  
  
  
  
  
                          Comment on above:         Result Comment: Test perform  
ed by glucose meter. Results may   
be  
  
                                                    10%-15% lowerthan serum/plas  
ma values. (CLIA ID 82R0915316)  
   
                                                    Performed By: #### BGLU ####  
UrbanSitter  
  
                                                    Lunenburg, OH 93463-9016  
  
  
  
  
                                        Hemogram w/ Autodiff  on 2022  
  
  
  
  
             Abs Baso Cnt 0.0 10*3/uL  Normal       0.0-0.2      Kettering Health Greene Memorial M2 Connections Long Island Jewish Medical Center  
  
  
  
  
                          Comment on above:         Performed By: #### HEMDF, BM  
P3M ####UrbanSitter STREETAKRON, OH 03565  
  
  
  
  
  
             Abs Neutrophile Cnt 6.4 10*3/uL  Normal       1.8-7.0      ProMedica Coldwater Regional Hospital  
  
  
  
  
                          Comment on above:         Performed By: #### HEMYANCI BM  
P3M ####Briana Ville 771835 Rio Rancho, OH 98446  
  
  
  
  
  
             Basophils/100 WBC (Bld) 0.6 %        Normal       0.0-2.0      Summ  
a Health System  
  
  
  
  
                          Comment on above:         Performed By: #### HEMYANCI BM  
P3M ####Briana Ville 771835 Rio Rancho, OH 70645  
  
  
  
  
  
             Eosinophils (Bld) [#/Vol] 0.1 10*3/uL  Normal       0.0-0.5      Harper University Hospital  
  
  
  
  
                          Comment on above:         Performed By: #### HEMYANCI BM  
P3M ####Briana Ville 771835 Rio Rancho, OH 20008  
  
  
  
  
  
             Eosinophils/100 WBC (Bld) 1.4 %        Normal       1.0-6.0      Harper University Hospital  
  
  
  
  
                          Comment on above:         Performed By: #### HEMYANCI BM  
P3M ####Kettering Health Greene Memorial M2 Connections Glfcnt918 Rio Rancho, OH 97587  
  
  
  
  
  
             Erythrocyte distribution width (RBC) 17.8 %       High         11.5  
-14.5    Munson Healthcare Cadillac Hospital  
  
             [Ratio]                                               
  
  
  
  
                          Comment on above:         Performed By: #### HEMYANCI, BM  
P3M ####Briana Ville 771835 Rio Rancho, OH 22599  
  
  
  
  
  
             Granulocytes/100 WBC (Bld) 76.3 %       Normal       40.0-80.0    Insight Surgical Hospital  
  
  
  
  
                          Comment on above:         Performed By: #### HEMYANCI, BM  
P3M ####Briana Ville 771835 University of Utah Hospital, OH 89890  
  
  
  
  
  
             Hematocrit (Bld) [Volume fraction] 31.0 %       Low          40.0-5  
2.0    Munson Healthcare Cadillac Hospital  
  
  
  
  
                          Comment on above:         Performed By: #### HEMDF, BM  
P3M ####Kettering Health Greene Memorial M2 Connections Abgkhz902 Rio Rancho, OH 81511  
  
  
  
  
  
             Hemoglobin (Bld) [Mass/Vol] 10.0 g/dL    Low          13.0-18.0      
Munson Healthcare Cadillac Hospital  
  
  
  
  
                          Comment on above:         Performed By: #### HEMDF, BM  
P3M ####Briana Ville 771835 Rio Rancho, OH   
  
  
  
  
             Lymphocytes (Bld) [#/Vol] 1.4 10*3/uL  Normal       1.0-4.3      Harper University Hospital  
  
  
  
  
                          Comment on above:         Performed By: #### HEMDF, BM  
P3M ####Briana Ville 771835 Rio Rancho, OH   
  
  
  
  
             Lymphocytes/100 WBC (Bld) 17.3 %       Low          20.0-40.0    Harper University Hospital  
  
  
  
  
                          Comment on above:         Performed By: #### HEMYANCI, BM  
P3M ####Briana Ville 771835 Rio Rancho, OH   
  
  
  
  
             MCH (RBC) [Entitic mass] 24.5 pg      Low          26.0-34.0    Beaumont Hospital  
  
  
  
  
                          Comment on above:         Performed By: #### HEMYANCI, BM  
P3M ####38 Herrera Street   
  
  
  
  
             MCHC         32.1 %       Normal       32.0-36.0    Mercy Health St. Vincent Medical Center Sy  
stem  
  
  
  
  
                          Comment on above:         Performed By: #### HEMYANCI, BM  
P3M ####Briana Ville 771835 Rio Rancho, OH   
  
  
  
  
             MCV (RBC) [Entitic vol] 76.3 fL      Low          80.0-98.0    Summ  
a Health System  
  
  
  
  
                          Comment on above:         Performed By: #### HEMDF, BM  
P3M ####Briana Ville 771835 Rio Rancho, OH   
  
  
  
  
             Monocytes (Bld) [#/Vol] 0.4 10*3/uL  Normal       0.0-0.8      Summ  
a Health System  
  
  
  
  
                          Comment on above:         Performed By: #### HEMDF, BM  
P3M ####Briana Ville 771835 Rio Rancho, OH   
  
  
  
  
             Monocytes/100 WBC (Bld) 4.4 %        Normal       2.0-10.0     Summ  
a Health System  
  
  
  
  
                          Comment on above:         Performed By: #### HEMDF, BM  
P3M ####Briana Ville 771835 Rio Rancho, OH   
  
  
  
  
             Platelet mean volume (Bld) [Entitic vol] 6.8 fL       Low            
7.4-10.4     Munson Healthcare Cadillac Hospital  
  
  
  
  
                          Comment on above:         Performed By: #### HEMYANCI BM  
P3M ####Kettering Health Greene Memorial M2 Connections Rouzrp618 E.  
  
                                                    Elizabeth, OH   
  
  
  
  
             Platelets (Bld) [#/Vol] 407 10*3/uL  Normal       140-440      University of Michigan Hospital  
  
  
  
  
                          Comment on above:         Performed By: #### HEMYANCI BM  
P3M ####Kettering Health Greene Memorial M2 Connections Qojwuj106 E.  
  
                                                    Elizabeth, OH   
  
  
  
  
             RBC (Bld) [#/Vol] 4.07 10*6/uL Low          4.40-5.90    Holmes County Joel Pomerene Memorial Hospital System  
  
  
  
  
                          Comment on above:         Performed By: #### HEMYANCI BM  
P3M ####Kettering Health Greene Memorial M2 Connections Klgpel166 E.  
  
                                                    Elizabeth, OH   
  
  
  
  
             WBC (Bld) [#/Vol] 8.3 10*3/uL  Normal       3.6-10.7     McLaren Oakland  
  
  
  
  
                          Comment on above:         Performed By: #### HEMYANCI BM  
P3M ####Kettering Health Greene Memorial M2 Connections Qlrzrt898 E.  
  
                                                    Elizabeth, OH   
  
  
  
  
                                        POCT Glucose  on 2022  
  
  
  
  
             Glucose [Mass/Vol] 177 mg/dL    High         70 - 100 mg/dL SUMMA  
  
                                                                 Work Phone: 1(6 28)302-6577  
   
             Interpretation and review of Abnormal                                
 SUMMA  
  
             laboratory results                                        Work Phon  
e: 1(439) 905-1305  
   
                                                                 University Hospitals Geneva Medical Center LAB  
   
                                                                 SUMMA  
  
                                                                 Work Phone: 9(6 95)879-3636  
   
             Glucose [Mass/Vol] 118 mg/dL    High         70 - 100 mg/dL SUMMA  
   
             Interpretation and review of Abnormal                                
 SUMMA  
  
             laboratory results                                          
   
                                                                 University Hospitals Geneva Medical Center LAB  
   
                                                                 SUMMA  
   
             Glucose [Mass/Vol] 230 mg/dL    High         70 - 100 mg/dL SUMMA  
   
             Interpretation and review of Abnormal                                
 SUMMA  
  
             laboratory results                                          
   
                                                                 University Hospitals Geneva Medical Center LAB  
   
                                                                 SUMMA  
   
             Glucose [Mass/Vol] 191 mg/dL    High         70 - 100 mg/dL SUMMA  
   
             Interpretation and review of Abnormal                                
 SUMMA  
  
             laboratory results                                          
   
                                                                 University Hospitals Geneva Medical Center LAB  
   
                                                                 SUMMA  
  
  
  
  
                                        APTT  on 2022  
  
  
  
  
             aPTT Coag (Bld) [Time] 49.5 s       High         20.0-30.5    Munson Healthcare Cadillac Hospital  
  
  
  
  
                          Comment on above:         Result Comment: NOTE: The th  
erapeutic time for Heparin  
  
                                                    anticoagulation,based on Xa   
activity inhibition, is an APTT of  
  
                                                    46-80seconds.  
   
                                                    Performed By: #### APTT ####  
Kettering Health Greene Memorial M2 Connections Eecnhz869 Rio Rancho, OH 78466-7202  
  
  
  
  
             aPTT Coag (Bld) [Time] 49.5 s       High         20.0 - 30.5 s SUMM  
A  
   
             Interpretation and review of Abnormal                                
 SUMMA  
  
             laboratory results                                          
   
                                                                 Galion Hospital  
B  
   
                                                                 SUMMA  
   
             aPTT Coag (Bld) [Time] 41.9 s       High         20.0-30.5    Munson Healthcare Cadillac Hospital  
  
  
  
  
                          Comment on above:         Result Comment: NOTE: The th  
erapeutic time for Heparin  
  
                                                    anticoagulation,based on Xa   
activity inhibition, is an APTT of  
  
                                                    46-80seconds.  
   
                                                    Performed By: #### APTT ####  
38 Herrera Street   
  
  
  
  
             aPTT Coag (Bld) [Time] 41.9 s       High         20.0 - 30.5 s SUMM  
A  
   
             Interpretation and review of Abnormal                                
 SUMMA  
  
             laboratory results                                          
   
                                                                 Lima City Hospital  
   
                                                                 SUMMA  
   
             aPTT Coag (Bld) [Time] 68.1 s       High         20.0-30.5    Munson Healthcare Cadillac Hospital  
  
  
  
  
                          Comment on above:         Result Comment: NOTE: The th  
erapeutic time for Heparin  
  
                                                    anticoagulation,based on Xa   
activity inhibition, is an APTT of  
  
                                                    46-80seconds.  
   
                                                    Performed By: #### APTT ####  
Kettering Health Greene Memorial M2 Connections 99 Hogan Street   
  
  
  
  
             aPTT Coag (Bld) [Time] 68.1 s       High         20.0 - 30.5 s SUMM  
A  
   
             Interpretation and review of Abnormal                                
 SUMMA  
  
             laboratory results                                          
   
                                                                 Galion Hospital  
B  
   
                                                                 SUMMA  
   
             aPTT Coag (Bld) [Time] 48.5 s       High         20.0-30.5    Munson Healthcare Cadillac Hospital  
  
  
  
  
                          Comment on above:         Result Comment: NOTE: The th  
erapeutic time for Heparin  
  
                                                    anticoagulation,based on Xa   
activity inhibition, is an APTT of  
  
                                                    46-80seconds.  
   
                                                    Performed By: #### APTT ####  
Munson Healthcare Cadillac Hospital525 Rio Rancho, OH   
  
  
  
  
             aPTT Coag (Bld) [Time] 48.5 s       High         20.0 - 30.5 s SUMM  
A  
  
  
  
  
                                        Basic Metabolic Panel  on 2022  
  
  
  
  
             Anion gap [Moles/Vol] 10 mmol/L    Normal       3-13         Munson Healthcare Cadillac Hospital  
  
  
  
  
                          Comment on above:         Performed By: #### HEMDF, BM  
P3M ####Kettering Health Greene Memorial M2 Connections Muagvv564 Rio Rancho, OH   
  
  
  
  
             Calcium [Mass/Vol] 9.3 mg/dL    Normal       8.4-10.4     Munson Healthcare Otsego Memorial Hospital  
  
  
  
  
                          Comment on above:         Performed By: #### HEMDF BM  
P3M ####Kettering Health Greene Memorial M2 Connections Hjiltk419 Rio Rancho, OH   
  
  
  
  
             CO2 [Moles/Vol] 21 mmol/L    Low          22-30        Munson Healthcare Cadillac Hospital  
  
  
  
  
                          Comment on above:         Performed By: #### HEMDF, BM  
P3M ####Kettering Health Greene Memorial M2 Connections Xaimin694 Rio Rancho, OH   
  
  
  
  
             Glucose [Mass/Vol] 117 mg/dL    High                Munson Healthcare Otsego Memorial Hospital  
  
  
  
  
                          Comment on above:         Performed By: #### HEMDF, BM  
P3M ####Kettering Health Greene Memorial M2 Connections Ivowxu630 Rio Rancho, OH   
  
  
  
  
             Urea nitrogen [Mass/Vol] 19 mg/dL     High         7-17         Beaumont Hospital  
  
  
  
  
                          Comment on above:         Performed By: #### HEMDF, BM  
P3M ####Kettering Health Greene Memorial M2 Connections Ltmayg132 Rio Rancho, OH   
  
  
  
  
             Creatinine [Mass/Vol] 1.28 mg/dL   High         0.52-1.25    Munson Healthcare Cadillac Hospital  
  
  
  
  
                          Comment on above:         Performed By: #### HEMDF, BM  
P3M ####Corey HospitalLaru Technologies Exqdbk041 Rio Rancho, OH   
  
  
  
  
             GFR/1.73 sq M.predicted among 67.6 mL/min/{1.73_m2} Normal       >6  
0          Munson Healthcare Cadillac Hospital  
  
             blacks MDRD (S/P/Bld) [Vol                                          
  
             rate/Area]                                            
  
  
  
  
                          Comment on above:         Performed By: #### HEMDF, BM  
P3M ####SummLaru Technologies Wgdvyv889 Rio Rancho, OH   
  
  
  
  
             GFR/1.73 sq M.predicted among 58.3 mL/min/{1.73_m2} Abnormal     >6  
0          Munson Healthcare Cadillac Hospital  
  
             non-blacks MDRD (S/P/Bld) [Vol                                       
     
  
             rate/Area]                                            
  
  
  
  
                          Comment on above:         Result Comment: KDIGO guidel  
zoë provide the following GFR  
  
                                                    categories:Stage GFR(ml/min/  
1.73 m2) TermsG1 >=90 Normal or highG2  
  
                                                    60-89 Mildly decreased*G3a 4  
5-59 Mildly to moderately lqaeimrqbK6b  
  
                                                    30-44 Moderately to severely  
 decreasedG4 15-29 Severely decreasedG5  
  
                                                    <15 Kidney failure*Relative   
to young adult level.In the absence of  
  
                                                    evidence of kidney damage, n  
either GFRcategory G1 nor G2 fulfill  
  
                                                    the criteria for CKD.The CKD  
-EPI equation is validated in  
  
                                                    individuals 18 yearsof age a  
nd older. Currently the best equation  
  
                                                    forestimating glomerular beto  
tration rate (GFR) from serumcreatinine  
  
                                                    in children is the Bedside S  
kristiwartz equation.It is less accurate in  
  
                                                    patients with extremes of mu  
sclemass, restriction of dietary  
  
                                                    protein, ingestion of creati  
ne,extra-renal metabolism of  
  
                                                    creatinine, or treatment wit  
hmedications that affect renal tubular  
  
                                                    creatinine secretion.  
   
                                                    Performed By: #### HEMDF BM  
P3M ####Kettering Health Greene Memorial M2 Connections Aszkku417 Rio Rancho, OH   
  
  
  
  
             Chloride [Moles/Vol] 104 mmol/L   Normal              Munising Memorial Hospital  
  
  
  
  
                          Comment on above:         Performed By: #### HEMDF, BM  
P3M ####Kettering Health Greene Memorial M2 Connections Rypjpt717 Rio Rancho, OH   
  
  
  
  
             Potassium [Moles/Vol] 4.3 mmol/L   Normal       3.5-5.1      Munson Healthcare Cadillac Hospital  
  
  
  
  
                          Comment on above:         Performed By: #### HEMDF BM  
P3M ####Kettering Health Greene Memorial M2 Connections Jwhupd279 Rio Rancho, OH   
  
  
  
  
             Sodium [Moles/Vol] 135 mmol/L   Normal       135-145      The Jewish Hospital System  
  
  
  
  
                          Comment on above:         Performed By: #### HEMDF, BM  
P3M ####Briana Ville 771835 Rio Rancho, OH   
  
  
  
  
                                        Basic Metabolic Panel w/ Reflex to MG  o  
n 2022  
  
  
  
  
             Anion gap [Moles/Vol] 10 mmol/L                 3 - 13 mmol/L SUMMA  
   
             Calcium [Mass/Vol] 9.3 mg/dL                 8.4 - 10.4 mg/dL SUMMA  
   
             Chloride [Moles/Vol] 104 mmol/L                98 - 107 mmol/L SUMM  
A  
   
             CO2 [Moles/Vol] 21 mmol/L    Low          22 - 30 mmol/L SUMMA  
   
             Creatinine [Mass/Vol] 1.28 mg/dL   High         0.52 - 1.25  SUMMA  
  
                                                    mg/dL          
   
             EGFR IF NonAfrican 58.3 mL/min  Abnormal     >60          SUMMA  
  
             American                                              
   
             GFR/1.73 sq M.predicted 67.6                      >60          SUMM  
A  
  
             among blacks MDRD mL/min/{1.73_m2}                             
  
             (S/P/Bld) [Vol rate/Area]                                          
   
             Glucose [Mass/Vol] 117 mg/dL    High         70 - 100 mg/dL SUMMA  
   
             Interpretation and review Abnormal                               KRAMER  
MMA  
  
             of laboratory results                                          
   
             Potassium [Moles/Vol] 4.3 mmol/L                3.5 - 5.1 mmol/L KRAMER  
MMA  
   
             Sodium [Moles/Vol] 135 mmol/L                135 - 145 mmol/L SUMMA  
   
             Urea nitrogen (BldV) 19 mg/dL     High         7 - 17 mg/dL SUMMA  
  
             [Mass/Vol]                                            
   
                                                                 Kettering Health Miamisburg LAB  
   
                                                                 SUMMA  
  
  
  
  
                                        CBC auto differential  on 2022  
  
  
  
  
             Absolute Neut # 6.6 10*3/uL               1.8 - 7.0 10*3/uL SUMMA  
   
             Basophils/100 WBC (Bld) 0.8 %                     0.0 - 2.0 %  SUMM  
A  
   
             Eosinophils (Bld) [#/Vol] 0.1 10*3/uL               0.0 - 0.5 10*3/  
uL SUMMA  
   
             Eosinophils/100 WBC (Bld) 1.4 %                     1.0 - 6.0 %  KRAMER  
MMA  
   
             Granulocytes/100 WBC (Bld) 78.1 %                    40.0 - 80.0 %   
SUMMA  
   
             Hematocrit (Bld) [Volume fraction] 31.9 %       Low          40.0 -  
 52.0 % SUMMA  
   
             Hemoglobin.gastrointestinal spec 1 Ql 10.3 g/dL    Low          13.  
0 - 18.0 g/dL SUMMA  
  
             (Stl)                                                 
   
             Lymphocytes (Bld) [#/Vol] 1.1 10*3/uL               1.0 - 4.3 10*3/  
uL SUMMA  
   
             Lymphocytes/100 WBC (Bld) 12.6 %       Low          20.0 - 40.0 % S  
UMMA  
   
             MCH (RBC) [Entitic mass] 24.6 pg      Low          26.0 - 34.0 pg S  
UMMA  
   
             MCHC (RBC) [Mass/Vol] 32.4 %                    32.0 - 36.0 % SUMMA  
   
             MCV (RBC) [Entitic vol] 75.7 fL      Low          80.0 - 98.0 fL KRAMER  
MMA  
   
             Monocytes (Bld) [#/Vol] 0.6 10*3/uL               0.0 - 0.8 10*3/uL  
 SUMMA  
   
             Monocytes/100 WBC (Bld) 7.1 %                     2.0 - 10.0 % SUMM  
A  
   
             Platelet distribution width (Bld) [Ratio] 18.0 %       High          
 11.5 - 14.5 % SUMMA  
   
             Platelet mean volume (Bld) [Entitic vol] 6.8 fL       Low            
7.4 - 10.4 fL SUMMA  
   
             Platelets (Bld) [#/Vol] 428 10*3/uL               140 - 440 10*3/uL  
 SUMMA  
   
             RBC (Bld) [#/Vol] 4.21 10*6/uL Low          4.40 - 5.90 10*6/uL SUM  
MA  
   
             WBC (Bld) [#/Vol] 8.5 10*3/uL               3.6 - 10.7 10*3/uL SUMM  
A  
  
  
  
  
                                        Glucose,Bedside  on 2022  
  
  
  
  
             Glucose [Mass/Vol] 152 mg/dL    High                The Jewish Hospital System  
  
  
  
  
                          Comment on above:         Result Comment: Test perform  
ed by glucose meter. Results may   
be  
  
                                                    10%-15% lowerthan serum/plas  
ma values. (CLIA ID 46F1042211)  
   
                                                    Performed By: #### BGLU ####  
K94 Discoveries Npzmpq559 Santa Maria Biotherapeutics Elizabeth, OH 15848-9804  
  
  
  
  
             Glucose [Mass/Vol] 157 mg/dL    High                Corey Hospitala a  
Lima City Hospital System  
  
  
  
  
                          Comment on above:         Result Comment: Test perform  
ed by glucose meter. Results may   
be  
  
                                                    10%-15% lowerthan serum/plas  
ma values. (CLIA ID 94P9148702)  
   
                                                    Performed By: #### BGLU ####  
K94 Discoveries Cwbwmw807 Santa Maria Biotherapeutics Elizabeth, OH 62676-6856  
  
  
  
  
             Glucose [Mass/Vol] 179 mg/dL    High                The Jewish Hospital System  
  
  
  
  
                          Comment on above:         Result Comment: Test perform  
ed by glucose meter. Results may   
be  
  
                                                    10%-15% lowerthan serum/plas  
ma values. (CLIA ID 97G6861538)  
   
                                                    Performed By: #### BGLU ####  
Briana Ville 771835 Rio Rancho, OH 52958-9463  
  
  
  
  
             Glucose [Mass/Vol] 124 mg/dL    High                Corey Hospital  
  
  
  
  
                          Comment on above:         Result Comment: Test perform  
ed by glucose meter. Results may   
be  
  
                                                    10%-15% lowerthan serum/plas  
ma values. (CLIA ID 80B9078594)  
   
                                                    Performed By: #### BGLU ####  
38 Herrera Street   
  
  
  
  
                                        Hemogram w/ Autodiff   on 2022  
  
  
  
  
             Abs Baso Cnt 0.1 10*3/uL  Normal       0.0-0.2      Salem City Hospital  
stem  
  
  
  
  
                          Comment on above:         Performed By: #### HEMDF BM  
P3M ####Kettering Health Greene Memorial M2 Connections Vvnomv033 Rio Rancho, OH   
  
  
  
  
             Abs Neutrophile Cnt 6.6 10*3/uL  Normal       1.8-7.0      ProMedica Coldwater Regional Hospital  
  
  
  
  
                          Comment on above:         Performed By: #### HEMDF BM  
P3M ####Kettering Health Greene Memorial M2 Connections Soglzh749 Rio Rancho, OH 14616  
  
  
  
  
             Basophils/100 WBC (Bld) 0.8 %        Normal       0.0-2.0      Summ  
a Health System  
  
  
  
  
                          Comment on above:         Performed By: #### HEMDF BM  
P3M ####Kettering Health Greene Memorial M2 Connections Hojnrl438 Rio Rancho, OH   
  
  
  
  
             Eosinophils (Bld) [#/Vol] 0.1 10*3/uL  Normal       0.0-0.5      Harper University Hospital  
  
  
  
  
                          Comment on above:         Performed By: #### HEMDF, BM  
P3M ####Briana Ville 771835 Rio Rancho, OH 72288  
  
  
  
  
  
             Eosinophils/100 WBC (Bld) 1.4 %        Normal       1.0-6.0      Harper University Hospital  
  
  
  
  
                          Comment on above:         Performed By: #### HEMDF BM  
P3M ####Briana Ville 771835 E.  
  
                                                    Elizabeth, OH   
  
  
  
  
             Erythrocyte distribution width (RBC) 18.0 %       High         11.5  
-14.5    Munson Healthcare Cadillac Hospital  
  
             [Ratio]                                               
  
  
  
  
                          Comment on above:         Performed By: #### HEMDF, BM  
P3M ####Briana Ville 771835 E.  
  
                                                    Elizabeth, OH   
  
  
  
  
             Granulocytes/100 WBC (Bld) 78.1 %       Normal       40.0-80.0    Insight Surgical Hospital  
  
  
  
  
                          Comment on above:         Performed By: #### HEMDF, BM  
P3M ####Briana Ville 771835 E.  
  
                                                    Elizabeth, OH   
  
  
  
  
             Hematocrit (Bld) [Volume fraction] 31.9 %       Low          40.0-5  
2.0    Munson Healthcare Cadillac Hospital  
  
  
  
  
                          Comment on above:         Performed By: #### HEMDF, BM  
P3M ####Briana Ville 771835 EDexter, OH   
  
  
  
  
             Hemoglobin (Bld) [Mass/Vol] 10.3 g/dL    Low          13.0-18.0      
Munson Healthcare Cadillac Hospital  
  
  
  
  
                          Comment on above:         Performed By: #### HEMDF, BM  
P3M ####Briana Ville 771835 EDexter, OH   
  
  
  
  
             Lymphocytes (Bld) [#/Vol] 1.1 10*3/uL  Normal       1.0-4.3      Harper University Hospital  
  
  
  
  
                          Comment on above:         Performed By: #### HEMDF, BM  
P3M ####Briana Ville 771835 E.  
  
                                                    Elizabeth, OH   
  
  
  
  
             Lymphocytes/100 WBC (Bld) 12.6 %       Low          20.0-40.0    Harper University Hospital  
  
  
  
  
                          Comment on above:         Performed By: #### HEMDF, BM  
P3M ####Briana Ville 771835 .  
  
                                                    Elizabeth, OH   
  
  
  
  
             MCH (RBC) [Entitic mass] 24.6 pg      Low          26.0-34.0    Beaumont Hospital  
  
  
  
  
                          Comment on above:         Performed By: #### HEMDF, BM  
P3M ####Briana Ville 771835 EDexter, OH   
  
  
  
  
             MCHC         32.4 %       Normal       32.0-36.0    Salem City Hospital  
stem  
  
  
  
  
                          Comment on above:         Performed By: #### HEMYANCI BM  
P3M ####Briana Ville 771835 Rio Rancho, OH   
  
  
  
  
             MCV (RBC) [Entitic vol] 75.7 fL      Low          80.0-98.0    Summ  
a Health System  
  
  
  
  
                          Comment on above:         Performed By: #### HEMYANCI BM  
P3M ####Briana Ville 771835 Rio Rancho, OH   
  
  
  
  
             Monocytes (Bld) [#/Vol] 0.6 10*3/uL  Normal       0.0-0.8      Summ  
a Health System  
  
  
  
  
                          Comment on above:         Performed By: #### HEMYANCI BM  
P3M ####38 Herrera Street   
  
  
  
  
             Monocytes/100 WBC (Bld) 7.1 %        Normal       2.0-10.0     Summ  
a Health System  
  
  
  
  
                          Comment on above:         Performed By: #### HEMYANCI BM  
P3M ####38 Herrera Street   
  
  
  
  
             Platelet mean volume (Bld) [Entitic vol] 6.8 fL       Low            
7.4-10.4     Munson Healthcare Cadillac Hospital  
  
  
  
  
                          Comment on above:         Performed By: #### HEMYANCI BM  
P3M ####38 Herrera Street   
  
  
  
  
             Platelets (Bld) [#/Vol] 428 10*3/uL  Normal       140-440      University of Michigan Hospital  
  
  
  
  
                          Comment on above:         Performed By: #### HEMAYNCI BM  
P3M ####38 Herrera Street   
  
  
  
  
             RBC (Bld) [#/Vol] 4.21 10*6/uL Low          4.40-5.90    McLaren Oakland  
  
  
  
  
                          Comment on above:         Performed By: #### HEMYANCI, BM  
P3M ####38 Herrera Street   
  
  
  
  
             WBC (Bld) [#/Vol] 8.5 10*3/uL  Normal       3.6-10.7     McLaren Oakland  
  
  
  
  
                          Comment on above:         Performed By: #### HEMYANCI BM  
P3M ####Briana Ville 771835 Rio Rancho, OH 64810 -0151  
  
  
  
  
                                        No Panel Information  on 2022  
  
  
  
  
             Interpretation and review of Abnormal                                
 SUMMA  
  
             laboratory results                                          
   
                                                                 Galion Hospital  
B  
   
                                                                 SUMMA  
   
             Absolute Baso # 0.1 10*3/uL               0.0 - 0.2 10*3/uL SUMMA  
   
             Interpretation and review of Abnormal                                
 SUMMA  
  
             laboratory results                                          
   
                                                                 Galion Hospital  
B  
   
                                                                 SUMMA  
  
  
  
  
                                        POCT Glucose  Ordered By: Yaz Leiva on  
 2022  
  
  
  
  
             Glucose [Mass/Vol] 152 mg/dL    High         70 - 100 mg/dL SUMMA  
   
             Interpretation and review of laboratory results Abnormal             
                    SUMMA  
   
                                                                 SUMMA  
  
  
  
  
                                        POCT Glucose  on 2022  
  
  
  
  
                                                                 University Hospitals Geneva Medical Center LA  
B  
   
             Glucose [Mass/Vol] 157 mg/dL    High         70 - 100 mg/dL SUMMA  
   
             Interpretation and review of Abnormal                                
 SUMMA  
  
             laboratory results                                          
   
                                                                 Lima City Hospital  
   
                                                                 SUMMA  
   
             Glucose [Mass/Vol] 179 mg/dL    High         70 - 100 mg/dL SUMMA  
  
  
  
  
                                        APTT  on 2022  
  
  
  
  
             aPTT Coag (Bld) [Time] 50.1 s       High         20.0-30.5    Munson Healthcare Cadillac Hospital  
  
  
  
  
                          Comment on above:         Result Comment: NOTE: The th  
erapeutic time for Heparin  
  
                                                    anticoagulation,based on Xa   
activity inhibition, is an APTT of  
  
                                                    46-80seconds.  
   
                                                    Performed By: #### APTT ####  
Briana Ville 771835 Rio Rancho, OH 23193-6222  
  
  
  
  
             aPTT Coag (Bld) [Time] 50.1 s       High         20.0 - 30.5 s SUMM  
A  
   
             Interpretation and review of Abnormal                                
 SUMMA  
  
             laboratory results                                          
   
                                                                 Galion Hospital  
B  
   
                                                                 SUMMA  
   
             aPTT Coag (Bld) [Time] 44.7 s       High         20.0-30.5    Munson Healthcare Cadillac Hospital  
  
  
  
  
                          Comment on above:         Result Comment: NOTE: The th  
erapeutic time for Heparin  
  
                                                    anticoagulation,based on Xa   
activity inhibition, is an APTT of  
  
                                                    46-80seconds.  
   
                                                    Performed By: #### APTT ####  
Kettering Health Greene Memorial M2 Connections Gafbfv746 Rio Rancho, OH 01834-3330  
  
  
  
  
             aPTT Coag (Bld) [Time] 44.7 s       High         20.0 - 30.5 s SUMM  
A  
   
             Interpretation and review of Abnormal                                
 Corey Hospital  
  
             laboratory results                                          
   
                                                                 Lima City Hospital  
   
                                                                 SUMMA  
   
             aPTT Coag (Bld) [Time] 47.2 s       High         20.0-30.5    Munson Healthcare Cadillac Hospital  
  
  
  
  
                          Comment on above:         Result Comment: NOTE: The th  
erapeutic time for Heparin  
  
                                                    anticoagulation,based on Xa   
activity inhibition, is an APTT of  
  
                                                    46-80seconds.  
   
                                                    Performed By: #### APTT ####  
Kettering Health Greene Memorial M2 Connections Sorfgv288 Rio Rancho, OH   
  
  
  
  
             aPTT Coag (Bld) [Time] 47.2 s       High         20.0 - 30.5 s SUMM  
A  
   
             Interpretation and review of Abnormal                                
 Corey Hospital  
  
             laboratory results                                          
   
                                                                 TriHealth McCullough-Hyde Memorial Hospital  
  
  
  
  
                                        Basic Metabolic Panel  on 2022  
  
  
  
  
             Calcium [Mass/Vol] 9.8 mg/dL    Normal       8.4-10.4     The Jewish Hospital System  
  
  
  
  
                          Comment on above:         Performed By: #### HEMDF BM  
P3M ####Corey HospitalLaru Technologies Rhywpk390 SoundwaveDexter, OH   
  
  
  
  
             Anion gap [Moles/Vol] 11 mmol/L    Normal       3-13         Munson Healthcare Cadillac Hospital  
  
  
  
  
                          Comment on above:         Performed By: #### HEMDF BM  
P3M ####K94 Discoveries Fmbjgq179 Rio Rancho, OH   
  
  
  
  
             CO2 [Moles/Vol] 20 mmol/L    Low          22-30        Munson Healthcare Cadillac Hospital  
  
  
  
  
                          Comment on above:         Performed By: #### HEMDF BM  
P3M ####Corey HospitalLaru Technologies Osdlwm990 Rio Rancho, OH   
  
  
  
  
             Creatinine [Mass/Vol] 1.24 mg/dL   Normal       0.52-1.25    Munson Healthcare Cadillac Hospital  
  
  
  
  
                          Comment on above:         Performed By: #### HEMDF BM  
P3M ####Corey HospitalIndependent Space525 Rio Rancho, OH   
  
  
  
  
             GFR/1.73 sq M.predicted among 70.2 mL/min/{1.73_m2} Normal       >6  
0          Munson Healthcare Cadillac Hospital  
  
             blacks MDRD (S/P/Bld) [Vol                                          
  
             rate/Area]                                            
  
  
  
  
                          Comment on above:         Performed By: #### HEMDF BM  
P3M ####SummIndependent Space525 Rio Rancho, OH   
  
  
  
  
             GFR/1.73 sq M.predicted among 60.6 mL/min/{1.73_m2} Normal       >6  
0          Munson Healthcare Cadillac Hospital  
  
             non-blacks MDRD (S/P/Bld) [Vol                                       
     
  
             rate/Area]                                            
  
  
  
  
                          Comment on above:         Result Comment: KDIGO guidel  
zoë provide the following GFR  
  
                                                    categories:Stage GFR(ml/min/  
1.73 m2) TermsG1 >=90 Normal or highG2  
  
                                                    60-89 Mildly decreased*G3a 4  
5-59 Mildly to moderately sapecpcupR5l  
  
                                                    30-44 Moderately to severely  
 decreasedG4 15-29 Severely decreasedG5  
  
                                                    <15 Kidney failure*Relative   
to young adult level.In the absence of  
  
                                                    evidence of kidney damage, n  
either GFRcategory G1 nor G2 fulfill  
  
                                                    the criteria for CKD.The CKD  
-EPI equation is validated in  
  
                                                    individuals 18 yearsof age a  
nd older. Currently the best equation  
  
                                                    forestimating glomerular beto  
tration rate (GFR) from serumcreatinine  
  
                                                    in children is the Bedside S  
kristiwartz equation.It is less accurate in  
  
                                                    patients with extremes of mu  
sclemass, restriction of dietary  
  
                                                    protein, ingestion of creati  
ne,extra-renal metabolism of  
  
                                                    creatinine, or treatment wit  
hmedications that affect renal tubular  
  
                                                    creatinine secretion.  
   
                                                    Performed By: #### HEMDF BM  
P3M ####Kettering Health Greene Memorial M2 Connections Tvqehb171 Rio Rancho, OH   
  
  
  
  
             Glucose [Mass/Vol] 102 mg/dL    High                The Jewish Hospital System  
  
  
  
  
                          Comment on above:         Performed By: #### HEMDF, BM  
P3M ####Kettering Health Greene Memorial M2 Connections Ceuyiv729 Rio Rancho, OH   
  
  
  
  
             Urea nitrogen [Mass/Vol] 17 mg/dL     Normal       7-17         Beaumont Hospital  
  
  
  
  
                          Comment on above:         Performed By: #### HEMDF, BM  
P3M ####Kettering Health Greene Memorial M2 Connections Ckqnqq246 Rio Rancho, OH   
  
  
  
  
             Chloride [Moles/Vol] 103 mmol/L   Normal              Mercy Health St. Joseph Warren Hospital System  
  
  
  
  
                          Comment on above:         Performed By: #### HEMDF, BM  
P3M ####Kettering Health Greene Memorial M2 Connections Wdsiot037 Rio Rancho, OH   
  
  
  
  
             Potassium [Moles/Vol] 4.1 mmol/L   Normal       3.5-5.1      Mercy Health St. Vincent Medical Center System  
  
  
  
  
                          Comment on above:         Performed By: #### HEMYANCI, BM  
P3M ####K94 Discoveries Stccvb242 SoundwaveDexter, OH 26116 -0133  
  
  
  
  
             Sodium [Moles/Vol] 134 mmol/L   Low          135-145      Corey Hospitala Grant Hospital System  
  
  
  
  
                          Comment on above:         Performed By: #### HEMDF, BM  
P3M ####K94 Discoveries Eecksf140 Rio Rancho, OH 35897 -9116  
  
  
  
  
                                        Basic Metabolic Panel w/ Reflex to MG  o  
n 2022  
  
  
  
  
             Anion gap [Moles/Vol] 11 mmol/L                 3 - 13 mmol/L SUMMA  
   
             Calcium [Mass/Vol] 9.8 mg/dL                 8.4 - 10.4 mg/dL SUMMA  
   
             Chloride [Moles/Vol] 103 mmol/L                98 - 107 mmol/L SUMM  
A  
   
             CO2 [Moles/Vol] 20 mmol/L    Low          22 - 30 mmol/L SUMMA  
   
             Creatinine [Mass/Vol] 1.24 mg/dL                0.52 - 1.25 mg/dL S  
Barnesville Hospital  
   
             EGFR IF NonAfrican American 60.6 mL/min               >60            
SUMMA  
   
             GFR/1.73 sq M.predicted among 70.2 mL/min/{1.73_m2}              >6  
0          SUMMA  
  
             blacks MDRD (S/P/Bld) [Vol                                          
  
             rate/Area]                                            
   
             Glucose [Mass/Vol] 102 mg/dL    High         70 - 100 mg/dL SUMMA  
   
             Potassium [Moles/Vol] 4.1 mmol/L                3.5 - 5.1 mmol/L KRAMER  
MMA  
   
             Sodium [Moles/Vol] 134 mmol/L   Low          135 - 145 mmol/L SUMMA  
   
             Urea nitrogen (BldV) [Mass/Vol] 17 mg/dL                  7 - 17 mg  
/dL SUMMA  
  
  
  
  
                                        CBC auto differential  on 2022  
  
  
  
  
             Absolute Neut # 6.8 10*3/uL               1.8 - 7.0 10*3/uL SUMMA  
   
             Hemoglobin.gastrointestinal spec 1 Ql (Stl) 11.1 g/dL    Low         
   13.0 - 18.0 g/dL SUMMA  
   
             MCHC (RBC) [Mass/Vol] 31.7 %       Low          32.0 - 36.0 % SUMMA  
   
             Platelet distribution width (Bld) [Ratio] 17.6 %       High          
 11.5 - 14.5 % SUMMA  
  
  
  
  
                                        EKG 12 Lead  on 2022  
  
  
  
  
                                                                 Northwest Rural Health Network CARDIOLOGY  
   
                                                                 SUMMA  
  
                                                                 Work Phone: 1(1 86)639-8657  
  
  
  
  
                                        EKG 12 Lead  Ordered By: Unknown Result   
on 2022  
  
  
  
  
                                                                 SUMMA  
  
  
  
  
                                        Glucose,Bedside  on 2022  
  
  
  
  
             Glucose [Mass/Vol] 120 mg/dL    High                Corey Hospitala Hea  
lt System  
  
  
  
  
                          Comment on above:         Result Comment: Test perform  
ed by glucose meter. Results may   
be  
  
                                                    10%-15% lowerthan serum/plas  
ma values. (CLIA ID 56Z7233409)  
   
                                                    Performed By: #### BGLU ####  
K94 Discoveries Keecww630 E. Elizabeth, OH 94249-3492  
  
  
  
  
             Glucose [Mass/Vol] 123 mg/dL    High                Corey Hospitala Hea  
lt System  
  
  
  
  
                          Comment on above:         Result Comment: Test perform  
ed by glucose meter. Results may   
be  
  
                                                    10%-15% lowerthan serum/plas  
ma values. (CLIA ID 81T9571951)  
   
                                                    Performed By: #### BGLU ####  
K94 Discoveries Fvpnpb072 E. Elizabeth, OH 60211-8832  
  
  
  
  
             Glucose [Mass/Vol] 103 mg/dL    High                Corey Hospitala a  
lt System  
  
  
  
  
                          Comment on above:         Result Comment: Test perform  
ed by glucose meter. Results may   
be  
  
                                                    10%-15% lowerthan serum/plas  
ma values. (CLIA ID 18X5740602)  
   
                                                    Performed By: #### BGLU ####  
K94 Discoveries Ezxyon961 E. Elizabeth, OH 46460-1804  
  
  
  
  
             Glucose [Mass/Vol] 96 mg/dL     Normal              Corey Hospitala a  
Lima City Hospital System  
  
  
  
  
                          Comment on above:         Result Comment: Test perform  
ed by glucose meter. Results may   
be  
  
                                                    10%-15% lowerthan serum/plas  
ma values. (CLIA ID 97S9506778)  
   
                                                    Performed By: #### BGLU ####  
K94 Discoveries Kvutkl760 E. Elizabeth, OH 88687-3564  
  
  
  
  
             Glucose [Mass/Vol] 102 mg/dL    High                Corey Hospitala Hea  
lt System  
  
  
  
  
                          Comment on above:         Result Comment: Test perform  
ed by glucose meter. Results may   
be  
  
                                                    10%-15% lowerthan serum/plas  
ma values. (CLIA ID 71W1063028)  
   
                                                    Performed By: #### BGLU ####  
K94 Discoveries Vodfsj291 Rio Rancho, OH   
  
  
  
  
             Glucose [Mass/Vol] 104 mg/dL    High                The Jewish Hospital System  
  
  
  
  
                          Comment on above:         Result Comment: Test perform  
ed by glucose meter. Results may   
be  
  
                                                    10%-15% lowerthan serum/plas  
ma values. (CLIA ID 52Y0075218)  
   
                                                    Performed By: #### BGLU ####  
Corey HospitalLaru Technologies Fqjmlp793 Rio Rancho, OH   
  
  
  
  
                                        Hemogram w/ Autodiff  on 2022  
  
  
  
  
             Abs Baso Cnt 0.1 10*3/uL  Normal       0.0-0.2      Salem City Hospital  
stem  
  
  
  
  
                          Comment on above:         Performed By: #### HEMDF BM  
P3M ####Cerus Corporation80 Walton Street Linden, PA 17744   
  
  
  
  
             Abs Neutrophile Cnt 6.8 10*3/uL  Normal       1.8-7.0      OhioHealth Berger Hospital System  
  
  
  
  
                          Comment on above:         Performed By: #### HEMDF BM  
P3M ####Cerus Corporation525 EDexter, OH   
  
  
  
  
             Basophils/100 WBC (Bld) 0.6 %        Normal       0.0-2.0      SUMM  
A  
  
  
  
  
                          Comment on above:         Performed By: #### HEMDF, BM  
P3M ####K94 Discoveries Clinton Ville 25743 EDexter, OH   
  
  
  
  
             Eosinophils (Bld) [#/Vol] 0.2 10*3/uL  Normal       0.0-0.5      KRAMER  
MMA  
  
  
  
  
                          Comment on above:         Performed By: #### HEMDF, BM  
P3M ####Cerus Corporation525 Rio Rancho, OH   
  
  
  
  
             Eosinophils/100 WBC (Bld) 1.7 %        Normal       1.0-6.0      KRAMER  
MMA  
  
  
  
  
                          Comment on above:         Performed By: #### HEMDF, BM  
P3M ####Cerus Corporation80 Walton Street Linden, PA 17744   
  
  
  
  
             Erythrocyte distribution width (RBC) 17.6 %       High         11.5  
-14.5    Mercy Health St. Vincent Medical Center System  
  
             [Ratio]                                               
  
  
  
  
                          Comment on above:         Performed By: #### HEMDF, BM  
P3M ####Kettering Health Greene Memorial M2 Connections Ccuqkp788 Rio Rancho, OH   
  
  
  
  
             Granulocytes/100 WBC (Bld) 73.5 %       Normal       40.0-80.0    S  
UMMA  
  
  
  
  
                          Comment on above:         Performed By: #### HEMDF, BM  
P3M ####Briana Ville 771835 Rio Rancho, OH   
  
  
  
  
             Hematocrit (Bld) [Volume fraction] 35.1 %       Low          40.0-5  
2.0    Corey Hospital  
  
  
  
  
                          Comment on above:         Performed By: #### HEMDF, BM  
P3M ####38 Herrera Street   
  
  
  
  
             Hemoglobin (Bld) [Mass/Vol] 11.1 g/dL    Low          13.0-18.0      
Mercy Health St. Vincent Medical Center System  
  
  
  
  
                          Comment on above:         Performed By: #### HEMDF, BM  
P3M ####38 Herrera Street   
  
  
  
  
             Lymphocytes (Bld) [#/Vol] 1.5 10*3/uL  Normal       1.0-4.3      KRAMER  
MMA  
  
  
  
  
                          Comment on above:         Performed By: #### HEMDF, BM  
P3M ####Kettering Health Greene Memorial M2 Connections 99 Hogan Street   
  
  
  
  
             Lymphocytes/100 WBC (Bld) 15.7 %       Low          20.0-40.0    KRAMER  
MMA  
  
  
  
  
                          Comment on above:         Performed By: #### HEMDF, BM  
P3M ####Kettering Health Greene Memorial M2 Connections 99 Hogan Street   
  
  
  
  
             MCH (RBC) [Entitic mass] 24.2 pg      Low          26.0-34.0    SUM  
MA  
  
  
  
  
                          Comment on above:         Performed By: #### HEMDF, BM  
P3M ####38 Herrera Street   
  
  
  
  
             MCHC         31.7 %       Low          32.0-36.0    Mercy Health St. Vincent Medical Center Sy  
stem  
  
  
  
  
                          Comment on above:         Performed By: #### HEMDF, BM  
P3M ####38 Herrera Street   
  
  
  
  
             MCV (RBC) [Entitic vol] 76.4 fL      Low          80.0-98.0    SUMM  
A  
  
  
  
  
                          Comment on above:         Performed By: #### HEMDF, BM  
P3M ####Corey HospitalLaru Technologies Pzraat397 Rio Rancho, OH   
  
  
  
  
             Monocytes (Bld) [#/Vol] 0.8 10*3/uL  Normal       0.0-0.8      SUMM  
A  
  
  
  
  
                          Comment on above:         Performed By: #### HEMDF, BM  
P3M ####Corey HospitalLaru Technologies 99 Hogan Street   
  
  
  
  
             Monocytes/100 WBC (Bld) 8.5 %        Normal       2.0-10.0     SUMM  
A  
  
  
  
  
                          Comment on above:         Performed By: #### HEMDF, BM  
P3M ####Corey HospitalLaru Technologies 99 Hogan Street   
  
  
  
  
             Platelet mean volume (Bld) [Entitic vol] 6.7 fL       Low            
7.4-10.4     SUMMA  
  
  
  
  
                          Comment on above:         Performed By: #### HEMDF, BM  
P3M ####Corey HospitalLaru Technologies 99 Hogan Street   
  
  
  
  
             Platelets (Bld) [#/Vol] 429 10*3/uL  Normal       140-440      SUMM  
A  
  
  
  
  
                          Comment on above:         Performed By: #### HEMDF, BM  
P3M ####Corey HospitalLaru Technologies 99 Hogan Street   
  
  
  
  
             RBC (Bld) [#/Vol] 4.59 10*6/uL Normal       4.40-5.90    SUMMA  
  
  
  
  
                          Comment on above:         Performed By: #### HEMDF, BM  
P3M ####Corey HospitalLaru Technologies 99 Hogan Street   
  
  
  
  
             WBC (Bld) [#/Vol] 9.3 10*3/uL  Normal       3.6-10.7     SUMMA  
  
  
  
  
                          Comment on above:         Performed By: #### HEMDF, BM  
P3M ####K94 Discoveries 99 Hogan Street   
  
  
  
  
                                        No Panel Information  on 2022  
  
  
  
  
             Interpretation and review of laboratory Abnormal                     
            Corey Hospital  
  
             results                                               
   
                                                                 University Hospitals Geneva Medical Center LAB  
   
                                                                 SUMMA  
  
  
  
  
                                        POCT Glucose  on 2022  
  
  
  
  
             Glucose [Mass/Vol] 120 mg/dL    High         70 - 100 mg/dL Corey Hospital  
   
             Interpretation and review of Abnormal                                
 Corey Hospital  
  
             laboratory results                                          
   
                                                                 University Hospitals Geneva Medical Center LA  
B  
   
                                                                 SUMMA  
   
             Glucose [Mass/Vol] 123 mg/dL    High         70 - 100 mg/dL Corey Hospital  
   
             Interpretation and review of Abnormal                                
 Corey Hospital  
  
             laboratory results                                          
   
                                                                 University Hospitals Geneva Medical Center LA  
B  
   
                                                                 SUMMA  
   
             Glucose [Mass/Vol] 103 mg/dL    High         70 - 100 mg/dL SUMMA  
   
             Interpretation and review of Abnormal                                
 Corey Hospital  
  
             laboratory results                                          
   
                                                                 University Hospitals Geneva Medical Center LA  
B  
   
                                                                 SUMMA  
   
             Glucose [Mass/Vol] 96 mg/dL                  70 - 100 mg/dL Select Medical Specialty Hospital - Youngstown LA  
B  
   
                                                                 Select Medical Specialty Hospital - Youngstown LA  
B  
  
  
  
  
                                        POCT Glucose  Ordered By: Noel Vieira   
on 2022  
  
  
  
  
             Glucose [Mass/Vol] 102 mg/dL    High         70 - 100 mg/dL Corey Hospital  
   
             Interpretation and review of laboratory results Abnormal             
                    Parkview Health  
  
  
  
  
                                        Basic Metabolic Panel  on 2022  
  
  
  
  
             Calcium [Mass/Vol] 9.8 mg/dL    Normal       8.4-10.4     The Jewish Hospital System  
  
  
  
  
                          Comment on above:         Performed By: #### HEMDF, HA  
1C2, LACT3, MG3, PHOS3, BMP3, CK3  
  
                                                    ####Kettering Health Greene Memorial M2 Connections Qhefni444 Soundwave  
Dexter, OH   
  
  
  
  
             Anion gap [Moles/Vol] 12 mmol/L    Normal       3-13         Munson Healthcare Cadillac Hospital  
  
  
  
  
                          Comment on above:         Performed By: #### HEMDF, HA  
1C2, LACT3, MG3, PHOS3, BMP3, CK3  
  
                                                    ####Kettering Health Greene Memorial M2 Connections Rmyprj239 E  
Dexter, OH   
  
  
  
  
             CO2 [Moles/Vol] 20 mmol/L    Low          22-30        Munson Healthcare Cadillac Hospital  
  
  
  
  
                          Comment on above:         Performed By: #### HEMDF, HA  
1C2, LACT3, MG3, PHOS3, BMP3, CK3  
  
                                                    ####Kettering Health Greene Memorial M2 Connections Erbzol033 Soundwave  
Dexter, OH   
  
  
  
  
             Glucose [Mass/Vol] 109 mg/dL    High                The Jewish Hospital System  
  
  
  
  
                          Comment on above:         Performed By: #### HEMDF, HA  
1C2, LACT3, MG3, PHOS3, BMP3, CK3  
  
                                                    ####Kettering Health Greene Memorial M2 Connections Kfigix902 Soundwave  
Dexter, OH 89210-6968  
  
  
  
  
             Urea nitrogen [Mass/Vol] 18 mg/dL     High         7-17         Beaumont Hospital  
  
  
  
  
                          Comment on above:         Performed By: #### HEMDF, HA  
1C2, LACT3, MG3, PHOS3, BMP3, CK3  
  
                                                    ####Munson Healthcare Cadillac Hospital525   
. Elizabeth, OH 57896-5033  
  
  
  
  
             Creatinine [Mass/Vol] 1.28 mg/dL   High         0.52-1.25    Munson Healthcare Cadillac Hospital  
  
  
  
  
                          Comment on above:         Performed By: #### HEMDF, HA  
1C2, LACT3, MG3, PHOS3, BMP3, CK3  
  
                                                    ####Munson Healthcare Cadillac Hospital525 E  
. Elizabeth, OH 22390-8464  
  
  
  
  
             GFR/1.73 sq M.predicted among 67.6 mL/min/{1.73_m2} Normal       >6  
0          Munson Healthcare Cadillac Hospital  
  
             blacks MDRD (S/P/Bld) [Vol                                          
  
             rate/Area]                                            
  
  
  
  
                          Comment on above:         Performed By: #### HEMDF, HA  
1C2, LACT3, MG3, PHOS3, BMP3, CK3  
  
                                                    ####Munson Healthcare Cadillac Hospital525 E  
. Elizabeth, OH 00006-7503  
  
  
  
  
             GFR/1.73 sq M.predicted among 58.3 mL/min/{1.73_m2} Abnormal     >6  
0          Munson Healthcare Cadillac Hospital  
  
             non-blacks MDRD (S/P/Bld) [Vol                                       
     
  
             rate/Area]                                            
  
  
  
  
                          Comment on above:         Result Comment: KDIGO guidel  
zoë provide the following GFR  
  
                                                    categories:Stage GFR(ml/min/  
1.73 m2) TermsG1 >=90 Normal or highG2  
  
                                                    60-89 Mildly decreased*G3a 4  
5-59 Mildly to moderately kzcdsvmrtM2h  
  
                                                    30-44 Moderately to severely  
 decreasedG4 15-29 Severely decreasedG5  
  
                                                    <15 Kidney failure*Relative   
to young adult level.In the absence of  
  
                                                    evidence of kidney damage, n  
either GFRcategory G1 nor G2 fulfill  
  
                                                    the criteria for CKD.The CKD  
-EPI equation is validated in  
  
                                                    individuals 18 yearsof age a  
nd older. Currently the best equation  
  
                                                    forestimating glomerular beto  
tration rate (GFR) from serumcreatinine  
  
                                                    in children is the Bedside S  
kristiwartz equation.It is less accurate in  
  
                                                    patients with extremes of mu  
sclemass, restriction of dietary  
  
                                                    protein, ingestion of creati  
ne,extra-renal metabolism of  
  
                                                    creatinine, or treatment wit  
hmedications that affect renal tubular  
  
                                                    creatinine secretion.  
   
                                                    Performed By: #### HEMDF, HA  
1C2, LACT3, MG3, PHOS3, BMP3, CK3  
  
                                                    ####Munson Healthcare Cadillac Hospital525 Sacramento, OH   
  
  
  
  
             Potassium [Moles/Vol] 4.3 mmol/L   Normal       3.5-5.1      Munson Healthcare Cadillac Hospital  
  
  
  
  
                          Comment on above:         Performed By: #### HEMDF, HA  
1C2, LACT3, MG3, PHOS3, BMP3, CK3  
  
                                                    ####Mercy Health St. Vincent Medical Center Lscnes479 E  
Dexter, OH   
  
  
  
  
             Sodium [Moles/Vol] 134 mmol/L   Low          135-145      The Jewish Hospital System  
  
  
  
  
                          Comment on above:         Performed By: #### HEMDF, HA  
1C2, LACT3, MG3, PHOS3, BMP3, CK3  
  
                                                    ####Briana Ville 771835 E  
Dexter, OH   
  
  
  
  
             Chloride [Moles/Vol] 101 mmol/L   Normal              Mercy Health St. Joseph Warren Hospital System  
  
  
  
  
                          Comment on above:         Performed By: #### HEMDF, HA  
1C2, LACT3, MG3, PHOS3, BMP3, CK3  
  
                                                    ####Kettering Health Greene Memorial M2 Connections Yldnwu047 Sacramento, OH   
  
  
  
  
             Anion gap [Moles/Vol] 12 mmol/L                 3 - 13 mmol/L SUMMA  
   
             Calcium [Mass/Vol] 9.8 mg/dL                 8.4 - 10.4 mg/dL SUMMA  
   
             Chloride [Moles/Vol] 101 mmol/L                98 - 107 mmol/L SUMM  
A  
   
             CO2 [Moles/Vol] 20 mmol/L    Low          22 - 30 mmol/L SUMMA  
   
             Creatinine [Mass/Vol] 1.28 mg/dL   High         0.52 - 1.25 mg/dL S  
MA  
   
             EGFR IF NonAfrican American 58.3 mL/min  Abnormal     >60            
SUMMA  
   
             GFR/1.73 sq M.predicted among 67.6 mL/min/{1.73_m2}              >6  
0          SUMMA  
  
             blacks MDRD (S/P/Bld) [Vol                                          
  
             rate/Area]                                            
   
             Glucose [Mass/Vol] 109 mg/dL    High         70 - 100 mg/dL SUMMA  
   
             Potassium [Moles/Vol] 4.3 mmol/L                3.5 - 5.1 mmol/L KRAMER  
MMA  
   
             Sodium [Moles/Vol] 134 mmol/L   Low          135 - 145 mmol/L SUMMA  
   
             Urea nitrogen (BldV) 18 mg/dL     High         7 - 17 mg/dL SUMMA  
  
             [Mass/Vol]                                            
  
  
  
  
                                        CBC with Auto Differential  on   
2  
  
  
  
  
             Absolute Baso # 0.1 10*3/uL               0.0 - 0.2 10*3/uL SUMMA  
   
             Absolute Neut # 9.7 10*3/uL  High         1.8 - 7.0 10*3/uL SUMMA  
   
             Basophils/100 WBC (Bld) 0.4 %                     0.0 - 2.0 %  SUMM  
A  
   
             Eosinophils (Bld) [#/Vol] 0.2 10*3/uL               0.0 - 0.5 10*3/  
uL SUMMA  
   
             Eosinophils/100 WBC (Bld) 1.6 %                     1.0 - 6.0 %  KRAMER  
MMA  
   
             Granulocytes/100 WBC (Bld) 77.7 %                    40.0 - 80.0 %   
SUMMA  
   
             Hematocrit (Bld) [Volume 33.9 %       Low          40.0 - 52.0 % KRAMER  
MMA  
  
             fraction]                                             
   
             Hemoglobin.gastrointestinal 10.7 g/dL    Low          13.0 - 18.0 g  
/dL SUMMA  
  
             spec 1 Ql (Stl)                                          
   
             Interpretation and review of Abnormal                                
 SUMMA  
  
             laboratory results                                          
   
             Lymphocytes (Bld) [#/Vol] 1.5 10*3/uL               1.0 - 4.3 10*3/  
uL SUMMA  
   
             Lymphocytes/100 WBC (Bld) 11.8 %       Low          20.0 - 40.0 % S  
UMMA  
   
             MCH (RBC) [Entitic mass] 24.3 pg      Low          26.0 - 34.0 pg S  
UMMA  
   
             MCHC (RBC) [Mass/Vol] 31.7 %       Low          32.0 - 36.0 % SUMMA  
   
             MCV (RBC) [Entitic vol] 76.6 fL      Low          80.0 - 98.0 fL KRAMER  
MMA  
   
             Monocytes (Bld) [#/Vol] 1.1 10*3/uL  High         0.0 - 0.8 10*3/uL  
 SUMMA  
   
             Monocytes/100 WBC (Bld) 8.5 %                     2.0 - 10.0 % SUMM  
A  
   
             Platelet distribution width 17.6 %       High         11.5 - 14.5 %  
 SUMMA  
  
             (Bld) [Ratio]                                          
   
             Platelet mean volume (Bld) 6.8 fL       Low          7.4 - 10.4 fL   
SUMMA  
  
             [Entitic vol]                                          
   
             Platelets (Bld) [#/Vol] 452 10*3/uL  High         140 - 440 10*3/uL  
 SUMMA  
   
             RBC (Bld) [#/Vol] 4.43 10*6/uL              4.40 - 5.90 10*6/uL SUM  
MA  
   
             WBC (Bld) [#/Vol] 12.5 10*3/uL High         3.6 - 10.7 10*3/uL SUMM  
A  
   
                                                                 University Hospitals Geneva Medical Center   
LAB  
   
                                                                 SUMMA  
  
  
  
  
                                        CK  on 2022  
  
  
  
  
             CK [Catalytic activity/Vol] 36 U/L       Normal                
Munson Healthcare Cadillac Hospital  
  
  
  
  
                          Comment on above:         Performed By: #### HEMDF, HA  
1C2, LACT3, MG3, PHOS3, BMP3, CK3  
  
                                                    ####Briana Ville 771835 Sacramento, OH   
  
  
  
  
             CK [Catalytic activity/Vol] 36 U/L                    30 - 170 U/L   
Corey Hospital  
  
  
  
  
                                        CTA Abd Aorta + Iliofemoral   on   
022  
  
  
  
  
             CTA Abd Aorta + Iliofemoral              Normal                      
Munson Healthcare Cadillac Hospital  
  
  
  
  
                                        Creatinine, Bedside  on 2022  
  
  
  
  
             Creatinine [Mass/Vol] 1.4 mg/dL    Normal       0.6-1.4      Munson Healthcare Cadillac Hospital  
  
  
  
  
                          Comment on above:         Result Comment: Performed by  
 latakoo i-STAT CLIA   
ID:27F6802150TxfimMinneapolis, OH  
   
                                                    Performed By: #### CREB1 ###  
#Munson Healthcare Cadillac Hospital525 Rio Rancho, OH 85187-9876  
  
  
  
  
             GFR/1.73 sq M.predicted among 60.6 mL/min/{1.73_m2} Normal       >6  
0          Munson Healthcare Cadillac Hospital  
  
             blacks MDRD (S/P/Bld) [Vol                                          
  
             rate/Area]                                            
  
  
  
  
                          Comment on above:         Performed By: #### CREB1 ###  
#Briana Ville 771835 Rio Rancho, OH 99488-2876  
  
  
  
  
             GFR/1.73 sq M.predicted among 52.3 mL/min/{1.73_m2} Abnormal     >6  
0          Munson Healthcare Cadillac Hospital  
  
             non-blacks MDRD (S/P/Bld) [Vol                                       
     
  
             rate/Area]                                            
  
  
  
  
                          Comment on above:         Result Comment: KDIGO guidel  
zoë provide the following GFR  
  
                                                    categories:Stage GFR(ml/min/  
1.73 m2) TermsG1 >=90 Normal or highG2  
  
                                                    60-89 Mildly decreased*G3a 4  
5-59 Mildly to moderately asvgasnwhT3c  
  
                                                    30-44 Moderately to severely  
 decreasedG4 15-29 Severely decreasedG5  
  
                                                    <15 Kidney failure*Relative   
to young adult level.In the absence of  
  
                                                    evidence of kidney damage, n  
either GFRcategory G1 nor G2 fulfill  
  
                                                    the criteria for CKD.The CKD  
-EPI equation is validated in  
  
                                                    individuals 18 yearsof age a  
nd older. Currently the best equation  
  
                                                    forestimating glomerular beto  
tration rate (GFR) from serumcreatinine  
  
                                                    in children is the Bedside S  
chwartz equation.It is less accurate in  
  
                                                    patients with extremes of mu  
sclemass, restriction of dietary  
  
                                                    protein, ingestion of creati  
ne,extra-renal metabolism of  
  
                                                    creatinine, or treatment wit  
hmedications that affect renal tubular  
  
                                                    creatinine secretion.  
   
                                                    Performed By: #### CREB1 ###  
#Xplornet5 Santa Maria Biotherapeutics Elizabeth, OH   
  
  
  
  
                                        Hemoglobin A1C   on 2022  
  
  
  
  
             Glucose [Mass/Vol] 123 mg/dL    Normal                    The Jewish Hospital System  
  
  
  
  
                          Comment on above:         Performed By: #### HEMDF, HA  
1C2, LACT3, MG3, PHOS3, BMP3, CK3  
  
                                                    ####Xplornet5 Sandag Elizabeth, OH   
  
  
  
  
             HbA1c (Bld) [Mass fraction] 5.9 %        Abnormal                    
Kettering Health Greene Memorial M2 Connections Insight Surgical Hospital  
  
  
  
  
                          Comment on above:         Result Comment: Normal less   
than 5.7%Prediabetes 5.7% to  
  
                                                    6.4%Diabetes 6.5% or higher-  
-HgbA1C levels may not be accurate in  
  
                                                    patients who haverenal disea  
se, received recent blood transfusions,  
  
                                                    are anemic,or who have dyshe  
moglobinemia.  
   
                                                    Performed By: #### HEMDF, HA  
1C2, LACT3, MG3, PHOS3, BMP3, CK3  
  
                                                    ####Xplornet5 Sandag Elizabeth, OH   
  
  
  
  
             eAG          123 mg/dL                              SUMMHosted America  
  
  
  
  
                                        Hemogram w/ Autodiff  on 2022  
  
  
  
  
             Abs Baso Cnt 0.1 10*3/uL  Normal       0.0-0.2      Corey HospitalLaru Technologies Sy  
stem  
  
  
  
  
                          Comment on above:         Performed By: #### HEMDF, HA  
1C2, LACT3, MG3, PHOS3, BMP3, CK3  
  
                                                    ####Briana Ville 771835 Sacramento, OH   
  
  
  
  
             Abs Neutrophile Cnt 9.7 10*3/uL  High         1.8-7.0      ProMedica Coldwater Regional Hospital  
  
  
  
  
                          Comment on above:         Performed By: #### HEMDF, HA  
1C2, LACT3, MG3, PHOS3, BMP3, CK3  
  
                                                    ####Briana Ville 771835 Sacramento, OH   
  
  
  
  
             Basophils/100 WBC (Bld) 0.4 %        Normal       0.0-2.0      Summ  
a Health System  
  
  
  
  
                          Comment on above:         Performed By: #### HEMDF, HA  
1C2, LACT3, MG3, PHOS3, BMP3, CK3  
  
                                                    ####60 Richards Street   
  
  
  
  
             Eosinophils (Bld) [#/Vol] 0.2 10*3/uL  Normal       0.0-0.5      Harper University Hospital  
  
  
  
  
                          Comment on above:         Performed By: #### HEMDF, HA  
1C2, LACT3, MG3, PHOS3, BMP3, CK3  
  
                                                    ####60 Richards Street   
  
  
  
  
             Eosinophils/100 WBC (Bld) 1.6 %        Normal       1.0-6.0      Harper University Hospital  
  
  
  
  
                          Comment on above:         Performed By: #### HEMDF, HA  
1C2, LACT3, MG3, PHOS3, BMP3, CK3  
  
                                                    ####Briana Ville 771835 Sacramento, OH   
  
  
  
  
             Erythrocyte distribution width (RBC) 17.6 %       High         11.5  
-14.5    Munson Healthcare Cadillac Hospital  
  
             [Ratio]                                               
  
  
  
  
                          Comment on above:         Performed By: #### HEMDF, HA  
1C2, LACT3, MG3, PHOS3, BMP3, CK3  
  
                                                    ####Briana Ville 771835 Sacramento, OH   
  
  
  
  
             Granulocytes/100 WBC (Bld) 77.7 %       Normal       40.0-80.0    Insight Surgical Hospital  
  
  
  
  
                          Comment on above:         Performed By: #### HEMDF, HA  
1C2, LACT3, MG3, PHOS3, BMP3, CK3  
  
                                                    ####Briana Ville 771835 Sacramento, OH   
  
  
  
  
             Hematocrit (Bld) [Volume fraction] 33.9 %       Low          40.0-5  
2.0    Munson Healthcare Cadillac Hospital  
  
  
  
  
                          Comment on above:         Performed By: #### HEMDF, HA  
1C2, LACT3, MG3, PHOS3, BMP3, CK3  
  
                                                    ####60 Richards Street   
  
  
  
  
             Hemoglobin (Bld) [Mass/Vol] 10.7 g/dL    Low          13.0-18.0      
Munson Healthcare Cadillac Hospital  
  
  
  
  
                          Comment on above:         Performed By: #### HEMDF, HA  
1C2, LACT3, MG3, PHOS3, BMP3, CK3  
  
                                                    ####60 Richards Street   
  
  
  
  
             Lymphocytes (Bld) [#/Vol] 1.5 10*3/uL  Normal       1.0-4.3      Harper University Hospital  
  
  
  
  
                          Comment on above:         Performed By: #### HEMDF, HA  
1C2, LACT3, MG3, PHOS3, BMP3, CK3  
  
                                                    ####60 Richards Street   
  
  
  
  
             Lymphocytes/100 WBC (Bld) 11.8 %       Low          20.0-40.0    Harper University Hospital  
  
  
  
  
                          Comment on above:         Performed By: #### HEMDF, HA  
1C2, LACT3, MG3, PHOS3, BMP3, CK3  
  
                                                    ####60 Richards Street   
  
  
  
  
             MCH (RBC) [Entitic mass] 24.3 pg      Low          26.0-34.0    Beaumont Hospital  
  
  
  
  
                          Comment on above:         Performed By: #### HEMDF, HA  
1C2, LACT3, MG3, PHOS3, BMP3, CK3  
  
                                                    ####60 Richards Street   
  
  
  
  
             MCHC         31.7 %       Low          32.0-36.0    Salem City Hospital  
stem  
  
  
  
  
                          Comment on above:         Performed By: #### HEMDF, HA  
1C2, LACT3, MG3, PHOS3, BMP3, CK3  
  
                                                    ####60 Richards Street   
  
  
  
  
             MCV (RBC) [Entitic vol] 76.6 fL      Low          80.0-98.0    Summ  
a Health System  
  
  
  
  
                          Comment on above:         Performed By: #### HEMDF, HA  
1C2, LACT3, MG3, PHOS3, BMP3, CK3  
  
                                                    ####Briana Ville 771835 E  
Dexter, OH   
  
  
  
  
             Monocytes (Bld) [#/Vol] 1.1 10*3/uL  High         0.0-0.8      Summ  
a Health System  
  
  
  
  
                          Comment on above:         Performed By: #### HEMDF, HA  
1C2, LACT3, MG3, PHOS3, BMP3, CK3  
  
                                                    ####Briana Ville 771835 Sacramento, OH   
  
  
  
  
             Monocytes/100 WBC (Bld) 8.5 %        Normal       2.0-10.0     Summ  
a Health System  
  
  
  
  
                          Comment on above:         Performed By: #### HEMDF, HA  
1C2, LACT3, MG3, PHOS3, BMP3, CK3  
  
                                                    ####60 Richards Street   
  
  
  
  
             Platelet mean volume (Bld) [Entitic vol] 6.8 fL       Low            
7.4-10.4     Munson Healthcare Cadillac Hospital  
  
  
  
  
                          Comment on above:         Performed By: #### HEMDF, HA  
1C2, LACT3, MG3, PHOS3, BMP3, CK3  
  
                                                    ####Briana Ville 771835 Sacramento, OH   
  
  
  
  
             Platelets (Bld) [#/Vol] 452 10*3/uL  High         140-440      University of Michigan Hospital  
  
  
  
  
                          Comment on above:         Performed By: #### HEMDF, HA  
1C2, LACT3, MG3, PHOS3, BMP3, CK3  
  
                                                    ####Briana Ville 771835 Sacramento, OH   
  
  
  
  
             RBC (Bld) [#/Vol] 4.43 10*6/uL Normal       4.40-5.90    McLaren Oakland  
  
  
  
  
                          Comment on above:         Performed By: #### HEMDF, HA  
1C2, LACT3, MG3, PHOS3, BMP3, CK3  
  
                                                    ####Briana Ville 771835 Sacramento, OH   
  
  
  
  
             WBC (Bld) [#/Vol] 12.5 10*3/uL High         3.6-10.7     Holmes County Joel Pomerene Memorial Hospital System  
  
  
  
  
                          Comment on above:         Performed By: #### HEMDF, HA  
1C2, LACT3, MG3, PHOS3, BMP3, CK3  
  
                                                    ####Kettering Health Greene Memorial M2 Connections Jskkcn212 Sacramento, OH   
  
  
  
  
                                        Laboratory - Hematology and Cell counts   
 on 2022  
  
  
  
  
             HbA1c (Bld) [Mass fraction] 5.9 %                     4.0 - 6.0 %    
SUMMA  
  
  
  
  
                                        Lactic Acid  on 2022  
  
  
  
  
             Lactate [Moles/Vol] 1.1 mmol/L   Normal       0.7-2.0      OhioHealth Berger Hospital System  
  
  
  
  
                          Comment on above:         Performed By: #### HEMDF, HA  
1C2, LACT3, MG3, PHOS3, BMP3, CK3  
  
                                                    ####Kettering Health Greene Memorial M2 Connections Goqjil237 Sacramento, OH   
  
  
  
  
             Lactate [Moles/Vol] 1.1 mmol/L                0.7 - 2.0 mmol/L SUMM  
A  
   
                                                                 University Hospitals Geneva Medical Center  
  
                                                                 LAB  
   
                                                                 SUMMA  
  
  
  
  
                                        Magnesium  on 2022  
  
  
  
  
             Magnesium [Mass/Vol] 1.7 mg/dL    Normal       1.6-2.3      Mercy Health St. Joseph Warren Hospital System  
  
  
  
  
                          Comment on above:         Performed By: #### HEMDF, HA  
1C2, LACT3, MG3, PHOS3, BMP3, CK3  
  
                                                    ####Kettering Health Greene Memorial M2 Connections Elbbfd966 Sacramento, OH   
  
  
  
  
             Magnesium [Mass/Vol] 1.7 mg/dL                 1.6 - 2.3 mg/dL SUMM  
A  
  
  
  
  
                                        No Panel Information   on 2022  
  
  
  
  
             Interpretation and review of laboratory Abnormal                     
            SUMMA  
  
             results                                               
   
                                                                 University Hospitals Geneva Medical Center LAB  
   
                                                                 SUMMA  
   
             Interpretation and review of laboratory Abnormal                     
            SUMMA  
  
             results                                               
   
                                                                 University Hospitals Geneva Medical Center LAB  
   
                                                                 SUMMA  
  
  
  
  
                                        POCT Glucose  on 2022  
  
  
  
  
             Glucose [Mass/Vol] 104 mg/dL    High         70 - 100 mg/dL SUMMA  
   
             Interpretation and review of Abnormal                                
 SUMMA  
  
             laboratory results                                          
   
                                                                 University Hospitals Geneva Medical Center LA  
B  
   
                                                                 SUMMA  
  
  
  
  
                                        Phosphorus  on 2022  
  
  
  
  
             Phosphate [Mass/Vol] 4.6 mg/dL    High         2.5-4.5      Summa H  
ealth System  
  
  
  
  
                          Comment on above:         Performed By: #### HEMDF, HA  
1C2, LACT3, MG3, PHOS3, BMP3, CK3  
  
                                                    ####Xplornet5 Sandag Elizabeth, OH   
  
  
  
  
             Phosphate [Mass/Vol] 4.6 mg/dL    High         2.5 - 4.5 mg/dL SUMM  
A  
  
  
  
  
                                        Protime AND APTT  on 2022  
  
  
  
  
             aPTT Coag (Bld) [Time] 33.4 s       High         20.0-30.5    Kettering Health Greene Memorial  
 Stottler Henke Associates  
  
  
  
  
                          Comment on above:         Result Comment: NOTE: The th  
erapeutic time for Heparin  
  
                                                    anticoagulation,based on Xa   
activity inhibition, is an APTT of  
  
                                                    46-80seconds.  
   
                                                    Performed By: #### PT/AP ###  
#Xplornet5 Santa Maria Biotherapeutics Elizabeth, OH   
  
  
  
  
             INR          1.9          High         0.9-1.1      Memoir  
stem  
  
  
  
  
                          Comment on above:         Result Comment: Recommended   
Anticoagulant Therapy: SEE   
BELOW-----  
  
                                                    INR of 2.0 - 3.0 : - Prophyl  
axis of Venous Thrombosis (high-risk  
  
                                                    surgery) - Treatment of Veno  
us Thrombosis - Treatment of Pulmonary  
  
                                                    Embolism (Includes tissue he  
art valves, Acute Myocardial Infarction  
  
                                                    to prevent systemic embolism  
, Valvular Heart Disease, and Atrial  
  
                                                    Fibrillation)----- INR of 2.  
5 - 3.5 : - Mechanical Prosthetic  
  
                                                    Valves (high risk) - If oral  
 anticoagulant therapy is used to  
  
                                                    prevent Myocardial Infarctio  
n  
   
                                                    Performed By: #### PT/AP ###  
#Xplornet5 Santa Maria Biotherapeutics Elizabeth, OH   
  
  
  
  
             PT Coag (PPP) [Time] 19.9 s       High         9.0-12.0     Kettering Health Greene Memorial H  
ealth System  
  
  
  
  
                          Comment on above:         Result Comment: .  
   
                                                    Performed By: #### PT/AP ###  
#Xplornet5 Santa Maria Biotherapeutics Elizabeth, OH   
  
  
  
  
                                        Protime/INR & PTT  Ordered By: Momo keys on 2022  
  
  
  
  
             aPTT Coag (Bld) [Time] 33.4 s       High         20.0 - 30.5 s SUMM  
A  
   
             INR Coag (Bld) [Relative time] 1.9 {INR}    High                     
   Corey Hospital  
   
             PT Coag (PPP) [Time] 19.9 s       High         9.0 - 12.0 s Lima City Hospital Arterial Duplex US Lower Ext Left  on  
 2022  
  
  
  
  
             VL Arterial Duplex US Lower Ext Left              Normal             
         Munson Healthcare Cadillac Hospital  
  
  
  
  
                                        VL Lower Extremity Arteries Left   on   
  
  
  
  
                          Wyandot Memorial Hospital HEART AND VASCULAR INSTITUTE               
              Northwest Rural Health Network  
  
                                                                 CARDIOLOGY  
  
                          ------------------------------------------------------  
-----------------                             
  
                          Lower Extremity Graft Evaluation                        
       
  
                                                                   
  
                          AMENDED REPORT                             
  
                                                                   
  
                          Patient Augustine, : 1957 Study 2022      
                         
  
                          Name: Willow JOHNSON (65yrs) Date:                           
    
  
                          Patient 84380948 Age: 65 Account: 143878269259          
                     
  
                          ID:                                      
  
                          Gender: M Loc: Accession: 17947611043                   
            
  
                          BP:                                      
  
                          Ordering Physician: Ed Breen M.D.                   
            
  
                          Sonographer: Mnoi Gunderson RVT                         
      
  
                          Interpreting Physician: Ed Brene M.D.               
                
  
                                                                   
  
                          ------------------------------------------------------  
-----------------                             
  
                                                                   
  
                          Location: 15 Price Street                   
            
  
                                                                   
  
                          ------------------------------------------------------  
-----------------                             
  
                          Indications: Atherosclerosis of native arteries. PVD w  
ith ulcer.                             
  
                          Non healing ulcer left calf.                            
   
  
                                                                   
  
                          ------------------------------------------------------  
-----------------                             
  
                          CRITICAL RESULTS: A critical finding, was reported to   
Ed Breen MD ,                             
  
                          an attending physician responsible for the patient , Red Gunderson RVT , on 2022 , at 04:57 PM. Correct read-back w  
as verified.                             
  
                                                                   
  
                          ------------------------------------------------------  
-----------------                             
  
                                                                   
  
                          Conclusions                              
  
                                                                   
  
                          1. Occluded left femoral-popliteal bypass graft         
                      
  
                          2. Right NENA 0.98, normal                             
  
                          3. Left NENA 0.3, critical arterial insufficiency        
                       
  
                                                                   
  
                          ------------------------------------------------------  
-----------------                             
  
                          History: Risk factors: Former tobacco use. Hypertensio  
n. Age over                             
  
                          65 years.                                
  
                                                                   
  
                          Labs, prior tests, procedures, and surgery:             
                  
  
                          Graft from the left common femoral artery to the left   
popliteal artery.                             
  
                                                                   
  
                          ------------------------------------------------------  
-----------------                             
  
                                                                   
  
                          Study data: Lower extremity graft evaluation. Location  
: Vascular                             
  
                          laboratory. Procedure: A vascular evaluation was perfo  
rmed with the                             
  
                          patient in the supine position. Images were obtained u  
sing a GE Logic                             
  
                          E10s vascular ultrasound machine.                       
        
  
                                                                   
  
                          ------------------------------------------------------  
-----------------                             
  
                          Arterial pressure indices:                             
  
                                                                   
  
                          +----------+----------------+------------+              
                 
  
                          +Location +Pressure (REST)*+Index (REST)+               
                
  
                          +----------+----------------+------------+              
                 
  
                          +R brachial+122 +------------+                          
     
  
                          +----------+----------------+------------+              
                 
  
                          +L brachial+122 +------------+                          
     
  
                          +----------+----------------+------------+              
                 
  
                          +R DP +104 +0.85 +                             
  
                          +----------+----------------+------------+              
                 
  
                          +R PT +120 +0.98 +                             
  
                          +----------+----------------+------------+              
                 
  
                          +L DP +0 +0.00 +                             
  
                          +----------+----------------+------------+              
                 
  
                          +L PT +37 +0.30 +                             
  
                          +----------+----------------+------------+              
                 
  
                                                                   
  
                          ------------------------------------------------------  
-----------------                             
  
                                                                   
  
                          Arterial flow:                             
  
                                                                   
  
                          +----------------+-----------+-----------+------------  
--+-------------+                             
  
                          +Location +PSV(cm/sec)+EDV(cm/sec)+Comment +Graft type  
 +                             
  
                          +----------------+-----------+-----------+------------  
--+-------------+                             
  
                          +L Hbhvq-owfdlh-7+80 +8 +--------------+L fem to Pop +  
                             
  
                          + + + + +A +                             
  
                          +----------------+-----------+-----------+------------  
--+-------------+                             
  
                          +L Graft-proximal+31 +0 +Pre occlusive +-------------+  
                             
  
                          +anastomosis-1 + + +doppler + +                         
      
  
                          + + + +signal. + +                             
  
                          +----------------+-----------+-----------+------------  
--+-------------+                             
  
                          +L Graft-proximal+0 +0 +--------------+-------------+   
                            
  
                          +graft-1 + + + + +                             
  
                          +----------------+-----------+-----------+------------  
--+-------------+                             
  
                          +L Graft-mid +0 +0 +--------------+-------------+       
                        
  
                          +graft-1 + + + + +                             
  
                          +----------------+-----------+-----------+------------  
--+-------------+                             
  
                          +L Graft-distal +0 +0 +--------------+-------------+    
                           
  
                          +graft-1 + + + + +                             
  
                          +----------------+-----------+-----------+------------  
--+-------------+                             
  
                          +L Graft-distal +0 +0 +--------------+-------------+    
                           
  
                          +anastomosis-1 + + + + +                             
  
                          +----------------+-----------+-----------+------------  
--+-------------+                             
  
                          +L  +0 +0 +--------------+-------------+                
               
  
                          +Zcjzy-xnxjfbo-9 + + + + +                             
  
                          +----------------+-----------+-----------+------------  
--+-------------+                             
  
                          Amended                                  
  
                                                                   
  
                          Ed Breen M.D.                             
  
                          2022 18:23                             
   
                                                    Ed Breen MD -   
022 Formatting of this note might be different from  
 the original.                                              Mary Rutan Hospital HEART AND VASCULAR INSTITUTE               
              Work Phone: 1(435) 582-3590  
  
                                                                   
  
                          ------------------------------------------------------  
-----------------                             
  
                          Lower Extremity Graft Evaluation                        
       
  
                                                                   
  
                          AMENDED REPORT                             
  
                                                                   
  
                          Libby Bradshaw, : 1957 Study 2022      
                         
  
                          Name: Willow JOHNSON (65yrs) Date:                           
    
  
                          Patient 19468429 Age: 65 Account: 417935078839          
                     
  
                          ID:                                      
  
                          Gender: M Loc: Accession: 06353999046                   
            
  
                          BP:                                      
  
                          Ordering Physician: Ed Breen M.D.                   
            
  
                          Sonographer: Moni Gunderson RVT                         
      
  
                          Interpreting Physician: Ed Breen M.D.               
                
  
                                                                   
  
                          ------------------------------------------------------  
-----------------                             
  
                                                                   
  
                          Location: 15 Price Street                   
            
  
                                                                   
  
                          ------------------------------------------------------  
-----------------                             
  
                          Indications: Atherosclerosis of native arteries. PVD w  
ith ulcer.                             
  
                          Non healing ulcer left calf.                            
   
  
                                                                   
  
                          ------------------------------------------------------  
-----------------                             
  
                          CRITICAL RESULTS: A critical finding, was reported to   
Ed Breen MD ,                             
  
                          an attending physician responsible for the patient , Red Gunderson RVT , on 2022 , at 04:57 PM. Correct read-back w  
as verified.                             
  
                                                                   
  
                          ------------------------------------------------------  
-----------------                             
  
                                                                   
  
                          Conclusions                              
  
                                                                   
  
                          1. Occluded left femoral-popliteal bypass graft         
                      
  
                          2. Right NENA 0.98, normal                             
  
                          3. Left NENA 0.3, critical arterial insufficiency        
                       
  
                                                                   
  
                          ------------------------------------------------------  
-----------------                             
  
                          History: Risk factors: Former tobacco use. Hypertensio  
n. Age over                             
  
                          65 years.                                
  
                                                                   
  
                          Labs, prior tests, procedures, and surgery:             
                  
  
                          Graft from the left common femoral artery to the left   
popliteal artery.                             
  
                                                                   
  
                          ------------------------------------------------------  
-----------------                             
  
                                                                   
  
                          Study data: Lower extremity graft evaluation. Location  
: Vascular                             
  
                          laboratory. Procedure: A vascular evaluation was perfo  
rmed with the                             
  
                          patient in the supine position. Images were obtained u  
sing a GE Logic                             
  
                          E10s vascular ultrasound machine.                       
        
  
                                                                   
  
                          ------------------------------------------------------  
-----------------                             
  
                          Arterial pressure indices:                             
  
                                                                   
  
                          +----------+----------------+------------+              
                 
  
                          +Location +Pressure (REST)*+Index (REST)+               
                
  
                          +----------+----------------+------------+              
                 
  
                          +R brachial+122 +------------+                          
     
  
                          +----------+----------------+------------+              
                 
  
                          +L brachial+122 +------------+                          
     
  
                          +----------+----------------+------------+              
                 
  
                          +R DP +104 +0.85 +                             
  
                          +----------+----------------+------------+              
                 
  
                          +R PT +120 +0.98 +                             
  
                          +----------+----------------+------------+              
                 
  
                          +L DP +0 +0.00 +                             
  
                          +----------+----------------+------------+              
                 
  
                          +L PT +37 +0.30 +                             
  
                          +----------+----------------+------------+              
                 
  
                                                                   
  
                          ------------------------------------------------------  
-----------------                             
  
                                                                   
  
                          Arterial flow:                             
  
                                                                   
  
                          +----------------+-----------+-----------+------------  
--+-------------+                             
  
                          +Location +PSV(cm/sec)+EDV(cm/sec)+Comment +Graft type  
 +                             
  
                          +----------------+-----------+-----------+------------  
--+-------------+                             
  
                          +L Jmmlf-kkmluf-6+80 +8 +--------------+L fem to Pop +  
                             
  
                          + + + + +A +                             
  
                          +----------------+-----------+-----------+------------  
--+-------------+                             
  
                          +L Graft-proximal+31 +0 +Pre occlusive +-------------+  
                             
  
                          +anastomosis-1 + + +doppler + +                         
      
  
                          + + + +signal. + +                             
  
                          +----------------+-----------+-----------+------------  
--+-------------+                             
  
                          +L Graft-proximal+0 +0 +--------------+-------------+   
                            
  
                          +graft-1 + + + + +                             
  
                          +----------------+-----------+-----------+------------  
--+-------------+                             
  
                          +L Graft-mid +0 +0 +--------------+-------------+       
                        
  
                          +graft-1 + + + + +                             
  
                          +----------------+-----------+-----------+------------  
--+-------------+                             
  
                          +L Graft-distal +0 +0 +--------------+-------------+    
                           
  
                          +graft-1 + + + + +                             
  
                          +----------------+-----------+-----------+------------  
--+-------------+                             
  
                          +L Graft-distal +0 +0 +--------------+-------------+    
                           
  
                          +anastomosis-1 + + + + +                             
  
                          +----------------+-----------+-----------+------------  
--+-------------+                             
  
                          +L +0 +0 +--------------+-------------+                 
              
  
                          +Pxefo-ukxfjdi-1 + + + + +                             
  
                          +----------------+-----------+-----------+------------  
--+-------------+                             
  
                          Ed Johnston M.D.                             
  
                          2022 18:23                             
   
                                                                 SUMMA  
  
                                                                 Work Phone: 8(6 78)191-1414  
   
             R                                                   SUMMA  
  
             a                                                   Work Phone: 8(1 07)935-9623  
  
             d                                                     
  
             i                                                     
  
             o                                                     
  
             l                                                     
  
             o                                                     
  
             g                                                     
  
             y                                                     
  
             S                                                     
  
             t                                                     
  
             u                                                     
  
             d                                                     
  
             y                                                     
  
             o                                                     
  
             b                                                     
  
             s                                                     
  
             e                                                     
  
             r                                                     
  
             v                                                     
  
             a                                                     
  
             t                                                     
  
             i                                                     
  
             o                                                     
  
             n                                                     
  
             (                                                     
  
             n                                                     
  
             a                                                     
  
             r                                                     
  
             r                                                     
  
             a                                                     
  
             t                                                     
  
             i                                                     
  
             v                                                     
  
             e                                                     
  
             )                                                     
  
  
  
  
                                        CNOVSP  on 2022  
  
  
  
  
             CNOVSP       Visit (SP) Office (HEMAGRE) Normal                      
Ararat  
  
                                                      
--------------------------------------------------------------------------------
                                                            General  
  
                          WILLOW BRADSHAW (061585) 1957 M                  
           Medical  
  
                          Date Time Provider Department                           
  Center  
  
                          22 9:45 AM ELIZABETH MILES            
                   
  
                          During your visit today, we recorded the following inf  
ormation about you:                             
  
                          Temperature Pulse Blood pressure Weight                 
              
  
                          96.8 degrees 98/minute 106/62 89.8 kg                   
            
  
                          Height                                   
  
                          1.702 m                                  
  
                          Elizabeth Miles MD 2/15/2022 4:37 PM Signed        
                       
  
                          Hematology/Oncology Progress Note                       
        
  
                          Willow Bradshaw                             
  
                          1957                                 
  
                          Encounter Date: 2022                             
  
                          Cancer Staging                             
  
                          No matching staging information was found for the lily  
ent.                             
  
                                HPI: Willow Bradshaw is a 61 year old gentleman  
 with clinical stage at least                   
                                          
  
                          IB (cT2 cN0 cM0) G1 adenocarcinoma of the CASSANDRA.          
                     
  
                          Mr. Bradshaw initially presented 2017 for CABG on 17 secondary to                             
  
                                significant symptomatic angina and CAD. His cour  
se was complicated by hypoxic                   
                                          
  
                                respiratory failure. CT of the chest on 17   
showed a spiculated mass, 1.3                   
                                          
  
                          cm, in the left upper lobe.                             
  
                                He reports that he did not know that he had this  
 nodule until ~6 months after                   
                                          
  
                          his CABG.                                
  
                          CT chest 10/20/17 showed the CASSANDRA lesion had increased   
to 1.8 x 1.2 cm.                             
  
                          PET/CT performed on 17 showed a 1.8 cm CASSANDRA lesion  
 with max SUV 2.2.                             
  
                          Otherwise LUCIANA.                             
  
                          Pulmonary function testing on 17 showed FEV1 97%,  
 VC 99%, FEV1/FVC 74%,                             
  
                          DLCO 44%.                                
  
                                        Percutaneous biopsy was attempted on , however, the nodule could not be   
                                                      
  
                          safely biopsied and attempt was aborted. At this time,  
 it was decided to                             
  
                          serially follow the lesion.                             
  
                                CT chest on 18 showed a stable CASSANDRA nodule, a  
nd an unchanged 6 x 3 mm left                   
                                          
  
                          lingual nodule.                             
  
                          CT chest on 10/4/18 showed enlargement of the CASSANDRA nodu  
le 2.5 x 1.3 cm and a                             
  
                          stable 6 mm lingula nodule.                             
  
                          CT performed to assess for pulmonary embolism on  showed new                             
  
                                        infiltrates in the RUL with a nodular ar  
e posteriorly 1.7 x 1.4 cm, new. There   
                                                      
  
                          is a small nodule in the RUL 5.4 mm. Spiculated mass i  
n the CASSANDRA is 3.6 x 2.4                             
  
                          cm. +Mediastinal adenopathy, largest is a right paratr  
acheal node 18.5 mm.                             
  
                          He underwent EBUS and biopsy on 18. 4L node insu  
fficient sample, 4R no                             
  
                                                    malignant cells, 7 rare atyp  
ical cells of uncertain significance, 10R negative,  
                                                              
  
                          10L atypical cells of uncertain significance (appears   
to be artifact). CASSANDRA                             
  
                          transbronchial biopsy showed well differentiated adeno  
carcinoma.                             
  
                          PET/CT 19 showed a few scattered mediastinal LN, n  
ot FDG avid (0.7 x 1.3                             
  
                          cm, SUV 1.9). Mildly hypermetabolic CASSANDRA nodule 1.2 x 2  
.1 cm (SUV 2.3).                             
  
                          PFTs show mild large airway obstruction and moderately  
 decreased DLCO.                             
  
                          He was treated with SBRT from 3/11/19-3/15/19.          
                     
  
                                                    CT 19 showed stable ling  
darlene lesion and a tiny subpleural nodule in the left  
                                                              
  
                          lower lobe.                              
  
                          Inerterval History                             
  
                          Mr. Bradshaw presents today in follow up of his lung c  
ancer. He was admitted                             
  
                                                    with ischemia of the left lo  
wer extremity s/p fasciotomy. He reports that he is  
                                                              
  
                                        slowly healing. Has stopped smoking. He   
is eating and drinking well. He has no   
                                                      
  
                          nausea, vomiting, diarrhea, constipation. He had cardi  
ac stent placement in                             
  
                          10/2020.                                 
  
                          CT chest 20 with stable changes. CT chest 20  
20 stable. CT chest                             
  
                          2021 stable. CT chest 21 stable architectur  
al distortion in the                             
  
                          lingula. Subpleural density in the right lower lobe, s  
uggestive of focal                             
  
                          inflammatory or infection process.                      
         
  
                          PAST MEDICAL HISTORY                             
  
                          Diagnosis Date                             
  
                          - CAD (coronary artery disease)                         
      
  
                          - COPD (chronic obstructive pulmonary disease) (HCC)    
                           
  
                          - Hyperlipidemia                             
  
                          - Lung cancer (HCC)                             
  
                          adenocarcinoma CASSANDRA                             
  
                          - Peripheral vascular disease (HCC)                     
          
  
                          - Personal history of DVT (deep vein thrombosis)        
                       
  
                          PAST SURGICAL HISTORY                             
  
                          Procedure Laterality Date                             
  
                          - ANGIOPLASTY FEMORAL/POP Left                          
     
  
                          2019                                 
  
                          - CORONARY ARTERY BYPASS GRAFT 2017               
                
  
                          Dr. Fulton                              
  
                          Current Outpatient Medications                          
     
  
                          Medication Sig Dispense Refill                          
     
  
                          - warfarin (COUMADIN) 2.5 mg tablet TAKE AS DIRECTED B  
Y Palmdale Regional Medical Center ANTICOAGULATION                             
  
                          CLINIC (30 TABLETS= 30 DAY SUPPLY)                      
         
  
                          - oxyCODONE IR (ROXICODONE) 5 mg immediate release tab  
let Take 5 mg by mouth                             
  
                          every 6 hours as needed.                             
  
                          - ONETOUCH VERIO TEST STRIPS test strip                 
              
  
                          - ONETOUCH VERIO REFLECT METER                          
     
  
                          - buPROPion HCL, smoking deter, 150 mg tab ER 12 hr Ta  
ke 150 mg by mouth.                             
  
                          - clopidogrel (PLAVIX) 75 mg tablet Take 75 mg by mout  
h.                             
  
                          - ONETOUCH DELICA PLUS LANCET 33 gauge                  
             
  
                          - Nicotine Polacrilex 2 mg lozenge Take 2 mg by mouth.  
                             
  
                                        - nitroglycerin sublingual (NITROQUICK)   
0.4 mg SL tablet Dissolve 0.4 mg under   
                                                      
  
                          the tongue.                              
  
                          - pantoprazole DR (PROTONIX) 40 mg tablet               
                
  
                          - JARDIANCE 10 mg tablet                             
  
                          - VASCEPA capsule TAKE 2 CAPSULES BY MOUTH 2 TIMES AUDRA  
LY                             
  
                          - alirocumab (PRALUENT PEN) 75 mg/mL pen Inject 75 mg   
subcutaneously.                             
  
                          - ubidecarenone Q-10 (COENZYME Q-10) 10 mg cap Take by  
 mouth.                             
  
                          - Magnesium 250 mg tab Take 250 mg by mouth.            
                   
  
                          - sildenafil (VIAGRA) 100 mg tablet Take 100 mg by al  
th.                             
  
                          - metFORMIN ER (GLUCOPHAGE XR) 500 mg 24 hr tablet Adeel  
e 1000mg by mouth with                             
  
                          breakfast and 500mg by mouth with dinner                
               
  
                          - atorvastatin (more content not included)...           
                    
  
  
  
  
                                        INR, POC  on 2022  
  
  
  
  
             INR, POC     3.8          High         0.9-1.1      Kettering Health Greene Memorial M2 Connections Sy  
stem  
  
  
  
  
                          Comment on above:         Result Comment: Performed by  
 Roche Coaguchek XS Plus CLIA ID:  
  
                                                    15I3249279Hcbxu Health Akreina gr, OHRecomGeorge Washington University Hospitalmiryam Anticoagulant  
  
                                                    Therapy:See Below----- INR o  
f 2.0 - 3.0: - Prophylaxis of Venous  
  
                                                    Thrombosis (high-risk surger  
y) - Treatment of Venous Thrombosis -  
  
                                                    Treatment of Pulmonary Embol  
ism (includes tissue heart valves,  
  
                                                    Acute Myocardial Infarction   
to prevent systemic embolism, Valvular  
  
                                                    Heart Disease, and Atrial Fi  
brillation)----- INR of 2.5 - 3.5: -  
  
                                                    Mechanical Prosthetic Valves  
 (high risk) - If oral anticoagulant  
  
                                                    therapy is used to prevent M  
yocardial Infarction  
   
                                                    Performed By: #### INRPC ###  
#POINT OF CARE  
  
  
  
  
                                        Glucose,Bedside  on 2022  
  
  
  
  
             Glucose [Mass/Vol] 111 mg/dL    High                Corey Hospitala Hea  
Lima City Hospital System  
  
  
  
  
                          Comment on above:         Result Comment: Test perform  
ed by glucose meter. Results may   
be  
  
                                                    10%-15% lowerthan serum/plas  
ma values. (CLIA ID 29Q3138151)  
   
                                                    Performed By: #### BGLU ####  
Kettering Health Greene Memorial M2 Connections Luhedy547 Rio Rancho, OH   
  
  
  
  
                                        Op Note  on 2022  
  
  
  
  
             Op Note                   Normal                    Kettering Health Greene Memorial Health Sy  
stem  
  
  
  
  
                                        Prothrombin Time  on 2022  
  
  
  
  
             INR          2.3          High         0.9-1.1      Kettering Health Greene Memorial Health Sy  
stem  
  
  
  
  
                          Comment on above:         Result Comment: Recommended   
Anticoagulant Therapy: SEE   
BELOW-----  
  
                                                    INR of 2.0 - 3.0 : - Prophyl  
axis of Venous Thrombosis (high-risk  
  
                                                    surgery) - Treatment of Veno  
us Thrombosis - Treatment of Pulmonary  
  
                                                    Embolism (Includes tissue he  
art valves, Acute Myocardial Infarction  
  
                                                    to prevent systemic embolism  
, Valvular Heart Disease, and Atrial  
  
                                                    Fibrillation)----- INR of 2.  
5 - 3.5 : - Mechanical Prosthetic  
  
                                                    Valves (high risk) - If oral  
 anticoagulant therapy is used to  
  
                                                    prevent Myocardial Infarctio  
n  
   
                                                    Performed By: #### PT ####Kramer  
Blanchard Valley Health System Bluffton Hospital M2 Connections 99 Hogan Street   
  
  
  
  
             PT Coag (PPP) [Time] 23.5 s       High         9.0-12.0     Summa Salem Regional Medical Center System  
  
  
  
  
                          Comment on above:         Result Comment: .  
   
                                                    Performed By: #### PT ####Southview Medical Center M2 Connections 99 Hogan Street   
  
  
  
  
                                        Glucose,Bedside   on 2022  
  
  
  
  
             Glucose [Mass/Vol] 158 mg/dL    High                Corey Hospitala Hea  
Lima City Hospital System  
  
  
  
  
                          Comment on above:         Result Comment: Test perform  
ed by glucose meter. Results may   
be  
  
                                                    10%-15% lowerthan serum/plas  
ma values. (CLIA ID 90B4627904)  
   
                                                    Performed By: #### BGLU ####  
Kettering Health Greene Memorial M2 Connections Cwdumg976 Rio Rancho, OH   
  
  
  
  
             Glucose [Mass/Vol] 108 mg/dL    High                Corey Hospitala Hea  
Lima City Hospital System  
  
  
  
  
                          Comment on above:         Result Comment: Test perform  
ed by glucose meter. Results may   
be  
  
                                                    10%-15% lowerthan serum/plas  
ma values. (CLIA ID 81J4609884)  
   
                                                    Performed By: #### BGLU ####  
Cerus Corporation525 Rio Rancho, OH   
  
  
  
  
             Glucose [Mass/Vol] 109 mg/dL    High                The Jewish Hospital System  
  
  
  
  
                          Comment on above:         Result Comment: Test perform  
ed by glucose meter. Results may   
be  
  
                                                    10%-15% lowerthan serum/plas  
ma values. (CLIA ID 36D5188809)  
   
                                                    Performed By: #### BGLU ####  
Cerus Corporation80 Walton Street Linden, PA 17744   
  
  
  
  
                                        Protime AND APTT  on 2022  
  
  
  
  
             aPTT Coag (Bld) [Time] 35.4 s       High         20.0-30.5    Munson Healthcare Cadillac Hospital  
  
  
  
  
                          Comment on above:         Result Comment: NOTE: The th  
erapeutic time for Heparin  
  
                                                    anticoagulation,based on Xa   
activity inhibition, is an APTT of  
  
                                                    46-80seconds.  
   
                                                    Performed By: #### PT/AP ###  
#Cerus Corporation80 Walton Street Linden, PA 17744   
  
  
  
  
             INR          2.4          High         0.9-1.1      Corey HospitalLaru Technologies   
stem  
  
  
  
  
                          Comment on above:         Result Comment: Recommended   
Anticoagulant Therapy: SEE   
BELOW-----  
  
                                                    INR of 2.0 - 3.0 : - Prophyl  
axis of Venous Thrombosis (high-risk  
  
                                                    surgery) - Treatment of Veno  
us Thrombosis - Treatment of Pulmonary  
  
                                                    Embolism (Includes tissue he  
art valves, Acute Myocardial Infarction  
  
                                                    to prevent systemic embolism  
, Valvular Heart Disease, and Atrial  
  
                                                    Fibrillation)----- INR of 2.  
5 - 3.5 : - Mechanical Prosthetic  
  
                                                    Valves (high risk) - If oral  
 anticoagulant therapy is used to  
  
                                                    prevent Myocardial Infarctio  
n  
   
                                                    Performed By: #### PT/AP ###  
#Cerus Corporation525 Catheter Connections Elizabeth, OH   
  
  
  
  
             PT Coag (PPP) [Time] 24.0 s       High         9.0-12.0     Mercy Health St. Joseph Warren Hospital System  
  
  
  
  
                          Comment on above:         Result Comment: .  
   
                                                    Performed By: #### PT/AP ###  
#Cerus CorporationTrinity Health System Twin City Medical CenterCatheter Connections Elizabeth, OH   
  
  
  
  
                                        Basic Metabolic Panel  on 2022  
  
  
  
  
             Anion gap [Moles/Vol] 10 mmol/L    Normal       3-13         Munson Healthcare Cadillac Hospital  
  
  
  
  
                          Comment on above:         Performed By: #### HEMDF, PT  
/AP, MG3, BMP3 ####Thomas Ville 45855 E. Gause, OH   
  
  
  
  
             Calcium [Mass/Vol] 8.7 mg/dL    Normal       8.4-10.4     Munson Healthcare Otsego Memorial Hospital  
  
  
  
  
                          Comment on above:         Performed By: #### HEMDF, PT  
/AP, MG3, BMP3 ####Thomas Ville 45855 E. Gause, OH   
  
  
  
  
             CO2 [Moles/Vol] 21 mmol/L    Low          22-30        Munson Healthcare Cadillac Hospital  
  
  
  
  
                          Comment on above:         Performed By: #### HEMDF, PT  
/AP, MG3, BMP3 ####Thomas Ville 45855 EYork, OH   
  
  
  
  
             Glucose [Mass/Vol] 132 mg/dL    High                Munson Healthcare Otsego Memorial Hospital  
  
  
  
  
                          Comment on above:         Performed By: #### HEMDF, PT  
/AP, MG3, BMP3 ####Thomas Ville 45855 E. Gause, OH   
  
  
  
  
             Urea nitrogen [Mass/Vol] 13 mg/dL     Normal       7-17         Beaumont Hospital  
  
  
  
  
                          Comment on above:         Performed By: #### HEMDF, PT  
/AP, MG3, BMP3 ####Thomas Ville 45855 E. Gause, OH   
  
  
  
  
             Creatinine [Mass/Vol] 1.13 mg/dL   Normal       0.52-1.25    Munson Healthcare Cadillac Hospital  
  
  
  
  
                          Comment on above:         Performed By: #### HEMDF, PT  
/AP, MG3, BMP3 ####Thomas Ville 45855 E. Gause, OH   
  
  
  
  
             GFR/1.73 sq M.predicted among 78.6 mL/min/{1.73_m2} Normal       >6  
0          Munson Healthcare Cadillac Hospital  
  
             blacks MDRD (S/P/Bld) [Vol                                          
  
             rate/Area]                                            
  
  
  
  
                          Comment on above:         Performed By: #### HEMDF, PT  
/AP, MG3, BMP3 ####88 Johnson Street. Gause, OH   
  
  
  
  
             GFR/1.73 sq M.predicted among 67.8 mL/min/{1.73_m2} Normal       >6  
0          Munson Healthcare Cadillac Hospital  
  
             non-blacks MDRD (S/P/Bld) [Vol                                       
     
  
             rate/Area]                                            
  
  
  
  
                          Comment on above:         Result Comment: KDIGO guidel  
zoë provide the following GFR  
  
                                                    categories:Stage GFR(ml/min/  
1.73 m2) TermsG1 >=90 Normal or highG2  
  
                                                    60-89 Mildly decreased*G3a 4  
5-59 Mildly to moderately izpvaiopoH5c  
  
                                                    30-44 Moderately to severely  
 decreasedG4 15-29 Severely decreasedG5  
  
                                                    <15 Kidney failure*Relative   
to young adult level.In the absence of  
  
                                                    evidence of kidney damage, n  
either GFRcategory G1 nor G2 fulfill  
  
                                                    the criteria for CKD.The CKD  
-EPI equation is validated in  
  
                                                    individuals 18 yearsof age a  
nd older. Currently the best equation  
  
                                                    forestimating glomerular beto  
tration rate (GFR) from serumcreatinine  
  
                                                    in children is the Bedside S  
reynaldotz equation.It is less accurate in  
  
                                                    patients with extremes of mu  
sclemass, restriction of dietary  
  
                                                    protein, ingestion of creati  
ne,extra-renal metabolism of  
  
                                                    creatinine, or treatment wit  
hmedications that affect renal tubular  
  
                                                    creatinine secretion.  
   
                                                    Performed By: #### HEMDF, PT  
/AP, MG3, BMP3 ####Corey HospitalLaru Technologies  
  
                                                    Hryvtv963 Allison, OH   
  
  
  
  
             Chloride [Moles/Vol] 109 mmol/L   High                Munising Memorial Hospital  
  
  
  
  
                          Comment on above:         Performed By: #### HEMDF, PT  
/AP, MG3, BMP3 ####Kettering Health Greene Memorial M2 Connections  
  
                                                    Zalbag727 Allison, OH   
  
  
  
  
             Potassium [Moles/Vol] 3.9 mmol/L   Normal       3.5-5.1      Munson Healthcare Cadillac Hospital  
  
  
  
  
                          Comment on above:         Performed By: #### HEMDF, PT  
/AP, MG3, BMP3 ####K94 Discoveries  
  
                                                    Iykhmr288 Allison, OH   
  
  
  
  
             Sodium [Moles/Vol] 140 mmol/L   Normal       135-145      The Jewish Hospital System  
  
  
  
  
                          Comment on above:         Performed By: #### HEMDF, PT  
/AP, MG3, BMP3 ####Corey HospitalLaru Technologies  
  
                                                    Crfmrq222 Allison, OH 21476-0412  
  
  
  
  
                                        Glucose,Bedside   on 2022  
  
  
  
  
             Glucose [Mass/Vol] 149 mg/dL    High                Corey Hospitala Hea  
lt System  
  
  
  
  
                          Comment on above:         Result Comment: Test perform  
ed by glucose meter. Results may   
be  
  
                                                    10%-15% lowerthan serum/plas  
ma values. (CLIA ID 05M4914800)  
   
                                                    Performed By: #### BGLU ####  
K94 Discoveries Lxkkxy288 E. Elizabeth, OH 69756-9640  
  
  
  
  
             Glucose [Mass/Vol] 141 mg/dL    High                Corey Hospitala Hea  
lt System  
  
  
  
  
                          Comment on above:         Result Comment: Test perform  
ed by glucose meter. Results may   
be  
  
                                                    10%-15% lowerthan serum/plas  
ma values. (CLIA ID 49A4254039)  
   
                                                    Performed By: #### BGLU ####  
K94 Discoveries Dxhjvg095 E. Elizabeth, OH 66036-3947  
  
  
  
  
             Glucose [Mass/Vol] 247 mg/dL    High                Corey Hospitala Hea  
lt System  
  
  
  
  
                          Comment on above:         Result Comment: Test perform  
ed by glucose meter. Results may   
be  
  
                                                    10%-15% lowerthan serum/plas  
ma values. (CLIA ID 80T3348152)  
   
                                                    Performed By: #### BGLU ####  
K94 Discoveries Ormzph966 E. Elizabeth, OH 57153-9775  
  
  
  
  
             Glucose [Mass/Vol] 152 mg/dL    High                Corey Hospitala Hea  
Lima City Hospital System  
  
  
  
  
                          Comment on above:         Result Comment: Test perform  
ed by glucose meter. Results may   
be  
  
                                                    10%-15% lowerthan serum/plas  
ma values. (CLIA ID 83P1483868)  
   
                                                    Performed By: #### BGLU ####  
K94 Discoveries Zigzvk876 E. Elizabeth, OH 88738-5588  
  
  
  
  
             Glucose [Mass/Vol] 110 mg/dL    High                Corey Hospitala Hea  
lt System  
  
  
  
  
                          Comment on above:         Result Comment: Test perform  
ed by glucose meter. Results may   
be  
  
                                                    10%-15% lowerthan serum/plas  
ma values. (CLIA ID 90I1158233)  
   
                                                    Performed By: #### BGLU ####  
K94 Discoveries Vtxopu046 E. Elizabeth, OH 57282-4975  
  
  
  
  
             Glucose [Mass/Vol] 126 mg/dL    High                The Jewish Hospital System  
  
  
  
  
                          Comment on above:         Result Comment: Test perform  
ed by glucose meter. Results may   
be  
  
                                                    10%-15% lowerthan serum/plas  
ma values. (CLIA ID 56N3166179)  
   
                                                    Performed By: #### BGLU ####  
Briana Ville 771835 Rio Rancho, OH   
  
  
  
  
                                        Hemogram w/ Autodiff  on 2022  
  
  
  
  
             Abs Baso Cnt 0.1 10*3/uL  Normal       0.0-0.2      Salem City Hospital  
stem  
  
  
  
  
                          Comment on above:         Performed By: #### HEMDF, PT  
/AP, MG3, BMP3 ####Kettering Health Greene Memorial M2 Connections  
  
                                                    Zuyskg578 EYork, OH   
  
  
  
  
             Abs Neutrophile Cnt 6.7 10*3/uL  Normal       1.8-7.0      ProMedica Coldwater Regional Hospital  
  
  
  
  
                          Comment on above:         Performed By: #### HEMDF, PT  
/AP, MG3, BMP3 ####Kettering Health Greene Memorial M2 Connections  
  
                                                    Xagzfm955 Allison, OH   
  
  
  
  
             Basophils/100 WBC (Bld) 0.7 %        Normal       0.0-2.0      Summ  
a Health System  
  
  
  
  
                          Comment on above:         Performed By: #### HEMDF, PT  
/AP, MG3, BMP3 ####Kettering Health Greene Memorial M2 Connections  
  
                                                    Ofnjso009 Allison, OH   
  
  
  
  
             Eosinophils (Bld) [#/Vol] 0.1 10*3/uL  Normal       0.0-0.5      Harper University Hospital  
  
  
  
  
                          Comment on above:         Performed By: #### HEMDF, PT  
/AP, MG3, BMP3 ####Kettering Health Greene Memorial M2 Connections  
  
                                                    Gzxtee450 Allison, OH   
  
  
  
  
             Eosinophils/100 WBC (Bld) 1.2 %        Normal       1.0-6.0      Harper University Hospital  
  
  
  
  
                          Comment on above:         Performed By: #### HEMDF, PT  
/AP, MG3, BMP3 ####Kettering Health Greene Memorial M2 Connections  
  
                                                    Imnpaf564 Allison, OH   
  
  
  
  
             Erythrocyte distribution width (RBC) 17.3 %       High         11.5  
-14.5    Munson Healthcare Cadillac Hospital  
  
             [Ratio]                                               
  
  
  
  
                          Comment on above:         Performed By: #### HEMDF, PT  
/AP, MG3, BMP3 ####Briana Ville 771835 Allison, OH   
  
  
  
  
             Granulocytes/100 WBC (Bld) 76.5 %       Normal       40.0-80.0    S  
University of Michigan Health  
  
  
  
  
                          Comment on above:         Performed By: #### HEMDF, PT  
/AP, MG3, BMP3 ####Briana Ville 771835 EYork, OH   
  
  
  
  
             Hematocrit (Bld) [Volume fraction] 26.6 %       Low          40.0-5  
2.0    Munson Healthcare Cadillac Hospital  
  
  
  
  
                          Comment on above:         Performed By: #### HEMDF, PT  
/AP, MG3, BMP3 ####04 Allen Street   
  
  
  
  
             Hemoglobin (Bld) [Mass/Vol] 8.5 g/dL     Low          13.0-18.0      
Munson Healthcare Cadillac Hospital  
  
  
  
  
                          Comment on above:         Performed By: #### HEMDF, PT  
/AP, MG3, BMP3 ####04 Allen Street   
  
  
  
  
             Lymphocytes (Bld) [#/Vol] 1.4 10*3/uL  Normal       1.0-4.3      Harper University Hospital  
  
  
  
  
                          Comment on above:         Performed By: #### HEMDF, PT  
/AP, MG3, BMP3 ####04 Allen Street   
  
  
  
  
             Lymphocytes/100 WBC (Bld) 16.2 %       Low          20.0-40.0    Harper University Hospital  
  
  
  
  
                          Comment on above:         Performed By: #### HEMDF, PT  
/AP, MG3, BMP3 ####04 Allen Street   
  
  
  
  
             MCH (RBC) [Entitic mass] 26.9 pg      Normal       26.0-34.0    Beaumont Hospital  
  
  
  
  
                          Comment on above:         Performed By: #### HEMDF, PT  
/AP, MG3, BMP3 ####Briana Ville 771835 Allison, OH   
  
  
  
  
             MCHC         31.8 %       Low          32.0-36.0    Salem City Hospital  
stem  
  
  
  
  
                          Comment on above:         Performed By: #### HEMDF, PT  
/AP, MG3, BMP3 ####04 Allen Street   
  
  
  
  
             MCV (RBC) [Entitic vol] 84.7 fL      Normal       80.0-98.0    Summ  
a Health System  
  
  
  
  
                          Comment on above:         Performed By: #### HEMDF, PT  
/AP, MG3, BMP3 ####04 Allen Street   
  
  
  
  
             Monocytes (Bld) [#/Vol] 0.5 10*3/uL  Normal       0.0-0.8      Summ  
a Health System  
  
  
  
  
                          Comment on above:         Performed By: #### HEMDF, PT  
/AP, MG3, BMP3 ####04 Allen Street   
  
  
  
  
             Monocytes/100 WBC (Bld) 5.4 %        Normal       2.0-10.0     Summ  
a Health System  
  
  
  
  
                          Comment on above:         Performed By: #### HEMDF, PT  
/AP, MG3, BMP3 ####04 Allen Street   
  
  
  
  
             Platelet mean volume (Bld) [Entitic vol] 7.5 fL       Normal         
7.4-10.4     Munson Healthcare Cadillac Hospital  
  
  
  
  
                          Comment on above:         Performed By: #### HEMDF, PT  
/AP, MG3, BMP3 ####04 Allen Street   
  
  
  
  
             Platelets (Bld) [#/Vol] 285 10*3/uL  Normal       140-440      University of Michigan Hospital  
  
  
  
  
                          Comment on above:         Performed By: #### HEMDF, PT  
/AP, MG3, BMP3 ####04 Allen Street   
  
  
  
  
             RBC (Bld) [#/Vol] 3.14 10*6/uL Low          4.40-5.90    McLaren Oakland  
  
  
  
  
                          Comment on above:         Performed By: #### HEMDF, PT  
/AP, MG3, BMP3 ####04 Allen Street   
  
  
  
  
             WBC (Bld) [#/Vol] 8.8 10*3/uL  Normal       3.6-10.7     McLaren Oakland  
  
  
  
  
                          Comment on above:         Performed By: #### HEMDF, PT  
/AP, MG3, BMP3 ####Cerus Corporation525 Allison, OH   
  
  
  
  
                                        Magnesium  on 2022  
  
  
  
  
             Magnesium [Mass/Vol] 1.7 mg/dL    Normal       1.6-2.3      SummHometapper System  
  
  
  
  
                          Comment on above:         Performed By: #### HEMDF, PT  
/AP, MG3, BMP3 ####Cerus Corporation525 Allison, OH   
  
  
  
  
                                        Protime AND APTT  on 2022  
  
  
  
  
             aPTT Coag (Bld) [Time] 38.3 s       High         20.0-30.5    Corey HospitalLaru Technologies Insight Surgical Hospital  
  
  
  
  
                          Comment on above:         Result Comment: NOTE: The th  
erapeutic time for Heparin  
  
                                                    anticoagulation,based on Xa   
activity inhibition, is an APTT of  
  
                                                    46-80seconds.  
   
                                                    Performed By: #### HEMDF, PT  
/AP, MG3, BMP3 ####Cerus Corporation525 Allison, OH   
  
  
  
  
             INR          1.7          High         0.9-1.1      Memoir  
stem  
  
  
  
  
                          Comment on above:         Result Comment: Recommended   
Anticoagulant Therapy: SEE   
BELOW-----  
  
                                                    INR of 2.0 - 3.0 : - Prophyl  
axis of Venous Thrombosis (high-risk  
  
                                                    surgery) - Treatment of Veno  
us Thrombosis - Treatment of Pulmonary  
  
                                                    Embolism (Includes tissue he  
art valves, Acute Myocardial Infarction  
  
                                                    to prevent systemic embolism  
, Valvular Heart Disease, and Atrial  
  
                                                    Fibrillation)----- INR of 2.  
5 - 3.5 : - Mechanical Prosthetic  
  
                                                    Valves (high risk) - If oral  
 anticoagulant therapy is used to  
  
                                                    prevent Myocardial Infarctio  
n  
   
                                                    Performed By: #### HEMDF, PT  
/AP, MG3, BMP3 ####Cerus Corporation525 Santa Maria Biotherapeutics Gause, OH   
  
  
  
  
             PT Coag (PPP) [Time] 17.7 s       High         9.0-12.0     SummBabbleLima City Hospital System  
  
  
  
  
                          Comment on above:         Result Comment: .  
   
                                                    Performed By: #### HEMDF, PT  
/AP, MG3, BMP3 ####Cerus Corporation525 Allison, OH   
  
  
  
  
                                        Basic Metabolic Panel  on 2022  
  
  
  
  
             Calcium [Mass/Vol] 8.8 mg/dL    Normal       8.4-10.4     The Jewish Hospital System  
  
  
  
  
                          Comment on above:         Performed By: #### HEMDF, PT  
/AP, MG3, BMP3 ####Briana Ville 771835 Allison, OH   
  
  
  
  
             Anion gap [Moles/Vol] 10 mmol/L    Normal       3-13         Munson Healthcare Cadillac Hospital  
  
  
  
  
                          Comment on above:         Performed By: #### HEMDF, PT  
/AP, MG3, BMP3 ####Briana Ville 771835 Allison, OH   
  
  
  
  
             CO2 [Moles/Vol] 22 mmol/L    Normal       22-30        Munson Healthcare Cadillac Hospital  
  
  
  
  
                          Comment on above:         Performed By: #### HEMDF, PT  
/AP, MG3, BMP3 ####Briana Ville 771835 Allison, OH   
  
  
  
  
             Creatinine [Mass/Vol] 1.15 mg/dL   Normal       0.52-1.25    Munson Healthcare Cadillac Hospital  
  
  
  
  
                          Comment on above:         Performed By: #### HEMDF, PT  
/AP, MG3, BMP3 ####04 Allen Street   
  
  
  
  
             GFR/1.73 sq M.predicted among 77.0 mL/min/{1.73_m2} Normal       >6  
0          Munson Healthcare Cadillac Hospital  
  
             blacks MDRD (S/P/Bld) [Vol                                          
  
             rate/Area]                                            
  
  
  
  
                          Comment on above:         Performed By: #### HEMDF, PT  
/AP, MG3, BMP3 ####04 Allen Street   
  
  
  
  
             GFR/1.73 sq M.predicted among 66.4 mL/min/{1.73_m2} Normal       >6  
0          Munson Healthcare Cadillac Hospital  
  
             non-blacks MDRD (S/P/Bld) [Vol                                       
     
  
             rate/Area]                                            
  
  
  
  
                          Comment on above:         Result Comment: KDIGO guidel  
zoë provide the following GFR  
  
                                                    categories:Stage GFR(ml/min/  
1.73 m2) TermsG1 >=90 Normal or highG2  
  
                                                    60-89 Mildly decreased*G3a 4  
5-59 Mildly to moderately spwrbxketP3i  
  
                                                    30-44 Moderately to severely  
 decreasedG4 15-29 Severely decreasedG5  
  
                                                    <15 Kidney failure*Relative   
to young adult level.In the absence of  
  
                                                    evidence of kidney damage, n  
either GFRcategory G1 nor G2 fulfill  
  
                                                    the criteria for CKD.The CKD  
-EPI equation is validated in  
  
                                                    individuals 18 yearsof age a  
nd older. Currently the best equation  
  
                                                    forestimating glomerular beto  
tration rate (GFR) from serumcreatinine  
  
                                                    in children is the Bedside S  
kristiwartz equation.It is less accurate in  
  
                                                    patients with extremes of mu  
sclemass, restriction of dietary  
  
                                                    protein, ingestion of creati  
ne,extra-renal metabolism of  
  
                                                    creatinine, or treatment wit  
hmedications that affect renal tubular  
  
                                                    creatinine secretion.  
   
                                                    Performed By: #### HEMDF, PT  
/AP, MG3, BMP3 ####Briana Ville 771835 Allison, OH   
  
  
  
  
             Glucose [Mass/Vol] 113 mg/dL    High                Munson Healthcare Otsego Memorial Hospital  
  
  
  
  
                          Comment on above:         Performed By: #### HEMDF, PT  
/AP, MG3, BMP3 ####Briana Ville 771835 Allison, OH   
  
  
  
  
             Urea nitrogen [Mass/Vol] 14 mg/dL     Normal       7-17         Beaumont Hospital  
  
  
  
  
                          Comment on above:         Performed By: #### HEMDF, PT  
/AP, MG3, BMP3 ####Kettering Health Greene Memorial M2 Connections  
  
                                                    Fhfnyi838 Allison, OH   
  
  
  
  
             Chloride [Moles/Vol] 109 mmol/L   High                Munising Memorial Hospital  
  
  
  
  
                          Comment on above:         Performed By: #### HEMDF, PT  
/AP, MG3, BMP3 ####Briana Ville 771835 Allison, OH   
  
  
  
  
             Potassium [Moles/Vol] 4.5 mmol/L   Normal       3.5-5.1      Munson Healthcare Cadillac Hospital  
  
  
  
  
                          Comment on above:         Performed By: #### HEMDF, PT  
/AP, MG3, BMP3 ####Kettering Health Greene Memorial M2 Connections  
  
                                                    Blabni641 Allison, OH   
  
  
  
  
             Sodium [Moles/Vol] 141 mmol/L   Normal       135-145      Munson Healthcare Otsego Memorial Hospital  
  
  
  
  
                          Comment on above:         Performed By: #### HEMDF, PT  
/AP, MG3, BMP3 ####Kettering Health Greene Memorial M2 Connections  
  
                                                    Pfctgj014 Allison, OH   
  
  
  
  
                                        Glucose,Bedside  on 2022  
  
  
  
  
             Glucose [Mass/Vol] 140 mg/dL    High                Corey Hospitala Grant Hospital System  
  
  
  
  
                          Comment on above:         Result Comment: Test perform  
ed by glucose meter. Results may   
be  
  
                                                    10%-15% lowerthan serum/plas  
ma values. (CLIA ID 62R7768179)  
   
                                                    Performed By: #### BGLU ####  
K94 Discoveries Zorpxy857 E. Elizabeth, OH   
  
  
  
  
             Glucose [Mass/Vol] 155 mg/dL    High                Corey Hospitala Grant Hospital System  
  
  
  
  
                          Comment on above:         Result Comment: Test perform  
ed by glucose meter. Results may   
be  
  
                                                    10%-15% lowerthan serum/plas  
ma values. (CLIA ID 49H9904667)  
   
                                                    Performed By: #### BGLU ####  
K94 Discoveries Fkgqqe105 E. Elizabeth, OH   
  
  
  
  
             Glucose [Mass/Vol] 122 mg/dL    High                The Jewish Hospital System  
  
  
  
  
                          Comment on above:         Result Comment: Test perform  
ed by glucose meter. Results may   
be  
  
                                                    10%-15% lowerthan serum/plas  
ma values. (CLIA ID 78P9098745)  
   
                                                    Performed By: #### BGLU ####  
Cerus Corporation525 E. Elizabeth, OH   
  
  
  
  
             Glucose [Mass/Vol] 150 mg/dL    High                The Jewish Hospital System  
  
  
  
  
                          Comment on above:         Result Comment: Test perform  
ed by glucose meter. Results may   
be  
  
                                                    10%-15% lowerthan serum/plas  
ma values. (CLIA ID 27Y9971424)  
   
                                                    Performed By: #### BGLU ####  
Cerus Corporation525 E. Elizabeth, OH   
  
  
  
  
                                        Hemogram w/ Autodiff  on 2022  
  
  
  
  
             Abs Baso Cnt 0.1 10*3/uL  Normal       0.0-0.2      Kettering Health Greene Memorial M2 Connections   
stem  
  
  
  
  
                          Comment on above:         Performed By: #### HEMDF, PT  
/AP, MG3, BMP3 ####Cerus Corporation525 E. Gause, OH   
  
  
  
  
             Abs Neutrophile Cnt 7.3 10*3/uL  High         1.8-7.0      OhioHealth Berger Hospital System  
  
  
  
  
                          Comment on above:         Performed By: #### HEMDF, PT  
/AP, MG3, BMP3 ####Briana Ville 771835 E. Gause, OH 69093-8344  
  
  
  
  
             Basophils/100 WBC (Bld) 0.8 %        Normal       0.0-2.0      Summ  
a Health System  
  
  
  
  
                          Comment on above:         Performed By: #### HEMDF, PT  
/AP, MG3, BMP3 ####Briana Ville 771835 E. Gause, OH   
  
  
  
  
             Eosinophils (Bld) [#/Vol] 0.1 10*3/uL  Normal       0.0-0.5      Harper University Hospital  
  
  
  
  
                          Comment on above:         Performed By: #### HEMDF, PT  
/AP, MG3, BMP3 ####Briana Ville 771835 E. Gause, OH   
  
  
  
  
             Eosinophils/100 WBC (Bld) 1.1 %        Normal       1.0-6.0      Harper University Hospital  
  
  
  
  
                          Comment on above:         Performed By: #### HEMDF, PT  
/AP, MG3, BMP3 ####Briana Ville 771835 Allison, OH   
  
  
  
  
             Erythrocyte distribution width (RBC) 17.5 %       High         11.5  
-14.5    Munson Healthcare Cadillac Hospital  
  
             [Ratio]                                               
  
  
  
  
                          Comment on above:         Performed By: #### HEMDF, PT  
/AP, MG3, BMP3 ####Briana Ville 771835 Allison, OH   
  
  
  
  
             Granulocytes/100 WBC (Bld) 78.4 %       Normal       40.0-80.0    Insight Surgical Hospital  
  
  
  
  
                          Comment on above:         Performed By: #### HEMDF, PT  
/AP, MG3, BMP3 ####Briana Ville 771835 E. Gause, OH   
  
  
  
  
             Hematocrit (Bld) [Volume fraction] 26.3 %       Low          40.0-5  
2.0    Munson Healthcare Cadillac Hospital  
  
  
  
  
                          Comment on above:         Performed By: #### HEMDF, PT  
/AP, MG3, BMP3 ####Briana Ville 771835 EYork, OH 57055-3336  
  
  
  
  
             Hemoglobin (Bld) [Mass/Vol] 8.3 g/dL     Low          13.0-18.0      
Munson Healthcare Cadillac Hospital  
  
  
  
  
                          Comment on above:         Performed By: #### HEMDF, PT  
/AP, MG3, BMP3 ####Briana Ville 771835 EYork, OH   
  
  
  
  
             Lymphocytes (Bld) [#/Vol] 1.2 10*3/uL  Normal       1.0-4.3      Harper University Hospital  
  
  
  
  
                          Comment on above:         Performed By: #### HEMDF, PT  
/AP, MG3, BMP3 ####04 Allen Street   
  
  
  
  
             Lymphocytes/100 WBC (Bld) 13.2 %       Low          20.0-40.0    Harper University Hospital  
  
  
  
  
                          Comment on above:         Performed By: #### HEMDF, PT  
/AP, MG3, BMP3 ####04 Allen Street   
  
  
  
  
             MCH (RBC) [Entitic mass] 27.1 pg      Normal       26.0-34.0    Beaumont Hospital  
  
  
  
  
                          Comment on above:         Performed By: #### HEMDF, PT  
/AP, MG3, BMP3 ####04 Allen Street   
  
  
  
  
             MCHC         31.7 %       Low          32.0-36.0    Mercy Health St. Vincent Medical Center Sy  
stem  
  
  
  
  
                          Comment on above:         Performed By: #### HEMDF, PT  
/AP, MG3, BMP3 ####Briana Ville 771835 Allison, OH   
  
  
  
  
             MCV (RBC) [Entitic vol] 85.6 fL      Normal       80.0-98.0    Summ  
a Health System  
  
  
  
  
                          Comment on above:         Performed By: #### HEMDF, PT  
/AP, MG3, BMP3 ####04 Allen Street   
  
  
  
  
             Monocytes (Bld) [#/Vol] 0.6 10*3/uL  Normal       0.0-0.8      Summ  
a Health System  
  
  
  
  
                          Comment on above:         Performed By: #### HEMDF, PT  
/AP, MG3, BMP3 ####04 Allen Street   
  
  
  
  
             Monocytes/100 WBC (Bld) 6.5 %        Normal       2.0-10.0     Summ  
a Health System  
  
  
  
  
                          Comment on above:         Performed By: #### HEMDF, PT  
/AP, MG3, BMP3 ####Kettering Health Greene Memorial M2 Connections  
  
                                                    Ajryek038 E. Gause, OH   
  
  
  
  
             Platelet mean volume (Bld) [Entitic vol] 7.4 fL       Normal         
7.4-10.4     Munson Healthcare Cadillac Hospital  
  
  
  
  
                          Comment on above:         Performed By: #### HEMDF, PT  
/AP, MG3, BMP3 ####Kettering Health Greene Memorial M2 Connections  
  
                                                    Wabygn809 E. Gause, OH   
  
  
  
  
             Platelets (Bld) [#/Vol] 246 10*3/uL  Normal       140-440      University of Michigan Hospital  
  
  
  
  
                          Comment on above:         Performed By: #### HEMDF, PT  
/AP, MG3, BMP3 ####Kettering Health Greene Memorial M2 Connections  
  
                                                    Uutuor770 E. Gause, OH   
  
  
  
  
             RBC (Bld) [#/Vol] 3.07 10*6/uL Low          4.40-5.90    Holmes County Joel Pomerene Memorial Hospital System  
  
  
  
  
                          Comment on above:         Performed By: #### HEMDF, PT  
/AP, MG3, BMP3 ####Kettering Health Greene Memorial M2 Connections  
  
                                                    Clinton Ville 25743 E. Gause, OH   
  
  
  
  
             WBC (Bld) [#/Vol] 9.3 10*3/uL  Normal       3.6-10.7     Holmes County Joel Pomerene Memorial Hospital System  
  
  
  
  
                          Comment on above:         Performed By: #### HEMDF, PT  
/AP, MG3, BMP3 ####Kettering Health Greene Memorial M2 Connections  
  
                                                    Lnzotl732 E. Gause, OH   
  
  
  
  
                                        Magnesium  on 2022  
  
  
  
  
             Magnesium [Mass/Vol] 1.7 mg/dL    Normal       1.6-2.3      Mercy Health St. Joseph Warren Hospital System  
  
  
  
  
                          Comment on above:         Performed By: #### HEMDF, PT  
/AP, MG3, BMP3 ####Kettering Health Greene Memorial M2 Connections  
  
                                                    Ioyqky016 . Gause, OH   
  
  
  
  
                                        Protime AND APTT  on 2022  
  
  
  
  
             aPTT Coag (Bld) [Time] 44.5 s       High         20.0-30.5    Munson Healthcare Cadillac Hospital  
  
  
  
  
                          Comment on above:         Result Comment: NOTE: The th  
erapeutic time for Heparin  
  
                                                    anticoagulation,based on Xa   
activity inhibition, is an APTT of  
  
                                                    46-80seconds.  
   
                                                    Performed By: #### HEMDF, PT  
/AP, MG3, BMP3 ####Cerus Corporation525 Allison, OH   
  
  
  
  
             INR          2.7          High         0.9-1.1      Corey HospitalLaru Technologies   
stem  
  
  
  
  
                          Comment on above:         Result Comment: Recommended   
Anticoagulant Therapy: SEE   
BELOW-----  
  
                                                    INR of 2.0 - 3.0 : - Prophyl  
axis of Venous Thrombosis (high-risk  
  
                                                    surgery) - Treatment of Veno  
us Thrombosis - Treatment of Pulmonary  
  
                                                    Embolism (Includes tissue he  
art valves, Acute Myocardial Infarction  
  
                                                    to prevent systemic embolism  
, Valvular Heart Disease, and Atrial  
  
                                                    Fibrillation)----- INR of 2.  
5 - 3.5 : - Mechanical Prosthetic  
  
                                                    Valves (high risk) - If oral  
 anticoagulant therapy is used to  
  
                                                    prevent Myocardial Infarctio  
n  
   
                                                    Performed By: #### HEMDF, PT  
/AP, MG3, BMP3 ####Cerus Corporation525 Allison, OH   
  
  
  
  
             PT Coag (PPP) [Time] 27.1 s       High         9.0-12.0     SummFlywheel Software System  
  
  
  
  
                          Comment on above:         Result Comment: .  
   
                                                    Performed By: #### HEMDF, PT  
/AP, MG3, BMP3 ####Cerus Corporation525 EYork, OH   
  
  
  
  
                                        APTT   on 2022  
  
  
  
  
             aPTT Coag (Bld) [Time] 121.6 s      Critically high 20.0-30.5    Harper University Hospital  
  
  
  
  
                          Comment on above:         Result Comment: NOTE: The th  
erapeutic time for Heparin  
  
                                                    anticoagulation,based on Xa   
activity inhibition, is an APTT of  
  
                                                    46-80seconds.  
   
                                                    Performed By: #### APTT, PT   
####Corey HospitalIndependent SpaceTrinity Health System Twin City Medical Center. Elizabeth, OH   
  
  
  
  
                                        Add on test from HIS  on 2022  
  
  
  
  
             Add on test from HIS Accepted     Normal                    Agile Therapeutics System  
  
  
  
  
                          Comment on above:         Result Comment: Specimen casey  
ilable & acceptable for analysis.  
   
                                                    Performed By: #### ADDON ###  
#Cerus Corporation80 Walton Street Linden, PA 17744   
  
  
  
  
                                        Basic Metabolic Panel  on 2022  
  
  
  
  
             Anion gap [Moles/Vol] 9 mmol/L     Normal       3-13         Munson Healthcare Cadillac Hospital  
  
  
  
  
                          Comment on above:         Performed By: #### HEMDF, MG  
3, BMP3 ####Briana Ville 771835  
 Rio Rancho, OH   
  
  
  
  
             Calcium [Mass/Vol] 8.9 mg/dL    Normal       8.4-10.4     Munson Healthcare Otsego Memorial Hospital  
  
  
  
  
                          Comment on above:         Performed By: #### HEMDF, MG  
3, BMP3 ####Briana Ville 771835  
 EDexter, OH   
  
  
  
  
             CO2 [Moles/Vol] 22 mmol/L    Normal       22-30        Munson Healthcare Cadillac Hospital  
  
  
  
  
                          Comment on above:         Performed By: #### HEMDF, MG  
3, BMP3 ####Briana Ville 771835  
 Rio Rancho, OH   
  
  
  
  
             Glucose [Mass/Vol] 117 mg/dL    High                Munson Healthcare Otsego Memorial Hospital  
  
  
  
  
                          Comment on above:         Performed By: #### HEMDF, MG  
3, BMP3 ####Briana Ville 771835  
 Rio Rancho, OH   
  
  
  
  
             Urea nitrogen [Mass/Vol] 10 mg/dL     Normal       7-17         Beaumont Hospital  
  
  
  
  
                          Comment on above:         Performed By: #### HEMDF, MG  
3, BMP3 ####Briana Ville 771835  
 Rio Rancho, OH   
  
  
  
  
             Creatinine [Mass/Vol] 1.13 mg/dL   Normal       0.52-1.25    Munson Healthcare Cadillac Hospital  
  
  
  
  
                          Comment on above:         Performed By: #### HEMDF, MG  
3, BMP3 ####Briana Ville 771835  
 EDexter, OH   
  
  
  
  
             GFR/1.73 sq M.predicted among 78.6 mL/min/{1.73_m2} Normal       >6  
0          Munson Healthcare Cadillac Hospital  
  
             blacks MDRD (S/P/Bld) [Vol                                          
  
             rate/Area]                                            
  
  
  
  
                          Comment on above:         Performed By: #### HEMDF, MG  
3, BMP3 ####Briana Ville 771835  
Rio Rancho, OH   
  
  
  
  
             GFR/1.73 sq M.predicted among 67.8 mL/min/{1.73_m2} Normal       >6  
0          Munson Healthcare Cadillac Hospital  
  
             non-blacks MDRD (S/P/Bld) [Vol                                       
     
  
             rate/Area]                                            
  
  
  
  
                          Comment on above:         Result Comment: KDIGO guidel  
zoë provide the following GFR  
  
                                                    categories:Stage GFR(ml/min/  
1.73 m2) TermsG1 >=90 Normal or highG2  
  
                                                    60-89 Mildly decreased*G3a 4  
5-59 Mildly to moderately uokhazawfU2i  
  
                                                    30-44 Moderately to severely  
 decreasedG4 15-29 Severely decreasedG5  
  
                                                    <15 Kidney failure*Relative   
to young adult level.In the absence of  
  
                                                    evidence of kidney damage, n  
either GFRcategory G1 nor G2 fulfill  
  
                                                    the criteria for CKD.The CKD  
-EPI equation is validated in  
  
                                                    individuals 18 yearsof age a  
nd older. Currently the best equation  
  
                                                    forestimating glomerular beto  
tration rate (GFR) from serumcreatinine  
  
                                                    in children is the Bedside S  
reynaldotz equation.It is less accurate in  
  
                                                    patients with extremes of mu  
sclemass, restriction of dietary  
  
                                                    protein, ingestion of creati  
ne,extra-renal metabolism of  
  
                                                    creatinine, or treatment wit  
hmedications that affect renal tubular  
  
                                                    creatinine secretion.  
   
                                                    Performed By: #### HEMDF MG  
3, BMP3 ####Cerus Corporation525 SoundwaveDexter, OH   
  
  
  
  
             Chloride [Moles/Vol] 109 mmol/L   High                Mercy Health St. Joseph Warren Hospital System  
  
  
  
  
                          Comment on above:         Performed By: #### HEMDF MG  
3, BMP3 ####Cerus Corporation525  
 SoundwaveDexter, OH   
  
  
  
  
             Potassium [Moles/Vol] 4.0 mmol/L   Normal       3.5-5.1      Munson Healthcare Cadillac Hospital  
  
  
  
  
                          Comment on above:         Performed By: #### HEMDF MG  
3, BMP3 ####Cerus Corporation525  
 SoundwaveDexter, OH   
  
  
  
  
             Sodium [Moles/Vol] 140 mmol/L   Normal       135-145      The Jewish Hospital System  
  
  
  
  
                          Comment on above:         Performed By: #### HEMDF, MG  
3, BMP3 ####Cerus Corporation525  
 SoundwaveDexter, OH 99373  
  
  
  
  
                                        Glucose,Bedside  on 2022  
  
  
  
  
             Glucose [Mass/Vol] 140 mg/dL    High                The Jewish Hospital System  
  
  
  
  
                          Comment on above:         Result Comment: Test perform  
ed by glucose meter. Results may   
be  
  
                                                    10%-15% lowerthan serum/plas  
ma values. (CLIA ID 35O3543103)  
   
                                                    Performed By: #### BGLU ####  
Cerus Corporation525 E. Elizabeth, OH 72871-8354  
  
  
  
  
             Glucose [Mass/Vol] 152 mg/dL    High                The Jewish Hospital System  
  
  
  
  
                          Comment on above:         Result Comment: Test perform  
ed by glucose meter. Results may   
be  
  
                                                    10%-15% lowerthan serum/plas  
ma values. (CLIA ID 99U0287668)  
   
                                                    Performed By: #### BGLU ####  
Cerus Corporation525 E. Elizabeth, OH   
  
  
  
  
             Glucose [Mass/Vol] 128 mg/dL    High                The Jewish Hospital System  
  
  
  
  
                          Comment on above:         Result Comment: Test perform  
ed by glucose meter. Results may   
be  
  
                                                    10%-15% lowerthan serum/plas  
ma values. (CLIA ID 56E4039778)  
   
                                                    Performed By: #### BGLU ####  
K94 Discoveries Clinton Ville 25743 E. Elizabeth, OH   
  
  
  
  
             Glucose [Mass/Vol] 116 mg/dL    High                The Jewish Hospital System  
  
  
  
  
                          Comment on above:         Result Comment: Test perform  
ed by glucose meter. Results may   
be  
  
                                                    10%-15% lowerthan serum/plas  
ma values. (CLIA ID 86L0857749)  
   
                                                    Performed By: #### BGLU ####  
Cerus Corporation525 E. Elizabeth, OH   
  
  
  
  
                                        Hemogram w/ Autodiff  on 2022  
  
  
  
  
             Abs Baso Cnt 0.1 10*3/uL  Normal       0.0-0.2      Salem City Hospital  
stem  
  
  
  
  
                          Comment on above:         Performed By: #### HEMDF, MG  
3, BMP3 ####Cerus Corporation525  
 Santa Maria Biotherapeutics  
  
                                                    Elizabeth, OH   
  
  
  
  
             Abs Neutrophile Cnt 6.6 10*3/uL  Normal       1.8-7.0      OhioHealth Berger Hospital System  
  
  
  
  
                          Comment on above:         Performed By: #### HEMDF, MG  
3, BMP3 ####K94 Discoveries Ijcwob846  
 Catheter Connections  
  
                                                    Elizabeth, OH   
  
  
  
  
             Basophils/100 WBC (Bld) 0.7 %        Normal       0.0-2.0      Summ  
a Health System  
  
  
  
  
                          Comment on above:         Performed By: #### HEMDF, MG  
3, BMP3 ####Briana Ville 771835  
 EDexter, OH   
  
  
  
  
             Eosinophils (Bld) [#/Vol] 0.1 10*3/uL  Normal       0.0-0.5      Harper University Hospital  
  
  
  
  
                          Comment on above:         Performed By: #### HEMDF, MG  
3, BMP3 ####Briana Ville 771835  
 Rio Rancho, OH   
  
  
  
  
             Eosinophils/100 WBC (Bld) 1.3 %        Normal       1.0-6.0      Harper University Hospital  
  
  
  
  
                          Comment on above:         Performed By: #### HEMDF, MG  
3, BMP3 ####38 Herrera Street   
  
  
  
  
             Erythrocyte distribution width (RBC) 17.0 %       High         11.5  
-14.5    Munson Healthcare Cadillac Hospital  
  
             [Ratio]                                               
  
  
  
  
                          Comment on above:         Performed By: #### HEMDF, MG  
3, BMP3 ####38 Herrera Street   
  
  
  
  
             Granulocytes/100 WBC (Bld) 75.3 %       Normal       40.0-80.0    Insight Surgical Hospital  
  
  
  
  
                          Comment on above:         Performed By: #### HEMDF, MG  
3, BMP3 ####Briana Ville 771835  
 Rio Rancho, OH   
  
  
  
  
             Hematocrit (Bld) [Volume fraction] 25.0 %       Low          40.0-5  
2.0    Munson Healthcare Cadillac Hospital  
  
  
  
  
                          Comment on above:         Performed By: #### HEMDF, MG  
3, BMP3 ####38 Herrera Street   
  
  
  
  
             Hemoglobin (Bld) [Mass/Vol] 8.1 g/dL     Low          13.0-18.0      
Munson Healthcare Cadillac Hospital  
  
  
  
  
                          Comment on above:         Performed By: #### HEMDF, MG  
3, BMP3 ####Briana Ville 771835  
 Rio Rancho, OH   
  
  
  
  
             Lymphocytes (Bld) [#/Vol] 1.5 10*3/uL  Normal       1.0-4.3      Harper University Hospital  
  
  
  
  
                          Comment on above:         Performed By: #### HEMDF, MG  
3, BMP3 ####Briana Ville 771835  
 Rio Rancho, OH   
  
  
  
  
             Lymphocytes/100 WBC (Bld) 17.0 %       Low          20.0-40.0    Harper University Hospital  
  
  
  
  
                          Comment on above:         Performed By: #### HEMDF, MG  
3, BMP3 ####Briana Ville 771835  
 Rio Rancho, OH   
  
  
  
  
             MCH (RBC) [Entitic mass] 27.3 pg      Normal       26.0-34.0    Beaumont Hospital  
  
  
  
  
                          Comment on above:         Performed By: #### HEMDF, MG  
3, BMP3 ####Briana Ville 771835  
Rio Rancho, OH   
  
  
  
  
             MCHC         32.2 %       Normal       32.0-36.0    Salem City Hospital  
stem  
  
  
  
  
                          Comment on above:         Performed By: #### HEMDF, MG  
3, BMP3 ####38 Herrera Street   
  
  
  
  
             MCV (RBC) [Entitic vol] 84.8 fL      Normal       80.0-98.0    Summ  
a Health System  
  
  
  
  
                          Comment on above:         Performed By: #### HEMDF, MG  
3, BMP3 ####38 Herrera Street   
  
  
  
  
             Monocytes (Bld) [#/Vol] 0.5 10*3/uL  Normal       0.0-0.8      Summ  
a Health System  
  
  
  
  
                          Comment on above:         Performed By: #### HEMDF, MG  
3, BMP3 ####38 Herrera Street   
  
  
  
  
             Monocytes/100 WBC (Bld) 5.7 %        Normal       2.0-10.0     Summ  
a Health System  
  
  
  
  
                          Comment on above:         Performed By: #### HEMDF, MG  
3, BMP3 ####38 Herrera Street   
  
  
  
  
             Platelet mean volume (Bld) [Entitic vol] 7.2 fL       Low            
7.4-10.4     Munson Healthcare Cadillac Hospital  
  
  
  
  
                          Comment on above:         Performed By: #### HEMDF, MG  
3, BMP3 ####38 Herrera Street   
  
  
  
  
             Platelets (Bld) [#/Vol] 208 10*3/uL  Normal       140-440      University of Michigan Hospital  
  
  
  
  
                          Comment on above:         Performed By: #### HEMDF, MG  
3, BMP3 ####MyoKardia M2 Connections Mirlwf756  
 EDexter, OH   
  
  
  
  
             RBC (Bld) [#/Vol] 2.95 10*6/uL Low          4.40-5.90    Holmes County Joel Pomerene Memorial Hospital System  
  
  
  
  
                          Comment on above:         Performed By: #### HEMDF, MG  
3, BMP3 ####MyoKardia M2 Connections 99 Hogan Street   
  
  
  
  
             WBC (Bld) [#/Vol] 8.7 10*3/uL  Normal       3.6-10.7     Holmes County Joel Pomerene Memorial Hospital System  
  
  
  
  
                          Comment on above:         Performed By: #### HEMDF, MG  
3, BMP3 ####Kettering Health Greene Memorial M2 Connections Clinton Ville 25743  
 SoundwaveDexter, OH   
  
  
  
  
                                        Magnesium  on 2022  
  
  
  
  
             Magnesium [Mass/Vol] 1.8 mg/dL    Normal       1.6-2.3      Mercy Health St. Joseph Warren Hospital System  
  
  
  
  
                          Comment on above:         Performed By: #### HEMDF, MG  
3, BMP3 ####MyoKardia M2 Connections Clinton Ville 25743  
 SoundwaveDexter, OH   
  
  
  
  
                                        Prothrombin Time  on 2022  
  
  
  
  
             INR          3.7          High         0.9-1.1      Kettering Health Greene Memorial M2 Connections   
stem  
  
  
  
  
                          Comment on above:         Result Comment: Recommended   
Anticoagulant Therapy: SEE   
BELOW-----  
  
                                                    INR of 2.0 - 3.0 : - Prophyl  
axis of Venous Thrombosis (high-risk  
  
                                                    surgery) - Treatment of Veno  
us Thrombosis - Treatment of Pulmonary  
  
                                                    Embolism (Includes tissue he  
art valves, Acute Myocardial Infarction  
  
                                                    to prevent systemic embolism  
, Valvular Heart Disease, and Atrial  
  
                                                    Fibrillation)----- INR of 2.  
5 - 3.5 : - Mechanical Prosthetic  
  
                                                    Valves (high risk) - If oral  
 anticoagulant therapy is used to  
  
                                                    prevent Myocardial Infarctio  
n  
   
                                                    Performed By: #### APTT, PT   
####Kettering Health Greene Memorial M2 Connections Puitex896 Rio Rancho, OH   
  
  
  
  
             PT Coag (PPP) [Time] 36.1 s       High         9.0-12.0     Mercy Health St. Joseph Warren Hospital System  
  
  
  
  
                          Comment on above:         Result Comment: .  
   
                                                    Performed By: #### APTT, PT   
####Briana Ville 771835 Rio Rancho, OH   
  
  
  
  
                                        APTT  on 2022  
  
  
  
  
             aPTT Coag (Bld) [Time] 74.6 s       High         20.0-30.5    Munson Healthcare Cadillac Hospital  
  
  
  
  
                          Comment on above:         Result Comment: NOTE: The th  
erapeutic time for Heparin  
  
                                                    anticoagulation,based on Xa   
activity inhibition, is an APTT of  
  
                                                    46-80seconds.  
   
                                                    Performed By: #### APTT ####  
38 Herrera Street   
  
  
  
  
                                        Basic Metabolic Panel  on 2022  
  
  
  
  
             Anion gap [Moles/Vol] 9 mmol/L     Normal       3-13         Munson Healthcare Cadillac Hospital  
  
  
  
  
                          Comment on above:         Performed By: #### HEMDF, MG  
3, BMP3 ####38 Herrera Street   
  
  
  
  
             Calcium [Mass/Vol] 8.6 mg/dL    Normal       8.4-10.4     Munson Healthcare Otsego Memorial Hospital  
  
  
  
  
                          Comment on above:         Performed By: #### HEMDF, MG  
3, BMP3 ####Kettering Health Greene Memorial M2 Connections 99 Hogan Street   
  
  
  
  
             CO2 [Moles/Vol] 23 mmol/L    Normal       22-30        Munson Healthcare Cadillac Hospital  
  
  
  
  
                          Comment on above:         Performed By: #### HEMDF, MG  
3, BMP3 ####38 Herrera Street   
  
  
  
  
             Creatinine [Mass/Vol] 1.08 mg/dL   Normal       0.52-1.25    Munson Healthcare Cadillac Hospital  
  
  
  
  
                          Comment on above:         Performed By: #### HEMDF, MG  
3, BMP3 ####38 Herrera Street   
  
  
  
  
             GFR/1.73 sq M.predicted among 83.1 mL/min/{1.73_m2} Normal       >6  
0          Munson Healthcare Cadillac Hospital  
  
             blacks MDRD (S/P/Bld) [Vol                                          
  
             rate/Area]                                            
  
  
  
  
                          Comment on above:         Performed By: #### HEMDF, MG  
3, BMP3 ####38 Herrera Street   
  
  
  
  
             GFR/1.73 sq M.predicted among 71.7 mL/min/{1.73_m2} Normal       >6  
0          Munson Healthcare Cadillac Hospital  
  
             non-blacks MDRD (S/P/Bld) [Vol                                       
     
  
             rate/Area]                                            
  
  
  
  
                          Comment on above:         Result Comment: KDIGO guidel  
zoë provide the following GFR  
  
                                                    categories:Stage GFR(ml/min/  
1.73 m2) TermsG1 >=90 Normal or highG2  
  
                                                    60-89 Mildly decreased*G3a 4  
5-59 Mildly to moderately zqkgthtowG9u  
  
                                                    30-44 Moderately to severely  
 decreasedG4 15-29 Severely decreasedG5  
  
                                                    <15 Kidney failure*Relative   
to young adult level.In the absence of  
  
                                                    evidence of kidney damage, n  
either GFRcategory G1 nor G2 fulfill  
  
                                                    the criteria for CKD.The CKD  
-EPI equation is validated in  
  
                                                    individuals 18 yearsof age a  
nd older. Currently the best equation  
  
                                                    forestimating glomerular beto  
tration rate (GFR) from serumcreatinine  
  
                                                    in children is the Bedside S  
chwartz equation.It is less accurate in  
  
                                                    patients with extremes of mu  
sclemass, restriction of dietary  
  
                                                    protein, ingestion of creati  
ne,extra-renal metabolism of  
  
                                                    creatinine, or treatment wit  
hmedications that affect renal tubular  
  
                                                    creatinine secretion.  
   
                                                    Performed By: #### HEMDF, MG  
3, BMP3 ####Kettering Health Greene Memorial M2 Connections Gdkuks154 Santa Maria Biotherapeutics  
  
                                                    Elizabeth, OH 71086  
  
  
  
  
  
             Glucose [Mass/Vol] 104 mg/dL    High                The Jewish Hospital System  
  
  
  
  
                          Comment on above:         Performed By: #### HEMDF, MG  
3, BMP3 ####Kettering Health Greene Memorial M2 Connections Teanqf014  
 Santa Maria Biotherapeutics  
  
                                                    Elizabeth, OH 03411  
  
  
  
  
  
             Urea nitrogen [Mass/Vol] 13 mg/dL     Normal       7-17         Beaumont Hospital  
  
  
  
  
                          Comment on above:         Performed By: #### HEMDF, MG  
3, BMP3 ####Kettering Health Greene Memorial Stottler Henke Associates525  
 Santa Maria Biotherapeutics  
  
                                                    Elizabeth, OH 51592  
-2090  
  
  
  
  
             Chloride [Moles/Vol] 107 mmol/L   Normal              Munising Memorial Hospital  
  
  
  
  
                          Comment on above:         Performed By: #### HEMDF, MG  
3, BMP3 ####Kettering Health Greene Memorial M2 Connections Tyhqun772  
 Santa Maria Biotherapeutics  
  
                                                    Elizabeth, OH 45503  
-2090  
  
  
  
  
             Potassium [Moles/Vol] 3.8 mmol/L   Normal       3.5-5.1      Summa   
Health System  
  
  
  
  
                          Comment on above:         Performed By: #### HEMDF, MG  
3, BMP3 ####K94 Discoveries Uwlkao552  
 E.  
  
                                                    Elizabeth, OH 34153  
-0  
  
  
  
  
             Sodium [Moles/Vol] 138 mmol/L   Normal       135-145      The Jewish Hospital System  
  
  
  
  
                          Comment on above:         Performed By: #### HEMDF, MG  
3, BMP3 ####K94 Discoveries Njwbdx710  
 E.  
  
                                                    Elizabeth, OH 84075  
-2090  
  
  
  
  
                                        Glucose,Bedside  on 2022  
  
  
  
  
             Glucose [Mass/Vol] 147 mg/dL    High                The Jewish Hospital System  
  
  
  
  
                          Comment on above:         Result Comment: Test perform  
ed by glucose meter. Results may   
be  
  
                                                    10%-15% lowerthan serum/plas  
ma values. (CLIA ID 43Q2627705)  
   
                                                    Performed By: #### BGLU ####  
Cerus Corporation525 E. Elizabeth, OH 13613-2454  
  
  
  
  
             Glucose [Mass/Vol] 206 mg/dL    High                The Jewish Hospital System  
  
  
  
  
                          Comment on above:         Result Comment: Test perform  
ed by glucose meter. Results may   
be  
  
                                                    10%-15% lowerthan serum/plas  
ma values. (CLIA ID 20I8443811)  
   
                                                    Performed By: #### BGLU ####  
Cerus Corporation525 E. Elizabeth, OH 93639-1628  
  
  
  
  
             Glucose [Mass/Vol] 126 mg/dL    High                Corey Hospitala Grant Hospital System  
  
  
  
  
                          Comment on above:         Result Comment: Test perform  
ed by glucose meter. Results may   
be  
  
                                                    10%-15% lowerthan serum/plas  
ma values. (CLIA ID 80Z1831877)  
   
                                                    Performed By: #### BGLU ####  
Cerus Corporation525 E. Mary Free Bed Rehabilitation Hospital, OH 68420-4429  
  
  
  
  
             Glucose [Mass/Vol] 111 mg/dL    High                Corey Hospitala a  
Lima City Hospital System  
  
  
  
  
                          Comment on above:         Result Comment: Test perform  
ed by glucose meter. Results may   
be  
  
                                                    10%-15% lowerthan serum/plas  
ma values. (CLIA ID 50D0279920)  
   
                                                    Performed By: #### BGLU ####  
K94 Discoveries Ghsdlc382 E. Elizabeth, OH 87847-1455  
  
  
  
  
                                        Hemogram w/ Autodiff  on 2022  
  
  
  
  
             Abs Baso Cnt 0.1 10*3/uL  Normal       0.0-0.2      Mercy Health St. Vincent Medical Center Sy  
stem  
  
  
  
  
                          Comment on above:         Performed By: #### HEMDF, MG  
3, BMP3 ####Kettering Health Greene Memorial M2 Connections Eiywjm779  
 Rio Rancho, OH 74032  
  
  
  
  
  
             Abs Neutrophile Cnt 6.3 10*3/uL  Normal       1.8-7.0      ProMedica Coldwater Regional Hospital  
  
  
  
  
                          Comment on above:         Performed By: #### HEMDF, MG  
3, BMP3 ####Kettering Health Greene Memorial M2 Connections Zzjfms763  
 Rio Rancho, OH 22324  
  
  
  
  
  
             Basophils/100 WBC (Bld) 0.7 %        Normal       0.0-2.0      Summ  
a Health System  
  
  
  
  
                          Comment on above:         Performed By: #### HEMDF, MG  
3, BMP3 ####Briana Ville 771835  
 Rio Rancho, OH 46619  
  
  
  
  
  
             Eosinophils (Bld) [#/Vol] 0.1 10*3/uL  Normal       0.0-0.5      Harper University Hospital  
  
  
  
  
                          Comment on above:         Performed By: #### HEMDF, MG  
3, BMP3 ####Kettering Health Greene Memorial M2 Connections Xdpijv498  
 EDexter, OH 79335  
  
  
  
  
  
             Eosinophils/100 WBC (Bld) 1.1 %        Normal       1.0-6.0      Harper University Hospital  
  
  
  
  
                          Comment on above:         Performed By: #### HEMDF, MG  
3, BMP3 ####Kettering Health Greene Memorial M2 Connections Qgpuwq557  
 EDexter, OH 19453  
  
  
  
  
  
             Erythrocyte distribution width (RBC) 17.4 %       High         11.5  
-14.5    Munson Healthcare Cadillac Hospital  
  
             [Ratio]                                               
  
  
  
  
                          Comment on above:         Performed By: #### HEMDF, MG  
3, BMP3 ####Kettering Health Greene Memorial M2 Connections Asjzkd728  
 Rio Rancho, OH 50201  
2090  
  
  
  
  
             Granulocytes/100 WBC (Bld) 73.1 %       Normal       40.0-80.0    S  
University of Michigan Health  
  
  
  
  
                          Comment on above:         Performed By: #### HEMDF, MG  
3, BMP3 ####Kettering Health Greene Memorial M2 Connections Gqlrky584  
 Rio Rancho, OH 07153  
2090  
  
  
  
  
             Hematocrit (Bld) [Volume fraction] 23.9 %       Low          40.0-5  
2.0    Munson Healthcare Cadillac Hospital  
  
  
  
  
                          Comment on above:         Performed By: #### HEMDF, MG  
3, BMP3 ####38 Herrera Street   
  
  
  
  
             Hemoglobin (Bld) [Mass/Vol] 7.6 g/dL     Low          13.0-18.0      
Munson Healthcare Cadillac Hospital  
  
  
  
  
                          Comment on above:         Performed By: #### HEMDF, MG  
3, BMP3 ####38 Herrera Street   
  
  
  
  
             Lymphocytes (Bld) [#/Vol] 1.7 10*3/uL  Normal       1.0-4.3      Harper University Hospital  
  
  
  
  
                          Comment on above:         Performed By: #### HEMDF, MG  
3, BMP3 ####38 Herrera Street   
  
  
  
  
             Lymphocytes/100 WBC (Bld) 19.4 %       Low          20.0-40.0    Harper University Hospital  
  
  
  
  
                          Comment on above:         Performed By: #### HEMDF, MG  
3, BMP3 ####38 Herrera Street   
  
  
  
  
             MCH (RBC) [Entitic mass] 27.3 pg      Normal       26.0-34.0    Beaumont Hospital  
  
  
  
  
                          Comment on above:         Performed By: #### HEMDF, MG  
3, BMP3 ####38 Herrera Street   
  
  
  
  
             MCHC         31.9 %       Low          32.0-36.0    Mercy Health St. Vincent Medical Center Sy  
stem  
  
  
  
  
                          Comment on above:         Performed By: #### HEMDF, MG  
3, BMP3 ####38 Herrera Street   
  
  
  
  
             MCV (RBC) [Entitic vol] 85.6 fL      Normal       80.0-98.0    Summ  
a Health System  
  
  
  
  
                          Comment on above:         Performed By: #### HEMDF, MG  
3, BMP3 ####38 Herrera Street   
  
  
  
  
             Monocytes (Bld) [#/Vol] 0.5 10*3/uL  Normal       0.0-0.8      Summ  
a Health System  
  
  
  
  
                          Comment on above:         Performed By: #### HEMDF, MG  
3, BMP3 ####Kettering Health Greene Memorial M2 Connections Uelaoy841  
 E.  
  
                                                    Elizabeth, OH   
  
  
  
  
             Monocytes/100 WBC (Bld) 5.7 %        Normal       2.0-10.0     Summ  
a Health System  
  
  
  
  
                          Comment on above:         Performed By: #### HEMDF, MG  
3, BMP3 ####Kettering Health Greene Memorial M2 Connections Ocekpt302  
 E.  
  
                                                    Elizabeth, OH   
  
  
  
  
             Platelet mean volume (Bld) [Entitic vol] 7.4 fL       Normal         
7.4-10.4     Munson Healthcare Cadillac Hospital  
  
  
  
  
                          Comment on above:         Performed By: #### HEMDF, MG  
3, BMP3 ####Kettering Health Greene Memorial M2 Connections Clinton Ville 25743  
EDexter, OH   
  
  
  
  
             Platelets (Bld) [#/Vol] 197 10*3/uL  Normal       140-440      University of Michigan Hospital  
  
  
  
  
                          Comment on above:         Performed By: #### HEMDF, MG  
3, BMP3 ####Kettering Health Greene Memorial M2 Connections 99 Hogan Street   
  
  
  
  
             RBC (Bld) [#/Vol] 2.79 10*6/uL Low          4.40-5.90    Holmes County Joel Pomerene Memorial Hospital System  
  
  
  
  
                          Comment on above:         Performed By: #### HEMDF, MG  
3, BMP3 ####Kettering Health Greene Memorial M2 Connections 14 Robinson Street.  
  
                                                    Elizabeth, OH   
  
  
  
  
             WBC (Bld) [#/Vol] 8.6 10*3/uL  Normal       3.6-10.7     McLaren Oakland  
  
  
  
  
                          Comment on above:         Performed By: #### HEMDF, MG  
3, BMP3 ####Kettering Health Greene Memorial M2 Connections 99 Hogan Street   
  
  
  
  
                                        Magnesium  on 2022  
  
  
  
  
             Magnesium [Mass/Vol] 1.8 mg/dL    Normal       1.6-2.3      Mercy Health St. Joseph Warren Hospital System  
  
  
  
  
                          Comment on above:         Performed By: #### HEMDF, MG  
3, BMP3 ####Kettering Health Greene Memorial M2 Connections 99 Hogan Street   
  
  
  
  
                                        Protime AND APTT  on 2022  
  
  
  
  
             aPTT Coag (Bld) [Time] 76.9 s       High         20.0-30.5    Munson Healthcare Cadillac Hospital  
  
  
  
  
                          Comment on above:         Result Comment: NOTE: The th  
erapeutic time for Heparin  
  
                                                    anticoagulation,based on Xa   
activity inhibition, is an APTT of  
  
                                                    46-80seconds.  
   
                                                    Performed By: #### HEMDF, MG  
3, BMP3, PT/AP ####Cerus Corporation525 Allison, OH   
  
  
  
  
             INR          2.0          High         0.9-1.1      Kettering Health Greene Memorial M2 Connections Long Island Jewish Medical Center  
  
  
  
  
                          Comment on above:         Result Comment: Recommended   
Anticoagulant Therapy: SEE   
BELOW-----  
  
                                                    INR of 2.0 - 3.0 : - Prophyl  
axis of Venous Thrombosis (high-risk  
  
                                                    surgery) - Treatment of Veno  
us Thrombosis - Treatment of Pulmonary  
  
                                                    Embolism (Includes tissue he  
art valves, Acute Myocardial Infarction  
  
                                                    to prevent systemic embolism  
, Valvular Heart Disease, and Atrial  
  
                                                    Fibrillation)----- INR of 2.  
5 - 3.5 : - Mechanical Prosthetic  
  
                                                    Valves (high risk) - If oral  
 anticoagulant therapy is used to  
  
                                                    prevent Myocardial Infarctio  
n  
   
                                                    Performed By: #### HEMDF, MG  
3, BMP3, PT/AP ####Cerus Corporation525 Allison, OH   
  
  
  
  
             PT Coag (PPP) [Time] 20.2 s       High         9.0-12.0     Kettering Health Greene Memorial Acreations Reptiles and Exotics  
Formerly Oakwood Heritage Hospital  
  
  
  
  
                          Comment on above:         Result Comment: .  
   
                                                    Performed By: #### HEMDF, MG  
3, BMP3, PT/AP ####Cerus Corporation525 Allison, OH   
  
  
  
  
                                        APTT  on 2022  
  
  
  
  
             aPTT Coag (Bld) [Time] 59.5 s       High         20.0-30.5    Munson Healthcare Cadillac Hospital  
  
  
  
  
                          Comment on above:         Result Comment: NOTE: The th  
erapeutic time for Heparin  
  
                                                    anticoagulation,based on Xa   
activity inhibition, is an APTT of  
  
                                                    46-80seconds.  
   
                                                    Performed By: #### APTT, PT   
####Cerus Corporation525 Rio Rancho, OH   
  
  
  
  
             aPTT Coag (Bld) [Time] 61.8 s       High         20.0-30.5    Munson Healthcare Cadillac Hospital  
  
  
  
  
                          Comment on above:         Result Comment: NOTE: The th  
erapeutic time for Heparin  
  
                                                    anticoagulation,based on Xa   
activity inhibition, is an APTT of  
  
                                                    46-80seconds.  
   
                                                    Performed By: #### APTT ####  
Xplornet5 E. ECU Health Duplin HospitalHANNAH, OH 99422-1674  
  
  
  
  
                                        Basic Metabolic Panel  on 2022  
  
  
  
  
             Anion gap [Moles/Vol] 8 mmol/L     Normal       3-13         Munson Healthcare Cadillac Hospital  
  
  
  
  
                          Comment on above:         Performed By: #### HEMDF, MG  
3, BMP3, PT/AP ####Munson Healthcare Cadillac Hospital525 E. Gause, OH 63935-7835  
  
  
  
  
             Calcium [Mass/Vol] 8.7 mg/dL    Normal       8.4-10.4     Munson Healthcare Otsego Memorial Hospital  
  
  
  
  
                          Comment on above:         Performed By: #### HEMDF, MG  
3, BMP3, PT/AP ####Briana Ville 771835 EYork, OH 11113-7523  
  
  
  
  
             CO2 [Moles/Vol] 22 mmol/L    Normal       22-30        Munson Healthcare Cadillac Hospital  
  
  
  
  
                          Comment on above:         Performed By: #### HEMDF, MG  
3, BMP3, PT/AP ####Briana Ville 771835 EYork, OH   
  
  
  
  
             Creatinine [Mass/Vol] 1.22 mg/dL   Normal       0.52-1.25    Munson Healthcare Cadillac Hospital  
  
  
  
  
                          Comment on above:         Performed By: #### HEMDF, MG  
3, BMP3, PT/AP ####Kettering Health Greene Memorial M2 Connections  
  
                                                    Etquoa472 E. Gause, OH 82661-4447  
  
  
  
  
             GFR/1.73 sq M.predicted among 71.7 mL/min/{1.73_m2} Normal       >6  
0          Munson Healthcare Cadillac Hospital  
  
             blacks MDRD (S/P/Bld) [Vol                                          
  
             rate/Area]                                            
  
  
  
  
                          Comment on above:         Performed By: #### HEMDF, MG  
3, BMP3, PT/AP ####Briana Ville 771835 E. Gause, OH 15877-8724  
  
  
  
  
             GFR/1.73 sq M.predicted among 61.8 mL/min/{1.73_m2} Normal       >6  
0          Munson Healthcare Cadillac Hospital  
  
             non-blacks MDRD (S/P/Bld) [Vol                                       
     
  
             rate/Area]                                            
  
  
  
  
                          Comment on above:         Result Comment: KDIGO guidel  
zoë provide the following GFR  
  
                                                    categories:Stage GFR(ml/min/  
1.73 m2) TermsG1 >=90 Normal or highG2  
  
                                                    60-89 Mildly decreased*G3a 4  
5-59 Mildly to moderately gdeontqblI2i  
  
                                                    30-44 Moderately to severely  
 decreasedG4 15-29 Severely decreasedG5  
  
                                                    <15 Kidney failure*Relative   
to young adult level.In the absence of  
  
                                                    evidence of kidney damage, n  
either GFRcategory G1 nor G2 fulfill  
  
                                                    the criteria for CKD.The CKD  
-EPI equation is validated in  
  
                                                    individuals 18 yearsof age a  
nd older. Currently the best equation  
  
                                                    forestimating glomerular beto  
tration rate (GFR) from serumcreatinine  
  
                                                    in children is the Bedside S  
kristiwartz equation.It is less accurate in  
  
                                                    patients with extremes of mu  
sclemass, restriction of dietary  
  
                                                    protein, ingestion of creati  
ne,extra-renal metabolism of  
  
                                                    creatinine, or treatment wit  
hmedications that affect renal tubular  
  
                                                    creatinine secretion.  
   
                                                    Performed By: #### HEMDF, MG  
3, BMP3, PT/AP ####Kettering Health Greene Memorial M2 Connections  
  
                                                    Wcbbow494 Allison, OH 83426-0084  
  
  
  
  
             Glucose [Mass/Vol] 123 mg/dL    High                Munson Healthcare Otsego Memorial Hospital  
  
  
  
  
                          Comment on above:         Performed By: #### HEMDF, MG  
3, BMP3, PT/AP ####Kettering Health Greene Memorial M2 Connections  
  
                                                    Ktjljk905 Allison, OH 40276-2755  
  
  
  
  
             Urea nitrogen [Mass/Vol] 13 mg/dL     Normal       7-17         Beaumont Hospital  
  
  
  
  
                          Comment on above:         Performed By: #### HEMDF, MG  
3, BMP3, PT/AP ####Kettering Health Greene Memorial M2 Connections  
  
                                                    Cedbgt411 Allison, OH 32624-6439  
  
  
  
  
             Chloride [Moles/Vol] 108 mmol/L   High                Mercy Health St. Joseph Warren Hospital System  
  
  
  
  
                          Comment on above:         Performed By: #### HEMDF, MG  
3, BMP3, PT/AP ####Kettering Health Greene Memorial M2 Connections  
  
                                                    Xamhkf350 Allison, OH 95235-3859  
  
  
  
  
             Potassium [Moles/Vol] 3.6 mmol/L   Normal       3.5-5.1      Munson Healthcare Cadillac Hospital  
  
  
  
  
                          Comment on above:         Performed By: #### HEMDF, MG  
3, BMP3, PT/AP ####Kettering Health Greene Memorial M2 Connections  
  
                                                    Agsunw041 Allison, OH 27238-9555  
  
  
  
  
             Sodium [Moles/Vol] 138 mmol/L   Normal       135-145      The Jewish Hospital System  
  
  
  
  
                          Comment on above:         Performed By: #### HEMDF, MG  
3, BMP3, PT/AP ####Kettering Health Greene Memorial M2 Connections  
  
                                                    Tatadc207 E. Gause, OH   
  
  
  
  
                                        Glucose,Bedside  on 2022  
  
  
  
  
             Glucose [Mass/Vol] 172 mg/dL    High                The Jewish Hospital System  
  
  
  
  
                          Comment on above:         Result Comment: Test perform  
ed by glucose meter. Results may   
be  
  
                                                    10%-15% lowerthan serum/plas  
ma values. (CLIA ID 92F6932727)  
   
                                                    Performed By: #### BGLU ####  
Kettering Health Greene Memorial M2 Connections Piooca217 E. Elizabeth, OH   
  
  
  
  
             Glucose [Mass/Vol] 138 mg/dL    High                The Jewish Hospital System  
  
  
  
  
                          Comment on above:         Result Comment: Test perform  
ed by glucose meter. Results may   
be  
  
                                                    10%-15% lowerthan serum/plas  
ma values. (CLIA ID 43W4072697)  
   
                                                    Performed By: #### BGLU ####  
Kettering Health Greene Memorial M2 Connections Clinton Ville 25743 E. Elizabeth, OH   
  
  
  
  
             Glucose [Mass/Vol] 119 mg/dL    High                The Jewish Hospital System  
  
  
  
  
                          Comment on above:         Result Comment: Test perform  
ed by glucose meter. Results may   
be  
  
                                                    10%-15% lowerthan serum/plas  
ma values. (CLIA ID 84N9581738)  
   
                                                    Performed By: #### BGLU ####  
Kettering Health Greene Memorial M2 Connections Onbujz146 Rio Rancho, OH   
  
  
  
  
                                        Hemogram w/ Autodiff  on 2022  
  
  
  
  
             Abs Baso Cnt 0.1 10*3/uL  Normal       0.0-0.2      Salem City Hospital  
stem  
  
  
  
  
                          Comment on above:         Performed By: #### HEMDF, MG  
3, BMP3, PT/AP ####Kettering Health Greene Memorial M2 Connections  
  
                                                    Djgptu991 . Gause, OH   
  
  
  
  
             Abs Neutrophile Cnt 6.6 10*3/uL  Normal       1.8-7.0      ProMedica Coldwater Regional Hospital  
  
  
  
  
                          Comment on above:         Performed By: #### HEMDF, MG  
3, BMP3, PT/AP ####Kettering Health Greene Memorial M2 Connections  
  
                                                    Cbwxiv569 Allison, OH   
  
  
  
  
             Basophils/100 WBC (Bld) 0.7 %        Normal       0.0-2.0      Summ  
a Health System  
  
  
  
  
                          Comment on above:         Performed By: #### HEMDF, MG  
3, BMP3, PT/AP ####Kettering Health Greene Memorial M2 Connections  
  
                                                    Pginfh273 E. Gause, OH   
  
  
  
  
             Eosinophils (Bld) [#/Vol] 0.1 10*3/uL  Normal       0.0-0.5      Harper University Hospital  
  
  
  
  
                          Comment on above:         Performed By: #### HEMDF, MG  
3, BMP3, PT/AP ####Kettering Health Greene Memorial M2 Connections  
  
                                                    Akesxy846 E. Munson Healthcare Manistee Hospital NICOAK  
HANNAH, OH   
  
  
  
  
             Eosinophils/100 WBC (Bld) 0.7 %        Low          1.0-6.0      Harper University Hospital  
  
  
  
  
                          Comment on above:         Performed By: #### HEMDF, MG  
3, BMP3, PT/AP ####Kettering Health Greene Memorial M2 Connections  
  
                                                    Lknwxp222 E. Gause, OH   
  
  
  
  
             Erythrocyte distribution width (RBC) 17.3 %       High         11.5  
-14.5    Munson Healthcare Cadillac Hospital  
  
             [Ratio]                                               
  
  
  
  
                          Comment on above:         Performed By: #### HEMDF, MG  
3, BMP3, PT/AP ####Kettering Health Greene Memorial M2 Connections  
  
                                                    Wmtspm647 E. Gause, OH   
  
  
  
  
             Granulocytes/100 WBC (Bld) 78.6 %       Normal       40.0-80.0    Insight Surgical Hospital  
  
  
  
  
                          Comment on above:         Performed By: #### HEMDF, MG  
3, BMP3, PT/AP ####Kettering Health Greene Memorial M2 Connections  
  
                                                    Crxmyy343 E. Gause, OH   
  
  
  
  
             Hematocrit (Bld) [Volume fraction] 24.2 %       Low          40.0-5  
2.0    Munson Healthcare Cadillac Hospital  
  
  
  
  
                          Comment on above:         Performed By: #### HEMDF, MG  
3, BMP3, PT/AP ####Kettering Health Greene Memorial M2 Connections  
  
                                                    Egdacn926 E. Gause, OH   
  
  
  
  
             Hemoglobin (Bld) [Mass/Vol] 7.7 g/dL     Low          13.0-18.0      
Munson Healthcare Cadillac Hospital  
  
  
  
  
                          Comment on above:         Performed By: #### HEMDF, MG  
3, BMP3, PT/AP ####Kettering Health Greene Memorial M2 Connections  
  
                                                    Jozbtx915 E. Gause, OH   
  
  
  
  
             Lymphocytes (Bld) [#/Vol] 1.3 10*3/uL  Normal       1.0-4.3      Harper University Hospital  
  
  
  
  
                          Comment on above:         Performed By: #### HEMDF, MG  
3, BMP3, PT/AP ####Briana Ville 771835 Allison, OH   
  
  
  
  
             Lymphocytes/100 WBC (Bld) 15.1 %       Low          20.0-40.0    Harper University Hospital  
  
  
  
  
                          Comment on above:         Performed By: #### HEMDF, MG  
3, BMP3, PT/AP ####Briana Ville 771835 E. Gause, OH   
  
  
  
  
             MCH (RBC) [Entitic mass] 27.1 pg      Normal       26.0-34.0    Beaumont Hospital  
  
  
  
  
                          Comment on above:         Performed By: #### HEMDF, MG  
3, BMP3, PT/AP ####Briana Ville 771835 Allison, OH   
  
  
  
  
             MCHC         31.9 %       Low          32.0-36.0    Salem City Hospital  
stem  
  
  
  
  
                          Comment on above:         Performed By: #### HEMDF, MG  
3, BMP3, PT/AP ####04 Allen Street   
  
  
  
  
             MCV (RBC) [Entitic vol] 85.1 fL      Normal       80.0-98.0    Summ  
a Health System  
  
  
  
  
                          Comment on above:         Performed By: #### HEMDF, MG  
3, BMP3, PT/AP ####Briana Ville 771835 Allison, OH   
  
  
  
  
             Monocytes (Bld) [#/Vol] 0.4 10*3/uL  Normal       0.0-0.8      Summ  
a Health System  
  
  
  
  
                          Comment on above:         Performed By: #### HEMDF, MG  
3, BMP3, PT/AP ####Briana Ville 771835 Allison, OH   
  
  
  
  
             Monocytes/100 WBC (Bld) 4.9 %        Normal       2.0-10.0     Summ  
a Health System  
  
  
  
  
                          Comment on above:         Performed By: #### HEMDF, MG  
3, BMP3, PT/AP ####Briana Ville 771835 Allison, OH   
  
  
  
  
             Platelet mean volume (Bld) [Entitic vol] 7.1 fL       Low            
7.4-10.4     Munson Healthcare Cadillac Hospital  
  
  
  
  
                          Comment on above:         Performed By: #### HEMDF, MG  
3, BMP3, PT/AP ####Cerus Corporation525 . Gause, OH   
  
  
  
  
             Platelets (Bld) [#/Vol] 183 10*3/uL  Normal       140-440      University of Michigan Hospital  
  
  
  
  
                          Comment on above:         Performed By: #### HEMDF, MG  
3, BMP3, PT/AP ####Cerus Corporation525 E. Gause, OH   
  
  
  
  
             RBC (Bld) [#/Vol] 2.84 10*6/uL Low          4.40-5.90    Holmes County Joel Pomerene Memorial Hospital System  
  
  
  
  
                          Comment on above:         Performed By: #### HEMDF, MG  
3, BMP3, PT/AP ####Cerus Corporation525 E. Gause, OH   
  
  
  
  
             WBC (Bld) [#/Vol] 8.5 10*3/uL  Normal       3.6-10.7     McLaren Oakland  
  
  
  
  
                          Comment on above:         Performed By: #### HEMDF, MG  
3, BMP3, PT/AP ####Cerus Corporation525 E. Gause, OH   
  
  
  
  
                                        Magnesium  on 2022  
  
  
  
  
             Magnesium [Mass/Vol] 1.7 mg/dL    Normal       1.6-2.3      Summa H  
ealth System  
  
  
  
  
                          Comment on above:         Performed By: #### HEMDF, MG  
3, BMP3, PT/AP ####Cerus Corporation525 . Gause, OH   
  
  
  
  
                                        Op Note  on 2022  
  
  
  
  
             Op Note                   Normal                    Kettering Health Greene Memorial CambridgeSoft  
stem  
  
  
  
  
                                        Prothrombin Time  on 2022  
  
  
  
  
             INR          1.3          High         0.9-1.1      Kettering Health Greene Memorial CambridgeSoft  
stem  
  
  
  
  
                          Comment on above:         Result Comment: Recommended   
Anticoagulant Therapy: SEE   
BELOW-----  
  
                                                    INR of 2.0 - 3.0 : - Prophyl  
axis of Venous Thrombosis (high-risk  
  
                                                    surgery) - Treatment of Veno  
us Thrombosis - Treatment of Pulmonary  
  
                                                    Embolism (Includes tissue he  
art valves, Acute Myocardial Infarction  
  
                                                    to prevent systemic embolism  
, Valvular Heart Disease, and Atrial  
  
                                                    Fibrillation)----- INR of 2.  
5 - 3.5 : - Mechanical Prosthetic  
  
                                                    Valves (high risk) - If oral  
 anticoagulant therapy is used to  
  
                                                    prevent Myocardial Infarctio  
n  
   
                                                    Performed By: #### APTT, PT   
####Kettering Health Greene Memorial Stottler Henke Associates525 Rio Rancho, OH   
  
  
  
  
             PT Coag (PPP) [Time] 13.5 s       High         9.0-12.0     Mercy Health St. Joseph Warren Hospital System  
  
  
  
  
                          Comment on above:         Result Comment: .  
   
                                                    Performed By: #### APTT, PT   
####Kettering Health Greene Memorial M2 Connections Ljouoi997 E. Elizabeth, OH   
  
  
  
  
                                        APTT  on 2022  
  
  
  
  
             aPTT Coag (Bld) [Time] 51.6 s       High         20.0-30.5    Munson Healthcare Cadillac Hospital  
  
  
  
  
                          Comment on above:         Result Comment: NOTE: The th  
erapeutic time for Heparin  
  
                                                    anticoagulation,based on Xa   
activity inhibition, is an APTT of  
  
                                                    46-80seconds.  
   
                                                    Performed By: #### APTT, PT   
####Kettering Health Greene Memorial M2 Connections Uevlpx949 EDexter, OH   
  
  
  
  
             aPTT Coag (Bld) [Time] 95.9 s       High         20.0-30.5    Munson Healthcare Cadillac Hospital  
  
  
  
  
                          Comment on above:         Result Comment: NOTE: The th  
erapeutic time for Heparin  
  
                                                    anticoagulation,based on Xa   
activity inhibition, is an APTT of  
  
                                                    46-80seconds.  
   
                                                    Performed By: #### APTT ####  
Kettering Health Greene Memorial M2 Connections 14 Robinson Street. Elizabeth, OH   
  
  
  
  
                                        Add on test from HIS   on 2022  
  
  
  
  
             Add on test from HIS Accepted     Normal                    Mercy Health St. Joseph Warren Hospital System  
  
  
  
  
                          Comment on above:         Result Comment: Specimen casey  
ilable & acceptable for analysis.  
   
                                                    Performed By: #### ADDON ###  
#Kettering Health Greene Memorial M2 Connections 99 Hogan Street   
  
  
  
  
                                        Basic Metabolic Panel  on 2022  
  
  
  
  
             Anion gap [Moles/Vol] 9 mmol/L     Normal       3-13         Munson Healthcare Cadillac Hospital  
  
  
  
  
                          Comment on above:         Performed By: #### MG3, BMP3  
, HEMDF ####Kettering Health Greene Memorial M2 Connections Rymnoo319  
 Rio Rancho, OH   
  
  
  
  
             Calcium [Mass/Vol] 8.8 mg/dL    Normal       8.4-10.4     The Jewish Hospital System  
  
  
  
  
                          Comment on above:         Performed By: #### MG3, BMP3  
, HEMDF ####Kettering Health Greene Memorial M2 Connections Iexfhe447  
 Rio Rancho, OH   
  
  
  
  
             CO2 [Moles/Vol] 23 mmol/L    Normal       22-30        Munson Healthcare Cadillac Hospital  
  
  
  
  
                          Comment on above:         Performed By: #### MG3, BMP3  
, HEMDF ####Munson Healthcare Cadillac Hospital525  
 Rio Rancho, OH   
  
  
  
  
             Creatinine [Mass/Vol] 1.05 mg/dL   Normal       0.52-1.25    Munson Healthcare Cadillac Hospital  
  
  
  
  
                          Comment on above:         Performed By: #### MG3, BMP3  
, HEMDF ####Munson Healthcare Cadillac Hospital525  
 Rio Rancho, OH   
  
  
  
  
             GFR/1.73 sq M.predicted among 85.9 mL/min/{1.73_m2} Normal       >6  
0          Munson Healthcare Cadillac Hospital  
  
             blacks MDRD (S/P/Bld) [Vol                                          
  
             rate/Area]                                            
  
  
  
  
                          Comment on above:         Performed By: #### MG3, BMP3  
, HEMDF ####Kettering Health Greene Memorial M2 Connections Baeamg113  
Rio Rancho, OH   
  
  
  
  
             GFR/1.73 sq M.predicted among 74.1 mL/min/{1.73_m2} Normal       >6  
0          Munson Healthcare Cadillac Hospital  
  
             non-blacks MDRD (S/P/Bld) [Vol                                       
     
  
             rate/Area]                                            
  
  
  
  
                          Comment on above:         Result Comment: KDIGO guidel  
zoë provide the following GFR  
  
                                                    categories:Stage GFR(ml/min/  
1.73 m2) TermsG1 >=90 Normal or highG2  
  
                                                    60-89 Mildly decreased*G3a 4  
5-59 Mildly to moderately otdegrlcyO9a  
  
                                                    30-44 Moderately to severely  
 decreasedG4 15-29 Severely decreasedG5  
  
                                                    <15 Kidney failure*Relative   
to young adult level.In the absence of  
  
                                                    evidence of kidney damage, n  
either GFRcategory G1 nor G2 fulfill  
  
                                                    the criteria for CKD.The CKD  
-EPI equation is validated in  
  
                                                    individuals 18 yearsof age a  
nd older. Currently the best equation  
  
                                                    forestimating glomerular beto  
tration rate (GFR) from serumcreatinine  
  
                                                    in children is the Bedside S  
kristiwartz equation.It is less accurate in  
  
                                                    patients with extremes of mu  
sclemass, restriction of dietary  
  
                                                    protein, ingestion of creati  
ne,extra-renal metabolism of  
  
                                                    creatinine, or treatment wit  
hmedications that affect renal tubular  
  
                                                    creatinine secretion.  
   
                                                    Performed By: #### MG3, BMP3  
, HEMDF ####Kettering Health Greene Memorial Stottler Henke Associates525 E.  
  
                                                    Elizabeth, OH 39156  
  
  
  
  
  
             Glucose [Mass/Vol] 125 mg/dL    High                The Jewish Hospital System  
  
  
  
  
                          Comment on above:         Performed By: #### MG3, BMP3  
, HEMDF ####Kettering Health Greene Memorial M2 Connections Nxqdah653  
 EDexter, OH 00190  
  
  
  
  
  
             Urea nitrogen [Mass/Vol] 13 mg/dL     Normal       7-17         Memorial Health System Selby General Hospital System  
  
  
  
  
                          Comment on above:         Performed By: #### MG3, BMP3  
, HEMDF ####Kettering Health Greene Memorial Stottler Henke Associates525  
 Rio Rancho, OH 33638  
  
  
  
  
  
             Chloride [Moles/Vol] 107 mmol/L   Normal              Mercy Health St. Joseph Warren Hospital System  
  
  
  
  
                          Comment on above:         Performed By: #### MG3, BMP3  
, HEMDF ####Kettering Health Greene Memorial M2 Connections Mynzdk438  
 Rio Rancho, OH 99426  
  
  
  
  
  
             Potassium [Moles/Vol] 3.8 mmol/L   Normal       3.5-5.1      Munson Healthcare Cadillac Hospital  
  
  
  
  
                          Comment on above:         Performed By: #### MG3, BMP3  
, HEMDF ####Kettering Health Greene Memorial M2 Connections Ytbvtj751  
 Rio Rancho, OH   
  
  
  
  
             Sodium [Moles/Vol] 139 mmol/L   Normal       135-145      The Jewish Hospital System  
  
  
  
  
                          Comment on above:         Performed By: #### MG3, BMP3  
, HEMDF ####Kettering Health Greene Memorial M2 Connections Stactk774  
 EDexter, OH 55818  
209  
  
  
  
  
                                        Glucose,Bedside  on 2022  
  
  
  
  
             Glucose [Mass/Vol] 162 mg/dL    High                The Jewish Hospital System  
  
  
  
  
                          Comment on above:         Result Comment: Test perform  
ed by glucose meter. Results may   
be  
  
                                                    10%-15% lowerthan serum/plas  
ma values. (CLIA ID 91J4450765)  
   
                                                    Performed By: #### BGLU ####  
Kettering Health Greene Memorial M2 Connections Qhpicl238 Rio Rancho, OH 98210-6614  
  
  
  
  
             Glucose [Mass/Vol] 187 mg/dL    High                The Jewish Hospital System  
  
  
  
  
                          Comment on above:         Result Comment: Test perform  
ed by glucose meter. Results may   
be  
  
                                                    10%-15% lowerthan serum/plas  
ma values. (CLIA ID 53D1169699)  
   
                                                    Performed By: #### BGLU ####  
Kettering Health Greene Memorial Stottler Henke Associates525 Rio Rancho, OH   
  
  
  
  
             Glucose [Mass/Vol] 115 mg/dL    High                The Jewish Hospital System  
  
  
  
  
                          Comment on above:         Result Comment: Test perform  
ed by glucose meter. Results may   
be  
  
                                                    10%-15% lowerthan serum/plas  
ma values. (CLIA ID 52M9557074)  
   
                                                    Performed By: #### BGLU ####  
Kettering Health Greene Memorial Stottler Henke Associates80 Walton Street Linden, PA 17744   
  
  
  
  
                                        Hemogram w/ Autodiff  on 2022  
  
  
  
  
             Abs Baso Cnt 0.1 10*3/uL  Normal       0.0-0.2      Salem City Hospital  
stem  
  
  
  
  
                          Comment on above:         Performed By: #### MG3, BMP3  
, HEMDF ####MyoKardia Stottler Henke Associates80 Walton Street Linden, PA 17744   
  
  
  
  
             Abs Neutrophile Cnt 7.4 10*3/uL  High         1.8-7.0      OhioHealth Berger Hospital System  
  
  
  
  
                          Comment on above:         Performed By: #### MG3, BMP3  
, HEMDF ####MyoKardia Stottler Henke Associates525  
 Rio Rancho, OH   
  
  
  
  
             Basophils/100 WBC (Bld) 0.7 %        Normal       0.0-2.0      Summ  
a Health Insight Surgical Hospital  
  
  
  
  
                          Comment on above:         Performed By: #### MG3, BMP3  
, HEMDF ####Kettering Health Greene Memorial Stottler Henke Associates525  
 Rio Rancho, OH   
  
  
  
  
             Eosinophils (Bld) [#/Vol] 0.1 10*3/uL  Normal       0.0-0.5      Harper University Hospital  
  
  
  
  
                          Comment on above:         Performed By: #### MG3, BMP3  
, HEMDF ####Kettering Health Greene Memorial Stottler Henke Associates525  
 Rio Rancho, OH   
  
  
  
  
             Eosinophils/100 WBC (Bld) 1.0 %        Normal       1.0-6.0      Harper University Hospital  
  
  
  
  
                          Comment on above:         Performed By: #### MG3, BMP3  
, HEMDF ####Briana Ville 771835  
 Rio Rancho, OH   
  
  
  
  
             Erythrocyte distribution width (RBC) 16.8 %       High         11.5  
-14.5    Munson Healthcare Cadillac Hospital  
  
             [Ratio]                                               
  
  
  
  
                          Comment on above:         Performed By: #### MG3, BMP3  
, HEMDF ####Briana Ville 771835  
 Rio Rancho, OH   
  
  
  
  
             Granulocytes/100 WBC (Bld) 77.3 %       Normal       40.0-80.0    Insight Surgical Hospital  
  
  
  
  
                          Comment on above:         Performed By: #### MG3, BMP3  
, HEMDF ####38 Herrera Street   
  
  
  
  
             Hematocrit (Bld) [Volume fraction] 25.2 %       Low          40.0-5  
2.0    Munson Healthcare Cadillac Hospital  
  
  
  
  
                          Comment on above:         Performed By: #### MG3, BMP3  
, HEMDF ####38 Herrera Street   
  
  
  
  
             Hemoglobin (Bld) [Mass/Vol] 8.0 g/dL     Low          13.0-18.0      
Munson Healthcare Cadillac Hospital  
  
  
  
  
                          Comment on above:         Performed By: #### MG3, BMP3  
, HEMDF ####38 Herrera Street   
  
  
  
  
             Lymphocytes (Bld) [#/Vol] 1.4 10*3/uL  Normal       1.0-4.3      Harper University Hospital  
  
  
  
  
                          Comment on above:         Performed By: #### MG3, BMP3  
, HEMDF ####38 Herrera Street   
  
  
  
  
             Lymphocytes/100 WBC (Bld) 15.0 %       Low          20.0-40.0    Harper University Hospital  
  
  
  
  
                          Comment on above:         Performed By: #### MG3, BMP3  
, HEMDF ####38 Herrera Street   
  
  
  
  
             MCH (RBC) [Entitic mass] 27.2 pg      Normal       26.0-34.0    Beaumont Hospital  
  
  
  
  
                          Comment on above:         Performed By: #### MG3, BMP3  
, HEMDF ####38 Herrera Street   
  
  
  
  
             MCHC         31.9 %       Low          32.0-36.0    Mercy Health St. Vincent Medical Center Sy  
stem  
  
  
  
  
                          Comment on above:         Performed By: #### MG3, BMP3  
, HEMDF ####Briana Ville 771835  
Rio Rancho, OH   
  
  
  
  
             MCV (RBC) [Entitic vol] 85.5 fL      Normal       80.0-98.0    Summ  
a Health System  
  
  
  
  
                          Comment on above:         Performed By: #### MG3, BMP3  
, HEMDF ####38 Herrera Street   
  
  
  
  
             Monocytes (Bld) [#/Vol] 0.6 10*3/uL  Normal       0.0-0.8      Summ  
a Health System  
  
  
  
  
                          Comment on above:         Performed By: #### MG3, BMP3  
, HEMDF ####38 Herrera Street   
  
  
  
  
             Monocytes/100 WBC (Bld) 6.0 %        Normal       2.0-10.0     Summ  
a Health System  
  
  
  
  
                          Comment on above:         Performed By: #### MG3, BMP3  
, HEMDF ####38 Herrera Street   
  
  
  
  
             Platelet mean volume (Bld) [Entitic vol] 7.5 fL       Normal         
7.4-10.4     Munson Healthcare Cadillac Hospital  
  
  
  
  
                          Comment on above:         Performed By: #### MG3, BMP3  
, HEMDF ####38 Herrera Street   
  
  
  
  
             Platelets (Bld) [#/Vol] 180 10*3/uL  Normal       140-440      University of Michigan Hospital  
  
  
  
  
                          Comment on above:         Performed By: #### MG3, BMP3  
, HEMDF ####38 Herrera Street   
  
  
  
  
             RBC (Bld) [#/Vol] 2.95 10*6/uL Low          4.40-5.90    McLaren Oakland  
  
  
  
  
                          Comment on above:         Performed By: #### MG3, BMP3  
, HEMDF ####38 Herrera Street   
  
  
  
  
             WBC (Bld) [#/Vol] 9.5 10*3/uL  Normal       3.6-10.7     Summa Heal  
th System  
  
  
  
  
                          Comment on above:         Performed By: #### MG3, BMP3  
, HEMDF ####Cerus Corporation525  
 Santa Maria Biotherapeutics  
  
                                                    Elizabeth, OH   
  
  
  
  
                                        Magnesium  on 2022  
  
  
  
  
             Magnesium [Mass/Vol] 1.8 mg/dL    Normal       1.6-2.3      SummJJS Media  
OhioHealth Nelsonville Health Center System  
  
  
  
  
                          Comment on above:         Performed By: #### MG3, BMP3  
, HEMDF ####Cerus Corporation525  
 Santa Maria Biotherapeutics  
  
                                                    Elizabeth, OH   
  
  
  
  
                                        Prothrombin Time  on 2022  
  
  
  
  
             INR          1.2          High         0.9-1.1      Corey HospitalHazel Mail  
  
  
  
  
                          Comment on above:         Result Comment: Recommended   
Anticoagulant Therapy: SEE   
BELOW-----  
  
                                                    INR of 2.0 - 3.0 : - Prophyl  
axis of Venous Thrombosis (high-risk  
  
                                                    surgery) - Treatment of Veno  
us Thrombosis - Treatment of Pulmonary  
  
                                                    Embolism (Includes tissue he  
art valves, Acute Myocardial Infarction  
  
                                                    to prevent systemic embolism  
, Valvular Heart Disease, and Atrial  
  
                                                    Fibrillation)----- INR of 2.  
5 - 3.5 : - Mechanical Prosthetic  
  
                                                    Valves (high risk) - If oral  
 anticoagulant therapy is used to  
  
                                                    prevent Myocardial Infarctio  
n  
   
                                                    Performed By: #### APTT, PT   
####Cerus Corporation525 Santa Maria Biotherapeutics Elizabeth, OH   
  
  
  
  
             PT Coag (PPP) [Time] 12.5 s       High         9.0-12.0     SummJJS Media  
OhioHealth Nelsonville Health Center System  
  
  
  
  
                          Comment on above:         Result Comment: .  
   
                                                    Performed By: #### APTT, PT   
####Cerus Corporation525 Santa Maria Biotherapeutics Elizabeth, OH   
  
  
  
  
                                        TS GEL  on 2022  
  
  
  
  
             TS GEL       ABO Group:   Normal                    Corey HospitalHazel Mail  
  
                          A                                        
  
                          Rh, Gel:                                 
  
                          POS                                      
  
                          Antibody Screen Gel:                             
  
                          NEG                                      
  
  
  
  
                          Comment on above:         Performed By: #### TSGL ####  
Cerus Corporation  
  
  
  
  
                                        APTT  on 2022  
  
  
  
  
             aPTT Coag (Bld) [Time] 78.3 s       High         20.0-30.5    Cerus Corporation  
  
  
  
  
                          Comment on above:         Result Comment: NOTE: The th  
erapeutic time for Heparin  
  
                                                    anticoagulation,based on Xa   
activity inhibition, is an APTT of  
  
                                                    46-80seconds.  
   
                                                    Performed By: #### APTT ####  
Xplornet5 E. Elizabeth, OH   
  
  
  
  
             aPTT Coag (Bld) [Time] 71.7 s       High         20.0-30.5    Munson Healthcare Cadillac Hospital  
  
  
  
  
                          Comment on above:         Result Comment: NOTE: The th  
erapeutic time for Heparin  
  
                                                    anticoagulation,based on Xa   
activity inhibition, is an APTT of  
  
                                                    46-80seconds.  
   
                                                    Performed By: #### APTT ####  
Briana Ville 771835 E. Elizabeth, OH   
  
  
  
  
             aPTT Coag (Bld) [Time] 91.8 s       High         20.0-30.5    Munson Healthcare Cadillac Hospital  
  
  
  
  
                          Comment on above:         Result Comment: NOTE: The th  
erapeutic time for Heparin  
  
                                                    anticoagulation,based on Xa   
activity inhibition, is an APTT of  
  
                                                    46-80seconds.  
   
                                                    Performed By: #### APTT ####  
38 Herrera Street   
  
  
  
  
                                        Basic Metabolic Panel  on 2022  
  
  
  
  
             Anion gap [Moles/Vol] 10 mmol/L    Normal       3-13         Munson Healthcare Cadillac Hospital  
  
  
  
  
                          Comment on above:         Performed By: #### HEMDF, MG  
3, BMP3 ####Briana Ville 771835  
 Rio Rancho, OH   
  
  
  
  
             Calcium [Mass/Vol] 8.8 mg/dL    Normal       8.4-10.4     Munson Healthcare Otsego Memorial Hospital  
  
  
  
  
                          Comment on above:         Performed By: #### HEMDF, MG  
3, BMP3 ####Briana Ville 771835  
 E.  
  
                                                    Elizabeth, OH   
  
  
  
  
             CO2 [Moles/Vol] 22 mmol/L    Normal       22-30        Munson Healthcare Cadillac Hospital  
  
  
  
  
                          Comment on above:         Performed By: #### HEMDF, MG  
3, BMP3 ####Briana Ville 771835  
 .  
  
                                                    Elizabeth, OH   
  
  
  
  
             Glucose [Mass/Vol] 128 mg/dL    High                Munson Healthcare Otsego Memorial Hospital  
  
  
  
  
                          Comment on above:         Performed By: #### HEMDF, MG  
3, BMP3 ####Briana Ville 771835  
 Rio Rancho, OH   
  
  
  
  
             Urea nitrogen [Mass/Vol] 12 mg/dL     Normal       7-17         Sum  
ma Health System  
  
  
  
  
                          Comment on above:         Performed By: #### HEMDF, MG  
3, BMP3 ####K94 Discoveries Kpcsbx620  
 Rio Rancho, OH   
  
  
  
  
             Creatinine [Mass/Vol] 1.07 mg/dL   Normal       0.52-1.25    Munson Healthcare Cadillac Hospital  
  
  
  
  
                          Comment on above:         Performed By: #### HEMDF, MG  
3, BMP3 ####Kettering Health Greene Memorial M2 Connections Rwzidx474  
 EDexter, OH   
  
  
  
  
             GFR/1.73 sq M.predicted among 84.0 mL/min/{1.73_m2} Normal       >6  
0          Munson Healthcare Cadillac Hospital  
  
             blacks MDRD (S/P/Bld) [Vol                                          
  
             rate/Area]                                            
  
  
  
  
                          Comment on above:         Performed By: #### HEMDF, MG  
3, BMP3 ####Cerus Corporation525  
Rio Rancho, OH   
  
  
  
  
             GFR/1.73 sq M.predicted among 72.5 mL/min/{1.73_m2} Normal       >6  
0          Munson Healthcare Cadillac Hospital  
  
             non-blacks MDRD (S/P/Bld) [Vol                                       
     
  
             rate/Area]                                            
  
  
  
  
                          Comment on above:         Result Comment: KDIGO guidel  
zoë provide the following GFR  
  
                                                    categories:Stage GFR(ml/min/  
1.73 m2) TermsG1 >=90 Normal or highG2  
  
                                                    60-89 Mildly decreased*G3a 4  
5-59 Mildly to moderately ujnthkdoeC4h  
  
                                                    30-44 Moderately to severely  
 decreasedG4 15-29 Severely decreasedG5  
  
                                                    <15 Kidney failure*Relative   
to young adult level.In the absence of  
  
                                                    evidence of kidney damage, n  
either GFRcategory G1 nor G2 fulfill  
  
                                                    the criteria for CKD.The CKD  
-EPI equation is validated in  
  
                                                    individuals 18 yearsof age a  
nd older. Currently the best equation  
  
                                                    forestimating glomerular beto  
tration rate (GFR) from serumcreatinine  
  
                                                    in children is the Bedside S  
kristiwartz equation.It is less accurate in  
  
                                                    patients with extremes of mu  
sclemass, restriction of dietary  
  
                                                    protein, ingestion of creati  
ne,extra-renal metabolism of  
  
                                                    creatinine, or treatment wit  
hmedications that affect renal tubular  
  
                                                    creatinine secretion.  
   
                                                    Performed By: #### HEMDF, MG  
3, BMP3 ####Corey HospitalLaru Technologies Hejpet387 Rio Rancho, OH   
  
  
  
  
             Potassium [Moles/Vol] 4.1 mmol/L   Normal       3.5-5.1      Munson Healthcare Cadillac Hospital  
  
  
  
  
                          Comment on above:         Performed By: #### HEMDF, MG  
3, BMP3 ####K94 Discoveries Dvfgmi131  
 E.  
  
                                                    Elizabeth, OH 83743  
0  
  
  
  
  
             Sodium [Moles/Vol] 137 mmol/L   Normal       135-145      The Jewish Hospital System  
  
  
  
  
                          Comment on above:         Performed By: #### HEMDF, MG  
3, BMP3 ####K94 Discoveries Jfwufv591  
 E.  
  
                                                    Elizabeth, OH 51805  
-2090  
  
  
  
  
             Chloride [Moles/Vol] 105 mmol/L   Normal              Mercy Health St. Joseph Warren Hospital System  
  
  
  
  
                          Comment on above:         Performed By: #### HEMDF, MG  
3, BMP3 ####K94 Discoveries Vgyxnu589  
 E.  
  
                                                    Elizabeth, OH 42304  
0  
  
  
  
  
                                        Glucose,Bedside  on 2022  
  
  
  
  
             Glucose [Mass/Vol] 136 mg/dL    High                The Jewish Hospital System  
  
  
  
  
                          Comment on above:         Result Comment: Test perform  
ed by glucose meter. Results may   
be  
  
                                                    10%-15% lowerthan serum/plas  
ma values. (CLIA ID 02A6010460)  
   
                                                    Performed By: #### BGLU ####  
Cerus Corporation525 E. Elizabeth, OH 79971-4630  
  
  
  
  
             Glucose [Mass/Vol] 192 mg/dL    High                The Jewish Hospital System  
  
  
  
  
                          Comment on above:         Result Comment: Test perform  
ed by glucose meter. Results may   
be  
  
                                                    10%-15% lowerthan serum/plas  
ma values. (CLIA ID 11F6014359)  
   
                                                    Performed By: #### BGLU ####  
K94 Discoveries Itihvg261 E. Elizabeth, OH 58510-7947  
  
  
  
  
             Glucose [Mass/Vol] 133 mg/dL    High                Corey Hospitala a  
Lima City Hospital System  
  
  
  
  
                          Comment on above:         Result Comment: Test perform  
ed by glucose meter. Results may   
be  
  
                                                    10%-15% lowerthan serum/plas  
ma values. (CLIA ID 31U3777805)  
   
                                                    Performed By: #### BGLU ####  
K94 Discoveries Pknceh263 E. Elizabeth, OH 52251-0676  
  
  
  
  
             Glucose [Mass/Vol] 139 mg/dL    High                Corey Hospitala a  
Lima City Hospital System  
  
  
  
  
                          Comment on above:         Result Comment: Test perform  
ed by glucose meter. Results may   
be  
  
                                                    10%-15% lowerthan serum/plas  
ma values. (CLIA ID 89D5334877)  
   
                                                    Performed By: #### BGLU ####  
Kettering Health Greene Memorial M2 Connections Khbfkc954 Rio Rancho, OH   
  
  
  
  
                                        Hemogram w/ Autodiff  on 2022  
  
  
  
  
             Abs Baso Cnt 0.0 10*3/uL  Normal       0.0-0.2      Mercy Health St. Vincent Medical Center Sy  
stem  
  
  
  
  
                          Comment on above:         Performed By: #### HEMDF, MG  
3, BMP3 ####Kettering Health Greene Memorial M2 Connections Dghgab812  
 Rio Rancho, OH   
  
  
  
  
             Abs Neutrophile Cnt 8.6 10*3/uL  High         1.8-7.0      ProMedica Coldwater Regional Hospital  
  
  
  
  
                          Comment on above:         Performed By: #### HEMDF, MG  
3, BMP3 ####Kettering Health Greene Memorial M2 Connections Qsrnks561  
 Rio Rancho, OH   
  
  
  
  
             Basophils/100 WBC (Bld) 0.4 %        Normal       0.0-2.0      Summ  
a Health System  
  
  
  
  
                          Comment on above:         Performed By: #### HEMDF, MG  
3, BMP3 ####Kettering Health Greene Memorial M2 Connections Nrugfe165  
 Rio Rancho, OH   
  
  
  
  
             Eosinophils (Bld) [#/Vol] 0.1 10*3/uL  Normal       0.0-0.5      Harper University Hospital  
  
  
  
  
                          Comment on above:         Performed By: #### HEMDF, MG  
3, BMP3 ####Kettering Health Greene Memorial M2 Connections Xjkino191  
 Rio Rancho, OH   
  
  
  
  
             Eosinophils/100 WBC (Bld) 0.7 %        Low          1.0-6.0      Harper University Hospital  
  
  
  
  
                          Comment on above:         Performed By: #### HEMDF, MG  
3, BMP3 ####Kettering Health Greene Memorial Stottler Henke Associates80 Walton Street Linden, PA 17744   
  
  
  
  
             Erythrocyte distribution width (RBC) 16.8 %       High         11.5  
-14.5    Munson Healthcare Cadillac Hospital  
  
             [Ratio]                                               
  
  
  
  
                          Comment on above:         Performed By: #### HEMDF, MG  
3, BMP3 ####Kettering Health Greene Memorial M2 Connections 99 Hogan Street   
  
  
  
  
             Granulocytes/100 WBC (Bld) 82.5 %       High         40.0-80.0    S  
University of Michigan Health  
  
  
  
  
                          Comment on above:         Performed By: #### HEMDF, MG  
3, BMP3 ####38 Herrera Street   
  
  
  
  
             Hematocrit (Bld) [Volume fraction] 24.9 %       Low          40.0-5  
2.0    Munson Healthcare Cadillac Hospital  
  
  
  
  
                          Comment on above:         Performed By: #### HEMDF, MG  
3, BMP3 ####38 Herrera Street   
  
  
  
  
             Hemoglobin (Bld) [Mass/Vol] 8.3 g/dL     Low          13.0-18.0      
Munson Healthcare Cadillac Hospital  
  
  
  
  
                          Comment on above:         Performed By: #### HEMDF, MG  
3, BMP3 ####38 Herrera Street   
  
  
  
  
             Lymphocytes (Bld) [#/Vol] 1.1 10*3/uL  Normal       1.0-4.3      Harper University Hospital  
  
  
  
  
                          Comment on above:         Performed By: #### HEMDF, MG  
3, BMP3 ####38 Herrera Street   
  
  
  
  
             Lymphocytes/100 WBC (Bld) 11.0 %       Low          20.0-40.0    Harper University Hospital  
  
  
  
  
                          Comment on above:         Performed By: #### HEMDF, MG  
3, BMP3 ####38 Herrera Street   
  
  
  
  
             MCH (RBC) [Entitic mass] 28.1 pg      Normal       26.0-34.0    Beaumont Hospital  
  
  
  
  
                          Comment on above:         Performed By: #### HEMDF, MG  
3, BMP3 ####38 Herrera Street   
  
  
  
  
             MCHC         33.3 %       Normal       32.0-36.0    Salem City Hospital  
stem  
  
  
  
  
                          Comment on above:         Performed By: #### HEMDF, MG  
3, BMP3 ####Briana Ville 771835  
Rio Rancho, OH   
  
  
  
  
             MCV (RBC) [Entitic vol] 84.3 fL      Normal       80.0-98.0    Summ  
a Health System  
  
  
  
  
                          Comment on above:         Performed By: #### HEMDF, MG  
3, BMP3 ####Briana Ville 771835  
Rio Rancho, OH   
  
  
  
  
             Monocytes (Bld) [#/Vol] 0.6 10*3/uL  Normal       0.0-0.8      Summ  
a Health System  
  
  
  
  
                          Comment on above:         Performed By: #### HEMDF, MG  
3, BMP3 ####38 Herrera Street   
  
  
  
  
             Monocytes/100 WBC (Bld) 5.4 %        Normal       2.0-10.0     Summ  
a Health System  
  
  
  
  
                          Comment on above:         Performed By: #### HEMDF, MG  
3, BMP3 ####38 Herrera Street   
  
  
  
  
             Platelet mean volume (Bld) [Entitic vol] 7.3 fL       Low            
7.4-10.4     Munson Healthcare Cadillac Hospital  
  
  
  
  
                          Comment on above:         Performed By: #### HEMDF, MG  
3, BMP3 ####38 Herrera Street   
  
  
  
  
             Platelets (Bld) [#/Vol] 168 10*3/uL  Normal       140-440      University of Michigan Hospital  
  
  
  
  
                          Comment on above:         Performed By: #### HEMDF, MG  
3, BMP3 ####38 Herrera Street   
  
  
  
  
             RBC (Bld) [#/Vol] 2.95 10*6/uL Low          4.40-5.90    McLaren Oakland  
  
  
  
  
                          Comment on above:         Performed By: #### HEMDF, MG  
3, BMP3 ####38 Herrera Street   
  
  
  
  
             WBC (Bld) [#/Vol] 10.5 10*3/uL Normal       3.6-10.7     Holmes County Joel Pomerene Memorial Hospital System  
  
  
  
  
                          Comment on above:         Performed By: #### HEMDF, MG  
3, BMP3 ####38 Herrera Street   
  
  
  
  
                                        Magnesium  on 2022  
  
  
  
  
             Magnesium [Mass/Vol] 1.9 mg/dL    Normal       1.6-2.3      Mercy Health St. Joseph Warren Hospital System  
  
  
  
  
                          Comment on above:         Performed By: #### HEMDF, MG  
3, BMP3 ####Briana Ville 771835  
 Rio Rancho, OH   
  
  
  
  
                                        Op Note   on 2022  
  
  
  
  
             Op Note                   Normal                    Mercy Health St. Vincent Medical Center Sy  
stem  
  
  
  
  
                                        APTT  on 2022  
  
  
  
  
             aPTT Coag (Bld) [Time] 23.0 s       Normal       20.0-30.5    Munson Healthcare Cadillac Hospital  
  
  
  
  
                          Comment on above:         Result Comment: NOTE: The th  
erapeutic time for Heparin  
  
                                                    anticoagulation,based on Xa   
activity inhibition, is an APTT of  
  
                                                    46-80seconds.  
   
                                                    Performed By: #### APTT ####  
38 Herrera Street   
  
  
  
  
             aPTT Coag (Bld) [Time] 92.5 s       High         20.0-30.5    Munson Healthcare Cadillac Hospital  
  
  
  
  
                          Comment on above:         Result Comment: NOTE: The th  
erapeutic time for Heparin  
  
                                                    anticoagulation,based on Xa   
activity inhibition, is an APTT of  
  
                                                    46-80seconds.  
   
                                                    Performed By: #### APTT ####  
38 Herrera Street   
  
  
  
  
                                        Basic Metabolic Panel   on 2022  
  
  
  
  
             Anion gap [Moles/Vol] 9 mmol/L     Normal       3-13         Munson Healthcare Cadillac Hospital  
  
  
  
  
                          Comment on above:         Performed By: #### HEMDF, MG  
3, BMP3 ####38 Herrera Street   
  
  
  
  
             Calcium [Mass/Vol] 8.7 mg/dL    Normal       8.4-10.4     Munson Healthcare Otsego Memorial Hospital  
  
  
  
  
                          Comment on above:         Performed By: #### HEMDF, MG  
3, BMP3 ####38 Herrera Street   
  
  
  
  
             CO2 [Moles/Vol] 23 mmol/L    Normal       22-30        Munson Healthcare Cadillac Hospital  
  
  
  
  
                          Comment on above:         Performed By: #### HEMDF, MG  
3, BMP3 ####38 Herrera Street   
  
  
  
  
             Glucose [Mass/Vol] 111 mg/dL    High                Munson Healthcare Otsego Memorial Hospital  
  
  
  
  
                          Comment on above:         Performed By: #### HEMDF, MG  
3, BMP3 ####38 Herrera Street   
  
  
  
  
             Urea nitrogen [Mass/Vol] 14 mg/dL     Normal       7-17         Beaumont Hospital  
  
  
  
  
                          Comment on above:         Performed By: #### HEMDF, MG  
3, BMP3 ####Munson Healthcare Cadillac Hospital525  
 Rio Rancho, OH   
  
  
  
  
             Creatinine [Mass/Vol] 1.11 mg/dL   Normal       0.52-1.25    Munson Healthcare Cadillac Hospital  
  
  
  
  
                          Comment on above:         Performed By: #### HEMDF, MG  
3, BMP3 ####Kettering Health Greene Memorial M2 Connections Gqqfne069  
 Rio Rancho, OH   
  
  
  
  
             GFR/1.73 sq M.predicted among 80.4 mL/min/{1.73_m2} Normal       >6  
0          Munson Healthcare Cadillac Hospital  
  
             blacks MDRD (S/P/Bld) [Vol                                          
  
             rate/Area]                                            
  
  
  
  
                          Comment on above:         Performed By: #### HEMDF, MG  
3, BMP3 ####Briana Ville 771835  
EDexter, OH   
  
  
  
  
             GFR/1.73 sq M.predicted among 69.3 mL/min/{1.73_m2} Normal       >6  
0          Munson Healthcare Cadillac Hospital  
  
             non-blacks MDRD (S/P/Bld) [Vol                                       
     
  
             rate/Area]                                            
  
  
  
  
                          Comment on above:         Result Comment: KDIGO guidel  
zoë provide the following GFR  
  
                                                    categories:Stage GFR(ml/min/  
1.73 m2) TermsG1 >=90 Normal or highG2  
  
                                                    60-89 Mildly decreased*G3a 4  
5-59 Mildly to moderately agnuqoipcF0r  
  
                                                    30-44 Moderately to severely  
 decreasedG4 15-29 Severely decreasedG5  
  
                                                    <15 Kidney failure*Relative   
to young adult level.In the absence of  
  
                                                    evidence of kidney damage, n  
either GFRcategory G1 nor G2 fulfill  
  
                                                    the criteria for CKD.The CKD  
-EPI equation is validated in  
  
                                                    individuals 18 yearsof age a  
nd older. Currently the best equation  
  
                                                    forestimating glomerular beto  
tration rate (GFR) from serumcreatinine  
  
                                                    in children is the Bedside S  
kristiwartz equation.It is less accurate in  
  
                                                    patients with extremes of mu  
sclemass, restriction of dietary  
  
                                                    protein, ingestion of creati  
ne,extra-renal metabolism of  
  
                                                    creatinine, or treatment wit  
hmedications that affect renal tubular  
  
                                                    creatinine secretion.  
   
                                                    Performed By: #### HEMDF, MG  
3, BMP3 ####Kettering Health Greene Memorial M2 Connections Svhdvr972 E.  
  
                                                    Elizabeth, OH 73793  
-2090  
  
  
  
  
             Potassium [Moles/Vol] 3.9 mmol/L   Normal       3.5-5.1      Mercy Health St. Vincent Medical Center System  
  
  
  
  
                          Comment on above:         Performed By: #### HEMDF, MG  
3, BMP3 ####Briana Ville 771835  
 E.  
  
                                                    Elizabeth, OH 08212  
-2090  
  
  
  
  
             Sodium [Moles/Vol] 136 mmol/L   Normal       135-145      The Jewish Hospital System  
  
  
  
  
                          Comment on above:         Performed By: #### HEMDF, MG  
3, BMP3 ####Briana Ville 771835  
 E.  
  
                                                    Mary Free Bed Rehabilitation Hospital, OH 77557  
-2090  
  
  
  
  
             Chloride [Moles/Vol] 105 mmol/L   Normal              Mercy Health St. Joseph Warren Hospital System  
  
  
  
  
                          Comment on above:         Performed By: #### HEMDF, MG  
3, BMP3 ####Thomas Ville 45855  
 EDexter, OH 28701  
-2090  
  
  
  
  
                                        Glucose,Bedside  on 2022  
  
  
  
  
             Glucose [Mass/Vol] 145 mg/dL    High                The Jewish Hospital System  
  
  
  
  
                          Comment on above:         Result Comment: Test perform  
ed by glucose meter. Results may   
be  
  
                                                    10%-15% lowerthan serum/plas  
ma values. (CLIA ID 36V9571578)  
   
                                                    Performed By: #### BGLU ####  
Kettering Health Greene Memorial M2 Connections Rsviyf310 E. Elizabeth, OH 61573-1625  
  
  
  
  
             Glucose [Mass/Vol] 137 mg/dL    High                Corey Hospitala a  
Lima City Hospital System  
  
  
  
  
                          Comment on above:         Result Comment: Test perform  
ed by glucose meter. Results may   
be  
  
                                                    10%-15% lowerthan serum/plas  
ma values. (CLIA ID 07F0649610)  
   
                                                    Performed By: #### BGLU ####  
Kettering Health Greene Memorial M2 Connections Uwtymz435 E. Elizabeth, OH 17072-6566  
  
  
  
  
             Glucose [Mass/Vol] 123 mg/dL    High                Corey Hospitala a  
Lima City Hospital System  
  
  
  
  
                          Comment on above:         Result Comment: Test perform  
ed by glucose meter. Results may   
be  
  
                                                    10%-15% lowerthan serum/plas  
ma values. (CLIA ID 60A1271090)  
   
                                                    Performed By: #### BGLU ####  
Kettering Health Greene Memorial M2 Connections Urqckm304 E. Elizabeth, OH   
  
  
  
  
                                        Hemogram w/ Autodiff  on 2022  
  
  
  
  
             Abs Baso Cnt 0.1 10*3/uL  Normal       0.0-0.2      Salem City Hospital  
stem  
  
  
  
  
                          Comment on above:         Performed By: #### HEMDF, MG  
3, BMP3 ####Munson Healthcare Cadillac Hospital525  
 E.  
  
                                                    Elizabeth, OH   
  
  
  
  
             Abs Neutrophile Cnt 8.0 10*3/uL  High         1.8-7.0      ProMedica Coldwater Regional Hospital  
  
  
  
  
                          Comment on above:         Performed By: #### HEMDF, MG  
3, BMP3 ####Kettering Health Greene Memorial M2 Connections Wsxmqc981  
 E.  
  
                                                    Elizabeth, OH   
  
  
  
  
             Basophils/100 WBC (Bld) 0.5 %        Normal       0.0-2.0      Summ  
a Health System  
  
  
  
  
                          Comment on above:         Performed By: #### HEMDF, MG  
3, BMP3 ####Briana Ville 771835  
 E.  
  
                                                    Elizabeth, OH   
  
  
  
  
             Eosinophils (Bld) [#/Vol] 0.1 10*3/uL  Normal       0.0-0.5      Harper University Hospital  
  
  
  
  
                          Comment on above:         Performed By: #### HEMDF, MG  
3, BMP3 ####Kettering Health Greene Memorial M2 Connections Iqirqp583  
 E.  
  
                                                    Elizabeth, OH   
  
  
  
  
             Eosinophils/100 WBC (Bld) 0.8 %        Low          1.0-6.0      Harper University Hospital  
  
  
  
  
                          Comment on above:         Performed By: #### HEMDF, MG  
3, BMP3 ####Kettering Health Greene Memorial M2 Connections Usluip369  
 E.  
  
                                                    Elizabeth, OH   
  
  
  
  
             Erythrocyte distribution width (RBC) 17.1 %       High         11.5  
-14.5    Munson Healthcare Cadillac Hospital  
  
             [Ratio]                                               
  
  
  
  
                          Comment on above:         Performed By: #### HEMDF, MG  
3, BMP3 ####Kettering Health Greene Memorial M2 Connections Aisviq474  
 Rio Rancho, OH   
  
  
  
  
             Granulocytes/100 WBC (Bld) 78.0 %       Normal       40.0-80.0    S  
University of Michigan Health  
  
  
  
  
                          Comment on above:         Performed By: #### HEMDF, MG  
3, BMP3 ####Kettering Health Greene Memorial M2 Connections Zfomdd719  
 Rio Rancho, OH   
  
  
  
  
             Hematocrit (Bld) [Volume fraction] 25.1 %       Low          40.0-5  
2.0    Munson Healthcare Cadillac Hospital  
  
  
  
  
                          Comment on above:         Performed By: #### HEMYANCI MG  
3, BMP3 ####Briana Ville 771835  
Rio Rancho, OH   
  
  
  
  
             Hemoglobin (Bld) [Mass/Vol] 8.2 g/dL     Low          13.0-18.0      
Munson Healthcare Cadillac Hospital  
  
  
  
  
                          Comment on above:         Performed By: #### HEMDF MG  
3, BMP3 ####38 Herrera Street   
  
  
  
  
             Lymphocytes (Bld) [#/Vol] 1.5 10*3/uL  Normal       1.0-4.3      Harper University Hospital  
  
  
  
  
                          Comment on above:         Performed By: #### HEMDF MG  
3, BMP3 ####38 Herrera Street   
  
  
  
  
             Lymphocytes/100 WBC (Bld) 15.0 %       Low          20.0-40.0    Harper University Hospital  
  
  
  
  
                          Comment on above:         Performed By: #### HEMDF MG  
3, BMP3 ####38 Herrera Street   
  
  
  
  
             MCH (RBC) [Entitic mass] 27.7 pg      Normal       26.0-34.0    Beaumont Hospital  
  
  
  
  
                          Comment on above:         Performed By: #### HEMDF, MG  
3, BMP3 ####38 Herrera Street   
  
  
  
  
             MCHC         32.5 %       Normal       32.0-36.0    Mercy Health St. Vincent Medical Center Sy  
stem  
  
  
  
  
                          Comment on above:         Performed By: #### HEMDF, MG  
3, BMP3 ####38 Herrera Street   
  
  
  
  
             MCV (RBC) [Entitic vol] 85.4 fL      Normal       80.0-98.0    Summ  
a Health System  
  
  
  
  
                          Comment on above:         Performed By: #### HEMDF, MG  
3, BMP3 ####38 Herrera Street   
  
  
  
  
             Monocytes (Bld) [#/Vol] 0.6 10*3/uL  Normal       0.0-0.8      Summ  
a Health System  
  
  
  
  
                          Comment on above:         Performed By: #### HEMDF, MG  
3, BMP3 ####Kettering Health Greene Memorial M2 Connections Stxbeo584  
 E.  
  
                                                    Elizabeth, OH   
  
  
  
  
             Monocytes/100 WBC (Bld) 5.7 %        Normal       2.0-10.0     Summ  
a Health System  
  
  
  
  
                          Comment on above:         Performed By: #### HEMDF, MG  
3, BMP3 ####Kettering Health Greene Memorial M2 Connections Clinton Ville 25743  
 E.  
  
                                                    Elizabeth, OH   
  
  
  
  
             Platelet mean volume (Bld) [Entitic vol] 7.3 fL       Low            
7.4-10.4     Munson Healthcare Cadillac Hospital  
  
  
  
  
                          Comment on above:         Performed By: #### HEMDF, MG  
3, BMP3 ####Kettering Health Greene Memorial M2 Connections Clinton Ville 25743  
E.  
  
                                                    Elizabeth, OH   
  
  
  
  
             Platelets (Bld) [#/Vol] 180 10*3/uL  Normal       140-440      University of Michigan Hospital  
  
  
  
  
                          Comment on above:         Performed By: #### HEMDF, MG  
3, BMP3 ####Kettering Health Greene Memorial M2 Connections 99 Hogan Street   
  
  
  
  
             RBC (Bld) [#/Vol] 2.94 10*6/uL Low          4.40-5.90    Holmes County Joel Pomerene Memorial Hospital System  
  
  
  
  
                          Comment on above:         Performed By: #### HEMDF, MG  
3, BMP3 ####Kettering Health Greene Memorial M2 Connections 14 Robinson Street.  
  
                                                    Elizabeth, OH   
  
  
  
  
             WBC (Bld) [#/Vol] 10.2 10*3/uL Normal       3.6-10.7     McLaren Oakland  
  
  
  
  
                          Comment on above:         Performed By: #### HEMDF, MG  
3, BMP3 ####Kettering Health Greene Memorial M2 Connections 99 Hogan Street   
  
  
  
  
                                        Magnesium  on 2022  
  
  
  
  
             Magnesium [Mass/Vol] 1.8 mg/dL    Normal       1.6-2.3      Mercy Health St. Joseph Warren Hospital System  
  
  
  
  
                          Comment on above:         Performed By: #### HEMDF, MG  
3, BMP3 ####38 Herrera Street   
  
  
  
  
                                        APTT  on 01-  
  
  
  
  
             aPTT Coag (Bld) [Time] 70.2 s       High         20.0-30.5    Munson Healthcare Cadillac Hospital  
  
  
  
  
                          Comment on above:         Result Comment: NOTE: The th  
erapeutic time for Heparin  
  
                                                    anticoagulation,based on Xa   
activity inhibition, is an APTT of  
  
                                                    46-80seconds.  
   
                                                    Performed By: #### APTT ####  
Briana Ville 771835 Rio Rancho, OH   
  
  
  
  
                                        Basic Metabolic Panel  on 01-  
  
  
  
  
             Anion gap [Moles/Vol] 6 mmol/L     Normal       3-13         Munson Healthcare Cadillac Hospital  
  
  
  
  
                          Comment on above:         Performed By: #### HEMDF, MG  
3, BMP3 ####Briana Ville 771835  
 Rio Rancho, OH   
  
  
  
  
             Calcium [Mass/Vol] 8.5 mg/dL    Normal       8.4-10.4     Munson Healthcare Otsego Memorial Hospital  
  
  
  
  
                          Comment on above:         Performed By: #### HEMDF, MG  
3, BMP3 ####Thomas Ville 45855  
 EDexter, OH   
  
  
  
  
             CO2 [Moles/Vol] 25 mmol/L    Normal       22-30        Munson Healthcare Cadillac Hospital  
  
  
  
  
                          Comment on above:         Performed By: #### HEMDF, MG  
3, BMP3 ####Kettering Health Greene Memorial M2 Connections Jkybpq675  
 EDexter, OH   
  
  
  
  
             Glucose [Mass/Vol] 125 mg/dL    High                Munson Healthcare Otsego Memorial Hospital  
  
  
  
  
                          Comment on above:         Performed By: #### HEMDF, MG  
3, BMP3 ####Thomas Ville 45855  
 EDexter, OH   
  
  
  
  
             Urea nitrogen [Mass/Vol] 13 mg/dL     Normal       7-17         Beaumont Hospital  
  
  
  
  
                          Comment on above:         Performed By: #### HEMDF, MG  
3, BMP3 ####Briana Ville 771835  
 Rio Rancho, OH   
  
  
  
  
             Creatinine [Mass/Vol] 1.09 mg/dL   Normal       0.52-1.25    Munson Healthcare Cadillac Hospital  
  
  
  
  
                          Comment on above:         Performed By: #### HEMDF, MG  
3, BMP3 ####Briana Ville 771835  
 Rio Rancho, OH   
  
  
  
  
             GFR/1.73 sq M.predicted among 82.1 mL/min/{1.73_m2} Normal       >6  
0          Munson Healthcare Cadillac Hospital  
  
             blacks MDRD (S/P/Bld) [Vol                                          
  
             rate/Area]                                            
  
  
  
  
                          Comment on above:         Performed By: #### HEMDF, MG  
3, BMP3 ####Cerus Corporation525  
Santa Maria Biotherapeutics  
  
                                                    Elizabeth, OH   
  
  
  
  
             GFR/1.73 sq M.predicted among 70.9 mL/min/{1.73_m2} Normal       >6  
0          Munson Healthcare Cadillac Hospital  
  
             non-blacks MDRD (S/P/Bld) [Vol                                       
     
  
             rate/Area]                                            
  
  
  
  
                          Comment on above:         Result Comment: KDIGO guidel  
zoë provide the following GFR  
  
                                                    categories:Stage GFR(ml/min/  
1.73 m2) TermsG1 >=90 Normal or highG2  
  
                                                    60-89 Mildly decreased*G3a 4  
5-59 Mildly to moderately dnqrsvnboP9s  
  
                                                    30-44 Moderately to severely  
 decreasedG4 15-29 Severely decreasedG5  
  
                                                    <15 Kidney failure*Relative   
to young adult level.In the absence of  
  
                                                    evidence of kidney damage, n  
either GFRcategory G1 nor G2 fulfill  
  
                                                    the criteria for CKD.The CKD  
-EPI equation is validated in  
  
                                                    individuals 18 yearsof age a  
nd older. Currently the best equation  
  
                                                    forestimating glomerular beto  
tration rate (GFR) from serumcreatinine  
  
                                                    in children is the Bedside S  
chwartz equation.It is less accurate in  
  
                                                    patients with extremes of mu  
sclemass, restriction of dietary  
  
                                                    protein, ingestion of creati  
ne,extra-renal metabolism of  
  
                                                    creatinine, or treatment wit  
hmedications that affect renal tubular  
  
                                                    creatinine secretion.  
   
                                                    Performed By: #### HEMDF, MG  
3, BMP3 ####Cerus Corporation525 Santa Maria Biotherapeutics  
  
                                                    Elizabeth, OH   
  
  
  
  
             Chloride [Moles/Vol] 105 mmol/L   Normal              Mercy Health St. Joseph Warren Hospital System  
  
  
  
  
                          Comment on above:         Performed By: #### HEMDF, MG  
3, BMP3 ####Cerus Corporation525  
 Santa Maria Biotherapeutics  
  
                                                    Elizabeth, OH 12490  
  
  
  
  
  
             Potassium [Moles/Vol] 4.2 mmol/L   Normal       3.5-5.1      Munson Healthcare Cadillac Hospital  
  
  
  
  
                          Comment on above:         Performed By: #### HEMDF, MG  
3, BMP3 ####Cerus Corporation525  
 Santa Maria Biotherapeutics  
  
                                                    Elizabeth, OH 73444  
  
  
  
  
  
             Sodium [Moles/Vol] 136 mmol/L   Normal       135-145      The Jewish Hospital System  
  
  
  
  
                          Comment on above:         Performed By: #### HEMDF, MG  
3, BMP3 ####Kettering Health Greene Memorial M2 Connections Llaals397  
 E.  
  
                                                    Elizabeth, OH 69434  
  
  
  
  
  
                                        Glucose,Bedside  on 01-  
  
  
  
  
             Glucose [Mass/Vol] 184 mg/dL    High                The Jewish Hospital System  
  
  
  
  
                          Comment on above:         Result Comment: Test perform  
ed by glucose meter. Results may   
be  
  
                                                    10%-15% lowerthan serum/plas  
ma values. (CLIA ID 84A0884495)  
   
                                                    Performed By: #### BGLU ####  
Kettering Health Greene Memorial M2 Connections Gjpcsg924 E. Elizabeth, OH   
  
  
  
  
             Glucose [Mass/Vol] 140 mg/dL    High                The Jewish Hospital System  
  
  
  
  
                          Comment on above:         Result Comment: Test perform  
ed by glucose meter. Results may   
be  
  
                                                    10%-15% lowerthan serum/plas  
ma values. (CLIA ID 70Z1128299)  
   
                                                    Performed By: #### BGLU ####  
Kettering Health Greene Memorial M2 Connections Lsyquk516 E. Elizabeth, OH   
  
  
  
  
             Glucose [Mass/Vol] 130 mg/dL    High                The Jewish Hospital System  
  
  
  
  
                          Comment on above:         Result Comment: Test perform  
ed by glucose meter. Results may   
be  
  
                                                    10%-15% lowerthan serum/plas  
ma values. (CLIA ID 89E6831464)  
   
                                                    Performed By: #### BGLU ####  
Kettering Health Greene Memorial M2 Connections Dmkyyd913 . Elizabeth, OH   
  
  
  
  
                                        Hemogram w/ Autodiff  on 01-  
  
  
  
  
             Abs Baso Cnt 0.1 10*3/uL  Normal       0.0-0.2      Salem City Hospital  
stem  
  
  
  
  
                          Comment on above:         Performed By: #### HEMDF, MG  
3, BMP3 ####Kettering Health Greene Memorial M2 Connections Clwzck141  
 .  
  
                                                    Elizabeth, OH   
  
  
  
  
             Abs Neutrophile Cnt 11.8 10*3/uL High         1.8-7.0      ProMedica Coldwater Regional Hospital  
  
  
  
  
                          Comment on above:         Performed By: #### HEMDF, MG  
3, BMP3 ####Kettering Health Greene Memorial M2 Connections Flotrw359  
 Rio Rancho, OH   
  
  
  
  
             Basophils/100 WBC (Bld) 0.5 %        Normal       0.0-2.0      Summ  
a Health System  
  
  
  
  
                          Comment on above:         Performed By: #### HEMDF, MG  
3, BMP3 ####Briana Ville 771835  
 EDexter, OH   
  
  
  
  
             Eosinophils (Bld) [#/Vol] 0.0 10*3/uL  Normal       0.0-0.5      Harper University Hospital  
  
  
  
  
                          Comment on above:         Performed By: #### HEMDF, MG  
3, BMP3 ####38 Herrera Street   
  
  
  
  
             Eosinophils/100 WBC (Bld) 0.2 %        Low          1.0-6.0      Harper University Hospital  
  
  
  
  
                          Comment on above:         Performed By: #### HEMDF MG  
3, BMP3 ####38 Herrera Street   
  
  
  
  
             Erythrocyte distribution width (RBC) 16.4 %       High         11.5  
-14.5    Munson Healthcare Cadillac Hospital  
  
             [Ratio]                                               
  
  
  
  
                          Comment on above:         Performed By: #### HEMDF MG  
3, BMP3 ####38 Herrera Street   
  
  
  
  
             Granulocytes/100 WBC (Bld) 85.0 %       High         40.0-80.0    Insight Surgical Hospital  
  
  
  
  
                          Comment on above:         Performed By: #### HEMDF, MG  
3, BMP3 ####38 Herrera Street   
  
  
  
  
             Hematocrit (Bld) [Volume fraction] 25.3 %       Low          40.0-5  
2.0    Munson Healthcare Cadillac Hospital  
  
  
  
  
                          Comment on above:         Performed By: #### HEMDF, MG  
3, BMP3 ####38 Herrera Street   
  
  
  
  
             Hemoglobin (Bld) [Mass/Vol] 8.1 g/dL     Low          13.0-18.0      
Munson Healthcare Cadillac Hospital  
  
  
  
  
                          Comment on above:         Performed By: #### HEMDF, MG  
3, BMP3 ####38 Herrera Street   
  
  
  
  
             Lymphocytes (Bld) [#/Vol] 1.1 10*3/uL  Normal       1.0-4.3      Harper University Hospital  
  
  
  
  
                          Comment on above:         Performed By: #### HEMDF, MG  
3, BMP3 ####Briana Ville 771835  
 Rio Rancho, OH   
  
  
  
  
             Lymphocytes/100 WBC (Bld) 7.9 %        Low          20.0-40.0    Harper University Hospital  
  
  
  
  
                          Comment on above:         Performed By: #### HEMDF, MG  
3, BMP3 ####Briana Ville 771835  
 Rio Rancho, OH   
  
  
  
  
             MCH (RBC) [Entitic mass] 27.3 pg      Normal       26.0-34.0    Beaumont Hospital  
  
  
  
  
                          Comment on above:         Performed By: #### HEMDF, MG  
3, BMP3 ####Briana Ville 771835  
Rio Rancho, OH   
  
  
  
  
             MCHC         32.1 %       Normal       32.0-36.0    Salem City Hospital  
stem  
  
  
  
  
                          Comment on above:         Performed By: #### HEMDF, MG  
3, BMP3 ####38 Herrera Street   
  
  
  
  
             MCV (RBC) [Entitic vol] 84.8 fL      Normal       80.0-98.0    Summ  
a Health System  
  
  
  
  
                          Comment on above:         Performed By: #### HEMDF, MG  
3, BMP3 ####38 Herrera Street   
  
  
  
  
             Monocytes (Bld) [#/Vol] 0.9 10*3/uL  High         0.0-0.8      Summ  
a Health System  
  
  
  
  
                          Comment on above:         Performed By: #### HEMDF, MG  
3, BMP3 ####Briana Ville 771835  
 Rio Rancho, OH   
  
  
  
  
             Monocytes/100 WBC (Bld) 6.4 %        Normal       2.0-10.0     Summ  
a Health System  
  
  
  
  
                          Comment on above:         Performed By: #### HEMDF, MG  
3, BMP3 ####38 Herrera Street   
  
  
  
  
             Platelet mean volume (Bld) [Entitic vol] 7.2 fL       Low            
7.4-10.4     Munson Healthcare Cadillac Hospital  
  
  
  
  
                          Comment on above:         Performed By: #### HEMDF, MG  
3, BMP3 ####Briana Ville 771835  
Rio Rancho, OH   
  
  
  
  
             Platelets (Bld) [#/Vol] 235 10*3/uL  Normal       140-440      University of Michigan Hospital  
  
  
  
  
                          Comment on above:         Performed By: #### HEMDF, MG  
3, BMP3 ####Kettering Health Greene Memorial M2 Connections Nsypkw999  
 .  
  
                                                    Elizabeth, OH   
  
  
  
  
             RBC (Bld) [#/Vol] 2.98 10*6/uL Low          4.40-5.90    McLaren Oakland  
  
  
  
  
                          Comment on above:         Performed By: #### HEMDF, MG  
3, BMP3 ####Kettering Health Greene Memorial M2 Connections 14 Robinson Street.  
  
                                                    Elizabeth, OH   
  
  
  
  
             WBC (Bld) [#/Vol] 13.8 10*3/uL High         3.6-10.7     Holmes County Joel Pomerene Memorial Hospital System  
  
  
  
  
                          Comment on above:         Performed By: #### HEMDF, MG  
3, BMP3 ####Kettering Health Greene Memorial M2 Connections 99 Hogan Street   
  
  
  
  
                                        Magnesium  on 01-  
  
  
  
  
             Magnesium [Mass/Vol] 1.8 mg/dL    Normal       1.6-2.3      Summa H  
ealth System  
  
  
  
  
                          Comment on above:         Performed By: #### HEMDF, MG  
3, BMP3 ####Kettering Health Greene Memorial M2 Connections 99 Hogan Street   
  
  
  
  
                                        APTT  on 2022  
  
  
  
  
             aPTT Coag (Bld) [Time] 63.8 s       High         20.0-30.5    Munson Healthcare Cadillac Hospital  
  
  
  
  
                          Comment on above:         Result Comment: NOTE: The th  
erapeutic time for Heparin  
  
                                                    anticoagulation,based on Xa   
activity inhibition, is an APTT of  
  
                                                    46-80seconds.  
   
                                                    Performed By: #### APTT ####  
Kettering Health Greene Memorial M2 Connections 99 Hogan Street   
  
  
  
  
             aPTT Coag (Bld) [Time] 63.2 s       High         20.0-30.5    Munson Healthcare Cadillac Hospital  
  
  
  
  
                          Comment on above:         Result Comment: NOTE: The th  
erapeutic time for Heparin  
  
                                                    anticoagulation,based on Xa   
activity inhibition, is an APTT of  
  
                                                    46-80seconds.  
   
                                                    Performed By: #### APTT ####  
Kettering Health Greene Memorial M2 Connections Xcvfdj074 Rio Rancho, OH   
  
  
  
  
                                        Basic Metabolic Panel  on 2022  
  
  
  
  
             Anion gap [Moles/Vol] 10 mmol/L    Normal       3-13         Munson Healthcare Cadillac Hospital  
  
  
  
  
                          Comment on above:         Performed By: #### HEMDF, MG  
3, BMP3 ####Briana Ville 771835  
 Rio Rancho, OH   
  
  
  
  
             Calcium [Mass/Vol] 8.6 mg/dL    Normal       8.4-10.4     Munson Healthcare Otsego Memorial Hospital  
  
  
  
  
                          Comment on above:         Performed By: #### HEMDF, MG  
3, BMP3 ####Briana Ville 771835  
 EDexter, OH   
  
  
  
  
             CO2 [Moles/Vol] 22 mmol/L    Normal       22-30        Munson Healthcare Cadillac Hospital  
  
  
  
  
                          Comment on above:         Performed By: #### HEMDF, MG  
3, BMP3 ####Briana Ville 771835  
 EDexter, OH   
  
  
  
  
             Glucose [Mass/Vol] 133 mg/dL    High                Munson Healthcare Otsego Memorial Hospital  
  
  
  
  
                          Comment on above:         Performed By: #### HEMDF, MG  
3, BMP3 ####Briana Ville 771835  
 Rio Rancho, OH   
  
  
  
  
             Urea nitrogen [Mass/Vol] 13 mg/dL     Normal       7-17         Beaumont Hospital  
  
  
  
  
                          Comment on above:         Performed By: #### HEMDF, MG  
3, BMP3 ####Briana Ville 771835  
 Rio Rancho, OH   
  
  
  
  
             Creatinine [Mass/Vol] 1.01 mg/dL   Normal       0.52-1.25    Munson Healthcare Cadillac Hospital  
  
  
  
  
                          Comment on above:         Performed By: #### HEMDF, MG  
3, BMP3 ####Briana Ville 771835  
 EDexter, OH   
  
  
  
  
             GFR/1.73 sq M.predicted among blacks mL/min/{1.73_m2} Normal         
>60          Munson Healthcare Cadillac Hospital  
  
             MDRD (S/P/Bld) [Vol rate/Area]                                       
     
  
  
  
  
                          Comment on above:         Performed By: #### HEMDF, MG  
3, BMP3 ####Briana Ville 771835  
EDexter, OH   
  
  
  
  
             GFR/1.73 sq M.predicted among 77.7 mL/min/{1.73_m2} Normal       >6  
0          Munson Healthcare Cadillac Hospital  
  
             non-blacks MDRD (S/P/Bld) [Vol                                       
     
  
             rate/Area]                                            
  
  
  
  
                          Comment on above:         Result Comment: KDIGO guidel  
zoë provide the following GFR  
  
                                                    categories:Stage GFR(ml/min/  
1.73 m2) TermsG1 >=90 Normal or highG2  
  
                                                    60-89 Mildly decreased*G3a 4  
5-59 Mildly to moderately efzzcfhnfQ3p  
  
                                                    30-44 Moderately to severely  
 decreasedG4 15-29 Severely decreasedG5  
  
                                                    <15 Kidney failure*Relative   
to young adult level.In the absence of  
  
                                                    evidence of kidney damage, n  
either GFRcategory G1 nor G2 fulfill  
  
                                                    the criteria for CKD.The CKD  
-EPI equation is validated in  
  
                                                    individuals 18 yearsof age a  
nd older. Currently the best equation  
  
                                                    forestimating glomerular beto  
tration rate (GFR) from serumcreatinine  
  
                                                    in children is the Bedside S  
reynaldotz equation.It is less accurate in  
  
                                                    patients with extremes of mu  
sclemass, restriction of dietary  
  
                                                    protein, ingestion of creati  
ne,extra-renal metabolism of  
  
                                                    creatinine, or treatment wit  
hmedications that affect renal tubular  
  
                                                    creatinine secretion.  
   
                                                    Performed By: #### HEMDF, MG  
3, BMP3 ####Kettering Health Greene Memorial M2 Connections Pzgrvm879 SoundwaveDexter, OH 18442  
-2090  
  
  
  
  
             Potassium [Moles/Vol] 4.0 mmol/L   Normal       3.5-5.1      Munson Healthcare Cadillac Hospital  
  
  
  
  
                          Comment on above:         Performed By: #### HEMDF, MG  
3, BMP3 ####Briana Ville 771835  
 Rio Rancho, OH 58151  
-2090  
  
  
  
  
             Sodium [Moles/Vol] 137 mmol/L   Normal       135-145      The Jewish Hospital System  
  
  
  
  
                          Comment on above:         Performed By: #### HEMDF, MG  
3, BMP3 ####Kettering Health Greene Memorial M2 Connections Apaydb477  
 Rio Rancho, OH 18277  
-2090  
  
  
  
  
             Chloride [Moles/Vol] 106 mmol/L   Normal              Mercy Health St. Joseph Warren Hospital System  
  
  
  
  
                          Comment on above:         Performed By: #### HEMDF, MG  
3, BMP3 ####Kettering Health Greene Memorial M2 Connections Pcecmh608  
 Rio Rancho, OH 78991  
-2090  
  
  
  
  
                                        Glucose,Bedside  on 2022  
  
  
  
  
             Glucose [Mass/Vol] 184 mg/dL    High                The Jewish Hospital System  
  
  
  
  
                          Comment on above:         Result Comment: Test perform  
ed by glucose meter. Results may   
be  
  
                                                    10%-15% lowerthan serum/plas  
ma values. (CLIA ID 30W7449730)  
   
                                                    Performed By: #### BGLU ####  
Cerus Corporation525 Rio Rancho, OH   
  
  
  
  
             Glucose [Mass/Vol] 141 mg/dL    High                Corey Hospitala Hea  
lt System  
  
  
  
  
                          Comment on above:         Result Comment: Test perform  
ed by glucose meter. Results may   
be  
  
                                                    10%-15% lowerthan serum/plas  
ma values. (CLIA ID 60A2508739)  
   
                                                    Performed By: #### BGLU ####  
Cerus Corporation525 E. Elizabeth, OH   
  
  
  
  
             Glucose [Mass/Vol] 118 mg/dL    High                Corey Hospitala a  
lt System  
  
  
  
  
                          Comment on above:         Result Comment: Test perform  
ed by glucose meter. Results may   
be  
  
                                                    10%-15% lowerthan serum/plas  
ma values. (CLIA ID 97E6338898)  
   
                                                    Performed By: #### BGLU ####  
Cerus Corporation80 Walton Street Linden, PA 17744   
  
  
  
  
             Glucose [Mass/Vol] 121 mg/dL    High                Corey Hospitala a  
lt System  
  
  
  
  
                          Comment on above:         Result Comment: Test perform  
ed by glucose meter. Results may   
be  
  
                                                    10%-15% lowerthan serum/plas  
ma values. (CLIA ID 69M7490818)  
   
                                                    Performed By: #### BGLU ####  
Cerus Corporation525 Rio Rancho, OH   
  
  
  
  
                                        Hemogram w/ Autodiff  on 2022  
  
  
  
  
             Abs Baso Cnt 0.0 10*3/uL  Normal       0.0-0.2      Kettering Health Greene Memorial M2 Connections Sy  
stem  
  
  
  
  
                          Comment on above:         Performed By: #### HEMDF, MG  
3, BMP3 ####Cerus Corporation525  
 Catheter Connections  
  
                                                    Elizabeth, OH   
  
  
  
  
             Abs Neutrophile Cnt 8.7 10*3/uL  High         1.8-7.0      OhioHealth Berger Hospital System  
  
  
  
  
                          Comment on above:         Performed By: #### HEMDF, MG  
3, BMP3 ####Cerus Corporation80 Walton Street Linden, PA 17744   
  
  
  
  
             Basophils/100 WBC (Bld) 0.4 %        Normal       0.0-2.0      Summ  
a Health System  
  
  
  
  
                          Comment on above:         Performed By: #### HEMDF MG  
3, BMP3 ####Briana Ville 771835  
 Rio Rancho, OH   
  
  
  
  
             Eosinophils (Bld) [#/Vol] 0.1 10*3/uL  Normal       0.0-0.5      Harper University Hospital  
  
  
  
  
                          Comment on above:         Performed By: #### HEMDF MG  
3, BMP3 ####Briana Ville 771835  
 Rio Rancho, OH   
  
  
  
  
             Eosinophils/100 WBC (Bld) 0.8 %        Low          1.0-6.0      Harper University Hospital  
  
  
  
  
                          Comment on above:         Performed By: #### HEMDF MG  
3, BMP3 ####Briana Ville 771835  
 Rio Rancho, OH   
  
  
  
  
             Erythrocyte distribution width (RBC) 16.7 %       High         11.5  
-14.5    Munson Healthcare Cadillac Hospital  
  
             [Ratio]                                               
  
  
  
  
                          Comment on above:         Performed By: #### HEMDF MG  
3, BMP3 ####38 Herrera Street   
  
  
  
  
             Granulocytes/100 WBC (Bld) 80.0 %       Normal       40.0-80.0    Insight Surgical Hospital  
  
  
  
  
                          Comment on above:         Performed By: #### HEMDF MG  
3, BMP3 ####Briana Ville 771835  
 Rio Rancho, OH   
  
  
  
  
             Hematocrit (Bld) [Volume fraction] 25.8 %       Low          40.0-5  
2.0    Munson Healthcare Cadillac Hospital  
  
  
  
  
                          Comment on above:         Performed By: #### HEMDF, MG  
3, BMP3 ####Briana Ville 771835  
Rio Rancho, OH   
  
  
  
  
             Hemoglobin (Bld) [Mass/Vol] 8.4 g/dL     Low          13.0-18.0      
Munson Healthcare Cadillac Hospital  
  
  
  
  
                          Comment on above:         Performed By: #### HEMDF, MG  
3, BMP3 ####Briana Ville 771835  
 Rio Rancho, OH   
  
  
  
  
             Lymphocytes (Bld) [#/Vol] 1.4 10*3/uL  Normal       1.0-4.3      Harper University Hospital  
  
  
  
  
                          Comment on above:         Performed By: #### HEMDF, MG  
3, BMP3 ####Briana Ville 771835  
 Rio Rancho, OH   
  
  
  
  
             Lymphocytes/100 WBC (Bld) 13.1 %       Low          20.0-40.0    Harper University Hospital  
  
  
  
  
                          Comment on above:         Performed By: #### HEMDF, MG  
3, BMP3 ####Briana Ville 771835  
 Rio Rancho, OH   
  
  
  
  
             MCH (RBC) [Entitic mass] 27.8 pg      Normal       26.0-34.0    Beaumont Hospital  
  
  
  
  
                          Comment on above:         Performed By: #### HEMDF, MG  
3, BMP3 ####38 Herrera Street   
  
  
  
  
             MCHC         32.7 %       Normal       32.0-36.0    Salem City Hospital  
stem  
  
  
  
  
                          Comment on above:         Performed By: #### HEMDF, MG  
3, BMP3 ####38 Herrera Street   
  
  
  
  
             MCV (RBC) [Entitic vol] 85.1 fL      Normal       80.0-98.0    Summ  
a Health System  
  
  
  
  
                          Comment on above:         Performed By: #### HEMDF, MG  
3, BMP3 ####38 Herrera Street   
  
  
  
  
             Monocytes (Bld) [#/Vol] 0.6 10*3/uL  Normal       0.0-0.8      Summ  
a Health System  
  
  
  
  
                          Comment on above:         Performed By: #### HEMDF, MG  
3, BMP3 ####38 Herrera Street   
  
  
  
  
             Monocytes/100 WBC (Bld) 5.7 %        Normal       2.0-10.0     Summ  
a Health System  
  
  
  
  
                          Comment on above:         Performed By: #### HEMDF, MG  
3, BMP3 ####38 Herrera Street   
  
  
  
  
             Platelet mean volume (Bld) [Entitic vol] 7.2 fL       Low            
7.4-10.4     Munson Healthcare Cadillac Hospital  
  
  
  
  
                          Comment on above:         Performed By: #### HEMDF, MG  
3, BMP3 ####38 Herrera Street   
  
  
  
  
             Platelets (Bld) [#/Vol] 312 10*3/uL  Normal       140-440      University of Michigan Hospital  
  
  
  
  
                          Comment on above:         Performed By: #### HEMDF, MG  
3, BMP3 ####Kettering Health Greene Memorial M2 Connections Mojidk140  
 E.  
  
                                                    Elizabeth, OH   
  
  
  
  
             RBC (Bld) [#/Vol] 3.03 10*6/uL Low          4.40-5.90    Holmes County Joel Pomerene Memorial Hospital System  
  
  
  
  
                          Comment on above:         Performed By: #### HEMDF, MG  
3, BMP3 ####Kettering Health Greene Memorial M2 Connections Gdbrvi579  
 E.  
  
                                                    Elizabeth, OH   
  
  
  
  
             WBC (Bld) [#/Vol] 10.9 10*3/uL High         3.6-10.7     Holmes County Joel Pomerene Memorial Hospital System  
  
  
  
  
                          Comment on above:         Performed By: #### HEMDF, MG  
3, BMP3 ####Kettering Health Greene Memorial M2 Connections Ladtgq951  
 Rio Rancho, OH   
  
  
  
  
                                        Magnesium  on 2022  
  
  
  
  
             Magnesium [Mass/Vol] 1.8 mg/dL    Normal       1.6-2.3      Mercy Health St. Joseph Warren Hospital System  
  
  
  
  
                          Comment on above:         Performed By: #### HEMDF, MG  
3, BMP3 ####Kettering Health Greene Memorial M2 Connections Ivhvcf119  
 E.  
  
                                                    Elizabeth, OH   
  
  
  
  
                                        TS GEL  on 2022  
  
  
  
  
             TS GEL       ABO Group:   Normal                    Salem City Hospital  
stem  
  
                          A                                        
  
                          Rh, Gel:                                 
  
                          POS                                      
  
                          Antibody Screen Gel:                             
  
                          NEG                                      
  
  
  
  
                          Comment on above:         Performed By: #### TSGL ####  
Munson Healthcare Cadillac Hospital  
  
  
  
  
                                        APTT  on 2022  
  
  
  
  
             aPTT Coag (Bld) [Time] 66.3 s       High         20.0-30.5    Munson Healthcare Cadillac Hospital  
  
  
  
  
                          Comment on above:         Result Comment: NOTE: The th  
erapeutic time for Heparin  
  
                                                    anticoagulation,based on Xa   
activity inhibition, is an APTT of  
  
                                                    46-80seconds.  
   
                                                    Performed By: #### APTT ####  
Kettering Health Greene Memorial M2 Connections Ttefxs710 Rio Rancho, OH   
  
  
  
  
             aPTT Coag (Bld) [Time] 38.9 s       High         20.0-30.5    Munson Healthcare Cadillac Hospital  
  
  
  
  
                          Comment on above:         Result Comment: NOTE: The th  
erapeutic time for Heparin  
  
                                                    anticoagulation,based on Xa   
activity inhibition, is an APTT of  
  
                                                    46-80seconds.  
   
                                                    Performed By: #### APTT ####  
Briana Ville 771835 Rio Rancho, OH   
  
  
  
  
                                        Basic Metabolic Panel  on 2022  
  
  
  
  
             Anion gap [Moles/Vol] 9 mmol/L     Normal       3-13         Munson Healthcare Cadillac Hospital  
  
  
  
  
                          Comment on above:         Performed By: #### HEMDF MG  
3, BMP3 ####38 Herrera Street   
  
  
  
  
             Calcium [Mass/Vol] 8.7 mg/dL    Normal       8.4-10.4     Munson Healthcare Otsego Memorial Hospital  
  
  
  
  
                          Comment on above:         Performed By: #### HEMDF MG  
3, BMP3 ####38 Herrera Street   
  
  
  
  
             CO2 [Moles/Vol] 21 mmol/L    Low          22-30        Munson Healthcare Cadillac Hospital  
  
  
  
  
                          Comment on above:         Performed By: #### HEMDF MG  
3, BMP3 ####38 Herrera Street   
  
  
  
  
             Glucose [Mass/Vol] 120 mg/dL    High                Munson Healthcare Otsego Memorial Hospital  
  
  
  
  
                          Comment on above:         Performed By: #### HEMDF, MG  
3, BMP3 ####38 Herrera Street   
  
  
  
  
             Urea nitrogen [Mass/Vol] 14 mg/dL     Normal       7-17         Beaumont Hospital  
  
  
  
  
                          Comment on above:         Performed By: #### HEMDF, MG  
3, BMP3 ####38 Herrera Street   
  
  
  
  
             Creatinine [Mass/Vol] 1.04 mg/dL   Normal       0.52-1.25    Munson Healthcare Cadillac Hospital  
  
  
  
  
                          Comment on above:         Performed By: #### HEMDF, MG  
3, BMP3 ####38 Herrera Street   
  
  
  
  
             GFR/1.73 sq M.predicted among 86.9 mL/min/{1.73_m2} Normal       >6  
0          Munson Healthcare Cadillac Hospital  
  
             blacks MDRD (S/P/Bld) [Vol                                          
  
             rate/Area]                                            
  
  
  
  
                          Comment on above:         Performed By: #### HEMDF, MG  
3, BMP3 ####Kettering Health Greene Memorial M2 Connections Lwpucn375  
Rio Rancho, OH 53437  
-2090  
  
  
  
  
             GFR/1.73 sq M.predicted among 75.0 mL/min/{1.73_m2} Normal       >6  
0          Munson Healthcare Cadillac Hospital  
  
             non-blacks MDRD (S/P/Bld) [Vol                                       
     
  
             rate/Area]                                            
  
  
  
  
                          Comment on above:         Result Comment: KDIGO guidel  
zoë provide the following GFR  
  
                                                    categories:Stage GFR(ml/min/  
1.73 m2) TermsG1 >=90 Normal or highG2  
  
                                                    60-89 Mildly decreased*G3a 4  
5-59 Mildly to moderately gnsijzhoaV6b  
  
                                                    30-44 Moderately to severely  
 decreasedG4 15-29 Severely decreasedG5  
  
                                                    <15 Kidney failure*Relative   
to young adult level.In the absence of  
  
                                                    evidence of kidney damage, n  
either GFRcategory G1 nor G2 fulfill  
  
                                                    the criteria for CKD.The CKD  
-EPI equation is validated in  
  
                                                    individuals 18 yearsof age a  
nd older. Currently the best equation  
  
                                                    forestimating glomerular beto  
tration rate (GFR) from serumcreatinine  
  
                                                    in children is the Bedside S  
kristiwartz equation.It is less accurate in  
  
                                                    patients with extremes of mu  
sclemass, restriction of dietary  
  
                                                    protein, ingestion of creati  
ne,extra-renal metabolism of  
  
                                                    creatinine, or treatment wit  
hmedications that affect renal tubular  
  
                                                    creatinine secretion.  
   
                                                    Performed By: #### HEMDF, MG  
3, BMP3 ####K94 Discoveries Zppbqr364 SoundwaveDexter, OH   
  
  
  
  
             Potassium [Moles/Vol] 3.9 mmol/L   Normal       3.5-5.1      Munson Healthcare Cadillac Hospital  
  
  
  
  
                          Comment on above:         Performed By: #### HEMDF, MG  
3, BMP3 ####Cerus Corporation525  
 SoundwaveDexter, OH 49374  
209  
  
  
  
  
             Sodium [Moles/Vol] 136 mmol/L   Normal       135-145      The Jewish Hospital System  
  
  
  
  
                          Comment on above:         Performed By: #### HEMDF, MG  
3, BMP3 ####Kettering Health Greene Memorial M2 Connections Fsweax817  
 Rio Rancho, OH 01264  
209  
  
  
  
  
             Chloride [Moles/Vol] 106 mmol/L   Normal              Mercy Health St. Joseph Warren Hospital System  
  
  
  
  
                          Comment on above:         Performed By: #### HEMDF, MG  
3, BMP3 ####Cerus Corporation525  
 E.  
  
                                                    Elizabeth, OH   
  
  
  
  
                                        Glucose,Bedside  on 2022  
  
  
  
  
             Glucose [Mass/Vol] 156 mg/dL    High                Corey Hospitala a  
Lima City Hospital System  
  
  
  
  
                          Comment on above:         Result Comment: Test perform  
ed by glucose meter. Results may   
be  
  
                                                    10%-15% lowerthan serum/plas  
ma values. (CLIA ID 92B4861714)  
   
                                                    Performed By: #### BGLU ####  
Cerus Corporation525 E. Elizabeth, OH   
  
  
  
  
             Glucose [Mass/Vol] 173 mg/dL    High                Corey Hospitala a  
Lima City Hospital System  
  
  
  
  
                          Comment on above:         Result Comment: Test perform  
ed by glucose meter. Results may   
be  
  
                                                    10%-15% lowerthan serum/plas  
ma values. (CLIA ID 89O1074350)  
   
                                                    Performed By: #### BGLU ####  
Cerus Corporation525 E. Elizabeth, OH   
  
  
  
  
             Glucose [Mass/Vol] 176 mg/dL    High                Corey Hospitala Grant Hospital System  
  
  
  
  
                          Comment on above:         Result Comment: Test perform  
ed by glucose meter. Results may   
be  
  
                                                    10%-15% lowerthan serum/plas  
ma values. (CLIA ID 08J1311037)  
   
                                                    Performed By: #### BGLU ####  
Cerus Corporation525 E. Elizabeth, OH   
  
  
  
  
             Glucose [Mass/Vol] 142 mg/dL    High                Corey Hospitala a  
Lima City Hospital System  
  
  
  
  
                          Comment on above:         Result Comment: Test perform  
ed by glucose meter. Results may   
be  
  
                                                    10%-15% lowerthan serum/plas  
ma values. (CLIA ID 61M7355434)  
   
                                                    Performed By: #### BGLU ####  
Cerus Corporation525 E. Elizabeth, OH   
  
  
  
  
                                        Hemogram w/ Autodiff  on 2022  
  
  
  
  
             Abs Baso Cnt 0.1 10*3/uL  Normal       0.0-0.2      Corey HospitalLaru Technologies Sy  
stem  
  
  
  
  
                          Comment on above:         Performed By: #### HEMDF, MG  
3, BMP3 ####Cerus Corporation525  
 E.  
  
                                                    Elizabeth, OH   
  
  
  
  
             Abs Neutrophile Cnt 9.8 10*3/uL  High         1.8-7.0      ProMedica Coldwater Regional Hospital  
  
  
  
  
                          Comment on above:         Performed By: #### HEMDF MG  
3, BMP3 ####Briana Ville 771835  
 Rio Rancho, OH 58274  
-2090  
  
  
  
  
             Basophils/100 WBC (Bld) 0.5 %        Normal       0.0-2.0      Summ  
a Health System  
  
  
  
  
                          Comment on above:         Performed By: #### HEMDF MG  
3, BMP3 ####Briana Ville 771835  
 EDexter, OH 02508  
  
  
  
  
  
             Eosinophils (Bld) [#/Vol] 0.1 10*3/uL  Normal       0.0-0.5      Harper University Hospital  
  
  
  
  
                          Comment on above:         Performed By: #### HEMDF MG  
3, BMP3 ####Briana Ville 771835  
 Rio Rancho, OH 88105  
  
  
  
  
  
             Eosinophils/100 WBC (Bld) 1.1 %        Normal       1.0-6.0      Harper University Hospital  
  
  
  
  
                          Comment on above:         Performed By: #### HEMDF MG  
3, BMP3 ####Briana Ville 771835  
 Rio Rancho, OH 93420  
  
  
  
  
  
             Erythrocyte distribution width (RBC) 16.2 %       High         11.5  
-14.5    Munson Healthcare Cadillac Hospital  
  
             [Ratio]                                               
  
  
  
  
                          Comment on above:         Performed By: #### HEMDF MG  
3, BMP3 ####Briana Ville 771835  
 Rio Rancho, OH 67603  
  
  
  
  
  
             Granulocytes/100 WBC (Bld) 79.2 %       Normal       40.0-80.0    Insight Surgical Hospital  
  
  
  
  
                          Comment on above:         Performed By: #### HEMDF, MG  
3, BMP3 ####Briana Ville 771835  
 Rio Rancho, OH 38101  
  
  
  
  
  
             Hematocrit (Bld) [Volume fraction] 28.1 %       Low          40.0-5  
2.0    Munson Healthcare Cadillac Hospital  
  
  
  
  
                          Comment on above:         Performed By: #### HEMDF, MG  
3, BMP3 ####Briana Ville 771835  
Rio Rancho, OH 31797  
209  
  
  
  
  
             Hemoglobin (Bld) [Mass/Vol] 9.2 g/dL     Low          13.0-18.0      
Munson Healthcare Cadillac Hospital  
  
  
  
  
                          Comment on above:         Performed By: #### HEMDF, MG  
3, BMP3 ####Briana Ville 771835  
 Rio Rancho, OH   
  
  
  
  
             Lymphocytes (Bld) [#/Vol] 1.8 10*3/uL  Normal       1.0-4.3      Harper University Hospital  
  
  
  
  
                          Comment on above:         Performed By: #### HEMDF, MG  
3, BMP3 ####Briana Ville 771835  
 Rio Rancho, OH   
  
  
  
  
             Lymphocytes/100 WBC (Bld) 14.3 %       Low          20.0-40.0    Harper University Hospital  
  
  
  
  
                          Comment on above:         Performed By: #### HEMDF, MG  
3, BMP3 ####38 Herrera Street   
  
  
  
  
             MCH (RBC) [Entitic mass] 27.7 pg      Normal       26.0-34.0    Beaumont Hospital  
  
  
  
  
                          Comment on above:         Performed By: #### HEMDF, MG  
3, BMP3 ####38 Herrera Street   
  
  
  
  
             MCHC         32.8 %       Normal       32.0-36.0    Mercy Health St. Vincent Medical Center Sy  
stem  
  
  
  
  
                          Comment on above:         Performed By: #### HEMDF, MG  
3, BMP3 ####38 Herrera Street   
  
  
  
  
             MCV (RBC) [Entitic vol] 84.4 fL      Normal       80.0-98.0    Summ  
a Health System  
  
  
  
  
                          Comment on above:         Performed By: #### HEMDF, MG  
3, BMP3 ####38 Herrera Street   
  
  
  
  
             Monocytes (Bld) [#/Vol] 0.6 10*3/uL  Normal       0.0-0.8      Summ  
a Health System  
  
  
  
  
                          Comment on above:         Performed By: #### HEMDF, MG  
3, BMP3 ####38 Herrera Street   
  
  
  
  
             Monocytes/100 WBC (Bld) 4.9 %        Normal       2.0-10.0     Summ  
a Health System  
  
  
  
  
                          Comment on above:         Performed By: #### HEMDF, MG  
3, BMP3 ####38 Herrera Street   
  
  
  
  
             Platelet mean volume (Bld) [Entitic vol] 7.8 fL       Normal         
7.4-10.4     Munson Healthcare Cadillac Hospital  
  
  
  
  
                          Comment on above:         Performed By: #### HEMDF, MG  
3, BMP3 ####Kettering Health Greene Memorial M2 Connections Shzltz407  
E.  
  
                                                    Elizabeth, OH   
  
  
  
  
             Platelets (Bld) [#/Vol] 439 10*3/uL  Normal       140-440      University of Michigan Hospital  
  
  
  
  
                          Comment on above:         Performed By: #### HEMDF, MG  
3, BMP3 ####Kettering Health Greene Memorial M2 Connections Omtdcr895  
 E.  
  
                                                    Elizabeth, OH   
  
  
  
  
             RBC (Bld) [#/Vol] 3.33 10*6/uL Low          4.40-5.90    Holmes County Joel Pomerene Memorial Hospital System  
  
  
  
  
                          Comment on above:         Performed By: #### HEMDF, MG  
3, BMP3 ####Kettering Health Greene Memorial M2 Connections Lvbkdv478  
 E.  
  
                                                    Elizabeth, OH   
  
  
  
  
             WBC (Bld) [#/Vol] 12.4 10*3/uL High         3.6-10.7     Holmes County Joel Pomerene Memorial Hospital System  
  
  
  
  
                          Comment on above:         Performed By: #### HEMDF, MG  
3, BMP3 ####Kettering Health Greene Memorial Stottler Henke Associates525  
 E.  
  
                                                    Elizabeth, OH   
  
  
  
  
                                        Magnesium  on 2022  
  
  
  
  
             Magnesium [Mass/Vol] 1.9 mg/dL    Normal       1.6-2.3      Summa H  
ealth System  
  
  
  
  
                          Comment on above:         Performed By: #### HEMDF, MG  
3, BMP3 ####Kettering Health Greene Memorial M2 Connections Afndbw250  
 .  
  
                                                    Elizabeth, OH   
  
  
  
  
                                        APTT  on 2022  
  
  
  
  
             aPTT Coag (Bld) [Time] 48.5 s       High         20.0-30.5    Munson Healthcare Cadillac Hospital  
  
  
  
  
                          Comment on above:         Result Comment: NOTE: The th  
erapeutic time for Heparin  
  
                                                    anticoagulation,based on Xa   
activity inhibition, is an APTT of  
  
                                                    46-80seconds.  
   
                                                    Performed By: #### APTT ####  
Kettering Health Greene Memorial M2 Connections Exnqji633 Rio Rancho, OH   
  
  
  
  
             aPTT Coag (Bld) [Time] 52.5 s       High         20.0-30.5    Summa  
 Health System  
  
  
  
  
                          Comment on above:         Result Comment: NOTE: The th  
erapeutic time for Heparin  
  
                                                    anticoagulation,based on Xa   
activity inhibition, is an APTT of  
  
                                                    46-80seconds.  
   
                                                    Performed By: #### APTT ####  
Briana Ville 771835 E. Munson Healthcare Manistee Hospital  
  
                                                    JORGE OH   
  
  
  
  
                                        Basic Metabolic Panel  on 2022  
  
  
  
  
             Anion gap [Moles/Vol] 8 mmol/L     Normal       3-13         Munson Healthcare Cadillac Hospital  
  
  
  
  
                          Comment on above:         Performed By: #### MG3 BMP3  
KACEY MDIFF ####88 Johnson Street. Munson Healthcare Manistee Hospital NICOAK  
HANNAH, OH   
  
  
  
  
             Calcium [Mass/Vol] 9.0 mg/dL    Normal       8.4-10.4     Munson Healthcare Otsego Memorial Hospital  
  
  
  
  
                          Comment on above:         Performed By: #### MG3 BMP3  
KACEY MDIFF ####Thomas Ville 45855 E. ECU Health Duplin Hospital  
HANNAH, OH   
  
  
  
  
             CO2 [Moles/Vol] 24 mmol/L    Normal       22-30        Munson Healthcare Cadillac Hospital  
  
  
  
  
                          Comment on above:         Performed By: #### MG3 BMP3  
KACEY MDIFF ####Thomas Ville 45855 E. Munson Healthcare Manistee Hospital NICOAK  
HANNAH, OH   
  
  
  
  
             Glucose [Mass/Vol] 130 mg/dL    High                Munson Healthcare Otsego Memorial Hospital  
  
  
  
  
                          Comment on above:         Performed By: #### MG3, BMP3  
KACEY MDIFF ####Thomas Ville 45855 E. Munson Healthcare Manistee Hospital MIRIAM YOUNG, OH   
  
  
  
  
             Urea nitrogen [Mass/Vol] 17 mg/dL     Normal       7-17         Beaumont Hospital  
  
  
  
  
                          Comment on above:         Performed By: #### MG3, BMP3  
KACEY MDIFF ####88 Johnson Street. ECU Health Duplin Hospital  
HANNAH, OH   
  
  
  
  
             Creatinine [Mass/Vol] 0.98 mg/dL   Normal       0.52-1.25    Munson Healthcare Cadillac Hospital  
  
  
  
  
                          Comment on above:         Performed By: #### MG3, BMP3  
 HEMMD YANCIIFF ####94 Mullins Street  
HANNAH, OH   
  
  
  
  
             GFR/1.73 sq M.predicted among blacks mL/min/{1.73_m2} Normal         
>60          Munson Healthcare Cadillac Hospital  
  
             MDRD (S/P/Bld) [Vol rate/Area]                                       
     
  
  
  
  
                          Comment on above:         Performed By: #### IVETH CACERES HEMDF, MDIFF ####Briana Ville 771835 Allison, OH   
  
  
  
  
             GFR/1.73 sq M.predicted among 80.6 mL/min/{1.73_m2} Normal       >6  
0          Munson Healthcare Cadillac Hospital  
  
             non-blacks MDRD (S/P/Bld) [Vol                                       
     
  
             rate/Area]                                            
  
  
  
  
                          Comment on above:         Result Comment: KDIGO guidel  
zoë provide the following GFR  
  
                                                    categories:Stage GFR(ml/min/  
1.73 m2) TermsG1 >=90 Normal or highG2  
  
                                                    60-89 Mildly decreased*G3a 4  
5-59 Mildly to moderately qnglcloboQ4p  
  
                                                    30-44 Moderately to severely  
 decreasedG4 15-29 Severely decreasedG5  
  
                                                    <15 Kidney failure*Relative   
to young adult level.In the absence of  
  
                                                    evidence of kidney damage, n  
either GFRcategory G1 nor G2 fulfill  
  
                                                    the criteria for CKD.The CKD  
-EPI equation is validated in  
  
                                                    individuals 18 yearsof age a  
nd older. Currently the best equation  
  
                                                    forestimating glomerular beto  
tration rate (GFR) from serumcreatinine  
  
                                                    in children is the Bedside S  
kristiwartz equation.It is less accurate in  
  
                                                    patients with extremes of mu  
sclemass, restriction of dietary  
  
                                                    protein, ingestion of creati  
ne,extra-renal metabolism of  
  
                                                    creatinine, or treatment wit  
hmedications that affect renal tubular  
  
                                                    creatinine secretion.  
   
                                                    Performed By: #### IVETH CACERES HEMDF, MDIFF ####Briana Ville 771835 Allison, OH   
  
  
  
  
             Chloride [Moles/Vol] 106 mmol/L   Normal              Munising Memorial Hospital  
  
  
  
  
                          Comment on above:         Performed By: #### IVETH CACERES HEMDF, MDIFF ####Briana Ville 771835 Allison, OH   
  
  
  
  
             Potassium [Moles/Vol] 4.0 mmol/L   Normal       3.5-5.1      Munson Healthcare Cadillac Hospital  
  
  
  
  
                          Comment on above:         Performed By: #### IVETH CACERES HEMDF, MDIFF ####Briana Ville 771835 Allison, OH 97399-6858  
  
  
  
  
             Sodium [Moles/Vol] 138 mmol/L   Normal       135-145      The Jewish Hospital System  
  
  
  
  
                          Comment on above:         Performed By: #### MG3, BMP3  
, HEMDF, MDIFF ####K94 Discoveries  
  
                                                    Evudjl505 Allison, OH 02485-3484  
  
  
  
  
                                        Glucose,Bedside  on 2022  
  
  
  
  
             Glucose [Mass/Vol] 163 mg/dL    High                Corey Hospitala Grant Hospital System  
  
  
  
  
                          Comment on above:         Result Comment: Test perform  
ed by glucose meter. Results may   
be  
  
                                                    10%-15% lowerthan serum/plas  
ma values. (CLIA ID 24Y3566901)  
   
                                                    Performed By: #### BGLU ####  
Kettering Health Greene Memorial M2 Connections Pljbiq037 E. Elizabeth, OH 63565-5346  
  
  
  
  
             Glucose [Mass/Vol] 128 mg/dL    High                Corey Hospitala Grant Hospital System  
  
  
  
  
                          Comment on above:         Result Comment: Test perform  
ed by glucose meter. Results may   
be  
  
                                                    10%-15% lowerthan serum/plas  
ma values. (CLIA ID 98R4964772)  
   
                                                    Performed By: #### BGLU ####  
K94 Discoveries Xvwviz749 E. Elizabeth, OH 68755-1602  
  
  
  
  
             Glucose [Mass/Vol] 126 mg/dL    High                Corey Hospitala Grant Hospital System  
  
  
  
  
                          Comment on above:         Result Comment: Test perform  
ed by glucose meter. Results may   
be  
  
                                                    10%-15% lowerthan serum/plas  
ma values. (CLIA ID 81X8913376)  
   
                                                    Performed By: #### BGLU ####  
K94 Discoveries Parulf632 E. Elizabeth, OH 70587-1680  
  
  
  
  
             Glucose [Mass/Vol] 137 mg/dL    High                The Jewish Hospital System  
  
  
  
  
                          Comment on above:         Result Comment: Test perform  
ed by glucose meter. Results may   
be  
  
                                                    10%-15% lowerthan serum/plas  
ma values. (CLIA ID 36W9441922)  
   
                                                    Performed By: #### BGLU ####  
K94 Discoveries Wzztpw579 Rio Rancho, OH 65023-9051  
  
  
  
  
                                        Hemogram w/ Autodiff   on 2022  
  
  
  
  
             Erythrocyte distribution width (RBC) 16.3 %       High         11.5  
-14.5    Munson Healthcare Cadillac Hospital  
  
             [Ratio]                                               
  
  
  
  
                          Comment on above:         Performed By: #### MG3, BMP3  
, HEMDF, MDIFF ####Briana Ville 771835 Allison, OH   
  
  
  
  
             Hematocrit (Bld) [Volume fraction] 28.0 %       Low          40.0-5  
2.0    Munson Healthcare Cadillac Hospital  
  
  
  
  
                          Comment on above:         Performed By: #### MG3, BMP3  
, HEMDF, MDIFF ####Thomas Ville 45855 EYork, OH   
  
  
  
  
             Hemoglobin (Bld) [Mass/Vol] 9.1 g/dL     Low          13.0-18.0      
Munson Healthcare Cadillac Hospital  
  
  
  
  
                          Comment on above:         Performed By: #### IVETH CACERES HEMDF, MDIFF ####04 Allen Street   
  
  
  
  
             MCH (RBC) [Entitic mass] 27.6 pg      Normal       26.0-34.0    Beaumont Hospital  
  
  
  
  
                          Comment on above:         Performed By: #### IVETH CACERES HEMDF, MDIFF ####04 Allen Street   
  
  
  
  
             MCHC         32.4 %       Normal       32.0-36.0    Salem City Hospital  
stem  
  
  
  
  
                          Comment on above:         Performed By: #### IVETH CACREES HEMDF, MDIFF ####Briana Ville 771835 Allison, OH   
  
  
  
  
             MCV (RBC) [Entitic vol] 85.3 fL      Normal       80.0-98.0    Summ  
a Health System  
  
  
  
  
                          Comment on above:         Performed By: #### MG3, BMP3  
, HEMDF, MDIFF ####04 Allen Street   
  
  
  
  
             Platelet mean volume (Bld) [Entitic vol] 7.7 fL       Normal         
7.4-10.4     Munson Healthcare Cadillac Hospital  
  
  
  
  
                          Comment on above:         Performed By: #### MG3, BMP3  
, HEMDF, MDIFF ####04 Allen Street   
  
  
  
  
             Platelets (Bld) [#/Vol] 391 10*3/uL  Normal       140-440      University of Michigan Hospital  
  
  
  
  
                          Comment on above:         Performed By: #### MG3 KACEY LAZARO MDIFF ####Cerus Corporation525 Allison, OH   
  
  
  
  
             RBC (Bld) [#/Vol] 3.29 10*6/uL Low          4.40-5.90    Corey Hospitala MetroHealth Parma Medical Center System  
  
  
  
  
                          Comment on above:         Performed By: #### MG3, BMP3  
KACEY MDIFF ####Corey HospitalIndependent Space525 Allison, OH   
  
  
  
  
             WBC (Bld) [#/Vol] 12.9 10*3/uL High         3.6-10.7     Corey Hospitala MetroHealth Parma Medical Center System  
  
  
  
  
                          Comment on above:         Performed By: #### MG3IVETH HEMDF, MDIFF ####Cerus Corporation28 Robinson Street Minden, WV 25879   
  
  
  
  
                                        Magnesium  on 2022  
  
  
  
  
             Magnesium [Mass/Vol] 2.0 mg/dL    Normal       1.6-2.3      Summa H  
eaLima City Hospital System  
  
  
  
  
                          Comment on above:         Performed By: #### MG3, BMP3  
, HEMDF, MDIFF ####Cerus Corporation28 Robinson Street Minden, WV 25879   
  
  
  
  
                                        Manual Diff  on 2022  
  
  
  
  
             Abs Eosin Cnt 0.1 10*3/uL  Normal       0.0-0.5      Mercy Health St. Vincent Medical Center S  
yste  
  
  
  
  
                          Comment on above:         Performed By: #### MG3, BMP3  
KACEY MDIFF ####Cerus Corporation28 Robinson Street Minden, WV 25879   
  
  
  
  
             Abs Lymph Cnt 1.4 10*3/uL  Normal       1.1-4.5      Corey Hospitala Health S  
yste  
  
  
  
  
                          Comment on above:         Performed By: #### MG3, BMP3  
KACEY MDIFF ####Cerus Corporation525 Allison, OH   
  
  
  
  
             Abs Monocyte Cnt 0.8 10*3/uL  Normal       0.2-1.1      Corey Hospitala ACMC Healthcare System System  
  
  
  
  
                          Comment on above:         Performed By: #### MG3, BMP3  
, HEMGWENDOLYN PETE ####Cerus Corporation525 Allison, OH   
  
  
  
  
             Abs Neutrophile Cnt 10.6 10*3/uL High         2.2-8.2      Corey Hospitala He  
alth System  
  
  
  
  
                          Comment on above:         Performed By: #### MG3, BMP3  
, MD KAECYIFF ####Mercy Health St. Vincent Medical Center  
  
                                                    Kqzncp101 E. Gause, OH   
  
  
  
  
             Anisocytosis Slight       Normal                    Summa Health Sy  
stem  
  
  
  
  
                          Comment on above:         Performed By: #### MG3, BMP3  
, HEMAYNCI, MDIFF ####Mercy Health St. Vincent Medical Center  
  
                                                    Fklogv630 E. Gause, OH   
  
  
  
  
             Bands        2 %          Normal       0-3          Summa Health Sy  
stem  
  
  
  
  
                          Comment on above:         Performed By: #### MG3, BMP3  
, KACEY, MDIFF ####Mercy Health St. Vincent Medical Center  
  
                                                    Hxocqu724 E. Gause, OH   
  
  
  
  
             Eosinophils  1 %          Normal       1-6          Summa Health Sy  
stem  
  
  
  
  
                          Comment on above:         Performed By: #### MG3, BMP3  
, MD KACEYIFF ####Briana Ville 771835 E. Gause, OH   
  
  
  
  
             Lymphocytes  11 %         Low          20-40        Summa Health Sy  
stem  
  
  
  
  
                          Comment on above:         Performed By: #### MG3, BMP3  
, MD KACEYIFF ####Mercy Health St. Vincent Medical Center  
  
                                                    Wheoum658 E. Gause, OH   
  
  
  
  
             Microcytosis Slight       Normal                    Summa Health Sy  
stem  
  
  
  
  
                          Comment on above:         Performed By: #### MG3, BMP3  
, MD KACEYIFF ####Mercy Health St. Vincent Medical Center  
  
                                                    Ivdbqb820 E. Gause, OH   
  
  
  
  
             Monocytes    6 %          Normal       2-10         Summa Health Sy  
stem  
  
  
  
  
                          Comment on above:         Performed By: #### MG3, BMP3  
KACEY MDIFF ####Mercy Health St. Vincent Medical Center  
  
                                                    Nuhyif862 EYork, OH   
  
  
  
  
             Ovalocytes   Slight       Normal                    Summa Health Sy  
stem  
  
  
  
  
                          Comment on above:         Performed By: #### MG3, BMP3  
KACEY MDIFF ####Mercy Health St. Vincent Medical Center  
  
                                                    Slwqvb917 E. Gause, OH   
  
  
  
  
             Poikilocytosis Slight       Normal                    Corey Hospitala Health   
System  
  
  
  
  
                          Comment on above:         Performed By: #### MG3, BMP3  
, HEMMD YANCIIFF ####Mercy Health St. Vincent Medical Center  
  
                                                    Ekgswe905 E. Gause, OH   
  
  
  
  
             Polychromasia Slight       Normal                    Select Medical Specialty Hospital - Cincinnati  
ystem  
  
  
  
  
                          Comment on above:         Performed By: #### MG3, BMP3  
, HEMDF, MDIFF ####Kettering Health Greene Memorial M2 Connections  
  
                                                    Adrumd770 E. Gause, OH   
  
  
  
  
             RBC Morphology ABNORMAL     Normal                    Munson Healthcare Cadillac Hospital  
  
  
  
  
                          Comment on above:         Performed By: #### MG3, BMP3  
, HEMDF, MDIFF ####Kettering Health Greene Memorial M2 Connections  
  
                                                    Fqxetg513 E. Gause, OH   
  
  
  
  
             Seg Neutrophils 80 %         Normal       40-80        Munson Healthcare Cadillac Hospital  
  
  
  
  
                          Comment on above:         Performed By: #### MG3, BMP3  
, HEMDF, MDIFF ####Kettering Health Greene Memorial M2 Connections  
  
                                                    Fywcmg567 . Gause, OH   
  
  
  
  
             Toxic Granulation Slight       Normal                    Corey Hospitala Heal  
 System  
  
  
  
  
                          Comment on above:         Performed By: #### MG3, BMP3  
, HEMDF, MDIFF ####Kettering Health Greene Memorial M2 Connections  
  
                                                    Ciaamk845 . Gause, OH   
  
  
  
  
             Abs Baso Cnt 0.0 10*3/uL  Normal       0.0-0.2      Kettering Health Greene Memorial Health Sy  
stem  
  
  
  
  
                          Comment on above:         Performed By: #### MG3, BMP3  
, HEMYANCI, MDIFF ####Kettering Health Greene Memorial M2 Connections  
  
                                                    Zapacx304 Allison, OH   
  
  
  
  
             Basophils    0 %          Normal       0-2          Corey Hospitala Health   
stem  
  
  
  
  
                          Comment on above:         Performed By: #### MG3, BMP3  
, HEMDF, MDIFF ####Kettering Health Greene Memorial M2 Connections  
  
                                                    Xqhtoj885 Allison, OH   
  
  
  
  
             Cells counted 100          Normal                    Select Medical Specialty Hospital - Cincinnati  
yste  
  
  
  
  
                          Comment on above:         Performed By: #### MG3, BMP3  
, HEMYANCI, MDIFF ####Kettering Health Greene Memorial M2 Connections  
  
                                                    Foxuxo788 Soundwave. Gause, OH   
  
  
  
  
                                        APTT  on 2022  
  
  
  
  
             aPTT Coag (Bld) [Time] 52.4 s       High         20.0-30.5    Munson Healthcare Cadillac Hospital  
  
  
  
  
                          Comment on above:         Result Comment: NOTE: The th  
erapeutic time for Heparin  
  
                                                    anticoagulation,based on Xa   
activity inhibition, is an APTT of  
  
                                                    46-80seconds.  
   
                                                    Performed By: #### APTT ####  
Kettering Health Greene Memorial M2 Connections Ogwnnf585 E. Elizabeth, OH   
  
  
  
  
             aPTT Coag (Bld) [Time] 53.0 s       High         20.0-30.5    Munson Healthcare Cadillac Hospital  
  
  
  
  
                          Comment on above:         Result Comment: NOTE: The th  
erapeutic time for Heparin  
  
                                                    anticoagulation,based on Xa   
activity inhibition, is an APTT of  
  
                                                    46-80seconds.  
   
                                                    Performed By: #### APTT ####  
Briana Ville 771835 E. Elizabeth, OH   
  
  
  
  
             aPTT Coag (Bld) [Time] 38.5 s       High         20.0-30.5    Munson Healthcare Cadillac Hospital  
  
  
  
  
                          Comment on above:         Result Comment: NOTE: The th  
erapeutic time for Heparin  
  
                                                    anticoagulation,based on Xa   
activity inhibition, is an APTT of  
  
                                                    46-80seconds.  
   
                                                    Performed By: #### APTT ####  
Thomas Ville 45855 E. Elizabeth, OH   
  
  
  
  
                                        Basic Metabolic Panel  on 2022  
  
  
  
  
             Anion gap [Moles/Vol] 8 mmol/L     Normal       3-13         Munson Healthcare Cadillac Hospital  
  
  
  
  
                          Comment on above:         Performed By: #### HEMDF, MG  
3, BMP3 ####Thomas Ville 45855  
 E.  
  
                                                    Elizabeth, OH   
  
  
  
  
             Calcium [Mass/Vol] 8.6 mg/dL    Normal       8.4-10.4     Munson Healthcare Otsego Memorial Hospital  
  
  
  
  
                          Comment on above:         Performed By: #### HEMDF, MG  
3, BMP3 ####Thomas Ville 45855  
 E.  
  
                                                    Elizabeth, OH   
  
  
  
  
             CO2 [Moles/Vol] 22 mmol/L    Normal       22-30        Munson Healthcare Cadillac Hospital  
  
  
  
  
                          Comment on above:         Performed By: #### HEMDF, MG  
3, BMP3 ####Briana Ville 771835  
 E.  
  
                                                    Elizabeth, OH   
  
  
  
  
             Glucose [Mass/Vol] 129 mg/dL    High                Munson Healthcare Otsego Memorial Hospital  
  
  
  
  
                          Comment on above:         Performed By: #### HEMDF, MG  
3, BMP3 ####Briana Ville 771835  
 E.  
  
                                                    Elizabeth, OH   
  
  
  
  
             Urea nitrogen [Mass/Vol] 18 mg/dL     High         7-17         Beaumont Hospital  
  
  
  
  
                          Comment on above:         Performed By: #### HEMDF, MG  
3, BMP3 ####K94 Discoveries Voggqf546  
 SoundwaveDexter, OH   
  
  
  
  
             Creatinine [Mass/Vol] 0.91 mg/dL   Normal       0.52-1.25    Munson Healthcare Cadillac Hospital  
  
  
  
  
                          Comment on above:         Performed By: #### HEMDF, MG  
3, BMP3 ####K94 Discoveries Rgewcg975  
 Rio Rancho, OH 55916  
0  
  
  
  
  
             GFR/1.73 sq M.predicted among blacks mL/min/{1.73_m2} Normal         
>60          Munson Healthcare Cadillac Hospital  
  
             MDRD (S/P/Bld) [Vol rate/Area]                                       
     
  
  
  
  
                          Comment on above:         Performed By: #### HEMDF, MG  
3, BMP3 ####Cerus Corporation525  
Rio Rancho, OH   
  
  
  
  
             GFR/1.73 sq M.predicted among 88.2 mL/min/{1.73_m2} Normal       >6  
0          Munson Healthcare Cadillac Hospital  
  
             non-blacks MDRD (S/P/Bld) [Vol                                       
     
  
             rate/Area]                                            
  
  
  
  
                          Comment on above:         Result Comment: KDIGO guidel  
zoë provide the following GFR  
  
                                                    categories:Stage GFR(ml/min/  
1.73 m2) TermsG1 >=90 Normal or highG2  
  
                                                    60-89 Mildly decreased*G3a 4  
5-59 Mildly to moderately cmyfwfccmL7i  
  
                                                    30-44 Moderately to severely  
 decreasedG4 15-29 Severely decreasedG5  
  
                                                    <15 Kidney failure*Relative   
to young adult level.In the absence of  
  
                                                    evidence of kidney damage, n  
either GFRcategory G1 nor G2 fulfill  
  
                                                    the criteria for CKD.The CKD  
-EPI equation is validated in  
  
                                                    individuals 18 yearsof age a  
nd older. Currently the best equation  
  
                                                    forestimating glomerular beto  
tration rate (GFR) from serumcreatinine  
  
                                                    in children is the Bedside S  
kristiwartz equation.It is less accurate in  
  
                                                    patients with extremes of mu  
sclemass, restriction of dietary  
  
                                                    protein, ingestion of creati  
ne,extra-renal metabolism of  
  
                                                    creatinine, or treatment wit  
hmedications that affect renal tubular  
  
                                                    creatinine secretion.  
   
                                                    Performed By: #### HEMDF, MG  
3, BMP3 ####K94 Discoveries Hkrvjc703 Rio Rancho, OH   
  
  
  
  
             Potassium [Moles/Vol] 4.0 mmol/L   Normal       3.5-5.1      Munson Healthcare Cadillac Hospital  
  
  
  
  
                          Comment on above:         Performed By: #### HEMDF, MG  
3, BMP3 ####K94 Discoveries Uewxeb618  
 E.  
  
                                                    Mary Free Bed Rehabilitation Hospital, OH 64740  
-0  
  
  
  
  
             Sodium [Moles/Vol] 137 mmol/L   Normal       135-145      Corey Hospitala Hea  
lt System  
  
  
  
  
                          Comment on above:         Performed By: #### HEMDF, MG  
3, BMP3 ####K94 Discoveries Btjnut336  
 E.  
  
                                                    ECU Health Duplin HospitalRON, OH 43191  
-2090  
  
  
  
  
             Chloride [Moles/Vol] 106 mmol/L   Normal              Mercy Health St. Joseph Warren Hospital System  
  
  
  
  
                          Comment on above:         Performed By: #### HEMDF, MG  
3, BMP3 ####K94 Discoveries Ansjnj854  
 E.  
  
                                                    Elizabeth, OH 80628  
-2090  
  
  
  
  
                                        Glucose,Bedside   on 2022  
  
  
  
  
             Glucose [Mass/Vol] 165 mg/dL    High                Corey Hospitala Hea  
lt System  
  
  
  
  
                          Comment on above:         Result Comment: Test perform  
ed by glucose meter. Results may   
be  
  
                                                    10%-15% lowerthan serum/plas  
ma values. (CLIA ID 53U2401129)  
   
                                                    Performed By: #### BGLU ####  
K94 Discoveries Kueycd031 E. Mary Free Bed Rehabilitation Hospital, OH 21699-0267  
  
  
  
  
             Glucose [Mass/Vol] 129 mg/dL    High                Corey Hospitala Hea  
lt System  
  
  
  
  
                          Comment on above:         Result Comment: Test perform  
ed by glucose meter. Results may   
be  
  
                                                    10%-15% lowerthan serum/plas  
ma values. (CLIA ID 13T2993676)  
   
                                                    Performed By: #### BGLU ####  
K94 Discoveries Gwxyki463 E. Mary Free Bed Rehabilitation Hospital, OH 64117-4270  
  
  
  
  
             Glucose [Mass/Vol] 142 mg/dL    High                Corey Hospitala Hea  
lt System  
  
  
  
  
                          Comment on above:         Result Comment: Test perform  
ed by glucose meter. Results may   
be  
  
                                                    10%-15% lowerthan serum/plas  
ma values. (CLIA ID 58S9788507)  
   
                                                    Performed By: #### BGLU ####  
K94 Discoveries Tusbkv522 E. ECU Health Duplin HospitalRON, OH 00929-3838  
  
  
  
  
             Glucose [Mass/Vol] 139 mg/dL    High                Corey Hospitala Hea  
lt System  
  
  
  
  
                          Comment on above:         Result Comment: Test perform  
ed by glucose meter. Results may   
be  
  
                                                    10%-15% lowerthan serum/plas  
ma values. (CLIA ID 87W3680736)  
   
                                                    Performed By: #### BGLU ####  
Kettering Health Greene Memorial M2 Connections Skzeht377 E. Elizabeth, OH   
  
  
  
  
                                        Hemogram w/ Autodiff  on 2022  
  
  
  
  
             Abs Baso Cnt 0.1 10*3/uL  Normal       0.0-0.2      Mercy Health St. Vincent Medical Center Sy  
stem  
  
  
  
  
                          Comment on above:         Performed By: #### HEMDF, MG  
3, BMP3 ####Kettering Health Greene Memorial M2 Connections Qcmgau631  
 E.  
  
                                                    Mary Free Bed Rehabilitation Hospital, OH 49728  
  
  
  
  
  
             Abs Neutrophile Cnt 9.4 10*3/uL  High         1.8-7.0      ProMedica Coldwater Regional Hospital  
  
  
  
  
                          Comment on above:         Performed By: #### HEMDF, MG  
3, BMP3 ####Kettering Health Greene Memorial M2 Connections Prsfhb204  
 E.  
  
                                                    Mary Free Bed Rehabilitation Hospital, OH 81322  
  
  
  
  
  
             Basophils/100 WBC (Bld) 0.9 %        Normal       0.0-2.0      Summ  
a Health System  
  
  
  
  
                          Comment on above:         Performed By: #### HEMDF, MG  
3, BMP3 ####Kettering Health Greene Memorial M2 Connections Yvkpsu573  
 E.  
  
                                                    Mary Free Bed Rehabilitation Hospital, OH 45982  
  
  
  
  
  
             Eosinophils (Bld) [#/Vol] 0.1 10*3/uL  Normal       0.0-0.5      Harper University Hospital  
  
  
  
  
                          Comment on above:         Performed By: #### HEMDF, MG  
3, BMP3 ####Kettering Health Greene Memorial M2 Connections Iaoxao902  
 E.  
  
                                                    Mary Free Bed Rehabilitation Hospital, OH 87614  
  
  
  
  
  
             Eosinophils/100 WBC (Bld) 0.9 %        Low          1.0-6.0      Harper University Hospital  
  
  
  
  
                          Comment on above:         Performed By: #### HEMDF, MG  
3, BMP3 ####Kettering Health Greene Memorial M2 Connections Ewywvx507  
 E.  
  
                                                    Mary Free Bed Rehabilitation Hospital, OH 10660  
2090  
  
  
  
  
             Granulocytes/100 WBC (Bld) 81.7 %       High         40.0-80.0    Insight Surgical Hospital  
  
  
  
  
                          Comment on above:         Performed By: #### HEMDF, MG  
3, BMP3 ####Kettering Health Greene Memorial M2 Connections Qfwutm593  
 E.  
  
                                                    Elizabeth, OH 38204  
  
  
  
  
  
             Lymphocytes (Bld) [#/Vol] 1.3 10*3/uL  Normal       1.0-4.3      Harper University Hospital  
  
  
  
  
                          Comment on above:         Performed By: #### HEMDF, MG  
3, BMP3 ####38 Herrera Street   
  
  
  
  
             Lymphocytes/100 WBC (Bld) 11.3 %       Low          20.0-40.0    Harper University Hospital  
  
  
  
  
                          Comment on above:         Performed By: #### HEMDF, MG  
3, BMP3 ####38 Herrera Street   
  
  
  
  
             Monocytes (Bld) [#/Vol] 0.6 10*3/uL  Normal       0.0-0.8      Summ  
a Health System  
  
  
  
  
                          Comment on above:         Performed By: #### HEMDF, MG  
3, BMP3 ####38 Herrera Street   
  
  
  
  
             Monocytes/100 WBC (Bld) 5.2 %        Normal       2.0-10.0     Summ  
a Health System  
  
  
  
  
                          Comment on above:         Performed By: #### HEMDF, MG  
3, BMP3 ####38 Herrera Street   
  
  
  
  
             Erythrocyte distribution width (RBC) 16.0 %       High         11.5  
-14.5    Munson Healthcare Cadillac Hospital  
  
             [Ratio]                                               
  
  
  
  
                          Comment on above:         Performed By: #### HEMDF, MG  
3, BMP3 ####38 Herrera Street   
  
  
  
  
             Hematocrit (Bld) [Volume fraction] 26.1 %       Low          40.0-5  
2.0    Munson Healthcare Cadillac Hospital  
  
  
  
  
                          Comment on above:         Performed By: #### HEMDF, MG  
3, BMP3 ####38 Herrera Street   
  
  
  
  
             Hemoglobin (Bld) [Mass/Vol] 8.4 g/dL     Low          13.0-18.0      
Munson Healthcare Cadillac Hospital  
  
  
  
  
                          Comment on above:         Performed By: #### HEMDF, MG  
3, BMP3 ####38 Herrera Street   
  
  
  
  
             MCH (RBC) [Entitic mass] 27.5 pg      Normal       26.0-34.0    Beaumont Hospital  
  
  
  
  
                          Comment on above:         Performed By: #### HEMDF, MG  
3, BMP3 ####Briana Ville 771835  
Rio Rancho, OH   
  
  
  
  
             MCHC         32.2 %       Normal       32.0-36.0    Salem City Hospital  
stem  
  
  
  
  
                          Comment on above:         Performed By: #### HEMDF, MG  
3, BMP3 ####Briana Ville 771835  
Rio Rancho, OH   
  
  
  
  
             MCV (RBC) [Entitic vol] 85.4 fL      Normal       80.0-98.0    Summ  
a Health System  
  
  
  
  
                          Comment on above:         Performed By: #### HEMDF, MG  
3, BMP3 ####38 Herrera Street   
  
  
  
  
             Platelet mean volume (Bld) [Entitic vol] 8.0 fL       Normal         
7.4-10.4     Munson Healthcare Cadillac Hospital  
  
  
  
  
                          Comment on above:         Performed By: #### HEMDF, MG  
3, BMP3 ####38 Herrera Street   
  
  
  
  
             Platelets (Bld) [#/Vol] 345 10*3/uL  Normal       140-440      University of Michigan Hospital  
  
  
  
  
                          Comment on above:         Performed By: #### HEMDF, MG  
3, BMP3 ####Briana Ville 771835  
 Rio Rancho, OH   
  
  
  
  
             RBC (Bld) [#/Vol] 3.05 10*6/uL Low          4.40-5.90    Holmes County Joel Pomerene Memorial Hospital System  
  
  
  
  
                          Comment on above:         Performed By: #### HEMDF, MG  
3, BMP3 ####Briana Ville 771835  
 Rio Rancho, OH   
  
  
  
  
             WBC (Bld) [#/Vol] 11.7 10*3/uL High         3.6-10.7     Holmes County Joel Pomerene Memorial Hospital System  
  
  
  
  
                          Comment on above:         Performed By: #### HEMDF, MG  
3, BMP3 ####38 Herrera Street   
  
  
  
  
                                        Magnesium  on 2022  
  
  
  
  
             Magnesium [Mass/Vol] 2.1 mg/dL    Normal       1.6-2.3      Mercy Health St. Joseph Warren Hospital System  
  
  
  
  
                          Comment on above:         Performed By: #### HEMDF, MG  
3, BMP3 ####38 Herrera Street   
  
  
  
  
                                        APTT  on 01-  
  
  
  
  
             aPTT Coag (Bld) [Time] 51.0 s       High         20.0-30.5    Munson Healthcare Cadillac Hospital  
  
  
  
  
                          Comment on above:         Result Comment: NOTE: The th  
erapeutic time for Heparin  
  
                                                    anticoagulation,based on Xa   
activity inhibition, is an APTT of  
  
                                                    46-80seconds.  
   
                                                    Performed By: #### APTT ####  
38 Herrera Street   
  
  
  
  
                                        Basic Metabolic Panel   on 01-  
  
  
  
  
             Calcium [Mass/Vol] 7.8 mg/dL    Low          8.4-10.4     Munson Healthcare Otsego Memorial Hospital  
  
  
  
  
                          Comment on above:         Performed By: #### HEMDF, MG  
3, BMP3 ####38 Herrera Street   
  
  
  
  
             Glucose [Mass/Vol] 128 mg/dL    High                Munson Healthcare Otsego Memorial Hospital  
  
  
  
  
                          Comment on above:         Performed By: #### HEMDF, MG  
3, BMP3 ####38 Herrera Street   
  
  
  
  
             Urea nitrogen [Mass/Vol] 19 mg/dL     High         7-17         Beaumont Hospital  
  
  
  
  
                          Comment on above:         Performed By: #### HEMDF, MG  
3, BMP3 ####Briana Ville 771835  
 Rio Rancho, OH   
  
  
  
  
             Anion gap [Moles/Vol] 6 mmol/L     Normal       3-13         Munson Healthcare Cadillac Hospital  
  
  
  
  
                          Comment on above:         Performed By: #### HEMDF, MG  
3, BMP3 ####Briana Ville 771835  
 Rio Rancho, OH   
  
  
  
  
             CO2 [Moles/Vol] 24 mmol/L    Normal       22-30        Munson Healthcare Cadillac Hospital  
  
  
  
  
                          Comment on above:         Performed By: #### HEMDF, MG  
3, BMP3 ####38 Herrera Street   
  
  
  
  
             Creatinine [Mass/Vol] 0.91 mg/dL   Normal       0.52-1.25    Munson Healthcare Cadillac Hospital  
  
  
  
  
                          Comment on above:         Performed By: #### HEMDF, MG  
3, BMP3 ####38 Herrera Street 0  
  
  
  
  
             GFR/1.73 sq M.predicted among blacks mL/min/{1.73_m2} Normal         
>60          Munson Healthcare Cadillac Hospital  
  
             MDRD (S/P/Bld) [Vol rate/Area]                                       
     
  
  
  
  
                          Comment on above:         Performed By: #### HEMYANCI MG  
3, BMP3 ####MyoKardia M2 Connections Mcxclt539  
SoundwaveDexter, OH   
  
  
  
  
             GFR/1.73 sq M.predicted among 88.2 mL/min/{1.73_m2} Normal       >6  
0          Munson Healthcare Cadillac Hospital  
  
             non-blacks MDRD (S/P/Bld) [Vol                                       
     
  
             rate/Area]                                            
  
  
  
  
                          Comment on above:         Result Comment: KDIGO guidel  
zoë provide the following GFR  
  
                                                    categories:Stage GFR(ml/min/  
1.73 m2) TermsG1 >=90 Normal or highG2  
  
                                                    60-89 Mildly decreased*G3a 4  
5-59 Mildly to moderately lamyosxmwO9n  
  
                                                    30-44 Moderately to severely  
 decreasedG4 15-29 Severely decreasedG5  
  
                                                    <15 Kidney failure*Relative   
to young adult level.In the absence of  
  
                                                    evidence of kidney damage, n  
either GFRcategory G1 nor G2 fulfill  
  
                                                    the criteria for CKD.The CKD  
-EPI equation is validated in  
  
                                                    individuals 18 yearsof age a  
nd older. Currently the best equation  
  
                                                    forestimating glomerular beto  
tration rate (GFR) from serumcreatinine  
  
                                                    in children is the Bedside S  
chwartz equation.It is less accurate in  
  
                                                    patients with extremes of mu  
sclemass, restriction of dietary  
  
                                                    protein, ingestion of creati  
ne,extra-renal metabolism of  
  
                                                    creatinine, or treatment wit  
hmedications that affect renal tubular  
  
                                                    creatinine secretion.  
   
                                                    Performed By: #### HEMDF MG  
3, BMP3 ####Kettering Health Greene Memorial Stottler Henke Associates525 SoundwaveDexter, OH   
  
  
  
  
             Chloride [Moles/Vol] 103 mmol/L   Normal              Mercy Health St. Joseph Warren Hospital System  
  
  
  
  
                          Comment on above:         Performed By: #### HEMDF MG  
3, BMP3 ####Kettering Health Greene Memorial Stottler Henke Associates525  
 Rio Rancho, OH   
  
  
  
  
             Potassium [Moles/Vol] 3.4 mmol/L   Low          3.5-5.1      Munson Healthcare Cadillac Hospital  
  
  
  
  
                          Comment on above:         Performed By: #### HEMDF MG  
3, BMP3 ####SummIndependent Space525  
 MountainStar Healthcare STREETAKRON, OH 49412  
-2090  
  
  
  
  
             Sodium [Moles/Vol] 132 mmol/L   Low          135-145      Corey Hospitala Hea  
lt System  
  
  
  
  
                          Comment on above:         Performed By: #### HEMDF, MG  
3, BMP3 ####K94 Discoveries Crhlcl741  
 E.  
  
                                                    Elizabeth, OH 25884  
-2090  
  
  
  
  
                                        Glucose,Bedside  on 01-  
  
  
  
  
             Glucose [Mass/Vol] 168 mg/dL    High                Corey Hospitala Hea  
lt System  
  
  
  
  
                          Comment on above:         Result Comment: Test perform  
ed by glucose meter. Results may   
be  
  
                                                    10%-15% lowerthan serum/plas  
ma values. (CLIA ID 97Y3664397)  
   
                                                    Performed By: #### BGLU ####  
Cerus Corporation525 E. Elizabeth, OH 57361-6506  
  
  
  
  
             Glucose [Mass/Vol] 133 mg/dL    High                Corey Hospitala Hea  
lt System  
  
  
  
  
                          Comment on above:         Result Comment: Test perform  
ed by glucose meter. Results may   
be  
  
                                                    10%-15% lowerthan serum/plas  
ma values. (CLIA ID 14Z9944323)  
   
                                                    Performed By: #### BGLU ####  
Cerus Corporation525 E. Elizabeth, OH 52803-0929  
  
  
  
  
             Glucose [Mass/Vol] 166 mg/dL    High                Corey Hospitala Hea  
lt System  
  
  
  
  
                          Comment on above:         Result Comment: Test perform  
ed by glucose meter. Results may   
be  
  
                                                    10%-15% lowerthan serum/plas  
ma values. (CLIA ID 88K4821459)  
   
                                                    Performed By: #### BGLU ####  
Cerus Corporation525 E. Elizabeth, OH 74247-1054  
  
  
  
  
             Glucose [Mass/Vol] 142 mg/dL    High                Corey Hospitala Hea  
lt System  
  
  
  
  
                          Comment on above:         Result Comment: Test perform  
ed by glucose meter. Results may   
be  
  
                                                    10%-15% lowerthan serum/plas  
ma values. (CLIA ID 81O9446574)  
   
                                                    Performed By: #### BGLU ####  
K94 Discoveries Kwwrvn043 E. Elizabeth, OH 04698-7359  
  
  
  
  
             Glucose [Mass/Vol] 132 mg/dL    High                Corey Hospitala Hea  
lt System  
  
  
  
  
                          Comment on above:         Result Comment: Test perform  
ed by glucose meter. Results may   
be  
  
                                                    10%-15% lowerthan serum/plas  
ma values. (CLIA ID 27I4604485)  
   
                                                    Performed By: #### BGLU ####  
Kettering Health Greene Memorial M2 Connections Ljyxjv700 Rio Rancho, OH   
  
  
  
  
                                        Hemogram w/ Autodiff  on 01-  
  
  
  
  
             Abs Baso Cnt 0.1 10*3/uL  Normal       0.0-0.2      Salem City Hospital  
stem  
  
  
  
  
                          Comment on above:         Performed By: #### HEMDF, MG  
3, BMP3 ####Kettering Health Greene Memorial M2 Connections Heockv820  
 Rio Rancho, OH   
  
  
  
  
             Abs Neutrophile Cnt 9.2 10*3/uL  High         1.8-7.0      ProMedica Coldwater Regional Hospital  
  
  
  
  
                          Comment on above:         Performed By: #### HEMDF, MG  
3, BMP3 ####Kettering Health Greene Memorial M2 Connections Yqdymp219  
 Rio Rancho, OH   
  
  
  
  
             Basophils/100 WBC (Bld) 0.5 %        Normal       0.0-2.0      Summ  
a Health System  
  
  
  
  
                          Comment on above:         Performed By: #### HEMDF, MG  
3, BMP3 ####Kettering Health Greene Memorial M2 Connections Wignrx601  
 Rio Rancho, OH   
  
  
  
  
             Eosinophils (Bld) [#/Vol] 0.1 10*3/uL  Normal       0.0-0.5      Harper University Hospital  
  
  
  
  
                          Comment on above:         Performed By: #### HEMDF, MG  
3, BMP3 ####Kettering Health Greene Memorial M2 Connections Thjyhx667  
 Rio Rancho, OH 93323  
  
  
  
  
  
             Eosinophils/100 WBC (Bld) 0.8 %        Low          1.0-6.0      Harper University Hospital  
  
  
  
  
                          Comment on above:         Performed By: #### HEMDF, MG  
3, BMP3 ####Kettering Health Greene Memorial M2 Connections Dfimfi243  
 Rio Rancho, OH   
  
  
  
  
             Erythrocyte distribution width (RBC) 15.8 %       High         11.5  
-14.5    Munson Healthcare Cadillac Hospital  
  
             [Ratio]                                               
  
  
  
  
                          Comment on above:         Performed By: #### HEMDF, MG  
3, BMP3 ####Kettering Health Greene Memorial M2 Connections Eecvms711  
 Rio Rancho, OH 37728  
  
  
  
  
  
             Granulocytes/100 WBC (Bld) 82.2 %       High         40.0-80.0    S  
umma Health System  
  
  
  
  
                          Comment on above:         Performed By: #### HEMDF, MG  
3, BMP3 ####Briana Ville 771835  
 Rio Rancho, OH   
  
  
  
  
             Hematocrit (Bld) [Volume fraction] 23.9 %       Low          40.0-5  
2.0    Munson Healthcare Cadillac Hospital  
  
  
  
  
                          Comment on above:         Performed By: #### HEMDF, MG  
3, BMP3 ####38 Herrera Street   
  
  
  
  
             Hemoglobin (Bld) [Mass/Vol] 7.8 g/dL     Low          13.0-18.0      
Munson Healthcare Cadillac Hospital  
  
  
  
  
                          Comment on above:         Performed By: #### HEMDF, MG  
3, BMP3 ####38 Herrera Street   
  
  
  
  
             Lymphocytes (Bld) [#/Vol] 1.3 10*3/uL  Normal       1.0-4.3      Harper University Hospital  
  
  
  
  
                          Comment on above:         Performed By: #### HEMDF, MG  
3, BMP3 ####38 Herrera Street   
  
  
  
  
             Lymphocytes/100 WBC (Bld) 11.2 %       Low          20.0-40.0    Harper University Hospital  
  
  
  
  
                          Comment on above:         Performed By: #### HEMDF, MG  
3, BMP3 ####38 Herrera Street   
  
  
  
  
             MCH (RBC) [Entitic mass] 27.8 pg      Normal       26.0-34.0    Beaumont Hospital  
  
  
  
  
                          Comment on above:         Performed By: #### HEMDF, MG  
3, BMP3 ####38 Herrera Street   
  
  
  
  
             MCHC         32.8 %       Normal       32.0-36.0    Salem City Hospital  
stem  
  
  
  
  
                          Comment on above:         Performed By: #### HEMDF, MG  
3, BMP3 ####Briana Ville 771835  
Rio Rancho, OH   
  
  
  
  
             MCV (RBC) [Entitic vol] 84.9 fL      Normal       80.0-98.0    Summ  
a Health System  
  
  
  
  
                          Comment on above:         Performed By: #### HEMDF, MG  
3, BMP3 ####Briana Ville 771835  
Rio Rancho, OH   
  
  
  
  
             Monocytes (Bld) [#/Vol] 0.6 10*3/uL  Normal       0.0-0.8      Summ  
a Health System  
  
  
  
  
                          Comment on above:         Performed By: #### HEMDF, MG  
3, BMP3 ####Briana Ville 771835  
 EDexter, OH   
  
  
  
  
             Monocytes/100 WBC (Bld) 5.3 %        Normal       2.0-10.0     Summ  
a Health System  
  
  
  
  
                          Comment on above:         Performed By: #### HEMDF, MG  
3, BMP3 ####Briana Ville 771835  
 Rio Rancho, OH   
  
  
  
  
             Platelet mean volume (Bld) [Entitic vol] 7.5 fL       Normal         
7.4-10.4     Munson Healthcare Cadillac Hospital  
  
  
  
  
                          Comment on above:         Performed By: #### HEMDF, MG  
3, BMP3 ####38 Herrera Street   
  
  
  
  
             Platelets (Bld) [#/Vol] 322 10*3/uL  Normal       140-440      University of Michigan Hospital  
  
  
  
  
                          Comment on above:         Performed By: #### HEMDF, MG  
3, BMP3 ####Kettering Health Greene Memorial M2 Connections Psgaoq196  
 Rio Rancho, OH   
  
  
  
  
             RBC (Bld) [#/Vol] 2.82 10*6/uL Low          4.40-5.90    McLaren Oakland  
  
  
  
  
                          Comment on above:         Performed By: #### HEMDF, MG  
3, BMP3 ####Briana Ville 771835  
 Rio Rancho, OH   
  
  
  
  
             WBC (Bld) [#/Vol] 11.2 10*3/uL High         3.6-10.7     Holmes County Joel Pomerene Memorial Hospital System  
  
  
  
  
                          Comment on above:         Performed By: #### HEMDF, MG  
3, BMP3 ####38 Herrera Street   
  
  
  
  
                                        Magnesium  on 01-  
  
  
  
  
             Magnesium [Mass/Vol] 2.0 mg/dL    Normal       1.6-2.3      Mercy Health St. Joseph Warren Hospital System  
  
  
  
  
                          Comment on above:         Performed By: #### HEMDF, MG  
3, BMP3 ####Thomas Ville 45855  
 Rio Rancho, OH   
  
  
  
  
                                        APTT  on 2022  
  
  
  
  
             aPTT Coag (Bld) [Time] 52.4 s       High         20.0-30.5    Munson Healthcare Cadillac Hospital  
  
  
  
  
                          Comment on above:         Result Comment: NOTE: The th  
erapeutic time for Heparin  
  
                                                    anticoagulation,based on Xa   
activity inhibition, is an APTT of  
  
                                                    46-80seconds.  
   
                                                    Performed By: #### APTT ####  
38 Herrera Street   
  
  
  
  
             aPTT Coag (Bld) [Time] 64.3 s       High         20.0-30.5    Munson Healthcare Cadillac Hospital  
  
  
  
  
                          Comment on above:         Result Comment: NOTE: The th  
erapeutic time for Heparin  
  
                                                    anticoagulation,based on Xa   
activity inhibition, is an APTT of  
  
                                                    46-80seconds.  
   
                                                    Performed By: #### APTT ####  
38 Herrera Street   
  
  
  
  
             aPTT Coag (Bld) [Time] 78.2 s       High         20.0-30.5    Munson Healthcare Cadillac Hospital  
  
  
  
  
                          Comment on above:         Result Comment: NOTE: The th  
erapeutic time for Heparin  
  
                                                    anticoagulation,based on Xa   
activity inhibition, is an APTT of  
  
                                                    46-80seconds.  
   
                                                    Performed By: #### APTT ####  
Briana Ville 771835 Rio Rancho, OH   
  
  
  
  
                                        Arterial Blood Gases  on 2022  
  
  
  
  
             CO2 [Moles/Vol] 23.2 mmol/L  Normal       23.0-27.0    Munson Healthcare Cadillac Hospital  
  
  
  
  
                          Comment on above:         Performed By: #### HEMDF, BM  
P3, ABG ####38 Herrera Street   
  
  
  
  
             HCO3 (Bld) [Moles/Vol] 22.2 mmol/L  Normal       21.0-25.0    Munson Healthcare Cadillac Hospital  
  
  
  
  
                          Comment on above:         Performed By: #### HEMDF, BM  
P3, ABG ####38 Herrera Street   
  
  
  
  
             Hemoglobin (Bld) [Mass/Vol] 9.5 g/dL     Normal       ScreenOnly     
Munson Healthcare Cadillac Hospital  
  
  
  
  
                          Comment on above:         Performed By: #### HEMDF, BM  
P3, ABG ####K94 Discoveries Vdanol462  
 Rio Rancho, OH   
  
  
  
  
             Oxygen (Bld) [Partial pressure] 66.0 mm[Hg]  Low          80.0-100.  
0   Munson Healthcare Cadillac Hospital  
  
  
  
  
                          Comment on above:         Performed By: #### HEMDF, BM  
P3, ABG ####Kettering Health Greene Memorial M2 Connections Qfigeq194  
 Rio Rancho, OH   
  
  
  
  
             Oxygen saturation in Blood 93.2 %       Low          95.0-100.0   S  
University of Michigan Health  
  
  
  
  
                          Comment on above:         Performed By: #### HEMDF, BM  
P3, ABG ####Cerus Corporation525  
 Rio Rancho, OH   
  
  
  
  
             pCO2         31.7 mm[Hg]  Low          35.0-45.0    Kettering Health Greene Memorial Health   
stem  
  
  
  
  
                          Comment on above:         Performed By: #### HEMDF, BM  
P3, ABG ####Kettering Health Greene Memorial Stottler Henke Associates525  
 Rio Rancho, OH   
  
  
  
  
             pH           7.464        High         7.350-7.450  Kettering Health Greene Memorial Health   
stem  
  
  
  
  
                          Comment on above:         Performed By: #### HEMDF, BM  
P3, ABG ####Cerus Corporation525  
 Rio Rancho, OH   
  
  
  
  
             Std Base Excess -1.0 mmol/L  Normal       -3.0-3.0     Munson Healthcare Cadillac Hospital  
  
  
  
  
                          Comment on above:         Performed By: #### HEMDF, BM  
P3, ABG ####Cerus Corporation525  
 Rio Rancho, OH   
  
  
  
  
             FIO2         No data      Normal                    Kettering Health Greene Memorial Health Sy  
stem  
  
  
  
  
                          Comment on above:         Performed By: #### HEMDF, BM  
P3, ABG ####Cerus Corporation525  
 Rio Rancho, OH   
  
  
  
  
                                        Basic Metabolic Panel  on 2022  
  
  
  
  
             Calcium [Mass/Vol] 7.7 mg/dL    Low          8.4-10.4     The Jewish Hospital System  
  
  
  
  
                          Comment on above:         Performed By: #### HEMDF, MG  
3, BMP3 ####K94 Discoveries Rwugmd684  
 Rio Rancho, OH   
  
  
  
  
             Glucose [Mass/Vol] 134 mg/dL    High                The Jewish Hospital System  
  
  
  
  
                          Comment on above:         Performed By: #### HEMDF, MG  
3, BMP3 ####Kettering Health Greene Memorial M2 Connections Bkwajo541  
 EDexter, OH   
  
  
  
  
             Urea nitrogen [Mass/Vol] 18 mg/dL     High         7-17         Beaumont Hospital  
  
  
  
  
                          Comment on above:         Performed By: #### HEMDF, MG  
3, BMP3 ####Briana Ville 771835  
 EDexter, OH   
  
  
  
  
             Anion gap [Moles/Vol] 6 mmol/L     Normal       3-13         Munson Healthcare Cadillac Hospital  
  
  
  
  
                          Comment on above:         Performed By: #### HEMDF, MG  
3, BMP3 ####Kettering Health Greene Memorial M2 Connections Hcqmwi658  
 EDexter, OH   
  
  
  
  
             CO2 [Moles/Vol] 22 mmol/L    Normal       22-30        Munson Healthcare Cadillac Hospital  
  
  
  
  
                          Comment on above:         Performed By: #### HEMDF, MG  
3, BMP3 ####Kettering Health Greene Memorial M2 Connections Pifehb439  
 EDexter, OH   
  
  
  
  
             Creatinine [Mass/Vol] 1.00 mg/dL   Normal       0.52-1.25    Munson Healthcare Cadillac Hospital  
  
  
  
  
                          Comment on above:         Performed By: #### HEMDF, MG  
3, BMP3 ####Kettering Health Greene Memorial M2 Connections Cyljlk506  
 Rio Rancho, OH   
  
  
  
  
             GFR/1.73 sq M.predicted among blacks mL/min/{1.73_m2} Normal         
>60          Munson Healthcare Cadillac Hospital  
  
             MDRD (S/P/Bld) [Vol rate/Area]                                       
     
  
  
  
  
                          Comment on above:         Performed By: #### HEMDF, MG  
3, BMP3 ####Kettering Health Greene Memorial M2 Connections Hemzxk159  
Rio Rancho, OH   
  
  
  
  
             GFR/1.73 sq M.predicted among 78.7 mL/min/{1.73_m2} Normal       >6  
0          Munson Healthcare Cadillac Hospital  
  
             non-blacks MDRD (S/P/Bld) [Vol                                       
     
  
             rate/Area]                                            
  
  
  
  
                          Comment on above:         Result Comment: KDIGO guidel  
zoë provide the following GFR  
  
                                                    categories:Stage GFR(ml/min/  
1.73 m2) TermsG1 >=90 Normal or highG2  
  
                                                    60-89 Mildly decreased*G3a 4  
5-59 Mildly to moderately graarlvyhM9m  
  
                                                    30-44 Moderately to severely  
 decreasedG4 15-29 Severely decreasedG5  
  
                                                    <15 Kidney failure*Relative   
to young adult level.In the absence of  
  
                                                    evidence of kidney damage, n  
either GFRcategory G1 nor G2 fulfill  
  
                                                    the criteria for CKD.The CKD  
-EPI equation is validated in  
  
                                                    individuals 18 yearsof age a  
nd older. Currently the best equation  
  
                                                    forestimating glomerular beto  
tration rate (GFR) from serumcreatinine  
  
                                                    in children is the Bedside S  
kristiwartz equation.It is less accurate in  
  
                                                    patients with extremes of mu  
sclemass, restriction of dietary  
  
                                                    protein, ingestion of creati  
ne,extra-renal metabolism of  
  
                                                    creatinine, or treatment wit  
hmedications that affect renal tubular  
  
                                                    creatinine secretion.  
   
                                                    Performed By: #### HEMDF, MG  
3, BMP3 ####Corey HospitalIndependent Space525 Rio Rancho, OH   
  
  
  
  
             Potassium [Moles/Vol] 4.1 mmol/L   Normal       3.5-5.1      Munson Healthcare Cadillac Hospital  
  
  
  
  
                          Comment on above:         Performed By: #### HEMDF, MG  
3, BMP3 ####Kettering Health Greene Memorial Stottler Henke Associates525  
 SoundwaveDexter, OH   
  
  
  
  
             Chloride [Moles/Vol] 106 mmol/L   Normal              Mercy Health St. Joseph Warren Hospital System  
  
  
  
  
                          Comment on above:         Performed By: #### HEMDF, MG  
3, BMP3 ####Cerus Corporation525  
 SoundwaveDexter, OH   
  
  
  
  
             Sodium [Moles/Vol] 135 mmol/L   Normal       135-145      The Jewish Hospital System  
  
  
  
  
                          Comment on above:         Performed By: #### HEMDF, MG  
3, BMP3 ####Kettering Health Greene Memorial Stottler Henke Associates525  
 SoundwaveDexter, OH   
  
  
  
  
             Anion gap [Moles/Vol] 6 mmol/L     Normal       3-13         Munson Healthcare Cadillac Hospital  
  
  
  
  
                          Comment on above:         Performed By: #### HEMDF, BM  
P3, ABG ####Cerus Corporation525  
 Rio Rancho, OH   
  
  
  
  
             Calcium [Mass/Vol] 7.8 mg/dL    Low          8.4-10.4     The Jewish Hospital System  
  
  
  
  
                          Comment on above:         Performed By: #### HEMDF, BM  
P3, ABG ####Corey HospitalLaru Technologies Zddmhl658  
 Rio Rancho, OH   
  
  
  
  
             CO2 [Moles/Vol] 21 mmol/L    Low          22-30        Munson Healthcare Cadillac Hospital  
  
  
  
  
                          Comment on above:         Performed By: #### HEMDF, BM  
P3, ABG ####Kettering Health Greene Memorial M2 Connections Hnxgtk029  
 EDexter, OH   
  
  
  
  
             Glucose [Mass/Vol] 141 mg/dL    High                Munson Healthcare Otsego Memorial Hospital  
  
  
  
  
                          Comment on above:         Performed By: #### HEMDF, BM  
P3, ABG ####Kettering Health Greene Memorial M2 Connections Uycsez835  
 EDexter, OH   
  
  
  
  
             Urea nitrogen [Mass/Vol] 19 mg/dL     High         7-17         Beaumont Hospital  
  
  
  
  
                          Comment on above:         Performed By: #### HEMDF, BM  
P3, ABG ####Kettering Health Greene Memorial M2 Connections Fzprpk373  
 Rio Rancho, OH   
  
  
  
  
             Creatinine [Mass/Vol] 0.98 mg/dL   Normal       0.52-1.25    Munson Healthcare Cadillac Hospital  
  
  
  
  
                          Comment on above:         Performed By: #### HEMDF, BM  
P3, ABG ####Kettering Health Greene Memorial M2 Connections Kpxzgc772  
 EDexter, OH   
  
  
  
  
             GFR/1.73 sq M.predicted among blacks mL/min/{1.73_m2} Normal         
>60          Munson Healthcare Cadillac Hospital  
  
             MDRD (S/P/Bld) [Vol rate/Area]                                       
     
  
  
  
  
                          Comment on above:         Performed By: #### HEMDF, BM  
P3, ABG ####Kettering Health Greene Memorial M2 Connections Fjxjdj547  
EDexter, OH   
  
  
  
  
             GFR/1.73 sq M.predicted among 80.6 mL/min/{1.73_m2} Normal       >6  
0          Munson Healthcare Cadillac Hospital  
  
             non-blacks MDRD (S/P/Bld) [Vol                                       
     
  
             rate/Area]                                            
  
  
  
  
                          Comment on above:         Result Comment: KDIGO guidel  
zoë provide the following GFR  
  
                                                    categories:Stage GFR(ml/min/  
1.73 m2) TermsG1 >=90 Normal or highG2  
  
                                                    60-89 Mildly decreased*G3a 4  
5-59 Mildly to moderately vgnxwxqdhS4a  
  
                                                    30-44 Moderately to severely  
 decreasedG4 15-29 Severely decreasedG5  
  
                                                    <15 Kidney failure*Relative   
to young adult level.In the absence of  
  
                                                    evidence of kidney damage, n  
either GFRcategory G1 nor G2 fulfill  
  
                                                    the criteria for CKD.The CKD  
-EPI equation is validated in  
  
                                                    individuals 18 yearsof age a  
nd older. Currently the best equation  
  
                                                    forestimating glomerular beto  
tration rate (GFR) from serumcreatinine  
  
                                                    in children is the Bedside S  
reynaldotz equation.It is less accurate in  
  
                                                    patients with extremes of mu  
sclemass, restriction of dietary  
  
                                                    protein, ingestion of creati  
ne,extra-renal metabolism of  
  
                                                    creatinine, or treatment wit  
hmedications that affect renal tubular  
  
                                                    creatinine secretion.  
   
                                                    Performed By: #### HEMDF, BM  
P3, ABG ####Kettering Health Greene Memorial M2 Connections Sdhztc325 Rio Rancho, OH   
  
  
  
  
                                        CR Chest Portable  on 2022  
  
  
  
  
             CR Chest Portable              Normal                    Holmes County Joel Pomerene Memorial Hospital System  
   
             CR Chest Portable              Normal                    Holmes County Joel Pomerene Memorial Hospital System  
  
  
  
  
                                        CULTURE BLOOD  on 2022  
  
  
  
  
             Microscopic examination of CULTURE BLOOD --> Status: F Normal        
              Munson Healthcare Cadillac Hospital  
  
             blood, culture No growth at 5 days.                             
  
  
  
  
                          Comment on above:         Performed By: #### C/BLD ###  
#Kettering Health Greene Memorial M2 Connections Ryiukj573 Rio Rancho, OH   
  
  
  
  
                                        CULTURE BLOOD (Two)  on 2022  
  
  
  
  
             Microscopic examination of CULTURE BLOOD (Two) --> Status: F Normal  
                    Munson Healthcare Cadillac Hospital  
  
             blood, culture No growth at 5 days.                             
  
  
  
  
                          Comment on above:         Performed By: #### C/BLT ###  
#Corey HospitalLaru Technologies Cveyha583 Rio Rancho, OH   
  
  
  
  
                                        Glucose,Bedside  on 2022  
  
  
  
  
             Glucose [Mass/Vol] 173 mg/dL    High                Corey Hospitala a  
Lima City Hospital System  
  
  
  
  
                          Comment on above:         Result Comment: Test perform  
ed by glucose meter. Results may   
be  
  
                                                    10%-15% lowerthan serum/plas  
ma values. (CLIA ID 55H5059210)  
   
                                                    Performed By: #### BGLU ####  
K94 Discoveries Mkwgef744 Rio Rancho, OH   
  
  
  
  
             Glucose [Mass/Vol] 177 mg/dL    High                Corey Hospitala Hea  
lt System  
  
  
  
  
                          Comment on above:         Result Comment: Test perform  
ed by glucose meter. Results may   
be  
  
                                                    10%-15% lowerthan serum/plas  
ma values. (CLIA ID 16W3954375)  
   
                                                    Performed By: #### BGLU ####  
K94 Discoveries Ikiide876 Rio Rancho, OH   
  
  
  
  
             Glucose [Mass/Vol] 207 mg/dL    High                The Jewish Hospital System  
  
  
  
  
                          Comment on above:         Result Comment: Test perform  
ed by glucose meter. Results may   
be  
  
                                                    10%-15% lowerthan serum/plas  
ma values. (CLIA ID 06C6522352)  
   
                                                    Performed By: #### BGLU ####  
MyoKardia M2 Connections Zopreb073 EDexter, OH   
  
  
  
  
             Glucose [Mass/Vol] 122 mg/dL    High                The Jewish Hospital System  
  
  
  
  
                          Comment on above:         Result Comment: Test perform  
ed by glucose meter. Results may   
be  
  
                                                    10%-15% lowerthan serum/plas  
ma values. (CLIA ID 14J8631060)  
   
                                                    Performed By: #### BGLU ####  
MyoKardia Stottler Henke Associates525 Rio Rancho, OH   
  
  
  
  
             Glucose [Mass/Vol] 155 mg/dL    High                The Jewish Hospital System  
  
  
  
  
                          Comment on above:         Result Comment: Test perform  
ed by glucose meter. Results may   
be  
  
                                                    10%-15% lowerthan serum/plas  
ma values. (CLIA ID 43L2308385)  
   
                                                    Performed By: #### BGLU ####  
Cerus Corporation525 Santa Maria Biotherapeutics Elizabeth, OH   
  
  
  
  
                                        Hemogram w/ Autodiff  on 2022  
  
  
  
  
             Abs Baso Cnt 0.1 10*3/uL  Normal       0.0-0.2      Salem City Hospital  
stem  
  
  
  
  
                          Comment on above:         Performed By: #### HEMDF, MG  
3, BMP3 ####Cerus Corporation525  
 Rio Rancho, OH   
  
  
  
  
             Abs Neutrophile Cnt 12.2 10*3/uL High         1.8-7.0      ProMedica Coldwater Regional Hospital  
  
  
  
  
                          Comment on above:         Performed By: #### HEMDF, MG  
3, BMP3 ####Cerus Corporation525  
 Rio Rancho, OH   
  
  
  
  
             Basophils/100 WBC (Bld) 0.6 %        Normal       0.0-2.0      Summ  
a Health Insight Surgical Hospital  
  
  
  
  
                          Comment on above:         Performed By: #### HEMDF, MG  
3, BMP3 ####Cerus Corporation525  
 Rio Rancho, OH 89740  
-2090  
  
  
  
  
             Eosinophils (Bld) [#/Vol] 0.0 10*3/uL  Normal       0.0-0.5      Harper University Hospital  
  
  
  
  
                          Comment on above:         Performed By: #### HEMDF, MG  
3, BMP3 ####Mercy Health St. Vincent Medical Center Krtecw197  
 E.  
  
                                                    Elizabeth, OH 85525  
-2090  
  
  
  
  
             Eosinophils/100 WBC (Bld) 0.2 %        Low          1.0-6.0      Harper University Hospital  
  
  
  
  
                          Comment on above:         Performed By: #### HEMDF, MG  
3, BMP3 ####Mercy Health St. Vincent Medical Center Xojuse213  
 E.  
  
                                                    Elizabeth, OH 50338  
-2090  
  
  
  
  
             Granulocytes/100 WBC (Bld) 83.0 %       High         40.0-80.0    Insight Surgical Hospital  
  
  
  
  
                          Comment on above:         Performed By: #### HEMDF, MG  
3, BMP3 ####Kettering Health Greene Memorial M2 Connections Nfeztm829  
 E.  
  
                                                    Elizabeth, OH 53506  
-  
  
  
  
  
             Lymphocytes (Bld) [#/Vol] 1.5 10*3/uL  Normal       1.0-4.3      Harper University Hospital  
  
  
  
  
                          Comment on above:         Performed By: #### HEMDF, MG  
3, BMP3 ####Kettering Health Greene Memorial M2 Connections Ojusha083  
 E.  
  
                                                    Elizabeth, OH 45527  
-2090  
  
  
  
  
             Lymphocytes/100 WBC (Bld) 10.4 %       Low          20.0-40.0    Harper University Hospital  
  
  
  
  
                          Comment on above:         Performed By: #### HEMDF, MG  
3, BMP3 ####Kettering Health Greene Memorial M2 Connections Eyinsq768  
 E.  
  
                                                    Elizabeth, OH 57292  
-  
  
  
  
  
             Monocytes (Bld) [#/Vol] 0.9 10*3/uL  High         0.0-0.8      Summ  
a Health System  
  
  
  
  
                          Comment on above:         Performed By: #### HEMDF, MG  
3, BMP3 ####Kettering Health Greene Memorial M2 Connections Vjfvdy800  
 E.  
  
                                                    Elizabeth, OH 95748  
-2090  
  
  
  
  
             Monocytes/100 WBC (Bld) 5.8 %        Normal       2.0-10.0     Summ  
a Health System  
  
  
  
  
                          Comment on above:         Performed By: #### HEMDF, MG  
3, BMP3 ####Kettering Health Greene Memorial M2 Connections Nrwnye064  
 E.  
  
                                                    Elizabeth, OH 51450  
-2090  
  
  
  
  
             Erythrocyte distribution width (RBC) 16.0 %       High         11.5  
-14.5    Munson Healthcare Cadillac Hospital  
  
             [Ratio]                                               
  
  
  
  
                          Comment on above:         Performed By: #### HEMDF, MG  
3, BMP3 ####Briana Ville 771835  
 Rio Rancho, OH   
  
  
  
  
             Hematocrit (Bld) [Volume fraction] 24.9 %       Low          40.0-5  
2.0    Munson Healthcare Cadillac Hospital  
  
  
  
  
                          Comment on above:         Performed By: #### HEMDF, MG  
3, BMP3 ####38 Herrera Street   
  
  
  
  
             Hemoglobin (Bld) [Mass/Vol] 8.1 g/dL     Low          13.0-18.0      
Munson Healthcare Cadillac Hospital  
  
  
  
  
                          Comment on above:         Performed By: #### HEMDF, MG  
3, BMP3 ####38 Herrera Street   
  
  
  
  
             MCH (RBC) [Entitic mass] 27.5 pg      Normal       26.0-34.0    Beaumont Hospital  
  
  
  
  
                          Comment on above:         Performed By: #### HEMDF, MG  
3, BMP3 ####38 Herrera Street   
  
  
  
  
             MCHC         32.6 %       Normal       32.0-36.0    Mercy Health St. Vincent Medical Center Sy  
stem  
  
  
  
  
                          Comment on above:         Performed By: #### HEMDF, MG  
3, BMP3 ####38 Herrera Street   
  
  
  
  
             MCV (RBC) [Entitic vol] 84.3 fL      Normal       80.0-98.0    Summ  
a Health System  
  
  
  
  
                          Comment on above:         Performed By: #### HEMDF, MG  
3, BMP3 ####38 Herrera Street   
  
  
  
  
             Platelet mean volume (Bld) [Entitic vol] 7.4 fL       Normal         
7.4-10.4     Munson Healthcare Cadillac Hospital  
  
  
  
  
                          Comment on above:         Performed By: #### HEMDF, MG  
3, BMP3 ####38 Herrera Street   
  
  
  
  
             Platelets (Bld) [#/Vol] 294 10*3/uL  Normal       140-440      University of Michigan Hospital  
  
  
  
  
                          Comment on above:         Performed By: #### HEMDF, MG  
3, BMP3 ####Mercy Health St. Vincent Medical Center Dfuzba160  
 E.  
  
                                                    Elizabeth, OH   
  
  
  
  
             RBC (Bld) [#/Vol] 2.96 10*6/uL Low          4.40-5.90    Holmes County Joel Pomerene Memorial Hospital System  
  
  
  
  
                          Comment on above:         Performed By: #### HEMDF, MG  
3, BMP3 ####Kettering Health Greene Memorial M2 Connections Vefmsy837  
 Rio Rancho, OH   
  
  
  
  
             WBC (Bld) [#/Vol] 14.9 10*3/uL High         3.6-10.7     Holmes County Joel Pomerene Memorial Hospital System  
  
  
  
  
                          Comment on above:         Performed By: #### HEMDF, MG  
3, BMP3 ####Kettering Health Greene Memorial M2 Connections Jhtnoj655  
 Rio Rancho, OH   
  
  
  
  
                                        Magnesium  on 2022  
  
  
  
  
             Magnesium [Mass/Vol] 2.0 mg/dL    Normal       1.6-2.3      Mercy Health St. Joseph Warren Hospital System  
  
  
  
  
                          Comment on above:         Performed By: #### HEMDF, MG  
3, BMP3 ####Kettering Health Greene Memorial M2 Connections Wukydg120  
 EDexter, OH   
  
  
  
  
                                        Op Note   on 2022  
  
  
  
  
             Op Note                   Normal                    Corey Hospitala Health Sy  
stem  
   
             Op Note                   Normal                    Kettering Health Greene Memorial Health Sy  
stem  
   
             Op Note                   Normal                    Kettering Health Greene Memorial Health Sy  
stem  
  
  
  
  
                                        ACT,Whole Blood  on 2022  
  
  
  
  
             ACT,Whole Blood 228 s        High                Munson Healthcare Cadillac Hospital  
  
  
  
  
                          Comment on above:         Result Comment: ACTBPerforme  
d by Kaonetics Technologies EliteCLIA ID:  
  
                                                    75V6071994Svchc Health, Akro  
n, OHACT testing is not intended for  
  
                                                    patients taking aprotonin,pa  
tients with hematocrits of <20% or  
  
                                                    >55%, patients usingother ty  
pes of anticoagulation medications, and  
  
                                                    patients withLupus Anticoagu  
lant.  
   
                                                    Performed By: #### ACTB ####  
Kettering Health Greene Memorial M2 Connections Stjegw004 Rio Rancho, OH   
  
  
  
  
             ACT,Whole Blood 230 s        High                Munson Healthcare Cadillac Hospital  
  
  
  
  
                          Comment on above:         Result Comment: ACTBPerforme  
d by goDog Fetch Sig EliteCLIA ID:  
  
                                                    29C3675957Iyylf HealthAtlantiCare Regional Medical Center, Atlantic City Campus  
n, OHACT testing is not intended for  
  
                                                    patients taking aprotonin,pa  
tients with hematocrits of <20% or  
  
                                                    >55%, patients usingother ty  
pes of anticoagulation medications, and  
  
                                                    patients withLupus Anticoagu  
lant.  
   
                                                    Performed By: #### ACTB ####  
38 Herrera Street   
  
  
  
  
                                        APTT  on 2022  
  
  
  
  
             aPTT Coag (Bld) [Time] s            Critically high 20.0-30.5    Harper University Hospital  
  
  
  
  
                          Comment on above:         Result Comment: NOTE: The th  
erapeutic time for Heparin  
  
                                                    anticoagulation,based on Xa   
activity inhibition, is an APTT of  
  
                                                    46-80seconds.  
   
                                                    Performed By: #### APTT ####  
38 Herrera Street   
  
  
  
  
                                        Basic Metabolic Panel  on 2022  
  
  
  
  
             Chloride [Moles/Vol] 108 mmol/L   High                Munising Memorial Hospital  
  
  
  
  
                          Comment on above:         Performed By: #### HEMDF, BM  
P3, ABG ####Kettering Health Greene Memorial M2 Connections 99 Hogan Street   
  
  
  
  
             Potassium [Moles/Vol] 4.3 mmol/L   Normal       3.5-5.1      Munson Healthcare Cadillac Hospital  
  
  
  
  
                          Comment on above:         Performed By: #### HEMDF, BM  
P3, ABG ####Kettering Health Greene Memorial M2 Connections 99 Hogan Street   
  
  
  
  
             Sodium [Moles/Vol] 135 mmol/L   Normal       135-145      The Jewish Hospital System  
  
  
  
  
                          Comment on above:         Performed By: #### HEMDF, BM  
P3, ABG ####Kettering Health Greene Memorial M2 Connections 99 Hogan Street   
  
  
  
  
             Calcium [Mass/Vol] 8.3 mg/dL    Low          8.4-10.4     The Jewish Hospital System  
  
  
  
  
                          Comment on above:         Performed By: #### HEMDF, MG  
3, BMP3 ####Kettering Health Greene Memorial M2 Connections 99 Hogan Street   
  
  
  
  
             Glucose [Mass/Vol] 117 mg/dL    High                Munson Healthcare Otsego Memorial Hospital  
  
  
  
  
                          Comment on above:         Performed By: #### HEMDF, MG  
3, BMP3 ####Kettering Health Greene Memorial M2 Connections 99 Hogan Street   
  
  
  
  
             Anion gap [Moles/Vol] 8 mmol/L     Normal       3-13         Munson Healthcare Cadillac Hospital  
  
  
  
  
                          Comment on above:         Performed By: #### HEMDF, MG  
3, BMP3 ####Corey HospitalLaru Technologies Qpnnqs903  
 Rio Rancho, OH   
  
  
  
  
             CO2 [Moles/Vol] 20 mmol/L    Low          22-30        Munson Healthcare Cadillac Hospital  
  
  
  
  
                          Comment on above:         Performed By: #### HEMDF, MG  
3, BMP3 ####Kettering Health Greene Memorial M2 Connections Exdude040  
 Rio Rancho, OH   
  
  
  
  
             Creatinine [Mass/Vol] 1.19 mg/dL   Normal       0.52-1.25    Munson Healthcare Cadillac Hospital  
  
  
  
  
                          Comment on above:         Performed By: #### HEMDF, MG  
3, BMP3 ####Corey HospitalLaru Technologies Mcjtaz283  
 Rio Rancho, OH   
  
  
  
  
             GFR/1.73 sq M.predicted among 73.9 mL/min/{1.73_m2} Normal       >6  
0          Munson Healthcare Cadillac Hospital  
  
             blacks MDRD (S/P/Bld) [Vol                                          
  
             rate/Area]                                            
  
  
  
  
                          Comment on above:         Performed By: #### HEMDF, MG  
3, BMP3 ####Corey HospitalLaru Technologies Cnsrxn291  
Rio Rancho, OH   
  
  
  
  
             GFR/1.73 sq M.predicted among 63.7 mL/min/{1.73_m2} Normal       >6  
0          Munson Healthcare Cadillac Hospital  
  
             non-blacks MDRD (S/P/Bld) [Vol                                       
     
  
             rate/Area]                                            
  
  
  
  
                          Comment on above:         Result Comment: KDIGO guidel  
zoë provide the following GFR  
  
                                                    categories:Stage GFR(ml/min/  
1.73 m2) TermsG1 >=90 Normal or highG2  
  
                                                    60-89 Mildly decreased*G3a 4  
5-59 Mildly to moderately xetrifovrL7s  
  
                                                    30-44 Moderately to severely  
 decreasedG4 15-29 Severely decreasedG5  
  
                                                    <15 Kidney failure*Relative   
to young adult level.In the absence of  
  
                                                    evidence of kidney damage, n  
either GFRcategory G1 nor G2 fulfill  
  
                                                    the criteria for CKD.The CKD  
-EPI equation is validated in  
  
                                                    individuals 18 yearsof age a  
nd older. Currently the best equation  
  
                                                    forestimating glomerular beto  
tration rate (GFR) from serumcreatinine  
  
                                                    in children is the Bedside S  
reynaldotz equation.It is less accurate in  
  
                                                    patients with extremes of mu  
sclemass, restriction of dietary  
  
                                                    protein, ingestion of creati  
ne,extra-renal metabolism of  
  
                                                    creatinine, or treatment wit  
hmedications that affect renal tubular  
  
                                                    creatinine secretion.  
   
                                                    Performed By: #### HEMYANCI MG  
3, BMP3 ####Kettering Health Greene Memorial M2 Connections Xmgoft939 Rio Rancho, OH   
  
  
  
  
             Urea nitrogen [Mass/Vol] 20 mg/dL     High         7-17         Memorial Health System Selby General Hospital System  
  
  
  
  
                          Comment on above:         Performed By: #### HEMDF MG  
3, BMP3 ####Kettering Health Greene Memorial M2 Connections Rrittp796  
 Rio Rancho, OH   
  
  
  
  
             Chloride [Moles/Vol] 108 mmol/L   High                Mercy Health St. Joseph Warren Hospital System  
  
  
  
  
                          Comment on above:         Performed By: #### HEMYANCI MG  
3, BMP3 ####Kettering Health Greene Memorial M2 Connections 99 Hogan Street   
  
  
  
  
             Potassium [Moles/Vol] 4.3 mmol/L   Normal       3.5-5.1      Munson Healthcare Cadillac Hospital  
  
  
  
  
                          Comment on above:         Performed By: #### HEMDF MG  
3, BMP3 ####Kettering Health Greene Memorial M2 Connections 99 Hogan Street   
  
  
  
  
             Sodium [Moles/Vol] 135 mmol/L   Normal       135-145      The Jewish Hospital System  
  
  
  
  
                          Comment on above:         Performed By: #### HEMDF MG  
3, BMP3 ####Kettering Health Greene Memorial M2 Connections 99 Hogan Street   
  
  
  
  
                                        Glucose,Bedside  on 2022  
  
  
  
  
             Glucose [Mass/Vol] 165 mg/dL    High                The Jewish Hospital System  
  
  
  
  
                          Comment on above:         Result Comment: Test perform  
ed by glucose meter. Results may   
be  
  
                                                    10%-15% lowerthan serum/plas  
ma values. (CLIA ID 66V6842131)  
   
                                                    Performed By: #### BGLU ####  
Kettering Health Greene Memorial M2 Connections Symwjk130 Rio Rancho, OH   
  
  
  
  
             Glucose [Mass/Vol] 173 mg/dL    High                The Jewish Hospital System  
  
  
  
  
                          Comment on above:         Result Comment: Test perform  
ed by glucose meter. Results may   
be  
  
                                                    10%-15% lowerthan serum/plas  
ma values. (CLIA ID 79H4285624)  
   
                                                    Performed By: #### BGLU ####  
Briana Ville 771835 Rio Rancho, OH   
  
  
  
  
             Glucose [Mass/Vol] 135 mg/dL    High                The Jewish Hospital System  
  
  
  
  
                          Comment on above:         Result Comment: Test perform  
ed by glucose meter. Results may   
be  
  
                                                    10%-15% lowerthan serum/plas  
ma values. (CLIA ID 93Q4170887)  
   
                                                    Performed By: #### BGLU ####  
Briana Ville 771835 Rio Rancho, OH   
  
  
  
  
             Glucose [Mass/Vol] 186 mg/dL    High                The Jewish Hospital System  
  
  
  
  
                          Comment on above:         Result Comment: Test perform  
ed by glucose meter. Results may   
be  
  
                                                    10%-15% lowerthan serum/plas  
ma values. (CLIA ID 99O1254079)  
   
                                                    Performed By: #### BGLU ####  
38 Herrera Street   
  
  
  
  
                                        Hemogram w/ Autodiff  on 2022  
  
  
  
  
             Abs Baso Cnt 0.1 10*3/uL  Normal       0.0-0.2      Salem City Hospital  
stem  
  
  
  
  
                          Comment on above:         Performed By: #### HEMDF, BM  
P3, ABG ####38 Herrera Street   
  
  
  
  
             Abs Neutrophile Cnt 12.2 10*3/uL High         1.8-7.0      ProMedica Coldwater Regional Hospital  
  
  
  
  
                          Comment on above:         Performed By: #### HEMDF, BM  
P3, ABG ####Briana Ville 771835  
 Rio Rancho, OH   
  
  
  
  
             Basophils/100 WBC (Bld) 0.5 %        Normal       0.0-2.0      Summ  
a Health System  
  
  
  
  
                          Comment on above:         Performed By: #### HEMDF, BM  
P3, ABG ####38 Herrera Street   
  
  
  
  
             Eosinophils (Bld) [#/Vol] 0.0 10*3/uL  Normal       0.0-0.5      Harper University Hospital  
  
  
  
  
                          Comment on above:         Performed By: #### HEMDF, BM  
P3, ABG ####Briana Ville 771835  
 Rio Rancho, OH   
  
  
  
  
             Eosinophils/100 WBC (Bld) 0.1 %        Low          1.0-6.0      Harper University Hospital  
  
  
  
  
                          Comment on above:         Performed By: #### HEMDF, BM  
P3, ABG ####Briana Ville 771835  
 E.  
  
                                                    Elizabeth, OH 21762  
-2090  
  
  
  
  
             Granulocytes/100 WBC (Bld) 88.6 %       High         40.0-80.0    S  
University of Michigan Health  
  
  
  
  
                          Comment on above:         Performed By: #### HEMDF, BM  
P3, ABG ####Briana Ville 771835  
 E.  
  
                                                    Elizabeth, OH 35582  
-2090  
  
  
  
  
             Lymphocytes (Bld) [#/Vol] 0.9 10*3/uL  Low          1.0-4.3      Harper University Hospital  
  
  
  
  
                          Comment on above:         Performed By: #### HEMDF, BM  
P3, ABG ####Briana Ville 771835  
 E.  
  
                                                    Elizabeth, OH 14901  
-2090  
  
  
  
  
             Lymphocytes/100 WBC (Bld) 6.3 %        Low          20.0-40.0    Harper University Hospital  
  
  
  
  
                          Comment on above:         Performed By: #### HEMDF, BM  
P3, ABG ####Briana Ville 771835  
 E.  
  
                                                    Elizabeth, OH 94859  
-0  
  
  
  
  
             Monocytes (Bld) [#/Vol] 0.6 10*3/uL  Normal       0.0-0.8      Summ  
a Health System  
  
  
  
  
                          Comment on above:         Performed By: #### HEMDF, BM  
P3, ABG ####Briana Ville 771835  
 E.  
  
                                                    Mary Free Bed Rehabilitation Hospital, OH 48373  
-2090  
  
  
  
  
             Monocytes/100 WBC (Bld) 4.5 %        Normal       2.0-10.0     Summ  
a Health System  
  
  
  
  
                          Comment on above:         Performed By: #### HEMDF, BM  
P3, ABG ####Briana Ville 771835  
 E.  
  
                                                    Elizabeth, OH 87982  
-2090  
  
  
  
  
             Erythrocyte distribution width (RBC) 16.0 %       High         11.5  
-14.5    Munson Healthcare Cadillac Hospital  
  
             [Ratio]                                               
  
  
  
  
                          Comment on above:         Performed By: #### HEMDF, BM  
P3, ABG ####Briana Ville 771835  
 EDexter, OH 31621  
-2090  
  
  
  
  
             Hematocrit (Bld) [Volume fraction] 26.0 %       Low          40.0-5  
2.0    Munson Healthcare Cadillac Hospital  
  
  
  
  
                          Comment on above:         Performed By: #### HEMDF, BM  
P3, ABG ####Briana Ville 771835  
Rio Rancho, OH   
  
  
  
  
             Hemoglobin (Bld) [Mass/Vol] 8.5 g/dL     Low          13.0-18.0      
Munson Healthcare Cadillac Hospital  
  
  
  
  
                          Comment on above:         Performed By: #### HEMDF, BM  
P3, ABG ####Briana Ville 771835  
 Rio Rancho, OH   
  
  
  
  
             MCH (RBC) [Entitic mass] 27.9 pg      Normal       26.0-34.0    Beaumont Hospital  
  
  
  
  
                          Comment on above:         Performed By: #### HEMDF, BM  
P3, ABG ####38 Herrera Street   
  
  
  
  
             MCHC         32.8 %       Normal       32.0-36.0    Select Specialty Hospital  
  
  
  
  
                          Comment on above:         Performed By: #### HEMDF, BM  
P3, ABG ####38 Herrera Street   
  
  
  
  
             MCV (RBC) [Entitic vol] 85.2 fL      Normal       80.0-98.0    Summ  
a Health System  
  
  
  
  
                          Comment on above:         Performed By: #### HEMDF, BM  
P3, ABG ####38 Herrera Street   
  
  
  
  
             Platelet mean volume (Bld) [Entitic vol] 7.5 fL       Normal         
7.4-10.4     Munson Healthcare Cadillac Hospital  
  
  
  
  
                          Comment on above:         Performed By: #### HEMDF, BM  
P3, ABG ####38 Herrera Street   
  
  
  
  
             Platelets (Bld) [#/Vol] 314 10*3/uL  Normal       140-440      University of Michigan Hospital  
  
  
  
  
                          Comment on above:         Performed By: #### HEMDF, BM  
P3, ABG ####Briana Ville 771835  
 Rio Rancho, OH   
  
  
  
  
             RBC (Bld) [#/Vol] 3.05 10*6/uL Low          4.40-5.90    Holmes County Joel Pomerene Memorial Hospital System  
  
  
  
  
                          Comment on above:         Performed By: #### HEMDF, BM  
P3, ABG ####94 Mullins StreetRON, OH   
  
  
  
  
             WBC (Bld) [#/Vol] 13.2 10*3/uL High         3.6-10.7     Holmes County Joel Pomerene Memorial Hospital System  
  
  
  
  
                          Comment on above:         Performed By: #### HEMDF, BM  
P3, ABG ####Munson Healthcare Cadillac Hospital525  
 E.  
  
                                                    Mary Free Bed Rehabilitation Hospital, OH   
  
  
  
  
             Abs Baso Cnt 0.1 10*3/uL  Normal       0.0-0.2      Salem City Hospital  
stem  
  
  
  
  
                          Comment on above:         Performed By: #### HEMDF, MG  
3, BMP3 ####Briana Ville 771835  
 ELayton Hospital, OH   
  
  
  
  
             Abs Neutrophile Cnt 12.9 10*3/uL High         1.8-7.0      ProMedica Coldwater Regional Hospital  
  
  
  
  
                          Comment on above:         Performed By: #### HEMDF, MG  
3, BMP3 ####Briana Ville 771835  
 Rio Rancho, OH   
  
  
  
  
             Basophils/100 WBC (Bld) 0.5 %        Normal       0.0-2.0      Summ  
a Health System  
  
  
  
  
                          Comment on above:         Performed By: #### HEMDF, MG  
3, BMP3 ####Briana Ville 771835  
 EDexter, OH   
  
  
  
  
             Eosinophils (Bld) [#/Vol] 0.0 10*3/uL  Normal       0.0-0.5      Harper University Hospital  
  
  
  
  
                          Comment on above:         Performed By: #### HEMDF, MG  
3, BMP3 ####Briana Ville 771835  
 EDexter, OH   
  
  
  
  
             Eosinophils/100 WBC (Bld) 0.0 %        Low          1.0-6.0      Harper University Hospital  
  
  
  
  
                          Comment on above:         Performed By: #### HEMDF, MG  
3, BMP3 ####Briana Ville 771835  
 .  
  
                                                    Mary Free Bed Rehabilitation Hospital, OH   
  
  
  
  
             Granulocytes/100 WBC (Bld) 82.1 %       High         40.0-80.0    Insight Surgical Hospital  
  
  
  
  
                          Comment on above:         Performed By: #### HEMDF, MG  
3, BMP3 ####Briana Ville 771835  
 Rio Rancho, OH   
  
  
  
  
             Lymphocytes (Bld) [#/Vol] 1.4 10*3/uL  Normal       1.0-4.3      Harper University Hospital  
  
  
  
  
                          Comment on above:         Performed By: #### HEMDF, MG  
3, BMP3 ####Briana Ville 771835  
 E.  
  
                                                    Elizabeth, OH   
  
  
  
  
             Lymphocytes/100 WBC (Bld) 9.2 %        Low          20.0-40.0    Harper University Hospital  
  
  
  
  
                          Comment on above:         Performed By: #### HEMDF, MG  
3, BMP3 ####Briana Ville 771835  
 E.  
  
                                                    Elizabeth, OH   
  
  
  
  
             Monocytes (Bld) [#/Vol] 1.3 10*3/uL  High         0.0-0.8      Summ  
a Health System  
  
  
  
  
                          Comment on above:         Performed By: #### HEMDF, MG  
3, BMP3 ####Briana Ville 771835  
 E.  
  
                                                    Elizabeth, OH   
  
  
  
  
             Monocytes/100 WBC (Bld) 8.2 %        Normal       2.0-10.0     Summ  
a Health System  
  
  
  
  
                          Comment on above:         Performed By: #### HEMDF, MG  
3, BMP3 ####38 Herrera Street   
  
  
  
  
             Erythrocyte distribution width (RBC) 16.3 %       High         11.5  
-14.5    Munson Healthcare Cadillac Hospital  
  
             [Ratio]                                               
  
  
  
  
                          Comment on above:         Performed By: #### HEMDF, MG  
3, BMP3 ####Thomas Ville 45855  
 EDexter, OH   
  
  
  
  
             Hematocrit (Bld) [Volume fraction] 20.3 %       Low          40.0-5  
2.0    Munson Healthcare Cadillac Hospital  
  
  
  
  
                          Comment on above:         Performed By: #### HEMDF, MG  
3, BMP3 ####Briana Ville 771835  
E.  
  
                                                    Elizabeth, OH   
  
  
  
  
             Hemoglobin (Bld) [Mass/Vol] 6.6 g/dL     Critically low 13.0-18.0    
  Munson Healthcare Cadillac Hospital  
  
  
  
  
                          Comment on above:         Performed By: #### HEMDF, MG  
3, BMP3 ####Briana Ville 771835  
 Rio Rancho, OH   
  
  
  
  
             MCH (RBC) [Entitic mass] 27.3 pg      Normal       26.0-34.0    Beaumont Hospital  
  
  
  
  
                          Comment on above:         Performed By: #### HEMDF, MG  
3, BMP3 ####Briana Ville 771835  
Rio Rancho, OH   
  
  
  
  
             MCHC         32.7 %       Normal       32.0-36.0    Salem City Hospital  
stem  
  
  
  
  
                          Comment on above:         Performed By: #### HEMDF, MG  
3, BMP3 ####38 Herrera Street   
  
  
  
  
             MCV (RBC) [Entitic vol] 83.4 fL      Normal       80.0-98.0    Summ  
a Health System  
  
  
  
  
                          Comment on above:         Performed By: #### HEMDF, MG  
3, BMP3 ####38 Herrera Street   
  
  
  
  
             Platelet mean volume (Bld) [Entitic vol] 7.3 fL       Low            
7.4-10.4     Munson Healthcare Cadillac Hospital  
  
  
  
  
                          Comment on above:         Performed By: #### HEMDF, MG  
3, BMP3 ####38 Herrera Street   
  
  
  
  
             Platelets (Bld) [#/Vol] 370 10*3/uL  Normal       140-440      University of Michigan Hospital  
  
  
  
  
                          Comment on above:         Performed By: #### HEMDF, MG  
3, BMP3 ####38 Herrera Street   
  
  
  
  
             RBC (Bld) [#/Vol] 2.44 10*6/uL Low          4.40-5.90    McLaren Oakland  
  
  
  
  
                          Comment on above:         Performed By: #### HEMDF, MG  
3, BMP3 ####38 Herrera Street   
  
  
  
  
             WBC (Bld) [#/Vol] 15.9 10*3/uL High         3.6-10.7     McLaren Oakland  
  
  
  
  
                          Comment on above:         Performed By: #### HEMDF, MG  
3, BMP3 ####38 Herrera Street   
  
  
  
  
                                        Leukodepleted Red Cells  on 2022  
  
  
  
  
             Leukodepleted Red Cells              Normal                    University of Michigan Hospital  
  
  
  
  
                          Comment on above:         Performed By: #### TSGL ####  
Munson Healthcare Cadillac Hospital#### LRC   
####44 Brandt Street 99500  
  
  
  
  
             Leukodepleted Red Cells              Normal                    University of Michigan Hospital  
  
  
  
  
                          Comment on above:         Performed By: #### TSGL ####  
Munson Healthcare Cadillac Hospital#### LRC   
####44 Brandt Street 60458  
  
  
  
  
                                        Magnesium  on 2022  
  
  
  
  
             Magnesium [Mass/Vol] 2.0 mg/dL    Normal       1.6-2.3      Summa H  
ealth System  
  
  
  
  
                          Comment on above:         Performed By: #### HEMDF, MG  
3, BMP3 ####Briana Ville 771835  
 Rio Rancho, OH 15309  
  
  
  
  
  
                                        ACT,Whole Blood  on 2022  
  
  
  
  
             ACT,Whole Blood 198 s        High                Munson Healthcare Cadillac Hospital  
  
  
  
  
                          Comment on above:         Result Comment: ACTBPerforme  
d by Hemochron Sig EliteCLIA ID:  
  
                                                    14A1292013Gdvuw Health, Children's Hospital of Michigan, OHACT testing is not intended for  
  
                                                    patients taking aprotonin,pa  
tients with hematocrits of <20% or  
  
                                                    >55%, patients usingother ty  
pes of anticoagulation medications, and  
  
                                                    patients withLupus Anticoagu  
lant.  
   
                                                    Performed By: #### ACTB ####  
Briana Ville 771835 Rio Rancho, OH   
  
  
  
  
                                        APTT  on 2022  
  
  
  
  
             aPTT Coag (Bld) [Time] 33.0 s       High         20.0-30.5    Munson Healthcare Cadillac Hospital  
  
  
  
  
                          Comment on above:         Result Comment: NOTE: The th  
erapeutic time for Heparin  
  
                                                    anticoagulation,based on Xa   
activity inhibition, is an APTT of  
  
                                                    46-80seconds.  
   
                                                    Performed By: #### APTT ####  
Briana Ville 771835 Rio Rancho, OH   
  
  
  
  
                                        Basic Metabolic Panel  on 2022  
  
  
  
  
             Anion gap [Moles/Vol] 10 mmol/L    Normal       3-13         Munson Healthcare Cadillac Hospital  
  
  
  
  
                          Comment on above:         Performed By: #### HEMDF, MG  
3, BMP3 ####Briana Ville 771835  
 Rio Rancho, OH   
  
  
  
  
             Calcium [Mass/Vol] 8.9 mg/dL    Normal       8.4-10.4     The Jewish Hospital System  
  
  
  
  
                          Comment on above:         Performed By: #### HEMDF, MG  
3, BMP3 ####Mercy Health St. Vincent Medical Center Lpiiub611  
 Rio Rancho, OH   
  
  
  
  
             CO2 [Moles/Vol] 21 mmol/L    Low          22-30        Munson Healthcare Cadillac Hospital  
  
  
  
  
                          Comment on above:         Performed By: #### HEMDF, MG  
3, BMP3 ####Munson Healthcare Cadillac Hospital525  
 Rio Rancho, OH   
  
  
  
  
             Creatinine [Mass/Vol] 1.18 mg/dL   Normal       0.52-1.25    Munson Healthcare Cadillac Hospital  
  
  
  
  
                          Comment on above:         Performed By: #### HEMDF, MG  
3, BMP3 ####Briana Ville 771835  
 Rio Rancho, OH   
  
  
  
  
             GFR/1.73 sq M.predicted among 74.6 mL/min/{1.73_m2} Normal       >6  
0          Munson Healthcare Cadillac Hospital  
  
             blacks MDRD (S/P/Bld) [Vol                                          
  
             rate/Area]                                            
  
  
  
  
                          Comment on above:         Performed By: #### HEMDF, MG  
3, BMP3 ####Briana Ville 771835  
Rio Rancho, OH   
  
  
  
  
             GFR/1.73 sq M.predicted among 64.4 mL/min/{1.73_m2} Normal       >6  
0          Munson Healthcare Cadillac Hospital  
  
             non-blacks MDRD (S/P/Bld) [Vol                                       
     
  
             rate/Area]                                            
  
  
  
  
                          Comment on above:         Result Comment: KDIGO guidel  
zoë provide the following GFR  
  
                                                    categories:Stage GFR(ml/min/  
1.73 m2) TermsG1 >=90 Normal or highG2  
  
                                                    60-89 Mildly decreased*G3a 4  
5-59 Mildly to moderately pbjbvczqlK0k  
  
                                                    30-44 Moderately to severely  
 decreasedG4 15-29 Severely decreasedG5  
  
                                                    <15 Kidney failure*Relative   
to young adult level.In the absence of  
  
                                                    evidence of kidney damage, n  
either GFRcategory G1 nor G2 fulfill  
  
                                                    the criteria for CKD.The CKD  
-EPI equation is validated in  
  
                                                    individuals 18 yearsof age a  
nd older. Currently the best equation  
  
                                                    forestimating glomerular beto  
tration rate (GFR) from serumcreatinine  
  
                                                    in children is the Bedside S  
kristiwartz equation.It is less accurate in  
  
                                                    patients with extremes of mu  
sclemass, restriction of dietary  
  
                                                    protein, ingestion of creati  
ne,extra-renal metabolism of  
  
                                                    creatinine, or treatment wit  
hmedications that affect renal tubular  
  
                                                    creatinine secretion.  
   
                                                    Performed By: #### HEMDF, MG  
3, BMP3 ####Corey HospitalLaru Technologies Ktgdmi611 E.  
  
                                                    Elizabeth, OH 39133  
  
  
  
  
  
             Glucose [Mass/Vol] 153 mg/dL    High                The Jewish Hospital System  
  
  
  
  
                          Comment on above:         Performed By: #### HEMDF, MG  
3, BMP3 ####Kettering Health Greene Memorial M2 Connections Uflrhg454  
 E.  
  
                                                    Elizabeth, OH 39094  
  
  
  
  
  
             Urea nitrogen [Mass/Vol] 17 mg/dL     Normal       7-17         Memorial Health System Selby General Hospital System  
  
  
  
  
                          Comment on above:         Performed By: #### HEMDF, MG  
3, BMP3 ####Kettering Health Greene Memorial Stottler Henke Associates525  
 Rio Rancho, OH 62480  
  
  
  
  
  
             Chloride [Moles/Vol] 106 mmol/L   Normal              Mercy Health St. Joseph Warren Hospital System  
  
  
  
  
                          Comment on above:         Performed By: #### HEMDF, MG  
3, BMP3 ####Kettering Health Greene Memorial M2 Connections Gswjrq532  
 Rio Rancho, OH 04955  
  
  
  
  
  
             Potassium [Moles/Vol] 4.6 mmol/L   Normal       3.5-5.1      Munson Healthcare Cadillac Hospital  
  
  
  
  
                          Comment on above:         Performed By: #### HEMDF, MG  
3, BMP3 ####Kettering Health Greene Memorial Stottler Henke Associates525  
 Rio Rancho, OH 47181  
  
  
  
  
  
             Sodium [Moles/Vol] 138 mmol/L   Normal       135-145      The Jewish Hospital System  
  
  
  
  
                          Comment on above:         Performed By: #### HEMDF, MG  
3, BMP3 ####Kettering Health Greene Memorial M2 Connections Rudpat111  
 E.  
  
                                                    Elizabeth, OH 83257  
209  
  
  
  
  
                                        Glucose,Bedside  on 2022  
  
  
  
  
             Glucose [Mass/Vol] 213 mg/dL    High                The Jewish Hospital System  
  
  
  
  
                          Comment on above:         Result Comment: Test perform  
ed by glucose meter. Results may   
be  
  
                                                    10%-15% lowerthan serum/plas  
ma values. (CLIA ID 68G5936955)  
   
                                                    Performed By: #### BGLU ####  
Kettering Health Greene Memorial M2 Connections Kekoqg477 Rio Rancho, OH 31779-0511  
  
  
  
  
             Glucose [Mass/Vol] 136 mg/dL    High                The Jewish Hospital System  
  
  
  
  
                          Comment on above:         Result Comment: Test perform  
ed by glucose meter. Results may   
be  
  
                                                    10%-15% lowerthan serum/plas  
ma values. (CLIA ID 94D4049248)  
   
                                                    Performed By: #### BGLU ####  
Kettering Health Greene Memorial M2 Connections Bcbkeq729 Rio Rancho, OH   
  
  
  
  
             Glucose [Mass/Vol] 175 mg/dL    High                The Jewish Hospital System  
  
  
  
  
                          Comment on above:         Result Comment: Test perform  
ed by glucose meter. Results may   
be  
  
                                                    10%-15% lowerthan serum/plas  
ma values. (CLIA ID 07Y7783407)  
   
                                                    Performed By: #### BGLU ####  
Kettering Health Greene Memorial M2 Connections 99 Hogan Street   
  
  
  
  
                                        Hemogram w/ Autodiff  on 2022  
  
  
  
  
             Abs Baso Cnt 0.0 10*3/uL  Normal       0.0-0.2      Salem City Hospital  
stem  
  
  
  
  
                          Comment on above:         Performed By: #### HEMDF, MG  
3, BMP3 ####Kettering Health Greene Memorial M2 Connections 99 Hogan Street   
  
  
  
  
             Abs Neutrophile Cnt 13.7 10*3/uL High         1.8-7.0      ProMedica Coldwater Regional Hospital  
  
  
  
  
                          Comment on above:         Performed By: #### HEMDF, MG  
3, BMP3 ####MyoKardia Stottler Henke Associates525  
 Rio Rancho, OH   
  
  
  
  
             Basophils/100 WBC (Bld) 0.3 %        Normal       0.0-2.0      Summ  
a Health System  
  
  
  
  
                          Comment on above:         Performed By: #### HEMDF, MG  
3, BMP3 ####Kettering Health Greene Memorial M2 Connections Rrkupv866  
 Rio Rancho, OH   
  
  
  
  
             Eosinophils (Bld) [#/Vol] 0.0 10*3/uL  Normal       0.0-0.5      Harper University Hospital  
  
  
  
  
                          Comment on above:         Performed By: #### HEMDF, MG  
3, BMP3 ####Kettering Health Greene Memorial M2 Connections Evuwee280  
 Rio Rancho, OH   
  
  
  
  
             Eosinophils/100 WBC (Bld) 0.0 %        Low          1.0-6.0      Harper University Hospital  
  
  
  
  
                          Comment on above:         Performed By: #### HEMDF, MG  
3, BMP3 ####Briana Ville 771835  
 Rio Rancho, OH   
  
  
  
  
             Erythrocyte distribution width (RBC) 16.6 %       High         11.5  
-14.5    Munson Healthcare Cadillac Hospital  
  
             [Ratio]                                               
  
  
  
  
                          Comment on above:         Performed By: #### HEMDF, MG  
3, BMP3 ####Briana Ville 771835  
 Rio Rancho, OH   
  
  
  
  
             Granulocytes/100 WBC (Bld) 86.4 %       High         40.0-80.0    Insight Surgical Hospital  
  
  
  
  
                          Comment on above:         Performed By: #### HEMDF, MG  
3, BMP3 ####Briana Ville 771835  
 Rio Rancho, OH   
  
  
  
  
             Hematocrit (Bld) [Volume fraction] 29.4 %       Low          40.0-5  
2.0    Munson Healthcare Cadillac Hospital  
  
  
  
  
                          Comment on above:         Performed By: #### HEMDF, MG  
3, BMP3 ####38 Herrera Street   
  
  
  
  
             Hemoglobin (Bld) [Mass/Vol] 9.6 g/dL     Low          13.0-18.0      
Munson Healthcare Cadillac Hospital  
  
  
  
  
                          Comment on above:         Performed By: #### HEMDF, MG  
3, BMP3 ####38 Herrera Street   
  
  
  
  
             Lymphocytes (Bld) [#/Vol] 0.8 10*3/uL  Low          1.0-4.3      Harper University Hospital  
  
  
  
  
                          Comment on above:         Performed By: #### HEMDF, MG  
3, BMP3 ####38 Herrera Street   
  
  
  
  
             Lymphocytes/100 WBC (Bld) 5.2 %        Low          20.0-40.0    Harper University Hospital  
  
  
  
  
                          Comment on above:         Performed By: #### HEMDF, MG  
3, BMP3 ####38 Herrera Street   
  
  
  
  
             MCH (RBC) [Entitic mass] 27.6 pg      Normal       26.0-34.0    Beaumont Hospital  
  
  
  
  
                          Comment on above:         Performed By: #### HEMDF, MG  
3, BMP3 ####38 Herrera Street   
  
  
  
  
             MCHC         32.6 %       Normal       32.0-36.0    Mercy Health St. Vincent Medical Center Sy  
stem  
  
  
  
  
                          Comment on above:         Performed By: #### HEMDF, MG  
3, BMP3 ####Briana Ville 771835  
.  
  
                                                    Elizabeth, OH   
  
  
  
  
             MCV (RBC) [Entitic vol] 84.5 fL      Normal       80.0-98.0    Summ  
a Health System  
  
  
  
  
                          Comment on above:         Performed By: #### HEMDF, MG  
3, BMP3 ####Thomas Ville 45855  
E.  
  
                                                    Elizabeth, OH   
  
  
  
  
             Monocytes (Bld) [#/Vol] 1.3 10*3/uL  High         0.0-0.8      Summ  
a Health System  
  
  
  
  
                          Comment on above:         Performed By: #### HEMDF, MG  
3, BMP3 ####38 Herrera Street   
  
  
  
  
             Monocytes/100 WBC (Bld) 8.1 %        Normal       2.0-10.0     Summ  
a Health System  
  
  
  
  
                          Comment on above:         Performed By: #### HEMDF, MG  
3, BMP3 ####88 Johnson Street.  
  
                                                    Elizabeth, OH   
  
  
  
  
             Platelet mean volume (Bld) [Entitic vol] 7.5 fL       Normal         
7.4-10.4     Munson Healthcare Cadillac Hospital  
  
  
  
  
                          Comment on above:         Performed By: #### HEMDF, MG  
3, BMP3 ####38 Herrera Street   
  
  
  
  
             Platelets (Bld) [#/Vol] 387 10*3/uL  Normal       140-440      University of Michigan Hospital  
  
  
  
  
                          Comment on above:         Performed By: #### HEMDF, MG  
3, BMP3 ####88 Johnson Street.  
  
                                                    Elizabeth, OH   
  
  
  
  
             RBC (Bld) [#/Vol] 3.48 10*6/uL Low          4.40-5.90    McLaren Oakland  
  
  
  
  
                          Comment on above:         Performed By: #### HEMDF, MG  
3, BMP3 ####38 Herrera Street   
  
  
  
  
             WBC (Bld) [#/Vol] 15.9 10*3/uL High         3.6-10.7     Summa Heal  
th System  
  
  
  
  
                          Comment on above:         Performed By: #### HEMDF, MG  
3, BMP3 ####Briana Ville 771835  
 E.  
  
                                                    Elizabeth, OH   
  
  
  
  
                                        Magnesium  on 2022  
  
  
  
  
             Magnesium [Mass/Vol] 2.0 mg/dL    Normal       1.6-2.3      Summa H  
ealth System  
  
  
  
  
                          Comment on above:         Performed By: #### HEMDF, MG  
3, BMP3 ####Briana Ville 771835  
 E.  
  
                                                    Elizabeth, OH   
  
  
  
  
                                        APTT  on 2022  
  
  
  
  
             aPTT Coag (Bld) [Time] 64.3 s       High         20.0-30.5    Munson Healthcare Cadillac Hospital  
  
  
  
  
                          Comment on above:         Result Comment: NOTE: The th  
erapeutic time for Heparin  
  
                                                    anticoagulation,based on Xa   
activity inhibition, is an APTT of  
  
                                                    46-80seconds.  
   
                                                    Performed By: #### APTT ####  
38 Herrera Street   
  
  
  
  
             aPTT Coag (Bld) [Time] 39.7 s       High         20.0-30.5    Munson Healthcare Cadillac Hospital  
  
  
  
  
                          Comment on above:         Result Comment: NOTE: The th  
erapeutic time for Heparin  
  
                                                    anticoagulation,based on Xa   
activity inhibition, is an APTT of  
  
                                                    46-80seconds.  
   
                                                    Performed By: #### APTT ####  
Briana Ville 771835 Rio Rancho, OH   
  
  
  
  
                                        Basic Metabolic Panel   on 2022  
  
  
  
  
             Anion gap [Moles/Vol] 11 mmol/L    Normal       3-13         Munson Healthcare Cadillac Hospital  
  
  
  
  
                          Comment on above:         Performed By: #### HEMDF, MG  
3, BMP3 ####Briana Ville 771835  
 E.  
  
                                                    Elizabeth, OH   
  
  
  
  
             Calcium [Mass/Vol] 8.7 mg/dL    Normal       8.4-10.4     The Jewish Hospital System  
  
  
  
  
                          Comment on above:         Performed By: #### HEMDF, MG  
3, BMP3 ####Briana Ville 771835  
 EDexter, OH   
  
  
  
  
             CO2 [Moles/Vol] 21 mmol/L    Low          22-30        Munson Healthcare Cadillac Hospital  
  
  
  
  
                          Comment on above:         Performed By: #### HEMDF, MG  
3, BMP3 ####Mercy Health St. Vincent Medical Center Uadtqx012  
 EDexter, OH 37458  
  
  
  
  
  
             Glucose [Mass/Vol] 145 mg/dL    High                The Jewish Hospital System  
  
  
  
  
                          Comment on above:         Performed By: #### HEMDF, MG  
3, BMP3 ####Munson Healthcare Cadillac Hospital525  
 Rio Rancho, OH 88910  
0  
  
  
  
  
             Urea nitrogen [Mass/Vol] 14 mg/dL     Normal       7-17         Beaumont Hospital  
  
  
  
  
                          Comment on above:         Performed By: #### HEMDF, MG  
3, BMP3 ####Munson Healthcare Cadillac Hospital525  
 Rio Rancho, OH 49725  
  
  
  
  
  
             Creatinine [Mass/Vol] 1.16 mg/dL   Normal       0.52-1.25    Munson Healthcare Cadillac Hospital  
  
  
  
  
                          Comment on above:         Performed By: #### HEMDF, MG  
3, BMP3 ####Briana Ville 771835  
 Rio Rancho, OH 93128  
0  
  
  
  
  
             GFR/1.73 sq M.predicted among 76.2 mL/min/{1.73_m2} Normal       >6  
0          Munson Healthcare Cadillac Hospital  
  
             blacks MDRD (S/P/Bld) [Vol                                          
  
             rate/Area]                                            
  
  
  
  
                          Comment on above:         Performed By: #### HEMDF, MG  
3, BMP3 ####Briana Ville 771835  
Rio Rancho, OH 69875  
0  
  
  
  
  
             GFR/1.73 sq M.predicted among 65.7 mL/min/{1.73_m2} Normal       >6  
0          Munson Healthcare Cadillac Hospital  
  
             non-blacks MDRD (S/P/Bld) [Vol                                       
     
  
             rate/Area]                                            
  
  
  
  
                          Comment on above:         Result Comment: KDIGO guidel  
zoë provide the following GFR  
  
                                                    categories:Stage GFR(ml/min/  
1.73 m2) TermsG1 >=90 Normal or highG2  
  
                                                    60-89 Mildly decreased*G3a 4  
5-59 Mildly to moderately rzpkzicdtN3h  
  
                                                    30-44 Moderately to severely  
 decreasedG4 15-29 Severely decreasedG5  
  
                                                    <15 Kidney failure*Relative   
to young adult level.In the absence of  
  
                                                    evidence of kidney damage, n  
either GFRcategory G1 nor G2 fulfill  
  
                                                    the criteria for CKD.The CKD  
-EPI equation is validated in  
  
                                                    individuals 18 yearsof age a  
nd older. Currently the best equation  
  
                                                    forestimating glomerular beto  
tration rate (GFR) from serumcreatinine  
  
                                                    in children is the Bedside S  
chwartz equation.It is less accurate in  
  
                                                    patients with extremes of mu  
sclemass, restriction of dietary  
  
                                                    protein, ingestion of creati  
ne,extra-renal metabolism of  
  
                                                    creatinine, or treatment wit  
hmedications that affect renal tubular  
  
                                                    creatinine secretion.  
   
                                                    Performed By: #### HEMDF, MG  
3, BMP3 ####Cerus Corporation525 EDexter, OH   
  
  
  
  
             Chloride [Moles/Vol] 106 mmol/L   Normal              Mercy Health St. Joseph Warren Hospital System  
  
  
  
  
                          Comment on above:         Performed By: #### HEMDF, MG  
3, BMP3 ####Cerus Corporation525  
 Rio Rancho, OH   
  
  
  
  
             Potassium [Moles/Vol] 3.6 mmol/L   Normal       3.5-5.1      Munson Healthcare Cadillac Hospital  
  
  
  
  
                          Comment on above:         Performed By: #### HEMDF, MG  
3, BMP3 ####Cerus Corporation525  
 EDexter, OH   
  
  
  
  
             Sodium [Moles/Vol] 138 mmol/L   Normal       135-145      The Jewish Hospital System  
  
  
  
  
                          Comment on above:         Performed By: #### HEMDF, MG  
3, BMP3 ####Cerus Corporation525  
 Rio Rancho, OH   
  
  
  
  
                                        Glucose,Bedside  on 2022  
  
  
  
  
             Glucose [Mass/Vol] 177 mg/dL    High                The Jewish Hospital System  
  
  
  
  
                          Comment on above:         Result Comment: Test perform  
ed by glucose meter. Results may   
be  
  
                                                    10%-15% lowerthan serum/plas  
ma values. (CLIA ID 40R5617854)  
   
                                                    Performed By: #### BGLU ####  
Cerus Corporation525 Rio Rancho, OH   
  
  
  
  
             Glucose [Mass/Vol] 139 mg/dL    High                The Jewish Hospital System  
  
  
  
  
                          Comment on above:         Result Comment: Test perform  
ed by glucose meter. Results may   
be  
  
                                                    10%-15% lowerthan serum/plas  
ma values. (CLIA ID 43X6895855)  
   
                                                    Performed By: #### BGLU ####  
Cerus Corporation525 Rio Rancho, OH   
  
  
  
  
                                        Hemogram w/ Autodiff  on 2022  
  
  
  
  
             Abs Baso Cnt 0.1 10*3/uL  Normal       0.0-0.2      Mercy Health St. Vincent Medical Center Sy  
stem  
  
  
  
  
                          Comment on above:         Performed By: #### HEMDF, MG  
3, BMP3 ####Briana Ville 771835  
 Rio Rancho, OH 83333  
  
  
  
  
  
             Abs Neutrophile Cnt 8.7 10*3/uL  High         1.8-7.0      ProMedica Coldwater Regional Hospital  
  
  
  
  
                          Comment on above:         Performed By: #### HEMDF, MG  
3, BMP3 ####Kettering Health Greene Memorial M2 Connections Fcgmim914  
 Rio Rancho, OH 36864  
  
  
  
  
  
             Basophils/100 WBC (Bld) 0.7 %        Normal       0.0-2.0      Summ  
a Health System  
  
  
  
  
                          Comment on above:         Performed By: #### HEMDF, MG  
3, BMP3 ####Briana Ville 771835  
 Rio Rancho, OH 32464  
  
  
  
  
  
             Eosinophils (Bld) [#/Vol] 0.3 10*3/uL  Normal       0.0-0.5      Harper University Hospital  
  
  
  
  
                          Comment on above:         Performed By: #### HEMDF, MG  
3, BMP3 ####Kettering Health Greene Memorial M2 Connections Mvrtew454  
 Rio Rancho, OH 28704  
  
  
  
  
  
             Eosinophils/100 WBC (Bld) 2.5 %        Normal       1.0-6.0      Harper University Hospital  
  
  
  
  
                          Comment on above:         Performed By: #### HEMDF, MG  
3, BMP3 ####Kettering Health Greene Memorial M2 Connections Khhokp611  
 Rio Rancho, OH 10996  
  
  
  
  
  
             Erythrocyte distribution width (RBC) 16.5 %       High         11.5  
-14.5    Munson Healthcare Cadillac Hospital  
  
             [Ratio]                                               
  
  
  
  
                          Comment on above:         Performed By: #### HEMDF, MG  
3, BMP3 ####Kettering Health Greene Memorial M2 Connections Bznvlo349  
 Rio Rancho, OH 74230  
2090  
  
  
  
  
             Granulocytes/100 WBC (Bld) 74.7 %       Normal       40.0-80.0    S  
University of Michigan Health  
  
  
  
  
                          Comment on above:         Performed By: #### HEMDF, MG  
3, BMP3 ####Kettering Health Greene Memorial M2 Connections Lljtiq179  
 Rio Rancho, OH 85045  
0  
  
  
  
  
             Hematocrit (Bld) [Volume fraction] 29.4 %       Low          40.0-5  
2.0    Munson Healthcare Cadillac Hospital  
  
  
  
  
                          Comment on above:         Performed By: #### HEMDF, MG  
3, BMP3 ####38 Herrera Street   
  
  
  
  
             Hemoglobin (Bld) [Mass/Vol] 9.8 g/dL     Low          13.0-18.0      
Munson Healthcare Cadillac Hospital  
  
  
  
  
                          Comment on above:         Performed By: #### HEMDF, MG  
3, BMP3 ####38 Herrera Street   
  
  
  
  
             Lymphocytes (Bld) [#/Vol] 1.6 10*3/uL  Normal       1.0-4.3      Harper University Hospital  
  
  
  
  
                          Comment on above:         Performed By: #### HEMDF, MG  
3, BMP3 ####38 Herrera Street   
  
  
  
  
             Lymphocytes/100 WBC (Bld) 13.6 %       Low          20.0-40.0    Harper University Hospital  
  
  
  
  
                          Comment on above:         Performed By: #### HEMDF, MG  
3, BMP3 ####38 Herrera Street   
  
  
  
  
             MCH (RBC) [Entitic mass] 27.6 pg      Normal       26.0-34.0    Beaumont Hospital  
  
  
  
  
                          Comment on above:         Performed By: #### HEMDF, MG  
3, BMP3 ####38 Herrera Street   
  
  
  
  
             MCHC         33.2 %       Normal       32.0-36.0    Mercy Health St. Vincent Medical Center Sy  
stem  
  
  
  
  
                          Comment on above:         Performed By: #### HEMDF, MG  
3, BMP3 ####38 Herrera Street   
  
  
  
  
             MCV (RBC) [Entitic vol] 83.2 fL      Normal       80.0-98.0    Summ  
a Health System  
  
  
  
  
                          Comment on above:         Performed By: #### HEMDF, MG  
3, BMP3 ####38 Herrera Street   
  
  
  
  
             Monocytes (Bld) [#/Vol] 1.0 10*3/uL  High         0.0-0.8      Summ  
a Health System  
  
  
  
  
                          Comment on above:         Performed By: #### HEMDF, MG  
3, BMP3 ####Munson Healthcare Cadillac Hospital525  
 Rio Rancho, OH   
  
  
  
  
             Monocytes/100 WBC (Bld) 8.5 %        Normal       2.0-10.0     Summ  
a Health System  
  
  
  
  
                          Comment on above:         Performed By: #### HEMDF, MG  
3, BMP3 ####Briana Ville 771835  
 Rio Rancho, OH   
  
  
  
  
             Platelet mean volume (Bld) [Entitic vol] 7.4 fL       Normal         
7.4-10.4     Munson Healthcare Cadillac Hospital  
  
  
  
  
                          Comment on above:         Performed By: #### HEMDF, MG  
3, BMP3 ####Kettering Health Greene Memorial M2 Connections Ygxqzq748  
Rio Rancho, OH   
  
  
  
  
             Platelets (Bld) [#/Vol] 324 10*3/uL  Normal       140-440      University of Michigan Hospital  
  
  
  
  
                          Comment on above:         Performed By: #### HEMDF, MG  
3, BMP3 ####38 Herrera Street   
  
  
  
  
             RBC (Bld) [#/Vol] 3.54 10*6/uL Low          4.40-5.90    Holmes County Joel Pomerene Memorial Hospital System  
  
  
  
  
                          Comment on above:         Performed By: #### HEMDF, MG  
3, BMP3 ####Kettering Health Greene Memorial M2 Connections Obwsed468  
 Rio Rancho, OH   
  
  
  
  
             WBC (Bld) [#/Vol] 11.6 10*3/uL High         3.6-10.7     McLaren Oakland  
  
  
  
  
                          Comment on above:         Performed By: #### HEMDF, MG  
3, BMP3 ####Kettering Health Greene Memorial M2 Connections Mowgny974  
 Rio Rancho, OH   
  
  
  
  
                                        Magnesium   on 2022  
  
  
  
  
             Magnesium [Mass/Vol] 2.1 mg/dL    Normal       1.6-2.3      Mercy Health St. Joseph Warren Hospital System  
  
  
  
  
                          Comment on above:         Performed By: #### HEMDF, MG  
3, BMP3 ####Kettering Health Greene Memorial M2 Connections Xickll034  
 Rio Rancho, OH   
  
  
  
  
                                        TS GEL  on 2022  
  
  
  
  
             TS GEL       ABO Group:   Normal                    Mercy Health St. Vincent Medical Center Sy  
stem  
  
                          A                                        
  
                          Rh, Gel:                                 
  
                          POS                                      
  
                          Antibody Screen Gel:                             
  
                          NEG                                      
  
  
  
  
                          Comment on above:         Performed By: #### TSGL ####  
Kettering Health Greene Memorial Health System#### LRC   
####Von Voigtlander Women's Hospital525 Turin, OH 00844  
  
  
  
  
                                        APTT  on 2022  
  
  
  
  
             aPTT Coag (Bld) [Time] 63.3 s       High         20.0-30.5    Munson Healthcare Cadillac Hospital  
  
  
  
  
                          Comment on above:         Result Comment: NOTE: The th  
erapeutic time for Heparin  
  
                                                    anticoagulation,based on Xa   
activity inhibition, is an APTT of  
  
                                                    46-80seconds.  
   
                                                    Performed By: #### APTT ####  
38 Herrera Street   
  
  
  
  
             aPTT Coag (Bld) [Time] 36.7 s       High         20.0-30.5    Munson Healthcare Cadillac Hospital  
  
  
  
  
                          Comment on above:         Result Comment: NOTE: The th  
erapeutic time for Heparin  
  
                                                    anticoagulation,based on Xa   
activity inhibition, is an APTT of  
  
                                                    46-80seconds.  
   
                                                    Performed By: #### APTT ####  
38 Herrera Street   
  
  
  
  
             aPTT Coag (Bld) [Time] 53.7 s       High         20.0-30.5    Munson Healthcare Cadillac Hospital  
  
  
  
  
                          Comment on above:         Result Comment: NOTE: The th  
erapeutic time for Heparin  
  
                                                    anticoagulation,based on Xa   
activity inhibition, is an APTT of  
  
                                                    46-80seconds.  
   
                                                    Performed By: #### APTT ####  
38 Herrera Street   
  
  
  
  
                                        Basic Metabolic Panel  on 2022  
  
  
  
  
             Anion gap [Moles/Vol] 11 mmol/L    Normal       3-13         Munson Healthcare Cadillac Hospital  
  
  
  
  
                          Comment on above:         Performed By: #### HEMDF, MG  
3, BMP3 ####38 Herrera Street   
  
  
  
  
             Calcium [Mass/Vol] 8.8 mg/dL    Normal       8.4-10.4     Munson Healthcare Otsego Memorial Hospital  
  
  
  
  
                          Comment on above:         Performed By: #### HEMDF, MG  
3, BMP3 ####38 Herrera Street   
  
  
  
  
             CO2 [Moles/Vol] 22 mmol/L    Normal       22-30        Munson Healthcare Cadillac Hospital  
  
  
  
  
                          Comment on above:         Performed By: #### HEMDF, MG  
3, BMP3 ####Mercy Health St. Vincent Medical Center Tkwtuy636  
 EDexter, OH 24627  
  
  
  
  
  
             Glucose [Mass/Vol] 135 mg/dL    High                The Jewish Hospital System  
  
  
  
  
                          Comment on above:         Performed By: #### HEMDF, MG  
3, BMP3 ####Munson Healthcare Cadillac Hospital525  
 Rio Rancho, OH 18884  
0  
  
  
  
  
             Urea nitrogen [Mass/Vol] 17 mg/dL     Normal       7-17         Beaumont Hospital  
  
  
  
  
                          Comment on above:         Performed By: #### HEMDF, MG  
3, BMP3 ####Briana Ville 771835  
 Rio Rancho, OH 51509  
  
  
  
  
  
             Creatinine [Mass/Vol] 1.23 mg/dL   Normal       0.52-1.25    Munson Healthcare Cadillac Hospital  
  
  
  
  
                          Comment on above:         Performed By: #### HEMDF, MG  
3, BMP3 ####Briana Ville 771835  
 Rio Rancho, OH 12462  
0  
  
  
  
  
             GFR/1.73 sq M.predicted among 71.0 mL/min/{1.73_m2} Normal       >6  
0          Munson Healthcare Cadillac Hospital  
  
             blacks MDRD (S/P/Bld) [Vol                                          
  
             rate/Area]                                            
  
  
  
  
                          Comment on above:         Performed By: #### HEMDF, MG  
3, BMP3 ####Briana Ville 771835  
Rio Rancho, OH 92386  
0  
  
  
  
  
             GFR/1.73 sq M.predicted among 61.3 mL/min/{1.73_m2} Normal       >6  
0          Munson Healthcare Cadillac Hospital  
  
             non-blacks MDRD (S/P/Bld) [Vol                                       
     
  
             rate/Area]                                            
  
  
  
  
                          Comment on above:         Result Comment: KDIGO guidel  
zoë provide the following GFR  
  
                                                    categories:Stage GFR(ml/min/  
1.73 m2) TermsG1 >=90 Normal or highG2  
  
                                                    60-89 Mildly decreased*G3a 4  
5-59 Mildly to moderately ycoabliqxR0n  
  
                                                    30-44 Moderately to severely  
 decreasedG4 15-29 Severely decreasedG5  
  
                                                    <15 Kidney failure*Relative   
to young adult level.In the absence of  
  
                                                    evidence of kidney damage, n  
either GFRcategory G1 nor G2 fulfill  
  
                                                    the criteria for CKD.The CKD  
-EPI equation is validated in  
  
                                                    individuals 18 yearsof age a  
nd older. Currently the best equation  
  
                                                    forestimating glomerular beto  
tration rate (GFR) from serumcreatinine  
  
                                                    in children is the Bedside S  
reynaldotz equation.It is less accurate in  
  
                                                    patients with extremes of mu  
sclemass, restriction of dietary  
  
                                                    protein, ingestion of creati  
ne,extra-renal metabolism of  
  
                                                    creatinine, or treatment wit  
hmedications that affect renal tubular  
  
                                                    creatinine secretion.  
   
                                                    Performed By: #### HEMDF, MG  
3, BMP3 ####K94 Discoveries Thauwh921 E.  
  
                                                    Elizabeth, OH 16310  
  
  
  
  
  
             Potassium [Moles/Vol] 3.8 mmol/L   Normal       3.5-5.1      Munson Healthcare Cadillac Hospital  
  
  
  
  
                          Comment on above:         Performed By: #### HEMDF, MG  
3, BMP3 ####Cerus Corporation525  
 EDexter, OH   
  
  
  
  
             Sodium [Moles/Vol] 137 mmol/L   Normal       135-145      The Jewish Hospital System  
  
  
  
  
                          Comment on above:         Performed By: #### HEMDF, MG  
3, BMP3 ####Cerus Corporation525  
 EDexter, OH   
  
  
  
  
             Chloride [Moles/Vol] 105 mmol/L   Normal              Mercy Health St. Joseph Warren Hospital System  
  
  
  
  
                          Comment on above:         Performed By: #### HEMDF, MG  
3, BMP3 ####Cerus Corporation525  
 EDexter, OH   
  
  
  
  
                                        Glucose,Bedside  on 2022  
  
  
  
  
             Glucose [Mass/Vol] 163 mg/dL    High                The Jewish Hospital System  
  
  
  
  
                          Comment on above:         Result Comment: Test perform  
ed by glucose meter. Results may   
be  
  
                                                    10%-15% lowerthan serum/plas  
ma values. (CLIA ID 31Z4123969)  
   
                                                    Performed By: #### BGLU ####  
Cerus Corporation525 E. Elizabeth, OH 43079-8929  
  
  
  
  
             Glucose [Mass/Vol] 167 mg/dL    High                Corey Hospitala Grant Hospital System  
  
  
  
  
                          Comment on above:         Result Comment: Test perform  
ed by glucose meter. Results may   
be  
  
                                                    10%-15% lowerthan serum/plas  
ma values. (CLIA ID 29Z1439223)  
   
                                                    Performed By: #### BGLU ####  
Cerus Corporation525 E. Elizabeth, OH 16249-1116  
  
  
  
  
             Glucose [Mass/Vol] 140 mg/dL    High                The Jewish Hospital System  
  
  
  
  
                          Comment on above:         Result Comment: Test perform  
ed by glucose meter. Results may   
be  
  
                                                    10%-15% lowerthan serum/plas  
ma values. (CLIA ID 15B5106257)  
   
                                                    Performed By: #### BGLU ####  
MyoKardia M2 Connections Xxvauo985 E. Elizabeth, OH   
  
  
  
  
             Glucose [Mass/Vol] 135 mg/dL    High                The Jewish Hospital System  
  
  
  
  
                          Comment on above:         Result Comment: Test perform  
ed by glucose meter. Results may   
be  
  
                                                    10%-15% lowerthan serum/plas  
ma values. (CLIA ID 74B5837921)  
   
                                                    Performed By: #### BGLU ####  
Kettering Health Greene Memorial M2 Connections Kjosij593 E. Elizabeth, OH   
  
  
  
  
             Glucose [Mass/Vol] 128 mg/dL    High                The Jewish Hospital System  
  
  
  
  
                          Comment on above:         Result Comment: Test perform  
ed by glucose meter. Results may   
be  
  
                                                    10%-15% lowerthan serum/plas  
ma values. (CLIA ID 94E9175051)  
   
                                                    Performed By: #### BGLU ####  
Kettering Health Greene Memorial Stottler Henke Associates525 E. Elizabeth, OH   
  
  
  
  
                                        Hemogram w/ Autodiff  on 2022  
  
  
  
  
             Abs Baso Cnt 0.1 10*3/uL  Normal       0.0-0.2      Salem City Hospital  
stem  
  
  
  
  
                          Comment on above:         Performed By: #### HEMDF, MG  
3, BMP3 ####Kettering Health Greene Memorial M2 Connections Emqtip498  
 E.  
  
                                                    Elizabeth, OH   
  
  
  
  
             Abs Neutrophile Cnt 9.2 10*3/uL  High         1.8-7.0      ProMedica Coldwater Regional Hospital  
  
  
  
  
                          Comment on above:         Performed By: #### HEMDF, MG  
3, BMP3 ####Kettering Health Greene Memorial Stottler Henke Associates525  
 Rio Rancho, OH   
  
  
  
  
             Basophils/100 WBC (Bld) 0.5 %        Normal       0.0-2.0      Summ  
a Health System  
  
  
  
  
                          Comment on above:         Performed By: #### HEMDF, MG  
3, BMP3 ####Kettering Health Greene Memorial M2 Connections Kuqnol869  
 Rio Rancho, OH   
  
  
  
  
             Eosinophils (Bld) [#/Vol] 0.2 10*3/uL  Normal       0.0-0.5      Harper University Hospital  
  
  
  
  
                          Comment on above:         Performed By: #### HEMDF MG  
3, BMP3 ####Kettering Health Greene Memorial M2 Connections Qeleji834  
 E.  
  
                                                    Elizabeth, OH 10131  
  
  
  
  
  
             Eosinophils/100 WBC (Bld) 1.9 %        Normal       1.0-6.0      Harper University Hospital  
  
  
  
  
                          Comment on above:         Performed By: #### HEMDF MG  
3, BMP3 ####Kettering Health Greene Memorial M2 Connections Yxtpux100  
 E.  
  
                                                    Elizabeth, OH   
  
  
  
  
             Erythrocyte distribution width (RBC) 16.2 %       High         11.5  
-14.5    Munson Healthcare Cadillac Hospital  
  
             [Ratio]                                               
  
  
  
  
                          Comment on above:         Performed By: #### HEMDF MG  
3, BMP3 ####Kettering Health Greene Memorial M2 Connections Rzioix622  
 E.  
  
                                                    Elizabeth, OH   
  
  
  
  
             Granulocytes/100 WBC (Bld) 75.4 %       Normal       40.0-80.0    Insight Surgical Hospital  
  
  
  
  
                          Comment on above:         Performed By: #### HEMDF MG  
3, BMP3 ####Kettering Health Greene Memorial M2 Connections Swyzna624  
 E.  
  
                                                    Elizabeth, OH   
  
  
  
  
             Hematocrit (Bld) [Volume fraction] 29.1 %       Low          40.0-5  
2.0    Munson Healthcare Cadillac Hospital  
  
  
  
  
                          Comment on above:         Performed By: #### HEMDF MG  
3, BMP3 ####Kettering Health Greene Memorial M2 Connections Zlfzld297  
E.  
  
                                                    Elizabeth, OH 24938  
  
  
  
  
  
             Hemoglobin (Bld) [Mass/Vol] 9.5 g/dL     Low          13.0-18.0      
Munson Healthcare Cadillac Hospital  
  
  
  
  
                          Comment on above:         Performed By: #### HEMDF, MG  
3, BMP3 ####Kettering Health Greene Memorial M2 Connections Irlgkv822  
 E.  
  
                                                    Elizabeth, OH   
  
  
  
  
             Lymphocytes (Bld) [#/Vol] 1.8 10*3/uL  Normal       1.0-4.3      Harper University Hospital  
  
  
  
  
                          Comment on above:         Performed By: #### HEMDF, MG  
3, BMP3 ####Kettering Health Greene Memorial M2 Connections Ozcmqc727  
 E.  
  
                                                    Elizabeth, OH 81051  
  
  
  
  
  
             Lymphocytes/100 WBC (Bld) 14.5 %       Low          20.0-40.0    Harper University Hospital  
  
  
  
  
                          Comment on above:         Performed By: #### HEMDF, MG  
3, BMP3 ####Briana Ville 771835  
 Rio Rancho, OH   
  
  
  
  
             MCH (RBC) [Entitic mass] 27.6 pg      Normal       26.0-34.0    Beaumont Hospital  
  
  
  
  
                          Comment on above:         Performed By: #### HEMDF, MG  
3, BMP3 ####Briana Ville 771835  
Rio Rancho, OH   
  
  
  
  
             MCHC         32.8 %       Normal       32.0-36.0    Salem City Hospital  
stem  
  
  
  
  
                          Comment on above:         Performed By: #### HEMDF, MG  
3, BMP3 ####Briana Ville 771835  
Rio Rancho, OH   
  
  
  
  
             MCV (RBC) [Entitic vol] 84.1 fL      Normal       80.0-98.0    Summ  
a Health System  
  
  
  
  
                          Comment on above:         Performed By: #### HEMDF, MG  
3, BMP3 ####38 Herrera Street   
  
  
  
  
             Monocytes (Bld) [#/Vol] 0.9 10*3/uL  High         0.0-0.8      Summ  
a Health System  
  
  
  
  
                          Comment on above:         Performed By: #### HEMDF, MG  
3, BMP3 ####38 Herrera Street   
  
  
  
  
             Monocytes/100 WBC (Bld) 7.7 %        Normal       2.0-10.0     Summ  
a Health System  
  
  
  
  
                          Comment on above:         Performed By: #### HEMDF, MG  
3, BMP3 ####Briana Ville 771835  
 Rio Rancho, OH   
  
  
  
  
             Platelet mean volume (Bld) [Entitic vol] 8.1 fL       Normal         
7.4-10.4     Munson Healthcare Cadillac Hospital  
  
  
  
  
                          Comment on above:         Performed By: #### HEMDF, MG  
3, BMP3 ####Briana Ville 771835  
Rio Rancho, OH   
  
  
  
  
             Platelets (Bld) [#/Vol] 296 10*3/uL  Normal       140-440      University of Michigan Hospital  
  
  
  
  
                          Comment on above:         Performed By: #### HEMDF, MG  
3, BMP3 ####Briana Ville 771835  
 Rio Rancho, OH   
  
  
  
  
             RBC (Bld) [#/Vol] 3.46 10*6/uL Low          4.40-5.90    Holmes County Joel Pomerene Memorial Hospital System  
  
  
  
  
                          Comment on above:         Performed By: #### HEMDF, MG  
3, BMP3 ####Kettering Health Greene Memorial M2 Connections Ooqplq159  
 E.  
  
                                                    Elizabeth, OH   
  
  
  
  
             WBC (Bld) [#/Vol] 12.3 10*3/uL High         3.6-10.7     Holmes County Joel Pomerene Memorial Hospital System  
  
  
  
  
                          Comment on above:         Performed By: #### HEMDF, MG  
3, BMP3 ####MyoKardia M2 Connections Rlzjnz304  
 E.  
  
                                                    Elizabeth, OH   
  
  
  
  
                                        Magnesium  on 2022  
  
  
  
  
             Magnesium [Mass/Vol] 2.1 mg/dL    Normal       1.6-2.3      Mercy Health St. Joseph Warren Hospital System  
  
  
  
  
                          Comment on above:         Performed By: #### HEMDF, MG  
3, BMP3 ####Kettering Health Greene Memorial M2 Connections Clinton Ville 25743  
 E.  
  
                                                    Elizabeth, OH   
  
  
  
  
                                        APTT  on 2022  
  
  
  
  
             aPTT Coag (Bld) [Time] 66.3 s       High         20.0-30.5    Munson Healthcare Cadillac Hospital  
  
  
  
  
                          Comment on above:         Result Comment: NOTE: The th  
erapeutic time for Heparin  
  
                                                    anticoagulation,based on Xa   
activity inhibition, is an APTT of  
  
                                                    46-80seconds.  
   
                                                    Performed By: #### APTT ####  
Kettering Health Greene Memorial M2 Connections Clinton Ville 25743 E. Elizabeth, OH   
  
  
  
  
             aPTT Coag (Bld) [Time] 57.2 s       High         20.0-30.5    Munson Healthcare Cadillac Hospital  
  
  
  
  
                          Comment on above:         Result Comment: NOTE: The th  
erapeutic time for Heparin  
  
                                                    anticoagulation,based on Xa   
activity inhibition, is an APTT of  
  
                                                    46-80seconds.  
   
                                                    Performed By: #### APTT ####  
Kettering Health Greene Memorial M2 Connections Vdavyl156 . Elizabeth, OH   
  
  
  
  
             aPTT Coag (Bld) [Time] 43.9 s       High         20.0-30.5    Munson Healthcare Cadillac Hospital  
  
  
  
  
                          Comment on above:         Result Comment: NOTE: The th  
erapeutic time for Heparin  
  
                                                    anticoagulation,based on Xa   
activity inhibition, is an APTT of  
  
                                                    46-80seconds.  
   
                                                    Performed By: #### APTT ####  
Briana Ville 771835 Rio Rancho, OH   
  
  
  
  
                                        Basic Metabolic Panel   on 2022  
  
  
  
  
             Anion gap [Moles/Vol] 9 mmol/L     Normal       3-13         Munson Healthcare Cadillac Hospital  
  
  
  
  
                          Comment on above:         Performed By: #### HEMDF, MG  
3, BMP3 ####Briana Ville 771835  
 EDexter, OH   
  
  
  
  
             Calcium [Mass/Vol] 8.7 mg/dL    Normal       8.4-10.4     Munson Healthcare Otsego Memorial Hospital  
  
  
  
  
                          Comment on above:         Performed By: #### HEMDF, MG  
3, BMP3 ####Kettering Health Greene Memorial M2 Connections Qlyrcj275  
 Rio Rancho, OH   
  
  
  
  
             CO2 [Moles/Vol] 23 mmol/L    Normal       22-30        Munson Healthcare Cadillac Hospital  
  
  
  
  
                          Comment on above:         Performed By: #### HEMDF, MG  
3, BMP3 ####Briana Ville 771835  
 Rio Rancho, OH   
  
  
  
  
             Glucose [Mass/Vol] 148 mg/dL    High                Munson Healthcare Otsego Memorial Hospital  
  
  
  
  
                          Comment on above:         Performed By: #### HEMDF, MG  
3, BMP3 ####Kettering Health Greene Memorial M2 Connections Xsbvdv341  
 Rio Rancho, OH   
  
  
  
  
             Urea nitrogen [Mass/Vol] 16 mg/dL     Normal       7-17         Beaumont Hospital  
  
  
  
  
                          Comment on above:         Performed By: #### HEMDF, MG  
3, BMP3 ####Kettering Health Greene Memorial M2 Connections Oxtxxb944  
 EDexter, OH   
  
  
  
  
             Creatinine [Mass/Vol] 1.18 mg/dL   Normal       0.52-1.25    Munson Healthcare Cadillac Hospital  
  
  
  
  
                          Comment on above:         Performed By: #### HEMDF, MG  
3, BMP3 ####Kettering Health Greene Memorial M2 Connections Sevhfh808  
 Rio Rancho, OH   
  
  
  
  
             GFR/1.73 sq M.predicted among 74.6 mL/min/{1.73_m2} Normal       >6  
0          Munson Healthcare Cadillac Hospital  
  
             blacks MDRD (S/P/Bld) [Vol                                          
  
             rate/Area]                                            
  
  
  
  
                          Comment on above:         Performed By: #### HEMDF, MG  
3, BMP3 ####Kettering Health Greene Memorial Dustin Ville 454925  
Rio Rancho, OH   
  
  
  
  
             GFR/1.73 sq M.predicted among 64.4 mL/min/{1.73_m2} Normal       >6  
0          Munson Healthcare Cadillac Hospital  
  
             non-blacks MDRD (S/P/Bld) [Vol                                       
     
  
             rate/Area]                                            
  
  
  
  
                          Comment on above:         Result Comment: KDIGO guidel  
zoë provide the following GFR  
  
                                                    categories:Stage GFR(ml/min/  
1.73 m2) TermsG1 >=90 Normal or highG2  
  
                                                    60-89 Mildly decreased*G3a 4  
5-59 Mildly to moderately fomgdipsvT8v  
  
                                                    30-44 Moderately to severely  
 decreasedG4 15-29 Severely decreasedG5  
  
                                                    <15 Kidney failure*Relative   
to young adult level.In the absence of  
  
                                                    evidence of kidney damage, n  
either GFRcategory G1 nor G2 fulfill  
  
                                                    the criteria for CKD.The CKD  
-EPI equation is validated in  
  
                                                    individuals 18 yearsof age a  
nd older. Currently the best equation  
  
                                                    forestimating glomerular beto  
tration rate (GFR) from serumcreatinine  
  
                                                    in children is the Bedside S  
kristiwartz equation.It is less accurate in  
  
                                                    patients with extremes of mu  
sclemass, restriction of dietary  
  
                                                    protein, ingestion of creati  
ne,extra-renal metabolism of  
  
                                                    creatinine, or treatment wit  
hmedications that affect renal tubular  
  
                                                    creatinine secretion.  
   
                                                    Performed By: #### HEMDF, MG  
3, BMP3 ####Kettering Health Greene Memorial M2 Connections 99 Hogan Street   
  
  
  
  
             Chloride [Moles/Vol] 103 mmol/L   Normal              Munising Memorial Hospital  
  
  
  
  
                          Comment on above:         Performed By: #### HEMDF, MG  
3, BMP3 ####Kettering Health Greene Memorial M2 Connections 99 Hogan Street   
  
  
  
  
             Potassium [Moles/Vol] 4.0 mmol/L   Normal       3.5-5.1      Munson Healthcare Cadillac Hospital  
  
  
  
  
                          Comment on above:         Performed By: #### HEMDF, MG  
3, BMP3 ####K94 Discoveries 99 Hogan Street   
  
  
  
  
             Sodium [Moles/Vol] 135 mmol/L   Normal       135-145      The Jewish Hospital System  
  
  
  
  
                          Comment on above:         Performed By: #### HEMDF, MG  
3, BMP3 ####Kettering Health Greene Memorial M2 Connections 99 Hogan Street   
  
  
  
  
                                        CTA Abd Aorta + Iliofemoral  on 20  
  
  
  
  
             CTA Abd Aorta + Iliofemoral              Normal                      
Kettering Health Greene Memorial M2 Connections Insight Surgical Hospital  
  
  
  
  
                                        Glucose,Bedside  on 2022  
  
  
  
  
             Glucose [Mass/Vol] 167 mg/dL    High                The Jewish Hospital System  
  
  
  
  
                          Comment on above:         Result Comment: Test perform  
ed by glucose meter. Results may   
be  
  
                                                    10%-15% lowerthan serum/plas  
ma values. (CLIA ID 24F4551323)  
   
                                                    Performed By: #### BGLU ####  
Corey HospitalLaru Technologies Lqngcw645 E. Elizabeth, OH   
  
  
  
  
             Glucose [Mass/Vol] 117 mg/dL    High                The Jewish Hospital System  
  
  
  
  
                          Comment on above:         Result Comment: Test perform  
ed by glucose meter. Results may   
be  
  
                                                    10%-15% lowerthan serum/plas  
ma values. (CLIA ID 27F9855018)  
   
                                                    Performed By: #### BGLU ####  
Kettering Health Greene Memorial M2 Connections Clinton Ville 25743 EDexter, OH   
  
  
  
  
             Glucose [Mass/Vol] 175 mg/dL    High                The Jewish Hospital System  
  
  
  
  
                          Comment on above:         Result Comment: Test perform  
ed by glucose meter. Results may   
be  
  
                                                    10%-15% lowerthan serum/plas  
ma values. (CLIA ID 40N3743460)  
   
                                                    Performed By: #### BGLU ####  
Corey HospitalIndependent Space525 E. Elizabeth, OH   
  
  
  
  
             Glucose [Mass/Vol] 144 mg/dL    High                The Jewish Hospital System  
  
  
  
  
                          Comment on above:         Result Comment: Test perform  
ed by glucose meter. Results may   
be  
  
                                                    10%-15% lowerthan serum/plas  
ma values. (CLIA ID 74O9583368)  
   
                                                    Performed By: #### BGLU ####  
Cerus Corporation525 Rio Rancho, OH   
  
  
  
  
                                        Hemogram w/ Autodiff  on 2022  
  
  
  
  
             Abs Baso Cnt 0.1 10*3/uL  Normal       0.0-0.2      Kettering Health Greene Memorial M2 Connections Sy  
stem  
  
  
  
  
                          Comment on above:         Performed By: #### HEMDF, MG  
3, BMP3 ####Corey HospitalLaru Technologies Cqcsys893  
 EDexter, OH   
  
  
  
  
             Abs Neutrophile Cnt 8.6 10*3/uL  High         1.8-7.0      ProMedica Coldwater Regional Hospital  
  
  
  
  
                          Comment on above:         Performed By: #### HEMDF, MG  
3, BMP3 ####Kettering Health Greene Memorial M2 Connections Rvqwhx783  
 E.  
  
                                                    Elizabeth, OH 00300  
-2090  
  
  
  
  
             Basophils/100 WBC (Bld) 0.7 %        Normal       0.0-2.0      Summ  
a Health System  
  
  
  
  
                          Comment on above:         Performed By: #### HEMDF, MG  
3, BMP3 ####Kettering Health Greene Memorial M2 Connections Akeygw404  
 E.  
  
                                                    Elizabeth, OH 38667  
-2090  
  
  
  
  
             Eosinophils (Bld) [#/Vol] 0.2 10*3/uL  Normal       0.0-0.5      Harper University Hospital  
  
  
  
  
                          Comment on above:         Performed By: #### HEMDF, MG  
3, BMP3 ####Briana Ville 771835  
 E.  
  
                                                    Elizabeth, OH 85606  
-2090  
  
  
  
  
             Eosinophils/100 WBC (Bld) 1.7 %        Normal       1.0-6.0      Harper University Hospital  
  
  
  
  
                          Comment on above:         Performed By: #### HEMDF, MG  
3, BMP3 ####Kettering Health Greene Memorial M2 Connections Clinton Ville 25743  
 E.  
  
                                                    Elizabeth, OH 73821  
-2090  
  
  
  
  
             Granulocytes/100 WBC (Bld) 78.8 %       Normal       40.0-80.0    Insight Surgical Hospital  
  
  
  
  
                          Comment on above:         Performed By: #### HEMDF, MG  
3, BMP3 ####Kettering Health Greene Memorial M2 Connections Clinton Ville 25743  
 E.  
  
                                                    Elizabeth, OH 00720  
-2090  
  
  
  
  
             Lymphocytes (Bld) [#/Vol] 1.2 10*3/uL  Normal       1.0-4.3      Harper University Hospital  
  
  
  
  
                          Comment on above:         Performed By: #### HEMDF, MG  
3, BMP3 ####Kettering Health Greene Memorial M2 Connections Wyssmb102  
 E.  
  
                                                    Elizabeth, OH 37721  
-2090  
  
  
  
  
             Lymphocytes/100 WBC (Bld) 10.5 %       Low          20.0-40.0    Harper University Hospital  
  
  
  
  
                          Comment on above:         Performed By: #### HEMDF, MG  
3, BMP3 ####Kettering Health Greene Memorial M2 Connections Ugjddw625  
 E.  
  
                                                    Elizabeth, OH 19473  
-2090  
  
  
  
  
             Monocytes (Bld) [#/Vol] 0.9 10*3/uL  High         0.0-0.8      Summ  
a Health System  
  
  
  
  
                          Comment on above:         Performed By: #### HEMDF, MG  
3, BMP3 ####38 Herrera Street   
  
  
  
  
             Monocytes/100 WBC (Bld) 8.3 %        Normal       2.0-10.0     Summ  
a Health System  
  
  
  
  
                          Comment on above:         Performed By: #### HEMDF, MG  
3, BMP3 ####38 Herrera Street   
  
  
  
  
             Erythrocyte distribution width (RBC) 16.5 %       High         11.5  
-14.5    Munson Healthcare Cadillac Hospital  
  
             [Ratio]                                               
  
  
  
  
                          Comment on above:         Performed By: #### HEMDF, MG  
3, BMP3 ####38 Herrera Street   
  
  
  
  
             Hematocrit (Bld) [Volume fraction] 30.8 %       Low          40.0-5  
2.0    Munson Healthcare Cadillac Hospital  
  
  
  
  
                          Comment on above:         Performed By: #### HEMDF, MG  
3, BMP3 ####38 Herrera Street   
  
  
  
  
             Hemoglobin (Bld) [Mass/Vol] 10.1 g/dL    Low          13.0-18.0      
Munson Healthcare Cadillac Hospital  
  
  
  
  
                          Comment on above:         Performed By: #### HEMDF MG  
3, BMP3 ####38 Herrera Street   
  
  
  
  
             MCH (RBC) [Entitic mass] 27.8 pg      Normal       26.0-34.0    Beaumont Hospital  
  
  
  
  
                          Comment on above:         Performed By: #### HEMDF, MG  
3, BMP3 ####38 Herrera Street   
  
  
  
  
             MCHC         32.9 %       Normal       32.0-36.0    Mercy Health St. Vincent Medical Center Sy  
stem  
  
  
  
  
                          Comment on above:         Performed By: #### HEMDF, MG  
3, BMP3 ####38 Herrera Street   
  
  
  
  
             MCV (RBC) [Entitic vol] 84.4 fL      Normal       80.0-98.0    Summ  
a Health System  
  
  
  
  
                          Comment on above:         Performed By: #### HEMDF, MG  
3, BMP3 ####38 Herrera Street   
  
  
  
  
             Platelet mean volume (Bld) [Entitic vol] 7.7 fL       Normal         
7.4-10.4     Munson Healthcare Cadillac Hospital  
  
  
  
  
                          Comment on above:         Performed By: #### HEMDF, MG  
3, BMP3 ####Munson Healthcare Cadillac Hospital525  
Rio Rancho, OH   
  
  
  
  
             Platelets (Bld) [#/Vol] 270 10*3/uL  Normal       140-440      University of Michigan Hospital  
  
  
  
  
                          Comment on above:         Performed By: #### HEMDF, MG  
3, BMP3 ####Briana Ville 771835  
 Rio Rancho, OH   
  
  
  
  
             RBC (Bld) [#/Vol] 3.64 10*6/uL Low          4.40-5.90    Holmes County Joel Pomerene Memorial Hospital System  
  
  
  
  
                          Comment on above:         Performed By: #### HEMDF, MG  
3, BMP3 ####Briana Ville 771835  
 Rio Rancho, OH   
  
  
  
  
             WBC (Bld) [#/Vol] 11.0 10*3/uL High         3.6-10.7     McLaren Oakland  
  
  
  
  
                          Comment on above:         Performed By: #### HEMDF, MG  
3, BMP3 ####Briana Ville 771835  
 Rio Rancho, OH   
  
  
  
  
                                        Magnesium  on 2022  
  
  
  
  
             Magnesium [Mass/Vol] 2.2 mg/dL    Normal       1.6-2.3      Summa H  
ealth System  
  
  
  
  
                          Comment on above:         Performed By: #### HEMDF, MG  
3, BMP3 ####Briana Ville 771835  
 Rio Rancho, OH   
  
  
  
  
                                        ACT,Whole Blood   on 2022  
  
  
  
  
             ACT,Whole Blood 261 s        High                Munson Healthcare Cadillac Hospital  
  
  
  
  
                          Comment on above:         Result Comment: ACTBOPerform  
ed by Hemochron Sig EliteCLIA ID:  
  
                                                    38C1432454Jjgex Health, Encompass Health Valley of the Sun Rehabilitation Hospital  
n, OHACT testing is not intended for  
  
                                                    patients taking aprotonin,pa  
tients with hematocrits of <20% or  
  
                                                    >55%, patients usingother ty  
pes of anticoagulation medications, and  
  
                                                    patients withLupus Anticoagu  
lant.  
   
                                                    Performed By: #### ACTBO ###  
#Briana Ville 771835 Rio Rancho, OH   
  
  
  
  
             ACT,Whole Blood 300 s        High                Munson Healthcare Cadillac Hospital  
  
  
  
  
                          Comment on above:         Result Comment: ACTBOPerform  
ed by goDog Fetch Sig EliteCLIA ID:  
  
                                                    87M0118469Nziut Health, Akro  
n, OHACT testing is not intended for  
  
                                                    patients taking aprotonin,pa  
tients with hematocrits of <20% or  
  
                                                    >55%, patients usingother ty  
pes of anticoagulation medications, and  
  
                                                    patients withLupus Anticoagu  
lant.  
   
                                                    Performed By: #### ACTBO ###  
#Kettering Health Greene Memorial M2 Connections Inbbgh852 Rio Rancho, OH   
  
  
  
  
             ACT,Whole Blood 399 s        High                Munson Healthcare Cadillac Hospital  
  
  
  
  
                          Comment on above:         Result Comment: ACTBOPerform  
ed by goDog Fetch Sig EliteCLIA ID:  
  
                                                    67L4071268Laxpt Health, Akro  
n, OHACT testing is not intended for  
  
                                                    patients taking aprotonin,pa  
tients with hematocrits of <20% or  
  
                                                    >55%, patients usingother ty  
pes of anticoagulation medications, and  
  
                                                    patients withLupus Anticoagu  
lant.  
   
                                                    Performed By: #### ACTBO ###  
#Kettering Health Greene Memorial M2 Connections Edtgpe100 Rio Rancho, OH   
  
  
  
  
                                        APTT  on 2022  
  
  
  
  
             aPTT Coag (Bld) [Time] 57.8 s       High         20.0-30.5    Munson Healthcare Cadillac Hospital  
  
  
  
  
                          Comment on above:         Result Comment: NOTE: The th  
erapeutic time for Heparin  
  
                                                    anticoagulation,based on Xa   
activity inhibition, is an APTT of  
  
                                                    46-80seconds.  
   
                                                    Performed By: #### APTT ####  
Kettering Health Greene Memorial Stottler Henke Associates525 Rio Rancho, OH   
  
  
  
  
             aPTT Coag (Bld) [Time] 53.3 s       High         20.0-30.5    Munson Healthcare Cadillac Hospital  
  
  
  
  
                          Comment on above:         Result Comment: NOTE: The th  
erapeutic time for Heparin  
  
                                                    anticoagulation,based on Xa   
activity inhibition, is an APTT of  
  
                                                    46-80seconds.  
   
                                                    Performed By: #### APTT ####  
Kettering Health Greene Memorial M2 Connections Qiqgzy487 Rio Rancho, OH   
  
  
  
  
                                        Basic Metabolic Panel  on 2022  
  
  
  
  
             Anion gap [Moles/Vol] 9 mmol/L     Normal       3-13         Munson Healthcare Cadillac Hospital  
  
  
  
  
                          Comment on above:         Performed By: #### HEMDF, MG  
3, BMP3 ####Briana Ville 771835  
 E.  
  
                                                    Elizabeth, OH   
  
  
  
  
             Calcium [Mass/Vol] 8.2 mg/dL    Low          8.4-10.4     Munson Healthcare Otsego Memorial Hospital  
  
  
  
  
                          Comment on above:         Performed By: #### HEMDF, MG  
3, BMP3 ####Briana Ville 771835  
 E.  
  
                                                    Elizabeth, OH   
  
  
  
  
             CO2 [Moles/Vol] 21 mmol/L    Low          22-30        Munson Healthcare Cadillac Hospital  
  
  
  
  
                          Comment on above:         Performed By: #### HEMDF, MG  
3, BMP3 ####Briana Ville 771835  
 EDexter, OH   
  
  
  
  
             Glucose [Mass/Vol] 128 mg/dL    High                Munson Healthcare Otsego Memorial Hospital  
  
  
  
  
                          Comment on above:         Performed By: #### HEMDF, MG  
3, BMP3 ####Briana Ville 771835  
 EDexter, OH   
  
  
  
  
             Urea nitrogen [Mass/Vol] 15 mg/dL     Normal       7-17         Beaumont Hospital  
  
  
  
  
                          Comment on above:         Performed By: #### HEMDF, MG  
3, BMP3 ####Briana Ville 771835  
 Rio Rancho, OH   
  
  
  
  
             Creatinine [Mass/Vol] 1.11 mg/dL   Normal       0.52-1.25    Munson Healthcare Cadillac Hospital  
  
  
  
  
                          Comment on above:         Performed By: #### HEMDF, MG  
3, BMP3 ####Briana Ville 771835  
 E.  
  
                                                    Elizabeth, OH   
  
  
  
  
             GFR/1.73 sq M.predicted among 80.4 mL/min/{1.73_m2} Normal       >6  
0          Munson Healthcare Cadillac Hospital  
  
             blacks MDRD (S/P/Bld) [Vol                                          
  
             rate/Area]                                            
  
  
  
  
                          Comment on above:         Performed By: #### HEMDF, MG  
3, BMP3 ####Briana Ville 771835  
Rio Rancho, OH   
  
  
  
  
             GFR/1.73 sq M.predicted among 69.4 mL/min/{1.73_m2} Normal       >6  
0          Munson Healthcare Cadillac Hospital  
  
             non-blacks MDRD (S/P/Bld) [Vol                                       
     
  
             rate/Area]                                            
  
  
  
  
                          Comment on above:         Result Comment: KDIGO guidel  
zoë provide the following GFR  
  
                                                    categories:Stage GFR(ml/min/  
1.73 m2) TermsG1 >=90 Normal or highG2  
  
                                                    60-89 Mildly decreased*G3a 4  
5-59 Mildly to moderately noclvncawR7d  
  
                                                    30-44 Moderately to severely  
 decreasedG4 15-29 Severely decreasedG5  
  
                                                    <15 Kidney failure*Relative   
to young adult level.In the absence of  
  
                                                    evidence of kidney damage, n  
either GFRcategory G1 nor G2 fulfill  
  
                                                    the criteria for CKD.The CKD  
-EPI equation is validated in  
  
                                                    individuals 18 yearsof age a  
nd older. Currently the best equation  
  
                                                    forestimating glomerular beto  
tration rate (GFR) from serumcreatinine  
  
                                                    in children is the Bedside S  
reynaldotz equation.It is less accurate in  
  
                                                    patients with extremes of mu  
sclemass, restriction of dietary  
  
                                                    protein, ingestion of creati  
ne,extra-renal metabolism of  
  
                                                    creatinine, or treatment wit  
hmedications that affect renal tubular  
  
                                                    creatinine secretion.  
   
                                                    Performed By: #### HEMDF, MG  
3, BMP3 ####Xplornet5 Santa Maria Biotherapeutics  
  
                                                    Elizabeth, OH   
  
  
  
  
             Chloride [Moles/Vol] 102 mmol/L   Normal              Mercy Health St. Joseph Warren Hospital System  
  
  
  
  
                          Comment on above:         Performed By: #### HEMDF, MG  
3, BMP3 ####Xplornet5  
 Santa Maria Biotherapeutics  
  
                                                    Elizabeth, OH   
  
  
  
  
             Potassium [Moles/Vol] 4.7 mmol/L   Normal       3.5-5.1      Munson Healthcare Cadillac Hospital  
  
  
  
  
                          Comment on above:         Result Comment: Slightly hem  
olysed, interpret with caution.  
   
                                                    Performed By: #### HEMDF, MG  
3, BMP3 ####Xplornet5 Santa Maria Biotherapeutics  
  
                                                    Elizabeth, OH   
  
  
  
  
             Sodium [Moles/Vol] 132 mmol/L   Low          135-145      The Jewish Hospital System  
  
  
  
  
                          Comment on above:         Performed By: #### HEMDF, MG  
3, BMP3 ####Xplornet5  
 Santa Maria Biotherapeutics  
  
                                                    Elizabeth, OH   
  
  
  
  
                                        Glucose,Bedside  on 2022  
  
  
  
  
             Glucose [Mass/Vol] 180 mg/dL    High                The Jewish Hospital System  
  
  
  
  
                          Comment on above:         Result Comment: Test perform  
ed by glucose meter. Results may   
be  
  
                                                    10%-15% lowerthan serum/plas  
ma values. (CLIA ID 73Y4022119)  
   
                                                    Performed By: #### BGLU ####  
Kettering Health Greene Memorial M2 Connections Idszem382 E. Elizabeth, OH   
  
  
  
  
             Glucose [Mass/Vol] 131 mg/dL    High                The Jewish Hospital System  
  
  
  
  
                          Comment on above:         Result Comment: Test perform  
ed by glucose meter. Results may   
be  
  
                                                    10%-15% lowerthan serum/plas  
ma values. (CLIA ID 92V9003759)  
   
                                                    Performed By: #### BGLU ####  
Kettering Health Greene Memorial M2 Connections Elezyu502 E. Elizabeth, OH   
  
  
  
  
             Glucose [Mass/Vol] 124 mg/dL    High                The Jewish Hospital System  
  
  
  
  
                          Comment on above:         Result Comment: Test perform  
ed by glucose meter. Results may   
be  
  
                                                    10%-15% lowerthan serum/plas  
ma values. (CLIA ID 33Z2882176)  
   
                                                    Performed By: #### BGLU ####  
Kettering Health Greene Memorial M2 Connections Tmovwo175 E. Elizabeth, OH   
  
  
  
  
                                        Hemogram w/ Autodiff  on 2022  
  
  
  
  
             Abs Baso Cnt 0.1 10*3/uL  Normal       0.0-0.2      Salem City Hospital  
stem  
  
  
  
  
                          Comment on above:         Performed By: #### HEMDF, MG  
3, BMP3 ####Kettering Health Greene Memorial M2 Connections Nuquty736  
 EDexter, OH   
  
  
  
  
             Abs Neutrophile Cnt 8.4 10*3/uL  High         1.8-7.0      ProMedica Coldwater Regional Hospital  
  
  
  
  
                          Comment on above:         Performed By: #### HEMDF, MG  
3, BMP3 ####Kettering Health Greene Memorial M2 Connections Kkswcd855  
 .  
  
                                                    Elizabeth, OH   
  
  
  
  
             Basophils/100 WBC (Bld) 0.5 %        Normal       0.0-2.0      Summ  
a Health System  
  
  
  
  
                          Comment on above:         Performed By: #### HEMDF, MG  
3, BMP3 ####Briana Ville 771835  
 Rio Rancho, OH   
  
  
  
  
             Eosinophils (Bld) [#/Vol] 0.2 10*3/uL  Normal       0.0-0.5      Harper University Hospital  
  
  
  
  
                          Comment on above:         Performed By: #### HEMDF, MG  
3, BMP3 ####Kettering Health Greene Memorial M2 Connections Ovngza845  
 E.  
  
                                                    Elizabeth, OH   
  
  
  
  
             Eosinophils/100 WBC (Bld) 2.1 %        Normal       1.0-6.0      Harper University Hospital  
  
  
  
  
                          Comment on above:         Performed By: #### HEMDF, MG  
3, BMP3 ####Kettering Health Greene Memorial M2 Connections Jdduec337  
 Rio Rancho, OH   
  
  
  
  
             Erythrocyte distribution width (RBC) 16.1 %       High         11.5  
-14.5    Munson Healthcare Cadillac Hospital  
  
             [Ratio]                                               
  
  
  
  
                          Comment on above:         Performed By: #### HEMDF, MG  
3, BMP3 ####Kettering Health Greene Memorial M2 Connections Ufrrvu298  
 Rio Rancho, OH   
  
  
  
  
             Granulocytes/100 WBC (Bld) 74.8 %       Normal       40.0-80.0    Insight Surgical Hospital  
  
  
  
  
                          Comment on above:         Performed By: #### HEMDF, MG  
3, BMP3 ####Kettering Health Greene Memorial M2 Connections Kaaoxg665  
 Rio Rancho, OH   
  
  
  
  
             Hematocrit (Bld) [Volume fraction] 31.6 %       Low          40.0-5  
2.0    Munson Healthcare Cadillac Hospital  
  
  
  
  
                          Comment on above:         Performed By: #### HEMDF, MG  
3, BMP3 ####Kettering Health Greene Memorial M2 Connections Zzwflq018  
Rio Rancho, OH   
  
  
  
  
             Hemoglobin (Bld) [Mass/Vol] 10.5 g/dL    Low          13.0-18.0      
Munson Healthcare Cadillac Hospital  
  
  
  
  
                          Comment on above:         Performed By: #### HEMDF, MG  
3, BMP3 ####Kettering Health Greene Memorial M2 Connections Zyvpbc425  
 Rio Rancho, OH   
  
  
  
  
             Lymphocytes (Bld) [#/Vol] 1.7 10*3/uL  Normal       1.0-4.3      Harper University Hospital  
  
  
  
  
                          Comment on above:         Performed By: #### HEMDF, MG  
3, BMP3 ####Kettering Health Greene Memorial M2 Connections Ewbqaz673  
 Rio Rancho, OH   
  
  
  
  
             Lymphocytes/100 WBC (Bld) 14.9 %       Low          20.0-40.0    Harper University Hospital  
  
  
  
  
                          Comment on above:         Performed By: #### HEMDF, MG  
3, BMP3 ####Kettering Health Greene Memorial 72 Anderson Street   
  
  
  
  
             MCH (RBC) [Entitic mass] 28.0 pg      Normal       26.0-34.0    Beaumont Hospital  
  
  
  
  
                          Comment on above:         Performed By: #### HEMDF, MG  
3, BMP3 ####38 Herrera Street   
  
  
  
  
             MCHC         33.2 %       Normal       32.0-36.0    Salem City Hospital  
stem  
  
  
  
  
                          Comment on above:         Performed By: #### HEMDF, MG  
3, BMP3 ####38 Herrera Street   
  
  
  
  
             MCV (RBC) [Entitic vol] 84.2 fL      Normal       80.0-98.0    Summ  
a Health System  
  
  
  
  
                          Comment on above:         Performed By: #### HEMDF, MG  
3, BMP3 ####38 Herrera Street   
  
  
  
  
             Monocytes (Bld) [#/Vol] 0.9 10*3/uL  High         0.0-0.8      Summ  
a Health System  
  
  
  
  
                          Comment on above:         Performed By: #### HEMDF, MG  
3, BMP3 ####38 Herrera Street   
  
  
  
  
             Monocytes/100 WBC (Bld) 7.7 %        Normal       2.0-10.0     Summ  
a Health System  
  
  
  
  
                          Comment on above:         Performed By: #### HEMDF, MG  
3, BMP3 ####38 Herrera Street   
  
  
  
  
             Platelet mean volume (Bld) [Entitic vol] 7.6 fL       Normal         
7.4-10.4     Munson Healthcare Cadillac Hospital  
  
  
  
  
                          Comment on above:         Performed By: #### HEMDF, MG  
3, BMP3 ####38 Herrera Street   
  
  
  
  
             Platelets (Bld) [#/Vol] 180 10*3/uL  Normal       140-440      University of Michigan Hospital  
  
  
  
  
                          Comment on above:         Performed By: #### HEMDF, MG  
3, BMP3 ####38 Herrera Street   
  
  
  
  
             RBC (Bld) [#/Vol] 3.75 10*6/uL Low          4.40-5.90    Holmes County Joel Pomerene Memorial Hospital System  
  
  
  
  
                          Comment on above:         Performed By: #### HEMDF, MG  
3, BMP3 ####Corey HospitalLaru Technologies Qkyhnv067  
 Rio Rancho, OH   
  
  
  
  
             WBC (Bld) [#/Vol] 11.2 10*3/uL High         3.6-10.7     Holmes County Joel Pomerene Memorial Hospital System  
  
  
  
  
                          Comment on above:         Performed By: #### HEMDF, MG  
3, BMP3 ####Corey HospitalLaru Technologies 99 Hogan Street   
  
  
  
  
                                        Magnesium  on 2022  
  
  
  
  
             Magnesium [Mass/Vol] 2.2 mg/dL    Normal       1.6-2.3      Mercy Health St. Joseph Warren Hospital System  
  
  
  
  
                          Comment on above:         Result Comment: Slightly hem  
olysed, interpret with caution.  
   
                                                    Performed By: #### HEMDF, MG  
3, BMP3 ####K94 Discoveries 99 Hogan Street   
  
  
  
  
                                        Procalcitonin   on 2022  
  
  
  
  
             Procalcitonin 0.12 ng/mL   High         0.00-0.09    Select Medical Specialty Hospital - Cincinnati  
ystem  
  
  
  
  
                          Comment on above:         Performed By: #### PCAL ####  
Corey HospitalLaru Technologies 99 Hogan Street   
  
  
  
  
             Interpretation See Below    Normal                    Munson Healthcare Cadillac Hospital  
  
  
  
  
                          Comment on above:         Result Comment: PCT <0.50 =   
Low risk of severe sepsis and/or   
septic  
  
                                                    shock.PCT >2.00 = High risk   
of severe sepsis and/or septic shock.  
   
                                                    Performed By: #### PCAL ####  
Kettering Health Greene Memorial M2 Connections 99 Hogan Street   
  
  
  
  
                                        APTT  on 2022  
  
  
  
  
             aPTT Coag (Bld) [Time] 47.6 s       High         20.0-30.5    Munson Healthcare Cadillac Hospital  
  
  
  
  
                          Comment on above:         Result Comment: NOTE: The th  
erapeutic time for Heparin  
  
                                                    anticoagulation,based on Xa   
activity inhibition, is an APTT of  
  
                                                    46-80seconds.  
   
                                                    Performed By: #### APTT ####  
K94 Discoveries 99 Hogan Street   
  
  
  
  
             aPTT Coag (Bld) [Time] 47.2 s       High         20.0-30.5    Munson Healthcare Cadillac Hospital  
  
  
  
  
                          Comment on above:         Result Comment: NOTE: The th  
erapeutic time for Heparin  
  
                                                    anticoagulation,based on Xa   
activity inhibition, is an APTT of  
  
                                                    46-80seconds.  
   
                                                    Performed By: #### APTT ####  
Briana Ville 771835 Rio Rancho, OH 86825-8486  
  
  
  
  
             aPTT Coag (Bld) [Time] 49.3 s       High         20.0-30.5    Munson Healthcare Cadillac Hospital  
  
  
  
  
                          Comment on above:         Result Comment: NOTE: The th  
erapeutic time for Heparin  
  
                                                    anticoagulation,based on Xa   
activity inhibition, is an APTT of  
  
                                                    46-80seconds.  
   
                                                    Performed By: #### APTT ####  
Thomas Ville 45855 E. Elizabeth, OH   
  
  
  
  
             aPTT Coag (Bld) [Time] 49.8 s       High         20.0-30.5    Munson Healthcare Cadillac Hospital  
  
  
  
  
                          Comment on above:         Result Comment: NOTE: The th  
erapeutic time for Heparin  
  
                                                    anticoagulation,based on Xa   
activity inhibition, is an APTT of  
  
                                                    46-80seconds.  
   
                                                    Performed By: #### APTT ####  
Kettering Health Greene Memorial M2 Connections Clinton Ville 25743 E. Elizabeth, OH   
  
  
  
  
                                        Basic Metabolic Panel  on 2022  
  
  
  
  
             Anion gap [Moles/Vol] 9 mmol/L     Normal       3-13         Munson Healthcare Cadillac Hospital  
  
  
  
  
                          Comment on above:         Performed By: #### HEMDF, MG  
3, BMP3 ####Kettering Health Greene Memorial M2 Connections Auuldx670  
 EDexter, OH   
  
  
  
  
             Calcium [Mass/Vol] 8.5 mg/dL    Normal       8.4-10.4     Munson Healthcare Otsego Memorial Hospital  
  
  
  
  
                          Comment on above:         Performed By: #### HEMDF, MG  
3, BMP3 ####Kettering Health Greene Memorial M2 Connections Tvmfqm156  
 Rio Rancho, OH 43361  
  
  
  
  
  
             CO2 [Moles/Vol] 21 mmol/L    Low          22-30        Munson Healthcare Cadillac Hospital  
  
  
  
  
                          Comment on above:         Performed By: #### HEMDF, MG  
3, BMP3 ####38 Herrera Street 03260  
  
  
  
  
  
             Creatinine [Mass/Vol] 1.20 mg/dL   Normal       0.52-1.25    Munson Healthcare Cadillac Hospital  
  
  
  
  
                          Comment on above:         Performed By: #### HEMDF, MG  
3, BMP3 ####K94 Discoveries Bsishj770  
 Soundwave.  
  
                                                    Elizabeth, OH 26905  
-2090  
  
  
  
  
             GFR/1.73 sq M.predicted among 73.1 mL/min/{1.73_m2} Normal       >6  
0          Munson Healthcare Cadillac Hospital  
  
             blacks MDRD (S/P/Bld) [Vol                                          
  
             rate/Area]                                            
  
  
  
  
                          Comment on above:         Performed By: #### HEMDF, MG  
3, BMP3 ####Corey HospitalIndependent Space525  
E.  
  
                                                    Elizabeth, OH 39739  
-2090  
  
  
  
  
             GFR/1.73 sq M.predicted among 63.1 mL/min/{1.73_m2} Normal       >6  
0          Munson Healthcare Cadillac Hospital  
  
             non-blacks MDRD (S/P/Bld) [Vol                                       
     
  
             rate/Area]                                            
  
  
  
  
                          Comment on above:         Result Comment: KDIGO guidel  
zoë provide the following GFR  
  
                                                    categories:Stage GFR(ml/min/  
1.73 m2) TermsG1 >=90 Normal or highG2  
  
                                                    60-89 Mildly decreased*G3a 4  
5-59 Mildly to moderately mmzmmphfnQ2j  
  
                                                    30-44 Moderately to severely  
 decreasedG4 15-29 Severely decreasedG5  
  
                                                    <15 Kidney failure*Relative   
to young adult level.In the absence of  
  
                                                    evidence of kidney damage, n  
either GFRcategory G1 nor G2 fulfill  
  
                                                    the criteria for CKD.The CKD  
-EPI equation is validated in  
  
                                                    individuals 18 yearsof age a  
nd older. Currently the best equation  
  
                                                    forestimating glomerular beto  
tration rate (GFR) from serumcreatinine  
  
                                                    in children is the Bedside S  
chwartz equation.It is less accurate in  
  
                                                    patients with extremes of mu  
sclemass, restriction of dietary  
  
                                                    protein, ingestion of creati  
ne,extra-renal metabolism of  
  
                                                    creatinine, or treatment wit  
hmedications that affect renal tubular  
  
                                                    creatinine secretion.  
   
                                                    Performed By: #### HEMDF, MG  
3, BMP3 ####Cerus Corporation525 Santa Maria Biotherapeutics  
  
                                                    Elizabeth, OH 25955  
-2090  
  
  
  
  
             Glucose [Mass/Vol] 149 mg/dL    High                The Jewish Hospital System  
  
  
  
  
                          Comment on above:         Performed By: #### HEMDF, MG  
3, BMP3 ####Kettering Health Greene Memorial Stottler Henke Associates525  
 Santa Maria Biotherapeutics  
  
                                                    Elizabeth, OH 55427  
-2090  
  
  
  
  
             Urea nitrogen [Mass/Vol] 14 mg/dL     Normal       7-17         Sum  
ma Health System  
  
  
  
  
                          Comment on above:         Performed By: #### HEMDF, MG  
3, BMP3 ####K94 Discoveries Qzhwnv282  
 E.  
  
                                                    Elizabeth, OH   
  
  
  
  
             Chloride [Moles/Vol] 103 mmol/L   Normal              Mercy Health St. Joseph Warren Hospital System  
  
  
  
  
                          Comment on above:         Performed By: #### HEMDF, MG  
3, BMP3 ####K94 Discoveries Sddusv952  
 E.  
  
                                                    Elizabeth, OH   
  
  
  
  
             Potassium [Moles/Vol] 4.5 mmol/L   Normal       3.5-5.1      Munson Healthcare Cadillac Hospital  
  
  
  
  
                          Comment on above:         Result Comment: Slightly hem  
olysed, interpret with caution.  
   
                                                    Performed By: #### HEMDF, MG  
3, BMP3 ####Cerus Corporation525 E.  
  
                                                    Elizabeth, OH   
  
  
  
  
             Sodium [Moles/Vol] 133 mmol/L   Low          135-145      The Jewish Hospital System  
  
  
  
  
                          Comment on above:         Performed By: #### HEMDF, MG  
3, BMP3 ####Cerus Corporation525  
 E.  
  
                                                    Elizabeth, OH   
  
  
  
  
                                        Glucose,Bedside  on 2022  
  
  
  
  
             Glucose [Mass/Vol] 178 mg/dL    High                Nationwide Children's Hospitala  
Lima City Hospital System  
  
  
  
  
                          Comment on above:         Result Comment: Test perform  
ed by glucose meter. Results may   
be  
  
                                                    10%-15% lowerthan serum/plas  
ma values. (CLIA ID 76M6327071)  
   
                                                    Performed By: #### BGLU ####  
Cerus Corporation525 E. Elizabeth, OH   
  
  
  
  
             Glucose [Mass/Vol] 160 mg/dL    High                Corey Hospitala a  
Lima City Hospital System  
  
  
  
  
                          Comment on above:         Result Comment: Test perform  
ed by glucose meter. Results may   
be  
  
                                                    10%-15% lowerthan serum/plas  
ma values. (CLIA ID 13R3419315)  
   
                                                    Performed By: #### BGLU ####  
Cerus Corporation525 E. Elizabeth, OH   
  
  
  
  
             Glucose [Mass/Vol] 183 mg/dL    High                Corey Hospitala Hea  
lt System  
  
  
  
  
                          Comment on above:         Result Comment: Test perform  
ed by glucose meter. Results may   
be  
  
                                                    10%-15% lowerthan serum/plas  
ma values. (CLIA ID 38T2935580)  
   
                                                    Performed By: #### BGLU ####  
Cerus Corporation525 Rio Rancho, OH   
  
  
  
  
             Glucose [Mass/Vol] 139 mg/dL    High                The Jewish Hospital System  
  
  
  
  
                          Comment on above:         Result Comment: Test perform  
ed by glucose meter. Results may   
be  
  
                                                    10%-15% lowerthan serum/plas  
ma values. (CLIA ID 95F6395294)  
   
                                                    Performed By: #### BGLU ####  
Cerus Corporation525 Rio Rancho, OH   
  
  
  
  
                                        Hemogram w/ Autodiff  on 2022  
  
  
  
  
             Abs Baso Cnt 0.1 10*3/uL  Normal       0.0-0.2      Salem City Hospital  
stem  
  
  
  
  
                          Comment on above:         Performed By: #### HEMDF, MG  
3, BMP3 ####K94 Discoveries Qskwqd333  
 Rio Rancho, OH   
  
  
  
  
             Abs Neutrophile Cnt 9.3 10*3/uL  High         1.8-7.0      ProMedica Coldwater Regional Hospital  
  
  
  
  
                          Comment on above:         Performed By: #### HEMDF, MG  
3, BMP3 ####Cerus Corporation525  
 Rio Rancho, OH   
  
  
  
  
             Basophils/100 WBC (Bld) 0.5 %        Normal       0.0-2.0      Summ  
a Health System  
  
  
  
  
                          Comment on above:         Performed By: #### HEMDF, MG  
3, BMP3 ####K94 Discoveries Oupheb335  
 Rio Rancho, OH   
  
  
  
  
             Eosinophils (Bld) [#/Vol] 0.2 10*3/uL  Normal       0.0-0.5      Harper University Hospital  
  
  
  
  
                          Comment on above:         Performed By: #### HEMDF, MG  
3, BMP3 ####Cerus Corporation525  
 Rio Rancho, OH   
  
  
  
  
             Eosinophils/100 WBC (Bld) 1.9 %        Normal       1.0-6.0      Harper University Hospital  
  
  
  
  
                          Comment on above:         Performed By: #### HEMDF, MG  
3, BMP3 ####Corey HospitalLaru Technologies Qyimha514  
 Rio Rancho, OH   
  
  
  
  
             Erythrocyte distribution width (RBC) 16.1 %       High         11.5  
-14.5    Munson Healthcare Cadillac Hospital  
  
             [Ratio]                                               
  
  
  
  
                          Comment on above:         Performed By: #### HEMDF, MG  
3, BMP3 ####Briana Ville 771835  
 E.  
  
                                                    Elizabeth, OH   
  
  
  
  
             Granulocytes/100 WBC (Bld) 78.4 %       Normal       40.0-80.0    Insight Surgical Hospital  
  
  
  
  
                          Comment on above:         Performed By: #### HEMDF, MG  
3, BMP3 ####Briana Ville 771835  
 EDexter, OH   
  
  
  
  
             Hematocrit (Bld) [Volume fraction] 32.6 %       Low          40.0-5  
2.0    Munson Healthcare Cadillac Hospital  
  
  
  
  
                          Comment on above:         Performed By: #### HEMDF, MG  
3, BMP3 ####Briana Ville 771835  
EDexter, OH   
  
  
  
  
             Hemoglobin (Bld) [Mass/Vol] 10.9 g/dL    Low          13.0-18.0      
Munson Healthcare Cadillac Hospital  
  
  
  
  
                          Comment on above:         Performed By: #### HEMDF, MG  
3, BMP3 ####Briana Ville 771835  
 Rio Rancho, OH   
  
  
  
  
             Lymphocytes (Bld) [#/Vol] 1.3 10*3/uL  Normal       1.0-4.3      Harper University Hospital  
  
  
  
  
                          Comment on above:         Performed By: #### HEMDF, MG  
3, BMP3 ####Thomas Ville 45855  
 EDexter, OH   
  
  
  
  
             Lymphocytes/100 WBC (Bld) 11.1 %       Low          20.0-40.0    Harper University Hospital  
  
  
  
  
                          Comment on above:         Performed By: #### HEMDF, MG  
3, BMP3 ####Briana Ville 771835  
 E.  
  
                                                    Elizabeth, OH   
  
  
  
  
             MCH (RBC) [Entitic mass] 28.3 pg      Normal       26.0-34.0    Beaumont Hospital  
  
  
  
  
                          Comment on above:         Performed By: #### HEMDF, MG  
3, BMP3 ####Briana Ville 771835  
EDexter, OH   
  
  
  
  
             MCHC         33.4 %       Normal       32.0-36.0    Salem City Hospital  
stem  
  
  
  
  
                          Comment on above:         Performed By: #### HEMDF, MG  
3, BMP3 ####Briana Ville 771835  
Rio Rancho, OH   
  
  
  
  
             MCV (RBC) [Entitic vol] 84.6 fL      Normal       80.0-98.0    Summ  
a Health System  
  
  
  
  
                          Comment on above:         Performed By: #### HEMDF, MG  
3, BMP3 ####Briana Ville 771835  
Rio Rancho, OH   
  
  
  
  
             Monocytes (Bld) [#/Vol] 1.0 10*3/uL  High         0.0-0.8      Summ  
a Health System  
  
  
  
  
                          Comment on above:         Performed By: #### HEMDF, MG  
3, BMP3 ####38 Herrera Street   
  
  
  
  
             Monocytes/100 WBC (Bld) 8.1 %        Normal       2.0-10.0     Summ  
a Health System  
  
  
  
  
                          Comment on above:         Performed By: #### HEMDF, MG  
3, BMP3 ####38 Herrera Street   
  
  
  
  
             Platelet mean volume (Bld) [Entitic vol] 8.0 fL       Normal         
7.4-10.4     Munson Healthcare Cadillac Hospital  
  
  
  
  
                          Comment on above:         Performed By: #### HEMDF, MG  
3, BMP3 ####38 Herrera Street   
  
  
  
  
             Platelets (Bld) [#/Vol] 199 10*3/uL  Normal       140-440      University of Michigan Hospital  
  
  
  
  
                          Comment on above:         Performed By: #### HEMDF, MG  
3, BMP3 ####38 Herrera Street   
  
  
  
  
             RBC (Bld) [#/Vol] 3.85 10*6/uL Low          4.40-5.90    McLaren Oakland  
  
  
  
  
                          Comment on above:         Performed By: #### HEMDF, MG  
3, BMP3 ####38 Herrera Street   
  
  
  
  
             WBC (Bld) [#/Vol] 11.8 10*3/uL High         3.6-10.7     McLaren Oakland  
  
  
  
  
                          Comment on above:         Performed By: #### HEMDF, MG  
3, BMP3 ####03 Johnson Street STREETAKRON, OH   
  
  
  
  
                                        Magnesium  on 2022  
  
  
  
  
             Magnesium [Mass/Vol] 2.1 mg/dL    Normal       1.6-2.3      Mercy Health St. Joseph Warren Hospital System  
  
  
  
  
                          Comment on above:         Result Comment: Slightly hem  
olysed, interpret with caution.  
   
                                                    Performed By: #### HEMDF, MG  
3, BMP3 ####Briana Ville 771835 Rio Rancho, OH   
  
  
  
  
                                        APTT  on 2022  
  
  
  
  
             aPTT Coag (Bld) [Time] 37.6 s       High         20.0-30.5    Munson Healthcare Cadillac Hospital  
  
  
  
  
                          Comment on above:         Result Comment: NOTE: The th  
erapeutic time for Heparin  
  
                                                    anticoagulation,based on Xa   
activity inhibition, is an APTT of  
  
                                                    46-80seconds.  
   
                                                    Performed By: #### APTT ####  
38 Herrera Street   
  
  
  
  
             aPTT Coag (Bld) [Time] 127.6 s      Critically high 20.0-30.5    Harper University Hospital  
  
  
  
  
                          Comment on above:         Result Comment: NOTE: The th  
erapeutic time for Heparin  
  
                                                    anticoagulation,based on Xa   
activity inhibition, is an APTT of  
  
                                                    46-80seconds.  
   
                                                    Performed By: #### APTT ####  
Briana Ville 771835 Rio Rancho, OH   
  
  
  
  
             aPTT Coag (Bld) [Time] s            Critically high 20.0-30.5    Harper University Hospital  
  
  
  
  
                          Comment on above:         Result Comment: NOTE: The th  
erapeutic time for Heparin  
  
                                                    anticoagulation,based on Xa   
activity inhibition, is an APTT of  
  
                                                    46-80seconds.  
   
                                                    Performed By: #### APTT ####  
88 Johnson Street. Elizabeth, OH   
  
  
  
  
                                        Add on test from HIS  on 2022  
  
  
  
  
             Add on test from HIS Accepted     Normal                    Mercy Health St. Joseph Warren Hospital System  
  
  
  
  
                          Comment on above:         Result Comment: Specimen casey  
ilable & acceptable for analysis.  
   
                                                    Performed By: #### ADDON ###  
#38 Herrera Street   
  
  
  
  
                                        Basic Metabolic Panel  on 2022  
  
  
  
  
             Anion gap [Moles/Vol] 8 mmol/L     Normal       3-13         Munson Healthcare Cadillac Hospital  
  
  
  
  
                          Comment on above:         Performed By: #### BMP3 ####  
Briana Ville 771835 E. Elizabeth, OH 02831-3087  
  
  
  
  
             Calcium [Mass/Vol] 9.0 mg/dL    Normal       8.4-10.4     Munson Healthcare Otsego Memorial Hospital  
  
  
  
  
                          Comment on above:         Performed By: #### BMP3 ####  
Briana Ville 771835 E. Elizabeth, OH 60579-8353  
  
  
  
  
             CO2 [Moles/Vol] 25 mmol/L    Normal       22-30        Munson Healthcare Cadillac Hospital  
  
  
  
  
                          Comment on above:         Performed By: #### BMP3 ####  
Briana Ville 771835 EDexter, OH   
  
  
  
  
             Glucose [Mass/Vol] 171 mg/dL    High                Munson Healthcare Otsego Memorial Hospital  
  
  
  
  
                          Comment on above:         Performed By: #### BMP3 ####  
Briana Ville 771835 EDexter, OH   
  
  
  
  
             Urea nitrogen [Mass/Vol] 13 mg/dL     Normal       7-17         Beaumont Hospital  
  
  
  
  
                          Comment on above:         Performed By: #### BMP3 ####  
Thomas Ville 45855 EDexter, OH 30552-4337  
  
  
  
  
             Creatinine [Mass/Vol] 1.22 mg/dL   Normal       0.52-1.25    Munson Healthcare Cadillac Hospital  
  
  
  
  
                          Comment on above:         Performed By: #### BMP3 ####  
Briana Ville 771835 EDexter, OH   
  
  
  
  
             GFR/1.73 sq M.predicted among 71.7 mL/min/{1.73_m2} Normal       >6  
0          Munson Healthcare Cadillac Hospital  
  
             blacks MDRD (S/P/Bld) [Vol                                          
  
             rate/Area]                                            
  
  
  
  
                          Comment on above:         Performed By: #### BMP3 ####  
Briana Ville 771835 E. Elizabeth, OH 45151-7652  
  
  
  
  
             GFR/1.73 sq M.predicted among 61.9 mL/min/{1.73_m2} Normal       >6  
0          Munson Healthcare Cadillac Hospital  
  
             non-blacks MDRD (S/P/Bld) [Vol                                       
     
  
             rate/Area]                                            
  
  
  
  
                          Comment on above:         Result Comment: KDIGO guidel  
zoë provide the following GFR  
  
                                                    categories:Stage GFR(ml/min/  
1.73 m2) TermsG1 >=90 Normal or highG2  
  
                                                    60-89 Mildly decreased*G3a 4  
5-59 Mildly to moderately yzqaeparxD6a  
  
                                                    30-44 Moderately to severely  
 decreasedG4 15-29 Severely decreasedG5  
  
                                                    <15 Kidney failure*Relative   
to young adult level.In the absence of  
  
                                                    evidence of kidney damage, n  
either GFRcategory G1 nor G2 fulfill  
  
                                                    the criteria for CKD.The CKD  
-EPI equation is validated in  
  
                                                    individuals 18 yearsof age a  
nd older. Currently the best equation  
  
                                                    forestimating glomerular beto  
tration rate (GFR) from serumcreatinine  
  
                                                    in children is the Bedside S  
chwartz equation.It is less accurate in  
  
                                                    patients with extremes of mu  
sclemass, restriction of dietary  
  
                                                    protein, ingestion of creati  
ne,extra-renal metabolism of  
  
                                                    creatinine, or treatment wit  
hmedications that affect renal tubular  
  
                                                    creatinine secretion.  
   
                                                    Performed By: #### BMP3 ####  
Kettering Health Greene Memorial M2 Connections Ntsevj930 Rio Rancho, OH   
  
  
  
  
             Potassium [Moles/Vol] 3.8 mmol/L   Normal       3.5-5.1      Munson Healthcare Cadillac Hospital  
  
  
  
  
                          Comment on above:         Performed By: #### BMP3 ####  
Kettering Health Greene Memorial M2 Connections Hchzut503 Rio Rancho, OH   
  
  
  
  
             Chloride [Moles/Vol] 102 mmol/L   Normal              Mercy Health St. Joseph Warren Hospital System  
  
  
  
  
                          Comment on above:         Performed By: #### BMP3 ####  
Kettering Health Greene Memorial M2 Connections Xmhbvw823 Rio Rancho, OH   
  
  
  
  
             Sodium [Moles/Vol] 135 mmol/L   Normal       135-145      The Jewish Hospital System  
  
  
  
  
                          Comment on above:         Performed By: #### BMP3 ####  
Kettering Health Greene Memorial M2 Connections Ebmoxr830 Rio Rancho, OH   
  
  
  
  
             Calcium [Mass/Vol] 8.9 mg/dL    Normal       8.4-10.4     The Jewish Hospital System  
  
  
  
  
                          Comment on above:         Performed By: #### HEMDF, MG  
3, BMP3 ####Kettering Health Greene Memorial Stottler Henke Associates525  
 Rio Rancho, OH   
  
  
  
  
             Anion gap [Moles/Vol] 10 mmol/L    Normal       3-13         Munson Healthcare Cadillac Hospital  
  
  
  
  
                          Comment on above:         Performed By: #### HEMDF, MG  
3, BMP3 ####Kettering Health Greene Memorial Dustin Ville 454925  
 Rio Rancho, OH   
  
  
  
  
             CO2 [Moles/Vol] 22 mmol/L    Normal       22-30        Munson Healthcare Cadillac Hospital  
  
  
  
  
                          Comment on above:         Performed By: #### HEMDF, MG  
3, BMP3 ####Briana Ville 771835  
 Rio Rancho, OH   
  
  
  
  
             Creatinine [Mass/Vol] 1.20 mg/dL   Normal       0.52-1.25    Munson Healthcare Cadillac Hospital  
  
  
  
  
                          Comment on above:         Performed By: #### HEMDF, MG  
3, BMP3 ####Briana Ville 771835  
 Rio Rancho, OH   
  
  
  
  
             GFR/1.73 sq M.predicted among 73.1 mL/min/{1.73_m2} Normal       >6  
0          Munson Healthcare Cadillac Hospital  
  
             blacks MDRD (S/P/Bld) [Vol                                          
  
             rate/Area]                                            
  
  
  
  
                          Comment on above:         Performed By: #### HEMDF, MG  
3, BMP3 ####38 Herrera Street   
  
  
  
  
             GFR/1.73 sq M.predicted among 63.1 mL/min/{1.73_m2} Normal       >6  
0          Munson Healthcare Cadillac Hospital  
  
             non-blacks MDRD (S/P/Bld) [Vol                                       
     
  
             rate/Area]                                            
  
  
  
  
                          Comment on above:         Result Comment: KDIGO guidel  
zoë provide the following GFR  
  
                                                    categories:Stage GFR(ml/min/  
1.73 m2) TermsG1 >=90 Normal or highG2  
  
                                                    60-89 Mildly decreased*G3a 4  
5-59 Mildly to moderately frjogigppT6e  
  
                                                    30-44 Moderately to severely  
 decreasedG4 15-29 Severely decreasedG5  
  
                                                    <15 Kidney failure*Relative   
to young adult level.In the absence of  
  
                                                    evidence of kidney damage, n  
either GFRcategory G1 nor G2 fulfill  
  
                                                    the criteria for CKD.The CKD  
-EPI equation is validated in  
  
                                                    individuals 18 yearsof age a  
nd older. Currently the best equation  
  
                                                    forestimating glomerular beto  
tration rate (GFR) from serumcreatinine  
  
                                                    in children is the Bedside S  
kristiwartz equation.It is less accurate in  
  
                                                    patients with extremes of mu  
sclemass, restriction of dietary  
  
                                                    protein, ingestion of creati  
ne,extra-renal metabolism of  
  
                                                    creatinine, or treatment wit  
hmedications that affect renal tubular  
  
                                                    creatinine secretion.  
   
                                                    Performed By: #### HEMDF, MG  
3, BMP3 ####Briana Ville 771835 E.  
  
                                                    Roswell Park Comprehensive Cancer CenterAKRON, OH 05936  
-2090  
  
  
  
  
             Glucose [Mass/Vol] 155 mg/dL    High                The Jewish Hospital System  
  
  
  
  
                          Comment on above:         Performed By: #### HEMDF, MG  
3, BMP3 ####Briana Ville 771835  
 E.  
  
                                                    Roswell Park Comprehensive Cancer CenterAKRON, OH 60360  
-2090  
  
  
  
  
             Urea nitrogen [Mass/Vol] 11 mg/dL     Normal       7-17         Beaumont Hospital  
  
  
  
  
                          Comment on above:         Performed By: #### HEMDF, MG  
3, BMP3 ####Briana Ville 771835  
 E.  
  
                                                    Roswell Park Comprehensive Cancer CenterAKRON, OH 11279  
-2090  
  
  
  
  
             Chloride [Moles/Vol] 104 mmol/L   Normal              Mercy Health St. Joseph Warren Hospital System  
  
  
  
  
                          Comment on above:         Performed By: #### HEMDF, MG  
3, BMP3 ####Briana Ville 771835  
 E.  
  
                                                    ECU Health Duplin HospitalRON, OH 45326  
-2090  
  
  
  
  
             Potassium [Moles/Vol] 4.0 mmol/L   Normal       3.5-5.1      Munson Healthcare Cadillac Hospital  
  
  
  
  
                          Comment on above:         Performed By: #### HEMDF, MG  
3, BMP3 ####Briana Ville 771835  
 E.  
  
                                                    Mary Free Bed Rehabilitation Hospital, OH 68388  
-2090  
  
  
  
  
             Sodium [Moles/Vol] 136 mmol/L   Normal       135-145      The Jewish Hospital System  
  
  
  
  
                          Comment on above:         Performed By: #### HEMDF, MG  
3, BMP3 ####Kettering Health Greene Memorial M2 Connections Lenfob014  
 E.  
  
                                                    ECU Health Duplin HospitalRON, OH 32368  
-2090  
  
  
  
  
                                        Glucose,Bedside  on 2022  
  
  
  
  
             Glucose [Mass/Vol] 174 mg/dL    High                The Jewish Hospital System  
  
  
  
  
                          Comment on above:         Result Comment: Test perform  
ed by glucose meter. Results may   
be  
  
                                                    10%-15% lowerthan serum/plas  
ma values. (CLIA ID 00K3587050)  
   
                                                    Performed By: #### BGLU ####  
Briana Ville 771835 E. Roswell Park Comprehensive Cancer CenterAKRON, OH 89751-1591  
  
  
  
  
             Glucose [Mass/Vol] 175 mg/dL    High                The Jewish Hospital System  
  
  
  
  
                          Comment on above:         Result Comment: Test perform  
ed by glucose meter. Results may   
be  
  
                                                    10%-15% lowerthan serum/plas  
ma values. (CLIA ID 25F9196421)  
   
                                                    Performed By: #### BGLU ####  
Kettering Health Greene Memorial M2 Connections Iwwbfn105 Rio Rancho, OH   
  
  
  
  
             Glucose [Mass/Vol] 166 mg/dL    High                The Jewish Hospital System  
  
  
  
  
                          Comment on above:         Result Comment: Test perform  
ed by glucose meter. Results may   
be  
  
                                                    10%-15% lowerthan serum/plas  
ma values. (CLIA ID 29N3786002)  
   
                                                    Performed By: #### BGLU ####  
Kettering Health Greene Memorial M2 Connections Zycddz998 Rio Rancho, OH   
  
  
  
  
                                        Hemogram w/ Autodiff  on 2022  
  
  
  
  
             Abs Baso Cnt 0.0 10*3/uL  Normal       0.0-0.2      Salem City Hospital  
stem  
  
  
  
  
                          Comment on above:         Performed By: #### HEMDF, MG  
3, BMP3 ####Kettering Health Greene Memorial M2 Connections Voseqg313  
 Rio Rancho, OH   
  
  
  
  
             Abs Neutrophile Cnt 9.2 10*3/uL  High         1.8-7.0      ProMedica Coldwater Regional Hospital  
  
  
  
  
                          Comment on above:         Performed By: #### HEMDF, MG  
3, BMP3 ####Kettering Health Greene Memorial M2 Connections Tjmrev604  
 Rio Rancho, OH   
  
  
  
  
             Basophils/100 WBC (Bld) 0.3 %        Normal       0.0-2.0      Summ  
a Health System  
  
  
  
  
                          Comment on above:         Performed By: #### HEMDF, MG  
3, BMP3 ####Kettering Health Greene Memorial M2 Connections 99 Hogan Street   
  
  
  
  
             Eosinophils (Bld) [#/Vol] 0.1 10*3/uL  Normal       0.0-0.5      Harper University Hospital  
  
  
  
  
                          Comment on above:         Performed By: #### HEMDF, MG  
3, BMP3 ####Kettering Health Greene Memorial M2 Connections 99 Hogan Street   
  
  
  
  
             Eosinophils/100 WBC (Bld) 1.1 %        Normal       1.0-6.0      Harper University Hospital  
  
  
  
  
                          Comment on above:         Performed By: #### HEMDF, MG  
3, BMP3 ####Kettering Health Greene Memorial M2 Connections 99 Hogan Street   
  
  
  
  
             Erythrocyte distribution width (RBC) 16.0 %       High         11.5  
-14.5    Munson Healthcare Cadillac Hospital  
  
             [Ratio]                                               
  
  
  
  
                          Comment on above:         Performed By: #### HEMDF, MG  
3, BMP3 ####Briana Ville 771835  
 Rio Rancho, OH   
  
  
  
  
             Granulocytes/100 WBC (Bld) 83.8 %       High         40.0-80.0    S  
University of Michigan Health  
  
  
  
  
                          Comment on above:         Performed By: #### HEMDF, MG  
3, BMP3 ####Briana Ville 771835  
 Rio Rancho, OH   
  
  
  
  
             Hematocrit (Bld) [Volume fraction] 32.9 %       Low          40.0-5  
2.0    Munson Healthcare Cadillac Hospital  
  
  
  
  
                          Comment on above:         Performed By: #### HEMDF, MG  
3, BMP3 ####Briana Ville 771835  
Rio Rancho, OH   
  
  
  
  
             Hemoglobin (Bld) [Mass/Vol] 10.8 g/dL    Low          13.0-18.0      
Munson Healthcare Cadillac Hospital  
  
  
  
  
                          Comment on above:         Performed By: #### HEMDF, MG  
3, BMP3 ####38 Herrera Street   
  
  
  
  
             Lymphocytes (Bld) [#/Vol] 0.8 10*3/uL  Low          1.0-4.3      Harper University Hospital  
  
  
  
  
                          Comment on above:         Performed By: #### HEMDF, MG  
3, BMP3 ####38 Herrera Street   
  
  
  
  
             Lymphocytes/100 WBC (Bld) 7.6 %        Low          20.0-40.0    Harper University Hospital  
  
  
  
  
                          Comment on above:         Performed By: #### HEMDF, MG  
3, BMP3 ####Briana Ville 771835  
 Rio Rancho, OH   
  
  
  
  
             MCH (RBC) [Entitic mass] 27.8 pg      Normal       26.0-34.0    Beaumont Hospital  
  
  
  
  
                          Comment on above:         Performed By: #### HEMDF, MG  
3, BMP3 ####Briana Ville 771835  
Rio Rancho, OH   
  
  
  
  
             MCHC         32.9 %       Normal       32.0-36.0    Salem City Hospital  
stem  
  
  
  
  
                          Comment on above:         Performed By: #### HEMDF, MG  
3, BMP3 ####38 Herrera Street   
  
  
  
  
             MCV (RBC) [Entitic vol] 84.5 fL      Normal       80.0-98.0    Summ  
a Health System  
  
  
  
  
                          Comment on above:         Performed By: #### HEMDF, MG  
3, BMP3 ####38 Herrera Street   
  
  
  
  
             Monocytes (Bld) [#/Vol] 0.8 10*3/uL  Normal       0.0-0.8      Summ  
a Health System  
  
  
  
  
                          Comment on above:         Performed By: #### HEMDF, MG  
3, BMP3 ####38 Herrera Street   
  
  
  
  
             Monocytes/100 WBC (Bld) 7.2 %        Normal       2.0-10.0     Summ  
a Health System  
  
  
  
  
                          Comment on above:         Performed By: #### HEMDF, MG  
3, BMP3 ####38 Herrera Street   
  
  
  
  
             Platelet mean volume (Bld) [Entitic vol] 7.5 fL       Normal         
7.4-10.4     Munson Healthcare Cadillac Hospital  
  
  
  
  
                          Comment on above:         Performed By: #### HEMDF, MG  
3, BMP3 ####38 Herrera Street   
  
  
  
  
             Platelets (Bld) [#/Vol] 187 10*3/uL  Normal       140-440      University of Michigan Hospital  
  
  
  
  
                          Comment on above:         Performed By: #### HEMDF, MG  
3, BMP3 ####38 Herrera Street   
  
  
  
  
             RBC (Bld) [#/Vol] 3.89 10*6/uL Low          4.40-5.90    McLaren Oakland  
  
  
  
  
                          Comment on above:         Performed By: #### HEMDF, MG  
3, BMP3 ####38 Herrera Street   
  
  
  
  
             WBC (Bld) [#/Vol] 11.0 10*3/uL High         3.6-10.7     McLaren Oakland  
  
  
  
  
                          Comment on above:         Performed By: #### HEMDF, MG  
3, BMP3 ####84 Mcguire Street OH   
  
  
  
  
                                        Magnesium  on 2022  
  
  
  
  
             Magnesium [Mass/Vol] 2.0 mg/dL    Normal       1.6-2.3      Summa H  
ealth System  
  
  
  
  
                          Comment on above:         Performed By: #### HEMDF, MG  
3, BMP3 ####38 Herrera Street   
  
  
  
  
                                        ACT,Whole Blood   on 2021  
  
  
  
  
             ACT,Whole Blood 193 s        High                Munson Healthcare Cadillac Hospital  
  
  
  
  
                          Comment on above:         Result Comment: ACTBPerforme  
d by Hemochron Sig EliteCLIA ID:  
  
                                                    11C2567097Rxzpg Health, Children's Hospital of Michigan, OHACT testing is not intended for  
  
                                                    patients taking aprotonin,pa  
tients with hematocrits of <20% or  
  
                                                    >55%, patients usingother ty  
pes of anticoagulation medications, and  
  
                                                    patients withLupus Anticoagu  
lant.  
   
                                                    Performed By: #### ACTB ####  
38 Herrera Street   
  
  
  
  
             ACT,Whole Blood 193 s        High                Munson Healthcare Cadillac Hospital  
  
  
  
  
                          Comment on above:         Result Comment: ACT testing   
is not intended for patients   
taking  
  
                                                    aprotonin,patients with hema  
tocrits of <20% or >55%, patients  
  
                                                    usingother types of anticoag  
ulation medications, and patients  
  
                                                    withLupus Anticoagulant.  
   
                                                    Performed By: #### ACTB ####  
Briana Ville 771835 Rio Rancho, OH   
  
  
  
  
                                        APTT  on 2021  
  
  
  
  
             aPTT Coag (Bld) [Time] 68.4 s       High         20.0-30.5    Munson Healthcare Cadillac Hospital  
  
  
  
  
                          Comment on above:         Result Comment: NOTE: The th  
erapeutic time for Heparin  
  
                                                    anticoagulation,based on Xa   
activity inhibition, is an APTT of  
  
                                                    46-80seconds.  
   
                                                    Performed By: #### APTT ####  
Briana Ville 771835 Rio Rancho, OH   
  
  
  
  
                                        Arterial Blood Gases  on 2021  
  
  
  
  
             CO2 [Moles/Vol] 24.2 mmol/L  Normal       23.0-27.0    Munson Healthcare Cadillac Hospital  
  
  
  
  
                          Comment on above:         Performed By: #### HEMDF, AB  
G ####84 Mcguire Street OH   
  
  
  
  
             HCO3 (Bld) [Moles/Vol] 23.2 mmol/L  Normal       21.0-25.0    Munson Healthcare Cadillac Hospital  
  
  
  
  
                          Comment on above:         Performed By: #### KACEY AB  
G ####Briana Ville 771835 Rio Rancho, OH   
  
  
  
  
             Hemoglobin (Bld) [Mass/Vol] 10.8 g/dL    Normal       ScreenOnly     
Munson Healthcare Cadillac Hospital  
  
  
  
  
                          Comment on above:         Performed By: #### KACEY AB  
G ####38 Herrera Street   
  
  
  
  
             Oxygen (Bld) [Partial pressure] 74.9 mm[Hg]  Low          80.0-100.  
0   Munson Healthcare Cadillac Hospital  
  
  
  
  
                          Comment on above:         Performed By: #### KACEY AB  
G ####38 Herrera Street   
  
  
  
  
             Oxygen saturation in Blood 94.7 %       Low          95.0-100.0   S  
University of Michigan Health  
  
  
  
  
                          Comment on above:         Performed By: #### KACEY AB  
G ####Briana Ville 771835 Rio Rancho, OH   
  
  
  
  
             pCO2         34.9 mm[Hg]  Low          35.0-45.0    Kettering Health Greene Memorial Health Sy  
stem  
  
  
  
  
                          Comment on above:         Performed By: #### KACEY AB  
G ####Briana Ville 771835 Rio Rancho, OH   
  
  
  
  
             pH           7.440        Normal       7.350-7.450  Kettering Health Greene Memorial Health Sy  
stem  
  
  
  
  
                          Comment on above:         Performed By: #### KACEY, AB  
G ####38 Herrera Street   
  
  
  
  
             Std Base Excess -0.6 mmol/L  Normal       -3.0-3.0     Munson Healthcare Cadillac Hospital  
  
  
  
  
                          Comment on above:         Performed By: #### KACEY, AB  
G ####38 Herrera Street   
  
  
  
  
             FIO2         No data      Normal                    Kettering Health Greene Memorial Health Sy  
stem  
  
  
  
  
                          Comment on above:         Performed By: #### HEMYANCI, AB  
G ####38 Herrera Street   
  
  
  
  
                                        Basic Metabolic Panel   on 2021  
  
  
  
  
             Calcium [Mass/Vol] 8.3 mg/dL    Low          8.4-10.4     Munson Healthcare Otsego Memorial Hospital  
  
  
  
  
                          Comment on above:         Performed By: #### BMP3 ####  
Briana Ville 771835 Rio Rancho, OH   
  
  
  
  
             Glucose [Mass/Vol] 117 mg/dL    High                Munson Healthcare Otsego Memorial Hospital  
  
  
  
  
                          Comment on above:         Performed By: #### BMP3 ####  
38 Herrera Street   
  
  
  
  
             Anion gap [Moles/Vol] 8 mmol/L     Normal       3-13         Munson Healthcare Cadillac Hospital  
  
  
  
  
                          Comment on above:         Performed By: #### BMP3 ####  
38 Herrera Street   
  
  
  
  
             CO2 [Moles/Vol] 20 mmol/L    Low          22-30        Munson Healthcare Cadillac Hospital  
  
  
  
  
                          Comment on above:         Performed By: #### BMP3 ####  
38 Herrera Street   
  
  
  
  
             Creatinine [Mass/Vol] 0.99 mg/dL   Normal       0.52-1.25    Munson Healthcare Cadillac Hospital  
  
  
  
  
                          Comment on above:         Performed By: #### BMP3 ####  
38 Herrera Street   
  
  
  
  
             GFR/1.73 sq M.predicted among blacks mL/min/{1.73_m2} Normal         
>60          Munson Healthcare Cadillac Hospital  
  
             MDRD (S/P/Bld) [Vol rate/Area]                                       
     
  
  
  
  
                          Comment on above:         Performed By: #### BMP3 ####  
Briana Ville 771835 Rio Rancho, OH   
  
  
  
  
             GFR/1.73 sq M.predicted among 79.6 mL/min/{1.73_m2} Normal       >6  
0          Munson Healthcare Cadillac Hospital  
  
             non-blacks MDRD (S/P/Bld) [Vol                                       
     
  
             rate/Area]                                            
  
  
  
  
                          Comment on above:         Result Comment: KDIGO guidel  
zoë provide the following GFR  
  
                                                    categories:Stage GFR(ml/min/  
1.73 m2) TermsG1 >=90 Normal or highG2  
  
                                                    60-89 Mildly decreased*G3a 4  
5-59 Mildly to moderately rjvmztwgyB0e  
  
                                                    30-44 Moderately to severely  
 decreasedG4 15-29 Severely decreasedG5  
  
                                                    <15 Kidney failure*Relative   
to young adult level.In the absence of  
  
                                                    evidence of kidney damage, n  
either GFRcategory G1 nor G2 fulfill  
  
                                                    the criteria for CKD.The CKD  
-EPI equation is validated in  
  
                                                    individuals 18 yearsof age a  
nd older. Currently the best equation  
  
                                                    forestimating glomerular beto  
tration rate (GFR) from serumcreatinine  
  
                                                    in children is the Bedside S  
kristiwartz equation.It is less accurate in  
  
                                                    patients with extremes of mu  
sclemass, restriction of dietary  
  
                                                    protein, ingestion of creati  
ne,extra-renal metabolism of  
  
                                                    creatinine, or treatment wit  
hmedications that affect renal tubular  
  
                                                    creatinine secretion.  
   
                                                    Performed By: #### BMP3 ####  
Kettering Health Greene Memorial M2 Connections Ygtwcg111 Rio Rancho, OH   
  
  
  
  
             Urea nitrogen [Mass/Vol] 11 mg/dL     Normal       7-17         Beaumont Hospital  
  
  
  
  
                          Comment on above:         Performed By: #### BMP3 ####  
Briana Ville 771835 Rio Rancho, OH   
  
  
  
  
             Chloride [Moles/Vol] 109 mmol/L   High                Mercy Health St. Joseph Warren Hospital System  
  
  
  
  
                          Comment on above:         Performed By: #### BMP3 ####  
Kettering Health Greene Memorial M2 Connections Eypgjf754 Rio Rancho, OH   
  
  
  
  
             Potassium [Moles/Vol] 3.7 mmol/L   Normal       3.5-5.1      Munson Healthcare Cadillac Hospital  
  
  
  
  
                          Comment on above:         Performed By: #### BMP3 ####  
Briana Ville 771835 Rio Rancho, OH   
  
  
  
  
             Sodium [Moles/Vol] 138 mmol/L   Normal       135-145      The Jewish Hospital System  
  
  
  
  
                          Comment on above:         Performed By: #### BMP3 ####  
38 Herrera Street   
  
  
  
  
                                        CR Chest Portable  on 2021  
  
  
  
  
             CR Chest Portable              Normal                    Holmes County Joel Pomerene Memorial Hospital System  
  
  
  
  
                                        Echo Complete w/wo Contrast  on 20  
21  
  
  
  
  
             Echo Complete w/wo Contrast              Normal                      
Munson Healthcare Cadillac Hospital  
  
  
  
  
                                        Glucose,Bedside  on 2021  
  
  
  
  
             Glucose [Mass/Vol] 123 mg/dL    High                The Jewish Hospital System  
  
  
  
  
                          Comment on above:         Result Comment: Test perform  
ed by glucose meter. Results may   
be  
  
                                                    10%-15% lowerthan serum/plas  
ma values. (CLIA ID 37S8146856)  
   
                                                    Performed By: #### BGLU ####  
Kettering Health Greene Memorial M2 Connections Uteddv374 Rio Rancho, OH   
  
  
  
  
             Glucose [Mass/Vol] 135 mg/dL    High                Corey Hospitala Grant Hospital System  
  
  
  
  
                          Comment on above:         Result Comment: Test perform  
ed by glucose meter. Results may   
be  
  
                                                    10%-15% lowerthan serum/plas  
ma values. (CLIA ID 41O0117337)  
   
                                                    Performed By: #### BGLU ####  
Kettering Health Greene Memorial M2 Connections 99 Hogan Street   
  
  
  
  
             Glucose [Mass/Vol] 130 mg/dL    High                Corey Hospitala Grant Hospital System  
  
  
  
  
                          Comment on above:         Result Comment: Test perform  
ed by glucose meter. Results may   
be  
  
                                                    10%-15% lowerthan serum/plas  
ma values. (CLIA ID 46A9598012)  
   
                                                    Performed By: #### BGLU ####  
Kettering Health Greene Memorial M2 Connections 99 Hogan Street   
  
  
  
  
             Glucose [Mass/Vol] 123 mg/dL    High                The Jewish Hospital System  
  
  
  
  
                          Comment on above:         Result Comment: Test perform  
ed by glucose meter. Results may   
be  
  
                                                    10%-15% lowerthan serum/plas  
ma values. (CLIA ID 85Z9484791)  
   
                                                    Performed By: #### BGLU ####  
Kettering Health Greene Memorial M2 Connections 99 Hogan Street   
  
  
  
  
                                        Hemoglobin AND Hematocrit  on 2021  
  
  
  
  
             Hematocrit (Bld) [Volume fraction] 32.1 %       Low          40.0-5  
2.0    Munson Healthcare Cadillac Hospital  
  
  
  
  
                          Comment on above:         Performed By: #### HGHCT ###  
#Kettering Health Greene Memorial M2 Connections 99 Hogan Street   
  
  
  
  
             Hemoglobin (Bld) [Mass/Vol] 10.8 g/dL    Low          13.0-18.0      
Munson Healthcare Cadillac Hospital  
  
  
  
  
                          Comment on above:         Performed By: #### HGHCT ###  
#Kettering Health Greene Memorial M2 Connections 99 Hogan Street   
  
  
  
  
             Hematocrit (Bld) [Volume fraction] 31.3 %       Low          40.0-5  
2.0    Munson Healthcare Cadillac Hospital  
  
  
  
  
                          Comment on above:         Performed By: #### HGHCT ###  
#Briana Ville 771835 E. Elizabeth, OH   
  
  
  
  
             Hemoglobin (Bld) [Mass/Vol] 10.5 g/dL    Low          13.0-18.0      
Munson Healthcare Cadillac Hospital  
  
  
  
  
                          Comment on above:         Performed By: #### HGHCT ###  
#Briana Ville 771835 E. Elizabeth, OH   
  
  
  
  
                                        Hemogram w/ Autodiff   on 2021  
  
  
  
  
             Abs Baso Cnt 0.0 10*3/uL  Normal       0.0-0.2      Salem City Hospital  
stem  
  
  
  
  
                          Comment on above:         Performed By: #### HEMDF, AB  
G ####Briana Ville 771835 Rio Rancho, OH   
  
  
  
  
             Abs Neutrophile Cnt 7.7 10*3/uL  High         1.8-7.0      ProMedica Coldwater Regional Hospital  
  
  
  
  
                          Comment on above:         Performed By: #### HEMDF, AB  
G ####Briana Ville 771835 EDexter, OH   
  
  
  
  
             Basophils/100 WBC (Bld) 0.4 %        Normal       0.0-2.0      Summ  
a Health System  
  
  
  
  
                          Comment on above:         Performed By: #### HEMDF, AB  
G ####Briana Ville 771835 .   
Elizabeth, OH   
  
  
  
  
             Eosinophils (Bld) [#/Vol] 0.1 10*3/uL  Normal       0.0-0.5      Harper University Hospital  
  
  
  
  
                          Comment on above:         Performed By: #### HEMDF, AB  
G ####Briana Ville 771835 .   
Elizabeth, OH   
  
  
  
  
             Eosinophils/100 WBC (Bld) 1.1 %        Normal       1.0-6.0      Harper University Hospital  
  
  
  
  
                          Comment on above:         Performed By: #### HEMDF, AB  
G ####Briana Ville 771835 Rio Rancho, OH   
  
  
  
  
             Erythrocyte distribution width (RBC) 16.6 %       High         11.5  
-14.5    Munson Healthcare Cadillac Hospital  
  
             [Ratio]                                               
  
  
  
  
                          Comment on above:         Performed By: #### HEMDF, AB  
G ####Briana Ville 771835 Rio Rancho, OH   
  
  
  
  
             Granulocytes/100 WBC (Bld) 78.2 %       Normal       40.0-80.0    S  
University of Michigan Health  
  
  
  
  
                          Comment on above:         Performed By: #### HEMYANCI AB  
G ####Briana Ville 771835 Rio Rancho, OH   
  
  
  
  
             Hematocrit (Bld) [Volume fraction] 30.6 %       Low          40.0-5  
2.0    Munson Healthcare Cadillac Hospital  
  
  
  
  
                          Comment on above:         Performed By: #### HEMYANCI AB  
G ####Briana Ville 771835 Rio Rancho, OH   
  
  
  
  
             Hemoglobin (Bld) [Mass/Vol] 10.3 g/dL    Low          13.0-18.0      
Munson Healthcare Cadillac Hospital  
  
  
  
  
                          Comment on above:         Performed By: #### HEMYANCI AB  
G ####Briana Ville 771835 Rio Rancho, OH   
  
  
  
  
             Lymphocytes (Bld) [#/Vol] 1.3 10*3/uL  Normal       1.0-4.3      Harper University Hospital  
  
  
  
  
                          Comment on above:         Performed By: #### HEMYANCI AB  
G ####38 Herrera Street   
  
  
  
  
             Lymphocytes/100 WBC (Bld) 12.7 %       Low          20.0-40.0    Harper University Hospital  
  
  
  
  
                          Comment on above:         Performed By: #### HEMYANCI AB  
G ####Munson Healthcare Cadillac Hospital525 Rio Rancho, OH   
  
  
  
  
             MCH (RBC) [Entitic mass] 28.2 pg      Normal       26.0-34.0    Beaumont Hospital  
  
  
  
  
                          Comment on above:         Performed By: #### HEMYANCI AB  
G ####Briana Ville 771835 Rio Rancho, OH   
  
  
  
  
             MCHC         33.7 %       Normal       32.0-36.0    Salem City Hospital  
stem  
  
  
  
  
                          Comment on above:         Performed By: #### HEMYANCI AB  
G ####Briana Ville 771835 Rio Rancho, OH   
  
  
  
  
             MCV (RBC) [Entitic vol] 83.6 fL      Normal       80.0-98.0    Summ  
a Health System  
  
  
  
  
                          Comment on above:         Performed By: #### HEMYANCI AB  
G ####38 Herrera Street   
  
  
  
  
             Monocytes (Bld) [#/Vol] 0.7 10*3/uL  Normal       0.0-0.8      Summ  
a Health System  
  
  
  
  
                          Comment on above:         Performed By: #### HEMDF, AB  
G ####Briana Ville 771835 E.   
Elizabeth, OH   
  
  
  
  
             Monocytes/100 WBC (Bld) 7.6 %        Normal       2.0-10.0     Summ  
a Health System  
  
  
  
  
                          Comment on above:         Performed By: #### HEMDF, AB  
G ####Briana Ville 771835 E.   
Elizabeth, OH   
  
  
  
  
             Platelet mean volume (Bld) [Entitic vol] 7.6 fL       Normal         
7.4-10.4     Munson Healthcare Cadillac Hospital  
  
  
  
  
                          Comment on above:         Performed By: #### HEMYANCI AB  
G ####Briana Ville 771835 E.   
Elizabeth, OH   
  
  
  
  
             Platelets (Bld) [#/Vol] 191 10*3/uL  Normal       140-440      University of Michigan Hospital  
  
  
  
  
                          Comment on above:         Performed By: #### HEMDF, AB  
G ####Kettering Health Greene Memorial M2 Connections Ltuywv777 E.   
Elizabeth, OH   
  
  
  
  
             RBC (Bld) [#/Vol] 3.66 10*6/uL Low          4.40-5.90    McLaren Oakland  
  
  
  
  
                          Comment on above:         Performed By: #### HEMDF, AB  
G ####Munson Healthcare Cadillac Hospital525 E.   
Elizabeth, OH   
  
  
  
  
             WBC (Bld) [#/Vol] 9.9 10*3/uL  Normal       3.6-10.7     McLaren Oakland  
  
  
  
  
                          Comment on above:         Performed By: #### HEMDF, AB  
G ####Briana Ville 771835 .   
Elizabeth, OH   
  
  
  
  
                                        Procalcitonin  on 2021  
  
  
  
  
             Procalcitonin 0.09 ng/mL   Normal       0.00-0.09    Joint Township District Memorial Hospitalte  
  
  
  
  
                          Comment on above:         Performed By: #### PCAL ####  
Briana Ville 771835 E. Elizabeth, OH   
  
  
  
  
             Interpretation See Below    Normal                    Munson Healthcare Cadillac Hospital  
  
  
  
  
                          Comment on above:         Result Comment: PCT <0.50 =   
Low risk of severe sepsis and/or   
septic  
  
                                                    shock.PCT >2.00 = High risk   
of severe sepsis and/or septic shock.  
   
                                                    Performed By: #### PCAL ####  
Briana Ville 771835 Rio Rancho, OH   
  
  
  
  
                                        APTT  on 2021  
  
  
  
  
             aPTT Coag (Bld) [Time] 61.4 s       High         20.0-30.5    Munson Healthcare Cadillac Hospital  
  
  
  
  
                          Comment on above:         Result Comment: NOTE: The th  
erapeutic time for Heparin  
  
                                                    anticoagulation,based on Xa   
activity inhibition, is an APTT of  
  
                                                    46-80seconds.  
   
                                                    Performed By: #### HGHCT, AP  
TT ####Briana Ville 771835 Rio Rancho, OH   
  
  
  
  
             aPTT Coag (Bld) [Time] 64.6 s       High         20.0-30.5    Munson Healthcare Cadillac Hospital  
  
  
  
  
                          Comment on above:         Result Comment: NOTE: The th  
erapeutic time for Heparin  
  
                                                    anticoagulation,based on Xa   
activity inhibition, is an APTT of  
  
                                                    46-80seconds.  
   
                                                    Performed By: #### HGHCT, AP  
TT ####Kettering Health Greene Memorial M2 Connections 99 Hogan Street   
  
  
  
  
                                        Add on test from HIS   on 2021  
  
  
  
  
             Add on test from HIS Accepted     Normal                    Mercy Health St. Joseph Warren Hospital System  
  
  
  
  
                          Comment on above:         Result Comment: Specimen casey  
ilable & acceptable for analysis.  
   
                                                    Performed By: #### ADDON ###  
#38 Herrera Street   
  
  
  
  
                                        Arterial Blood Gases  on 2021  
  
  
  
  
             CO2 [Moles/Vol] 22.7 mmol/L  Low          23.0-27.0    Munson Healthcare Cadillac Hospital  
  
  
  
  
                          Comment on above:         Performed By: #### ABG, HEMD  
F ####Briana Ville 771835 Rio Rancho, OH   
  
  
  
  
             HCO3 (Bld) [Moles/Vol] 21.6 mmol/L  Normal       21.0-25.0    Munson Healthcare Cadillac Hospital  
  
  
  
  
                          Comment on above:         Performed By: #### ABG, HEMD  
F ####38 Herrera Street   
  
  
  
  
             Hemoglobin (Bld) [Mass/Vol] 12.6 g/dL    Normal       ScreenOnly     
Munson Healthcare Cadillac Hospital  
  
  
  
  
                          Comment on above:         Performed By: #### ABKIRA HEMD  
F ####Kettering Health Greene Memorial Stottler Henke Associates525 Rio Rancho, OH   
  
  
  
  
             Oxygen (Bld) [Partial pressure] 235.5 mm[Hg] High         80.0-100.  
0   Munson Healthcare Cadillac Hospital  
  
  
  
  
                          Comment on above:         Performed By: #### ABKIRA HEMD  
F ####Kettering Health Greene Memorial Stottler Henke Associates525 Rio Rancho, OH   
  
  
  
  
             Oxygen saturation in Blood 99.0 %       Normal       95.0-100.0   Insight Surgical Hospital  
  
  
  
  
                          Comment on above:         Performed By: #### ABKIRA HEMD  
F ####Kettering Health Greene Memorial Stottler Henke Associates525 Rio Rancho, OH   
  
  
  
  
             pCO2         35.7 mm[Hg]  Normal       35.0-45.0    Salem City Hospital  
stem  
  
  
  
  
                          Comment on above:         Performed By: #### ABKIRA HEMD  
F ####Kettering Health Greene Memorial M2 Connections Mqzvrn971 Rio Rancho, OH   
  
  
  
  
             pH           7.400        Normal       7.350-7.450  Salem City Hospital  
stem  
  
  
  
  
                          Comment on above:         Performed By: #### ROSANNE HEMD  
F ####Kettering Health Greene Memorial Stottler Henke Associates525 Rio Rancho, OH   
  
  
  
  
             Std Base Excess -2.6 mmol/L  Normal       -3.0-3.0     Munson Healthcare Cadillac Hospital  
  
  
  
  
                          Comment on above:         Performed By: #### ABKIRA HEMD  
F ####Kettering Health Greene Memorial M2 Connections Wrejsj427 Rio Rancho, OH   
  
  
  
  
             FIO2         No data      Normal                    Kettering Health Greene Memorial Health   
stem  
  
  
  
  
                          Comment on above:         Performed By: #### ABKIRA HEMD  
F ####Cerus Corporation525 Rio Rancho, OH   
  
  
  
  
                                        Basic Metabolic Panel  on 2021  
  
  
  
  
             Calcium [Mass/Vol] 7.8 mg/dL    Low          8.4-10.4     The Jewish Hospital System  
  
  
  
  
                          Comment on above:         Performed By: #### BMP3M, CK  
3 ####Kettering Health Greene Memorial M2 Connections Ezfglv228 Rio Rancho, OH   
  
  
  
  
             Glucose [Mass/Vol] 153 mg/dL    High                Munson Healthcare Otsego Memorial Hospital  
  
  
  
  
                          Comment on above:         Performed By: #### BMP3M, CK  
3 ####Briana Ville 771835 EDexter, OH   
  
  
  
  
             Urea nitrogen [Mass/Vol] 18 mg/dL     High         7-17         Beaumont Hospital  
  
  
  
  
                          Comment on above:         Performed By: #### BMP3M, CK  
3 ####Briana Ville 771835 EDexter, OH   
  
  
  
  
             Anion gap [Moles/Vol] 8 mmol/L     Normal       3-13         Munson Healthcare Cadillac Hospital  
  
  
  
  
                          Comment on above:         Performed By: #### BMP3M, CK  
3 ####Briana Ville 771835 EDexter, OH   
  
  
  
  
             CO2 [Moles/Vol] 19 mmol/L    Low          22-30        Munson Healthcare Cadillac Hospital  
  
  
  
  
                          Comment on above:         Performed By: #### BMP3M, CK  
3 ####Briana Ville 771835 Rio Rancho, OH   
  
  
  
  
             Creatinine [Mass/Vol] 1.31 mg/dL   High         0.52-1.25    Munson Healthcare Cadillac Hospital  
  
  
  
  
                          Comment on above:         Performed By: #### BMP3M, CK  
3 ####Briana Ville 771835 EDexter, OH   
  
  
  
  
             GFR/1.73 sq M.predicted among 65.8 mL/min/{1.73_m2} Normal       >6  
0          Munson Healthcare Cadillac Hospital  
  
             blacks MDRD (S/P/Bld) [Vol                                          
  
             rate/Area]                                            
  
  
  
  
                          Comment on above:         Performed By: #### BMP3M, CK  
3 ####Briana Ville 771835 EDexter, OH   
  
  
  
  
             GFR/1.73 sq M.predicted among 56.8 mL/min/{1.73_m2} Abnormal     >6  
0          Munson Healthcare Cadillac Hospital  
  
             non-blacks MDRD (S/P/Bld) [Vol                                       
     
  
             rate/Area]                                            
  
  
  
  
                          Comment on above:         Result Comment: KDIGO guidel  
zoë provide the following GFR  
  
                                                    categories:Stage GFR(ml/min/  
1.73 m2) TermsG1 >=90 Normal or highG2  
  
                                                    60-89 Mildly decreased*G3a 4  
5-59 Mildly to moderately erakgxlexT9w  
  
                                                    30-44 Moderately to severely  
 decreasedG4 15-29 Severely decreasedG5  
  
                                                    <15 Kidney failure*Relative   
to young adult level.In the absence of  
  
                                                    evidence of kidney damage, n  
either GFRcategory G1 nor G2 fulfill  
  
                                                    the criteria for CKD.The CKD  
-EPI equation is validated in  
  
                                                    individuals 18 yearsof age a  
nd older. Currently the best equation  
  
                                                    forestimating glomerular beto  
tration rate (GFR) from serumcreatinine  
  
                                                    in children is the Bedside S  
chwartz equation.It is less accurate in  
  
                                                    patients with extremes of mu  
sclemass, restriction of dietary  
  
                                                    protein, ingestion of creati  
ne,extra-renal metabolism of  
  
                                                    creatinine, or treatment wit  
hmedications that affect renal tubular  
  
                                                    creatinine secretion.  
   
                                                    Performed By: #### BMP3M, CK  
3 ####Kettering Health Greene Memorial M2 Connections Jpdrhn179 Rio Rancho, OH   
  
  
  
  
             Chloride [Moles/Vol] 110 mmol/L   High                Mercy Health St. Joseph Warren Hospital System  
  
  
  
  
                          Comment on above:         Performed By: #### BMP3M, CK  
3 ####Briana Ville 771835 Rio Rancho, OH   
  
  
  
  
             Potassium [Moles/Vol] 4.3 mmol/L   Normal       3.5-5.1      Munson Healthcare Cadillac Hospital  
  
  
  
  
                          Comment on above:         Performed By: #### BMP3M, CK  
3 ####Briana Ville 771835 Rio Rancho, OH   
  
  
  
  
             Sodium [Moles/Vol] 137 mmol/L   Normal       135-145      The Jewish Hospital System  
  
  
  
  
                          Comment on above:         Performed By: #### BMP3M, CK  
3 ####Kettering Health Greene Memorial M2 Connections Myhrvt057 Rio Rancho, OH   
  
  
  
  
                                        CK  on 2021  
  
  
  
  
             CK [Catalytic activity/Vol] 560 U/L      High                  
Munson Healthcare Cadillac Hospital  
  
  
  
  
                          Comment on above:         Performed By: #### BMP3M, CK  
3 ####Kettering Health Greene Memorial M2 Connections Pccsue485 Rio Rancho, OH   
  
  
  
  
                                        Calcium,Ionized   on 2021  
  
  
  
  
             Ionized Ca,Measured 3.90 mg/dL   Low          4.30-5.20    OhioHealth Berger Hospital System  
  
  
  
  
                          Comment on above:         Performed By: #### ICA ####S  
Wendy Ville 103185 Rio Rancho, OH   
  
  
  
  
             pH, Ionized Calcium 7.42         Normal       7.31-7.46    OhioHealth Berger Hospital System  
  
  
  
  
                          Comment on above:         Performed By: #### ICA ####S  
Wendy Ville 103185 E. Elizabeth, OH   
  
  
  
  
                                        Glucose,Bedside  on 2021  
  
  
  
  
             Glucose [Mass/Vol] 141 mg/dL    High                Corey Hospitala Grant Hospital System  
  
  
  
  
                          Comment on above:         Result Comment: Test perform  
ed by glucose meter. Results may   
be  
  
                                                    10%-15% lowerthan serum/plas  
ma values. (CLIA ID 32K5828572)  
   
                                                    Performed By: #### BGLU ####  
Briana Ville 771835 E. Elizabeth, OH   
  
  
  
  
             Glucose [Mass/Vol] 146 mg/dL    High                The Jewish Hospital System  
  
  
  
  
                          Comment on above:         Result Comment: Test perform  
ed by glucose meter. Results may   
be  
  
                                                    10%-15% lowerthan serum/plas  
ma values. (CLIA ID 86L9019232)  
   
                                                    Performed By: #### BGLU ####  
Kettering Health Greene Memorial M2 Connections Clinton Ville 25743 E. Elizabeth, OH   
  
  
  
  
             Glucose [Mass/Vol] 161 mg/dL    High                Corey Hospitala a  
Lima City Hospital System  
  
  
  
  
                          Comment on above:         Result Comment: Test perform  
ed by glucose meter. Results may   
be  
  
                                                    10%-15% lowerthan serum/plas  
ma values. (CLIA ID 47H1094845)  
   
                                                    Performed By: #### BGLU ####  
Kettering Health Greene Memorial M2 Connections 14 Robinson Street. Elizabeth, OH   
  
  
  
  
             Glucose [Mass/Vol] 168 mg/dL    High                Corey Hospitala Grant Hospital System  
  
  
  
  
                          Comment on above:         Result Comment: Test perform  
ed by glucose meter. Results may   
be  
  
                                                    10%-15% lowerthan serum/plas  
ma values. (CLIA ID 25O5194254)  
   
                                                    Performed By: #### BGLU ####  
Kettering Health Greene Memorial M2 Connections 99 Hogan Street   
  
  
  
  
                                        Hemoglobin AND Hematocrit   on   
  
  
  
  
             Hematocrit (Bld) [Volume fraction] 32.8 %       Low          40.0-5  
2.0    Kettering Health Greene Memorial Stottler Henke Associates  
  
  
  
  
                          Comment on above:         Performed By: #### HGHCT ###  
#Briana Ville 771835 Rio Rancho, OH   
  
  
  
  
             Hemoglobin (Bld) [Mass/Vol] 11.0 g/dL    Low          13.0-18.0      
Munson Healthcare Cadillac Hospital  
  
  
  
  
                          Comment on above:         Performed By: #### HGHCT ###  
#Briana Ville 771835 Rio Rancho, OH   
  
  
  
  
             Hematocrit (Bld) [Volume fraction] 34.4 %       Low          40.0-5  
2.0    Munson Healthcare Cadillac Hospital  
  
  
  
  
                          Comment on above:         Performed By: #### HGHCT, AP  
TT ####38 Herrera Street   
  
  
  
  
             Hemoglobin (Bld) [Mass/Vol] 11.3 g/dL    Low          13.0-18.0      
Munson Healthcare Cadillac Hospital  
  
  
  
  
                          Comment on above:         Performed By: #### HGHCT AP  
TT ####Briana Ville 771835 Rio Rancho, OH   
  
  
  
  
             Hematocrit (Bld) [Volume fraction] 36.2 %       Low          40.0-5  
2.0    Munson Healthcare Cadillac Hospital  
  
  
  
  
                          Comment on above:         Performed By: #### HGHCT AP  
TT ####38 Herrera Street   
  
  
  
  
             Hemoglobin (Bld) [Mass/Vol] 12.0 g/dL    Low          13.0-18.0      
Munson Healthcare Cadillac Hospital  
  
  
  
  
                          Comment on above:         Performed By: #### HGHCT AP  
TT ####Briana Ville 771835 Rio Rancho, OH   
  
  
  
  
                                        Hemogram w/ Autodiff  on 2021  
  
  
  
  
             Abs Baso Cnt 0.0 10*3/uL  Normal       0.0-0.2      Salem City Hospital  
stem  
  
  
  
  
                          Comment on above:         Performed By: #### ABG, HEMD  
F ####Briana Ville 771835 Rio Rancho, OH   
  
  
  
  
             Abs Neutrophile Cnt 8.7 10*3/uL  High         1.8-7.0      ProMedica Coldwater Regional Hospital  
  
  
  
  
                          Comment on above:         Performed By: #### ABG, HEMD  
F ####38 Herrera Street   
  
  
  
  
             Basophils/100 WBC (Bld) 0.4 %        Normal       0.0-2.0      Summ  
a Health System  
  
  
  
  
                          Comment on above:         Performed By: #### ROSANNE HEMD  
F ####Briana Ville 771835 Rio Rancho, OH   
  
  
  
  
             Eosinophils (Bld) [#/Vol] 0.0 10*3/uL  Normal       0.0-0.5      Harper University Hospital  
  
  
  
  
                          Comment on above:         Performed By: #### ROSANNE HEMD  
F ####38 Herrera Street   
  
  
  
  
             Eosinophils/100 WBC (Bld) 0.3 %        Low          1.0-6.0      Harper University Hospital  
  
  
  
  
                          Comment on above:         Performed By: #### ROSANNE HEMD  
F ####Briana Ville 771835 Rio Rancho, OH   
  
  
  
  
             Erythrocyte distribution width (RBC) 16.4 %       High         11.5  
-14.5    Munson Healthcare Cadillac Hospital  
  
             [Ratio]                                               
  
  
  
  
                          Comment on above:         Performed By: #### ROSANNE HEMD  
F ####38 Herrera Street   
  
  
  
  
             Granulocytes/100 WBC (Bld) 82.9 %       High         40.0-80.0    Insight Surgical Hospital  
  
  
  
  
                          Comment on above:         Performed By: #### ROSANNE HEMD  
F ####38 Herrera Street   
  
  
  
  
             Hematocrit (Bld) [Volume fraction] 35.9 %       Low          40.0-5  
2.0    Munson Healthcare Cadillac Hospital  
  
  
  
  
                          Comment on above:         Performed By: #### ROSANNE HEMD  
F ####38 Herrera Street   
  
  
  
  
             Hemoglobin (Bld) [Mass/Vol] 11.9 g/dL    Low          13.0-18.0      
Munson Healthcare Cadillac Hospital  
  
  
  
  
                          Comment on above:         Performed By: #### ROSANNE HEMD  
F ####38 Herrera Street   
  
  
  
  
             Lymphocytes (Bld) [#/Vol] 1.1 10*3/uL  Normal       1.0-4.3      Harper University Hospital  
  
  
  
  
                          Comment on above:         Performed By: #### ABG, HEMD  
F ####Briana Ville 771835 E.   
Elizabeth, OH 42441-2530  
  
  
  
  
             Lymphocytes/100 WBC (Bld) 10.1 %       Low          20.0-40.0    Harper University Hospital  
  
  
  
  
                          Comment on above:         Performed By: #### ABG, HEMD  
F ####Briana Ville 771835 E.   
Elizabeth, OH   
  
  
  
  
             MCH (RBC) [Entitic mass] 27.8 pg      Normal       26.0-34.0    Beaumont Hospital  
  
  
  
  
                          Comment on above:         Performed By: #### ABG, HEMD  
F ####Briana Ville 771835 E.   
Elizabeth, OH 50750-7114  
  
  
  
  
             MCHC         33.3 %       Normal       32.0-36.0    Salem City Hospital  
stem  
  
  
  
  
                          Comment on above:         Performed By: #### ABKIRA, HEMD  
F ####Briana Ville 771835 Rio Rancho, OH   
  
  
  
  
             MCV (RBC) [Entitic vol] 83.6 fL      Normal       80.0-98.0    Summ  
a Health System  
  
  
  
  
                          Comment on above:         Performed By: #### ABKIRA, HEMD  
F ####Briana Ville 771835 .   
Elizabeth, OH   
  
  
  
  
             Monocytes (Bld) [#/Vol] 0.7 10*3/uL  Normal       0.0-0.8      Summ  
a Health System  
  
  
  
  
                          Comment on above:         Performed By: #### ABKIRA, HEMD  
F ####Briana Ville 771835 .   
Elizabeth, OH   
  
  
  
  
             Monocytes/100 WBC (Bld) 6.3 %        Normal       2.0-10.0     Summ  
a Health System  
  
  
  
  
                          Comment on above:         Performed By: #### ABKIRA, HEMD  
F ####Briana Ville 771835 .   
Elizabeth, OH   
  
  
  
  
             Platelet mean volume (Bld) [Entitic vol] 7.4 fL       Normal         
7.4-10.4     Munson Healthcare Cadillac Hospital  
  
  
  
  
                          Comment on above:         Performed By: #### ABG, HEMD  
F ####Briana Ville 771835 .   
Elizabeth, OH 54596-8210  
  
  
  
  
             Platelets (Bld) [#/Vol] 211 10*3/uL  Normal       140-440      Summ  
a Health System  
  
  
  
  
                          Comment on above:         Performed By: #### ABG, HEMD  
F ####Kettering Health Greene Memorial M2 Connections Pvfpgr445 E.   
Elizabeth, OH   
  
  
  
  
             RBC (Bld) [#/Vol] 4.29 10*6/uL Low          4.40-5.90    McLaren Oakland  
  
  
  
  
                          Comment on above:         Performed By: #### ABG, HEMD  
F ####Munson Healthcare Cadillac Hospital525 E.   
Elizabeth, OH   
  
  
  
  
             WBC (Bld) [#/Vol] 10.5 10*3/uL Normal       3.6-10.7     McLaren Oakland  
  
  
  
  
                          Comment on above:         Performed By: #### ABG, HEMD  
F ####Briana Ville 771835 E.   
Elizabeth, OH   
  
  
  
  
                                        Op Note  on 2021  
  
  
  
  
             Op Note                   Normal                    Mercy Health St. Vincent Medical Center Sy  
stem  
   
             Op Note                   Normal                    Mercy Health St. Vincent Medical Center Sy  
stem  
  
  
  
  
                                        Troponin I  on 2021  
  
  
  
  
             Troponin I.cardiac [Mass/Vol] 0.040 ng/mL  High         0.000-0.034  
  Munson Healthcare Cadillac Hospital  
  
  
  
  
                          Comment on above:         Result Comment: .  
   
                                                    Performed By: #### TROPN ###  
#Briana Ville 771835 E. Elizabeth, OH   
  
  
  
  
                                        VL Vein Map for Preop Bypass Lower Ext    
on 2021  
  
  
  
  
             VL Vein Map for Preop Bypass Lower Ext              Normal           
           Munson Healthcare Cadillac Hospital  
  
  
  
  
                                        ACT,Whole Blood  on 2021  
  
  
  
  
             ACT,Whole Blood 192 s        High                Munson Healthcare Cadillac Hospital  
  
  
  
  
                          Comment on above:         Result Comment: ACTBOPerform  
ed by Hemochron Sig EliteCLIA ID:  
  
                                                    04Y5768625JaiupOhioHealth, OHACT testing is not intended for  
  
                                                    patients taking aprotonin,pa  
tients with hematocrits of <20% or  
  
                                                    >55%, patients usingother ty  
pes of anticoagulation medications, and  
  
                                                    patients withLupus Anticoagu  
lant.  
   
                                                    Performed By: #### ACTBO ###  
#Briana Ville 771835 . Elizabeth, OH   
  
  
  
  
                                        APTT  on 2021  
  
  
  
  
             aPTT Coag (Bld) [Time] 45.7 s       High         20.0-30.5    Munson Healthcare Cadillac Hospital  
  
  
  
  
                          Comment on above:         Result Comment: NOTE: The th  
erapeutic time for Heparin  
  
                                                    anticoagulation,based on Xa   
activity inhibition, is an APTT of  
  
                                                    46-80seconds.  
   
                                                    Performed By: #### APTT, HGH  
CT ####Briana Ville 771835 . Elizabeth, OH   
  
  
  
  
                                        Arterial Blood Gases  on 2021  
  
  
  
  
             CO2 [Moles/Vol] 22.8 mmol/L  Low          23.0-27.0    Munson Healthcare Cadillac Hospital  
  
  
  
  
                          Comment on above:         Performed By: #### ABG, LACT  
3 ####38 Herrera Street   
  
  
  
  
             HCO3 (Bld) [Moles/Vol] 21.6 mmol/L  Normal       21.0-25.0    Munson Healthcare Cadillac Hospital  
  
  
  
  
                          Comment on above:         Performed By: #### ABG, LACT  
3 ####38 Herrera Street   
  
  
  
  
             Hemoglobin (Bld) [Mass/Vol] 13.5 g/dL    Normal       ScreenOnly     
Munson Healthcare Cadillac Hospital  
  
  
  
  
                          Comment on above:         Performed By: #### ABG, LACT  
3 ####38 Herrera Street   
  
  
  
  
             Oxygen (Bld) [Partial pressure] 193.7 mm[Hg] High         80.0-100.  
0   Munson Healthcare Cadillac Hospital  
  
  
  
  
                          Comment on above:         Performed By: #### ABG, LACT  
3 ####Thomas Ville 45855 EDexter, OH   
  
  
  
  
             Oxygen saturation in Blood 99.0 %       Normal       95.0-100.0   S  
University of Michigan Health  
  
  
  
  
                          Comment on above:         Performed By: #### ABG, LACT  
3 ####Kettering Health Greene Memorial M2 Connections 14 Robinson Street.   
Elizabeth, OH   
  
  
  
  
             pCO2         39.4 mm[Hg]  Normal       35.0-45.0    Mercy Health St. Vincent Medical Center Sy  
stem  
  
  
  
  
                          Comment on above:         Performed By: #### ABG, LACT  
3 ####Kettering Health Greene Memorial M2 Connections 99 Hogan Street   
  
  
  
  
             pH           7.357        Normal       7.350-7.450  Kettering Health Greene Memorial M2 Connections Sy  
stem  
  
  
  
  
                          Comment on above:         Performed By: #### ABG, LACT  
3 ####Briana Ville 771835 E.   
Elizabeth, OH   
  
  
  
  
             Std Base Excess -3.5 mmol/L  Low          -3.0-3.0     Munson Healthcare Cadillac Hospital  
  
  
  
  
                          Comment on above:         Performed By: #### ABG, LACT  
3 ####Briana Ville 771835 E.   
Elizabeth, OH   
  
  
  
  
             FIO2         No data      Normal                    Salem City Hospital  
stem  
  
  
  
  
                          Comment on above:         Performed By: #### ABG, LACT  
3 ####Briana Ville 771835 E.   
Elizabeth, OH   
  
  
  
  
                                        CR Chest Portable  on 2021  
  
  
  
  
             CR Chest Portable              Normal                    Holmes County Joel Pomerene Memorial Hospital System  
  
  
  
  
                                        Comp Metabolic Panel  on 2021  
  
  
  
  
             ALP [Catalytic activity/Vol] 68 U/L       Normal               
 Munson Healthcare Cadillac Hospital  
  
  
  
  
                          Comment on above:         Performed By: #### HEMOG, PT  
/AP, CMP3 ####Briana Ville 771835 Rio Rancho, OH   
  
  
  
  
             ALT [Catalytic activity/Vol] 17 U/L       Normal       0-49          
 Munson Healthcare Cadillac Hospital  
  
  
  
  
                          Comment on above:         Result Comment: The ALT test  
 is performed by an updated assay  
  
                                                    method.Please note that the   
reference intervals have beenchanged  
  
                                                    and are now sex specific.  
   
                                                    Performed By: #### HEMOG, PT  
/AP, CMP3 ####Briana Ville 771835 EDexter, OH   
  
  
  
  
             Anion gap [Moles/Vol] 8 mmol/L     Normal       3-13         Munson Healthcare Cadillac Hospital  
  
  
  
  
                          Comment on above:         Performed By: #### HEMOG, PT  
/AP, CMP3 ####Briana Ville 771835 E.  
  
                                                    Elizabeth, OH   
  
  
  
  
             AST [Catalytic activity/Vol] 27 U/L       Normal       15-46         
 Munson Healthcare Cadillac Hospital  
  
  
  
  
                          Comment on above:         Performed By: #### HEMOG, PT  
/AP, CMP3 ####Briana Ville 771835 Rio Rancho, OH   
  
  
  
  
             Calcium [Mass/Vol] 9.0 mg/dL    Normal       8.4-10.4     The Jewish Hospital System  
  
  
  
  
                          Comment on above:         Performed By: #### HEMOG, PT  
/AP, CMP3 ####Briana Ville 771835 EDexter, OH   
  
  
  
  
             CO2 [Moles/Vol] 21 mmol/L    Low          22-30        Munson Healthcare Cadillac Hospital  
  
  
  
  
                          Comment on above:         Performed By: #### HEMOG, PT  
/AP, CMP3 ####Briana Ville 771835 E.  
  
                                                    ECU Health Duplin HospitalRON, OH   
  
  
  
  
             Glucose [Mass/Vol] 142 mg/dL    High                Munson Healthcare Otsego Memorial Hospital  
  
  
  
  
                          Comment on above:         Performed By: #### HEMOG, PT  
/AP, CMP3 ####Briana Ville 771835 E.  
  
                                                    Elizabeth, OH   
  
  
  
  
             Protein [Mass/Vol] 6.4 g/dL     Normal       6.3-8.2      Munson Healthcare Otsego Memorial Hospital  
  
  
  
  
                          Comment on above:         Performed By: #### HEMOG, PT  
/AP, CMP3 ####Briana Ville 771835 E.  
  
                                                    Elizabeth, OH   
  
  
  
  
             Urea nitrogen [Mass/Vol] 19 mg/dL     High         7-17         Beaumont Hospital  
  
  
  
  
                          Comment on above:         Performed By: #### HEMOG, PT  
/AP, CMP3 ####Thomas Ville 45855 E.  
  
                                                    Elizabeth, OH   
  
  
  
  
             Bilirubin [Mass/Vol] 0.9 mg/dL    Normal       0.2-1.3      Summa H  
ealth System  
  
  
  
  
                          Comment on above:         Performed By: #### HEMOG, PT  
/AP, CMP3 ####Thomas Ville 45855 E.  
  
                                                    Elizabeth, OH   
  
  
  
  
             Creatinine [Mass/Vol] 1.37 mg/dL   High         0.52-1.25    Munson Healthcare Cadillac Hospital  
  
  
  
  
                          Comment on above:         Performed By: #### HEMOG, PT  
/AP, CMP3 ####Briana Ville 771835 E.  
  
                                                    Elizabeth, OH   
  
  
  
  
             GFR/1.73 sq M.predicted among 62.3 mL/min/{1.73_m2} Normal       >6  
0          Munson Healthcare Cadillac Hospital  
  
             blacks MDRD (S/P/Bld) [Vol                                          
  
             rate/Area]                                            
  
  
  
  
                          Comment on above:         Performed By: #### HEMOG, PT  
/AP, CMP3 ####Briana Ville 771835 E.  
  
                                                    Elizabeth, OH   
  
  
  
  
             GFR/1.73 sq M.predicted among 53.8 mL/min/{1.73_m2} Abnormal     >6  
0          Munson Healthcare Cadillac Hospital  
  
             non-blacks MDRD (S/P/Bld) [Vol                                       
     
  
             rate/Area]                                            
  
  
  
  
                          Comment on above:         Result Comment: KDIGO guidel  
zoë provide the following GFR  
  
                                                    categories:Stage GFR(ml/min/  
1.73 m2) TermsG1 >=90 Normal or highG2  
  
                                                    60-89 Mildly decreased*G3a 4  
5-59 Mildly to moderately pgnssxjdnB5i  
  
                                                    30-44 Moderately to severely  
 decreasedG4 15-29 Severely decreasedG5  
  
                                                    <15 Kidney failure*Relative   
to young adult level.In the absence of  
  
                                                    evidence of kidney damage, n  
either GFRcategory G1 nor G2 fulfill  
  
                                                    the criteria for CKD.The CKD  
-EPI equation is validated in  
  
                                                    individuals 18 yearsof age a  
nd older. Currently the best equation  
  
                                                    forestimating glomerular beto  
tration rate (GFR) from serumcreatinine  
  
                                                    in children is the Bedside S  
kristiwartz equation.It is less accurate in  
  
                                                    patients with extremes of mu  
sclemass, restriction of dietary  
  
                                                    protein, ingestion of creati  
ne,extra-renal metabolism of  
  
                                                    creatinine, or treatment wit  
hmedications that affect renal tubular  
  
                                                    creatinine secretion.  
   
                                                    Performed By: #### HEMOG, PT  
/AP, CMP3 ####Corey HospitalLaru Technologies Xosotf192 Santa Maria Biotherapeutics  
  
                                                    Elizabeth, OH   
  
  
  
  
             Albumin [Mass/Vol] 3.7 g/dL     Normal       3.5-5.0      The Jewish Hospital System  
  
  
  
  
                          Comment on above:         Performed By: #### HEMOG, PT  
/AP, CMP3 ####Kettering Health Greene Memorial M2 Connections   
Rtrvxp509 Santa Maria Biotherapeutics  
  
                                                    Elizabeth, OH 33323  
  
  
  
  
             Chloride [Moles/Vol] 105 mmol/L   Normal              Mercy Health St. Joseph Warren Hospital System  
  
  
  
  
                          Comment on above:         Performed By: #### HEMOG, PT  
/AP, CMP3 ####Kettering Health Greene Memorial M2 Connections   
Looeja087 Santa Maria Biotherapeutics  
  
                                                    Elizabeth, OH 80605  
  
  
  
  
             Potassium [Moles/Vol] 4.4 mmol/L   Normal       3.5-5.1      Munson Healthcare Cadillac Hospital  
  
  
  
  
                          Comment on above:         Performed By: #### HEMOG, PT  
/AP, CMP3 ####Kettering Health Greene Memorial M2 Connections   
Nmgzqp933 Santa Maria Biotherapeutics  
  
                                                    Elizabeth, OH 41918  
  
  
  
  
             Sodium [Moles/Vol] 134 mmol/L   Low          135-145      The Jewish Hospital System  
  
  
  
  
                          Comment on above:         Performed By: #### HEMOG, PT  
/AP, CMP3 ####Kettering Health Greene Memorial M2 Connections   
Cxqzmi973 Rio Rancho, OH   
  
  
  
  
                                        Glucose,Bedside  on 2021  
  
  
  
  
             Glucose [Mass/Vol] 142 mg/dL    High                The Jewish Hospital System  
  
  
  
  
                          Comment on above:         Result Comment: Test perform  
ed by glucose meter. Results may   
be  
  
                                                    10%-15% lowerthan serum/plas  
ma values. (CLIA ID 09F9778095)  
   
                                                    Performed By: #### BGLU ####  
Briana Ville 771835 Rio Rancho, OH   
  
  
  
  
             Glucose [Mass/Vol] 125 mg/dL    High                The Jewish Hospital System  
  
  
  
  
                          Comment on above:         Result Comment: Test perform  
ed by glucose meter. Results may   
be  
  
                                                    10%-15% lowerthan serum/plas  
ma values. (CLIA ID 63I3434558)  
   
                                                    Performed By: #### BGLU ####  
Kettering Health Greene Memorial M2 Connections Madtft366 Rio Rancho, OH   
  
  
  
  
                                        Hemoglobin AND Hematocrit   on   
  
  
  
  
             Hematocrit (Bld) [Volume fraction] 40.1 %       Normal       40.0-5  
2.0    Munson Healthcare Cadillac Hospital  
  
  
  
  
                          Comment on above:         Performed By: #### APTT, HGH  
CT ####Kettering Health Greene Memorial M2 Connections Mgtrps166 Rio Rancho, OH   
  
  
  
  
             Hemoglobin (Bld) [Mass/Vol] 13.1 g/dL    Normal       13.0-18.0      
Munson Healthcare Cadillac Hospital  
  
  
  
  
                          Comment on above:         Performed By: #### APTT, HGH  
CT ####Kettering Health Greene Memorial M2 Connections Aaodmu187 Rio Rancho, OH   
  
  
  
  
                                        Hemogram  on 2021  
  
  
  
  
             Erythrocyte distribution width (RBC) 16.6 %       High         11.5  
-14.5    Munson Healthcare Cadillac Hospital  
  
             [Ratio]                                               
  
  
  
  
                          Comment on above:         Performed By: #### HEMOG, PT  
/AP, CMP3 ####Kettering Health Greene Memorial M2 Connections   
Fupbwl392 Rio Rancho, OH   
  
  
  
  
             Hematocrit (Bld) [Volume fraction] 44.6 %       Normal       40.0-5  
2.0    Munson Healthcare Cadillac Hospital  
  
  
  
  
                          Comment on above:         Performed By: #### HEMOG, PT  
/AP, CMP3 ####38 Herrera Street   
  
  
  
  
             Hemoglobin (Bld) [Mass/Vol] 14.9 g/dL    Normal       13.0-18.0      
Munson Healthcare Cadillac Hospital  
  
  
  
  
                          Comment on above:         Performed By: #### HEMOG, PT  
/AP, CMP3 ####38 Herrera Street   
  
  
  
  
             MCH (RBC) [Entitic mass] 27.9 pg      Normal       26.0-34.0    Beaumont Hospital  
  
  
  
  
                          Comment on above:         Performed By: #### HEMOG, PT  
/AP, CMP3 ####38 Herrera Street   
  
  
  
  
             MCHC         33.4 %       Normal       32.0-36.0    Salem City Hospital  
stem  
  
  
  
  
                          Comment on above:         Performed By: #### HEMOG, PT  
/AP, CMP3 ####38 Herrera Street   
  
  
  
  
             MCV (RBC) [Entitic vol] 83.7 fL      Normal       80.0-98.0    Summ  
a Health System  
  
  
  
  
                          Comment on above:         Performed By: #### HEMOG, PT  
/AP, CMP3 ####38 Herrera Street   
  
  
  
  
             Platelet mean volume (Bld) [Entitic vol] 7.5 fL       Normal         
7.4-10.4     Munson Healthcare Cadillac Hospital  
  
  
  
  
                          Comment on above:         Performed By: #### HEMOG, PT  
/AP, CMP3 ####38 Herrera Street   
  
  
  
  
             Platelets (Bld) [#/Vol] 228 10*3/uL  Normal       140-440      University of Michigan Hospital  
  
  
  
  
                          Comment on above:         Performed By: #### HEMOG, PT  
/AP, CMP3 ####38 Herrera Street   
  
  
  
  
             RBC (Bld) [#/Vol] 5.32 10*6/uL Normal       4.40-5.90    McLaren Oakland  
  
  
  
  
                          Comment on above:         Performed By: #### HEMOG, PT  
/AP, CMP3 ####Kettering Health Greene Memorial M2 Connections   
Vhlmqh406 Rio Rancho, OH   
  
  
  
  
             WBC (Bld) [#/Vol] 10.9 10*3/uL High         3.6-10.7     Holmes County Joel Pomerene Memorial Hospital System  
  
  
  
  
                          Comment on above:         Performed By: #### HEMOG, PT  
/AP, CMP3 ####Kettering Health Greene Memorial M2 Connections   
Fokhjj895 Rio Rancho, OH   
  
  
  
  
                                        Lactic Acid  on 2021  
  
  
  
  
             Lactate [Moles/Vol] 1.0 mmol/L   Normal       0.7-2.0      OhioHealth Berger Hospital System  
  
  
  
  
                          Comment on above:         Performed By: #### ABG, LACT  
3 ####38 Herrera Street   
  
  
  
  
                                        Protime AND APTT  on 2021  
  
  
  
  
             aPTT Coag (Bld) [Time] 26.8 s       Normal       20.0-30.5    Munson Healthcare Cadillac Hospital  
  
  
  
  
                          Comment on above:         Result Comment: NOTE: The th  
erapeutic time for Heparin  
  
                                                    anticoagulation,based on Xa   
activity inhibition, is an APTT of  
  
                                                    46-80seconds.  
   
                                                    Performed By: #### HEMOG, PT  
/AP, CMP3 ####Kettering Health Greene Memorial M2 Connections Uwigom852 Rio Rancho, OH   
  
  
  
  
             INR          1.1          Normal       0.9-1.1      Salem City Hospital  
stem  
  
  
  
  
                          Comment on above:         Result Comment: Recommended   
Anticoagulant Therapy: SEE   
BELOW-----  
  
                                                    INR of 2.0 - 3.0 : - Prophyl  
axis of Venous Thrombosis (high-risk  
  
                                                    surgery) - Treatment of Veno  
us Thrombosis - Treatment of Pulmonary  
  
                                                    Embolism (Includes tissue he  
art valves, Acute Myocardial Infarction  
  
                                                    to prevent systemic embolism  
, Valvular Heart Disease, and Atrial  
  
                                                    Fibrillation)----- INR of 2.  
5 - 3.5 : - Mechanical Prosthetic  
  
                                                    Valves (high risk) - If oral  
 anticoagulant therapy is used to  
  
                                                    prevent Myocardial Infarctio  
n  
   
                                                    Performed By: #### HEMOG, PT  
/AP, CMP3 ####Kettering Health Greene Memorial M2 Connections Ybxuzz761 Rio Rancho, OH   
  
  
  
  
             PT Coag (PPP) [Time] 11.8 s       Normal       9.0-12.0     Mercy Health St. Joseph Warren Hospital System  
  
  
  
  
                          Comment on above:         Result Comment: .  
   
                                                    Performed By: #### HEMOG, PT  
/AP, CMP3 ####Munson Healthcare Cadillac Hospital525 ÁNGEL MITCHELL Lunenburg, OH 31158 -2802  
  
  
  
  
                                        CNPN  on 2021  
  
  
  
  
             YEIMI         Telephone (HEMAGRE) Normal                    Ararat  
  
                                                      
--------------------------------------------------------------------------------
                                                            General  
  
                          WILLOW BRADSHAW (169565) 1957                   
           Medical  
  
                          Date Time Provider Department                           
  Center  
  
                          21 MAIDA SHARPE                    
           
  
                          During your visit today, we recorded the following inf  
ormation about you:                             
  
                          CARSON Spring.CNP 2021 7:56 AM Dipti dexter                             
  
                                Called and spoke to patient's wife via phone. In  
formed her that Dr. Miles                   
                                          
  
                                is out ill. We discussed CT findings. Patient ha  
s had a new cough. Patient is                   
                                          
  
                          having problems with the stents in his leg(s). Somethi  
ng happened over the                             
  
                          weekend and he is having excruciating pain in his legs  
 and his toes are                             
  
                                changing colors. She has a call out to his University of Utah Hospital doctor. This has happened                   
                                          
  
                          before.                                  
  
                                                    Will send antibiotic to cove  
r possible infection based on CT findings. Reviewed  
                                                              
  
                          medication, dosing, and SE.                             
  
                          Due to current problems with his legs, they would like  
 to reschedule                             
  
                          appointment.                             
  
                                                    Advised that patient should   
be evaluated immediately given discoloration of his  
                                                              
  
                                                    toes. She will wait for darrius  
mmendations from vascular. ED precautions reviewed.  
                                                              
  
                          She voices understanding.                             
  
                          Maida Sharpe, CARSON.JEANIE Mcmahan 2021 10:08 AM Signed                 
              
  
                                Spoke with patients wife to reschedule and she i  
s calling back when she looks                   
                                          
  
                          at her calender                             
  
                          Allergies As of Date: 2021 Noted Allergy Reactio  
n                             
  
                          MENTHOL 2019 2 - Rash                             
  
                          Comments: Topical like vicks vapor                      
         
  
                          MINT CHOCOLATE CHIP FLAVOR 2017 14 - Other: See   
Comments                             
  
                          Comments: Mouth burning with mint flavor                
               
  
                          Just MINT no chocolate chip                             
  
                          Date Reviewed: 2021                             
  
                          Reviewed by: Elen Soriano MA - Fully Assessed       
                        
  
                          Reason for Visit:                             
  
                          Appointment [186]                             
  
                          Order(s):amoxicillin-clavulanic acid (AUGMENTIN) 875-1  
25 mg per tabletTake 1                             
  
                          tablet by mouth twice daily for 10 days.Disp: 20 table  
tRfl: 0                             
  
                          Prescriptions as of 2021                          
     
  
                          - amoxicillin-clavulanic acid (AUGMENTIN) 875-125 mg p  
er tablet                             
  
                          Take 1 tablet by mouth twice daily for 10 days.         
                      
  
                          - ONETOUCH VERIO TEST STRIPS test strip                 
              
  
                          - ONETOUCH VERIO REFLECT METER                          
     
  
                          - buPROPion HCL, smoking deter, 150 mg tab ER 12 hr     
                          
  
                          Take 150 mg by mouth.                             
  
                          - clopidogrel (PLAVIX) 75 mg tablet                     
          
  
                          Take 75 mg by mouth.                             
  
                          - ONETOUCH DELICA PLUS LANCET 33 gauge                  
             
  
                          - nicotine (NICODERM) 14 mg/24 hr                       
        
  
                          Apply 1 Patch as directed.                             
  
                          - Nicotine Polacrilex 2 mg lozenge                      
         
  
                          Take 2 mg by mouth.                             
  
                          - nitroglycerin sublingual (NITROQUICK) 0.4 mg SL tabl  
et                             
  
                          Dissolve 0.4 mg under the tongue.                       
        
  
                          - pantoprazole DR (PROTONIX) 40 mg tablet               
                
  
                          - XARELTO 15 mg tablet                             
  
                          - XARELTO 20 mg tablet                             
  
                          - XARELTO 2.5 mg tablet                             
  
                          - JARDIANCE 10 mg tablet                             
  
                          - VASCEPA capsule                             
  
                          TAKE 2 CAPSULES BY MOUTH 2 TIMES DAILY                  
             
  
                          - alirocumab (PRALUENT PEN) 75 mg/mL pen                
               
  
                          Inject 75 mg subcutaneously.                            
   
  
                          - ipratropium-albuterol (DUONEB) 0.5 mg-3 mg(2.5 mg ba  
se)/3 mL nebu                             
  
                          Inhale 3 mL as instructed every 4 hours as needed.      
                         
  
                          - ubidecarenone Q-10 (COENZYME Q-10) 10 mg cap          
                     
  
                          Take by mouth.                             
  
                          - Magnesium 250 mg tab                             
  
                          Take 250 mg by mouth.                             
  
                          - sildenafil (VIAGRA) 100 mg tablet                     
          
  
                          Take 100 mg by mouth.                             
  
                          - metFORMIN ER (GLUCOPHAGE XR) 500 mg 24 hr tablet      
                         
  
                          Take 1000mg by mouth with breakfast and 500mg by mouth  
 with dinner                             
  
                          - atorvastatin (LIPITOR) 80 mg tablet                   
            
  
                          Take 1 tablet by mouth.                             
  
                          - Fenofibrate (LOFIBRA) 160 mg tablet                   
            
  
                          Take 160 mg by mouth.                             
  
                          - lisinopril 2.5 mg tablet                             
  
                          Take 2.5 mg by mouth.                             
  
                          - metoprolol succinate ER (TOPROL XL) 25 mg 24 hr tabl  
et                             
  
                          Take 25 mg by mouth.                             
  
                          - cilostazol (PLETAL) 100 mg tablet                     
          
  
                          Take 100 mg by mouth.                             
  
                          Problem List As Of Date 2021 Noted Resolved       
                        
  
                          Adenocarcinoma of left lung (HCC) [C34.92] 2019                             
  
                          Peripheral vascular disease (HCC) [I73.9] 2019    
                           
  
                          Centrilobular emphysema (HCC) [J43.2] 2019        
                       
  
                          Type 2 diabetes mellitus with diabetic peripher*2019                             
  
                          Primary malignant neoplasm of bronchus of left *2019                             
  
                          Prescriptions ordered this encounter Disp Refills Star  
t End                             
  
                          AMOXICILLIN 875 MG-POTASSIUM CLAVULA* 20 t* 0 20  
21 2022                             
  
                          Route: ORAL                              
  
                          Sig: Take 1 tablet by mouth twice daily for 10 days.    
                           
  
                          Encounter Number: 686329394                             
  
                          Encounter Status:Closed by TONIE MCMAHAN on 21    
                           
  
  
  
  
                                         Arterial Duplex US Lower Ext Left  on  
 2021  
  
  
  
  
                        Patient Name: WILLOW BRADSHAW III Normal            
          Kettering Health Greene Memorial  
  
             Arterial     MRN: S456634                           Health  
  
             Duplex US    Acct#: 995594200803                           System  
  
             Lower Ext    Ultrasound                               
  
             Left         ACCESSION EXAM DATE/TIME PROCEDURE ORDERING PROVIDER    
                           
  
                                                    -359137 2021 17:  
01 EST VL Arterial Duplex US DO BRYAN JOSEPH                                                        
  
                          Lower Ext Left                             
  
                          CPT code                                 
  
                          25339                                    
  
                          Reason For Exam                             
  
                          (VL Arterial Duplex US Lower Ext Left) LLE ischemia kramer  
bacute                             
  
                          Addendum                                 
  
                          Wyandot Memorial Hospital HEART AND VASCULAR INSTITUTE               
                
  
                          ------------------------------------------------------  
-----------------                             
  
                          Left Lower Extremity Native Arterial Duplex Report      
                         
  
                          AMENDED REPORT                             
  
                          Patient DO AugustineB: 1957 Study 2021      
                         
  
                          Name: Willow JOHNSON (64yrs) Date:                           
    
  
                          Patient U740925 Age: 64 Account: 191860597019           
                    
  
                          ID:                                      
  
                          Gender: M Loc: Accession: 73432457469                   
            
  
                          BP:                                      
  
                          Ordering Physician: Odell Bryan DO, JOAQUINA, Waldo HospitalC  
                             
  
                          Sonographer: Thu Perales RDMS, RVT                    
           
  
                          Interpreting Physician: La Fontaine, Ed M.D.               
                
  
                          ------------------------------------------------------  
-----------------                             
  
                          Location: Vegas Valley Rehabilitation Hospital                      
         
  
                          ------------------------------------------------------  
-----------------                             
  
                          Indications: LLE ischemia.                             
  
                          Dr giuseppe lopez PVR                             
  
                          ------------------------------------------------------  
-----------------                             
  
                          CRITICAL RESULTS: A critical finding, was reported to   
Dr Bryan,                             
  
                          by Thu Perales, on 2021, at 04:47 PM. Patient t  
o have angiogram                             
  
                          on                              
  
                          ------------------------------------------------------  
-----------------                             
  
                          Conclusions                              
  
                          1. Patent left common femoral, profunda arteries with   
no stenosis                             
  
                          2. Occluded left SFA stent                             
  
                          ------------------------------------------------------  
-----------------                             
  
                          History: Risk factors: Current tobacco use. Hypertensi  
on. Obese.                             
  
                          Age over 50 years.                             
  
                          ------------------------------------------------------  
-----------------                             
  
                          Ultrasound                               
  
                          Addendum                                 
  
                          Study data: Left lower extremity native arterial duple  
x.                             
  
                          Ankle-brachial index, duplex scan, grayscale 2D imagin  
g, color Doppler                             
  
                          imaging, and spectral Doppler analysis. Location: PeaceHealth. Objective: Diagnostic evaluation. Procedur  
e: A vascular                             
  
                          evaluation was performed with the patient in the supin  
e position.                             
  
                          Images were obtained using a GE Logic E9 vascular ultr  
asound machine.                             
  
                          ------------------------------------------------------  
-----------------                             
  
                          Arterial flow:                             
  
                          +--------------------+-------------+-----------+------  
----------------+                             
  
                          +Location +Diameter AP**+PSV(cm/sec)+Comment +          
                     
  
                          +--------------------+-------------+-----------+------  
----------------+                             
  
                          +L CFA , distal +1.12 cm +24 +----------------------+   
                            
  
                          +--------------------+-------------+-----------+------  
----------------+                             
  
                          +L DFA , prox +0.63 cm +34 +----------------------+     
                          
  
                          +--------------------+-------------+-----------+------  
----------------+                             
  
                          +L SFA , prox +0.47 cm +0 +Stent runs from prox +       
                        
  
                          + + + +SFA to distal SFA. +                             
  
                          +--------------------+-------------+-----------+------  
----------------+                             
  
                          +L SFA , mid +-------------+0 +----------------------+  
                             
  
                          +--------------------+-------------+-----------+------  
----------------+                             
  
                          +L SFA , distal +-------------+0 +--------------------  
--+                             
  
                          +--------------------+-------------+-----------+------  
----------------+                             
  
                          +L popliteal , distal+0.42 cm +0 +--------------------  
--+                             
  
                          +--------------------+-------------+-----------+------  
----------------+                             
  
                          +L PTA , distal +-------------+0 +--------------------  
--+                             
  
                          +--------------------+-------------+-----------+------  
----------------+                             
  
                          +L peroneal , distal +-------------+0 +---------------  
-------+                             
  
                          +--------------------+-------------+-----------+------  
----------------+                             
  
                          +L ROBBIE , distal +-------------+0 +--------------------  
--+                             
  
                          +--------------------+-------------+-----------+------  
----------------+                             
  
                          Amended                                  
  
                          Ed Breen M.D.                             
  
                          2021 10:26                             
  
                          *****Final Addendum*****                             
  
                                                                   
  
                                                                   
  
                                                                   
  
                          Signed Date and Time: 2021 10:26 am               
                
  
                          Signed by: ED BREEN                             
  
                          Report                                   
  
                          Wyandot Memorial Hospital HEART AND VASCULAR INSTITUTE               
                
  
                          ------------------------------------------------------  
-----------------                             
  
                          Left Lower Extremity Native Arterial Duplex Report      
                         
  
                          Patient Augustine, : 1957 Study 2021      
                         
  
                          Name: Willow JOHNSON (64yrs) Date:                           
    
  
                          Patient A665727 Age: 64 Account: 952490547075           
                    
  
                          ID:                                      
  
                          Gender: JUANJOSE Loc: Accession: 56453358194                   
            
  
                          BP:                                      
  
                          Ordering Physician: Odell Bryan DO, Cass Medical Center, Northwest Rural Health Network  
                             
  
                          Sonographer: Thu Perales, HEYDI, RVT                    
           
  
                          Interpreting Physician: Ed Breen M.D.               
                
  
                          ------------------------------------------------------  
-----------------                             
  
                          Ultrasound                               
  
                          Report                                   
  
                          Location: Vegas Valley Rehabilitation Hospital                      
         
  
                          ------------------------------------------------------  
-----------------                             
  
                          Indications: LLE ischemia.                             
  
                          Dr chin separate PVR                             
  
                          ------------------------------------------------------  
-----------------                             
  
                          CRITICAL RESULTS: A critical finding, was reported to   
Dr Bryan,                             
  
                          by Thu Perales, on 2021, at 04:47 PM. Patient t  
o have angiogram                             
  
                          on                              
  
                          ------------------------------------------------------  
-----------------                             
  
                          Conclusions                              
  
                                                    1. Patent common femoral, pr  
ofunda arteries with no stenosis (more content not   
included)...                                                  
  
  
  
  
                                        VL PVR Arterial Doppler Lwr w/o Exercise  
  on 2021  
  
  
  
  
             VL PVR       Patient Name: WILLOW BRADSHAW III Normal            
          Kettering Health Greene Memorial  
  
             Arterial     MRN: P270414                           Health  
  
             Doppler      Acct#: 558146739902                           System  
  
             Lwr w/o      Ultrasound                               
  
             Exercise     ACCESSION EXAM DATE/TIME PROCEDURE ORDERING PROVIDER    
                           
  
                                                    -444298 2021 17:  
01 EST VL PVR Arterial Doppler DO IRVIN   
ODELL                                                        
  
                          Lwr w/o Exercise                             
  
                          CPT code                                 
  
                          24502                                    
  
                          Reason For Exam                             
  
                          (VL PVR Arterial Doppler Lwr w/o Exercise) CHAMP pappasi  
a                             
  
                          Report                                   
  
                          Wyandot Memorial Hospital HEART AND VASCULAR INSTITUTE               
                
  
                          ------------------------------------------------------  
-----------------                             
  
                          Multilevel Lower Extremity Arterial Evaluation Report   
                            
  
                          Patient DO AugustineB: 1957 Study 2021      
                         
  
                          Name: Willow JOHNSON (64yrs) Date:                           
    
  
                          Patient U703172 Age: 64 Account: 315009537173           
                    
  
                          ID:                                      
  
                          Gender: M Loc: Accession: 88434272252                   
            
  
                          BP:                                      
  
                          Ordering Physician: Odell Bryan DO, FS, Northwest Rural Health Network  
                             
  
                          Sonographer: Thu Perales RDMS, T                    
           
  
                          Interpreting Physician: Ed Breen M.D.               
                
  
                          ------------------------------------------------------  
-----------------                             
  
                          Location: Vegas Valley Rehabilitation Hospital                      
         
  
                          ------------------------------------------------------  
-----------------                             
  
                          Indications: PAD LLE ischemia.                          
     
  
                          ------------------------------------------------------  
-----------------                             
  
                          CRITICAL RESULTS: A critical finding, was reported to   
Dr Bryan,                             
  
                          by Thu Perales, on 2021, at 04:47 PM. Correct r  
ead-back was                             
  
                          verified. Patient was sent home. Patient to have angio  
gram on the                              
  
                          ------------------------------------------------------  
-----------------                             
  
                          Conclusions                              
  
                          1. Right NENA 0.73, moderate arterial insufficiency. PV  
R waveforms and                             
  
                          segmental pressures reveal distal SFA/popliteal diseas  
e                             
  
                          2. Left NENA 0, critical arterial insufficiency. PVR wa  
veforms and                             
  
                          segmental pressures reveal aorto-iliac, proximal SFA d  
isease                             
  
                          ------------------------------------------------------  
-----------------                             
  
                          History: Risk factors: Current tobacco use. Obese. Age  
 over 50                             
  
                          years.                                   
  
                          ------------------------------------------------------  
-----------------                             
  
                          Study data: Lower extremity multilevel physiologic michelle  
luation.                             
  
                          Ultrasound                               
  
                          Report                                   
  
                          Pressure measurement and pulse volume recording. Locat  
ion: Vascular                             
  
                          laboratory. Objective: Diagnostic evaluation. Procedur  
e: A vascular                             
  
                          evaluation was performed with the patient in the supin  
e position.                             
  
                          Images were obtained using a Vasculab vascular ultraso  
und machine.                             
  
                                                                   
  
                          ------------------------------------------------------  
-----------------                             
  
                          Arterial pressure indices:                             
  
                          +------------+----------------+------------+            
                   
  
                          +Location +Pressure (REST)*+Index (REST)+               
                
  
                          +------------+----------------+------------+            
                   
  
                          +R brachial +110 +------------+                         
      
  
                          +------------+----------------+------------+            
                   
  
                          +R high thigh+114 +1.04 +                             
  
                          +------------+----------------+------------+            
                   
  
                          +R low thigh +100 +0.91 +                             
  
                          +------------+----------------+------------+            
                   
  
                          +R calf +75 +0.68 +                             
  
                          +------------+----------------+------------+            
                   
  
                          +R DP +65 +0.59 +                             
  
                          +------------+----------------+------------+            
                   
  
                          +R PT +80 +0.73 +                             
  
                          +------------+----------------+------------+            
                   
  
                          +L brachial +99 +------------+                          
     
  
                          +------------+----------------+------------+            
                   
  
                          +L high thigh+44 +0.40 +                             
  
                          +------------+----------------+------------+            
                   
  
                          +L low thigh +0 +0.00 +                             
  
                          +------------+----------------+------------+            
                   
  
                          +L calf +0 +0.00 +                             
  
                          +------------+----------------+------------+            
                   
  
                          +L DP +0 +0.00 +                             
  
                          +------------+----------------+------------+            
                   
  
                          +L PT +0 +0.00 +                             
  
                          +------------+----------------+------------+            
                   
  
                          Prepared and electronically signed by                   
            
  
                          Ed Breen M.D.                             
  
                          2021 10:27                             
  
                          ***** Final *****                             
  
                                                                   
  
                          Dictated: 2021 10:28 am                           
    
  
                          Dictating Physician: ED BREEN                       
        
  
                          Signed Date and Time: 2021 10:28 am               
                
  
                          Signed by: ED BREEN Cardiovascular                  
             
  
                          ACCESSION EXAM DATE/TIME PROCEDURE                      
         
  
                          -051774 2021 17:01 EST VL PVR Arterial Dop  
pler Lwr w/o                             
  
                          Exercise                                 
  
                          CPT code                                 
  
                          91696                                    
  
                          Reason For Exam                             
  
                          (VL PVR Arterial Doppler Lwr w/o Exercise) CHAMP amaya                             
  
                          Cardiovascular                             
  
                          Report                                   
  
                          Wyandot Memorial Hospital HEART AND VASCULAR INSTITUTE               
                
  
                          ------------------------------------------------------  
-----------------                             
  
                          Multilevel Lower Extremity Arterial Evaluation Report   
                            
  
                          Patient Augustine, : 1957 Study 2021      
                         
  
                          Name: Willow JOHNSON (64yrs) Date:                           
    
  
                          Patient Y684294 Age: 64 Account: 806159118370           
                    
  
                          ID:                                      
  
                          Gender: M Loc: Accession: 29230193218                   
            
  
                          BP:                                      
  
                          Ordering Physician: Odell Bryan DO, FS, Northwest Rural Health Network  
                             
  
                          Sonographer: Thu Perales, HEYDI, RVT                    
           
  
                          Interpreting Physician: Ed Breen M.D.               
                
  
                          ------------------------------------------------------  
-----------------                             
  
                          Location: Vegas Valley Rehabilitation Hospital                      
         
  
                          ------------------------------------------------------  
-----------------                             
  
                          Indications: PAD LLE ischemia.                          
     
  
                          ------------------------------------------------------  
-----------------                             
  
                          CRITICAL RESULTS: A critical finding, was reported to   
Dr Bryan,                             
  
                          by Thu Perales, on 2021, at 04:47 PM. Correct r  
ead-back was                             
  
                          verified. Patient was sent home. Patient to have angio  
gram on the                              
  
                          ------------------------------------------------------  
-----------------                             
  
                          Conclusions                              
  
                          1. Right NENA 0.73, (more content not included)...       
                        
  
  
  
  
                                        VL Arterial PVR Lower   Ordered By: Raghavendra Bryan on 2021  
  
  
  
  
                          Wyandot Memorial Hospital HEART AND VASCULAR INSTITUTE               
              SUMM  
  
                                                                 Work Phone: 1(5 38)160-8266  
  
                          ------------------------------------------------------  
-----------------                             
  
                          Multilevel Lower Extremity Arterial Evaluation Report   
                            
  
                                                                   
  
                          Patient Augustine : 1957 Study 2021      
                         
  
                          Name: Willow JOHNSON (64yrs) Date:                           
    
  
                          Patient 31802436 Age: 64 Account: 227944707280          
                     
  
                          ID:                                      
  
                          Gender: M Loc: Accession: 13961478715                   
            
  
                          BP:                                      
  
                          Ordering Physician: Odell Bryan DO, FS, Northwest Rural Health Network  
                             
  
                          Sonographer: Zenaida James RVT                       
        
  
                          Interpreting Physician: Odell Scott MD             
                  
  
                                                                   
  
                          ------------------------------------------------------  
-----------------                             
  
                                                                   
  
                          Location: 15 Price Street                   
            
  
                                                                   
  
                          ------------------------------------------------------  
-----------------                             
  
                          Indications: PVD with claudication.                     
          
  
                                                                   
  
                          ------------------------------------------------------  
-----------------                             
  
                                                                   
  
                          Conclusions                              
  
                                                                   
  
                          1. Right resting NENA is 0.82. These findings show mild  
 arterial                             
  
                          insufficiency. Segmental pressures and waveforms show   
mild arterial                             
  
                          insufficiency due to femoralpopliteal disease.          
                     
  
                          2. Left resting NENA is 0.78. These findings show moder  
ate arterial                             
  
                          insufficiency. Segmental pressures and waveforms show   
moderate                             
  
                          arterial insufficiency due to popliteal disease.        
                       
  
                          3. PVR waveforms of the right ankle appear diminished.  
                             
  
                          4. PVR waveforms of the left ankle appear diminished.   
                            
  
                                                                   
  
                          ------------------------------------------------------  
-----------------                             
  
                          History: Risk factors: Former tobacco use. Hypertensio  
n. Diabetes                             
  
                          mellitus. Obese. Hyperlipidemia.                        
       
  
                                                                   
  
                          Labs, prior tests, procedures, and surgery:             
                  
  
                          Successful atherectomy and clot removal of the left kramer  
perficial                             
  
                          femoral artery, popliteal artery thrombosis 21.    
                           
  
                                                                   
  
                          ------------------------------------------------------  
-----------------                             
  
                                                                   
  
                          Study data: Lower extremity multilevel physiologic michelle  
luation.                             
  
                          Pressure measurement and pulse volume recording. Locat  
ion: Vascular                             
  
                          laboratory. Procedure: A vascular evaluation was perfo  
rmed with the                             
  
                          patient in the supine position. Images were obtained u  
sing a ulike                             
  
                          vascular ultrasound machine.                            
   
  
                                                                   
  
                          ------------------------------------------------------  
-----------------                             
  
                          Arterial pressure indices:                             
  
                                                                   
  
                          +------------+----------------+------------+-------+    
                           
  
                          +Location +Pressure (REST)*+Index (REST)+Comment+       
                        
  
                          +------------+----------------+------------+-------+    
                           
  
                          +R brachial +108 +------------+-------+                 
              
  
                          +------------+----------------+------------+-------+    
                           
  
                          +R high thigh+113 +1.05 +-------+                       
        
  
                          +------------+----------------+------------+-------+    
                           
  
                          +R low thigh +114 +1.06 +-------+                       
        
  
                          +------------+----------------+------------+-------+    
                           
  
                          +R calf +80 +0.74 +-------+                             
  
                          +------------+----------------+------------+-------+    
                           
  
                          +R DP +89 +0.82 +-------+                             
  
                          +------------+----------------+------------+-------+    
                           
  
                          +R PT +85 +0.79 +-------+                             
  
                          +------------+----------------+------------+-------+    
                           
  
                          +R great toe +87 +0.81 +-------+                        
       
  
                          +------------+----------------+------------+-------+    
                           
  
                          +L brachial +106 +------------+-------+                 
              
  
                          +------------+----------------+------------+-------+    
                           
  
                          +L high thigh+255 +------------+NC +                    
           
  
                          +------------+----------------+------------+-------+    
                           
  
                          +L low thigh +255 +------------+NC +                    
           
  
                          +------------+----------------+------------+-------+    
                           
  
                          +L calf +71 +0.66 +-------+                             
  
                          +------------+----------------+------------+-------+    
                           
  
                          +L DP +70 +0.65 +-------+                             
  
                          +------------+----------------+------------+-------+    
                           
  
                          +L PT +84 +0.78 +-------+                             
  
                          +------------+----------------+------------+-------+    
                           
  
                          +L great toe +80 +0.74 +-------+                        
       
  
                          +------------+----------------+------------+-------+    
                           
  
                          Prepared and electronically signed by                   
            
  
                                                                   
  
                          Odell Scott MD                             
  
                          2021 14:07                             
   
                                                    Nasim, Kettering Health Greene Memorial Incoming Cardiolo  
gy Results From Sparkle/Marta - 2021 2:08 PM  
 EDT Formatting of this note might be different from the original.                
                           Mary Rutan Hospital HEART AND VASCULAR INSTITUTE               
              Work Phone: 1(612) 548-5012  
  
                                                                   
  
                          ------------------------------------------------------  
-----------------                             
  
                          Multilevel Lower Extremity Arterial Evaluation Report   
                            
  
                                                                   
  
                          Patient Augustine, : 1957 Study 2021      
                         
  
                          Name: Willow JOHNSON (64yrs) Date:                           
    
  
                          Patient 90267697 Age: 64 Account: 935304693181          
                     
  
                          ID:                                      
  
                          Gender: M Loc: Accession: 79443422084                   
            
  
                          BP:                                      
  
                          Ordering Physician: Odell Bryan DO, FS, Northwest Rural Health Network  
                             
  
                          Sonographer: Zenaida James DERRICK                       
        
  
                          Interpreting Physician: Odell Scott MD             
                  
  
                                                                   
  
                          ------------------------------------------------------  
-----------------                             
  
                                                                   
  
                          Location: 15 Price Street                   
            
  
                                                                   
  
                          ------------------------------------------------------  
-----------------                             
  
                          Indications: PVD with claudication.                     
          
  
                                                                   
  
                          ------------------------------------------------------  
-----------------                             
  
                                                                   
  
                          Conclusions                              
  
                                                                   
  
                          1. Right resting NENA is 0.82. These findings show mild  
 arterial                             
  
                          insufficiency. Segmental pressures and waveforms show   
mild arterial                             
  
                          insufficiency due to femoralpopliteal disease.          
                     
  
                          2. Left resting NENA is 0.78. These findings show moder  
ate arterial                             
  
                          insufficiency. Segmental pressures and waveforms show   
moderate                             
  
                          arterial insufficiency due to popliteal disease.        
                       
  
                          3. PVR waveforms of the right ankle appear diminished.  
                             
  
                          4. PVR waveforms of the left ankle appear diminished.   
                            
  
                                                                   
  
                          ------------------------------------------------------  
-----------------                             
  
                          History: Risk factors: Former tobacco use. Hypertensio  
n. Diabetes                             
  
                          mellitus. Obese. Hyperlipidemia.                        
       
  
                                                                   
  
                          Labs, prior tests, procedures, and surgery:             
                  
  
                          Successful atherectomy and clot removal of the left kramer  
perficial                             
  
                          femoral artery, popliteal artery thrombosis 21.    
                           
  
                                                                   
  
                          ------------------------------------------------------  
-----------------                             
  
                                                                   
  
                          Study data: Lower extremity multilevel physiologic michelle  
luation.                             
  
                          Pressure measurement and pulse volume recording. Locat  
ion: Vascular                             
  
                          laboratory. Procedure: A vascular evaluation was perfo  
rmed with the                             
  
                          patient in the supine position. Images were obtained u  
sing a ulike                             
  
                          vascular ultrasound machine.                            
   
  
                                                                   
  
                          ------------------------------------------------------  
-----------------                             
  
                          Arterial pressure indices:                             
  
                                                                   
  
                          +------------+----------------+------------+-------+    
                           
  
                          +Location +Pressure (REST)*+Index (REST)+Comment+       
                        
  
                          +------------+----------------+------------+-------+    
                           
  
                          +R brachial +108 +------------+-------+                 
              
  
                          +------------+----------------+------------+-------+    
                           
  
                          +R high thigh+113 +1.05 +-------+                       
        
  
                          +------------+----------------+------------+-------+    
                           
  
                          +R low thigh +114 +1.06 +-------+                       
        
  
                          +------------+----------------+------------+-------+    
                           
  
                          +R calf +80 +0.74 +-------+                             
  
                          +------------+----------------+------------+-------+    
                           
  
                          +R DP +89 +0.82 +-------+                             
  
                          +------------+----------------+------------+-------+    
                           
  
                          +R PT +85 +0.79 +-------+                             
  
                          +------------+----------------+------------+-------+    
                           
  
                          +R great toe +87 +0.81 +-------+                        
       
  
                          +------------+----------------+------------+-------+    
                           
  
                          +L brachial +106 +------------+-------+                 
              
  
                          +------------+----------------+------------+-------+    
                           
  
                          +L high thigh+255 +------------+NC +                    
           
  
                          +------------+----------------+------------+-------+    
                           
  
                          +L low thigh +255 +------------+NC +                    
           
  
                          +------------+----------------+------------+-------+    
                           
  
                          +L calf +71 +0.66 +-------+                             
  
                          +------------+----------------+------------+-------+    
                           
  
                          +L DP +70 +0.65 +-------+                             
  
                          +------------+----------------+------------+-------+    
                           
  
                          +L PT +84 +0.78 +-------+                             
  
                          +------------+----------------+------------+-------+    
                           
  
                          +L great toe +80 +0.74 +-------+                        
       
  
                          +------------+----------------+------------+-------+    
                           
  
                          Prepared and electronically signed by                   
            
  
                                                                   
  
                          Odell Scott MD                             
  
                          2021 14:07                             
   
                                                                 Corey Hospital  
  
                                                                 Work Phone: 5(3 22)715-9117  
  
  
  
  
                                        VL DUP LOWER EXTREMITY ARTERIES LEFT  Or  
dered By: Odell Bryan on   
2021  
  
  
  
  
                          Wyandot Memorial Hospital HEART AND VASCULAR INSTITUTE               
              Corey Hospital  
  
                                                                 Work Phone: 3(1 82)310-6826  
  
                          ------------------------------------------------------  
-----------------                             
  
                          Left Lower Extremity Native Arterial Duplex Report      
                         
  
                                                                   
  
                          Patient Augustine, : 1957 Study 2021      
                         
  
                          Name: Willow JOHNSON  (64yrs) Date:                          
     
  
                          Patient 15688893 Age: 64 Account: 376687188425          
                     
  
                          ID:                                      
  
                          Gender: M  Loc: Accession: 00028802390                  
             
  
                          BP:                                      
  
                          Ordering Physician: Odell Bryan DO, FS, Northwest Rural Health Network  
                             
  
                          Sonographer: JASON SethiT                       
        
  
                          Interpreting Physician: Odell Scott MD             
                  
  
                                                                   
  
                          ------------------------------------------------------  
-----------------                             
  
                                                                   
  
                          Location: 15 Price Street                   
            
  
                                                                   
  
                          ------------------------------------------------------  
-----------------                             
  
                          Indications: Critical limb ischemia. PVD with brooke archer.                             
  
                                                                   
  
                          ------------------------------------------------------  
-----------------                             
  
                                                                   
  
                          Conclusions                              
  
                                                                   
  
                          The left superficial femoral artery stent is patent.    
                           
  
                                                                   
  
                          ------------------------------------------------------  
-----------------                             
  
                          History: Risk factors: Former tobacco use. Hypertensio  
n. Diabetes                             
  
                          mellitus. Hyperlipidemia.                             
  
                                                                   
  
                          Labs, prior tests, procedures, and surgery:             
                  
  
                          Successful atherectomy and clot removal of the left kramer  
perficial                             
  
                          femoral artery, popliteal artery thrombosis 21.    
                           
  
                                                                   
  
                          ------------------------------------------------------  
-----------------                             
  
                                                                   
  
                          Study data: Left lower extremity native arterial duple  
x.                             
  
                          Ankle-brachial index, duplex scan, grayscale 2D imagin  
g, color Doppler                             
  
                          imaging, and spectral Doppler analysis. Location: PeaceHealth. Procedure: A vascular evaluation was perfo  
rmed with the                             
  
                          patient in the supine position. Images were obtained u  
sing a GE Logic                             
  
                          E9 vascular ultrasound machine. Full lower PVR perform  
ed 21.                             
  
                                                                   
  
                          ------------------------------------------------------  
-----------------                             
  
                                                                   
  
                          Arterial flow:                             
  
                                                                   
  
                          +-------------------+-----------+------------------+--  
----------------+                             
  
                          +Location +PSV(cm/sec)+Plaque +Comment +                
               
  
                          +-------------------+-----------+------------------+--  
----------------+                             
  
                          +L CFA , distal +51 +Irregular and +------------------  
+                             
  
                          + + +calcified + +                             
  
                          +-------------------+-----------+------------------+--  
----------------+                             
  
                          +L DFA , prox +34 +Smooth and +------------------+      
                         
  
                          + + +calcified +  +                             
  
                          +-------------------+-----------+------------------+--  
----------------+                             
  
                          +L SFA, prox +64 +------------------+SFA stent appears  
 +                             
  
                          +(proximal stent) + + +patent +                         
      
  
                          +-------------------+-----------+------------------+--  
----------------+                             
  
                          +L SFA, mid (mid +71 +------------------+-------------  
-----+                             
  
                          +stent) + + + +                             
  
                          +-------------------+-----------+------------------+--  
----------------+                             
  
                          +L SFA, distal +85 +------------------+---------------  
---+                             
  
                          +(distal stent) + + + +                             
  
                          +-------------------+-----------+------------------+--  
----------------+                             
  
                          +L popliteal, prox +55 +------------------+-----------  
-------+                             
  
                          +(distal stent) + + + +                             
  
                          +-------------------+-----------+------------------+--  
----------------+                             
  
                          +L popliteal, mid +65 +Irregular and +----------------  
--+                             
  
                          +(distal to stent) + +calcified + +                     
          
  
                          +-------------------+-----------+------------------+--  
----------------+                             
  
                          +L popliteal , +38 +------------------+---------------  
---+                             
  
                          +distal + + + +                             
  
                          +-------------------+-----------+------------------+--  
----------------+                             
  
                          Prepared and electronically signed by                   
            
  
                                                                   
  
                          Odell Scott MD                             
  
                          2021 14:06                             
   
                                                    Nasim, Kettering Health Greene Memorial Incoming Cardiolo  
gy Results From Sparkle/Marta - 2021 2:06 PM  
 EDT Formatting of this note might be different from the original.                
                           Mary Rutan Hospital HEART AND VASCULAR INSTITUTE               
              Work Phone: 1(803) 502-4030  
  
                                                                   
  
                          ------------------------------------------------------  
-----------------                             
  
                          Left Lower Extremity Native Arterial Duplex Report      
                         
  
                                                                   
  
                          Patient Augustine, : 1957 Study 2021      
                         
  
                          Name: Willow JOHNSON (64yrs) Date:                           
    
  
                          Patient 80040146 Age: 64 Account: 720800447592          
                     
  
                          ID:                                      
  
                          Gender: M Loc: Accession: 35803209977                   
            
  
                          BP:                                      
  
                          Ordering Physician: Odell Bryan DO, FS, Northwest Rural Health Network  
                             
  
                          Sonographer: Zenaida James DERRICK                       
        
  
                          Interpreting Physician: Odell Scott MD             
                  
  
                                                                   
  
                          ------------------------------------------------------  
-----------------                             
  
                                                                   
  
                          Location: 15 Price Street                   
            
  
                                                                   
  
                          ------------------------------------------------------  
-----------------                             
  
                          Indications: Critical limb ischemia. PVD with brooke archer.                             
  
                                                                   
  
                          ------------------------------------------------------  
-----------------                             
  
                                                                   
  
                          Conclusions                              
  
                                                                   
  
                          The left superficial femoral artery stent is patent.    
                           
  
                                                                   
  
                          ------------------------------------------------------  
-----------------                             
  
                          History: Risk factors: Former tobacco use. Hypertensio  
n. Diabetes                             
  
                          mellitus. Hyperlipidemia.                             
  
                                                                   
  
                          Labs, prior tests, procedures, and surgery:             
                  
  
                          Successful atherectomy and clot removal of the left kramer  
perficial                             
  
                          femoral artery, popliteal artery thrombosis 21.    
                           
  
                                                                   
  
                          ------------------------------------------------------  
-----------------                             
  
                                                                   
  
                          Study data: Left lower extremity native arterial duple  
x.                             
  
                          Ankle-brachial index, duplex scan, grayscale 2D imagin  
g, color Doppler                             
  
                          imaging, and spectral Doppler analysis. Location: PeaceHealth. Procedure: A vascular evaluation was perfo  
rmed with the                             
  
                          patient in the supine position. Images were obtained u  
sing a GE Logic                             
  
                          E9 vascular ultrasound machine. Full lower PVR perform  
ed 21.                             
  
                                                                   
  
                          ------------------------------------------------------  
-----------------                             
  
                                                                   
  
                          Arterial flow:                             
  
                                                                   
  
                          +-------------------+-----------+------------------+--  
----------------+                             
  
                          +Location +PSV(cm/sec)+Plaque +Comment +                
               
  
                          +-------------------+-----------+------------------+--  
----------------+                             
  
                          +L CFA , distal +51 +Irregular and +------------------  
+                             
  
                          + + +calcified + +                             
  
                          +-------------------+-----------+------------------+--  
----------------+                             
  
                          +L DFA , prox +34 +Smooth and +------------------+      
                         
  
                          + + +calcified + +                             
  
                          +-------------------+-----------+------------------+--  
----------------+                             
  
                          +L SFA, prox +64 +------------------+SFA stent appears  
 +                             
  
                          +(proximal stent) + + +patent +                         
      
  
                          +-------------------+-----------+------------------+--  
----------------+                             
  
                          +L SFA, mid (mid +71 +------------------+-------------  
-----+                             
  
                          +stent) + + + +                             
  
                          +-------------------+-----------+------------------+--  
----------------+                             
  
                          +L SFA, distal +85 +------------------+---------------  
---+                             
  
                          +(distal stent) + + + +                             
  
                          +-------------------+-----------+------------------+--  
----------------+                             
  
                          +L popliteal, prox +55 +------------------+-----------  
-------+                             
  
                          +(distal stent) + + + +                             
  
                          +-------------------+-----------+------------------+--  
----------------+                             
  
                          +L popliteal, mid +65 +Irregular and +----------------  
--+                             
  
                          +(distal to stent) + +calcified + +                     
          
  
                          +-------------------+-----------+------------------+--  
----------------+                             
  
                          +L popliteal , +38 +------------------+---------------  
---+                             
  
                          +distal + + + +                             
  
                          +-------------------+-----------+------------------+--  
----------------+                             
  
                          Prepared and electronically signed by                   
            
  
                                                                   
  
                          Odell Scott MD                             
  
                          2021 14:06                             
   
                                                                 Intercytex Group  
  
                                                                 Work Phone: 2(1 57)984-8030  
  
  
  
  
                                        Basic Metabolic Panel  Ordered By: Maximus Bryan on 2021  
  
  
  
  
             Anion gap [Moles/Vol] 9 mmol/L                  3 - 13 mmol/L SUMMA  
  
                                                                 Work Phone: 1  
34)361-4871  
   
             Calcium [Mass/Vol] 9.6 mg/dL                 8.4 - 10.4 mg/dL SUMMA  
  
                                                                 Work Phone: 1  
34)066-8584  
   
             Chloride [Moles/Vol] 106 mmol/L                98 - 107 mmol/L SUMM  
A  
  
                                                                 Work Phone: 1(  
34)601-0082  
   
             CO2 [Moles/Vol] 23 mmol/L                 22 - 30 mmol/L SUMMA  
  
                                                                 Work Phone: 1(  
34)819-4703  
   
             Creatinine [Mass/Vol] 1.45 mg/dL   High         0.52 - 1.25 mg/dL S  
UMMA  
  
                                                                 Work Phone: 1  
96)599-3542  
   
             EGFR IF NonAfrican 50.3 mL/min  Abnormal     >60          SUMMA  
  
             American                                            Work Phone: 1  
81)187-5303  
  
  
  
  
                          Comment on above:         KDIGO guidelines provide the  
 following GFR categories:  
  
                                                    Stage GFR(ml/min/1.73 m2) Te  
iggy  
  
                                                    G1 >=90 Normal or high  
  
                                                    G2 60-89 Mildly decreased*  
  
                                                    G3a 45-59 Mildly to moderate  
ly decreased  
  
                                                    G3b 30-44  Moderately to sev  
erely decreased  
  
                                                    G4 15-29 Severely decreased  
  
                                                    G5 <15 Kidney failure  
  
                                                      
  
                                                    *Relative to young adult lev  
el.  
  
                                                    In the absence of evidence o  
f kidney damage, neither GFR  
  
                                                    category G1 nor G2 fulfill t  
he criteria for CKD.  
  
                                                      
  
                                                    The CKD-EPI equation is margarita  
dated in individuals 18 years  
  
                                                    of age and older. Currently   
the best equation for  
  
                                                    estimating glomerular filtra  
tion rate (GFR) from serum  
  
                                                    creatinine in children is th  
e Bedside Keller equation.  
  
                                                    It is less accurate in patie  
nts with extremes of muscle  
  
                                                    mass, restriction of dietary  
 protein, ingestion of creatine,  
  
                                                    extra-renal metabolism of cr  
eatinine, or treatment with  
  
                                                    medications that affect main  
l tubular creatinine secretion.  
  
  
  
  
             GFR/1.73 sq  58.3 mL/min/{1.73_m2} Abnormal     >60          SUMMA  
  
             M.predicted among                                        Work Phone  
: 1(886) 880-4465  
  
             blacks MDRD (S/P/Bld)                                          
  
             [Vol rate/Area]                                          
   
             Glucose [Mass/Vol] 146 mg/dL    High         70 - 100     SUMMA  
  
                                                    mg/dL        Work Phone: 12  
96)650-6307  
   
             Interpretation and Abnormal                               SUMMA  
  
             review of laboratory                                        Work Ph  
one: 1(145) 476-9961  
  
             results                                               
   
             Potassium [Moles/Vol] 4.1 mmol/L                3.5 - 5.1    SUMMA  
  
                                                    mmol/L       Work Phone: 1(  
34)353-6022  
   
             Sodium [Moles/Vol] 138 mmol/L                135 - 145    SUMMA  
  
                                                    mmol/L       Work Phone: 1(  
34)598-7414  
   
             Urea nitrogen (BldV) 16 mg/dL                  7 - 20       SUMMA  
  
             [Mass/Vol]                             mg/dL        Work Phone: 1(  
34)651-3944  
   
                          Test Performed by Summ                           SUMM  
  
  
                          M2 Connections Insight Surgical Hospital, Lawrence Memorial Hospital E.                           Work P  
angie: 4(856)043-9048  
  
                          Pedro Bay, OH                             
  
                          68199                                    
   
                                                                 Corey Hospital  
  
                                                                 Work Phone: 1(  
34)623-3494  
  
  
  
  
                                        CBC  Ordered By: Odell Bryan on 0  
2021  
  
  
  
  
             Hematocrit (Bld) [Volume 45.2 %                    40.0 - 52.0 % KRAMER  
MMA  
  
             fraction]                                           Work Phone: 1(  
34)319-4481  
   
             Hemoglobin.gastrointesti 15.0 g/dL                 13.0 - 18.0  SUM  
MA  
  
             nal spec 1 Ql (Stl)                           g/dL         Work Vandana  
ne: 7(901)181-2849  
   
             Interpretation and Abnormal                               Corey Hospital  
  
             review of laboratory                                        Work Ph  
one: 5(872)639-2137  
  
             results                                               
   
             MCH (RBC) [Entitic mass] 28.1 pg                   26.0 - 34.0 pg S  
UMMA  
  
                                                                 Work Phone: 1(  
34)012-2427  
   
             MCHC (RBC) [Mass/Vol] 33.2 %                    32.0 - 36.0 % SUMMA  
  
                                                                 Work Phone: 1(  
34)182-2210  
   
             MCV (RBC) [Entitic vol] 84.7 fL                   80.0 - 98.0 fL KRAMER  
MMA  
  
                                                                 Work Phone: 1(  
34)301-1095  
   
             Platelet distribution 16.1 %       High         11.5 - 14.5 % SUMMA  
  
             width (Bld) [Ratio]                                        Work Vandana  
ne: 7(411)596-3878  
   
             Platelet mean volume 8.1 fL                    7.4 - 10.4 fL SUMMA  
  
             (Bld) [Entitic vol]                                        Work Vandana  
ne: 5(789)298-9113  
   
             Platelets (Bld) [#/Vol] 246 10*3/uL               140 - 440    SUMM  
A  
  
                                                    10*3/uL      Work Phone: 1(  
34)323-2349  
   
             RBC (Bld) [#/Vol] 5.34 10*6/uL              4.40 - 5.90  SUMMA  
  
                                                    10*6/uL      Work Phone: 1)535-4781  
   
             WBC (Bld) [#/Vol] 10.1 10*3/uL              3.6 - 10.7   SUMMA  
  
                                                    10*3/uL      Work Phone: 1)670-5376  
   
                          Test Performed by                           AstroloMe                           Work Phone: 1)367-1810  
  
                          System, 31 Smith Street West Palm Beach, FL 33417 98006                                 
   
                                                                 Intercytex Group  
  
                                                                 Work Phone: 1)423-3441  
  
  
  
  
                                        CNOV  on 2021  
  
  
  
  
             CNOV         Office Visit (EXPKEN) Normal                    Clevel  
and  
  
                                                      
--------------------------------------------------------------------------------
                                                            Shriners Children's Twin Cities  
  
                          WILLOW BRADSHAW (88895775) 1957 Cleveland Clinic Marymount Hospital  
  
                          Date Time Provider Department                           
    
  
                          21 5:00 PM FLOYD MUNSON EXPKEN                   
            
  
                          During your visit today, we recorded the following inf  
ormation about you:                             
  
                          Temperature Pulse Respiration Blood pressure            
                   
  
                          99 degrees 80/minute 20/minute 110/60                   
            
  
                          Weight Height                             
  
                          97 kg 1.702 m                             
  
                          MIGUEL A RootCNP 2021 6:32 PM Signed        
                       
  
                                                    Patient presents to HCA Midwest Division for evaluation of shortness of breath, wheezing  
                                                              
  
                          and URI symptoms. He has a history of COPD and usually  
 has a SPO2 of 93%. He                             
  
                                                    is becoming increasingly nazia  
rt of breath with being exposed to his wife's cold.  
                                                              
  
                          He has required increasing use of his albuterol nebuli  
zer because of this                             
  
                          which does not seem to alleviate his symptoms. He is n  
ow starting to have                             
  
                          subjective fever and rigors. He does not have known ex  
posure to COVID-19 and                             
  
                          he is not vaccinated. His symptoms are moderate in sev  
erity and persistent                             
  
                          nature.                                  
  
                          The history is provided by the patient. No language in  
terpreter was used.                             
  
                          Review of Systems                             
  
                          Constitutional: Positive for chills, fever and malaise  
/fatigue. Negative for                             
  
                          diaphoresis.                             
  
                          HENT: Positive for congestion and sore throat. Negativ  
e for ear pain.                             
  
                          Respiratory: Positive for cough, sputum production, sh  
ortness of breath and                             
  
                          wheezing. Negative for hemoptysis.                      
         
  
                          Cardiovascular: Positive for leg swelling (left - vasc  
ular procedure 4 weeks                             
  
                          ago). Negative for chest pain and palpitations.         
                      
  
                          Musculoskeletal: Positive for myalgias.                 
              
  
                          Neurological: Negative for dizziness and loss of consc  
iousness.                             
  
                          Physical Exam                             
  
                          Vitals reviewed.                             
  
                          Constitutional:                             
  
                          Appearance: He is well-developed. He is not toxic-appe  
aring.                             
  
                          HENT:                                    
  
                          Head: Normocephalic and atraumatic.                     
          
  
                          Right Ear: Hearing normal.                             
  
                          Left Ear: Hearing normal.                             
  
                          Nose: Rhinorrhea present.                             
  
                          Mouth/Throat:                             
  
                          Mouth: Mucous membranes are moist.                      
         
  
                          Cardiovascular:                             
  
                          Rate and Rhythm: Normal rate and regular rhythm.        
                       
  
                          Pulses:                                  
  
                          Radial pulses are 2+ on the left side.                  
             
  
                          Pulmonary:                               
  
                          Effort: Pulmonary effort is normal. Prolonged expirati  
on present.                             
  
                          Breath sounds: Decreased breath sounds and wheezing pr  
esent.                             
  
                          Comments: Talking in full sentences.                    
           
  
                          Musculoskeletal:                             
  
                          Cervical back: Full passive range of motion without pa  
in.                             
  
                          Right lower leg: No edema.                             
  
                          Left lower leg: Edema present.                          
     
  
                          Skin:                                    
  
                          General: Skin is warm and dry.                          
     
  
                          Capillary Refill: Capillary refill takes less than 2 s  
econds.                             
  
                          Coloration: Skin is not cyanotic or pale.               
                
  
                          Nails: There is clubbing.                             
  
                          Neurological:                             
  
                          Mental Status: He is alert and oriented to person, benjamin  
ce, and time.                             
  
                          Sensory: Sensation is intact.                           
    
  
                          Motor: Motor function is intact.                        
       
  
                          Gait: Gait is intact.                             
  
                          Psychiatric:                             
  
                          Mood and Affect: Mood and affect normal.                
               
  
                                        /60   Pulse 80   Temp (Src) 99 (Ty  
mpanic)   Resp 20   Ht 5' 7  (1.70m)     
                                                      
  
                          Wt 213 lb 12.8 oz (97.0kg)   SpO2 88%   BMI 33.48 kg/(  
m2).                             
  
                          -I have reviewed and updated with the patient: allergi  
es, VS, current                             
  
                                medications, past medical history, past surgical  
 history, past family medical                   
                                          
  
                          history, AND past social history.                       
        
  
                          MDM: Given the patient's complaint of shortness of mikel  
ath with pulse of of                             
  
                                88-90% and recent surgical procedure in the 4 we  
eks, they are appropriate for                   
                                          
  
                          further evaluation and treatment in the emergency depa  
rtment setting. The                             
  
                                patient elects to drive to City of Hope National Medical Center ER b  
y PO as both he and his wife                   
                                          
  
                          decline the need for Ambulance transport. Attempted to  
 call report to the ER                             
  
                          x2 and unsuccessful as I was placed on hold twice. Add  
itionally I tried to                             
  
                                        call the direct line to the charge nurse  
 with no answer. The patient was given   
                                                      
  
                          a Patient Emergency Letter, Google map to the ER and r  
ed tag to alert the ER                             
  
                          staff of their arrival.                             
  
                          ASSESSMENT/PLAN:                             
  
                          1. SOB (shortness of breath) - ICD9: 786.05, ICD10: R0  
6.02                             
  
                          - Go to the ER for further evaluation and treatment.    
                           
  
                          CARSON Root.CNP                             
  
                          This note was partially generated using Dragon voice r  
ecognition system, any                             
  
                          errors noted are unintentional and are due to this pedro  
hnology.                             
  
                          Referring Provider: SELF [200]                          
     
  
                          Allergies As of Date: 2021 Noted Allergy Reactio  
n                             
  
                          MENTHOL 2019 2 - Rash                             
  
                          Comments: Topical like vicks vapor                      
         
  
                          MINT CHOCOLATE CHIP FLAVOR 2017 14 - Other: See   
Comments                             
  
                          Comments: Mouth burning with mint flavor                
               
  
                          Just MINT no chocolate chip                             
  
                          Date Reviewed: 2021                             
  
                          Reviewed by: Riri Daniels Ma - Fully Assessed          
                     
  
                          Reason for Visit:                             
  
                          Cough [28] Cmt: cough, runny nose, achy                 
              
  
                          Primary Visit Diagnosis:SOB (shortness of breath) [R06  
.02]                             
  
                          Prescriptions as of 2021                          
     
  
                          - JARDIANCE 10 mg tablet                             
  
                          - PRALUENT PEN 75 mg/mL pen                             
  
                          INJECT 1 ML INTO THE SKIN EVERY 14 DAYS                 
              
  
                          - VASCEPA capsule                             
  
                          TAKE 2 CAPSULES BY MOUTH 2 TIMES DAILY                  
             
  
                          - alirocumab (PRALUENT PEN) 75 mg/mL pen                
               
  
                          Inject 75 mg subcutaneously.                            
   
  
                          - ipratropium-albuterol (DUONEB) 0.5 mg-3 mg(2.5 mg ba  
se)/3 mL nebu                             
  
                          Inhale 3 mL as instructed every 4 hours as needed.      
                         
  
                          - ubidecarenone Q-10 (JOE (more content not included).  
..                             
  
  
  
  
                                        VL Arterial Duplex US Lower Ext Bilatera  
l  on 2021  
  
  
  
  
             VL           Patient Name: WILLOW BRADSHAW III Normal            
          Summa  
  
             Arterial     MRN: I536633                           Health  
  
             Duplex US    Acct#: 431329820893                           System  
  
             Lower Ext    Ultrasound                               
  
             Bilateral    ACCESSION EXAM DATE/TIME PROCEDURE ORDERING PROVIDER    
                           
  
                          -859440 2021 16:46 EDT VL Arterial Duplex US  
 FELIPA JONES                                    
  
                          CPT code                                 
  
                          59609                                    
  
                          Reason For Exam                             
  
                          (VL Arterial Duplex US Lower) Peripheral vascular dise  
ase, unspecified                             
  
                          Report                                   
  
                          Wyandot Memorial Hospital HEART AND VASCULAR INSTITUTE               
                
  
                          ------------------------------------------------------  
-----------------                             
  
                          Bilateral Lower Extremity Native Arterial Duplex Repor  
t                             
  
                          Patient Bradshaw, : 1957 Study 2021      
                         
  
                          Name: Willow JOHNSON (64yrs) Date:                           
    
  
                          Patient P793621 Age: 64 Account: 009665598200           
                    
  
                          ID:                                      
  
                          Gender: M Loc: Accession: 52006599475                   
            
  
                          BP:                                      
  
                          Ordering Physician: Felipa Jones                        
       
  
                          Sonographer: Thu Perales RDMS T                    
           
  
                          Interpreting Physician: Ed Breen M.D.               
                
  
                          ------------------------------------------------------  
-----------------                             
  
                          Location: Vegas Valley Rehabilitation Hospital                      
         
  
                          ------------------------------------------------------  
-----------------                             
  
                          Indications: PVD.                             
  
                          ------------------------------------------------------  
-----------------                             
  
                          Conclusions                              
  
                          1. Right femoral/SFA/popliteal arteries patent with no  
rmal velocities                             
  
                          2. Left femoral/SFA/popliteal arteries patent with nor  
mal velocities                             
  
                          ------------------------------------------------------  
-----------------                             
  
                          History: Risk factors:                             
  
                          Full PVR with exercise rivera separately                   
            
  
                          LT CFA endart                             
  
                          Bilateral iliac stenting.                             
  
                          ------------------------------------------------------  
-----------------                             
  
                          Study data: Bilateral lower extremity arterial duplex.  
 Ankle-brachial                             
  
                          index, duplex scan, grayscale 2D imaging, color Dopple  
r imaging, and                             
  
                          spectral Doppler analysis. Location: Vascular PeaceHealth St. Joseph Medical Center Procedure:                             
  
                          A vascular evaluation was performed with the patient i  
n the supine                             
  
                          position. Images were obtained using a InfluxDB E9 vas  
cular ultrasound                             
  
                          Ultrasound                               
  
                          Report                                   
  
                          machine.                                 
  
                          ------------------------------------------------------  
-----------------                             
  
                          Arterial flow:                             
  
                          +-------------------+-------------+-----------+-------  
----+                             
  
                          +Location +Diameter AP**+PSV(cm/sec)+EDV(cm/sec)+       
                        
  
                          +-------------------+-------------+-----------+-------  
----+                             
  
                          +R CFA +0.76 cm +110 +20 +                             
  
                          +-------------------+-------------+-----------+-------  
----+                             
  
                          +R DFA , prox +0.61 cm +47 +0 +                         
      
  
                          +-------------------+-------------+-----------+-------  
----+                             
  
                          +R SFA , prox +0.68 cm +107 +21 +                       
        
  
                          +-------------------+-------------+-----------+-------  
----+                             
  
                          +R SFA , mid +0.50 cm +112 +25 +                        
       
  
                          +-------------------+-------------+-----------+-------  
----+                             
  
                          +R SFA , distal +0.60 cm +75 +12 +                      
         
  
                          +-------------------+-------------+-----------+-------  
----+                             
  
                          +R popliteal , mid +0.58 cm +73 +21 +                   
            
  
                          +-------------------+-------------+-----------+-------  
----+                             
  
                          +R PTA , distal +-------------+44 +15 +                 
              
  
                          +-------------------+-------------+-----------+-------  
----+                             
  
                          +R peroneal , distal+-------------+45 +10 +             
                  
  
                          +-------------------+-------------+-----------+-------  
----+                             
  
                          +R ROBBIE , distal +-------------+22 +0 +                  
             
  
                          +-------------------+-------------+-----------+-------  
----+                             
  
                          +L CFA , distal +0.98 cm +63 +21 +                      
         
  
                          +-------------------+-------------+-----------+-------  
----+                             
  
                          +L DFA , prox +0.46 cm +33 +5 +                         
      
  
                          +-------------------+-------------+-----------+-------  
----+                             
  
                          +L SFA , prox +0.69 cm +81 +33 +                        
       
  
                          +-------------------+-------------+-----------+-------  
----+                             
  
                          +L SFA , mid +0.53 cm +89 +20 +                         
      
  
                          +-------------------+-------------+-----------+-------  
----+                             
  
                          +L SFA , distal +0.47 cm +75 +36 +                      
         
  
                          +-------------------+-------------+-----------+-------  
----+                             
  
                          +L popliteal , mid +0.49 cm +62 +21 +                   
            
  
                          +-------------------+-------------+-----------+-------  
----+                             
  
                          +L PTA , distal +-------------+38 +18 +                 
              
  
                          +-------------------+-------------+-----------+-------  
----+                             
  
                          +L peroneal , distal+-------------+20 +7 +              
                 
  
                          +-------------------+-------------+-----------+-------  
----+                             
  
                          +L ROBBIE , distal +-------------+29 +20 +                 
              
  
                          +-------------------+-------------+-----------+-------  
----+                             
  
                          Prepared and electronically signed by                   
            
  
                          Ed Breen M.D.                             
  
                          2021 08:35                             
  
                          ***** Final *****                             
  
                                                                   
  
                          Dictated: 2021 8:35 am                            
   
  
                          Dictating Physician: ED BREEN                       
        
  
                          Signed Date and Time: 2021 8:35 am                
               
  
                          Signed by: ED BREEN Cardiovascular                  
             
  
                          ACCESSION EXAM DATE/TIME PROCEDURE                      
         
  
                          -102697 2021 16:46 EDT VL Arterial Duplex US  
 Lower                             
  
                          CPT code                                 
  
                          37034                                    
  
                          Reason For Exam                             
  
                          (VL Arterial Duplex US Lower) Peripheral vascular dise  
ase, unspecified                             
  
                          Report                                   
  
                          Wyandot Memorial Hospital HEART AND VASCULAR INSTITUTE               
                
  
                          ------------------------------------------------------  
-----------------                             
  
                          Bilateral Lower Extremity Native Arterial Duplex Repor  
t                             
  
                          Patient Augustine, : 1957 Study 2021      
                         
  
                          Name: Willow JOHNSON (64yrs) Date:                           
    
  
                          Patient I729480 Age: 64 Account: 125204123755           
                    
  
                          ID:                                      
  
                          Gender: M Loc: Accession: 52306826188                   
            
  
                          BP:                                      
  
                          Ordering Physician: Felipa Jones                        
       
  
                          Sonographer: Thu Perales RDMS, RVT                    
           
  
                          Interpreting Physician: Ed Breen M.D.               
                
  
                          ------------------------------------------------------  
-----------------                             
  
                          Loca (more content not included)...                     
          
  
  
  
  
                                        VL PVR Arterial Doppler Lower w/ Exercis  
  on 2021  
  
  
  
  
             VL PVR       Patient Name: WILLOW BRADSHAW III Normal            
          Kettering Health Greene Memorial  
  
             Arterial     MRN: U670420                           Health  
  
             Doppler      Acct#: 665101058016                           System  
  
             Lower w/     Ultrasound                               
  
             Exercis      ACCESSION EXAM DATE/TIME PROCEDURE ORDERING PROVIDER    
                           
  
                          -108437 2021 16:45 EDT VL PVR Arterial Doppl  
FELIPA Smith                             
  
                          Lwr w/ Exercise                             
  
                          CPT code                                 
  
                          70452                                    
  
                          Reason For Exam                             
  
                                                    (VL PVR Arterial Doppler Lwr  
 w/ Exercise) Peripheral vascular disease,   
unspecified                                                   
  
                          Report                                   
  
                          Wyandot Memorial Hospital HEART AND VASCULAR INSTITUTE               
                
  
                          ------------------------------------------------------  
-----------------                             
  
                          Multilevel Lower Extremity Arterial Evaluation with Ex  
ercise                             
  
                          Patient Augustine, : 1957 Study 2021      
                         
  
                          Name: Willow JOHNSON (64yrs) Date:                           
    
  
                          Patient N587780 Age: 64 Account: 853784500239           
                    
  
                          ID:                                      
  
                          Gender: M Loc: Accession: 23633551425                   
            
  
                          BP:                                      
  
                          Ordering Physician: Felipa Jones                        
       
  
                          Sonographer: Thu Perales RDMS, RVT                    
           
  
                          Interpreting Physician: La Fontaine, Ed M.D.               
                
  
                          ------------------------------------------------------  
-----------------                             
  
                          Location: Vegas Valley Rehabilitation Hospital                      
         
  
                          ------------------------------------------------------  
-----------------                             
  
                          Indications: PVD.                             
  
                          ------------------------------------------------------  
-----------------                             
  
                          Conclusions                              
  
                          1. Right NENA 0.74, moderate arterial insufficiency. PV  
R/digit waveforms                             
  
                          and segmental pressures reveal proximal SFA, distal SF  
A/popliteal                             
  
                          disease                                  
  
                          2. The right lower extremity shows an abnormal respons  
e to exercise,                             
  
                          with post exercise NENA in the severe category           
                    
  
                          3. Left NENA 0.86, mild arterial insufficiency. PVR/dig  
it waveforms and                             
  
                          segmental pressures reveal aorto-iliac disease. Some s  
egments                             
  
                          non-compressible                             
  
                          4. The left lower extremity shows an abnormal response  
 to exercise,                             
  
                          with post exercise NENA in the severe category           
                    
  
                          ------------------------------------------------------  
-----------------                             
  
                          History: Risk factors: Former tobacco use.              
                 
  
                          ------------------------------------------------------  
-----------------                             
  
                          Study data: Lower extremity multilevel physiologic michelle  
luation with                             
  
                          Ultrasound                               
  
                          Report                                   
  
                          exercise. Pressure measurement and pulse volume record  
ing. Location:                             
  
                          Vascular laboratory. Objective: Interval follow-up. Pr  
ocedure: A                             
  
                          vascular evaluation was performed with the patient in   
the supine                             
  
                          position. Images were obtained using a ulike vascul  
ar ultrasound                             
  
                          machine. An exercise arterial exam was performed with   
exercise on a                             
  
                          treadmill. Patient walked for 3 minutes at 1.5 mph on   
a 10% grade                             
  
                          before stopping due to pain in left leg Pre and post e  
xercise                             
  
                          measurements were recorded.                             
  
                          ------------------------------------------------------  
-----------------                             
  
                          Arterial pressure indices:                             
  
                          +------------+---------------+-----------+------------  
---+------------+                             
  
                          +Location +Pressure +Index +Pressure (POST +Index (POS  
T +                             
  
                          + +(REST)* +(REST) +STRESS) +STRESS) +                  
             
  
                          +------------+---------------+-----------+------------  
---+------------+                             
  
                          +R brachial +102 +-----------+121 +------------+        
                       
  
                          +------------+---------------+-----------+------------  
---+------------+                             
  
                          +R high thigh+120 +1.15 +---------------+------------+  
                             
  
                          +------------+---------------+-----------+------------  
---+------------+                             
  
                          +R low thigh +94 +0.90 +---------------+------------+   
                            
  
                          +------------+---------------+-----------+------------  
---+------------+                             
  
                          +R calf +62 +0.60 +---------------+------------+        
                       
  
                          +------------+---------------+-----------+------------  
---+------------+                             
  
                          +R DP +71 +0.68 +---------------+------------+          
                     
  
                          +------------+---------------+-----------+------------  
---+------------+                             
  
                          +R PT +77 +0.74 +55 +0.45 +                             
  
                          +------------+---------------+-----------+------------  
---+------------+                             
  
                          +R great toe +63 +0.61 +---------------+------------+   
                            
  
                          +------------+---------------+-----------+------------  
---+------------+                             
  
                          +L brachial +104 +-----------+---------------+--------  
----+                             
  
                          +------------+---------------+-----------+------------  
---+------------+                             
  
                          +L high thigh+255 +2.45 +---------------+------------+  
                             
  
                          +------------+---------------+-----------+------------  
---+------------+                             
  
                          +L low thigh +255 +2.45 +---------------+------------+  
                             
  
                          +------------+---------------+-----------+------------  
---+------------+                             
  
                          +L calf +58 +0.56 +---------------+------------+        
                       
  
                          +------------+---------------+-----------+------------  
---+------------+                             
  
                          +L DP +59 +0.57 +---------------+------------+          
                     
  
                          +------------+---------------+-----------+------------  
---+------------+                             
  
                          +L PT +89 +0.86 +57 +0.47 +                             
  
                          +------------+---------------+-----------+------------  
---+------------+                             
  
                          +L great toe +49 +0.47 +---------------+------------+   
                            
  
                          +------------+---------------+-----------+------------  
---+------------+                             
  
                          Prepared and electronically signed by                   
            
  
                          Ed Breen M.D.                             
  
                          2021 08:32                             
  
                          ***** Final *****                             
  
                                                                   
  
                          Dictated: 2021 8:33 am                            
   
  
                          Dictating Physician: ED BREEN                       
        
  
                          Signed Date and Time: 2021 8:32 am                
               
  
                          Signed by: ED BREEN Cardiovascular                  
             
  
                          ACCESSION EXAM DATE/TIME PROCEDURE                      
         
  
                          -034699 2021 16:45 EDT VL PVR Arterial Doppl  
er Lwr w/                             
  
                          Exercise                                 
  
                          CPT code                                 
  
                          46415                                    
  
                          Reason For Exam                             
  
                          (VL PVR Arterial D (more content not included)...       
                        
  
  
  
  
                                        APTT  Ordered By: Edwin Agosto on   
  
  
  
  
             aPTT Coag (Bld) [Time] 31.4 s       High         20.0 - 30.5 s SUMM  
A  
  
                                                                 Work Phone: 12  
35)821-1196  
  
  
  
  
                          Comment on above:         NOTE: The therapeutic time f  
or Heparin anticoagulation,  
  
                                                    based on Xa activity inhibit  
ion, is an APTT of 46-80  
  
                                                    seconds.  
  
  
  
  
             Interpretation and review of Abnormal                                
 Corey Hospital  
  
             laboratory results                                        Work Phon  
e: 1(572) 129-4255  
   
                          Test Performed by Summa                           SUMM  
A  
  
                          M2 Connections System, 525 E.                           Work P  
angie: 4(061)094-2542  
  
                          Pedro Bay, OH                             
  
                          65835                                    
   
                                                                 Corey Hospital  
  
                                                                 Work Phone: 12  
06)112-2838  
  
  
  
  
                                        Basic Metabolic Panel  Ordered By: Maximus Bryan on 2021  
  
  
  
  
             Anion gap [Moles/Vol] 8 mmol/L                  3 - 13 mmol/L Corey Hospital  
  
                                                                 Work Phone: 1(2  
46)643-4167  
   
             Calcium [Mass/Vol] 8.3 mg/dL    Low          8.4 - 10.4 mg/dL Corey Hospital  
  
                                                                 Work Phone: 12  
60)702-8372  
   
             Chloride [Moles/Vol] 107 mmol/L                98 - 107 mmol/L SUMM  
A  
  
                                                                 Work Phone: 1(  
34)991-3794  
   
             CO2 [Moles/Vol] 19 mmol/L    Low          22 - 30 mmol/L Corey Hospital  
  
                                                                 Work Phone: 12  
53)142-9676  
   
             Creatinine [Mass/Vol] 1.3 mg/dL    High         0.52 - 1.25 mg/dL S  
UMMA  
  
                                                                 Work Phone: 12  
66)169-9627  
   
             EGFR IF NonAfrican 57.5 mL/min  Abnormal     >60          Corey Hospital  
  
             American                                            Work Phone: 12  
05)321-6633  
  
  
  
  
                          Comment on above:         KDIGO guidelines provide the  
 following GFR categories:  
  
                                                    Stage GFR(ml/min/1.73 m2) Te  
iggy  
  
                                                    G1 >=90 Normal or high  
  
                                                    G2 60-89 Mildly decreased*  
  
                                                    G3a 45-59 Mildly to moderate  
ly decreased  
  
                                                    G3b 30-44 Moderately to koby  
rely decreased  
  
                                                    G4 15-29 Severely decreased  
  
                                                    G5  <15 Kidney failure  
  
                                                      
  
                                                    *Relative to young adult lev  
el.  
  
                                                    In the absence of evidence o  
f kidney damage, neither GFR  
  
                                                    category G1 nor G2 fulfill t  
he criteria for CKD.  
  
                                                      
  
                                                    The CKD-EPI equation is margarita  
dated in individuals 18 years  
  
                                                    of age and older. Currently   
the best equation for  
  
                                                    estimating glomerular filtra  
tion rate (GFR) from serum  
  
                                                    creatinine in children is th  
e Bedside Keller equation.  
  
                                                    It is less accurate in patie  
nts with extremes of muscle  
  
                                                    mass, restriction of dietary  
 protein, ingestion of creatine,  
  
                                                    extra-renal metabolism of cr  
eatinine, or treatment with  
  
                                                    medications that affect main  
l tubular creatinine secretion.  
  
  
  
  
             GFR/1.73 sq M.predicted 66.6 mL/min/{1.73_m2}              >60       
     SUMMA  
  
             among blacks MDRD                                        Work Phone  
: 7(280)097-1931  
  
             (S/P/Bld) [Vol                                          
  
             rate/Area]                                            
   
             Glucose [Mass/Vol] 118 mg/dL    High         70 - 100     SUMMA  
  
                                                    mg/dL        Work Phone: 1()383-1561  
   
             Interpretation and Abnormal                               SUMMA  
  
             review of laboratory                                        Work Ph  
one: 3(492)808-7183  
  
             results                                               
   
             Potassium [Moles/Vol] 4.2 mmol/L                3.5 - 5.1    SUMMA  
  
                                                    mmol/L       Work Phone: 1()568-2976  
   
             Sodium [Moles/Vol] 134 mmol/L   Low          135 - 145    SUMMA  
  
                                                    mmol/L       Work Phone: 1()467-1090  
   
             Urea nitrogen (BldV) 16 mg/dL                  7 - 20       SUMMA  
  
             [Mass/Vol]                             mg/dL        Work Phone: 1()475-2403  
   
                          Test Performed by Cieo Creative Inc. System, 525 E.                           Work P  
angie: 9(133)993-2689  
  
                          Pedro Bay, OH                             
  
                          41326                                    
   
                                                                 SUMMA  
  
                                                                 Work Phone: 1()575-8863  
  
  
  
  
                                        CBC auto differential  Ordered By: Lamonte Agosto on 2021  
  
  
  
  
             Absolute Baso # 0.1 10*3/uL               0.0 - 0.2    SUMMA  
  
                                                    10*3/uL      Work Phone: 1()079-2756  
   
             Absolute Neut # 7.7 10*3/uL  High         1.8 - 7.0    SUMMA  
  
                                                    10*3/uL      Work Phone: 1()080-1739  
   
             Basophils/100 WBC (Bld) 0.8 %                     0.0 - 2.0 %  SUMM  
A  
  
                                                                 Work Phone: 1()409-3051  
   
             Eosinophils (Bld) 0.3 10*3/uL               0.0 - 0.5    SUMMA  
  
             [#/Vol]                                10*3/uL      Work Phone: 1()251-6606  
   
             Eosinophils/100 WBC 2.5 %                     1.0 - 6.0 %  SUMMA  
  
             (Bld)                                               Work Phone: 1(  
34)3125222  
   
             Granulocytes/100 WBC 70.6 %                    40.0 - 80.0 % SUMMA  
  
             (Bld)                                               Work Phone: 1(  
34)290-52  
   
             Hematocrit (Bld) 40.8 %                    40.0 - 52.0 % SUMMA  
  
             [Volume fraction]                                        Work Phone  
: 4(715)844-5975  
   
             Hemoglobin.gastrointest 13.9 g/dL                 13.0 - 18.0 g/dL   
SUMMA  
  
             inal spec 1 Ql (Stl)                                        Work Ph  
one: 4(771)709-6158  
   
             Interpretation and Abnormal                               SUMMA  
  
             review of laboratory                                        Work Ph  
one: 2(185)585-5505  
  
             results                                               
   
             Lymphocytes (Bld) 2.0 10*3/uL               1.0 - 4.3    SUMMA  
  
             [#/Vol]                                10*3/uL      Work Phone: 1(  
34)3125222  
   
             Lymphocytes/100 WBC 17.9 %       Low          20.0 - 40.0 % SUMMA  
  
             (Bld)                                               Work Phone: 1(  
34)3125217  
   
             MCH (RBC) [Entitic 28.2 pg                   26.0 - 34.0 pg SUMMA  
  
             mass]                                               Work Phone: 1(  
34)312-5269  
   
             MCHC (RBC) [Mass/Vol] 34.1 %                    32.0 - 36.0 % SUMMA  
  
                                                                 Work Phone: 1(  
34)3125216  
   
             MCV (RBC) [Entitic vol] 82.8 fL                   80.0 - 98.0 fL KRAMER  
MMA  
  
                                                                 Work Phone: 1(  
34)3125298  
   
             Monocytes (Bld) [#/Vol] 0.9 10*3/uL  High         0.0 - 0.8    SUMM  
A  
  
                                                    10*3/uL      Work Phone: 1(  
34)312-5222  
   
             Monocytes/100 WBC (Bld) 8.2 %                     2.0 - 10.0 % SUMM  
A  
  
                                                                 Work Phone: 1(  
34)3125252  
   
             Platelet distribution 15.9 %       High         11.5 - 14.5 % SUMMA  
  
             width (Bld) [Ratio]                                        Work Vandana  
ne: 1(070)498-7915  
   
             Platelet mean volume 7.1 fL       Low          7.4 - 10.4 fL SUMMA  
  
             (Bld) [Entitic vol]                                        Work Vandana  
ne: 8(390)514-3033  
   
             Platelets (Bld) [#/Vol] 227 10*3/uL               140 - 440    SUMM  
A  
  
                                                    10*3/uL      Work Phone: 1()  
   
             RBC (Bld) [#/Vol] 4.93 10*6/uL              4.40 - 5.90  SUMMA  
  
                                                    10*6/uL      Work Phone: 1(  
34)  
   
             WBC (Bld) [#/Vol] 11.0 10*3/uL High         3.6 - 10.7   Corey Hospital  
  
                                                    10*3/uL      Work Phone: 1(  
34)  
   
                          Test Performed by                           AstroloMe                           Work Phone: 1()  
  
                          System, Lawrence Memorial Hospital E.                             
  
                          Pedro Bay, OH 55438                                 
   
                                                                 Corey Hospital  
  
                                                                 Work Phone: 1()  
  
  
  
  
                                        POCT Glucose  Ordered By: Ed Breen on  
 2021  
  
  
  
  
             Glucose [Mass/Vol] 120 mg/dL    High         70 - 100 mg/dL Corey Hospital  
  
                                                                 Work Phone: 1()77231  
  
  
  
  
                          Comment on above:         Test performed by glucose me  
ter. Results may be 10%-15% lower  
  
                                                    than serum/plasma values. (C  
NIALL ID 87E5633481)  
  
  
  
  
             Interpretation and review of Abnormal                                
 Corey Hospital  
  
             laboratory results                                        Work Phon  
e: 9(083)668-1133  
   
                          Test Performed by Aquavit Pharmaceuticals, 525 E.                           Work P  
angie: 6(802)593-6630  
  
                          Glo Bags Michigantown, OH                             
  
                          15709                                    
   
                                                                 SUMMHosted America  
  
                                                                 Work Phone: 1()45  
  
  
  
  
                                        APTT  Ordered By: Edwin Agosto on   
  
  
  
  
             aPTT Coag (Bld) [Time] 56.1 s       High         20.0 - 30.5 s SUMM  
Hosted America  
  
                                                                 Work Phone: 1()06885  
  
  
  
  
                          Comment on above:         NOTE: The therapeutic time f  
or Heparin anticoagulation,  
  
                                                    based on Xa activity inhibit  
ion, is an APTT of 46-80  
  
                                                    seconds.  
  
  
  
  
             Interpretation and review of Abnormal                                
 Corey Hospital  
  
             laboratory results                                        Work Phon  
e: 7(962)871-7023  
   
                          Test Performed by Aquavit Pharmaceuticals, 525 E.                           Work P  
angie: 2(103)749-5249  
  
                          Glo Bags Michigantown, OH                             
  
                          62488                                    
   
                                                                 Corey Hospital  
  
                                                                 Work Phone: 1()572-13  
  
  
  
  
                                        Basic Metabolic Panel w/ Reflex to MG  O  
rdered By: Edwin Agosto on 2021  
  
  
  
  
             Anion gap [Moles/Vol] 7 mmol/L                  3 - 13 mmol/L SUMMA  
  
                                                                 Work Phone: 1  
34)878-2809  
   
             Calcium [Mass/Vol] 8.8 mg/dL                 8.4 - 10.4 mg/dL SUMMA  
  
                                                                 Work Phone: 1(  
34)715-2530  
   
             Chloride [Moles/Vol] 107 mmol/L                98 - 107 mmol/L SUMM  
A  
  
                                                                 Work Phone: 1(  
34)973-0179  
   
             CO2 [Moles/Vol] 19 mmol/L    Low          22 - 30 mmol/L SUMMA  
  
                                                                 Work Phone: 1(  
34)368-0952  
   
             Creatinine [Mass/Vol] 1.23 mg/dL                0.52 - 1.25 mg/dL S  
UMMA  
  
                                                                 Work Phone: 1  
90)929-0032  
   
             EGFR IF NonAfrican 61.5 mL/min               >60          SUMMA  
  
             American                                            Work Phone: 1  
05)146-7476  
  
  
  
  
                          Comment on above:         KDIGO guidelines provide the  
 following GFR categories:  
  
                                                    Stage GFR(ml/min/1.73 m2) Te  
iggy  
  
                                                    G1 >=90 Normal or high  
  
                                                    G2 60-89 Mildly decreased*  
  
                                                    G3a 45-59 Mildly to moderate  
ly decreased  
  
                                                    G3b 30-44 Moderately to kboy  
rely decreased  
  
                                                    G4  15-29 Severely decreased  
  
                                                    G5 <15 Kidney failure  
  
                                                      
  
                                                    *Relative to young adult lev  
el.  
  
                                                    In the absence of evidence o  
f kidney damage, neither GFR  
  
                                                    category G1 nor G2 fulfill t  
he criteria for CKD.  
  
                                                      
  
                                                    The CKD-EPI equation is margarita  
dated in individuals 18 years  
  
                                                    of age and older. Currently   
the best equation for  
  
                                                    estimating glomerular filtra  
tion rate (GFR) from serum  
  
                                                    creatinine in children is th  
e Bedside Keller equation.  
  
                                                    It is less accurate in patie  
nts with extremes of muscle  
  
                                                    mass, restriction of dietary  
 protein, ingestion of creatine,  
  
                                                    extra-renal metabolism of cr  
eatinine, or treatment with  
  
                                                    medications that affect main  
l tubular creatinine secretion.  
  
  
  
  
             GFR/1.73 sq M.predicted 71.3 mL/min/{1.73_m2}              >60       
     SUMMA  
  
             among blacks MDRD                                        Work Phone  
: 1(729) 404-2640  
  
             (S/P/Bld) [Vol                                          
  
             rate/Area]                                            
   
             Glucose [Mass/Vol] 111 mg/dL    High         70 - 100     SUMMA  
  
                                                    mg/dL        Work Phone: 1  
00)924-2288  
   
             Interpretation and Abnormal                               SUMMA  
  
             review of laboratory                                        Work Ph  
one: 0(020)131-9156  
  
             results                                               
   
             Potassium [Moles/Vol] 4.0 mmol/L                3.5 - 5.1    SUMMA  
  
                                                    mmol/L       Work Phone: 1(  
34)294-1880  
   
             Sodium [Moles/Vol] 134 mmol/L   Low          135 - 145    SUMMA  
  
                                                    mmol/L       Work Phone: 1(  
34)658-6154  
   
             Urea nitrogen (BldV) 19 mg/dL                  7 - 20       SUMMA  
  
             [Mass/Vol]                             mg/dL        Work Phone: 1(  
34)900-5625  
   
                          Test Performed by Aquavit Pharmaceuticals, 525 E.                           Work P  
angie: 6(751)057-4627  
  
                          Pedro Bay, OH                             
  
                          09048                                    
   
                                                                 Corey Hospital  
  
                                                                 Work Phone: 1(  
34)073-3900  
  
  
  
  
                                        CARDIAC CATH NURSING LOG  Ordered By:   
 Karl on 2021  
  
  
  
  
                                                                 Corey Hospital  
  
                                                                 Work Phone: 1(  
34)982-5911  
  
  
  
  
                                        CBC auto differential  Ordered By: Lamonte Agosto on 2021  
  
  
  
  
             Absolute Baso # 0.1 10*3/uL               0.0 - 0.2    SUMMA  
  
                                                    10*3/uL      Work Phone: 1(  
34)773-6406  
   
             Absolute Neut # 7.1 10*3/uL  High         1.8 - 7.0    SUMMA  
  
                                                    10*3/uL      Work Phone: 1(  
34)289-3082  
   
             Basophils/100 WBC (Bld) 0.8 %                     0.0 - 2.0 %  SUMM  
A  
  
                                                                 Work Phone: 1(  
34)491-6701  
   
             Eosinophils (Bld) 0.3 10*3/uL               0.0 - 0.5    SUMMA  
  
             [#/Vol]                                10*3/uL      Work Phone: 1(  
34)451-5208  
   
             Eosinophils/100 WBC 2.7 %                     1.0 - 6.0 %  SUMMA  
  
             (Bld)                                               Work Phone: 1(  
34)425-52  
   
             Granulocytes/100 WBC 64.5 %                    40.0 - 80.0 % SUMMA  
  
             (Bld)                                               Work Phone: 1(  
34)285-4421  
   
             Hematocrit (Bld) 45.7 %                    40.0 - 52.0 % SUMMA  
  
             [Volume fraction]                                        Work Phone  
: 7(184)342-3687  
   
             Hemoglobin.gastrointest 15.3 g/dL                 13.0 - 18.0 g/dL   
SUMMA  
  
             inal spec 1 Ql (Stl)                                        Work Ph  
one: 3(818)333-1443  
   
             Interpretation and Abnormal                               SUMMA  
  
             review of laboratory                                        Work Ph  
one: 1(849)962-2939  
  
             results                                               
   
             Lymphocytes (Bld) 2.7 10*3/uL               1.0 - 4.3    SUMMA  
  
             [#/Vol]                                10*3/uL      Work Phone: 1(  
34)312-5222  
   
             Lymphocytes/100 WBC 24.8 %                    20.0 - 40.0 % SUMMA  
  
             (Bld)                                               Work Phone: 1(  
34)312-5222  
   
             MCH (RBC) [Entitic 28.5 pg                   26.0 - 34.0 pg SUMMA  
  
             mass]                                               Work Phone: 1(  
34)312-5222  
   
             MCHC (RBC) [Mass/Vol] 33.4 %                    32.0 - 36.0 % SUMMA  
  
                                                                 Work Phone: 1(  
34)31252  
   
             MCV (RBC) [Entitic vol] 85.4 fL                   80.0 - 98.0 fL KRAMER  
MMA  
  
                                                                 Work Phone: 1(  
34)312-5222  
   
             Monocytes (Bld) [#/Vol] 0.8 10*3/uL               0.0 - 0.8    SUMM  
A  
  
                                                    10*3/uL      Work Phone: 1(  
34)312-5222  
   
             Monocytes/100 WBC (Bld) 7.2 %                     2.0 - 10.0 % SUMM  
A  
  
                                                                 Work Phone: 1()31252  
   
             Platelet distribution 16.0 %       High         11.5 - 14.5 % SUMMA  
  
             width (Bld) [Ratio]                                        Work Vandana  
ne: 3(696)569-2393  
   
             Platelet mean volume 7.2 fL       Low          7.4 - 10.4 fL SUMMA  
  
             (Bld) [Entitic vol]                                        Work Vandana  
ne: 4(989)255-5337  
   
             Platelets (Bld) [#/Vol] 226 10*3/uL               140 - 440    SUMM  
A  
  
                                                    10*3/uL      Work Phone: 1(  
34)312-5222  
   
             RBC (Bld) [#/Vol] 5.36 10*6/uL              4.40 - 5.90  SUMMA  
  
                                                    10*6/uL      Work Phone: 1(  
34)312-5222  
   
             WBC (Bld) [#/Vol] 10.9 10*3/uL High         3.6 - 10.7   SUMMA  
  
                                                    10*3/uL      Work Phone: 1(  
34)31252  
   
                          Test Performed by                           AstroloMe                           Work Phone: 8(6 19)264-0420  
  
                          System, 525 E.                             
  
                          Pedro Bay, OH 57736                                 
   
                                                                 Intercytex Group  
  
                                                                 Work Phone: 4(5 12)842-5384  
  
  
  
  
                                        Catheterization and angiography procedur  
e details panel  Ordered By: Odell Bryan on 2021  
  
  
  
  
                          Corey Hospital CARDIOVASCULAR INSTITUTE                          
   Corey Hospital  
  
                                                                 Work Phone: 5(0 07)865-7023  
  
                          ------------------------------------------------------  
-----------------                             
  
                          INVASIVE PERIPHERAL VASCULAR PROCEDURE                  
             
  
                                                                   
  
                          Patient: Willow Bradshaw                            
   
  
                          Procedure Date: 2021                             
  
                          : 1957                             
  
                          Age: 64                                  
  
                          Gender: M                                
  
                          MRN: 78529347                             
  
                          Patient Type: Outpatient                             
  
                          Account#: 839473686991                             
  
                          Accession#: 90792236320                             
  
                          Procedure physician: Odell Bryan DO, FSVM, FAC C                             
  
                          Fellow:                                  
  
                          Referring Physician: Odell Bryan DO, FSVM, FAC C                             
  
                                                                   
  
                          ------------------------------------------------------  
-----------------                             
  
                          INDICATIONS: LLE Ischemic rest pain. PAD                
               
  
                                                                   
  
                          ------------------------------------------------------  
-----------------                             
  
                          Procedures performed:                             
  
                                                                   
  
                          # Ultcp-rpcw-cgoxcrnfi angiography.                     
          
  
                          # Left common femoral angiography.                      
         
  
                          # Left popliteal angiography.                           
    
  
                          # Left superficial femoral angiography.                 
              
  
                          # Left posterior tibial angiography.                    
           
  
                          # Percutaneous intervention on the 100% occlusion in t  
he left                             
  
                          superficial femoral artery. Balloon angioplasty. Stent  
 placement.                             
  
                          Stent placement.                             
  
                          # Percutaneous intervention on the stenosis in the pro  
ximal left common                             
  
                          iliac artery. Balloon angioplasty.                      
         
  
                          # Percutaneous intervention on the stenosis at the ost  
ium of the right                             
  
                          common iliac artery. Balloon angioplasty.               
                
  
                                                                   
  
                          ------------------------------------------------------  
-----------------                             
  
                          SUMMARY:                                 
  
                                                                   
  
                          1. Aorta: The distal aorta is mildly calcified. Overal  
l, the aorta is                             
  
                          moderately diseased. Ten- fifteen mmHg resting gradien  
t above and                             
  
                          below the lesion is noted.                             
  
                          2. Right common iliac: Ostial lesion: An angioplasty w  
as performed (see                             
  
                          3rd lesion intervention).                             
  
                          3. Left common iliac: Proximal vessel lesion: An angio  
plasty was                             
  
                          performed (see 2nd lesion intervention).                
               
  
                          4. Left superficial femoral: Lesion: The diagnostic st  
udy demonstrated                             
  
                          a diffuse, 220.0 mm (L), 100% occlusion mid vessel lef  
t superficial                             
  
                          femoral artery, at the Proximal P1 segment of the left  
 popliteal                             
  
                          artery. The distal vessel supplies a large vascular te  
rritory. The                             
  
                          lesion is a likely culprit for the patient's symptoms.  
 The lesion                             
  
                          was stented with 2 6 x120 Supera stents, after cutting  
 balloon                             
  
                          angioplasty and rotational excisional atherectomy with  
 MINAYA Rotorex                             
  
                          device, distal embolic protection with Abbott Ross 6 pl  
aved in P2                             
  
                          segment of popliteal artery, and, resulting in an exce  
llent                             
  
                          angiographic appearance (see 1st lesion intervention).  
 Following                             
  
                          intervention, there is a residual 0% stenosis.          
                     
  
                                                                   
  
                          ------------------------------------------------------  
-----------------                             
  
                          HISTORY: Cerebrovascular disease. History of CABG x3 a  
nd over 25                             
  
                          stents. PMH: Myocardial infarction. Peripheral arteria  
l disease. Ch                             
  
                          ronic lung disease. Risk factors: Current tobacco use.  
 Hypertension.                             
  
                          Diabetes mellitus; on therapy with oral hypoglycemics.  
 Dyslipidemia. Fam                             
  
                          james history is significant for coronary artery disease  
.                             
  
                                                                   
  
                          ------------------------------------------------------  
-----------------                             
  
                          LABS, PRIOR TESTS, PROCEDURES, and SURGERY:             
                  
  
                          Catheterization with coronary intervention (10/22/2020  
).                             
  
                                                                   
  
                          Coronary artery bypass grafting (2017).           
                    
  
                                                                   
  
                          ------------------------------------------------------  
-----------------                             
  
                          PROCEDURE IN DETAIL: Study status: Cardiac cath: kallie hanna Consent:                             
  
                          The risks, benefits, and alternatives to the procedure  
 were explained to                             
  
                          the patient and informed consent was obtained. Fluoros  
copy time: Fluo                             
  
                          roscopy time: 78 min. Fluoroscopy dose: Fluoroscopy do  
se: 116.2 cGy.                             
  
                          Location: Catheterization laboratory.                   
            
  
                          PROCEDURE:                               
  
                                                                   
  
                          1. Local anesthesia. 1% lidocaine was administered to   
the right groin.                             
  
                          2. Right femoral artery access. A 5Fr/12cm Engage shea  
th was advanced                             
  
                          into the vessel.                             
  
                          3. Uoxwa-zivh-vcjdkawui angiography, under fluoroscopi  
c guidance.                             
  
                          Catheter(s) used: 5Fr x 65cm Omni Flush. Contrast was   
injected.                             
  
                          4. Left common femoral angiography, under fluoroscopic  
 guidance.                             
  
                          Catheter(s) used: 5Fr x 100cm Angled Taper Glidecath.   
Contrast was                             
  
                          injected.                                
  
                          5. Left popliteal angiography, under fluoroscopic guid  
ance.                             
  
                          Catheter(s) used: 5Fr x 100cm Angled Taper Glidecath.   
Contrast was                             
  
                          injected.                                
  
                          6. Sheath exchange. The right femoral artery sheath wa  
s exchanged for                             
  
                          a 6Fr x 55cm Flexor Check-Zeferino Ajay 1 Mod .035  sheath  
.                             
  
                          7. Left superficial femoral angiography, under fluoros  
copic guidance.                             
  
                          Contrast was injected.                             
  
                          8. Supplemental oxygen. Oxygen, 3 L/min was administer  
ed throughout                             
  
                          the procedure.                             
  
                          9. A stent was placed in the stenosis in the left supe  
rficial femoral                             
  
                          artery. See detailed description below (1st lesion int  
ervention).                             
  
                          10. Left posterior tibial angiography, under fluorosco  
pic guidance.                             
  
                          Catheter(s) used: 5Fr x 100cm Angled Taper Glidecath.   
Contrast was                             
  
                          injected.                                
  
                          11. Angioplasty was performed on the stenosis in the l  
eft common iliac                             
  
                          artery. See detailed description below (2nd lesion int  
ervention).                             
  
                          12. Sebastian (more content not included)...                 
              
   
                                                    Nasim, Kettering Health Greene Memorial Incoming Cardiolo  
gy Results From Merge/Bjany - 2021 1:41 PM  
 EDT Formatting of this note might be different from the original.                
                           Parkview Health CARDIOVASCULAR INSTITUTE                          
   Work Phone: 1(873) 653-8094  
  
                                                                   
  
                          ------------------------------------------------------  
-----------------                             
  
                          INVASIVE PERIPHERAL VASCULAR PROCEDURE                  
             
  
                                                                   
  
                          Patient: Willow Bradshaw                            
   
  
                          Procedure Date: 2021                             
  
                          : 1957                             
  
                          Age: 64                                  
  
                          Gender: M                                
  
                          MRN: 52028960                             
  
                          Patient Type: Outpatient                             
  
                          Account#: 459497065493                             
  
                          Accession#: 89678110024                             
  
                          Procedure physician: Odell Bryan DO, FSVM, FAC C                             
  
                          Fellow:                                  
  
                          Referring Physician: Odell Bryan DO, FSVM, FAC C                             
  
                                                                   
  
                          ------------------------------------------------------  
-----------------                             
  
                          INDICATIONS: LLE Ischemic rest pain. PAD                
               
  
                                                                   
  
                          ------------------------------------------------------  
-----------------                             
  
                          Procedures performed:                             
  
                                                                   
  
                          # Jjqlp-cyrk-ctbrodoor angiography.                     
          
  
                          # Left common femoral angiography.                      
         
  
                          # Left popliteal angiography.                           
    
  
                          # Left superficial femoral angiography.                 
              
  
                          # Left posterior tibial angiography.                    
           
  
                          # Percutaneous intervention on the 100% occlusion in t  
he left                             
  
                          superficial femoral artery. Balloon angioplasty. Stent  
 placement.                             
  
                          Stent placement.                             
  
                          # Percutaneous intervention on the stenosis in the pro  
ximal left common                             
  
                          iliac artery. Balloon angioplasty.                      
         
  
                          # Percutaneous intervention on the stenosis at the ost  
ium of the right                             
  
                          common iliac artery. Balloon angioplasty.               
                
  
                                                                   
  
                          ------------------------------------------------------  
-----------------                             
  
                          SUMMARY:                                 
  
                                                                   
  
                          1. Aorta: The distal aorta is mildly calcified. Overal  
l, the aorta is                             
  
                          moderately diseased. Ten- fifteen mmHg resting gradien  
t above and                             
  
                          below the lesion is noted.                             
  
                          2. Right common iliac: Ostial lesion: An angioplasty w  
as performed (see                             
  
                          3rd lesion intervention).                             
  
                          3. Left common iliac: Proximal vessel lesion: An angio  
plasty was                             
  
                          performed (see 2nd lesion intervention).                
               
  
                          4. Left superficial femoral: Lesion: The diagnostic st  
udy demonstrated                             
  
                          a diffuse, 220.0 mm (L), 100% occlusion mid vessel lef  
t superficial                             
  
                          femoral artery, at the Proximal P1 segment of the left  
 popliteal                             
  
                          artery. The distal vessel supplies a large vascular te  
rritory. The                             
  
                          lesion is a likely culprit for the patient's symptoms.  
 The lesion                             
  
                          was stented with 2 6 x120 Supera stents, after cutting  
 balloon                             
  
                          angioplasty and rotational excisional atherectomy with  
 MINAYA Rotorex                             
  
                          device, distal embolic protection with Abbott Ross 6 pl  
aved in P2                             
  
                          segment of popliteal artery, and, resulting in an exce  
llent                             
  
                          angiographic appearance (see 1st lesion intervention).  
 Following                             
  
                          intervention, there is a residual 0% stenosis.          
                     
  
                                                                   
  
                          ------------------------------------------------------  
-----------------                             
  
                          HISTORY: Cerebrovascular disease. History of CABG x3 a  
nd over 25                             
  
                          stents. PMH: Myocardial infarction. Peripheral arteria  
l disease. Ch                             
  
                          ronic lung disease. Risk factors: Current tobacco use.  
 Hypertension.                             
  
                          Diabetes mellitus; on therapy with oral hypoglycemics.  
 Dyslipidemia. Fam                             
  
                          james history is significant for coronary artery disease  
.                             
  
                                                                   
  
                          ------------------------------------------------------  
-----------------                             
  
                          LABS, PRIOR TESTS, PROCEDURES, and SURGERY:             
                  
  
                          Catheterization with coronary intervention (10/22/2020  
).                             
  
                                                                   
  
                          Coronary artery bypass grafting (2017).           
                    
  
                                                                   
  
                          ------------------------------------------------------  
-----------------                             
  
                          PROCEDURE IN DETAIL: Study status: Cardiac cath: kallie hanna Consent:                             
  
                          The risks, benefits, and alternatives to the procedure  
 were explained to                             
  
                          the patient and informed consent was obtained. Fluoros  
copy time: Fluo                             
  
                          roscopy time: 78 min. Fluoroscopy dose: Fluoroscopy do  
se: 116.2 cGy.                             
  
                          Location: Catheterization laboratory.                   
            
  
                          PROCEDURE:                               
  
                                                                   
  
                          1. Local anesthesia. 1% lidocaine was administered to   
the right groin.                             
  
                          2. Right femoral artery access. A 5Fr/12cm Engage shea  
th was advanced                             
  
                          into the vessel.                             
  
                          3. Pawak-bjzp-bpvrvtglk angiography, under fluoroscopi  
c guidance.                             
  
                          Catheter(s) used: 5Fr x 65cm Omni Flush. Contrast was   
injected.                             
  
                          4. Left common femoral angiography, under fluoroscopic  
 guidance.                             
  
                          Catheter(s) used: 5Fr x 100cm Angled Taper Glidecath.   
Contrast was                             
  
                          injected.                                
  
                          5. Left popliteal angiography, under fluoroscopic guid  
ance.                             
  
                          Catheter(s) used: 5Fr x 100cm Angled Taper Glidecath.   
Contrast was                             
  
                          injected.                                
  
                          6. Sheath exchange. The right femoral artery sheath wa  
s exchanged for                             
  
                          a 6Fr x 55cm Flexor Check-Zeferino Ajay 1 Mod .035  sheath  
.                             
  
                          7. Left superficial femoral angiography, under fluoros  
copic guidance.                             
  
                          Contrast was injected.                             
  
                          8. Supplemental oxygen. Oxygen, 3 L/min was administer  
ed throughout                             
  
                          the procedure.                             
  
                          9. A stent was placed in the stenosis in the left supe  
rficial femoral                             
  
                          artery. See detailed description below (1st lesion int  
ervention).                             
  
                          10. Left posterior tibial angiography, under fluorosco  
pic guidance.                             
  
                          Catheter(s) used: 5Fr x 100cm Angled Taper Glidecath.   
Contrast was                             
  
                          injected.                                
  
                          11. Angioplasty was performed on the stenosis in the l  
eft common iliac                             
  
                          artery. See detailed description below (2nd lesion int  
ervention).                             
  
                          12. An (more content not included)...                   
            
   
                                                                 Intercytex Group  
  
                                                                 Work Phone: 6(6 92)935-2376  
  
  
  
  
                                        EKG 12 Lead - Chest Pain  Ordered By: Francisco Laura on 2021  
  
  
  
  
                           Kettering Health Greene Memorial M2 Connections Insight Surgical Hospital  Test Date: 2021 Pat         
                    Corey Hospital  
  
                          Name: WILLOW BRADSHAW Department: Mountain Vista Medical Center Patient         
                    Work Phone: 0(366)691-4065  
  
                          ID: 13080878 Room: The Children's Center Rehabilitation Hospital – Bethany Gender:  M Technician:        
                        
  
                          : 1957 Requested By: DANIS LAURA Order       
                        
  
                          Number: 8112294538 Reading MD: Danny Petit          
                     
  
                          Measurements Intervals Axis Rate: 89 P: 62 OH: 148      
                         
  
                          QRS: 72 QRSD:  98 T: 33 QT: 360 QTc: 438                
               
  
                          Interpretive Statements Sinus rhythm Normal EKG         
                      
  
                          LOW VOLTAGE THROUGHOUT Electronically Signed On         
                      
  
                          2021 12:51:19 EDT by Danny Rouse Kettering Health Greene Memorial Incoming Cardiolo  
gy Results From Merge/Epiphany - 2021 12:52   
PM EDT Formatting of this note might be different from the original.              
                             Trinity Health System Twin City Medical Center FundRazr                           Work Vandana  
ne: 8(382)153-0303  
  
                                                                   
  
                          Test Date: 2021                             
  
                          Pat Name: WILLOW BRADSHAW Department: 1AER             
                  
  
                          Patient ID: 13825677 Room: The Children's Center Rehabilitation Hospital – Bethany                         
      
  
                          Gender: M Technician:                              
  
                          : 1957 Requested By: DANIS LAURA             
                  
  
                          Order Number: 9743783505 Reading MD: Danny Petit    
                           
  
                          Measurements                             
  
                          Intervals Axis                             
  
                          Rate: 89 P: 62                             
  
                          OH: 148 QRS: 72                             
  
                          QRSD: 98 T: 33                             
  
                          QT: 360                                  
  
                          QTc: 438                                 
  
                          Interpretive Statements                             
  
                          Sinus rhythm                             
  
                          Normal EKG                               
  
                          LOW VOLTAGE THROUGHOUT                             
  
                          Electronically Signed On 2021 12:51:19 EDT by Josafat Petit                             
   
                                                                 SUMM  
  
                                                                 Work Phone: 1(4 74)384-6459  
  
  
  
  
                                        POCT Glucose  Ordered By: Santino España  
 on 2021  
  
  
  
  
             Glucose [Mass/Vol] 156 mg/dL    High         70 - 100 mg/dL Corey Hospital  
  
                                                                 Work Phone: 1(3 89)115-6197  
  
  
  
  
                          Comment on above:         Test performed by glucose me  
ter. Results may be 10%-15% lower  
  
                                                    than serum/plasma values. (C  
NIALL ID 88W1899619)  
  
  
  
  
             Interpretation and Abnormal                               SUMMA  
  
             review of laboratory                                        Work Ph  
one: 9(291)674-9167  
  
             results                                               
   
                          Test Performed by                           ProMedica Defiance Regional Hospital M2 Connections                           Work Phone: 1(2  
34)952-6135  
  
                          System, 635 E.                             
  
                          Pedro Bay, OH 50812                                 
   
                                                                 Corey Hospital  
  
                                                                 Work Phone: 1(2  
34)303-64  
   
             Glucose [Mass/Vol] 106 mg/dL    High         70 - 100     SUMMA  
  
                                                    mg/dL        Work Phone: 1(2  
65)585-40  
  
  
  
  
                          Comment on above:         Test performed by glucose me  
ter. Results may be 10%-15% lower  
  
                                                    than serum/plasma values. (C  
NIALL ID 94R3516088)  
  
  
  
  
             Interpretation and review of Abnormal                                
 Corey Hospital  
  
             laboratory results                                        Work Phon  
e: 1(490)499-9673  
   
                          Test Performed by Summa                           SUMM  
A  
  
                          M2 Connections Insight Surgical Hospital, 525 E.                           Work P  
angie: 5(838)663-8256  
  
                          Pedro Bay, OH                             
  
                          86894                                    
   
                                                                 Corey Hospital  
  
                                                                 Work Phone: 1(2  
34)621-27  
  
  
  
  
                                        POCT Glucose  Ordered By: Ed Breen on  
 2021  
  
  
  
  
             Glucose [Mass/Vol] 119 mg/dL    High         70 - 100 mg/dL Corey Hospital  
  
                                                                 Work Phone: 1(2  
02)069-4893  
  
  
  
  
                          Comment on above:         Test performed by glucose me  
ter. Results may be 10%-15% lower  
  
                                                    than serum/plasma values. (C  
NIALL ID 58F9297270)  
  
  
  
  
             Interpretation and review of Abnormal                                
 Corey Hospital  
  
             laboratory results                                        Work Phon  
e: 5(739)745-5178  
   
                          Test Performed by Kettering Health Greene Memorial                           Celoxica Insight Surgical Hospital, 525 E.                           Work P  
angie: 1(887)071-4511  
  
                          Pedro Bay, OH                             
  
                          90104                                    
   
                                                                 Corey Hospital  
  
                                                                 Work Phone: 1(2  
96)559-61  
  
  
  
  
                                        CTA ABDOMINAL AORTA W BILAT RUNOFF W WO   
CONTRAST  Ordered By: Maxim Ospina on  
  
                                        2021  
  
  
  
  
                          Patient Name: WILLOW BRADSHAW III MRN:              
               SUMMMONIQUE  
  
                          68196836 Acct#: 982502674519 Computed Tomography        
                     Work Phone: 2(121)080-0870  
  
                          ACCESSION EXAM DATE/TIME PROCEDURE ORDERING             
                  
  
                          PROVIDER -499648  2021 17:06 EDT CTA Abd     
                          
  
                          Aorta + WILTON OSPINA JOHN M Iliofemoral CPT code     
                          
  
                          06656  Reason For Exam (CTA Abd Aorta +            
                   
  
                          Iliofemoral) left foot colder and decreased pulses      
                         
  
                          Report CT ANGIOGRAPHY ABDOMEN AND PELVIS: CLINICAL      
                         
  
                          INDICATION: Left foot reduced pulses with clinical      
                         
  
                          extremity. Evaluate for limb ischemia. COMPARISON:      
                         
  
                          2021 TECHNIQUE: Thin axial CT angiographic         
                      
  
                          images of abdomen and pelvis during high flow IV        
                       
  
                          contrast injection. Multiplanar and 3D MIP              
                 
  
                          reconstruction was performed on an independent          
                     
  
                          viewing workstation. COMPARISON: None. VASCULAR         
                      
  
                          FINDINGS: Coronary artery atherosclerotic               
                
  
                          calcifications. There is an isolated left gastric       
                        
  
                          artery arising from abdominal aorta. Celiac axis,       
                        
  
                          SMA and inferior mesenteric arteries are patent.        
                       
  
                          Suprarenal abdominal aorta is normal in caliber         
                      
  
                          without atherosclerosis. Infrarenal aorta shows         
                      
  
                          calcific and noncalcific atherosclerotic changes        
                       
  
                          with mild eccentric distal aortic stenosis.             
                  
  
                          Bilateral common iliac kissing stents are patent.       
                        
  
                          Bilateral common and external iliac artery              
                 
  
                          atherosclerotic calcific plaques result in mild         
                      
  
                          multifocal stenoses. Bilateral common femoral           
                    
  
                          arteries are patent with atherosclerotic change.        
                       
  
                          There are surgical clips adjacent to the left           
                    
  
                          femoral site, possibly secondary to prior               
                
  
                          endarterectomy or surgical cutdown. There is            
                   
  
                          diffuse left infrainguinal superficial femoral and      
                         
  
                          profunda femoris artery atherosclerotic changes         
                      
  
                          with chronic occlusive changes of the left proximal     
                          
  
                          superficial femoral artery. There is reconstitution     
                          
  
                          of the proximal popliteal artery profunda femoris       
                        
  
                          branches. Mild popliteal stenosis is seen. There is     
                          
  
                          left leg three-vessel runoff. Diffuse                   
            
  
                          atherosclerotic regularity of the right superficial     
                          
  
                          femoral and popliteal arteries with mild stenoses       
                        
  
                          of the right SFA and moderate stenosis of the           
                    
  
                          proximal and distal right popliteal arteries. Right     
                          
  
                          leg three-vessel runoff is normal. NONVASCULAR          
                     
  
                          FINDINGS: Lung bases are clear. The gallbladder is      
                         
  
                          incompletely distended with bile. No biliary            
                   
  
                          dilatation. The liver, spleen, pancreas, bilateral      
                         
  
                          adrenal glands and kidneys enhance Computed             
                  
  
                          Tomography Report normally. The appendix and bowel      
                         
  
                          loops are unremarkable. No retroperitoneal or           
                    
  
                          mesenteric adenopathy. No free air or fluid is          
                     
  
                          evident. Urinary bladder and prostate are               
                
  
                          unremarkable. Mild lumbar degenerative spondylosis.     
                          
  
                          IMPRESSION: 1. Aortobiiliac diffuse atherosclerotic     
                          
  
                          irregularity with mild distal aortic stenosis. No       
                        
  
                          acute thrombus, aneurysm or dissection is evident.      
                         
  
                          2. Patent bilateral common iliac artery stents. 3.      
                         
  
                          Chronic occlusion of the left superficial femoral       
                        
  
                          artery with reconstitution of the left proximal         
                      
  
                          popliteal artery at the level the patella. Left leg     
                          
  
                          three-vessel runoff. 4. Mild diffuse                    
           
  
                          atherosclerotic irregularity of right superficial       
                        
  
                          femoral artery with mild to moderate stenoses of        
                       
  
                          the right popliteal artery. Three-vessel right leg      
                         
  
                          runoff. Report Dictated on Workstation: FTWVFMG79       
                        
  
                          ---** Final ---** Dictated: 2021 5:19 pm          
                     
  
                          Dictating Physician: PAU OSPINA DO Signed       
                        
  
                          Date and Time: 2021 6:07 pm Signed by:            
                   
  
                          PAU OSPINA DO Transcribed Date and Time:        
                       
  
                          2021 5:26                             
   
                                                    Nasim, Claramonique Incoming Radiolog  
y Results From Radnet - 2021 6:08 PM EDT   
Formatting of this note might be different from the original.                     
                      JANELLE  
  
                          Patient Name: WILLOW BRADSHAW III                   
          Work Phone: 1(166) 169-2649  
  
                                                                   
  
                                                                   
  
                          MRN: 76758510                             
  
                                                                   
  
                                                                   
  
                          Children's Minnesotat#: 014179949190                             
  
                                                                   
  
                                                                   
  
                          Computed Tomography                             
  
                                                                   
  
                                                                   
  
                          ACCESSION EXAM DATE/TIME PROCEDURE ORDERING PROVIDER    
                           
  
                          -733857 2021 17:06 EDT CTA Abd Aorta + ADILSON VOGEL PA-C,                             
  
                          MAXIM SOW                                   
  
                          Iliofemoral                              
  
                                                                   
  
                                                                   
  
                          CPT code                                 
  
                          62704                                    
  
                                                              
  
                                                                   
  
                                                                   
  
                          Reason For Exam                             
  
                          (CTA Abd Aorta + Iliofemoral) left foot colder and dec  
reased pulses                             
  
                                                                   
  
                          Report                                   
  
                                                                   
  
                          CT ANGIOGRAPHY ABDOMEN AND PELVIS:                      
         
  
                                                                   
  
                          CLINICAL INDICATION: Left foot reduced pulses with cli  
nical extremity.                             
  
                          Evaluate for limb ischemia.                             
  
                                                                   
  
                          COMPARISON: 2021                             
  
                                                                   
  
                          TECHNIQUE: Thin axial CT angiographic images of abdome  
n and pelvis                             
  
                          during high flow IV contrast injection. Multiplanar an  
d 3D MIP                             
  
                          reconstruction was performed on an independent viewing  
 workstation.                             
  
                                                                   
  
                          COMPARISON: None.                             
  
                                                                   
  
                          VASCULAR FINDINGS: Coronary artery atherosclerotic tenisha  
cifications.                             
  
                          There is an isolated left gastric artery arising from   
abdominal aorta.                             
  
                          Celiac axis, SMA and inferior mesenteric arteries are   
patent.                             
  
                          Suprarenal abdominal aorta is normal in caliber withou  
t                             
  
                          atherosclerosis. Infrarenal aorta shows calcific and n  
oncalcific                             
  
                          atherosclerotic changes with mild eccentric distal aor  
tic stenosis.                             
  
                                                                   
  
                          Bilateral common iliac kissing stents are patent. Bila  
teral common and                             
  
                          external iliac artery atherosclerotic calcific plaques  
 result in mild                             
  
                          multifocal stenoses. Bilateral common femoral arteries  
 are patent with                             
  
                          atherosclerotic change. There are surgical clips adjac  
ent to the left                             
  
                          femoral site, possibly secondary to prior endarterecto  
my or surgical                             
  
                          cutdown. There is diffuse left infrainguinal superfici  
al femoral and                             
  
                                                                   
  
                          profunda femoris artery atherosclerotic changes with c  
hronic occlusive                             
  
                          changes of the left proximal superficial femoral arter  
y. There is                             
  
                          reconstitution of the proximal popliteal artery profun  
da femoris                             
  
                          branches. Mild popliteal stenosis is seen. There is le  
ft leg                             
  
                          three-vessel runoff.                             
  
                                                                   
  
                          Diffuse atherosclerotic regularity of the right superf  
icial femoral                             
  
                          and popliteal arteries with mild stenoses of the right  
 SFA and                             
  
                          moderate stenosis of the proximal and distal right pop  
liteal arteries.                             
  
                          Right leg three-vessel runoff is normal.                
               
  
                                                                   
  
                                                                   
  
                          NONVASCULAR FINDINGS: Lung bases are clear. The gallbl  
adder is                             
  
                          incompletely distended with bile. No biliary dilatatio  
n. The liver,                             
  
                          spleen, pancreas, bilateral adrenal glands and kidneys  
 enhance                             
  
                          Computed Tomography                             
  
                                                                   
  
                                                                   
  
                          Report                                   
  
                          normally. The appendix and bowel loops are unremarkabl  
e. No                             
  
                          retroperitoneal or mesenteric adenopathy. No free air   
or fluid is                             
  
                          evident. Urinary bladder and prostate are unremarkable  
. Mild lumbar                             
  
                          degenerative spondylosis.                             
  
                                                                   
  
                          IMPRESSION:                              
  
                                                                   
  
                          1. Aortobiiliac diffuse atherosclerotic irregularity w  
ith mild distal                             
  
                          aortic stenosis. No acute thrombus, aneurysm or dissec  
tion is evident.                             
  
                                                                   
  
                          2. Patent bilateral common iliac artery stents.         
                      
  
                                                                   
  
                          3. Chronic occlusion of the left superficial femoral a  
rtery with                             
  
                          reconstitution of the left proximal popliteal artery a  
t the level the                             
  
                          patella. Left leg three-vessel runoff.                  
             
  
                                                                   
  
                          4. Mild diffuse atherosclerotic irregularity of right   
superficial                             
  
                          femoral artery with mild to moderate stenoses of the r  
ight popliteal                             
  
                          artery. Three-vessel right leg runoff.                  
             
  
                                                                   
  
                          Report Dictated on Workstation: VWXEVQO16               
                
  
                                                                   
  
                          ---** Final ---**                             
  
                                                                   
  
                          Dictated: 2021 5:19 pm                            
   
  
                          Dictating Physician: PAU OSPINA DO              
                 
  
                          Signed Date and Time: 2021 6:07 pm                
               
  
                          Signed by: PAU OSPINA DO                        
       
  
                          Transcribed Date and Time: 2021 5:26              
                 
   
                                                                 SUMMA  
  
                                                                 Work Phone: 1(8 02)952-5579  
  
  
  
  
                                        Comprehensive Metabolic Panel  Ordered B  
y: Danis Laura on 2021  
  
  
  
  
             Albumin [Mass/Vol] 4.2 g/dL                  3.5 - 5.0 g/dL SUMMA  
  
                                                                 Work Phone: 2(9 10)404-7606  
   
             ALP (Bld) [Catalytic 83 U/L                    38 - 126 U/L SUMMA  
  
             activity/Vol]                                        Work Phone: 1( 381.432.4294  
   
             ALT [Catalytic activity/Vol] 15 U/L                    0 - 49 U/L    
 SUMMA  
  
                                                                 Work Phone: 8(3 10)307-9194  
  
  
  
  
                          Comment on above:         The ALT test is performed by  
 an updated assay method.  
  
                                                    Please note that the referen  
ce intervals have been  
  
                                                    changed and are now sex spec  
ific.  
  
  
  
  
             Anion gap [Moles/Vol] 11 mmol/L                 3 - 13 mmol/L SUMMA  
  
                                                                 Work Phone: 1(6 72)623-5222  
   
             AST [Catalytic 25 U/L                    15 - 46 U/L  SUMMA  
  
             activity/Vol]                                        Work Phone: 1( 917.117.2659  
   
             Bilirubin [Mass/Vol] 0.8 mg/dL                 0.2 - 1.3 mg/dL SUMM  
A  
  
                                                                 Work Phone: 1(2  
34)605-6875  
   
             Calcium [Mass/Vol] 9.3 mg/dL                 8.4 - 10.4 mg/dL SUMMA  
  
                                                                 Work Phone: 1(2  
34)037-4564  
   
             Chloride [Moles/Vol] 103 mmol/L                98 - 107 mmol/L SUMM  
A  
  
                                                                 Work Phone: 1(2  
34)310-1531  
   
             CO2 [Moles/Vol] 21 mmol/L    Low          22 - 30 mmol/L SUMMA  
  
                                                                 Work Phone: 1(2  
34)008-8203  
   
             Creatinine [Mass/Vol] 1.43 mg/dL   High         0.52 - 1.25 mg/dL S  
UMMA  
  
                                                                 Work Phone: 1(2  
34)563-2948  
   
             EGFR IF NonAfrican 51.2 mL/min  Abnormal     >60          SUMMA  
  
             American                                            Work Phone: 1(2  
34)902-7583  
  
  
  
  
                          Comment on above:         KDIGO guidelines provide the  
 following GFR categories:  
  
                                                    Stage GFR(ml/min/1.73 m2) Te  
iggy  
  
                                                    G1 >=90 Normal or high  
  
                                                    G2 60-89 Mildly decreased*  
  
                                                    G3a 45-59 Mildly to moderate  
ly decreased  
  
                                                    G3b 30-44 Moderately to koby  
rely decreased  
  
                                                    G4 15-29 Severely decreased  
  
                                                    G5 <15 Kidney failure  
  
                                                      
  
                                                    *Relative to young adult lev  
el.  
  
                                                    In the absence of evidence o  
f kidney damage, neither GFR  
  
                                                    category G1 nor G2 fulfill t  
he criteria for CKD.  
  
                                                      
  
                                                    The CKD-EPI equation is margarita  
dated in individuals 18 years  
  
                                                    of age and older. Currently   
the best equation for  
  
                                                    estimating glomerular filtra  
tion rate (GFR) from serum  
  
                                                    creatinine in children is th  
e Bedside Keller equation.  
  
                                                    It is less accurate in patie  
nts with extremes of muscle  
  
                                                    mass, restriction of dietary  
 protein, ingestion of creatine,  
  
                                                    extra-renal metabolism of cr  
eatinine, or treatment with  
  
                                                    medications that affect main  
l tubular creatinine secretion.  
  
  
  
  
             Free PSA/Total PSA 7.6 g/dL                  6.3 - 8.2    SUMMA  
  
             [Mass fraction]                           g/dL         Work Phone:   
3(597)477-4113  
   
             GFR/1.73 sq M.predicted 59.4         Abnormal     >60          SUMM  
A  
  
             among blacks MDRD mL/min/{1.73_m2}                           Work P  
angie: 2(447)688-7482  
  
             (S/P/Bld) [Vol                                          
  
             rate/Area]                                            
   
             Glucose [Mass/Vol] 105 mg/dL    High         70 - 100     SUMMA  
  
                                                    mg/dL        Work Phone: 1(  
34)808-1195  
   
             Interpretation and Abnormal                               SUMMA  
  
             review of laboratory                                        Work Ph  
one: 8(911)881-0849  
  
             results                                               
   
             Potassium [Moles/Vol] 4.2 mmol/L                3.5 - 5.1    SUMMA  
  
                                                    mmol/L       Work Phone: 1(  
34)014-1580  
   
             Sodium [Moles/Vol] 135 mmol/L                135 - 145    SUMMA  
  
                                                    mmol/L       Work Phone: 1(  
34)597-3416  
   
             Urea nitrogen (BldV) 20 mg/dL                  7 - 20 mg/dL SUMMA  
  
             [Mass/Vol]                                          Work Phone: 1()814-0833  
   
                          Test Performed by                           AstroloMe                           Work Phone: 1()150-9009  
  
                          System76 Fleming Street 34823                                 
   
                                                                 SUMMA  
  
                                                                 Work Phone: 1()549-9851  
  
  
  
  
                                        Hemogram (CBC) w/Auto Diff  Ordered By:   
Danis Laura on 2021  
  
  
  
  
             Absolute Baso # 0.1 10*3/uL               0.0 - 0.2    SUMMA  
  
                                                    10*3/uL      Work Phone: 1()115-2008  
   
             Absolute Neut # 9.4 10*3/uL  High         1.8 - 7.0    SUMMA  
  
                                                    10*3/uL      Work Phone: 1()138-2558  
   
             Basophils/100 WBC (Bld) 0.8 %                     0.0 - 2.0 %  SUMM  
A  
  
                                                                 Work Phone: 1()158-2423  
   
             Eosinophils (Bld) 0.3 10*3/uL               0.0 - 0.5    SUMMA  
  
             [#/Vol]                                10*3/uL      Work Phone: 1()515-2897  
   
             Eosinophils/100 WBC 2.1 %                     1.0 - 6.0 %  SUMMA  
  
             (Bld)                                               Work Phone: 1()470-9036  
   
             Granulocytes/100 WBC 72.9 %                    40.0 - 80.0 % SUMMA  
  
             (Bld)                                               Work Phone: 1()605-4247  
   
             Hematocrit (Bld) 49.7 %                    40.0 - 52.0 % SUMMA  
  
             [Volume fraction]                                        Work Phone  
: 5(136)343-9835  
   
             Hemoglobin.gastrointest 16.2 g/dL                 13.0 - 18.0 g/dL   
SUMMA  
  
             inal spec 1 Ql (Stl)                                        Work Ph  
one: 4(223)165-6241  
   
             Interpretation and Abnormal                               SUMMA  
  
             review of laboratory                                        Work Ph  
one: 1(846)999-1168  
  
             results                                               
   
             Lymphocytes (Bld) 2.3 10*3/uL               1.0 - 4.3    SUMMA  
  
             [#/Vol]                                10*3/uL      Work Phone: 1(  
34)312-5222  
   
             Lymphocytes/100 WBC 17.5 %       Low          20.0 - 40.0 % SUMMA  
  
             (Bld)                                               Work Phone: 1(  
34)312-52  
   
             MCH (RBC) [Entitic 28.0 pg                   26.0 - 34.0 pg SUMMA  
  
             mass]                                               Work Phone: 1(  
34)312-52  
   
             MCHC (RBC) [Mass/Vol] 32.6 %                    32.0 - 36.0 % SUMMA  
  
                                                                 Work Phone: 1()31252  
   
             MCV (RBC) [Entitic vol] 85.9 fL                   80.0 - 98.0 fL KRAMER  
MMA  
  
                                                                 Work Phone: 1(  
34)312-5222  
   
             Monocytes (Bld) [#/Vol] 0.9 10*3/uL  High         0.0 - 0.8    SUMM  
A  
  
                                                    10*3/uL      Work Phone: 1(  
34)312-5222  
   
             Monocytes/100 WBC (Bld) 6.7 %                     2.0 - 10.0 % SUMM  
A  
  
                                                                 Work Phone: 1()31252  
   
             Platelet distribution 16.3 %       High         11.5 - 14.5 % SUMMA  
  
             width (Bld) [Ratio]                                        Work Vandana  
ne: 7(305)668-3305  
   
             Platelet mean volume 7.1 fL       Low          7.4 - 10.4 fL SUMMA  
  
             (Bld) [Entitic vol]                                        Work Vandana  
ne: 1(672)786-5338  
   
             Platelets (Bld) [#/Vol] 260 10*3/uL               140 - 440    SUMM  
A  
  
                                                    10*3/uL      Work Phone: 1(  
34)312-5222  
   
             RBC (Bld) [#/Vol] 5.79 10*6/uL              4.40 - 5.90  SUMMA  
  
                                                    10*6/uL      Work Phone: 1(2  
34)312-5222  
   
             WBC (Bld) [#/Vol] 12.9 10*3/uL High         3.6 - 10.7   Corey Hospital  
  
                                                    10*3/uL      Work Phone: 1)168-1424  
   
                          Test Performed by                           AstroloMe                           Work Phone: 1)511-5401  
  
                          System, 525 E.                             
  
                          Glo Bags Michigantown, OH 77030                                 
   
                                                                 Intercytex Group  
  
                                                                 Work Phone: 1  
04)114-2144  
  
  
  
  
                                        POCT Glucose  Ordered By: Ed Breen on  
 2021  
  
  
  
  
             Glucose [Mass/Vol] 171 mg/dL    High         70 - 100 mg/dL Intercytex Group  
  
                                                                 Work Phone: 1)947-7153  
  
  
  
  
                          Comment on above:         Test performed by glucose me  
ter. Results may be 10%-15% lower  
  
                                                    than serum/plasma values. (C  
NIALL ID 19K5447985)  
  
  
  
  
             Interpretation and review of Abnormal                                
 Corey Hospital  
  
             laboratory results                                        Work Phon  
e: 1(212) 310-6025  
   
                          Test Performed by Aquavit Pharmaceuticals, 525 E.                           Work P  
angie: 2(674)032-8474  
  
                          Glo Bags Michigantown, OH                             
  
                          05557                                    
   
                                                                 Intercytex Group  
  
                                                                 Work Phone: 1  
43)155-4322  
  
  
  
  
                                        PROTIME/INR & PTT  Ordered By: Danis brooks on 2021  
  
  
  
  
             aPTT Coag (Bld) [Time] 30.5 s                    20.0 - 30.5 s SUMM  
Hosted America  
  
                                                                 Work Phone: 1)258-0785  
  
  
  
  
                          Comment on above:         NOTE: The therapeutic time f  
or Heparin anticoagulation,  
  
                                                    based on Xa activity inhibit  
ion, is an APTT of 46-80  
  
                                                    seconds.  
  
  
  
  
             INR Coag (Bld) [Relative time] 1.0 {INR}                             
   SUMMHosted America  
  
                                                                 Work Phone: 1  
71)382-9276  
  
  
  
  
                          Comment on above:         Recommended Anticoagulant Th  
erapy:  
  
                                                    SEE BELOW  
  
                                                    ----- INR of 2.0 - 3.0 :  
  
                                                    - Prophylaxis of Venous Thro  
mbosis (high-risk surgery)  
  
                                                    - Treatment of Venous Thromb  
osis  
  
                                                    - Treatment of Pulmonary Emb  
olism (Includes tissue heart  
  
                                                    valves, Acute Myocardial Inf  
arction to prevent systemic  
  
                                                    embolism, Valvular Heart Dis  
ease, and Atrial Fibrillation)  
  
                                                    ----- INR of 2.5 - 3.5 :  
  
                                                    - Mechanical Prosthetic Valv  
es (high risk)  
  
                                                    - If oral anticoagulant ther  
apy is used to prevent  
  
                                                    Myocardial Infarction  
  
  
  
  
             PT Coag (PPP) [Time] 11 s                      9.0 - 12.0 s Intercytex Group  
  
                                                                 Work Phone: 1(7 17)375-7727  
  
  
  
  
                          Comment on above:         .  
  
  
  
  
                          Test Performed by Cerus Corporation, 525 E.           
                  SUMMA  
  
                          Market Michigantown, OH 85369                             
Work Phone: 2(200)665-9882  
   
                                                                 Intercytex Group  
  
                                                                 Work Phone: 1(3 11)336-2690  
  
  
  
  
                                        CTA ABDOMINAL AORTA W BILAT RUNOFF W WO   
CONTRAST   Ordered By: Odell   
Irvin on  
  
                                        2021  
  
  
  
  
                          Patient Name: WILLOW BRADSHAW III MRN: 78668746     
                        Intercytex Group  
  
                          Acct#: 704447824289 Computed Tomography ACCESSION       
                      Work Phone: 7(500)680-5661  
  
                          EXAM DATE/TIME PROCEDURE ORDERING PROVIDER              
                 
  
                          -427202 2021 08:43 EDT CTA Abd Aorta +       
                        
  
                          IRVIN ODELL Iliofemoral CPT code 54146      
                         
  
                           Reason For Exam (CTA Abd Aorta + Iliofemoral)     
                          
  
                          Peripheral vascular disease, unspecified Report CT      
                         
  
                          ANGIOGRAPHY ABDOMEN AND PELVIS AND BILATERAL LOWER      
                         
  
                          EXTREMITY ARTERIAL RUNOFF: CLINICAL INDICATION:         
                      
  
                          Peripheral vascular disease, unspecified TECHNIQUE:     
                          
  
                          Transaxial sequence through the abdomen, pelvis and     
                          
  
                          bilateral lower extremities initially without IV        
                       
  
                          contrast and then again during dynamic intravenous      
                         
  
                          infusion of 75 mL of nonionic contrast media,           
                    
  
                          injected at a high flow rate following a           
                     
  
                          study. Multiplanar and 3D MIP reconstruction was        
                       
  
                          performed concurrently with independent viewing         
                      
  
                          software. Comparison: 8/15/2019 FINDINGS: Aorta and     
                          
  
                          iliac arteries: The proximal abdominal aorta            
                   
  
                          maintains a normal caliber. There is                    
           
  
                          atherosclerotic calcifications of the                   
            
  
                          aortomesenteric and aortoiliac vasculature. Again       
                        
  
                          noted is a common trunk of the left gastric,            
                   
  
                          bilateral inferior phrenic, and accessory left          
                     
  
                          hepatic arteries, which is patent. The celiac,          
                     
  
                          superior mesenteric, and bilateral renal arteries       
                        
  
                          are patent. There are are bilateral accessory renal     
                          
  
                          arteries. Inferior mesenteric artery is narrowed at     
                          
  
                          its origin secondary to calcified atherosclerotic       
                        
  
                          plaque. There is mild narrowing of the distal           
                    
  
                          abdominal aorta due to extensive atherosclerotic        
                       
  
                          plaque, similar to prior exam. There are bilateral      
                         
  
                          common iliac artery stents, which are otherwise         
                      
  
                          patent. The bilateral internal iliac and external       
                        
  
                          iliac arteries are patent. There is mild narrowing      
                         
  
                          of the origin of the internal iliac arteries. Right     
                          
  
                          lower extremity: There is mild to moderate              
                 
  
                          narrowing of the right common femoral artery due to     
                          
  
                          atherosclerotic plaque. There are multilevel mild       
                        
  
                          multifocal tandem stenoses of the right superficial     
                          
  
                          femoral artery. The femoral profundus artery is         
                      
  
                          patent. There is moderate multifocal tandem             
                  
  
                          stenoses of the popliteal artery with the moderate      
                         
  
                          to severe stenosis of the popliteal artery              
                 
  
                          above-the-knee at the level of the distal femoral       
                        
  
                          metaphysis (series 4, image 981). There is a            
                   
  
                          three-vessel runoff of the right lower extremity to     
                          
  
                          the level of the right foot. The anterior tibial        
                       
  
                          artery supplies the dorsalis pedis. The posterior       
                        
  
                          tibial artery supplies the plantar arch. Left lower     
                          
  
                          extremity: No significant narrowing of the left         
                      
  
                          common  Computed Tomography Report femoral artery.      
                         
  
                          The femoral profundus artery is patent. There is        
                       
  
                          focal mild to moderate narrowing of the proximal        
                       
  
                          left superficial femoral artery from                    
           
  
                          atherosclerotic plaque with additional mild             
                  
  
                          multifocal tandem stenoses of the the left              
                 
  
                          superficial femoral artery. There is focal moderate     
                          
  
                          to severe narrowing of the distal proximal              
                 
  
                          popliteal artery at the level the left femoral          
                     
  
                          metaphysis (series 4, image 979). The left              
                 
  
                          popliteal artery is otherwise patent with mild to       
                        
  
                          moderate multifocal tandem stenoses. There is a         
                      
  
                          three vessel runoff to the level the left foot. The     
                          
  
                          anterior tibial artery supplies the dorsalis pedis.     
                          
  
                          The posterior tibial artery supplies the plantar        
                       
  
                          arch. Lung bases: There is nodular scarring noted       
                        
  
                          in the left upper lobe the left lung base with          
                     
  
                          fiducial markers noted. Liver: Normal size and          
                     
  
                          contours. No focal lesion. Biliary tree: No biliary     
                          
  
                          dilatation. No calcified gallstones. Spleen:            
                   
  
                          Normal. Adrenals: Normal. Pancreas: Normal.             
                  
  
                          Kidneys: Symmetric contrast excretion without           
                    
  
                          evidence of hydronephrosis. No focal renal lesion       
                        
  
                          is identified. Free fluid: None.                        
       
  
                          Retroperitoneal/mesenteric lymphadenopathy: None.       
                        
  
                          Bowel: No dilatation is noted. Appendix within          
                     
  
                          normal limits. Abdominal wall: Normal. Pelvic           
                    
  
                          organs/viscera: No mass identified. Pelvic              
                 
  
                          lymphadenopathy: None. Soft tissues of the lower        
                       
  
                          extremities: Within normal limits Osseous               
                
  
                          structures: Mild multilevel degenerative changes of     
                          
  
                          the imaged spine. IMPRESSION: 1. Atherosclerotic        
                       
  
                          changes of the aortoiliac and aortomesenteric           
                    
  
                          vasculature with moderate narrowing of the origin       
                        
  
                          of the inferior mesenteric artery. Otherwise no         
                      
  
                          significant narrowing of the abdominal pelvic           
                    
  
                          arteries. 2. Moderate narrowing of the right common     
                          
  
                          femoral artery with focal moderate to severe            
                   
  
                          narrowing of the popliteal artery as described          
                     
  
                          above, similar to the prior exam from 2019. 3.          
                     
  
                          Atherosclerotic changes of the left lower extremity     
                          
  
                          with mild to moderate narrowing as above. 4.            
                   
  
                          Nodular opacities in the partially imaged upper         
                      
  
                          lobe of the left lung with adjacent fiducial            
                   
  
                          markers. Computed Tomography Report Report Dictated     
                          
  
                          on Workstation: IMPAXTESTDS ---** Final ---**           
                    
  
                          Dictated: 2021 10:01 am Dictating Physician:      
                         
  
                          MD FELTON, MARIANN Signed Date and Time: 2021         
                      
  
                          10:23 am Signed by: PA (more content not                
               
  
                          included)...                             
   
                                                    Janelle Rouse Incoming Radiolog  
y Results From Radnet - 2021 10:24 AM EDT   
Formatting of this note might be different from the original.                     
                      JANELLE  
  
                          Patient Name: WILLOW BRADSHAW III                   
          Work Phone: 2(457)754-0254  
  
                                                                   
  
                                                                   
  
                          MRN: 17816162                             
  
                                                                   
  
                                                                   
  
                          Children's Minnesotat#: 003301780635                             
  
                                                                   
  
                                                                   
  
                          Computed Tomography                             
  
                                                                   
  
                                                                   
  
                          ACCESSION EXAM DATE/TIME PROCEDURE ORDERING PROVIDER    
                           
  
                          -610805 2021 08:43 EDT CTA Abd Aorta + BA ESPINAL DO, JOSEPH                                   
  
                          Iliofemoral                              
  
                                                                   
  
                                                                   
  
                          CPT code                                 
  
                          90570                                    
  
                                                              
  
                                                                   
  
                                                                   
  
                          Reason For Exam                             
  
                          (CTA Abd Aorta + Iliofemoral) Peripheral vascular dise  
ase, unspecified                             
  
                                                                   
  
                          Report                                   
  
                          CT ANGIOGRAPHY ABDOMEN AND PELVIS AND BILATERAL LOWER   
EXTREMITY                             
  
                          ARTERIAL RUNOFF:                             
  
                                                                   
  
                          CLINICAL INDICATION: Peripheral vascular disease, unsp  
ecified                             
  
                                                                   
  
                          TECHNIQUE: Transaxial sequence through the abdomen, pe  
lvis and                             
  
                          bilateral lower extremities initially without IV contr  
ast and then                             
  
                          again during dynamic intravenous infusion of 75 mL of   
nonionic                             
  
                          contrast media, injected at a high flow rate following  
 a  study.                             
  
                          Multiplanar and 3D MIP reconstruction was performed co  
ncurrently                             
  
                          with independent viewing software.                      
         
  
                                                                   
  
                          Comparison: 8/15/2019                             
  
                                                                   
  
                          FINDINGS:                                
  
                          Aorta and iliac arteries: The proximal abdominal aorta  
 maintains a                             
  
                          normal caliber. There is atherosclerotic calcification  
s of the                             
  
                          aortomesenteric and aortoiliac vasculature. Again note  
d is a common                             
  
                          trunk of the left gastric, bilateral inferior phrenic,  
 and accessory                             
  
                          left hepatic arteries, which is patent. The celiac, kramer  
perior                             
  
                          mesenteric, and bilateral renal arteries are patent. T  
here are are                             
  
                          bilateral accessory renal arteries. Inferior mesenteri  
c artery is                             
  
                          narrowed at its origin secondary to calcified atherosc  
lerotic plaque.                             
  
                                                                   
  
                          There is mild narrowing of the distal abdominal aorta   
due to extensive                             
  
                          atherosclerotic plaque, similar to prior exam. There a  
re bilateral                             
  
                          common iliac artery stents, which are otherwise patent  
. The bilateral                             
  
                                                                   
  
                          internal iliac and external iliac arteries are patent.  
 There is mild                             
  
                          narrowing of the origin of the internal iliac arteries  
.                             
  
                                                                   
  
                          Right lower extremity: There is mild to moderate narro  
wing of the                             
  
                          right common femoral artery due to atherosclerotic benjamin  
que. There are                             
  
                          multilevel mild multifocal tandem stenoses of the righ  
t superficial                             
  
                          femoral artery. The femoral profundus artery is patent  
. There is                             
  
                          moderate multifocal tandem stenoses of the popliteal a  
rtery with the                             
  
                          moderate to severe stenosis of the popliteal artery ab  
ove-the-knee at                             
  
                          the level of the distal femoral metaphysis (series 4,   
image 981).                             
  
                          There is a three-vessel runoff of the right lower extr  
emity to the                             
  
                          level of the right foot. The anterior tibial artery kramer  
pplies the                             
  
                          dorsalis pedis. The posterior tibial artery supplies t  
he plantar arch.                             
  
                                                                   
  
                          Left lower extremity: No significant narrowing of the   
left common                             
  
                          Computed Tomography                             
  
                                                                   
  
                                                                   
  
                          Report                                   
  
                          femoral artery. The femoral profundus artery is patent  
. There is focal                             
  
                          mild to moderate narrowing of the proximal left superf  
icial femoral                             
  
                          artery from atherosclerotic plaque with additional mil  
d multifocal                             
  
                          tandem stenoses of the the left superficial femoral ar  
norman. There is                             
  
                          focal moderate to severe narrowing of the distal proxi  
mal popliteal                             
  
                          artery at the level the left femoral metaphysis (serie  
s 4, image 979).                             
  
                          The left popliteal artery is otherwise patent with mil  
d to moderate                             
  
                          multifocal tandem stenoses. There is a three vessel ru  
noff to the                             
  
                          level the left foot. The anterior tibial artery suppli  
es the dorsalis                             
  
                          pedis. The posterior tibial artery supplies the planta  
r arch.                             
  
                                                                   
  
                                                                   
  
                          Lung bases: There is nodular scarring noted in the lef  
t upper lobe the                             
  
                          left lung base with fiducial markers noted.             
                  
  
                                                                   
  
                          Liver: Normal size and contours. No focal lesion.       
                        
  
                                                                   
  
                          Biliary tree: No biliary dilatation. No calcified gall  
stones.                             
  
                                                                   
  
                          Spleen: Normal.                             
  
                                                                   
  
                          Adrenals: Normal.                             
  
                                                                   
  
                          Pancreas: Normal.                             
  
                                                                   
  
                          Kidneys: Symmetric contrast excretion without evidence  
 of                             
  
                          hydronephrosis. No focal renal lesion is identified.    
                           
  
                                                                   
  
                          Free fluid: None.                             
  
                                                                   
  
                          Retroperitoneal/mesenteric lymphadenopathy: None.       
                        
  
                                                                   
  
                          Bowel: No dilatation is noted. Appendix within normal   
limits.                             
  
                                                                   
  
                          Abdominal wall: Normal.                             
  
                                                                   
  
                          Pelvic organs/viscera: No mass identified.              
                 
  
                                                                   
  
                          Pelvic lymphadenopathy: None.                           
    
  
                                                                   
  
                                                                   
  
                          Soft tissues of the lower extremities: Within normal l  
imits                             
  
                                                                   
  
                          Osseous structures: Mild multilevel degenerative nick  
es of the                             
  
                          imaged spine.                             
  
                                                                   
  
                          IMPRESSION:                              
  
                          1. Atherosclerotic changes of the aortoiliac and aorto  
mesenteric                             
  
                          vasculature with moderate narrowing of the origin of t  
he inferior                             
  
                          mesenteric artery. Otherwise no significant narrowing   
of the abdominal                             
  
                          pelvic arteries.                             
  
                          2. Moderate narrowing of the right common femoral hussain  
ry with focal                             
  
                          moderate to severe narrowing of the popliteal artery a  
s described                             
  
                          above, similar to the prior exam from 2019.             
                  
  
                          3. Atherosclerotic changes of the left lower extremity  
 with mild to                             
  
                          moderate narrowing as above.                            
   
  
                          4. Nodular opacities in the partially imaged upper lob  
e of the left                             
  
                          lung with adjacent fiducial markers.                    
           
  
                                                                   
  
                                                                   
  
                                                                   
  
                                                                   
  
                                                                   
  
                                                                   
  
                                                                   
  
                          Computed Tomography                             
  
                                                                   
  
                                                                   
  
                          Report                                   
  
                          Report Dictated on Workstation: IM (more content not i  
ncluded)...                             
   
                                                                 Intercytex Group  
  
                                                                 Work Phone: 6(9 50)381-7894  
  
  
  
  
                                        Echo 2D Doppler Color  on 2020  
  
  
  
  
                          TRANSTHORACIC ECHOCARDIOGRAM                            
 Grant Hospital  
  
                          PATIENT: Willow Bradshaw STUDY DATE: 2020     
                        FLORENTINO LINARES  
  
                          MRN: 97506623 FIN#: 519796261914                        
       
  
                          : 1957 ACCESSION#: 72691555418                 
              
  
                          AGE: 63 HT/WT: 170.2 cm (67 99.8 kg                     
          
  
                          in) (219.5 lb)                             
  
                          GENDER: M BP: 90 / 50                             
  
                          LOCATION: K94 Discoveries  PATIENT Outpatient            
                   
  
                          Arch Street STATUS:                             
  
                                                                   
  
                          *READING PHYSICIAN: Óscar Toro *SONOGRAPHER: * Lacey fallon RDCS, Luisa FRANCISCO MD  RVT, VT                             
  
                                                                   
  
                          ------------------------------------------------------  
-----------------                             
  
                          INDICATIONS: Shortness of breath (). History of C  
ABG x3 and over                             
  
                          25 stents.                               
  
                                                                   
  
                          ------------------------------------------------------  
-----------------                             
  
                          CONCLUSIONS                              
  
                                                                   
  
                          SUMMARY:                                 
  
                                                                   
  
                          1. Left ventricle: Not well visualized. Systolic funct  
ion is normal by                             
  
                          visual assessment. The estimated ejection fraction is   
60%. There are                             
  
                          no obvious regional wall motion abnormalities.          
                     
  
                          2. Right ventricle: The cavity size is normal. Right v  
entricular                             
  
                          systolic pressure is not able to be accuratelt estimat  
ed                             
  
                          3. Left atrium: The atrium is normal in size.           
                    
  
                          4. Mitral valve: Structurally normal valve.             
                  
  
                          5. Aortic valve: Trileaflet; mildly thickened, mildly   
calcified                             
  
                          leaflets. Thickening, consistent with sclerosis.        
                       
  
                                                                   
  
                          ------------------------------------------------------  
-----------------                             
  
                          STUDY DATA: Complete transthoracic echocardiogram. Pro  
cedure: Image                             
  
                          quality was poor. The study was technically limited du  
e to poor                             
  
                          acoustic window availability, body habitus, and respir  
atory                             
  
                          interference. Definity was not used due to no iv acces  
s. Nurse                             
  
                          attempted 3 times. M-mode, complete 2D, complete spect  
ral Doppler, and                             
  
                          color flow Doppler images were acquired and archived f  
or permanent                             
  
                          storage and are available for subsequent review. Study  
 status:                             
  
                          Routine. Patient status: Outpatient.                    
           
  
                                                                   
  
                          ------------------------------------------------------  
-----------------                             
  
                          FINDINGS                                 
  
                                                                   
  
                          LEFT VENTRICLE: Not well visualized. The cavity size i  
s normal. Wall                             
  
                          thickness is mildly increased. Systolic function is no  
rmal by visual                             
  
                          assessment. The estimated ejection fraction is 60%. Th  
ere are no                             
  
                          obvious regional wall motion abnormalities.             
                  
  
                          RIGHT VENTRICLE: The cavity size is normal. Right vent  
ricular systolic                             
  
                          pressure is not able to be accuratelt estimated         
                      
  
                          VENTRICULAR SEPTUM: There is no evidence of a ventricu  
lar septal                             
  
                          defect.                                  
  
                          LEFT ATRIUM: The atrium is normal in size.              
                 
  
                          RIGHT ATRIUM: The atrium is normal in size.             
                  
  
                          ATRIAL SEPTUM: Not well visualized. The interatrial se  
ptum is normal.                             
  
                          Doppler shows no shunt.                             
  
                          MITRAL VALVE: Structurally normal valve. Leaflet separ  
ation is                             
  
                          normal. Doppler: Transvalvular velocity is within the   
normal range.                             
  
                          There is no evidence for stenosis. There is no regurgi  
tation.                             
  
                          AORTIC VALVE: Trileaflet; mildly thickened, mildly tenisha  
cified                             
  
                          leaflets. Thickening, consistent with sclerosis. Cusp   
separation is                             
  
                          normal. Doppler: Transvalvular velocity is within the   
normal range.                             
  
                          There is no stenosis. There is no regurgitation.        
                       
  
                          TRICUSPID VALVE: Not well visualized. Doppler: Transva  
lvular                             
  
                          velocity is within the normal range. There is no evide  
nce for stenosis.                             
  
                          There is trivial, less than 1+ regurgitation.           
                    
  
                          PULMONIC VALVE: Not visualized. Doppler: Transvalvular  
 velocity is                             
  
                          within the normal range. There is trivial, less than 1  
+ regurgitation.                             
  
                          AORTA:  Aortic root: The aortic root is normal in size  
.                             
  
                          Ascending aorta: The ascending aorta is normal in size  
.                             
  
                          PERICARDIUM: There is no pericardial effusion.          
                     
  
                          SYSTEMIC VEINS:                             
  
                          Inferior vena cava: The vessel is normal in size. The   
IVC collapses by                             
  
                          greater than 50% with inspiration.                      
         
  
                                                                   
  
                          ------------------------------------------------------  
-----------------                             
  
                          Measurements                             
  
                                                                   
  
                          Value Reference                             
  
                          Aortic root ID, ED (sinus) 3.1 cm <4.3                  
             
  
                                                                   
  
                          Value Reference                             
  
                          Ascending aorta ID, A-P, S  2.9 cm ----------           
                    
  
                          Ascending aorta ID/bsa, A-P, S 1.3 cm/m^2 ----------    
                           
  
                                                                   
  
                          IVC Value Reference                             
  
                          ID 1.6 cm ----------                             
  
                                                                   
  
                          Left ventricle Value Reference                          
     
  
                          LV ID, ED 4.7 cm 4.2 - 5.8                             
  
                          LV ID, ES 2.9 cm 2.5 - 4.0                             
  
                          LV ID/bsa, ED (L) 2.1 cm/m^2 2.2 - 3.0                  
             
  
                          LV ID/bsa, ES 1.3 cm/m^2 1.3 - 2.1                      
         
  
                          LV PW thickness, ED (H) 1.2 cm 0.6 - 1.0                
               
  
                          LV PW/LV ID ratio, ED 0.26 ----------                   
            
  
                          LV wall mass (H) 225 g 96 - 200                         
      
  
                          LV wall mass/bsa 102 g/m^2 50 - 102                     
          
  
                          LV ejection fraction, 2-p 60 % 52 - 72                  
             
  
                          LV E/e', lateral 8.1 ----------                         
      
  
                          LV E/e', medial 9.4 ----------                          
     
  
                          LV E/e', average 8.7 ----------                         
      
  
                                                                   
  
                          Ventricular septum Value Reference                      
         
  
                          IVS thickness, ED (H) 1.3 cm 0.6 - 1.0                  
             
  
                                                                   
  
                          LVOT Value Reference                             
  
                          LVOT ID, A-P  2.5 cm ----------                         
      
  
                          LVOT mean velocity, S 0.9 m/sec ----------              
                 
  
                          LVOT peak gradient, S 7  mm Hg ----------               
                
  
                          Stroke volume (SV), LVOT  ml ----------           
                    
  
                          Stroke index (SV/bsa), LVOT DP 50 ml/m^2 ----------     
                          
  
                                                                   
  
                          Left atrium Value Reference                             
  
                          LA area, ES, A2C 17 cm^2 ----------                     
          
  
                          LA volume/bsa, ES, 2-p 26 ml/m^2 16 - 34                
               
  
                                                                   
  
                          Mitral valve Value Reference                            
   
  
                          Mitral E-wave peak velocity 0.7 m/sec ----------        
                       
  
                          Mitral A-wave peak velocity 0.6 m/sec ----------        
                       
  
                          Mitral deceleration time  284 ms ----------             
                  
  
                          Mitral E/A ratio, peak 1.1 ----------                   
            
  
                                                                   
  
                          Right atrium  Value Reference                           
    
  
                          RA area, ES, A4C (H) 19 cm^2 10 - 18                    
           
  
                                                                   
  
                          Systemic veins Value Reference                          
     
  
                          Estimated RAP 3 mm Hg ----------                        
       
  
                                                                   
  
                          Right ventricle Value Reference                         
      
  
                          RV ID, minor axis, ED, A4C base 3.4 cm 2.5 - 4.1        
                       
  
                          RV ID, minor axis, ED, A4C mid 3.4 cm 1.9 - 3.5         
                      
  
                          TAPSE, 2D 1.7 cm 1.7 - 3.1                             
  
                          RV s', lateral 8.9 cm/sec 6.0 - 13.4                    
           
  
                          Legend:                                  
  
                          (L) and (H) beth values outside specified reference ra  
nge.                             
  
                                                                   
  
                          Electronically signed by                             
  
                          Rere Moran MD                             
  
                          2020 12:36                             
  
                                                                   
  
                          Prior Signatures:                             
   
                                                    Select Medical Cleveland Clinic Rehabilitation Hospital, Edwin Shaw Kettering Health Greene Memorial Incoming Cardiolo  
gy Results From DoutÃ­ssima/Holisol logistics - 2020 12:36   
PM EST TRANSTHORACIC ECHOCARDIOGRAM                                         ProMedica Fostoria Community Hospital  
  
                          PATIENT: Willow Bradshaw STUDY DATE: 2020     
                        FLORENTINO LINARES  
  
                          MRN: 38863451 FIN#: 293482218793                        
       
  
                          : 1957 ACCESSION#: 82548615601                 
              
  
                          AGE: 63 HT/WT: 170.2 cm (67 99.8 kg                     
          
  
                          in) (219.5 lb)                             
  
                          GENDER: M BP: 90 / 50                             
  
                          LOCATION: Peerius Alan Ville 02983 PATIENT Outpatient            
                   
  
                          Arch Street STATUS:                             
  
                                                                   
  
                          *READING PHYSICIAN: Óscar Toro *SONOGRAPHER: * Lacey fallon RDCS, Luisa FRANCISCO MD RVT, VT                             
  
                                                                   
  
                          ------------------------------------------------------  
-----------------                             
  
                          INDICATIONS: Shortness of breath (). History of C  
ABG x3 and over                             
  
                          25 stents.                               
  
                                                                   
  
                          ------------------------------------------------------  
-----------------                             
  
                          CONCLUSIONS                              
  
                                                                   
  
                          SUMMARY:                                 
  
                                                                   
  
                          1. Left ventricle: Not well visualized. Systolic funct  
ion is normal by                             
  
                          visual assessment. The estimated ejection fraction is   
60%. There are                             
  
                          no obvious regional wall motion abnormalities.          
                     
  
                          2. Right ventricle: The cavity size is normal. Right v  
entricular                             
  
                          systolic pressure is not able to be accuratelt estimat  
ed                             
  
                          3. Left atrium: The atrium is normal in size.           
                    
  
                          4. Mitral valve: Structurally normal valve.             
                  
  
                          5. Aortic valve: Trileaflet; mildly thickened, mildly   
calcified                             
  
                          leaflets. Thickening, consistent with sclerosis.        
                       
  
                                                                   
  
                          ------------------------------------------------------  
-----------------                             
  
                          STUDY DATA: Complete transthoracic echocardiogram. Pro  
cedure: Image                             
  
                          quality was poor. The study was technically limited du  
e to poor                             
  
                          acoustic window availability, body habitus, and respir  
atory                             
  
                          interference. Definity was not used due to no iv acces  
s. Nurse                             
  
                          attempted 3 times. M-mode, complete 2D, complete spect  
ral Doppler, and                             
  
                          color flow Doppler images were acquired and archived f  
or permanent                             
  
                          storage and are available for subsequent review. Study  
 status:                             
  
                          Routine. Patient status: Outpatient.                    
           
  
                                                                   
  
                          ------------------------------------------------------  
-----------------                             
  
                          FINDINGS                                 
  
                                                                   
  
                          LEFT VENTRICLE: Not well visualized. The cavity size i  
s normal. Wall                             
  
                          thickness is mildly increased. Systolic function is no  
rmal by visual                             
  
                          assessment. The estimated ejection fraction is 60%. Th  
ere are no                             
  
                          obvious regional wall motion abnormalities.             
                  
  
                          RIGHT VENTRICLE: The cavity size is normal. Right vent  
ricular systolic                             
  
                          pressure is not able to be accuratelt estimated         
                      
  
                          VENTRICULAR SEPTUM: There is no evidence of a ventricu  
lar septal                             
  
                          defect.                                  
  
                          LEFT ATRIUM: The atrium is normal in size.              
                 
  
                          RIGHT ATRIUM: The atrium is normal in size.             
                  
  
                          ATRIAL SEPTUM: Not well visualized. The interatrial se  
ptum is normal.                             
  
                          Doppler shows no shunt.                             
  
                          MITRAL VALVE: Structurally normal valve. Leaflet separ  
ation is                             
  
                          normal. Doppler: Transvalvular velocity is within the   
normal range.                             
  
                          There is no evidence for stenosis. There is no regurgi  
tation.                             
  
                          AORTIC VALVE: Trileaflet; mildly thickened, mildly tenisha  
cified                             
  
                          leaflets. Thickening, consistent with sclerosis. Cusp   
separation is                             
  
                          normal. Doppler: Transvalvular velocity is within the   
normal range.                             
  
                          There is no stenosis. There is no regurgitation.        
                       
  
                          TRICUSPID VALVE: Not well visualized. Doppler: Transva  
lvular                             
  
                          velocity is within the normal range. There is no evide  
nce for stenosis.                             
  
                          There is trivial, less than 1+ regurgitation.           
                    
  
                          PULMONIC VALVE: Not visualized. Doppler: Transvalvular  
 velocity is                             
  
                          within the normal range. There is trivial, less than 1  
+ regurgitation.                             
  
                          AORTA: Aortic root: The aortic root is normal in size.  
                             
  
                          Ascending aorta: The ascending aorta is normal in size  
.                             
  
                          PERICARDIUM: There is no pericardial effusion.          
                     
  
                          SYSTEMIC VEINS:                             
  
                          Inferior vena cava: The vessel is normal in size. The   
IVC collapses by                             
  
                          greater than 50% with inspiration.                      
         
  
                                                                   
  
                          ------------------------------------------------------  
-----------------                             
  
                          Measurements                             
  
                                                                   
  
                          Value Reference                             
  
                          Aortic root ID, ED (sinus) 3.1 cm <4.3                  
             
  
                                                                   
  
                          Value Reference                             
  
                          Ascending aorta ID, A-P, S 2.9 cm ----------            
                   
  
                          Ascending aorta ID/bsa, A-P, S 1.3 cm/m^2 ----------    
                           
  
                                                                   
  
                          IVC Value Reference                             
  
                          ID 1.6 cm ----------                             
  
                                                                   
  
                          Left ventricle Value Reference                          
     
  
                          LV ID, ED 4.7 cm 4.2 - 5.8                             
  
                          LV ID, ES 2.9 cm 2.5 - 4.0                             
  
                          LV ID/bsa, ED (L) 2.1 cm/m^2 2.2 - 3.0                  
             
  
                          LV ID/bsa, ES 1.3 cm/m^2 1.3 - 2.1                      
         
  
                          LV PW thickness, ED (H) 1.2 cm 0.6 - 1.0                
               
  
                          LV PW/LV ID ratio, ED 0.26 ----------                   
            
  
                          LV wall mass (H) 225 g 96 - 200                         
      
  
                          LV wall mass/bsa 102 g/m^2 50 - 102                     
          
  
                          LV ejection fraction, 2-p 60 % 52 - 72                  
             
  
                          LV E/e', lateral 8.1 ----------                         
      
  
                          LV E/e', medial 9.4 ----------                          
     
  
                          LV E/e', average 8.7 ----------                         
      
  
                                                                   
  
                          Ventricular septum Value Reference                      
         
  
                          IVS thickness, ED (H) 1.3 cm 0.6 - 1.0                  
             
  
                                                                   
  
                          LVOT Value Reference                             
  
                          LVOT ID, A-P 2.5 cm ----------                          
     
  
                          LVOT mean velocity, S 0.9 m/sec ----------              
                 
  
                          LVOT peak gradient, S 7 mm Hg ----------                
               
  
                          Stroke volume (SV), LVOT  ml ----------           
                    
  
                          Stroke index (SV/bsa), LVOT DP 50 ml/m^2 ----------     
                          
  
                                                                   
  
                          Left atrium Value Reference                             
  
                          LA area, ES, A2C 17 cm^2 ----------                     
          
  
                          LA volume/bsa, ES, 2-p 26 ml/m^2 16 - 34                
               
  
                                                                   
  
                          Mitral valve Value Reference                            
   
  
                          Mitral E-wave peak velocity 0.7 m/sec ----------        
                       
  
                          Mitral A-wave peak velocity 0.6 m/sec ----------        
                       
  
                          Mitral deceleration time 284 ms ----------              
                 
  
                          Mitral E/A ratio, peak 1.1 ----------                   
            
  
                                                                   
  
                          Right atrium Value Reference                            
   
  
                          RA area, ES, A4C (H) 19 cm^2 10 - 18                    
           
  
                                                                   
  
                          Systemic veins Value Reference                          
     
  
                          Estimated RAP 3 mm Hg ----------                        
       
  
                                                                   
  
                          Right ventricle Value Reference                         
      
  
                          RV ID, minor axis, ED, A4C base 3.4 cm 2.5 - 4.1        
                       
  
                          RV ID, minor axis, ED, A4C mid 3.4 cm 1.9 - 3.5         
                      
  
                          TAPSE, 2D 1.7 cm 1.7 - 3.1                             
  
                          RV s', lateral 8.9 cm/sec 6.0 - 13.4                    
           
  
                          Legend:                                  
  
                          (L) and (H) beth values outside specified reference ra  
nge.                             
  
                                                                   
  
                          Electronically signed by                             
  
                          Rere Moran MD                             
  
                          2020 12:36                             
  
                                                                   
  
                          Prior Signatures:                             
  
  
  
  
                                        CT CHEST WO IVCON  on 2020  
  
  
  
  
             CT CHEST WO IVCON Final Report Normal                    IPextreme  
  
                          DATE OF EXAM: 2020 9:39AM                         
    System  
  
                          ANC 0541 - CT CHEST WO IVCON / ACCESSION # 497573346    
                           
  
                          PROCEDURE REASON: Neoplasm of lung                      
         
  
                                                                   
  
                          Physician Interpretation                             
  
                          EXAMINATION: CHEST CT WITHOUT CONTRAST                  
             
  
                          CLINICAL HISTORY: 61-year-old male with adenocarcinoma  
 of the left upper                             
  
                          lobe.                                    
  
                          Technique: Spiral CT acquisition of the chest from the  
 thoracic inlet to                             
  
                          the upper abdomen without contrast.                     
          
  
                          MQ: CTCWO_6                              
  
                          CT Dose-Length Product: 501 mGycm                       
        
  
                          CT Dose Reduction Employed: Automated exposure control  
 (AEC)                             
  
                          Comparison: CT chest studies 2020, 2019, 2019 and 2019.                             
  
                          RESULT:                                  
  
                          Limitations: Noncontrast technique and respiratory mot  
ion.                             
  
                          Lines, tubes, and devices: None.                        
       
  
                          Lung parenchyma and airways: Underlying moderate emphy  
sematous changes,                             
  
                          upper lobe predominant. On the right, there is a 4 mm   
lateral upper lobe                             
  
                          nodule, image 23 of series 3. Stable to comparison . There is a                             
  
                          3 mm medial upper lobe nodule, image 24. Stable. There  
 is a 6 mm medial                             
  
                          upper lobe peribronchovascular nodule, image 30. Stabl  
e. There is a 3 mm                             
  
                          upper lobe nodule, image 35. Stable. There is an 8 mm   
upper lobe nodule,                             
  
                          image 38. Stable. There is a 4 mm middle lobe nodule,   
image 41. Stable.                             
  
                          There is no parenchymal consolidation. On the left, th  
ere is a 6 mm                             
  
                          upper lobe nodule, image 29. Stable. There is a 16 mm   
posterior upper                             
  
                          lobe lingular nodular, image 39, stable, with architec  
tural distortion                             
  
                          and adjacent linear densities and clips. There is a 3   
mm nodule in                             
  
                          profile with the proximal major fissure, image 25. Sta  
ble. There is a 6                             
  
                          mm superior segment lower lobe rosa-fissural nodule, i  
mage 32. Stable.                             
  
                          There is a 3 mm posterior lower lobe subpleural nodule  
, image 53. Stable.                             
  
                          There is a 6 mm posterior lower lobe nodule, image 57.  
 Stable.                             
  
                          The central airways are patent.                         
      
  
                          Pleural space: No pleural effusion. Left upper lingula  
r pleural                             
  
                          thickening.                              
  
                          Lower neck, lymph nodes, and mediastinum: The imaged t  
hyroid gland is                             
  
                          unremarkable. Conspicuous number of mediastinal lymph   
nodes measuring                             
  
                          less than 1 cm in short axis. A precarinal node measur  
es 0.9 cm in short                             
  
                          axis. Stable. A prominent subcarinal node measures 1.4  
 cm in short axis.                             
  
                          Stable. A left infrahilar node measures 1.2 cm in shor  
t axis, image 41 of                             
  
                          series 2. Stable. Additional hilar lymph nodes measure  
 less than 1 cm in                             
  
                          short axis.                              
  
                          Heart, pericardium, and thoracic vessels: The thoracic  
 aorta and main                             
  
                          pulmonary artery are normal in caliber. The cardiac ch  
ambers are normal                             
  
                          in size. Multivessel coronary artery atherosclerotic c  
alcifications are                             
  
                          noted, although the study is not optimized for coronar  
y assessment. No                             
  
                          pericardial effusion or thickening.                     
          
  
                          Bones and soft tissues: Redemonstrated postoperative c  
hanges of median                             
  
                          sternotomy with sternomanubrial dehiscence. Ununited l  
eft lateral fifth                             
  
                          rib fracture with callus. Redemonstrated multilevel en  
dplate                             
  
                          irregularities of the thoracic spine. No destructive b  
one lesion.                             
  
                          Upper abdomen: No contributing abnormality in the imag  
ed upper abdomen.                             
  
                           (topogram) images: Noncontributory.               
                
  
                          IMPRESSION:                              
  
                          Similar, stable findings to comparison CT chest study   
2020.                             
  
                          Stable 16 mm left upper lobe lingular nodular density   
with architectural                             
  
                          distortion and surrounding linear densities.            
                   
  
                          Stable bilateral subcentimeter sized pulmonary nodules  
.                             
  
                          Stable prominent mediastinal and left hilar lymph node  
s.                             
  
                          : MALLORY                             
  
                          Transcribe Date/Time: 2020 8:53A                  
             
  
                          Dictated by : CARLTON ELLIOTT MD                       
        
  
                          This examination was interpreted and the report review  
ed and                             
  
                          electronically signed by:                             
  
                          CARLTON ELLIOTT MD on 2020 9:28AM EST            
                   
  
  
  
  
                                        Basic Metabolic Panel w/ Reflex to MG  o  
n 10-  
  
  
  
  
             Anion gap [Moles/Vol] 10 mmol/L                              Langley, KY  
   
             Calcium [Mass/Vol] 8.5 mg/dL                 8.4 - 10.4 mg/dL Langley, KY  
   
             Chloride [Moles/Vol] 106 mmol/L                98 - 107 mmol/L Downingtown, KY  
   
             CO2 [Moles/Vol] 18 mmol/L    Low          22 - 30 mmol/L Evansville, KY  
   
             Creatinine [Mass/Vol] 1.32 mg/dL   High         0.52 - 1.25 mg/dL M  
Louisville, KY  
   
             EGFR IF NonAfrican 56.7 mL/min  Abnormal     >60          Flint, KY  
  
             American                                              
  
  
  
  
                          Comment on above:         KDIGO guidelines provide the  
 following GFR categories:  
  
                                                    Stage GFR(ml/min/1.73 m2) Te  
iggy  
  
                                                    G1 >=90 Normal or high  
  
                                                    G2 60-89  Mildly decreased*  
  
                                                    G3a 45-59 Mildly to moderate  
ly decreased  
  
                                                    G3b 30-44 Moderately to koby  
rely decreased  
  
                                                    G4 15-29 Severely decreased  
  
                                                    G5 <15 Kidney failure  
  
                                                      
  
                                                    *Relative to young adult lev  
el.  
  
                                                    In the absence of evidence o  
f kidney damage, neither GFR  
  
                                                    category G1 nor G2 fulfill t  
he criteria for CKD.  
  
                                                      
  
                                                    The CKD-EPI equation is margarita  
dated in individuals 18 years  
  
                                                    of age and older. Currently   
the best equation for  
  
                                                    estimating glomerular filtra  
tion rate (GFR) from serum  
  
                                                    creatinine in children is th  
e Bedside Keller equation.  
  
                                                    It is less accurate in patie  
nts with extremes of muscle  
  
                                                    mass, restriction of dietary  
 protein, ingestion of creatine,  
  
                                                    extra-renal metabolism of cr  
eatinine, or treatment with  
  
                                                    medications that affect main  
l tubular creatinine secretion.  
  
  
  
  
             GFR/1.73 sq M predicted 65.7 mL/min/{1.73_m2}              >60       
     Langley, KY  
  
             among blacks MDRD                                          
  
             (S/P/Bld) [Vol                                          
  
             rate/Area]                                            
   
             Glucose [Mass/Vol] 131 mg/dL    High         70 - 100 mg/dL Alburtis, KY  
  
  
  
  
                          Comment on above:         Moderately hemolysed, interp  
ret with caution.  
  
  
  
  
             Potassium [Moles/Vol] 5.9 mmol/L   High         3.5 - 5.1 mmol/L Reform, KY  
  
  
  
  
                          Comment on above:         Moderately hemolysed, interp  
ret with caution.  
  
  
  
  
             Sodium [Moles/Vol] 134 mmol/L   Low          135 - 145 mmol/L Langley, KY  
   
             Urea nitrogen [Mass/Vol] 18 mg/dL                  7 - 20 mg/dL Licking Memorial Hospital, KY  
  
  
  
  
                                        CBC  on 10-  
  
  
  
  
             Erythrocyte distribution 15.2 %       High         11.5 - 14.5 % Nationwide Children's Hospital,  
  
             width (RBC) [Ratio]                                        KY  
   
             Hematocrit (Bld) [Volume 45.1 %                    40 - 52 %    Licking Memorial Hospital,  
  
             fraction]                                           KY  
   
             Hemoglobin (Bld) 15.2 g/dL                 13 - 18 g/dL Suburban Community Hospital & Brentwood Hospital,  
  
             [Mass/Vol]                                          KY  
   
             Interpretation and review Abnormal                               Nationwide Children's Hospital,  
  
             of laboratory results                                        KY  
   
             MCH (RBC) [Entitic mass] 28.5 pg                   26 - 34 pg   Licking Memorial Hospital,  
  
                                                                 KY  
   
             MCHC (RBC) [Mass/Vol] 33.8 %                    32 - 36 %    Langley, KY  
   
             MCV (RBC) [Entitic vol] 84.3 fL                   80 - 98 fL   Lima City Hospital,  
  
                                                                 KY  
   
             Platelet mean volume (Bld) 7.7 fL                    7.4 - 10.4 fL   
Fulton County Health Center,  
  
             [Entitic vol]                                        KY  
   
             Platelets (Bld) [#/Vol] 252 10*3/uL               140 - 440    Lima City Hospital,  
  
                                                    10*3/uL      KY  
   
             RBC (Bld) [#/Vol] 5.35 10*6/uL              4.4 - 5.9    Parkview Health,  
  
                                                    10*6/uL      KY  
   
             WBC (Bld) [#/Vol] 13.0 10*3/uL High         3.6 - 10.7   Parkview Health,  
  
                                                    10*3/uL      KY  
   
                          Test Performed by                           Parkview Health,  
  
                          Munson Healthcare Cadillac Hospital,                           96 Owens Street 01904                             
  
  
  
  
                                        Lipid panel  on 10-  
  
  
  
  
             Cholesterol [Mass/Vol] 242 mg/dL    Abnormal     <200         Langley, KY  
   
             Cholesterol in HDL [Mass/Vol] 21 mg/dL     Low          40 - 60 mg/  
dL Langley, KY  
   
             Cholesterol in LDL [Mass/Vol] see below    Abnormal     <100 mg/dL   
  Langley, KY  
  
  
  
  
                          Comment on above:         LDL unable to calculate, Tri  
g >400 mg/dL  
  
                                                    Suggest ordering LDL-Chol,Di  
rect.  
  
  
  
  
             Cholesterol.total/Cholesterol in HDL [Mass ratio] 12 {ratio}         
                      Langley, KY  
  
  
  
  
                          Comment on above:         Ref Range:  
  
                                                    < 3 Low Risk for CHD  
  
                                                    3-6 Mod Risk for CHD  
  
                                                    > 6 High Risk for CHD  
  
  
  
  
             Triglyceride [Mass/Vol] 465 mg/dL    Abnormal     <150         Downingtown, KY  
  
  
  
  
                                        Other  on 10-  
  
  
  
  
             Interpretation and review of Abnormal                                
 Langley, KY  
  
             laboratory results                                          
   
                          Test Performed by Geisinger St. Luke's Hospital, Lawrence Memorial Hospital EFrench Creek, OH 00275                             
  
  
  
  
                                        POCT Glucose  on 10-  
  
  
  
  
             Glucose [Mass/Vol] 159 mg/dL    High         70 - 100 mg/dL Alburtis, KY  
  
  
  
  
                          Comment on above:         Test performed by glucose Holzer Health System. Results may be 10%-15% lower  
  
                                                    than serum/plasma values. (C  
NIALL ID 95N3779031)  
  
  
  
  
             Interpretation and review of Abnormal                                
 Langley, KY  
  
             laboratory results                                          
   
                          Test Performed by Geisinger St. Luke's Hospital, Lawrence Memorial Hospital ECatheter Connections                             
  
                          Pedro Bay, OH 62026                             
  
  
  
  
                                        Potassium   on 10-  
  
  
  
  
             Potassium [Moles/Vol] 4.1 mmol/L                3.5 - 5.1 mmol/L Reform, KY  
   
                          Test Performed by Geisinger St. Luke's Hospital, Lawrence Memorial Hospital EFrench Creek, OH                             
  
                          04931                                    
  
  
  
  
                                        Activated clotting time  on 10-  
  
  
  
  
             Activated Clotting Time 194 s        High         90 - 134 s   Downingtown, KY  
  
  
  
  
                          Comment on above:         ACTBO  
  
                                                    Performed by Hemochron Sig E  
liteCLIA ID: 18H8096489  
  
                                                    Georgetown, OH  
  
                                                    ACT testing is not intended   
for patients taking aprotonin,  
  
                                                    patients with hematocrits of  
 <20% or >55%, patients using  
  
                                                    other types of anticoagulati  
on medications, and patients with  
  
                                                    Lupus Anticoagulant.  
  
  
  
  
             Interpretation and review of Abnormal                                
 Langley, KY  
  
             laboratory results                                          
   
                          Test Performed by Geisinger St. Luke's Hospital, Lawrence Memorial Hospital E.                             
  
                          Pedro Bay, OH 82173                             
  
  
  
  
                                        Diagnostic Cardiac Cath Lab Procedure     
on 10-  
  
  
  
  
                                                    Nasim, Downey Regional Medical Center Cardiolo  
gy Results From Merge/Bjany - 10/22/2020 10:07   
PM EDT Corey Hospital CARDIOVASCULAR Blanchard Valley Health System Bluffton Hospital-  
  
                          ------------------------------------------------------  
-----------------                           OH,   
KY  
  
                          CARDIAC CATHETERIZATION                             
  
                                                                   
  
                          Patient: Willow Bradshaw                            
   
  
                          Procedure Date: 10/22/2020                             
  
                          : 1957                             
  
                          Age: 63                                  
  
                          Gender: M                                
  
                          MRN: 94907085                             
  
                          Patient Type: Outpatient                             
  
                          Account#: 722611742633                             
  
                          Accession#: 07915385696                             
  
                          Procedure physician: Beth Vyas MD                      
         
  
                          Fellow:                                  
  
                          Referring Physician: Beth Vays MD                      
         
  
                                                                   
  
                          ------------------------------------------------------  
-----------------                             
  
                          INDICATIONS: Atherosclerotic coronary artery disease.   
Previous                             
  
                          coronary artery bypass grafting. Previous stent. Charline amaya-CCS class III (                             
  
                          marked limitation of ordinary activity)                 
              
  
                                                                   
  
                          ------------------------------------------------------  
-----------------                             
  
                          Procedures performed:                             
  
                                                                   
  
                          # Right femoral sheath side-port angiography.           
                    
  
                          # Percutaneous intervention on the 80% stenosis in the  
 1st right                             
  
                          posterolateral. Balloon angioplasty.                    
           
  
                          # Percutaneous intervention on the 90% stenosis in the  
 distal right                             
  
                          coronary. Balloon angioplasty. Balloon angioplasty. St  
ent placement.                             
  
                          # Percutaneous intervention on the 90% stenosis in the  
 mid right                             
  
                          coronary. Balloon angioplasty. Balloon angioplasty. St  
ent placement.                             
  
                          Balloon angioplasty. Interventional IVUS examination.   
                            
  
                          # Percutaneous intervention on the 70% restenosis at t  
he ostium of the                             
  
                          right coronary. Interventional IVUS examination. Stent  
 placement.                             
  
                          Interventional IVUS examination.                        
       
  
                                                                   
  
                          ------------------------------------------------------  
-----------------                             
  
                          SUMMARY: 62yo male with multiple prior RCA PCIs and kramer  
bsequent CABGx3                             
  
                           (LIMA to LAD, SVG to Diag, and SVG to OM) who und  
erwent cath today                             
  
                          due to recurrent progressive exertional angina. Patent  
 LIMA to LAD and p                             
  
                          atent SVG to OM, with presume occluded SVG to small Di  
ag; severe diffuse                             
  
                          recurrent in-stent restenosis of vecljzbq-rmd-szqrpy d  
ominant RCA, whic                             
  
                          h appeared to be culprit for his recurrent symptoms. A  
d hoc RCA PCI requ                             
  
                          ested.                                   
  
                          IMPRESSIONS:                             
  
                                                                   
  
                          1. Successful IVUS-guided PCI of kjdnutos-qrt-sfffzk R  
CA ISR with                             
  
                          extensive PTCA and deployment of 3 Xience SYDNIE (4.0x18m  
m in                             
  
                          ostial/proximal, 3.5x38mm in mid overlapped with 3.0x3  
8mm in distal                             
  
                          RCA; distal stent extended across the crux into the RP  
DA; RPL was                             
  
                          jailed by the stent but remained patent.)               
                
  
                          2. Successful balloon angioplasty only of the ostial R  
PL branch; RPL                             
  
                          jailed by the distal RCA stent but remained patent.     
                          
  
                          RECOMMENDATIONS:                             
  
                                                                   
  
                          1. The patient should continue with the present medica  
tions.                             
  
                          2. Patient management should include aggressive risk f  
actor                             
  
                          modification.                             
  
                          3. Patient management should include aggressive medica  
l management of                             
  
                          CAD.                                     
  
                          4. Clopidogrel (Plavix), 75mgPOdaily, for life.         
                      
  
                          5. Aspirin, 81mgPOdaily, indefinitely.                  
             
  
                          6. Echo to reassess LVEF and valves.                    
           
  
                                                                   
  
                          ------------------------------------------------------  
-----------------                             
  
                          HISTORY: Cerebrovascular disease. PVD, iliac stents. P  
MH:                             
  
                          Myocardial infarction. Peripheral arterial disease. Ch  
alvaro lung disea                             
  
                          se. Risk factors: Current tobacco use. Hypertension. D  
hermelinda rainey                             
  
                          s; on therapy with oral hypoglycemics. Dyslipidemia. F  
amily history is s                             
  
                          ignificant for coronary artery disease.                 
              
  
                                                                   
  
                          ------------------------------------------------------  
-----------------                             
  
                          LABS, PRIOR TESTS, PROCEDURES, and SURGERY:             
                  
  
                          Catheterization with coronary intervention (2014  
).                             
  
                                                                   
  
                          Coronary artery bypass grafting (2017).           
                    
  
                                                                   
  
                          ------------------------------------------------------  
-----------------                             
  
                          PROCEDURE IN DETAIL: Study status: Cardiac cath: elect  
franky. Consent:                             
  
                          The risks, benefits, and alternatives to the procedure  
 and sedation wer                             
  
                          e explained to the patient and informed consent was ob  
tained. Fluorosco                             
  
                          py time: Fluoroscopy time: 30.2 min. Fluoroscopy dose:  
 Fluoroscopy do                             
  
                          se: 201.7 cGy. Location: Catheterization laboratory.    
                           
  
                          PROCEDURE:                               
  
                                                                   
  
                          1. Initial setup. The patient was brought to the labor  
Naval Hospital Jacksonville. A                             
  
                          baseline ECG was recorded. Intravenous access was obta  
ined. Surface                             
  
                          ECG leads, blood pressure measurements, and pulse oxim  
etric signals                             
  
                          were monitored.                             
  
                          2. Skin preparation. The planned puncture sites were p  
repped and                             
  
                          draped in the usual sterile manner.                     
          
  
                          3. Sheath exchange. The right femoral artery sheath wa  
s exchanged for                             
  
                          a 6Fr x 23cm Brite Tip sheath.                          
     
  
                          4. Angioplasty was performed on the stenosis in the 1s  
t right                             
  
                          posterolateral branch. See detailed description below   
(1st lesion                             
  
                          intervention).                             
  
                          5. A stent was placed in the stenosis in the distal ri  
ght coronary                             
  
                          artery. See detailed description below (2nd lesion int  
ervention).                             
  
                          6. A stent was placed in the stenosis in the mid right  
 coronary                             
  
                          artery. See detailed description below (3rd lesion int  
ervention).                             
  
                          7. A stent was placed in the stenosis in the proximal   
right coronary                             
  
                          artery. See detailed description below (4th lesion int  
ervention).                             
  
                          8. Sheath exchange. The right femoral artery sheath wa  
s exchanged for                             
  
                          a 6Fr/12cm Engage sheath.                             
  
                          9. Right femoral sheath side-port angiography, under f  
luoroscopic                             
  
                          guidance. Contrast was injected into the sheath side p  
ort. Images                             
  
                          were obtained using ACOSTA projections.                    
           
  
                          10. Right femoral artery hemostasis. The sheath was re  
moved. Vessel                             
  
                          closure was achieved with a 6Fr Perclose ProGlide lópez  
ce.                             
  
                          Hemostasis was successfully obtained.                   
            
  
                          11. ACT was 194 sec.                             
  
                          1st lesion intervention:                             
  
                          Percutaneous intervention on the 80% stenosis in the 1  
st right                             
  
                          posterolateral.                             
  
                                                                   
  
                          1. A 0.014 /182cm Luge Mod Support wire was placed acr  
oss the lesion in                             
  
                          the 1st right posterolateral branch.                    
           
  
                          2. Balloon angioplasty. A 2.5 mm (D) x 15 mm (L), NC T  
rek RX balloon                             
  
                          was employed. The balloon was placed across the lesion  
 and given two                             
  
                          inflations with a maximum inflation pressure of 10 bella  
.                             
  
                          2nd lesion intervention:                             
  
                          Percutaneous intervention on the 90% stenosis in the d  
istal right                             
  
                          coronary.                                
  
                                                                   
  
                          1. Guider placement: a 6Fr x 100cm Launcher AL 1 kedar  
ng catheter was                             
  
                          placed in the ostium of the right coronary artery.      
                         
  
                          2. A 0.014 /182cm Luge Mod Support wire was placed acr  
oss the lesion in                             
  
                          the right coronary artery and extended into the RPDA.   
                            
  
                          3. A 0.014 /182cm Luge Mod Support wire was placed in   
order to protect                             
  
                          a side branch in the 1st right posterolateral branch.   
                            
  
                          4. Balloon angioplasty. A 2.5 mm (D) x 15 mm (L), NC T  
rek RX balloon                             
  
                          was employed. The balloon was placed across the lesion  
 and given six                             
  
                          inflations with a maximum inflation pressure of 20 bella  
.                             
  
                          5. Balloon angioplasty. A 3 mm (D) x 15 mm (L), NC Connor  
k RX balloon was                             
  
                          employed. The balloon was placed across the lesion and  
 given six                             
  
                          inflations with a maximum inflation pressure of 20 bella  
.                             
  
                          6. Guider placement: a 6Fr GuideLiner guiding catheter  
 was placed in                             
  
                          the distal right coronary artery.                       
        
  
                          7. Stent placement. A 3 mm (D) x 38 mm (L), Xience Sie  
rra RX (drug                             
  
                          eluting) stent was used. The stent was advanced across  
 the lesion                             
  
                          and deployed with a single inflation and a maximum pre  
ssure of 16                             
  
                          bella. Stent extended across the crux into the proximal   
RPDA, jailing                             
  
                          the RPL which remained patent.                          
     
  
                          3rd lesion intervention:                             
  
                          Percutaneous intervention on the 90% stenosis in the m  
id right                             
  
                          coronary.                                
  
                                                                   
  
                          1. Balloon angioplasty. A NC Trek RX(2.50 x 15) balloo  
n was employed.                             
  
                          The balloon was placed across the lesion and given fou  
r inflations                             
  
                          with a maximum inflation pressure of 16 bella.            
                   
  
                          2. Balloon angioplasty. A 3 mm (D) x 15 mm (L), NC Connor  
k RX balloon was                             
  
                          employed. The balloon was placed across the lesion and  
 given six                             
  
                          inflations with a maximum inflation pressure of 24 bella  
.                             
  
                          3. Guider placement: a 6Fr GuideLiner guiding catheter  
 was placed.                             
  
                          4. Stent placement. A 3.5 mm (D) x 38 mm (L), Xience S  
ierra RX (drug                             
  
                          eluting) stent was used. The stent was advanced across  
 the lesion                             
  
                          and deployed with a single inflation and a maximum pre  
ssure of 20                             
  
                          bella. Short region of stent overlap with the proximal e  
dge of the                             
  
                          distal RCA stent.                             
  
                          5. Balloon angioplasty. A 3.5 mm (D) x 15 mm (L), NC T  
rek RX balloon                             
  
                          was employed. The balloon was placed across the lesion  
 and given                             
  
                          three inflations with a maximum inflation pressure of   
24 eblla.                             
  
                          6. Post-intervention intravascular ultrasound evaluati  
on, using a .014                             
  
                          Agua Caliente Eye Capitan Grande catheter. Based on the results of t  
his study, the                             
  
                          lesion was judged to be adequately treated and no randi  
tional                             
  
                          intervention was warranted.                             
  
                          4th lesion intervention:                             
  
                          Percutaneous intervention on the 70% restenosis at the  
 ostium of the                             
  
                          right coronary.                             
  
                                                                   
  
                          1. Pre-intervention intravascular ultrasound evaluatio  
n, using a .014                             
  
                          Agua Caliente Eye Capitan Grande catheter.                            
   
  
                          2. Stent placement. A 4 mm (D) x 18 mm (L), Xience Sie  
rra RX (drug                             
  
                          eluting) stent was used. The stent was advanced across  
 the lesion                             
  
                          and deployed with a single inflation and a maximum pre  
ssure of 15                             
  
                          bella.                                     
  
                          3. Post-intervention intravascular ultrasound evaluati  
on, using a .014                             
  
                          Agua Caliente Eye Capitan Grande catheter. Based on the results of t  
his study, the                             
  
                          lesion was judged to be adequately treated and no randi  
tional                             
  
                          intervention was warranted.                             
  
                          STUDY COMPLETION: The estimated blood loss was 20 ml.   
All catheters                             
  
                          inserted during the procedure were removed. The patien  
t tolerated the pr                             
  
                          ocedure well and was discharged from the lab. There we  
re no complication                             
  
                          s. Administered medications: Midazolam, 2mg, IV, VERSE  
D. Heparin, 70                             
  
                          00units, IV, HEPARIN. Fentanyl, for a total dose of 10  
0mcg, IV, FENTANY                             
  
                          L. Contrast: 1. ISOVUE 300MG/ ml (total dose).    
                           
  
                                                                   
  
                          ------------------------------------------------------  
-----------------                             
  
                          CORONARY ARTERIES: The coronary circulation is right d  
ominant. The                             
  
                          left main bifurcates normally into the LAD and circumf  
jacinda. The right cor                             
  
                          onary gives rise to the posterior descending artery an  
d 1 posterolateral                             
  
                          .                                        
  
                          Right coronary: Distal vessel lesion: The diagnostic s  
tudy                             
  
                          demonstrated a diffuse, 90%stenosis. This lesion appea  
rs consistent with                             
  
                          atherosclerotic disease. It is a bifurcation lesion. T  
here is ALFIE grad                             
  
                          e 3 flow (brisk flow) across the lesion. The distal ve  
ssel supplies a mo                             
  
                          derate-sized vascular territory. The lesion is a likel  
y culprit for the                             
  
                          patient's anginal symptoms. The lesion presents an ACC  
/AHA type C  high                             
  
                          risk  lesion for intervention. Stent placement was per  
formed, with ball                             
  
                          oon angioplasty, jailing the first right posterolatera  
l, resulting in an                             
  
                          excellent angiographic appearance (see 2nd lesion inte  
rvention). Follow                             
  
                          ing intervention, there is a residual 0% stenosis with  
 ALFIE grade 3 flow                             
  
                          (brisk flow). There were no site complications. Mid-ve  
ssel lesion: The                             
  
                          diagnostic study demonstrated a diffuse, 90%stenosis.   
This lesion appea                             
  
                          rs moderately calcified and consistent with atheroscle  
rotic disease. It                             
  
                          is not a bifurcation lesion. There is ALFIE grade 3 zeferino  
w (brisk flow) acr                             
  
                          oss the lesion. The distal vessel supplies a moderate-  
sized vascular ter                             
  
                          ritory. The lesion is a likely culprit for the patient  
's anginal symptom                             
  
                          s. The lesion presents an ACC/AHA type C  high risk  l  
esion for interven                             
  
                          tion. Stent placement was performed, with balloon sebastian  
oplasty, resultin                             
  
                          g in an excellent angiographic appearance (see 3rd les  
ion intervention).                             
  
                          Following intervention, there is a residual 5% stenosi  
s with ALFIE grade                             
  
                          3 flow (brisk flow). There were no site complications.  
 Ostial lesion:                             
  
                          The diagnostic study demonstrated a tubular, 70%resten  
osis. This lesion                             
  
                          appears moderately calcified. It is not a bifurcation   
lesion. There is T                             
  
                          IMI grade 3 flow (brisk flow) across the lesion. The d  
istal vessel suppl                             
  
                          ies a large vascular territory. The lesion is a likely  
 culprit for the p                             
  
                          atient's anginal symptoms. The lesion presents an ACC/  
AHA type B1  moder                             
  
                          ate risk  lesion for intervention, with 1 adverse luciana  
acteristic. Stent                             
  
                          placement was performed, with balloon angioplasty and/  
or intravascular                             
  
                          ultrasound, resulting in an excellent angiographic kiesha  
earance (see 4th l                             
  
                          esion intervention). Following intervention, there is   
a residual 0% sten                             
  
                          osis with ALFIE grade 3 flow (brisk flow). There were n  
o site complicatio                             
  
                          ns.                                      
  
                          Right posterior descending: Prior intervention: stent   
in the proximal                             
  
                          RPDA. The stented segment is patent.                    
           
  
                          1st right posterolateral: Lesion: The diagnostic study  
 demonstrated a                             
  
                          discrete, 80%stenosis. This lesion appears consistent   
with atherosclerot                             
  
                          ic disease. It is a bifurcation lesion. There is ALFIE   
grade 3 flow (bris                             
  
                          k flow) across the lesion. The distal vessel supplies   
a moderate-sized v                             
  
                          ascular territory. The lesion is a likely culprit for   
the patient's sebastian                             
  
                          nal symptoms. The lesion presents an ACC/AHA type B2    
moderate risk  les                             
  
                          ion for intervention, with 2 or more adverse character  
istics. Angioplas                             
  
                          ty was performed, resulting in an excellent angiograph  
ic appearance (see                             
  
                          1st lesion intervention). Following intervention, ther  
e is a residual 1                             
  
                          0% stenosis with ALFIE grade 3 flow (brisk flow). There  
 were no site comp                             
  
                          lications.                               
  
                          HEMODYNAMICS:                             
  
                                                                   
  
                          +-------------------------+---------------------+       
                        
  
                          +Stage description +Condition1:Room Air -+              
                 
  
                          +-------------------------+---------------------+       
                        
  
                          +Arterial pressure s/d (m)+88/53 (69) +                 
              
  
                          +-------------------------+---------------------+       
                        
  
                          Prepared and electronically signed by                   
            
  
                                                                   
  
                          Beth Vyas MD                             
  
                          10/22/2020 22:07                             
   
                          Corey Hospital CARDIOVASCULAR INSTITUTE                          
   Mercy Health St. Joseph Warren Hospital-  
  
                          ------------------------------------------------------  
-----------------                           OH,   
KY  
  
                          CARDIAC CATHETERIZATION                             
  
                                                                   
  
                          Patient:  Willow Bradshaw                           
    
  
                          Procedure Date: 10/22/2020                             
  
                          : 1957                             
  
                          Age: 63                                  
  
                          Gender: M                                
  
                          MRN: 66287516                             
  
                          Patient Type: Outpatient                             
  
                          Account#:  520258083576                             
  
                          Accession#: 84000632262                             
  
                          Procedure physician: Beth Vyas MD                      
         
  
                          Fellow:                                  
  
                          Referring Physician: Beth Vyas MD                      
         
  
                                                                   
  
                          ------------------------------------------------------  
-----------------                             
  
                          INDICATIONS: Atherosclerotic coronary artery disease.   
Previous                             
  
                          coronary artery bypass grafting. Previous stent. Charline amaya-CCS class III (                             
  
                          marked limitation of ordinary activity)                 
              
  
                                                                   
  
                          ------------------------------------------------------  
-----------------                             
  
                          Procedures performed:                             
  
                                                                   
  
                          # Right femoral sheath side-port angiography.           
                    
  
                          # Percutaneous intervention on the 80% stenosis in the  
 1st right                             
  
                          posterolateral. Balloon angioplasty.                    
           
  
                          # Percutaneous intervention on the 90% stenosis in the  
 distal right                             
  
                          coronary. Balloon angioplasty. Balloon angioplasty. St  
ent placement.                             
  
                          # Percutaneous intervention on the 90% stenosis in the  
 mid right                             
  
                          coronary. Balloon angioplasty. Balloon angioplasty. St  
ent placement.                             
  
                          Balloon angioplasty. Interventional IVUS examination.   
                            
  
                          # Percutaneous intervention on the 70% restenosis at t  
he ostium of the                             
  
                          right coronary. Interventional IVUS examination. Stent  
 placement.                             
  
                          Interventional IVUS examination.                        
       
  
                                                                   
  
                          ------------------------------------------------------  
-----------------                             
  
                          SUMMARY: 62yo male with multiple prior RCA PCIs and kramer  
bsequent CABGx3                             
  
                           (LIMA to LAD, SVG to Diag, and SVG to OM) who und  
erwent cath today                             
  
                          due to recurrent progressive exertional angina. Patent  
 LIMA to LAD and p                             
  
                          atent SVG to OM, with presume occluded SVG to small Di  
ag; severe diffuse                             
  
                          recurrent in-stent restenosis of oynyvwiy-ztk-mjlekt d  
ominant RCA, whic                             
  
                          h appeared to be culprit for his recurrent symptoms. A  
d hoc RCA PCI requ                             
  
                          ested.                                   
  
                          IMPRESSIONS:                             
  
                                                                   
  
                          1. Successful IVUS-guided PCI of klhwgjbu-mup-yerwit R  
CA ISR with                             
  
                          extensive PTCA and deployment of 3 Xience SYDNIE (4.0x18m  
m in                             
  
                          ostial/proximal, 3.5x38mm in mid overlapped with 3.0x3  
8mm in distal                             
  
                          RCA; distal stent extended across the crux into the RP  
DA; RPL was                             
  
                          jailed by the stent but remained patent.)               
                
  
                          2. Successful balloon angioplasty only of the ostial R  
PL branch; RPL                             
  
                          jailed by the distal RCA stent but remained patent.     
                          
  
                          RECOMMENDATIONS:                             
  
                                                                   
  
                          1. The patient should continue with the present medica  
tions.                             
  
                          2. Patient management should include aggressive risk f  
actor                             
  
                          modification.                             
  
                          3. Patient management should include aggressive medica  
l management of                             
  
                          CAD.                                     
  
                          4. Clopidogrel (Plavix), 75mgPOdaily, for life.         
                      
  
                          5. Aspirin, 81mgPOdaily, indefinitely.                  
             
  
                          6. Echo to reassess LVEF and valves.                    
           
  
                                                                   
  
                          ------------------------------------------------------  
-----------------                             
  
                          HISTORY: Cerebrovascular disease. PVD, iliac stents. P  
MH:                             
  
                          Myocardial infarction. Peripheral arterial disease. Ch  
ronic lung disea                             
  
                          se. Risk factors: Current tobacco use. Hypertension. D  
hermelinda patelitu                             
  
                          s; on therapy with oral hypoglycemics. Dyslipidemia. F  
amily history is s                             
  
                          ignificant for coronary artery disease.                 
              
  
                                                                   
  
                          ------------------------------------------------------  
-----------------                             
  
                          LABS, PRIOR TESTS, PROCEDURES, and SURGERY:             
                  
  
                          Catheterization with coronary intervention (2014  
).                             
  
                                                                   
  
                          Coronary artery bypass grafting (2017).           
                    
  
                                                                   
  
                          ------------------------------------------------------  
-----------------                             
  
                          PROCEDURE IN DETAIL: Study status: Cardiac cath: elect  
franky. Consent:                             
  
                          The risks, benefits, and alternatives to the procedure  
 and sedation wer                             
  
                          e explained to the patient and informed consent was ob  
tained. Fluorosco                             
  
                          py time: Fluoroscopy time: 30.2 min. Fluoroscopy dose:  
 Fluoroscopy do                             
  
                          se: 201.7 cGy. Location: Catheterization laboratory.    
                           
  
                          PROCEDURE:                               
  
                                                                   
  
                          1. Initial setup. The patient was brought to the labor  
Naval Hospital Jacksonville. A                             
  
                          baseline ECG was recorded. Intravenous access was obta  
ined. Surface                             
  
                          ECG leads, blood pressure measurements, and pulse oxim  
etric signals                             
  
                          were monitored.                             
  
                          2. Skin preparation. The planned puncture sites were p  
repped and                             
  
                          draped in the usual sterile manner.                     
          
  
                          3. Sheath exchange. The right femoral artery sheath wa  
s exchanged for                             
  
                          a 6Fr x 23cm Brite Tip sheath.                          
     
  
                          4. Angioplasty was performed on the stenosis in the 1s  
t right                             
  
                          posterolateral branch. See detailed description below   
(1st lesion                             
  
                          intervention).                             
  
                          5. A stent was placed in the stenosis in the distal ri  
ght coronary                             
  
                          artery. See detailed description below (2nd lesion int  
ervention).                             
  
                          6. A stent was placed in the stenosis in the mid right  
 coronary                             
  
                          artery. See detailed description below (3rd lesion int  
ervention).                             
  
                          7. A stent was placed in the stenosis in the proximal   
right coronary                             
  
                          artery. See detailed description below (4th lesion int  
ervention).                             
  
                          8. Sheath exchange. The right femoral artery sheath wa  
s exchanged for                             
  
                          a 6Fr/12cm Engage sheath.                             
  
                          9. Right femoral sheath side-port angiography, under f  
luoroscopic                             
  
                          guidance. Contrast was injected into the sheath side p  
ort. Images                             
  
                          were obtained using ACOSTA projections.                    
           
  
                          10. Right femoral artery hemostasis. The sheath was re  
moved. Vessel                             
  
                          closure was achieved with a 6Fr Perclose ProGlide lópez  
ce.                             
  
                          Hemostasis was successfully obtained.                   
            
  
                          11. ACT was 194 sec.                             
  
                          1st lesion intervention:                             
  
                          Percutaneous intervention on the 80% stenosis in the 1  
st right                             
  
                          posterolateral.                             
  
                                                                   
  
                          1. A 0.014 /182cm Luge Mod Support wire was placed acr  
oss the lesion in                             
  
                          the 1st right posterolateral branch.                    
           
  
                          2. Balloon angioplasty. A 2.5 mm (D) x 15 mm (L), NC T  
rek RX balloon                             
  
                          was employed. The balloon was placed across the lesion  
 and given two                             
  
                          inflations with a maximum inflation pressure of 10 bella  
.                             
  
                          2nd lesion intervention:                             
  
                          Percutaneous intervention on the 90% stenosis in the d  
istal right                             
  
                          coronary.                                
  
                                                                   
  
                          1. Guider placement: a 6Fr x 100cm Launcher AL 1 kedar  
ng catheter was                             
  
                          placed in the ostium of the right coronary artery.      
                         
  
                          2. A 0.014 /182cm Luge Mod Support wire was placed acr  
oss the lesion in                             
  
                          the right coronary artery and extended into the RPDA.   
                            
  
                          3. A 0.014 /182cm Luge Mod Support wire was placed in   
order to protect                             
  
                          a side branch in the 1st right posterolateral branch.   
                            
  
                          4. Balloon angioplasty. A 2.5 mm (D) x 15 mm (L), NC T  
rek RX balloon                             
  
                          was employed. The balloon was placed across the lesion  
 and given six                             
  
                          inflations with a maximum inflation pressure of 20 bella  
.                             
  
                          5. Balloon angioplasty. A 3 mm (D) x 15 mm (L), NC Connor  
k RX balloon was                             
  
                          employed. The balloon was placed across the lesion and  
 given six                             
  
                          inflations with a maximum inflation pressure of 20 bella  
.                             
  
                          6. Guider placement: a 6Fr GuideLiner guiding catheter  
 was placed in                             
  
                          the distal right coronary artery.                       
        
  
                          7. Stent placement. A 3 mm (D) x 38 mm (L), Xience Sie  
rra RX (drug                             
  
                          eluting) stent was used. The stent was advanced across  
 the lesion                             
  
                          and deployed with a single inflation and a maximum pre  
ssure of 16                             
  
                          bella. Stent extended across the crux into the proximal   
RPDA, jailing                             
  
                          the RPL which remained patent.                          
     
  
                          3rd lesion intervention:                             
  
                          Percutaneous intervention on the 90% stenosis in the m  
id right                             
  
                          coronary.                                
  
                                                                   
  
                          1. Balloon angioplasty. A NC Trek RX(2.50 x 15) balloo  
n was employed.                             
  
                          The balloon was placed across the lesion and given fou  
r inflations                             
  
                          with a maximum inflation pressure of 16 bella.            
                   
  
                          2. Balloon angioplasty. A 3 mm (D) x 15 mm (L), NC Connor  
k RX balloon was                             
  
                          employed. The balloon was placed across the lesion and  
 given six                             
  
                          inflations with a maximum inflation pressure of 24 bella  
.                             
  
                          3. Guider placement: a 6Fr GuideLiner guiding catheter  
 was placed.                             
  
                          4. Stent placement. A 3.5 mm (D) x 38 mm (L), Xience S  
ierra RX (drug                             
  
                          eluting) stent was used. The stent was advanced across  
 the lesion                             
  
                          and deployed with a single inflation and a maximum pre  
ssure of 20                             
  
                          bella. Short region of stent overlap with the proximal e  
dge of the                             
  
                          distal RCA stent.                             
  
                          5. Balloon angioplasty. A 3.5 mm (D) x 15 mm (L), NC T  
rek RX balloon                             
  
                          was employed. The balloon was placed across the lesion  
 and given                             
  
                          three inflations with a maximum inflation pressure of   
24 bella.                             
  
                          6. Post-intervention intravascular ultrasound evaluati  
on, using a .014                             
  
                          Agua Caliente Eye Capitan Grande catheter. Based on the results of t  
his study, the                             
  
                          lesion was judged to be adequately treated and no randi  
tional                             
  
                          intervention was warranted.                             
  
                          4th lesion intervention:                             
  
                          Percutaneous intervention on the 70% restenosis at the  
 ostium of the                             
  
                          right coronary.                             
  
                                                                   
  
                          1. Pre-intervention intravascular ultrasound evaluatio  
n, using a .014                             
  
                          Agua Caliente Eye Capitan Grande catheter.                            
   
  
                          2. Stent placement. A 4 mm (D) x 18 mm (L), Xience Sie  
rra RX (drug                             
  
                          eluting) stent was used. The stent was advanced across  
 the lesion                             
  
                          and deployed with a single inflation and a maximum pre  
ssure of 15                             
  
                          bella.                                     
  
                          3. Post-intervention intravascular ultrasound evaluati  
on, using a .014                             
  
                          Agua Caliente Eye Capitan Grande catheter. Based on the results of t  
his study, the                             
  
                          lesion was judged to be adequately treated and no randi  
tional                             
  
                          intervention was warranted.                             
  
                          STUDY COMPLETION: The estimated blood loss was 20 ml.   
All catheters                             
  
                          inserted during the procedure were removed. The patien  
t tolerated the pr                             
  
                          ocedure well and was discharged from the lab. There we  
re no complication                             
  
                          s. Administered medications: Midazolam, 2mg, IV, VERSE  
D. Heparin, 70                             
  
                          00units, IV, HEPARIN. Fentanyl, for a total dose of 10  
0mcg, IV, FENTANY                             
  
                          L. Contrast: 1. ISOVUE 300MG/ ml (total dose).    
                           
  
                                                                   
  
                          ------------------------------------------------------  
-----------------                             
  
                          CORONARY ARTERIES: The coronary circulation is right d  
ominant. The                             
  
                          left main bifurcates normally into the LAD and circumf  
jacinda. The right cor                             
  
                          onary gives rise to the posterior descending artery an  
d 1 posterolateral                             
  
                          .                                        
  
                          Right coronary: Distal vessel lesion: The diagnostic s  
tudy                             
  
                          demonstrated a diffuse, 90%stenosis. This lesion appea  
rs consistent with                             
  
                          atherosclerotic disease. It is a bifurcation lesion. T  
here is ALFIE grad                             
  
                          e 3 flow (brisk flow) across the lesion. The distal ve  
ssel supplies a mo                             
  
                          derate-sized vascular territory. The lesion is a likel  
y culprit for the                             
  
                          patient's anginal symptoms. The lesion presents an ACC  
/AHA type C  high                             
  
                          risk  lesion for intervention. Stent placement was per  
formed, with ball                             
  
                          oon angioplasty, jailing the first right posterolatera  
l, resulting in an                             
  
                          excellent angiographic appearance (see 2nd lesion inte  
rvention). Follow                             
  
                          ing intervention, there is a residual 0% stenosis with  
 ALFIE grade 3 flow                             
  
                          (brisk flow). There were no site complications. Mid-ve  
ssel lesion: The                             
  
                          diagnostic study demonstrated a diffuse, 90%stenosis.   
This lesion appea                             
  
                          rs moderately calcified and consistent with atheroscle  
rotic disease. It                             
  
                          is not a bifurcation lesion. There is ALFIE grade 3 zeferino  
w (brisk flow) acr                             
  
                          oss the lesion. The distal vessel supplies a moderate-  
sized vascular ter                             
  
                          ritory. The lesion is a likely culprit for the patient  
's anginal symptom                             
  
                          s. The lesion presents an ACC/AHA type C  high risk  l  
esion for interven                             
  
                          tion. Stent placement was performed, with balloon sebastian  
oplasty, resultin                             
  
                          g in an excellent angiographic appearance (see 3rd les  
ion intervention).                             
  
                          Following intervention, there is a residual 5% stenosi  
s with ALFIE grade                             
  
                          3 flow (brisk flow). There were no site complications.  
 Ostial lesion:                             
  
                          The diagnostic study demonstrated a tubular, 70%resten  
osis. This lesion                             
  
                          appears moderately calcified. It is not a bifurcation   
lesion. There is T                             
  
                          IMI grade 3 flow (brisk flow) across the lesion. The d  
istal vessel suppl                             
  
                          ies a large vascular territory. The lesion is a likely  
 culprit for the p                             
  
                          atient's anginal symptoms. The lesion presents an ACC/  
AHA type B1  moder                             
  
                          ate risk  lesion for intervention, with 1 adverse luciana  
acteristic. Stent                             
  
                          placement was performed, with balloon angioplasty and/  
or intravascular                             
  
                          ultrasound, resulting in an excellent angiographic kiesha  
earance (see 4th l                             
  
                          esion intervention). Following intervention, there is   
a residual 0% sten                             
  
                          osis with ALFIE grade 3 flow (brisk flow). There were n  
o site complicatio                             
  
                          ns.                                      
  
                          Right posterior descending: Prior intervention: stent   
in the proximal                             
  
                          RPDA. The stented segment is patent.                    
           
  
                          1st right posterolateral: Lesion: The diagnostic study  
 demonstrated a                             
  
                          discrete, 80%stenosis. This lesion appears consistent   
with atherosclerot                             
  
                          ic disease. It is a bifurcation lesion. There is ALFIE   
grade 3 flow (bris                             
  
                          k flow) across the lesion. The distal vessel supplies   
a moderate-sized v                             
  
                          ascular territory. The lesion is a likely culprit for   
the patient's sebastian                             
  
                          nal symptoms. The lesion presents an ACC/AHA type B2    
moderate risk  les                             
  
                          ion for intervention, with 2 or more adverse character  
istics. Angioplas                             
  
                          ty was performed, resulting in an excellent angiograph  
ic appearance (see                             
  
                          1st lesion intervention). Following intervention, ther  
e is a residual 1                             
  
                          0% stenosis with ALFIE grade 3 flow (brisk flow). There  
 were no site comp                             
  
                          lications.                               
  
                          HEMODYNAMICS:                             
  
                                                                   
  
                          +-------------------------+---------------------+       
                        
  
                          +Stage description +Condition1:Room Air -+              
                 
  
                          +-------------------------+---------------------+       
                        
  
                          +Arterial pressure s/d (m)+88/53 (69) +                 
              
  
                          +-------------------------+---------------------+       
                        
  
                          Prepared and electronically signed by                   
            
  
                                                                   
  
                          Beth Vyas MD                             
  
                          10/22/2020 22:07                             
   
                                                    Nasim Summmonique Incoming Cardiolo  
gy Results From Sparkle/Marta - 10/22/2020 1:59 PM  
 EDT Corey Hospital CARDIOVASCULAR INSTITUTE                                         Shar brandon  
  
                                                                 Health-  
  
                          ------------------------------------------------------  
-----------------                           OH,   
KY  
  
                          CARDIAC CATHETERIZATION                             
  
                                                                   
  
                          Patient: Willow Bradshaw                            
   
  
                          Procedure Date: 10/22/2020                             
  
                          : 1957                             
  
                          Age: 63                                  
  
                          Gender: M                                
  
                          MRN: 84635902                             
  
                          Patient Type: Outpatient                             
  
                          Account#: 767154732687                             
  
                          Accession#: 29156520517                             
  
                          Procedure physician: Odell Bryan DO, FSVM, FAC C                             
  
                          Fellow:                                  
  
                          Referring Physician: Odell Bryan DO, FSVM, FAC C                             
  
                                                                   
  
                          ------------------------------------------------------  
-----------------                             
  
                          INDICATIONS: Angina refractory to medical management.   
                            
  
                                                                   
  
                          ------------------------------------------------------  
-----------------                             
  
                          Procedures performed:                             
  
                                                                   
  
                          # Left coronary angiography.                            
   
  
                          # Right coronary angiography.                           
    
  
                          # Saphenous vein graft angiography.                     
          
  
                          # LIMA graft angiography.                             
  
                                                                   
  
                          ------------------------------------------------------  
-----------------                             
  
                          SUMMARY:                                 
  
                                                                   
  
                          1. Left main: Distal vessel lesion: There is a 70% hector  
nosis.                             
  
                          2. Left circumflex: Prior intervention: stent in the p  
roximal left                             
  
                          circumflex. The stented segment is patent. Proximal ve  
ssel lesion:                             
  
                          There is a 100% stenosis.                             
  
                          3. Right coronary: Very large and super dominant vesse  
l. Prior                             
  
                          intervention #3: stent in the RCA. The stent originate  
d in the                             
  
                          proximal vessel. The stents are present in to the PDA.  
 70% mid RCA                             
  
                          instent restenoosis. 70% distal RCA stent restenosis.   
Believed to be                             
  
                          the culprit vessell for anginal syndrome. Mid-vessel l  
esion: There                             
  
                          is a 70% stenosis.                             
  
                          4. Right posterior descending: Prior intervention: hector  
nt in the ostial                             
  
                          RPDA. The stented segment is patent. Lesion: There is   
a 70%                             
  
                          stenosis.                                
  
                          5. Saphenous vein graft to the mid 2nd obtuse marginal  
, from the aorta:                             
  
                          The graft is patent.                             
  
                          6. LIMA graft to the LAD: The graft is normal.          
                     
  
                          7. Saphenous vein graft to the mid 1st diagonal, from   
the aorta: The                             
  
                          graft was not visualized. Presumed occluded. Did not w  
ant to use                             
  
                          contrast for this as natove vessel was filling from na  
tive                             
  
                          circulastion.                             
  
                          IMPRESSIONS:                             
  
                                                                   
  
                          1. Progressive CAD with accelerating angina. RCA likel  
y culprit vessel.                             
  
                          2. Patent LIMA to LAD-Dg.                             
  
                          Patent SVG to OM3.                             
  
                          Unvisualized, presumably occluded SVG to Dg1.           
                    
  
                          RECOMMENDATIONS: PCI RCA. Evaluate distal LMT if able.  
                             
  
                                                                   
  
                          ------------------------------------------------------  
-----------------                             
  
                          HISTORY: Cerebrovascular disease. PVD, iliac stents. P  
MH:                             
  
                          Myocardial infarction. Peripheral arterial disease. Ch  
ronic lung disea                             
  
                          se. Risk factors: Current tobacco use. Hypertension. D  
iabetes mellitu                             
  
                          s; on therapy with oral hypoglycemics. Dyslipidemia. F  
amily history is s                             
  
                          ignificant for coronary artery disease.                 
              
  
                                                                   
  
                          ------------------------------------------------------  
-----------------                             
  
                          LABS, PRIOR TESTS, PROCEDURES, and SURGERY:             
                  
  
                          Catheterization with coronary intervention (2014  
).                             
  
                                                                   
  
                          Coronary artery bypass grafting (2017).           
                    
  
                                                                   
  
                          ------------------------------------------------------  
-----------------                             
  
                          PROCEDURE IN DETAIL: Study status: Cardiac cath: elect  
franky. Consent:                             
  
                          The risks, benefits, and alternatives to the procedure  
 and sedation wer                             
  
                          e explained to the patient and informed consent was ob  
tained. Fluorosco                             
  
                          py time: Fluoroscopy time: 16 min. Fluoroscopy dose: F  
luoroscopy dose                             
  
                          : 77.9 cGy. Location: Catheterization laboratory.       
                        
  
                          PROCEDURE:                               
  
                                                                   
  
                          1. Initial setup. The patient was brought to the labor  
atory. A baseline                             
  
                          ECG was recorded. Intravenous access was obtained. Leyda  
face ECG                             
  
                          leads, blood pressure measurements, and pulse oximetri  
c signals were                             
  
                          monitored.                               
  
                          2. Skin preparation. The planned puncture sites were p  
repped and draped                             
  
                          in the usual sterile manner.                            
   
  
                          3. Local anesthesia. 1% lidocaine was administered to   
the right groin.                             
  
                          4. Right femoral artery access. A 5Fr/12cm Engage shea  
th was advanced                             
  
                          into the vessel.                             
  
                          5. Selective left coronary angiography. A 5Fr x 100cm   
JL3.5 catheter                             
  
                          was advanced into the left coronary vessel ostium unde  
r fluoroscopic                             
  
                          guidance. Contrast was injected. Images were obtained   
in multiple                             
  
                          projections.                             
  
                          6. Selective right coronary angiography. A 5Fr x 100cm  
 JR4 catheter was                             
  
                          advanced into the right coronary vessel ostium under f  
luoroscopic                             
  
                          guidance. Contrast was injected. Images were obtained   
in multiple                             
  
                          projections.                             
  
                          7. Selective angiography of the saphenous vein graft t  
o the left                             
  
                          circumflex. A 5Fr x 100cm JR4 catheter was advanced in  
to the                             
  
                          saphenous vein graft proximal anastomosis under fluoro  
scopic                             
  
                          guidance. Contrast was injected. Images were obtained   
in multiple                             
  
                          projections.                             
  
                          8. Selective angiography of the left internal mammary   
graft to the LAD.                             
  
                          A 5Fr x 100cm IM catheter was advanced into the left i  
nternal                             
  
                          mammary graft ostium under fluoroscopic guidance. Cont  
rast was                             
  
                          injected. Images were obtained in multiple projections  
.                             
  
                          9. Right femoral artery hemostasis.                     
          
  
                          STUDY COMPLETION: All catheters inserted during the pr  
ocedure were                             
  
                          removed. The patient tolerated the procedure well and   
was discharged fro                             
  
                          m the lab. There were no complications. Administered m  
edications: IV                             
  
                          Hydration (Standard), infusion rate of 100ml/hr, IV Hy  
dration (Standard)                             
  
                          . Contrast: 1. ISOVUE 300MG/CC 84 ml (total dose).      
                         
  
                                                                   
  
                          ------------------------------------------------------  
-----------------                             
  
                          CORONARY ARTERIES: The coronary circulation is right d  
ominant. The                             
  
                          left main bifurcates normally into the LAD and circumf  
jacinda. The left ante                             
  
                          rior descending gives rise to 2 diagonals. The left ci  
rcumflex gives ris                             
  
                          e to 2 obtuse marginals.                             
  
                          Left main: Distal vessel lesion: There is a 70% stenos  
is.                             
  
                          LAD: Very small. Minor luminal irregularities.          
                     
  
                          1st diagonal: Normal-sized. Minor luminal irregulariti  
es.                             
  
                          2nd diagonal: Normal-sized. Minor luminal irregulariti  
es.                             
  
                          Left circumflex: Minor luminal irregularities. Prior i  
ntervention:                             
  
                          stent in the proximal left circumflex. The stented seg  
ment is patent. P                             
  
                          roximal vessel lesion: There is a 100% stenosis.        
                       
  
                          1st obtuse marginal: Minor luminal irregularities.      
                         
  
                          2nd obtuse marginal: Very large. Minor luminal irregul  
arities.                             
  
                          Right coronary: Very large and super dominant vessel.   
Prior                             
  
                          intervention #3: stent in the RCA. The stent originate  
d in the proximal                             
  
                          vessel. The stents are present in to the PDA. 70% mid   
RCA instent resten                             
  
                          oosis. 70% distal RCA stent restenosis. Believed to be  
 the culprit vesse                             
  
                          ll for anginal syndrome. Mid-vessel lesion: There is a  
 70% stenosis.                             
  
                          Right posterior descending: Prior intervention: stent   
in the ostial                             
  
                          RPDA. The stented segment is patent. Lesion: There is   
a 70% stenosis.                             
  
                          CORONARY GRAFTS:                             
  
                          Saphenous vein graft to the mid 2nd obtuse marginal, f  
rom the aorta:                             
  
                          The graft is patent.                             
  
                                                                   
  
                          LIMA graft to the LAD: The graft is normal.             
                  
  
                          Saphenous vein graft to the mid 1st diagonal, from the  
 aorta: The graft                             
  
                          was not visualized. Presumed occluded. Did not want to  
 use contrast for                             
  
                          this as natove vessel was filling from native anila archer.                             
  
                          LEFT VENTRICLE: Ventriculography was not performed due  
 to preexisting                             
  
                          renal insufficiency.                             
  
                          HEMODYNAMICS:                             
  
                                                                   
  
                          +-------------------------+---------------------+       
                        
  
                          +Stage description +Condition1:Room Air -+              
                 
  
                          +-------------------------+---------------------+       
                        
  
                          +Arterial pressure s/d (m)+85/55 (69) +                 
              
  
                          +-------------------------+---------------------+       
                        
  
                          Prepared and electronically signed by                   
            
  
                                                                   
  
                          Odell Bryan DO, FSVM, FACC                      
         
  
                          10/22/2020 13:59                             
   
                          Corey Hospital CARDIOVASCULAR Morrow County Hospital-  
  
                          ------------------------------------------------------  
-----------------                           FLORENTINO LINARES  
  
                          CARDIAC CATHETERIZATION                             
  
                                                                   
  
                          Patient: Willow Bradshaw                            
   
  
                          Procedure Date:  10/22/2020                             
  
                          : 1957                             
  
                          Age: 63                                  
  
                          Gender: M                                
  
                          MRN: 90392308                             
  
                          Patient Type: Outpatient                             
  
                          Account#: 022507230917                             
  
                          Accession#: 54492281369                             
  
                          Procedure physician: Odell Bryan DO, FSVM, FAC C                             
  
                          Fellow:                                  
  
                          Referring Physician: Odell Bryan DO, FSVM, FAC C                             
  
                                                                   
  
                          ------------------------------------------------------  
-----------------                             
  
                          INDICATIONS: Angina refractory to medical management.   
                            
  
                                                                   
  
                          ------------------------------------------------------  
-----------------                             
  
                          Procedures performed:                             
  
                                                                   
  
                          # Left coronary angiography.                            
   
  
                          # Right coronary angiography.                           
    
  
                          # Saphenous vein graft angiography.                     
          
  
                          # LIMA graft angiography.                             
  
                                                                   
  
                          ------------------------------------------------------  
-----------------                             
  
                          SUMMARY:                                 
  
                                                                   
  
                          1. Left main: Distal vessel lesion: There is a 70% hector  
nosis.                             
  
                          2. Left circumflex: Prior intervention: stent in the p  
roximal left                             
  
                          circumflex. The stented segment is patent. Proximal ve  
ssel lesion:                             
  
                          There is a 100% stenosis.                             
  
                          3. Right coronary: Very large and super dominant vesse  
l. Prior                             
  
                          intervention #3: stent in the RCA. The stent originate  
d in the                             
  
                          proximal vessel. The stents are present in to the PDA.  
 70% mid RCA                             
  
                          instent restenoosis. 70% distal RCA stent restenosis.   
Believed to be                             
  
                          the culprit vessell for anginal syndrome. Mid-vessel l  
esion: There                             
  
                          is a 70% stenosis.                             
  
                          4. Right posterior descending: Prior intervention: hector  
nt in the ostial                             
  
                          RPDA. The stented segment is patent. Lesion: There is   
a 70%                             
  
                          stenosis.                                
  
                          5. Saphenous vein graft to the mid 2nd obtuse marginal  
, from the aorta:                             
  
                          The graft is patent.                             
  
                          6. LIMA graft to the LAD: The graft is normal.          
                     
  
                          7. Saphenous vein graft to the mid 1st diagonal, from   
the aorta: The                             
  
                          graft was not visualized. Presumed occluded. Did not w  
ant to use                             
  
                          contrast for this as natove vessel was filling from na  
tive                             
  
                          circulastion.                             
  
                          IMPRESSIONS:                             
  
                                                                   
  
                          1. Progressive CAD with accelerating angina. RCA likel  
y culprit vessel.                             
  
                          2. Patent LIMA to LAD-Dg.                             
  
                          Patent SVG to OM3.                             
  
                          Unvisualized, presumably occluded SVG to Dg1.           
                    
  
                          RECOMMENDATIONS: PCI RCA. Evaluate distal LMT if able.  
                             
  
                                                                   
  
                          ------------------------------------------------------  
-----------------                             
  
                          HISTORY: Cerebrovascular disease. PVD, iliac stents. P  
MH:                             
  
                          Myocardial infarction. Peripheral arterial disease. Ch  
ronic lung disea                             
  
                          se. Risk factors: Current tobacco use. Hypertension. D  
hermelinda rainey                             
  
                          s; on therapy with oral hypoglycemics. Dyslipidemia. COOKIE perera history is s                             
  
                          ignificant for coronary artery disease.                 
              
  
                                                                   
  
                          ------------------------------------------------------  
-----------------                             
  
                          LABS, PRIOR TESTS, PROCEDURES, and SURGERY:             
                  
  
                          Catheterization with coronary intervention (2014  
).                             
  
                                                                   
  
                          Coronary artery bypass grafting (2017).           
                    
  
                                                                   
  
                          ------------------------------------------------------  
-----------------                             
  
                          PROCEDURE IN DETAIL: Study status: Cardiac cath: elect  
franky. Consent:                             
  
                          The risks, benefits, and alternatives to the procedure  
 and sedation wer                             
  
                          e explained to the patient and informed consent was ob  
tained. Fluorosco                             
  
                          py time: Fluoroscopy time: 16 min. Fluoroscopy dose: F  
luoroscopy dose                             
  
                          : 77.9 cGy. Location: Catheterization laboratory.       
                        
  
                          PROCEDURE:                               
  
                                                                   
  
                          1. Initial setup. The patient was brought to the Olympic Memorial Hospital. A baseline                             
  
                          ECG was recorded. Intravenous access was obtained. Leyda  
face ECG                             
  
                          leads, blood pressure measurements, and pulse oximetri  
c signals were                             
  
                          monitored.                               
  
                          2. Skin preparation. The planned puncture sites were p  
repped and draped                             
  
                          in the usual sterile manner.                            
   
  
                          3. Local anesthesia. 1% lidocaine was administered to   
the right groin.                             
  
                          4. Right femoral artery access. A 5Fr/12cm Engage shea  
th was advanced                             
  
                          into the vessel.                             
  
                          5. Selective left coronary angiography. A 5Fr x 100cm   
JL3.5 catheter                             
  
                          was advanced into the left coronary vessel ostium unde  
r fluoroscopic                             
  
                          guidance. Contrast was injected. Images were obtained   
in multiple                             
  
                          projections.                             
  
                          6. Selective right coronary angiography. A 5Fr x 100cm  
 JR4 catheter was                             
  
                          advanced into the right coronary vessel ostium under f  
luoroscopic                             
  
                          guidance. Contrast was injected. Images were obtained   
in multiple                             
  
                          projections.                             
  
                          7. Selective angiography of the saphenous vein graft t  
o the left                             
  
                          circumflex. A 5Fr x 100cm JR4 catheter was advanced in  
to the                             
  
                          saphenous vein graft proximal anastomosis under fluoro  
scopic                             
  
                          guidance. Contrast was injected. Images were obtained   
in multiple                             
  
                          projections.                             
  
                          8. Selective angiography of the left internal mammary   
graft to the LAD.                             
  
                          A 5Fr x 100cm IM catheter was advanced into the left i  
nternal                             
  
                          mammary graft ostium under fluoroscopic guidance. Cont  
rast was                             
  
                          injected. Images were obtained in multiple projections  
.                             
  
                          9. Right femoral artery hemostasis.                     
          
  
                          STUDY COMPLETION: All catheters inserted during the pr  
ocedure were                             
  
                          removed. The patient tolerated the procedure well and   
was discharged fro                             
  
                          m the lab. There were no complications. Administered m  
edications: IV                             
  
                          Hydration (Standard), infusion rate of 100ml/hr, IV Hy  
dration (Standard)                             
  
                          . Contrast: 1. ISOVUE 300MG/CC 84 ml (total dose).      
                         
  
                                                                   
  
                          ------------------------------------------------------  
-----------------                             
  
                          CORONARY ARTERIES: The coronary circulation is right d  
ominant. The                             
  
                          left main bifurcates normally into the LAD and circumf  
jacinda. The left ante                             
  
                          rior descending gives rise to 2 diagonals. The left ci  
rcumflex gives ris                             
  
                          e to 2 obtuse marginals.                             
  
                          Left main: Distal vessel lesion: There is a 70% stenos  
is.                             
  
                          LAD: Very small. Minor luminal irregularities.          
                     
  
                          1st diagonal: Normal-sized. Minor luminal irregulariti  
es.                             
  
                          2nd diagonal: Normal-sized. Minor luminal irregulariti  
es.                             
  
                          Left circumflex: Minor luminal irregularities. Prior i  
ntervention:                             
  
                          stent in the proximal left circumflex. The stented seg  
ment is patent.  P                             
  
                          roximal vessel lesion: There is a 100% stenosis.        
                       
  
                          1st obtuse marginal: Minor luminal irregularities.      
                         
  
                          2nd obtuse marginal: Very large. Minor luminal irregul  
arities.                             
  
                          Right coronary: Very large and super dominant vessel.   
Prior                             
  
                          intervention #3: stent in the RCA. The stent originate  
d in the proximal                             
  
                          vessel. The stents are present in to the PDA. 70% mid   
RCA instent resten                             
  
                          oosis. 70% distal RCA stent restenosis. Believed to be  
 the culprit vesse                             
  
                          ll for anginal syndrome. Mid-vessel lesion: There is a  
 70% stenosis.                             
  
                          Right posterior descending: Prior intervention: stent   
in the ostial                             
  
                          RPDA. The stented segment is patent. Lesion: There is   
a 70% stenosis.                             
  
                          CORONARY GRAFTS:                             
  
                          Saphenous vein graft to the mid 2nd obtuse marginal, f  
rom the aorta:                             
  
                          The graft is patent.                             
  
                                                                   
  
                          LIMA graft to the LAD: The graft is normal.             
                  
  
                          Saphenous vein graft to the mid 1st diagonal, from the  
 aorta: The graft                             
  
                          was not visualized. Presumed occluded. Did not want to  
 use contrast for                             
  
                          this as natove vessel was filling from native circulas  
tion.                             
  
                          LEFT VENTRICLE: Ventriculography was not performed due  
 to preexisting                             
  
                          renal insufficiency.                             
  
                          HEMODYNAMICS:                             
  
                                                                   
  
                          +-------------------------+---------------------+       
                        
  
                          +Stage description +Condition1:Room Air -+              
                 
  
                          +-------------------------+---------------------+       
                        
  
                          +Arterial pressure s/d (m)+85/55 (69) +                 
              
  
                          +-------------------------+---------------------+       
                        
  
                          Prepared and electronically signed by                   
            
  
                                                                   
  
                          Odell Bryan DO, FS, Northwest Rural Health Network                      
         
  
                          10/22/2020 13:59                             
  
  
  
  
                                        POCT Glucose  on 10-  
  
  
  
  
             Glucose [Mass/Vol] 189 mg/dL    High         70 - 100 mg/dL OhioHealth Doctors Hospital, KY  
  
  
  
  
                          Comment on above:         Test performed by glucose Holzer Health System. Results may be 10%-15% lower  
  
                                                    than serum/plasma values. (C  
NIALL ID 13A2274750)  
  
  
  
  
             Interpretation and review Abnormal                               Nationwide Children's Hospital,  
  
             of laboratory results                                        KY  
   
                          Test Performed by                           Parkview Health,  
  
                          Pipestone, KY  
  
                          525 E. Pedro Bay, OH 02008                             
   
             Glucose [Mass/Vol] 153 mg/dL    High         70 - 100 mg/dL Alburtis, KY  
  
  
  
  
                          Comment on above:         Test performed by glucose me  
ter. Results may be 10%-15% lower  
  
                                                    than serum/plasma values. (C  
NIALL ID 53G1651471)  
  
  
  
  
             Interpretation and review of Abnormal                                
 Langley, KY  
  
             laboratory results                                          
   
                          Test Performed by Geisinger St. Luke's Hospital, 525 E.                             
  
                          Pedro Bay, OH 45418                             
  
  
  
  
                                        Basic Metabolic Panel  on 10-  
  
  
  
  
             Anion gap [Moles/Vol] 11 mmol/L                              Langley, KY  
   
             Calcium [Mass/Vol] 9.6 mg/dL                 8.4 - 10.4 mg/dL Langley, KY  
   
             Chloride [Moles/Vol] 105 mmol/L                98 - 107 mmol/L Downingtown, KY  
   
             CO2 [Moles/Vol] 21 mmol/L    Low          22 - 30 mmol/L Evansville, KY  
   
             Creatinine [Mass/Vol] 1.44 mg/dL   High         0.52 - 1.25 mg/dL Washington, KY  
   
             EGFR IF NonAfrican 51.1 mL/min  Abnormal     >60          Flint, KY  
  
             American                                              
  
  
  
  
                          Comment on above:         KDIGO guidelines provide the  
 following GFR categories:  
  
                                                    Stage GFR(ml/min/1.73 m2) Te  
iggy  
  
                                                    G1 >=90  Normal or high  
  
                                                    G2 60-89 Mildly decreased*  
  
                                                    G3a 45-59 Mildly to moderate  
ly decreased  
  
                                                    G3b 30-44  Moderately to sev  
erely decreased  
  
                                                    G4 15-29 Severely decreased  
  
                                                    G5 <15 Kidney failure  
  
                                                      
  
                                                    *Relative to young adult lev  
el.  
  
                                                    In the absence of evidence o  
f kidney damage, neither GFR  
  
                                                    category G1 nor G2 fulfill t  
 criteria for CKD.  
  
                                                      
  
                                                    The CKD-EPI equation is margarita  
dated in individuals 18 years  
  
                                                    of age and older. Currently   
the best equation for  
  
                                                    estimating glomerular filtra  
tion rate (GFR) from serum  
  
                                                    creatinine in children is University of Vermont Health Network Bedside Keller equation.  
  
                                                    It is less accurate in patie  
nts with extremes of muscle  
  
                                                    mass, restriction of dietary  
 protein, ingestion of creatine,  
  
                                                    extra-renal metabolism of cr  
eatinine, or treatment with  
  
                                                    medications that affect main  
l tubular creatinine secretion.  
  
  
  
  
             GFR/1.73 sq M predicted 59.2 mL/min/{1.73_m2} Abnormal     >60       
     Fulton County Health Center,  
  
             among blacks MDRD                                        KY  
  
             (S/P/Bld) [Vol                                          
  
             rate/Area]                                            
   
             Glucose [Mass/Vol] 135 mg/dL    High         70 - 100     Marion Hospital,  
  
                                                    mg/dL        KY  
   
             Interpretation and Abnormal                               Marion Hospital,  
  
             review of laboratory                                        KY  
  
             results                                               
   
             Potassium [Moles/Vol] 4.6 mmol/L                3.5 - 5.1    Fulton County Health Center,  
  
                                                    mmol/L       KY  
   
             Sodium [Moles/Vol] 137 mmol/L                135 - 145    Marion Hospital,  
  
                                                    mmol/L       KY  
   
             Urea nitrogen [Mass/Vol] 22 mg/dL     High         7 - 20 mg/dL Licking Memorial Hospital,  
  
                                                                 KY  
  
  
  
  
                                        CBC  on 10-  
  
  
  
  
             Erythrocyte distribution 15.0 %       High         11.5 - 14.5 % Nationwide Children's Hospital,  
  
             width (RBC) [Ratio]                                        KY  
   
             Hematocrit (Bld) [Volume 48.2 %                    40 - 52 %    Licking Memorial Hospital,  
  
             fraction]                                           KY  
   
             Hemoglobin (Bld) 16.0 g/dL                 13 - 18 g/dL Suburban Community Hospital & Brentwood Hospital,  
  
             [Mass/Vol]                                          KY  
   
             Interpretation and review Abnormal                               Nationwide Children's Hospital,  
  
             of laboratory results                                        KY  
   
             MCH (RBC) [Entitic mass] 28.4 pg                   26 - 34 pg   Licking Memorial Hospital,  
  
                                                                 KY  
   
             MCHC (RBC) [Mass/Vol] 33.1 %                    32 - 36 %    Langley, KY  
   
             MCV (RBC) [Entitic vol] 85.9 fL                   80 - 98 fL   Lima City Hospital,  
  
                                                                 KY  
   
             Platelet mean volume (Bld) 7.7 fL                    7.4 - 10.4 fL   
Fulton County Health Center,  
  
             [Entitic vol]                                        KY  
   
             Platelets (Bld) [#/Vol] 273 10*3/uL               140 - 440    Lima City Hospital,  
  
                                                    10*3/uL      KY  
   
             RBC (Bld) [#/Vol] 5.61 10*6/uL              4.4 - 5.9    Parkview Health,  
  
                                                    10*6/uL      KY  
   
             WBC (Bld) [#/Vol] 9.6 10*3/uL               3.6 - 10.7   Parkview Health,  
  
                                                    10*3/uL      KY  
   
                          Test Performed by                           Parkview Health,  
  
                          Munson Healthcare Cadillac Hospital,                           KY  
  
                          525 E. Market St.Herbster, OH 85511                             
  
  
  
  
                                        Magnesium  on 10-  
  
  
  
  
             Magnesium [Mass/Vol] 2.3 mg/dL                 1.6 - 2.3 mg/dL Downingtown, KY  
  
  
  
  
                                        Other  on 10-  
  
  
  
  
                          Test Performed by Munson Healthcare Cadillac Hospital, 525 E. Market S  
t.,                           Royal Oak, OH 91016                             
  
  
  
  
                                        T4, Free  on 10-  
  
  
  
  
             Free T4 [Mass/Vol] 1.23 ng/dL                0.78 - 2.19 ng/dL Downingtown, KY  
   
                          Test Performed by Geisinger St. Luke's Hospital, 525 E.                             
  
                          Market StZieglerville, OH                             
  
                          73182                                    
  
  
  
  
                                        Dermatopathology  on 2020  
  
  
  
  
             Dermatopathology Ashtabula County Medical Center  
  
                          Dermatopathology Laboratory                             
Center  
  
                          15 Stuart Street McKittrick, CA 93251 21018-1973                             
  
                          Phone: (874) 769-4904 Fax: (258) 120-2156               
                
  
                                                                   
  
                                                                   
  
                          DERMATOPATHOLOGY REPORT                             
  
                                                                   
  
                          Name:WILLOW BRADSHAW Adena Health System. Rec #. 81230363          
                     
  
                          Accession #: Z52-3790 Location: HonorHealth Deer Valley Medical Center                   
            
  
                          Date of Procedure: 2020 Race:                       
        
  
                          Date Received: 7/10/2020 /Sex: 1957 (Age: 63)    
                           
  
                          / M Date Reported: 2020 Other:                     
          
  
                          Submitting Physician:RENE HIDALGO MD             
                  
  
                                                                   
  
                                                                   
  
                          FINAL DIAGNOSIS                             
  
                          A. SKIN, RIGHT TEMPLE:                             
  
                          MALIGNANT MELANOMA, BRESLOW THICKNESS 0.5 MM, SEE NOTE  
.                             
  
                          Note: Microscopic examination reveals a specimen that   
extends into the mid                             
  
                          reticular dermis. There is dense solar elastosis in ar  
eas with some areas of                             
  
                          minimal solar elastosis. There is an asymmetric prolif  
eration of nested and                             
  
                          single atypical melanocytes along the dermal-epidermal  
 junction that overlie                             
  
                                both the area without significant solar elastosi  
s and other areas with severe                   
                                          
  
                          solar elastosis and there are occasional nests of more  
 benign appearing                             
  
                          melanocytes in the superficial dermis.                  
             
  
                          There appears to be a possible precursor dysplastic ne  
vus as evidenced by                             
  
                          markedly diminished solar elastosis underlying some of  
 the melanocytic                             
  
                          proliferation. However, the melanocytes extend over th  
e severely sun damaged                             
  
                          skin in a poorly circumscribed and asymmetric fashion   
arguing that there is                             
  
                          melanoma in situ. The dermal melanocytes are somewhat   
more challenging to                             
  
                                assess. In some areas there is not significant u  
nderlying solar elastosis and                   
                                          
  
                                        in some areas there is dense solar elast  
osis. The presence of dense underlying   
                                                      
  
                          solar elastosis favors an invasive melanoma over a roderick  
anoma in situ in                             
  
                          association with a dysplastic compound nevus.           
                    
  
                          B. SKIN, LEFT HELIX:                             
  
                          ACTINIC KERATOSIS, PRESENT ON THE DEEP AND PERIPHERAL   
MARGIN.                             
  
                          C. SKIN, LEFT ANTIHELIX:                             
  
                                SQUAMOUS CELL CARCINOMA IN SITU WITH ADNEXAL INV  
OLVEMENT, PRESENT ON THE DEEP                   
                                          
  
                          AND PERIPHERAL MARGIN, SEE NOTE.                        
       
  
                                Note: The atypical keratinocytes extend down the  
 hair follicles into at least                   
                                          
  
                          the mid reticular dermis.                             
  
                          ***Electronically Signed Out by TAQUERIA HENRY M.D.***  
                             
  
                          CANCER SUMMARY REPORT                             
  
                          A. SKIN, RIGHT TEMPLE:                             
  
                          Procedure: Not specified                             
  
                          Specimen Laterality: Right                             
  
                          Tumor Site: Specify (if known): Right temple            
                   
  
                          Macroscopic Satellite Nodule(s): Not identified         
                      
  
                          Histologic Type: Lentigo maligna melanoma               
                
  
                          Tumor Size: Indeterminate: Not available                
               
  
                          Maximum Tumor Thickness: 0.5 mm                         
      
  
                          Anatomic Level: III (Melanoma fills and expands papill  
keegan                             
  
                          dermis)                                  
  
                          Ulceration: Not identified                             
  
                          Peripheral Margins: Peripheral margins involved by roderick  
anoma in                             
  
                          situ                                     
  
                          Location(s): Not possible                             
  
                          Deep Margins: Involved by melanoma in situ              
                 
  
                          Specify location(s), if possible: At the                
               
  
                          peripheral margin.                             
  
                          Mitotic Index: 0 / sq mm                             
  
                          Microsatellitosis: Not identified                       
        
  
                          Lymph-Vascular Invasion: Not identified                 
              
  
                          Perineural Invasion: Not identified                     
          
  
                          Tumor-Infiltrating Lymphocytes: Not identified          
                     
  
                          Tumor Regression: Not identified                        
       
  
                          Growth Phase: Vertical                             
  
                          Lymph Nodes: Lymph nodes are not present in specimen    
                           
  
                          TNM Descriptors: Not applicable                         
      
  
                          Primary Tumor (pT): pT1a: Melanoma <0.8 mm in thicknes  
s, no                             
  
                          ulceration                               
  
                          Additional Pathologic Findings: Other(s): None          
                     
  
                                                                   
  
                                                                   
  
                          Electronically Signed Out By TAQUERIA HENRY MD/University of California, Irvine Medical Center      
                         
  
                                By the signature on this report, the individual   
or group listed as making the                   
                                          
  
                          Final Interpretation/Diagnosis certifies that they hav  
e reviewed this case.                             
  
                                                                   
  
                          Clinical History:                             
  
                          A: NAD. B, C: HAK vs SCC.                             
  
                          Specimens Submitted As:                             
  
                          A: SKIN, RIGHT TEMPLE                             
  
                          B: SKIN, LEFT HELIX                             
  
                          C: SKIN, LEFT ANTIHELIX                             
  
                          Gross Description:                             
  
                                A: Received in formalin is one tan-brown piece o  
f skin measuring 3t4a2yh. The                   
                                          
  
                          specimen is inked and embedded in toto. B: Received in  
 formalin is one                             
  
                                tan-brown piece of skin measuring 1n9x5ip. The s  
pecimen is inked and embedded                   
                                          
  
                          in toto. C: Received in formalin is one tan-brown piec  
e of skin measuring                             
  
                          5p6g7hk. The specimen is inked and embedded in toto.    
                           
  
                          dcp/7/10/2020                             
  
                          Microscopic Description:                             
  
                          B. Microscopic examination reveals basal layer keratin  
ocyte atypia.                             
  
                                                    C. Microscopic analysis show  
s hyperplastic epidermis with full-thickness atypia  
                                                              
  
                          of keratinocytes. The atypical keratinocytes extend do  
wn hair follicles.                             
  
  
  
  
                          Comment on above:         Performed By: #### D ####  
  
                                                    Dermatopathology  
  
  
  
  
                                        Basic Metabolic Panel  on 2020  
  
  
  
  
             Anion gap [Moles/Vol] 12 mmol/L                              Langley, KY  
  
  
  
  
                          Comment on above:         Test Performed by Upverter System, Lawrence Memorial Hospital EFrench Creek, OH  
  
                                                    70568  
  
  
  
  
             Calcium [Mass/Vol] 9.4 mg/dL                 8.4 - 10.4 mg/dL Langley, KY  
  
  
  
  
                          Comment on above:         Test Performed by Upverter System, Lawrence Memorial Hospital EFrench Creek, OH  
  
                                                    01502  
  
  
  
  
             Chloride [Moles/Vol] 105 mmol/L                98 - 107 mmol/L Downingtown, KY  
  
  
  
  
                          Comment on above:         Test Performed by Upverter System, Lawrence Memorial Hospital EFrench Creek, OH  
  
                                                    51827  
  
  
  
  
             CO2 [Moles/Vol] 20 mmol/L    Low          22 - 30 mmol/L Evansville, KY  
  
  
  
  
                          Comment on above:         Test Performed by Upverter System, Lawrence Memorial Hospital EFrench Creek, OH  
  
                                                    13136  
  
  
  
  
             Creatinine [Mass/Vol] 1.78 mg/dL   High         0.52 - 1.25 mg/dL Washington, KY  
  
  
  
  
                          Comment on above:         Test Performed by Upverter System, Lawrence Memorial Hospital E. Pedro Bay, OH  
  
                                                    15769  
  
  
  
  
             EGFR IF NonAfrican American 38.8 mL/min               >60            
Langley, KY  
  
  
  
  
                          Comment on above:         Test Performed by Upverter System, Lawrence Memorial Hospital EFrench Creek, OH 94637  
  
                                                    Source- MDRD equation with c  
reatinine calibration to IDMS(NKDEP)  
  
                                                    eGFR not recommended for johnny  
g dose adjustment  
  
  
  
  
             GFR/1.73 sq M predicted among blacks 47.0 mL/min/{1.73_m2}           
     >60          Langley, KY  
  
             MDRD (S/P/Bld) [Vol rate/Area]                                       
     
  
  
  
  
                          Comment on above:         Test Performed by Upverter System, 525 E. Market St.,   
Ararat, OH  
  
                                                    89266  
  
  
  
  
             Glucose [Mass/Vol] 142 mg/dL    High         70 - 100 mg/dL Alburtis, KY  
  
  
  
  
                          Comment on above:         Test Performed by Upverter System, 525 E. Market St.,   
Ararat, OH  
  
                                                    17754  
  
  
  
  
             Interpretation and review of Abnormal                                
 Langley, KY  
  
             laboratory results                                          
   
             Potassium [Moles/Vol] 4.7 mmol/L                3.5 - 5.1 mmol/L Reform, KY  
  
  
  
  
                          Comment on above:         Test Performed by Upverter System, 525 E. Market St.,   
Ararat, OH  
  
                                                    67863  
  
  
  
  
             Sodium [Moles/Vol] 137 mmol/L                135 - 145 mmol/L Langley, KY  
   
             Urea nitrogen [Mass/Vol] 27 mg/dL     High         7 - 20 mg/dL Osage, KY  
  
  
  
  
                          Comment on above:         Test Performed by Upverter System, 525 E. Market St.,   
Ararat, OH  
  
                                                    64280  
  
  
  
  
                          Test Performed by Kettering Health Greene Memorial Stottler Henke Associates, Lawrence Memorial Hospital E. Beaumont Hospital S  
t.Lawton Indian Hospital – Lawton, OH 58679                             
  
  
  
  
                                        Basic Metabolic Panel w/ Reflex to MG  o  
n 2020  
  
  
  
  
             Anion gap [Moles/Vol] 11 mmol/L                              Langley, KY  
  
  
  
  
                          Comment on above:         Test Performed by Upverter System, 525 E. Market St.,   
Ararat, OH  
  
                                                    73038  
  
  
  
  
             Calcium [Mass/Vol] 9.3 mg/dL                 8.4 - 10.4 mg/dL Langley, KY  
  
  
  
  
                          Comment on above:         Test Performed by Upverter System, 525 E. Market St.,   
Ararat, OH  
  
                                                    50464  
  
  
  
  
             Chloride [Moles/Vol] 104 mmol/L                98 - 107 mmol/L Downingtown, KY  
  
  
  
  
                          Comment on above:         Test Performed by Upverter System, 525 E. Market St.,   
Ararat, OH  
  
                                                    03391  
  
  
  
  
             CO2 [Moles/Vol] 21 mmol/L    Low          22 - 30 mmol/L Evansville, KY  
  
  
  
  
                          Comment on above:         Test Performed by Upverter System, 525 E. Market St.,   
Ararat, OH  
  
                                                    73905  
  
  
  
  
             Creatinine [Mass/Vol] 1.81 mg/dL   High         0.52 - 1.25 mg/dL Washington, KY  
  
  
  
  
                          Comment on above:         Test Performed by Upverter System, 525 E. Pedro Bay, OH  
  
                                                    39731  
  
  
  
  
             EGFR IF NonAfrican American 38.0 mL/min               >60            
Langley, KY  
  
  
  
  
                          Comment on above:         Test Performed by Upverter System, 525 E. Pedro Bay, OH 64463  
  
                                                    Source- MDRD equation with c  
reatinine calibration to IDMS(NKDEP)  
  
                                                    eGFR not recommended for johnny  
g dose adjustment  
  
  
  
  
             GFR/1.73 sq M predicted among blacks 46.1 mL/min/{1.73_m2}           
     >60          Langley, KY  
  
             MDRD (S/P/Bld) [Vol rate/Area]                                       
     
  
  
  
  
                          Comment on above:         Test Performed by Upverter System, Lawrence Memorial Hospital E. Pedro Bay, OH  
  
                                                    66684  
  
  
  
  
             Glucose [Mass/Vol] 121 mg/dL    High         70 - 100 mg/dL Alburtis, KY  
  
  
  
  
                          Comment on above:         Test Performed by Upverter System, Lawrence Memorial Hospital E. Pedro Bay, OH  
  
                                                    83968  
  
  
  
  
             Interpretation and review of Abnormal                                
 Langley, KY  
  
             laboratory results                                          
   
             Potassium [Moles/Vol] 4.6 mmol/L                3.5 - 5.1 mmol/L Reform, KY  
  
  
  
  
                          Comment on above:         Test Performed by Upverter System, Lawrence Memorial Hospital E. Pedro Bay, OH  
  
                                                    95999  
  
  
  
  
             Sodium [Moles/Vol] 136 mmol/L                135 - 145 mmol/L Langley, KY  
   
             Urea nitrogen [Mass/Vol] 23 mg/dL     High         7 - 20 mg/dL Osage, KY  
  
  
  
  
                          Comment on above:         Test Performed by Upverter System, 525 E. Pedro Bay, OH  
  
                                                    13780  
  
  
  
  
                                        CBC  on 2020  
  
  
  
  
             Erythrocyte distribution width (RBC) 16.3 %       High         11.5  
 - 14.5 % Langley, KY  
  
             [Ratio]                                               
  
  
  
  
                          Comment on above:         Test Performed by Upverter System, 525 E. Pedro Bay, OH  
  
                                                    00204  
  
  
  
  
             Hematocrit (Bld) [Volume fraction] 45.6 %                    40 - 5  
2 %    Langley, KY  
  
  
  
  
                          Comment on above:         Test Performed by Upverter System, 525 E. Pedro Bay, OH  
  
                                                    03254  
  
  
  
  
             Hemoglobin (Bld) [Mass/Vol] 15.3 g/dL                 13 - 18 g/dL   
Langley, KY  
  
  
  
  
                          Comment on above:         Test Performed by Upverter System, 525 E. Market St.Newton Medical Center, OH  
  
                                                    24453  
  
  
  
  
             Interpretation and review of laboratory Abnormal                     
            Langley, KY  
  
             results                                               
   
             MCH (RBC) [Entitic mass] 28.4 pg                   26 - 34 pg   Osage, KY  
  
  
  
  
                          Comment on above:         Test Performed by Upverter System, 525 E. Market St.Newton Medical Center, OH  
  
                                                    00225  
  
  
  
  
             MCHC (RBC) [Mass/Vol] 33.6 %                    32 - 36 %    Langley, KY  
  
  
  
  
                          Comment on above:         Test Performed by Upverter System, 525 E. Market St.Newton Medical Center, OH  
  
                                                    78971  
  
  
  
  
             MCV (RBC) [Entitic vol] 84.4 fL                   80 - 98 fL   Downingtown, KY  
  
  
  
  
                          Comment on above:         Test Performed by Upverter System, 525 E. Market St.Newton Medical Center, OH  
  
                                                    98242  
  
  
  
  
             Platelet mean volume (Bld) [Entitic 7.1 fL       Low          7.4 -  
 10.4 fL Langley, KY  
  
             vol]                                                  
  
  
  
  
                          Comment on above:         Test Performed by Upverter System, 525 E. Market St.Newton Medical Center, OH  
  
                                                    64792  
  
  
  
  
             Platelets (Bld) [#/Vol] 276 10*3/uL               140 - 440 10*3/uL  
 Langley, KY  
  
  
  
  
                          Comment on above:         Test Performed by Upverter System, 525 E. Market St.Newton Medical Center, OH  
  
                                                    00929  
  
  
  
  
             RBC (Bld) [#/Vol] 5.40 10*6/uL              4.4 - 5.9 10*6/uL Langley, KY  
  
  
  
  
                          Comment on above:         Test Performed by Upverter System, 525 E. Market St.Newton Medical Center, OH  
  
                                                    04593  
  
  
  
  
             WBC (Bld) [#/Vol] 11.0 10*3/uL High         3.6 - 10.7 10*3/uL Downingtown, KY  
   
                          Test Performed by Graysville, KY  
  
                          Health System, 525 E.                             
  
                          Market St.Newton Medical Center, OH                             
  
                          56430                                    
  
  
  
  
                                        EKG 12 Lead - Chest Pain   on 2020  
  
  
  
  
                           Munson Healthcare Cadillac Hospital  Test Date: 2020 Pat Name:   
                          Langley, KY  
  
                          Willow Bradshaw Department: Mountain Vista Medical Center MRN: 13165259 Room:   
1512                             
  
                          Gender: M Technician: Divine Savior Healthcare : 1957 Requested By  
:                             
  
                          LATESHA STUBBS Order Number: 960109352 Reading MD  
:                             
  
                          Odell Castañeda  Measurements Intervals Axis Rate: 115 P:  
 51                             
  
                          OH: 154 QRS:  73 QRSD: 108 T: 4 QT: 332 QTc: 460        
                       
  
                          Interpretive Statements Sinus tachycardia Low voltage,  
                             
  
                          extremity and precordial leads Probable anteroseptal    
                           
  
                          infarct, old Electronically Signed On 2020 14:02:  
27                             
  
                          EDT by Odell Rouse Kettering Health Greene Memorial Incoming Cardiolo  
gy Results From Merge/Epiphany - 2020 2:03 PM  
 EDT Enterprise, KY  
  
                                                                   
  
                          Test Date: 2020                             
  
                          Pat Name: Willow Bradshaw Department: Mountain Vista Medical Center             
                  
  
                          MRN: 95695796 Room: 1512                             
  
                          Gender: M Technician: Divine Savior Healthcare                             
  
                          : 1957 Requested By: LATESHA STUBBS       
                        
  
                          Order Number: 024521167 Reading MD: Odell Castañeda        
                       
  
                          Measurements                             
  
                          Intervals Axis                             
  
                          Rate: 115 P: 51                             
  
                          OH: 154 QRS: 73                             
  
                          QRSD: 108 T: 4                             
  
                          QT: 332                                  
  
                          QTc: 460                                 
  
                          Interpretive Statements                             
  
                          Sinus tachycardia                             
  
                          Low voltage, extremity and precordial leads             
                  
  
                          Probable anteroseptal infarct, old                      
         
  
                          Electronically Signed On 2020 14:02:27 EDT by Terence Castañeda                             
  
  
  
  
                                        Hepatic function panel  on 2020  
  
  
  
  
             Albumin [Mass/Vol] 4.2 g/dL                  3.5 - 5 g/dL Flint, KY  
   
             ALP [Catalytic activity/Vol] 71 U/L                    38 - 126 U/L  
 Langley, KY  
  
  
  
  
                          Comment on above:         Test Performed by Upverter System, Lawrence Memorial Hospital EFrench Creek, OH  
  
                                                    10205  
  
  
  
  
             ALT [Catalytic activity/Vol] 18 U/L                    0 - 49 U/L    
 Langley, KY  
  
  
  
  
                          Comment on above:         Test Performed by The New Music Movement, Lawrence Memorial Hospital SoundwaveFrench Creek, OH 24586  
  
                                                    The ALT test is performed by  
 an updated assay method.  
  
                                                    Please note that the referen  
ce intervals have been  
  
                                                    changed and are now sex spec  
ific.  
  
  
  
  
             AST [Catalytic activity/Vol] 23 U/L                    15 - 46 U/L   
 Langley, KY  
  
  
  
  
                          Comment on above:         Test Performed by Upverter System, Lawrence Memorial Hospital E. Market St.,   
Ararat, OH  
  
                                                    11024  
  
  
  
  
             Bilirubin Ql (U) 0.5 mg/dL                 0.2 - 1.3 mg/dL Marion, KY  
  
  
  
  
                          Comment on above:         Test Performed by Upverter System, 525 E. Market St.,   
Ararat, OH  
  
                                                    20590  
  
  
  
  
             Bilirubin.direct [Mass/Vol] 0.0 mg/dL                 0 - 0.3 mg/dL  
 Langley, KY  
  
  
  
  
                          Comment on above:         Test Performed by Upverter System, 525 E. Market St.,   
Ararat, OH  
  
                                                    17470  
  
  
  
  
             Protein [Mass/Vol] 7.0 g/dL                  6.3 - 8.2 g/dL Alburtis, KY  
  
  
  
  
                          Comment on above:         Test Performed by Upverter System, 525 E. Market St.,   
Ararat, OH  
  
                                                    00539  
  
  
  
  
                                        Other   on 2020  
  
  
  
  
                          Test Performed by Kettering Health Greene Memorial Stottler Henke Associates, Lawrence Memorial Hospital E. Market S  
t.,                           Mansfield Hospital, OH 06814                             
  
  
  
  
                                        Basic Metabolic Panel  on 2020  
  
  
  
  
             Anion gap [Moles/Vol] 12 mmol/L                              Langley, KY  
  
  
  
  
                          Comment on above:         Test Performed by Upverter System, 525 E. Market St.,   
Ararat, OH  
  
                                                    49585  
  
  
  
  
             Calcium [Mass/Vol] 9.9 mg/dL                 8.4 - 10.4 mg/dL Langley, KY  
  
  
  
  
                          Comment on above:         Test Performed by Upverter System, 525 E. Market St.,   
Ararat, OH  
  
                                                    91964  
  
  
  
  
             Chloride [Moles/Vol] 104 mmol/L                98 - 107 mmol/L Downingtown, KY  
  
  
  
  
                          Comment on above:         Test Performed by Upverter System, 525 E. Market St.,   
Ararat, OH  
  
                                                    57102  
  
  
  
  
             CO2 [Moles/Vol] 21 mmol/L    Low          22 - 30 mmol/L Evansville, KY  
  
  
  
  
                          Comment on above:         Test Performed by Upverter System, 525 E. Market St.,   
Ararat, OH  
  
                                                    56719  
  
  
  
  
             Creatinine [Mass/Vol] 1.31 mg/dL   High         0.52 - 1.25 mg/dL Washington, KY  
  
  
  
  
                          Comment on above:         Test Performed by Upverter System, 525 E. Market St.,   
Ararat, OH  
  
                                                    00901  
  
  
  
  
             EGFR IF NonAfrican American 55.2 mL/min               >60            
Langley, KY  
  
  
  
  
                          Comment on above:         Test Performed by Upverter System, 525 E. Beaumont Hospital StZieglerville, OH 09773  
  
                                                    Source- MDRD equation with c  
reatinine calibration to IDMS(NKDEP)  
  
                                                    eGFR not recommended for johnny  
g dose adjustment  
  
  
  
  
             GFR/1.73 sq M predicted among mL/min/{1.73_m2}              >60 mL/  
min   Langley, KY  
  
             blacks MDRD (S/P/Bld) [Vol                                          
  
             rate/Area]                                            
  
  
  
  
                          Comment on above:         Test Performed by Upverter System, 525 E. Beaumont Hospital StZieglerville, OH  
  
                                                    69545  
  
  
  
  
             Glucose [Mass/Vol] 202 mg/dL    High         70 - 100 mg/dL Alburtis, KY  
  
  
  
  
                          Comment on above:         Test Performed by Upverter System, Lawrence Memorial Hospital E. Pedro Bay, OH  
  
                                                    37006  
  
  
  
  
             Interpretation and review of Abnormal                                
 Langley, KY  
  
             laboratory results                                          
   
             Potassium [Moles/Vol] 4.2 mmol/L                3.5 - 5.1 mmol/L Reform, KY  
  
  
  
  
                          Comment on above:         Test Performed by Upverter System, Lawrence Memorial Hospital E. Beaumont Hospital StZieglerville, OH  
  
                                                    62174  
  
  
  
  
             Sodium [Moles/Vol] 137 mmol/L                135 - 145 mmol/L Langley, KY  
   
             Urea nitrogen [Mass/Vol] 15 mg/dL                  7 - 20 mg/dL Osage, KY  
  
  
  
  
                          Comment on above:         Test Performed by The New Music Movement, 525 E. Pedro Bay, OH  
  
                                                    23628  
  
  
  
  
                          Test Performed by Kettering Health Greene Memorial M2 Connections Insight Surgical Hospital, Lawrence Memorial Hospital E. Beaumont Hospital S  
t.,                           Royal Oak, OH 19283                             
  
  
  
  
                                        Brain Natriuretic Peptide  on 2020  
  
  
  
  
             Natriuretic peptide B (Bld) [Mass/Vol] 77 pg/mL                  0   
- 125 pg/mL Langley, KY  
  
  
  
  
                                        D-Dimer, Quantitative  on 2020  
  
  
  
  
             D-Dimer, Quant 0.51 mg/L    High         0 - 0.5 mg/L Langley, KY  
  
  
  
  
                          Comment on above:         Innovance D-Dimer values of   
<0.50 mg/L FEU can be used in  
  
                                                    combination with a pre-test   
probability model (e.g. Well's)  
  
                                                    to exclude pulmonary embolis  
m (PE) disease, as well as an  
  
                                                    aid in the diagnosis of deep  
 vein thrombosis (DVT).  
  
  
  
  
             Interpretation and review of Abnormal                                
 Langley, KY  
  
             laboratory results                                          
   
                          Test Performed by Graysville, KY  
  
                          Health System, 525 E.                             
  
                          Beaumont Hospital StZieglerville, OH 76015                             
  
  
  
  
                                        Hemogram (CBC)  on 2020  
  
  
  
  
             Erythrocyte distribution width (RBC) 16.0 %       High         11.5  
 - 14.5 % Langley, KY  
  
             [Ratio]                                               
  
  
  
  
                          Comment on above:         Test Performed by Upverter System, 525 E. Beaumont Hospital StZieglerville, OH  
  
                                                    66595  
  
  
  
  
             Hematocrit (Bld) [Volume fraction] 46.5 %                    40 - 5  
2 %    Langley, KY  
  
  
  
  
                          Comment on above:         Test Performed by Upverter System, 525 E. Beaumont Hospital StZieglerville, OH  
  
                                                    41922  
  
  
  
  
             Hemoglobin (Bld) [Mass/Vol] 15.9 g/dL                 13 - 18 g/dL   
Langley, KY  
  
  
  
  
                          Comment on above:         Test Performed by Upverter System, 525 E. Pedro Bay, OH  
  
                                                    91932  
  
  
  
  
             Interpretation and review of laboratory Abnormal                     
            Langley, KY  
  
             results                                               
   
             MCH (RBC) [Entitic mass] 28.8 pg                   26 - 34 pg   Osage, KY  
  
  
  
  
                          Comment on above:         Test Performed by Upverter System, 525 E. Beaumont Hospital StZieglerville, OH  
  
                                                    31492  
  
  
  
  
             MCHC (RBC) [Mass/Vol] 34.2 %                    32 - 36 %    Langley, KY  
  
  
  
  
                          Comment on above:         Test Performed by Upverter System, 525 E. Beaumont Hospital StZieglerville, OH  
  
                                                    09899  
  
  
  
  
             MCV (RBC) [Entitic vol] 84.3 fL                   80 - 98 fL   Downingtown, KY  
  
  
  
  
                          Comment on above:         Test Performed by Upverter System, 525 E. Beaumont Hospital StZieglerville, OH  
  
                                                    89599  
  
  
  
  
             Platelet mean volume (Bld) [Entitic 7.1 fL       Low          7.4 -  
 10.4 fL Langley, KY  
  
             vol]                                                  
  
  
  
  
                          Comment on above:         Test Performed by Upverter System, 525 E. Beaumont Hospital StZieglerville, OH  
  
                                                    98190  
  
  
  
  
             Platelets (Bld) [#/Vol] 252 10*3/uL               140 - 440 10*3/uL  
 Langley, KY  
  
  
  
  
                          Comment on above:         Test Performed by Upverter System, 525 E. Beaumont Hospital StRutgers - University Behavioral HealthCare, OH  
  
                                                    68363  
  
  
  
  
             RBC (Bld) [#/Vol] 5.51 10*6/uL              4.4 - 5.9 10*6/uL Langley, KY  
  
  
  
  
                          Comment on above:         Test Performed by OhioHealth Doctors Hospital  
Channelinsight Insight Surgical Hospital, 525 E. Pedro Bay, OH  
  
                                                    71069  
  
  
  
  
             WBC (Bld) [#/Vol] 12.0 10*3/uL High         3.6 - 10.7 10*3/uL Downingtown, KY  
   
                          Test Performed by University Hospitals Elyria Medical Center System, Lawrence Memorial Hospital E.                             
  
                          Pedro Bay, OH                             
  
                          52250                                    
  
  
  
  
                                        Other  on 2020  
  
  
  
  
                          Test Performed by Kettering Health Greene Memorial M2 Connections Insight Surgical Hospital, Lawrence Memorial Hospital E. Beaumont Hospital S  
t.,                           Royal Oak, OH 25715                             
  
  
  
  
                                        POCT Glucose  on 2020  
  
  
  
  
             Glucose [Mass/Vol] 204 mg/dL    High         70 - 100 mg/dL Alburtis, KY  
  
  
  
  
                          Comment on above:         Test performed by glucose me  
ter. Results may be 10%-15% lower  
  
                                                    than serum/plasma values. (C  
NIALL ID 32G0738436)  
  
  
  
  
             Interpretation and review of Abnormal                                
 Langley, KY  
  
             laboratory results                                          
   
                          Test Performed by University Hospitals Elyria Medical Center System, Lawrence Memorial Hospital E.                             
  
                          Pedro Bay, OH 49414                             
  
  
  
  
                                        Troponin  on 2020  
  
  
  
  
             Troponin I.cardiac [Mass/Vol] ng/mL                     0 - 0.034 n  
g/mL Langley, KY  
  
  
  
  
                          Comment on above:         .  
  
  
  
  
                          Test Performed by University Hospitals Elyria Medical Center System, Lawrence Memorial Hospital E.                             
  
                          Pedro Bay, OH                             
  
                          38580                                    
   
             Troponin I.cardiac ng/mL                     0 - 0.034 ng/mL Langley, KY  
  
             [Mass/Vol]                                            
  
  
  
  
                          Comment on above:         .  
  
  
  
  
                          Test Performed by Kettering Health Greene Memorial Stottler Henke Associates, Lawrence Memorial Hospital E. Market S  
t.,                           Royal Oak, OH 62202                             
  
  
  
  
                                        Troponin x1  on 2020  
  
  
  
  
             Troponin I.cardiac [Mass/Vol] ng/mL                     0 - 0.034 n  
g/mL Langley, KY  
  
  
  
  
                          Comment on above:         .  
  
  
  
  
                                        XR CHEST PORTABLE  on 2020  
  
  
  
  
                          Nasim, Kettering Health Greene Memorial Incoming Radiology Results From Radnet -  11:35 AM EDT                             
Langley, KY  
  
                                                                   
  
                                                                   
  
                          Patient Name: WILLOW BRADSHAW III                   
            
  
                          MRN: 22681279                             
  
                          FIN: 435551189203                             
  
                                                                   
  
                                                                   
  
                          ---Diagnostic Radiology---                             
  
                                                                   
  
                          Exam Date/Time 2020 11:31:01 EDT                  
             
  
                          Exam CR Chest Portable                             
  
                          Ordering Physician 812916 LATESHA BARNES         
                      
  
                          Accession Number -536892                          
     
  
                                                                   
  
                          CPT4 Codes                               
  
                          92360 ()                                 
  
                                                                   
  
                                                                   
  
                          Reason For Exam                             
  
                          Chest pain                               
  
                                                                   
  
                                                                   
  
                          Report                                   
  
                          Indication: Chest pain.                             
  
                                                                   
  
                          Comparison: 2019.                             
  
                                                                   
  
                          Technique: A single frontal view of chest was obtained  
.                             
  
                                                                   
  
                          Findings:                                
  
                                                                   
  
                          Trachea is midline. The right lung is clear without pl  
eural effusion                             
  
                          or pneumothorax. A trace left effusion is suspected. T  
here are some                             
  
                          reticular opacities peripherally which could indicate   
some developing                             
  
                          interstitial edema. Cardiac silhouette is normal. Ster  
notomy wires are                             
  
                          present. Pulmonary vasculature is normal.               
                
  
                                                                   
  
                          Impression:                              
  
                                                                   
  
                          Probable trace left effusion with findings suggesting   
developing                             
  
                          interstitial edema.                             
  
                          Report Dictated on Workstation: FDJMVSS37               
                
  
                          ---** Final ---**                             
  
                                                                   
  
                          Dictated: 2020 11:32 am                           
    
  
                          Dictating Physician: MD MANSFIELD TOM A                
               
  
                          Signed Date and Time: 2020 11:34 am               
                
  
                          Signed by: MD MANSFIELD TOM A                          
     
  
                          Transcribed Date and Time: 2020 11:32             
                  
   
                          Patient Name: WILLOW BRADSHAW III MRN: 54821226   
N:                           Langley, KY  
  
                          080570936259 ---Diagnostic Radiology--- Exam Date/Time  
                             
  
                          2020 11:31:01 EDT Exam CR Chest Portable Orderin  
g                             
  
                          Physician 040320 LATESHA BARNES Accession Numbe  
jabier                             
  
                          -134441 CPT4 Codes 02227 () Reason For Exam Ches  
t                             
  
                          pain Report Indication: Chest pain. Comparison:   
019.                             
  
                          Technique: A single frontal view of chest was obtained  
.                             
  
                          Findings: Trachea is midline. The right lung is clear   
                            
  
                          without pleural effusion or pneumothorax. A trace left  
                             
  
                          effusion is suspected. There are some reticular opacit  
ies                             
  
                          peripherally which could indicate some developing       
                        
  
                          interstitial edema. Cardiac silhouette is normal.       
                        
  
                          Sternotomy wires are present. Pulmonary vasculature is  
                             
  
                          normal. Impression: Probable trace left effusion with   
                            
  
                          findings suggesting developing interstitial edema. Rep  
ort                             
  
                          Dictated on Workstation: KMQLAOO15 ---** Final ---**    
                           
  
                          Dictated: 2020 11:32 am Dictating Physician:      
                         
  
                          MD MANSFIELD TOM A Signed Date and Time: 2020 1  
1:34                             
  
                          am Signed by: MD DONAL, HERRERA AMAYA Transcribed Date and  
                             
  
                          Time: 2020 11:32                             
  
  
  
  
                                        CTA Abdominal Aorta W Bilat Runoff W Wo   
Contrast  on 08-  
  
  
  
  
                          Patient Name: WILLOW BRADSHAW III MRN: U917758 FIN  
:                           Langley, KY  
  
                          924861273504 ---CT--- Exam Date/Time 08/15/2019 10:30:  
00                             
  
                          EDT Exam  CTA Abd Aorta + Iliofemoral Ordering Physicjoe BRYAN DO, JOSEPH Accession Number -738162  
                             
  
                          CPT4 Codes 81447 (),  (CT ISOVUE                   
            
  
                          370MG/ML&61590148662&ML&1) Reason For Exam Intermitten  
t                             
  
                          claudication s/p Ilaic stenting and femoral             
                  
  
                          endarterectomy. Report CT ANGIOGRAPHY ABDOMEN AND PELV  
IS                             
  
                          AND BILATERAL LOWER EXTREMITY ARTERIAL RUNOFF: CLINICA  
L                             
  
                          INDICATION: Intermittent claudication status post mirella  
c                             
  
                          stenting and femoral endarterectomy TECHNIQUE: Transax  
ial                             
  
                          sequence through the abdomen, pelvis and bilateral low  
er                             
  
                          extremities initially without IV contrast and then aga  
in                             
  
                          during dynamic intravenous infusion of 75 mL of nonion  
ic                             
  
                          contrast media, injected at a high flow rate following  
 a                             
  
                           study. Multiplanar and 3D MIP reconstruction was  
                             
  
                          performed concurrently with independent viewing softwa  
re.                             
  
                          Dose reduction was employed with automated exposure     
                          
  
                          control. Comparison: CT chest from 2018. CT ches  
t,                             
  
                          abdomen and pelvis from 2017 FINDINGS: Aorta:      
                         
  
                          Abdominal aorta is normal in course and caliber. There  
 are                             
  
                          minimal atherosclerotic changes in the suprarenal aort  
a. A                             
  
                          common trunk containing the left gastric artery, an     
                          
  
                          accessory left hepatic artery, and bilateral phrenic    
                           
  
                          arteries arises directly from the aorta just superior   
to                             
  
                          the celiac trunk. The celiac trunk, SMA, and bilateral  
                             
  
                          renal arteries are patent with no evidence of ostial    
                           
  
                          stenosis. Bilateral inferior pole accessory renal hussain  
ino                             
  
                          are patent with no ostial stenosis. There is moderate   
                            
  
                          atherosclerotic calcification of the infrarenal abdomi  
nal                             
  
                          aorta with no flow-limiting stenosis. Bilateral aortoi  
liac                             
  
                          stents are patent. Right lower extremity arteries: The  
                             
  
                          right common iliac artery demonstrates focal            
                   
  
                          atherosclerosis with minimal narrowing at the distal e  
nd                             
  
                          of the aortoiliac stent. Internal iliac artery is epstein  
nt.                             
  
                          The right external iliac artery demonstrates areas mil  
d                             
  
                          focal calcification with no significant narrowing. Rig  
ht                             
  
                          common femoral artery demonstrates focal calcification  
                             
  
                          with mild stenosis, particularly just prior to the      
                         
  
                          bifurcation. Deep femoral artery demonstrates patency   
with                             
  
                          areas of mild focal atherosclerotic calcification but   
no                             
  
                          flow-limiting stenosis. Superficial femoral artery is   
                            
  
                          patent with several focal areas of calcification        
                       
  
                          demonstrating mild stenosis. The popliteal artery has   
an                             
  
                          approximately 1.5 cm focal area of atherosclerotic good  
nge                             
  
                          with associated moderate narrowing (series 7, images    
                           
  
                          966-981). Scattered atherosclerosis is seen throughout  
 the                             
  
                          distal popliteal artery with mild narrowing. Mild       
                        
  
                          atherosclerotic calcification is seen within the proxi  
mal                             
  
                          anterior tibial, posterior tibial, and peroneal arteri  
es                             
  
                          with no flow-limiting stenosis. Three-vessel runoff is  
                             
  
                          seen to the ankle with the posterior tibial artery      
                         
  
                          opacified through the plantar foot. Left lower extremi  
ty                             
  
                          arteries: The left common iliac artery demonstrates fo  
tenisha                             
  
                          atherosclerosis with minimal narrowing at the distal e  
nd                             
  
                          of the aortoiliac stent. Internal iliac artery is epstein  
nt.                             
  
                          The left external iliac artery demonstrates areas mild  
                             
  
                          focal calcification with no significant narrowing. Rig  
ht                             
  
                          common femoral artery demonstrates focal calcification  
                             
  
                          with minimal stenosis. The deep femoral artery          
                     
  
                          demonstrates patency with areas minimal atheroscleroti  
c                             
  
                          calcification, now flow-limiting stenosis. Superficial  
                             
  
                          femoral artery is patent with an area of focal          
                     
  
                          calcification proximally demonstrating mild stenosis j  
ust                             
  
                          under 50 percent (series 7, image 577). The popliteal   
                            
  
                          artery has a focal area of calcification demonstrating  
                             
  
                          approximately 50 percent narrowing (series 7, image 96  
2).                             
  
                          Scattered atherosclerosis is seen throughout the dista  
l                             
  
                          popliteal artery with mild narrowing. Mild atheroscler  
otic                             
  
                          calcification is seen within the proximal anterior tib  
ial                             
  
                          and posterior tibial arteries with no flow-limiting     
                          
  
                          stenosis. Three-vessel runoff is seen to the ankle wit  
h                             
  
                          the posterior tibial artery opacified through the plan  
tar                             
  
                          foot. Lung bases: There is extensive predominantly      
                         
  
                          dependent groundglass opacity in the lungs and septal   
                            
  
                          thickening. These findings are new from 2018. No  
                             
  
                          pleural effusions are seen. Liver: Normal size and      
                         
  
                          contours. No focal lesion although evaluation is limit  
ed                             
  
                          by early arterial phase of contrast. Biliary tree: No   
                            
  
                          biliary dilatation. Gallbladder is normal in appearanc  
e.                             
  
                          Spleen: Not enlarged. Adrenals: No significant          
                     
  
                          abnormality. Pancreas: No significant abnormality.      
                         
  
                          Kidneys: Symmetric contrast excretion without evidence  
 of                             
  
                          hydronephrosis. No focal renal lesion is identified. F  
ree                             
  
                          fluid: None. Retroperitoneal/mesenteric lymphadenopath  
y:                             
  
                          None. Bowel: L wall thickening or dilatation is seen.   
                            
  
                          Appendix is normal in appearance. Abdominal wall: No    
                           
  
                          significant abnormality. Pelvic organs/viscera: No mas  
s                             
  
                          identified. The bladder is grossly normal. Pelvic       
                        
  
                          lymphadenopathy: None. Soft tissues of the lower        
                       
  
                          extremities: There is mild left greater than right sof  
t                             
  
                          tissue edema of the lower legs. Inflammatory changes a  
re                             
  
                          seen within the left groin, which may be due to         
                      
  
                          endovascular access at this site. Osseous structures:   
                            
  
                          Median sternotomy wires are seen with distraction of t  
he                             
  
                          sternum centrally. A few of the sternotomy wires appea  
r                             
  
                          fractured. Mild multilevel degenerative changes of the  
                             
  
                          lumbar spine. IMPRESSION: 1. Patent aortoiliac stents.  
 2.                             
  
                          Diffuse atherosclerotic changes throughout the infrare  
nal                             
  
                          aorta, iliac arteries and bilateral lower extremities.  
                             
  
                          There are bilateral three-vessel runoffs to the ankle   
but                             
  
                          areas of moderate stenosis bilaterally in the poplitea  
l                             
  
                          arteries. Additional findings, as above. 3. Exam        
                       
  
                          suggestive of interstitial pulmonary edema or extensiv  
e                             
  
                          postprocedural atelectasis. Correlate clinically. For   
the                             
  
                          purposes of this report, stenosis vocabulary as follow  
s                             
  
                          defined as follows: Mild narrowin-50% Moderate      
                         
  
                          narrowin-75% Severe narrowin-99% Report Dict  
ated                             
  
                          on Workstation: BCHDS2 ---** Final ---** Dictating      
                         
  
                          Physician: MD CAMARA JAMES Signed Date and Time:       
                        
  
                          08/15/2019 3:41 pm Signed by: MD CAMARA JAMES Transcr  
ibed                             
  
                          Date and Time: 08/15/2019 3:42                          
     
   
                          Nasim, Summa Incoming Radiology Results From LifeCare Hospitals of North Carolina - 08  
/15/2019 3:42 PM EDT                             
Langley, KY  
  
                                                                   
  
                                                                   
  
                          Patient Name: WILLOW BRADSHAW III                   
            
  
                          MRN: O130388                             
  
                          FIN: 634022129602                             
  
                                                                   
  
                                                                   
  
                          ---CT---                                 
  
                                                                   
  
                          Exam Date/Time 08/15/2019 10:30:00 EDT                  
             
  
                          Exam CTA Abd Aorta + Iliofemoral                        
       
  
                          Ordering Physician DO BRYAN JOSEPH              
                 
  
                          Accession Number -335445                          
     
  
                                                                   
  
                          CPT4 Codes                               
  
                          47177 (),  (CT ISOVUE 370MG/ML&43178301709&ML&1)   
                            
  
                                                                   
  
                                                                   
  
                          Reason For Exam                             
  
                          Intermittent claudication s/p Ilaic stenting and femor  
al endarterectomy.                             
  
                                                                   
  
                                                                   
  
                          Report                                   
  
                          CT ANGIOGRAPHY ABDOMEN AND PELVIS AND BILATERAL LOWER   
EXTREMITY ARTERIAL                             
  
                          RUNOFF:                                  
  
                                                                   
  
                          CLINICAL INDICATION: Intermittent claudication status   
post iliac stenting                             
  
                          and                                      
  
                          femoral endarterectomy                             
  
                                                                   
  
                          TECHNIQUE: Transaxial sequence through the abdomen, pe  
lvis and bilateral                             
  
                          lower                                    
  
                          extremities initially without IV contrast and then aga  
in during dynamic                             
  
                          intravenous infusion of 75 mL of nonionic contrast med  
ia, injected at a                             
  
                          high                                     
  
                          flow rate following a  study. Multiplanar and 3D   
MIP reconstruction                             
  
                          was                                      
  
                          performed concurrently with independent viewing softwa  
re. Dose reduction                             
  
                          was                                      
  
                          employed with automated exposure control.               
                
  
                                                                   
  
                          Comparison: CT chest from 2018. CT chest, abdome  
n and pelvis from                             
  
                          2017                                
  
                                                                   
  
                          FINDINGS:                                
  
                                                                   
  
                          Aorta:                                   
  
                                                                   
  
                          Abdominal aorta is normal in course and caliber. There  
 are minimal                             
  
                          atherosclerotic changes in the suprarenal aorta. A com  
mon trunk containing                             
  
                          the                                      
  
                          left gastric artery, an accessory left hepatic artery,  
 and bilateral                             
  
                          phrenic                                  
  
                          arteries arises directly from the aorta just superior   
to the celiac trunk.                             
  
                          The                                      
  
                          celiac trunk, SMA, and bilateral renal arteries are pa  
tent with no evidence                             
  
                          of                                       
  
                          ostial stenosis. Bilateral inferior pole accessory anibal  
al arteries are                             
  
                          patent                                   
  
                                                                   
  
                                                                   
  
                                                                   
  
                                                                   
  
                                                                   
  
                                                                   
  
                          with no ostial stenosis. There is moderate atheroscler  
otic calcification of                             
  
                          the                                      
  
                          infrarenal abdominal aorta with no flow-limiting steno  
sis. Bilateral                             
  
                          aortoiliac                               
  
                          stents are patent.                             
  
                                                                   
  
                          Right lower extremity arteries:                         
      
  
                          The right common iliac artery demonstrates focal ather  
osclerosis with                             
  
                          minimal                                  
  
                          narrowing at the distal end of the aortoiliac stent. I  
nternal iliac artery                             
  
                          is                                       
  
                          patent. The right external iliac artery demonstrates a  
reas mild focal                             
  
                          calcification with no significant narrowing. Right com  
mon femoral artery                             
  
                          demonstrates focal calcification with mild stenosis, p  
articularly just                             
  
                          prior to                                 
  
                          the bifurcation. Deep femoral artery demonstrates epstein  
ncy with areas of                             
  
                          mild                                     
  
                          focal atherosclerotic calcification but no flow-limiti  
ng stenosis.                             
  
                                                                   
  
                          Superficial femoral artery is patent with several foca  
l areas of                             
  
                          calcification                             
  
                          demonstrating mild stenosis. The popliteal artery has   
an approximately 1.5                             
  
                          cm                                       
  
                          focal area of atherosclerotic change with associated m  
oderate narrowing                             
  
                          (series                                  
  
                          7, images 966-981). Scattered atherosclerosis is seen   
throughout the distal                             
  
                          popliteal artery with mild narrowing.                   
            
  
                                                                   
  
                          Mild atherosclerotic calcification is seen within the   
proximal anterior                             
  
                          tibial,                                  
  
                          posterior tibial, and peroneal arteries with no flow-l  
imiting stenosis.                             
  
                          Three-vessel runoff is seen to the ankle with the post  
erior tibial artery                             
  
                          opacified through the plantar foot.                     
          
  
                                                                   
  
                          Left lower extremity arteries:                          
     
  
                                                                   
  
                          The left common iliac artery demonstrates focal athero  
sclerosis with                             
  
                          minimal                                  
  
                          narrowing at the distal end of the aortoiliac stent. I  
nternal iliac artery                             
  
                          is                                       
  
                          patent. The left external iliac artery demonstrates ar  
eas mild focal                             
  
                          calcification with no significant narrowing. Right com  
mon femoral artery                             
  
                          demonstrates focal calcification with minimal stenosis  
. The deep femoral                             
  
                          artery                                   
  
                          demonstrates patency with areas minimal atheroscleroti  
c calcification, now                             
  
                          flow-limiting stenosis.                             
  
                                                                   
  
                          Superficial femoral artery is patent with an area of f  
ocal calcification                             
  
                          proximally demonstrating mild stenosis just under 50 p  
ercent (series 7,                             
  
                          image                                    
  
                          577).                                    
  
                                                                   
  
                          The popliteal artery has a focal area of calcification  
 demonstrating                             
  
                          approximately 50 percent narrowing (series 7, image 96  
2). Scattered                             
  
                          atherosclerosis is seen throughout the distal poplitea  
l artery with mild                             
  
                          narrowing.                               
  
                                                                   
  
                                                                   
  
                                                                   
  
                                                                   
  
                                                                   
  
                                                                   
  
                                                                   
  
                          Mild atherosclerotic calcification is seen within the   
proximal anterior                             
  
                          tibial                                   
  
                          and posterior tibial arteries with no flow-limiting st  
enosis. Three-vessel                             
  
                          runoff is seen to the ankle with the posterior tibial   
artery opacified                             
  
                          through                                  
  
                          the plantar foot.                             
  
                                                                   
  
                          Lung bases: There is extensive predominantly dependent  
 groundglass opacity                             
  
                          in                                       
  
                          the lungs and septal thickening. These findings are ne  
w from 2018. No                             
  
                          pleural effusions are seen.                             
  
                                                                   
  
                          Liver: Normal size and contours. No focal lesion altho  
ugh evaluation is                             
  
                          limited                                  
  
                          by early arterial phase of contrast.                    
           
  
                                                                   
  
                          Biliary tree: No biliary dilatation. Gallbladder is no  
rmal in appearance.                             
  
                                                                   
  
                          Spleen: Not enlarged.                             
  
                                                                   
  
                          Adrenals: No significant abnormality.                   
            
  
                                                                   
  
                          Pancreas: No significant abnormality.                   
            
  
                                                                   
  
                          Kidneys: Symmetric contrast excretion without evidence  
 of hydronephrosis.                             
  
                          No                                       
  
                          focal renal lesion is identified.                       
        
  
                                                                   
  
                          Free fluid: None.                             
  
                                                                   
  
                          Retroperitoneal/mesenteric lymphadenopathy: None.       
                        
  
                                                                   
  
                          Bowel: L wall thickening or dilatation is seen. Append  
ix is normal in                             
  
                          appearance.                              
  
                                                                   
  
                          Abdominal wall: No significant abnormality.             
                  
  
                                                                   
  
                          Pelvic organs/viscera: No mass identified. The bladder  
 is grossly normal.                             
  
                                                                   
  
                          Pelvic lymphadenopathy: None.                           
    
  
                                                                   
  
                          Soft tissues of the lower extremities: There is mild l  
eft greater than                             
  
                          right                                    
  
                          soft tissue edema of the lower legs. Inflammatory parada  
ges are seen within                             
  
                          the                                      
  
                          left groin, which may be due to endovascular access at  
 this site.                             
  
                                                                   
  
                          Osseous structures: Median sternotomy wires are seen w  
ith distraction of                             
  
                          the                                      
  
                          sternum centrally. A few of the sternotomy wires appea  
r fractured. Mild                             
  
                          multilevel degenerative changes of the lumbar spine.    
                           
  
                                                                   
  
                          IMPRESSION:                              
  
                                                                   
  
                          1. Patent aortoiliac stents.                            
   
  
                                                                   
  
                                                                   
  
                                                                   
  
                                                                   
  
                                                                   
  
                                                                   
  
                          2. Diffuse atherosclerotic changes throughout the infr  
arenal aorta, iliac                             
  
                          arteries and bilateral lower extremities. There are bi  
lateral three-vessel                             
  
                          runoffs to the ankle but areas of moderate stenosis bi  
laterally in the                             
  
                          popliteal                                
  
                          arteries. Additional findings, as above.                
               
  
                          3. Exam suggestive of interstitial pulmonary edema or   
extensive                             
  
                          postprocedural                             
  
                          atelectasis. Correlate clinically.                      
         
  
                                                                   
  
                                                                   
  
                          For the purposes of this report, stenosis vocabulary a  
s follows defined as                             
  
                          follows:                                 
  
                          Mild narrowin-50%                             
  
                          Moderate narrowin-75%                             
  
                          Severe narrowin-99%                             
  
                                                                   
  
                          Report Dictated on Workstation: BCHDS2                  
             
  
                          ---** Final ---**                             
  
                                                                   
  
                                                                   
  
                          Dictating Physician: MD CAMARA JAMES                   
            
  
                          Signed Date and Time: 08/15/2019 3:41 pm                
               
  
                          Signed by: MD CAMARA JAMES                             
  
                          Transcribed Date and Time: 08/15/2019 3:42              
                 
  
  
  
  
                                        Basic Metabolic Panel  on 2019  
  
  
  
  
             Anion gap [Moles/Vol] 15 mmol/L                              Langley, KY  
   
             Calcium [Mass/Vol] 9.6 mg/dL                 8.4 - 10.4 mg/dL Langley, KY  
   
             Chloride [Moles/Vol] 104 mmol/L                98 - 107 mmol/L Downingtown, KY  
   
             CO2 [Moles/Vol] 19 mmol/L    Low          22 - 30 mmol/L Evansville, KY  
   
             Creatinine [Mass/Vol] 1.3 mg/dL    High         0.52 - 1.25 mg/dL M  
Louisville, KY  
   
             EGFR IF NonAfrican American 55.8 mL/min               >60            
Langley, KY  
  
  
  
  
                          Comment on above:         Source- MDRD equation with c  
reatinine calibration to   
IDMS(NKDEP)  
  
                                                    eGFR not recommended for johnny  
g dose adjustment  
  
  
  
  
             GFR/1.73 sq M predicted mL/min/{1.73_m2}              >60 mL/min     
Mercy Health St. Joseph Warren Hospital-  
  
             among blacks MDRD                                        Renton, KY  
  
             (S/P/Bld) [Vol                                          
  
             rate/Area]                                            
   
             Glucose [Mass/Vol] 129 mg/dL    High         70 - 100 mg/dL Highland District Hospital  
eaMooresville, KY  
   
             Interpretation and Abnormal                               Parkview Health Bryan Hospital-  
  
             review of laboratory                                        Renton, KY  
  
             results                                               
   
             Potassium [Moles/Vol] 4.3 mmol/L                3.5 - 5.1    Mercy Health St. Joseph Warren Hospital-  
  
                                                    mmol/L       Renton, KY  
   
             Sodium [Moles/Vol] 139 mmol/L                135 - 145    Parkview Health Bryan Hospital-  
  
                                                    mmol/L       Renton, KY  
   
             Urea nitrogen [Mass/Vol] 16 mg/dL                  7 - 20 mg/dL Osage, KY  
   
                          Test Performed by CHI St. Vincent Rehabilitation Hospital, 525 E.                           Fort Yukon, OH                             
  
                          69283                                    
  
  
  
  
                                        MARK/CHEST 2 VIEWS  on 2018  
  
  
  
  
             MARK/CHEST 2  MRN: 047889116Xvdngeo Name: AUGUSTINE, Normal            
          Bradshaw  
  
             VIEWS        WILLIAMSTUDY:MARK/CHEST 2 VIEWS; 2018 12:34         
                    Memorial  
  
                          pmINDICATION:cough.COMPARISON:None.ACCESSION            
                 Hospital  
  
                          NUMBER(S):A7166384AIOHOPHI CLINICIAN:SHUN YOUSIF:Frontal and lateral views of the          
                     
  
                          chest, three views in all, were obtained.Mild           
                    
  
                          interstitial prominence. No focal infiltrate or         
                      
  
                          pleural effusion. Cardiacsilhouette within normal       
                        
  
                          limits for size, status post median                     
          
  
                          sternotomy.Thoracic spine kyphosis and                  
             
  
                          degenerative endplate spurring.IMPRESSION:Mild          
                     
  
                          interstitial prominence could reflect acute or          
                     
  
                          chronic interstitialdisease. Follow-up as               
                
  
                          clinically warranted. Status post                       
        
  
                          sternotomy.Dictated by:Electronically Signed by:        
                       
  
                          Megan Quinonesectronically Signed on: 2018         
                      
  
                          1:00 PM                                  
  
  
  
Vital Signs  
  
  
  
             Date Time    Vital Sign   Value        Performing Clinician Facilit  
y  
   
             2022   SaO2% (BldA) [Mass 95 %         Dulce Salvador PA-C Cl  
farhad Clinic  
  
             09:   fraction]                 Work Phone: 1(186) 795-2720   
   
             2022   Body height  170.2 cm     Dulce Salvador PA-C Clevelan  
d Clinic  
  
             08:050500                             Work Phone: 1(729) 858-2436   
   
             2022   Body temperature 97.3 [degF]  Dulce Salvador PA-C Clev  
eland Clinic  
  
             08:050500                             Work Phone: 1(828) 269-1075   
   
             2022   Body weight  86.18 kg     Dulce Salvador PA-C Clevelan  
d Clinic  
  
             08:050500                             Work Phone: 1(823) 666-5784   
   
             2022   Diastolic blood 68 mm[Hg]    Dulce Salvador PA-C Jd  
land Clinic  
  
             08:050500   pressure                  Work Phone: 1(576) 231-7757   
   
             2022   Heart rate   103 /min     Dulce Salvador PA-C Clevelan  
d Clinic  
  
             08:050500                             Work Phone: 1(578) 688-6221   
   
             2022   Systolic blood 124 mm[Hg]   Dulce Salvador PA-C Clevel  
and Clinic  
  
             08:   pressure                  Work Phone: 1(790) 750-1804   
   
             2022   Body height  170.2 cm     Bridget Mendieta MD Corey Hospital  
  
             11:                             Work Phone: 3(647)054-0226   
   
             2022   Body mass index 34.46 kg/m2  Bridget Mendieta MD SUMMA  
  
             11:150   (BMI) [Ratio]              Work Phone: 0(546)190-1135   
   
             2022   Body temperature 96.4 [degF]  Bridget Mendieta MD SUMMA  
  
             11:                             Work Phone: 5(624)601-8617   
   
             2022   Body weight  99.79 kg     Bridget Mendieta MD SUMMA  
  
             11:150                             Work Phone: 3(137)594-3083   
   
             2022   Diastolic blood 66 mm[Hg]    Bridget Mendieta MD SUMMA  
  
             11:15   pressure                  Work Phone: 6(669)498-4179   
   
             2022   Heart rate   100 /min     Bridget Mendieta MD SUMMA  
  
             11:                             Work Phone: 3(967)950-8778   
   
             2022   Respiratory rate 20 /min      Bridget Mendieta MD SUMMA  
  
             11:                             Work Phone: 7(242)648-0578   
   
             2022   SaO2% (BldA) [Mass 93 %         Bridget Mendieta MD SUMMA  
  
             11:   fraction]                 Work Phone: 4(728)269-5934   
   
             2022   Systolic blood 104 mm[Hg]   Bridget Mendieta MD SUMMA  
  
             11:15   pressure                  Work Phone: 2(262)676-7765   
   
             2022   Body height  170.2 cm     Hector BYRD.CNP Providence Hospital  
  
             10:                             Work Phone: 4(540)863-1295   
   
             2022   Body temperature 97.3 [degF]  Hector BYRD.JEANIE Kettering Health Behavioral Medical Center Clinic  
  
             10:                             Work Phone: 5(383)704-6519   
   
             2022   Body weight  90.72 kg     Hector Tijerina CARSON.CNP Jd  
Ascension Eagle River Memorial Hospital Clinic  
  
             10:                             Work Phone: 1(493)684-9631   
   
             2022   Diastolic blood 78 mm[Hg]    Hector BYRD.JEANIE Ohio State University Wexner Medical Center Clinic  
  
             10:   pressure                  Work Phone: 9(350)727-1653   
   
             2022   Heart rate   98 /min      Hector Tijerina CARSON.Fulton County Health Center  
  
             10:0                             Work Phone: 4(020)689-4243   
   
             2022   SaO2% (BldA) [Mass 93 %         Hector Tijerina APRN.Premier Health  
  
             10:   fraction]                 Work Phone: 3(672)226-9928   
   
             2022   Systolic blood 124 mm[Hg]   Hector Tijerina APRN.Martin Memorial Hospital  
  
             10:040   pressure                  Work Phone: 5(229)342-8076   
   
             2022   Diastolic blood 59 mm[Hg]    Odell Romerotrhelenaungo DO SUM  
MA  
  
             23:000400   pressure                  Work Phone: 6(898)084-5997   
   
             2022   Heart rate   100 /min     Odell Mimsungo DO SUMMA  
  
             23:000400                             Work Phone: 1(751)706-8414   
   
             2022   Respiratory rate 22 /min      Odell Starkeyo DO KRAMER  
MMA  
  
             23:000400                             Work Phone: 8(153)665-1759   
   
             2022   SaO2% (BldA) [Mass 93 %         Odell Mimsungo DO   
SUMMA  
  
             23:000400   fraction]                 Work Phone: 0(300)706-8859   
   
             2022   Systolic blood 113 mm[Hg]   Odell Mimsungo DO SUMM  
A  
  
             23:   pressure                  Work Phone: 9(839)217-5064   
   
             2022   Body temperature 99 [degF]    Odell Starkeyo DO KRAMER  
MMA  
  
             19:                             Work Phone: 2(033)792-1691   
   
             2022   Body mass index 33.49 kg/m2  Odell Romerotrhelenaungo DO SUM  
MA  
  
             06:000400   (BMI) [Ratio]              Work Phone: 6(435)066-2910   
   
             2022   Body weight  99.9 kg      Odell Mimsungo DO SUMMA  
  
             06:000400                             Work Phone: 3(521)966-9386   
   
             2022   Body temperature 97.39 [degF] Mariann Tan MD SUMMA  
  
             11:160400                             Work Phone: 3(028)710-4164   
   
             2022   Diastolic blood 59 mm[Hg]    Mariann Tan MD SUMMA  
  
             11:160400   pressure                  Work Phone: 7(533)898-8177   
   
             2022   Heart rate   91 /min      Mariann Tan MD SUMMA  
  
             11:160400                             Work Phone: 7(192)824-3645   
   
             2022   Respiratory rate 16 /min      Mariann Tan MD SUMMA  
  
             11:160400                             Work Phone: 5(817)426-9872   
   
             2022   SaO2% (BldA) [Mass 95 %         Mariann Tan MD SUMMA  
  
             11:160400   fraction]                 Work Phone: 0(856)706-1986   
   
             2022   Systolic blood 95 mm[Hg]    Mariann Tan MD SUMMA  
  
             11:160400   pressure                  Work Phone: 2(422)291-2583   
   
             2022   Body mass index 32.89 kg/m2  Mariann Tan MD OhioHealth Grady Memorial HospitalA  
  
             03:060400   (BMI) [Ratio]              Work Phone: 3(817)322-3122   
   
             2022   Body weight  95.25 kg     Mariann Tan MD SUMMA  
  
             03:060400                             Work Phone: 6(858)652-1533   
   
             2022   Body height  170.2 cm     Mariann Tan MD SUMMA  
  
             08:590400                             Work Phone: 2(368)992-5250   
   
             2022   Diastolic blood 63 mm[Hg]    Ed Breen MD SUMMA  
  
             14:300400   pressure                  Work Phone: 6(087)499-7788   
   
             2022   Heart rate   84 /min      Ed Breen MD SUMMA  
  
             14:300400                             Work Phone: 1(781)886-6503   
   
             2022   Respiratory rate 9 /min       Ed Breen MD SUMMA  
  
             14:300400                             Work Phone: 9(317)483-3677   
   
             2022   SaO2% (BldA) [Mass 90 %         Ed Breen MD SUMMA  
  
             14:300400   fraction]                 Work Phone: 3(140)816-2320   
   
             2022   Systolic blood 99 mm[Hg]    Ed Breen MD SUMMA  
  
             14:300400   pressure                  Work Phone: 4(333)145-2149   
   
             2022   Body temperature 97.5 [degF]  Ed Breen MD SUMMA  
  
             13:040400                             Work Phone: 4(384)371-4498   
   
             2022   Body height  170.2 cm     Ed Breen MD SUMMA  
  
             10:0                             Work Phone: 0(139)064-3653   
   
             2022   Body mass index 30.7 kg/m2   Ed Breen MD SUMMA  
  
             10:0400   (BMI) [Ratio]              Work Phone: 8(754)903-85542022   Body weight  88.91 kg     Ed Breen MD SUMMA  
  
             10:0                             Work Phone: 5(973)021-1920   
   
             2022   Body height  170.2 cm     Ed Breen MD SUMMA  
  
             13:390400                             Work Phone: 4(769)082-2810   
   
             2022   Body mass index 30.7 kg/m2   Ed Breen MD SUMMA  
  
             13:390400   (BMI) [Ratio]              Work Phone: 7(222)093-8545   
   
             2022   Body temperature 96.1 [degF]  Ed Breen MD SUMMA  
  
             13:390400                             Work Phone: 4(611)578-7776   
   
             2022   Body weight  88.91 kg     Ed Breen MD SUMMA  
  
             13:390400                             Work Phone: 1(691)797-8141   
   
             2022   Diastolic blood 64 mm[Hg]    Ed Breen MD SUMMA  
  
             13:390400   pressure                  Work Phone: 0(686)269-7005   
   
             2022   Heart rate   90 /min      Ed Breen MD SUMMA  
  
             13:390400                             Work Phone: 1(580)628-0008   
   
             2022   Respiratory rate 20 /min      Ed Breen MD SUMMA  
  
             13:390400                             Work Phone: 9(346)310-0235   
   
             2022   SaO2% (BldA) [Mass 96 %         Ed Breen MD SUMMA  
  
             13:   fraction]                 Work Phone: 6(945)966-9020   
   
             2022   Systolic blood 101 mm[Hg]   Ed Breen MD SUMMA  
  
             13:   pressure                  Work Phone: 3(246)359-8763   
   
             2022   Body temperature 97.39 [degF] Maxim Colindres MD SUMMA  
  
             16:090500                             Work Phone: 3(751)775-9725   
   
             2022   Diastolic blood 55 mm[Hg]    Maxim Colindres MD SUMMA  
  
             16:090500   pressure                  Work Phone: 6(316)868-0749   
   
             2022   Heart rate   91 /min      Maxim Colindres MD SUMMA  
  
             16:0500                             Work Phone: 6(880)292-9390   
   
             2022   Respiratory rate 18 /min      Maxim Colindres MD SUMMA  
  
             16:0500                             Work Phone: 8(268)176-4228   
   
             2022   SaO2% (BldA) [Mass 97 %         Maxim Colindres MD SUMMA  
  
             16:0500   fraction]                 Work Phone: 9(437)486-1211   
   
             2022   Systolic blood 98 mm[Hg]    Maxim Colindres MD SUMMA  
  
             16:0500   pressure                  Work Phone: 3(381)493-8324   
   
             2022   Body height  170.2 cm     Maxim Colindres MD OhioHealth Grady Memorial HospitalA  
  
             10:                             Work Phone: 2(026)602-2155   
   
             2022   Body mass index 31.32 kg/m2  Maxim Colindres MD SUMMA  
  
             21:   (BMI) [Ratio]              Work Phone: 9(057)809-5232   
   
             2022   Body weight  90.72 kg     Maxim Colindres MD SUMMA  
  
             21:                             Work Phone: 8(202)791-0330   
   
             2021   SaO2% (BldA) [Mass 99 %         Jasmeet Christina MD SUMM  
A  
  
             15:   fraction]                 Work Phone: 6(477)772-0517 W  
ork Phone: 6(207)650-0283  
   
             2021   Body height  170.2 cm     Jasmeet Christina MD SUMMA  
  
             15:                             Work Phone: 4(351)839-9502 W  
ork Phone: 5(703)806-5308  
   
             2021   Body mass index 34.14 kg/m2  Jasmeet Christina MD SUMMA  
  
             15:   (BMI) [Ratio]              Work Phone: 3(039)870-0485   
Work Phone: 1(175)323-5403  
   
             2021   Body temperature 97.7 [degF]  Jasmeet Christina MD SUMMA  
  
             15:                             Work Phone: 8(680)362-0844 W  
ork Phone: 4(245)260-5939  
   
             2021   Body weight  98.88 kg     Jasmeet Christina MD SUMMA  
  
             15:                             Work Phone: 1(490)660-8013 W  
ork Phone: 1(931)725-3423  
   
             2021   Diastolic blood 77 mm[Hg]    Jasmeet Christina MD SUMMA  
  
             15:   pressure                  Work Phone: 3(547)453-4134 W  
ork Phone: 4(651)505-1541  
   
             2021   Heart rate   98 /min      Jasmeet Christina MD SUMMA  
  
             15:                             Work Phone: 3(319)583-9023 W  
ork Phone: 6(014)507-9318  
   
             2021   Respiratory rate 20 /min      Jasmeet Christina MD Corey Hospital  
  
             15:                             Work Phone: 8(514)459-0523 W  
ork Phone: 0(364)441-8619  
   
             2021   Systolic blood 103 mm[Hg]   Jasmeet Christina MD SUMMA  
  
             15:   pressure                  Work Phone: 6(749)091-5689 W  
ork Phone: 3(125)070-5525  
   
             2021   Body temperature 96.6 [degF]  Santino España MD SUMMA  
  
             07:                             Work Phone: 6(283)220-2134 W  
ork Phone: 8(978)832-1674  
   
             2021   Diastolic blood 66 mm[Hg]    Santino España MD SUMMA  
  
             07:   pressure                  Work Phone: 6(805)735-3176 W  
ork Phone: 9(499)334-5972  
   
             2021   Heart rate   91 /min      Santino España MD SUMMA  
  
             07:                             Work Phone: 4(007)678-6817 W  
ork Phone: 8(054)832-1241  
   
             2021   Respiratory rate 16 /min      Santino España MD SUMMA  
  
             07:                             Work Phone: 1(022)494-2341 W  
ork Phone: 6(912)025-4716  
   
             2021   SaO2% (BldA) [Mass 94 %         Santino España MD SUMM  
A  
  
             07:   fraction]                 Work Phone: 2(701)177-9672 W  
ork Phone: 2(114)183-6412  
   
             2021   Systolic blood 100 mm[Hg]   Santino España MD SUMMA  
  
             07:   pressure                  Work Phone: 1(793)330-5096 W  
ork Phone: 5(472)916-6604  
   
             2021   Body mass index 32.58 kg/m2  Santino España MD SUMMA  
  
             19:070400   (BMI) [Ratio]              Work Phone: 6(222)844-0701   
Work Phone: 0(818)279-2235  
   
             2021   Body weight  94.35 kg     Santino España MD SUMMA  
  
             19:070400                             Work Phone: 9(358)068-9988 W  
ork Phone: 9(698)358-2707  
   
             10-   Body Temperature 97 [degF]    Odell MylesCentra Southside Community Hospital- OH,  
  
             07:                                          KY  
   
             10-   BP Diastolic 68 mm[Hg]    Odell BullAvita Health System- OH,  
  
             07:                                          KY  
   
             10-   BP Systolic  113 mm[Hg]   Odell BullAvita Health System- OH,  
  
             07:                                          KY  
   
             10-   Pulse (Heart Rate) 63 /min      Odell Bryan Avita Health System Galion Hospital- OH,  
  
             07:                                          KY  
   
             10-   Pulse Oximetry 94 %         Odell RIOJAS  
eaLima City Hospital- OH,  
  
             07:                                          KY  
   
             10-   Respiratory Rate 18 /min      Odell Bryan Mercy Health St. Joseph Warren Hospital- OH,  
  
             07:                                          KY  
   
             2020   BP Diastolic 60 mm[Hg]    Parish Welsh Fulton County Health Center,  
  
             16:                                          KY  
  
  
  
  
                          Comment on above:         manual  
  
  
  
  
             2020 16: BP Systolic  92 mm[Hg]    Parish Welsh Fulton County Health Center, KY  
  
  
  
  
                          Comment on above:         manual  
  
  
  
  
             2020   Body Temperature 97.3 [degF]  Parish Avitia  
th-  
  
             15:                                          OH, KY  
   
             2020   Pulse (Heart Rate) 106 /min     Parish Dow   
alth-  
  
             15:                                          OH, KY  
   
             2020   Pulse Oximetry 96 %         Parish Dow St. John of God Hospital  
-  
  
             15:480400                                          OH, KY  
   
             2020   Respiratory Rate 18 /min      Parish Avitia  
th-  
  
             15:480400                                          OH, KY  
   
             2020   BMI (Body Mass Index) 34.85 kg/m2  Parish Dow  
 St. John of God Hospital-  
  
             04:000400                                          OH, KY  
   
             2020   Body weight  100.92 kg    Parish Dow St. John of God Hospital-  
  
             04:000400                                          OH, KY  
   
             2020   Height       170.2 cm     Parish Dow St. John of God Hospital-  
  
             11:                                          OH, KY  
   
             2020   BMI (Body Mass Index) 34.46 kg/m2  Lewis iglesias Health-  
  
             14:200500                                          OH, KY  
   
             2020   Body Temperature 97.7 [degF]  Lewis Bull  
alth-  
  
             14:200500                                          OH, KY  
   
             2020   Body weight  99.79 kg     Lewis Dow St. John of God Hospital  
-  
  
             14:                                          OH, KY  
   
             2020   BP Diastolic 69 mm[Hg]    Lewis Dow St. John of God Hospital  
-  
  
             14:                                          OH, KY  
   
             2020   BP Systolic  125 mm[Hg]   Lewis Dow St. John of God Hospital  
-  
  
             14:200500                                          OH, KY  
   
             2020   Height       170.2 cm     Lewis Dow St. John of God Hospital  
-  
  
             14:                                          OH, KY  
   
             2020   Pulse (Heart Rate) 96 /min      Lewis Dow   
St. John of God Hospital-  
  
             14:200500                                          OH, KY  
   
             2020   Pulse Oximetry 94 %         Lewis Avitia  
th-  
  
             14:                                          OH, KY  
   
             2020   Respiratory Rate 18 /min      Lewis Bull  
alth-  
  
             14:                                          OH, KY  
  
  
  
Encounters  
  
  
  
                Encounter Date  Encounter Type  Care Provider   Facility  
   
                Start: 12- Refill          Elizabeth Miles MD PPG Hem  
atology/Oncology  
  
                                                Work Phone: 1(308) 522-4750   
  
  
  
  
                          Comment on above:         Refill Request  
  
  
  
  
                Start: 2022 ambulatory      ALLI DAWSON   Mercy Health St. Vincent Medical Center S  
ystem  
  
                End: 2022                                 SHS  
   
                Start: 2022 ambulatory      ODELL ROMEROJEREMIAHOLUNGHolzer Medical Center – Jackson System  
  
                End: 2022                                 Tooele Valley Hospital  
   
                Start: 2022 ambulatory      ALLI DAWSON   Mercy Health St. Vincent Medical Center S  
yste  
  
                End: 2022                                 Tooele Valley Hospital  
   
                Start: 2022 ambulatory      ALLI CURT   Mercy Health St. Vincent Medical Center S  
yste  
  
                End: 2022                                 Tooele Valley Hospital  
   
                Start: 2022 ambulatory      ELIZABETH MILES Facility:A  
Olive View-UCLA Medical Center  
  
                                                                General  
   
                Start: 2022 Subsequent hospital visit Ct Green        RADI  
O CT SCAN Utica Psychiatric Center  
  
                End: 2022 by physician                    GREEN  
  
  
  
  
                          Comment on above:         Neoplasm of lung [D49.1]  
  
  
  
  
                Start: 2022 ambulatory      CHARITYSHIRLEY DUONG      Facility:Ararat  
 General  
  
                End: 2022                                   
   
                Start: 2022 Patient encounter Dulce dexter Clinic Ararat  
  
                End: 2022 procedure       Work Phone: 1(995) 893-7721 Sofia Montero Primary  
  
                                                                Care  
  
  
  
  
                          Comment on above:         Positive FIT (fecal immunoch  
emical test) (Primary Dx);  
  
                                                    Peripheral vascular disease   
(HCC);  
  
                                                    Controlled type 2 diabetes m  
ellitus with diabetic peripheral angiopathy without  
 gangrene, without long-term current use of insulin (HCC);  
  
                                                    Encounter for immunization;  
  
                                                    Screening for prostate cance  
r;  
  
                                                    Depression screening  
  
  
  
  
                Start: 2022 ambulatory      DEBORA Hocking Valley Community Hospital  
ysteGlendale Adventist Medical Center  
  
                End: 2022                                   
   
                Start: 2022 ambulatory      ALLI CURT   Kettering Health Greene Memorial M2 Connections S  
ysteGlendale Adventist Medical Center  
  
                End: 2022                                   
   
                Start: 2022 ambulatory      ALLI CURT   Mercy Health St. Vincent Medical Center S  
ysteGlendale Adventist Medical Center  
  
                End: 2022                                   
   
                Start: 2022 ambulatory      ALLI CURT   Mercy Health St. Vincent Medical Center S  
ysteGlendale Adventist Medical Center  
  
                End: 2022                                   
   
                Start: 2022 ambulatory      DEBORA Goshen General Hospital M2 Connections S  
ysteGlendale Adventist Medical Center  
  
                End: 2022                                   
   
                Start: 2022 ambulatory      ALLI Brown Memorial Hospital S  
ysteGlendale Adventist Medical Center  
  
                End: 2022                                   
   
                Start: 2022 ambulatory      ALLI Brown Memorial Hospital S  
ysteGlendale Adventist Medical Center  
  
                End: 2022                                   
   
                Start: 2022 ambulatory      ALLI Brown Memorial Hospital S  
ystem SHS  
  
                End: 2022                                   
   
                Start: 2022 ambulatory      ALLI DAWSON   MyoKardia M2 Connections S  
ystem SHS  
  
                End: 2022                                   
   
                Start: 2022 Chart abstracting Green Primary Care Trinity Health System Cheko  
  
                                                Work Phone: 3(860)889-1051 Sofia Montero Primary  
  
                                                                Care  
  
  
  
  
                          Comment on above:         Outside Labs Results  
  
  
  
  
                Start: 2022 ambulatory      ALLI DAWSON   MyoKardiamonique M2 Connections S  
ystem  
  
                End: 2022                                 SHS  
   
                Start: 2022 ambulatory      DEBORA AustinNAOMI     Kettering Health Greene Memorial M2 Connections S  
ystem  
  
                End: 2022                                 SHS  
   
                Start: 11- ambulatory      DEBORA AustinNAOMI     Kettering Health Greene Memorial M2 Connections S  
ystem  
  
                End: 11-                                 SHS  
   
                Start: 2022 ambulatory      ADAM SMIHT  Kettering Health Greene Memorial M2 Connections S  
ystem  
  
                End: 2022                                 SHS  
   
                Start: 11- ambulatory      ALLI DAWSON   MyoKardia M2 Connections S  
ystem  
  
                End: 11-                                 SHS  
   
                Start: 2022 ambulatory      DEBORA Goshen General Hospital M2 Connections S  
ystem  
  
                End: 2022                                 SHS  
   
                Start: 2022 ambulatory      JUDSON STARROhio State Harding Hospital M2 Connections S  
ystem  
  
                End: 2022                                 SHS  
   
                Start: 2022 ambulatory      ALLI ADWSON   MyoKardia M2 Connections S  
ystem  
  
                End: 2022                                 SHS  
   
                Start: 2022 ambulatory      ALLI DAWSON   MyoKardia M2 Connections S  
ystem  
  
                End: 2022                                 SHS  
   
                Start: 2022 ambulatory      DEBORA AustinNAOMI     Kettering Health Greene Memorial M2 Connections S  
ystem  
  
                End: 2022                                 SHS  
   
                Start: 2022 ambulatory      ALLI DAWSON   MyoKardia M2 Connections S  
ystem  
  
                End: 2022                                 SHS  
   
                Start: 10- ambulatory      ALLI DAWSON   MyoKardia M2 Connections S  
ystem  
  
                End: 2022                                 SHS  
   
                Start: 10- ambulatory      Atrium Health Steele Creek ANGELICA Kettering Health Greene Memorial M2 Connections   
Insight Surgical Hospital  
   
                Start: 10- Subsequent hospital Judson Akhtar Tuscarawas Hospital Dept  
  
                End: 10- visit by physician Work Phone: 7(572)162-8823   
   
                Start: 10- Subsequent hospital Benjamin Stickney Cable Memorial Hospital Akro  
n University Hospitals Health System Dept  
  
                End: 10- visit by physician Work Phone: 8(694)539-9717   
   
                Start: 10- Subsequent hospital Benjamin Stickney Cable Memorial Hospital Akro  
n University Hospitals Health System Dept  
  
                End: 10- visit by physician Work Phone: 2(426)687-1509   
   
                Start: 10- Subsequent hospital Benjamin Stickney Cable Memorial Hospital Akro  
n University Hospitals Health System Dept  
  
                End: 10- visit by physician Work Phone: 6(832)171-1712   
   
                Start: 10- Subsequent hospital Benjamin Stickney Cable Memorial Hospital Akro  
n University Hospitals Health System Dept  
  
                End: 10- visit by physician Work Phone: 1(943)674-6773   
   
                Start: 10- Subsequent hospital Benjamin Stickney Cable Memorial Hospital Akro  
n University Hospitals Health System Dept  
  
                End: 10- visit by physician Work Phone: 5(487)053-1981   
   
                Start: 10- Subsequent hospital Benjamin Stickney Cable Memorial Hospital Akro  
n University Hospitals Health System Dept  
  
                End: 10- visit by physician Work Phone: 8(218)752-1699   
   
                Start: 10- Subsequent hospital Benjamin Stickney Cable Memorial Hospital Akro  
n University Hospitals Health System Dept  
  
                End: 10- visit by physician Work Phone: 8(742)040-2471   
   
                Start: 2022 Subsequent hospital Benjamin Stickney Cable Memorial Hospital Akro  
n University Hospitals Health System Dept  
  
                End: 2022 visit by physician Work Phone: 6(770)767-4529   
   
                Start: 2022 Emergency department Bridget Luis monique  
Lima City Hospital System  
  
                End: 2022 patient visit                     
   
                Start: 2022 Emergency department Bridget Mendieta MD Northwest Rural Health Network Gr  
een Emergency  
  
                End: 2022 patient visit   Work Phone: 2(152)120-3027 Dept  
  
  
  
  
                          Comment on above:         Fall, initial encounter (Joy  
sandra Dx);  
  
                                                    S/P below knee amputation, l  
eft (HCC)  
  
  
  
  
                Start: 2022 ambulatory      HECTOR TIJERINA   Facility:Wayne Hospital  
  
                End: 2022                                   
   
                Start: 2022 Patient encounter Hector Tijerina APRN.Children's Hospital of Columbus  
  
                End: 2022 procedure       Work Phone: 6(124)106-8117 Sofia  
ysabel Chawla Primary  
  
                                                                Care  
  
  
  
  
                          Comment on above:         Hospital discharge follow-up  
 (Primary Dx);  
  
                                                    Peripheral vascular disease   
(HCC);  
  
                                                    S/P below knee amputation, l  
eft (HCC)  
  
  
  
  
                Start: 2022 Telephone encounter Hector Benjamin BYRD.Martin Memorial Hospital  
  
                                                Work Phone: 3(478)884-8348 AraratCharleston Area Medical Center  
  
                                                                Primary Care  
  
  
  
  
                          Comment on above:         Patient Question  
  
  
  
  
                Start: 2022 ambulatory      Denise Abrams Southview Medical Center Primary Ca  
re  
  
  
  
  
                          Comment on above:         Population Health Navigation  
 Outreach  
  
  
  
  
                Start: 2022 ambulatory      Hector Benjamin BYRD.CNP AG Ambu  
latory Care  
  
                                                Work Phone: 5(607)314-8910   
   
                Start: 2022 Patient Outreach Hectoraundrea BYRD.Bethesda North Hospital Ararat  
  
                                                Work Phone: 1(804) 336-3729 Sofia Montero Primary  
  
                                                                Care  
  
  
  
  
                          Comment on above:         Transition Of Care  
  
  
  
  
                Start: 2022 Emergency department Hector Tijerina   The Jewish Hospital System  
  
                End: 2022 patient visit                     
   
                Start: 08- ambulatory      Denise Abrams Southview Medical Center Pr  
imary  
  
                                                                Care  
  
  
  
  
                          Comment on above:         Population Health Navigation  
 Outreach  
  
  
  
  
                Start: 2022 ambulatory      ELIZABETH MILES Facility:Hendricks Regional Health  
  
                End: 2022                                   
   
                Start: 2022 ambulatory      Elizabeth Miles MD PPG Hem  
atology/Oncology  
  
                End: 2022                 Work Phone: 1(155) 109-7937   
  
  
  
  
                          Comment on above:         Malignant neoplasm of upper   
lobe of left lung (HCC) (Primary   
Dx)  
  
  
  
  
                Start: 2022 Telemedicine    Elizabeth Miles MD AKRON G  
ENERAL  
  
                End: 2022 consultation with Work Phone: 1(596) 470-4385 Regency Hospital Toledo AND  
  
                                patient                         WELLNESS GREEN  
   
                Start: 2022 Evaluation and  UNKNOWN PROVIDER Mercy Health St. Vincent Medical Center  
  
                End: 2022 management of inpatient                 System  
   
                Start: 2022 Evaluation and  Odell Irvin DO ACH HEAR  
T & LUNG  
  
                End: 2022 management of inpatient Work Phone: 3(524)430-21 09   
  
  
  
  
                          Comment on above:         PAD (peripheral artery disea  
se) (HCC) (Primary Dx)  
  
  
  
  
                Start: 2022 ambulatory      Judson GiovanaCleveland Clinic Mentor Hospital  
yste  
   
                Start: 2022 Subsequent hospital Judson Akhtar DO ACH Akro  
Salem City Hospital Dept  
  
                End: 2022 visit by physician Work Phone: 0(124)605-6466   
   
                Start: 2022 ambulatory      ELIZABETH MILES Facility:A  
austin  
  
                                                                General  
   
                Start: 2022 Subsequent hospital Ct Green        RADIO CT S  
CAN C  
  
                End: 2022 visit by physician                 ANASTASIIA  
  
  
  
  
                          Comment on above:         Neoplasm of lung [D49.1]  
  
  
  
  
                Start: 2022 Chart abstracting Hector GRADYFulton County Health Center   
Ararat  
  
                                                Work Phone: 9(467)280-3695 Sofia Montero Primary  
  
                                                                Care  
  
  
  
  
                          Comment on above:         Outside Lab Results  
  
  
  
  
                Start: 2022 ambulatory      Atrium Health Harrisburg M2 Connections S  
yste  
   
                Start: 2022 Subsequent hospital Ed Breen MD ACH 95 Ar  
ch Vascular  
  
                End: 2022 visit by physician Work Phone: 0(294)104-5722 La  
b  
  
  
  
  
                          Comment on above:         Atherosclerosis of native ar  
teries of extremities with rest   
pain,  
  
                                                    left leg (HCC)  
  
  
  
  
                Start: 2022 ambulatory      Atrium Health Steele Creek PROVIDER Kettering Health Greene Memorial M2 Connections   
Insight Surgical Hospital  
  
                End: 2022                                   
   
                Start: 2022 Subsequent hospital Mariann Tan MD ACH 1C C  
apacity  
  
                End: 2022 visit by physician Work Phone: 1(199) 124-8827 Marko moreno  
  
  
  
  
                          Comment on above:         CAD S/P percutaneous coronar  
y angioplasty (Primary Dx);  
  
                                                    Type 2 diabetes mellitus wit  
h hyperglycemia, without long-term current use of   
insulin (HCC)  
  
  
  
  
                Start: 2022 ambulatory      Atrium Health Steele Creek PROVIDER Kettering Health Greene Memorial M2 Connections   
Insight Surgical Hospital  
   
                Start: 2022 ambulatory      Atrium Health Steele Creek PROVIDER Munson Healthcare Cadillac Hospital  
  
                End: 2022                                   
   
                Start: 2022 Subsequent hospital Ed Breen MD ACH Gener  
al Surgery  
  
                End: 2022 visit by physician Work Phone: 0(371)357-8389   
  
  
  
  
                          Comment on above:         Post-op pain (Primary Dx)  
   
                                                    Refill Request  
  
  
  
  
                Start: 2022 ambulatory      Highland District Hospital  
ystem  
  
                End: 2022                                   
   
                Start: 2022 ambulatory      Highland District Hospital  
ystem  
   
                Start: 2022 Encounter for other Hodgeman County Health Center  
  
                                preprocedural examination                   
   
                Start: 2022 Subsequent hospital visit Ed Breen MD ACH  
 Pre-Admit  
  
                End: 2022 by physician    Work Phone: 8(889)591-6929 Testi  
onel  
  
  
  
  
                          Comment on above:         Arrived  
  
  
  
  
                Start: 2022 ambulatory      UNKNOWN PROVIDER Munson Healthcare Cadillac Hospital  
   
                Start: 2022 ambulatory      UNKNOWN PROVIDER Munson Healthcare Cadillac Hospital  
   
                Start: 2022 Subsequent hospital Odell NickCarolinas ContinueCARE Hospital at Kings Mountain DO Northwest Rural Health Network   
95 Arch  
  
                End: 2022 visit by physician Work Phone: 7(768)924-0820 La  
boratory  
  
  
  
  
                          Comment on above:         Femoropopliteal arterial thr  
ombosis of left lower extremity   
(HCC)  
  
  
  
  
                Start: 03- ambulatory      HECTOR TIJERINA   Facility:AraratSouthview Medical Center  
  
                End: 03-                                   
   
                Start: 2022 ambulatory      Atrium Health Steele Creek PROVIDER Munson Healthcare Cadillac Hospital  
   
                Start: 2022 ambulatory      Children's Hospital of The King's Daughters  
   
                Start: 2022 Evaluation and  Highland District Hospital  
ystem  
  
                End: 2022 management of                     
  
                                inpatient                         
   
                Start: 2022 Evaluation and  Maxim Colindres MD ACH 1C Capaci  
ty  
  
                End: 2022 management of   Work Phone: 4(055)503-9964 Manag  
ement  
  
                                inpatient                         
   
                Start: 2022 ambulatory      Highland District Hospital  
ystem  
   
                Start: 2022 ambulatory      Highland District Hospital  
ystem  
   
                Start: 2022 Subsequent hospital Ed Breen MD ACH 95 Ar  
ch Vascular Lab  
  
                End: 2022 visit by physician Work Phone: 1(633) 484-4643   
  
  
  
  
                          Comment on above:         Atherosclerosis of native ar  
norman of left lower extremity with  
  
                                                    intermittent claudication (H  
CC)  
  
  
  
  
                Start: 2022 ambulatory      Hector Tijerina   Select Medical Specialty Hospital - Cincinnati  
ystem  
   
                Start: 2022 ambulatory      ELIZABETH MILES Facility:MONIQUE avila  
  
                End: 2022                                 General  
   
                Start: 2022 ambulatory      Highland District Hospital  
ystem  
  
                End: 2022                                   
   
                Start: 2021 Evaluation and  Highland District Hospital  
yste  
  
                End: 2022 management of inpatient                   
   
                Start: 2021 Subsequent Essentia Health-Fargo Hospital   
Vascular Lab  
  
                End: 2021 visit by physician Work Phone: 4(867)194-1660   
  
  
  
  
                          Comment on above:         Atherosclerosis of native ar  
teries of extremities with rest   
pain, unspecified extremity (HCC);  
  
                                                    Atherosclerosis of native ar  
teries of extremities with rest pain, left leg   
(HCC);  
  
                                                    Other specified postprocedur  
al states;  
  
                                                    Critical limb ischemia with   
history of revascularization of same extremity   
(HCC);  
  
                                                    Atherosclerosis of native ar  
teries of extremities with rest pain, left leg   
(HCC)  
  
  
  
  
                Start: 2021 Subsequent Carrie Ville 21669 Arch  
  
                End: 2021 visit by physician Work Phone: 9(256)282-9328 Va  
scular Lab  
  
  
  
  
                          Comment on above:         Critical lower limb ischemia  
 (HCC);  
  
                                                    Peripheral vascular disease   
with claudication (HCC)  
  
  
  
  
                Start: 2021 Subsequent Carrie Ville 21669 Arch  
  
                End: 2021 visit by physician Work Phone: 7(779)928-8439 Va  
scular Lab  
  
  
  
  
                          Comment on above:         Peripheral vascular disease   
with claudication (HCC);  
  
                                                    Critical lower limb ischemia  
 (HCC);  
  
                                                    Claudication of left lower e  
xtremity (HCC);  
  
                                                    Ischemic rest pain of lower   
extremity;  
  
                                                    PAD (peripheral artery disea  
se) (HCC)  
  
  
  
  
                Start: 2021 Subsequent Carrie Ville 21669 Arch  
  
                End: 2021 visit by physician Work Phone: 9(454)793-7068 La  
boratory  
  
  
  
  
                          Comment on above:         Peripheral vascular disease   
with claudication (HCC);  
  
                                                    Critical lower limb ischemia  
 (HCC)  
  
  
  
  
                Start: 2021 Emergency department Jasmeet lindquist Emergency  
  
                End: 2021 patient visit   Work Phone: 2(538)466-8256 Dept  
  
  
  
  
                          Comment on above:         Acute upper respiratory infe  
ction (Primary Dx)  
  
  
  
  
                Start: 2021 Subsequent Essentia Health-Fargo Hospital   
Vascular Lab  
  
                End: 2021 visit by physician Work Phone: 6(627)326-2797   
  
  
  
  
                          Comment on above:         Peripheral vascular disease,  
 unspecified (HCC);  
  
                                                    PAD (peripheral artery disea  
se) (HCC)  
  
  
  
  
                Start: 2021 Evaluation and  Santino España MD Northwest Rural Health Network 1C Cap  
acity  
  
                End: 2021 management of   Work Phone: 6(380)782-2354 Manag  
ement  
  
                                inpatient                         
  
  
  
  
                          Comment on above:         Ischemia of left lower extre  
mity (Primary Dx)  
  
  
  
  
                Start: 2021 Subsequent Carilion Franklin Memorial Hospital   
95 ARCH CT  
  
                End: 2021 visit by physician Work Phone: 5(211)638-8858   
  
  
  
  
                          Comment on above:         Peripheral vascular disease,  
 unspecified (HCC);  
  
                                                    Peripheral vascular disease   
with claudication (Formerly Chesterfield General Hospital)  
  
  
  
  
                Start: 2020 Subsequent hospital South Baldwin Regional Medical Center 95 Arc  
h St  
  
                End: 2020 visit by physician Work Phone: 3(592)239-3045   
  
  
  
  
                          Comment on above:         Coronary artery disease invo  
lving native coronary artery of   
native heart without angina pectoris;  
  
                                                    Status post insertion of johnny  
g-eluting stent into right coronary artery for   
coronary artery disease;  
  
                                                    Essential hypertension  
  
  
  
  
                Start: 10- Subsequent Sentara CarePlex Hospital 1C   
Capacity  
  
                End: 10- visit by physician Work Phone: 1(672) 948-5112 Marko moreno  
  
  
  
  
                          Comment on above:         Arrived  
  
  
  
  
                Start: 10- Subsequent Bradley Hospital 95 Ar  
ch  
  
                End: 10- visit by physician Work Phone: 1(196) 922-3448 La  
boratory  
  
  
  
  
                          Comment on above:         Muscle spasms of both lower   
extremities;  
  
                                                    Coronary artery disease invo  
lving coronary bypass graft of native heart without  
 angina pectoris;  
  
                                                    Abnormal thyroid blood test;  
  
                                                    Subclinical hypothyroidism  
  
  
  
  
                Start: 2020 Emergency department Parish Welsh Northwest Rural Health Network 5E   
MED SURG  
  
                End: 2020 patient visit   Work Phone: 0(030)235-2329   
  
  
  
  
                          Comment on above:         Chest pain, unspecified type  
 (Primary Dx);  
  
                                                    Acute kidney injury superimp  
osed on chronic kidney disease (HCC)  
  
  
  
  
                Start: 2020 Emergency department Lewis RENEE Gre  
en Emergency  
  
                End: 2020 patient visit   Work Phone: 8(828)874-9880 Dept  
  
  
  
  
                          Comment on above:         Gluteal abscess (Primary Dx)  
  
  
  
  
                Start: 2019 Subsequent hospital Abimbola Díaz SHB Vascul  
ar Lab  
  
                End: 2019 visit by physician Work Phone: 0(188)779-8046   
  
  
  
  
                          Comment on above:         Atheroscler of native artery  
 of both legs with intermit  
  
                                                    claudication (HCC)  
  
  
  
  
                Start: 08- Subsequent hospital visit Odell LATIF  
HB CT Scan  
  
                End: 08- by physician    Work Phone: 3(779)506-0398   
  
  
  
  
                          Comment on above:         Intermittent claudication of  
 both lower extremities due to  
  
                                                    atherosclerosis (HCC)  
  
  
  
  
                Start: 2019 Subsequent hospital Odell Bryan ACH 95   
Arch  
  
                End: 2019 visit by physician Work Phone: 9(646)848-6954 La  
boratory  
  
  
  
  
                          Comment on above:         Intermittent claudication of  
 both lower extremities due to  
  
                                                    atherosclerosis (HCC)  
  
  
  
  
                Start: 2018 Ambulatory      PROVIDER UNKNOWN Sheridan Memorial Hospital - Sheridan  
  
                End: 2018                                   
  
  
  
Procedures  
  
  
  
                Date            Procedure       Procedure Detail Performing Clin  
ician  
   
                Start: 2022 INFLUENZA SEASONAL                 Dulce H  
yonatan PA-PETER  
  
                                QUADRIVALENT HIGH DOSE AGE                 Work   
Phone: 3(099)803-5551  
  
                                65+                               
   
                Start: 10- Hemoglobin.gastrointestina                 Carla parkerlower [Presence] in                 Work Phone  
: 9(337)334-0108  
  
                                Stool by Immunoassay                   
   
                Start: 10- Cul prsmptv Penn State Health Holy Spirit Medical Center                 Alli jean baptistene DO  
  
                                organism scrn w/colony                 Work Phon  
e: 7(065)865-9914  
  
                                estimj                            
   
                Start: 2022 Cul prsmptv pthgn                 Judson Starr  
elisa DO  
  
                                organism scrn w/colony                 Work Phon  
e: 5(384)521-5008  
  
                                estimj                            
   
                Start: 2022 Radiologic examination                 Bridget Mendieta MD  
  
                                knee 3 views                    Work Phone: 1(89 8)464-6068  
   
                Start: 2022 Adult depression screening                 Roderick Tijerina APRN.CNP  
  
                                assessment                      Work Phone: 1(13 1)108-5931  
   
                Start: 2022 Microscopic examination of                 ANANDA BREEN  
  
                                blood, culture                    
  
  
  
  
                          Comment on above:         Performed By: #### C/BLD ###  
#38 Herrera Street 07730-9262Vs90 Davis Street 644492616  
  
  
  
  
                Start: 2022 Gluc bld gluc mntr dev                 Odell   
Pietrolungo DO  
  
                                cleared fda spec home                 Work Phone  
: 1(113) 662-9545  
  
                                use                               
   
                Start: 2022 Thromboplastin time                 Joseph espino MD  
  
                                partial plasma/whole                 Work Phone:  
 1(862) 143-8780  
  
                                blood                             
   
                Start: 2022 Gluc bld gluc mntr dev                 Odell Romerotrolungo DO  
  
                                cleared fda spec home                 Work Phone  
: 1(899) 118-1330  
  
                                use                               
   
                Start: 2022 POCT ACTIVATED CLOTTING                 Odell Romerotrolungo DO  
  
                                TIME                            Work Phone: 1(27  
0)719-0777  
   
                Start: 2022 Gluc bld gluc mntr dev                 Odell Romerotrolungo DO  
  
                                cleared fda spec home                 Work Phone  
: 5(472)367-4388  
  
                                use                               
   
                Start: 2022 Gluc bld gluc mntr dev                 Odell   
Pietrolungo DO  
  
                End: 2022 cleared fda spec home                 Work Phone  
: 1(502) 730-3755  
  
                                use                               
   
                Start: 2022 Cardiac catheterization                 Joesph Garner MD  
  
                                                                Work Phone: 1(33  
0)282-0808  
   
                Start: 2022 Cardiac catheterization                 Odell Romerotrolungo DO  
  
                                                                Work Phone: 1(23  
0)308-9480  
   
                Start: 2022 Basic metabolic panel                 Raul Calhoun MD  
  
                                calcium total                   Work Phone: 133  
0)665-6632  
   
                Start: 08- Gluc bld gluc mntr dev                 Odell Romerotrolungo DO  
  
                                cleared fda spec home                 Work Phone  
: 1(190) 987-9696  
  
                                use                               
   
                Start: 08- Gluc bld gluc mntr dev                 Odell   
Pietrolungo DO  
  
                                cleared fda spec home                 Work Phone  
: 1(544) 455-3158  
  
                                use                               
   
                Start: 08- Blood count complete                 Joseph Garner MD  
  
                                automated                       Work Phone: 1(24  
0)670-1874  
   
                Start: 08- Gluc bld gluc mntr dev                 Odell Romerotrolungo DO  
  
                                cleared fda spec home                 Work Phone  
: 1(513) 538-8002  
  
                                use                               
   
                Start: 08- CARDIAC CATH NURSING LOG                 Physi  
zac Generic  
   
                Start: 08- Coagulation time                 Odell Kirby  
lungo DO  
  
                End: 08- activated                       Work Phone: 1(33  
0)459-9636  
   
                Start: 08- Coagulation time                 Odell Orr  
lungo DO  
  
                End: 08- activated                       Work Phone: 1(33  
0)580-4048  
   
                Start: 08- Coagulation time                 Odell Orr  
lungo DO  
  
                                activated                       Work Phone: 1(33  
0)754-2692  
   
                Start: 08- Gluc bld gluc mntr dev                 Odell Mimsungo DO  
  
                                cleared fda spec home                 Work Phone  
: 4(784)651-9498  
  
                                use                               
   
                Start: 08- Basic metabolic panel                 Odell FERNANDES  
ietrolungo DO  
  
                                calcium total                   Work Phone: 1(33  
0)573-5673  
   
                Start: 08- Basic metabolic panel                 Raul Calhoun MD  
  
                                calcium total                   Work Phone: 1(33  
0)766-2074  
   
                Start: 2022 Gluc bld gluc mntr dev                 Odell Mimsungo DO  
  
                                cleared fda spec home                 Work Phone  
: 0(362)875-8076  
  
                                use                               
   
                Start: 2022 Basic metabolic panel                 Odell P  
ietrolungo DO  
  
                End: 2022 calcium total                   Work Phone: 1(33  
0)624-2250  
   
                Start: 2022 Cardiac catheterization                 Odell Starkeyo DO  
  
                                                                Work Phone: 1(33  
0)333-4060  
   
                Start: 2022 Coagulation time                 Odell Orr  
lungo DO  
  
                                activated                       Work Phone: 1(33  
0)541-6888  
   
                Start: 2022 Coagulation time                 Odell Orr  
lungo DO  
  
                End: 2022 activated                       Work Phone: 1(33  
0)473-6654  
   
                Start: 2022 Coagulation time                 Odell Orr  
lungo DO  
  
                                activated                       Work Phone: 1(33  
0)502-8030  
   
                Start: 2022 Basic metabolic panel                 Odell P  
ietrolungo DO  
  
                                calcium total                   Work Phone: 1(33  
0)504-3517  
   
                Start: 2022 Ct thorax w/o contrast                 Elizabeth Miles MD  
  
                                material                        Work Phone: 133  
0)138-3468  
   
                Start: 2022 Dup-scan lxtr art/artl                 Ed Breen MD  
  
                                bpgs uni/lmtd study                 Work Phone:   
1(153)657-7417  
   
                Start: 2022 Gluc bld gluc mntr dev                 Mariann Tan MD  
  
                                cleared fda spec home                 Work Phone  
: 1(251)498-7055  
  
                                use                               
   
                Start: 2022 Blood count complete                 Mariann bentley MD  
  
                                automated                       Work Phone: 1(33  
0)085-0054  
   
                Start: 2022 Gluc bld gluc mntr fabian Tan MD  
  
                                cleared fda spec home                 Work Phone  
: 1(166) 345-5223  
  
                                use                               
   
                Start: 2022 Gluc bld gluc mntr dev                 Mariann Tan MD  
  
                                cleared fda spec home                 Work Phone  
: 1(324) 393-9276  
  
                                use                               
   
                Start: 2022 POCT ACTIVATED CLOTTING                 Mariann Tan MD  
  
                                TIME                            Work Phone: 1(33  
0)394-9328  
   
                Start: 2022 POCT ACTIVATED CLOTTING                 Mariann Tan MD  
  
                                TIME                            Work Phone: 1(33  
0)465-1602  
   
                Start: 2022 Gluc bld gluc mntr fabian Tan MD  
  
                                cleared fda spec home                 Work Phone  
: 0(312)673-7786  
  
                                use                               
   
                Start: 2022 Ecg routine ecg w/least                 Mariann Tan MD  
  
                                12 lds w/i&r                    Work Phone: 1(33  
0)644-4842  
   
                Start: 2022 CARDIAC CATH NURSING LOG                 Physi  
zac Generic  
   
                Start: 2022 Coagulation time                 Mariann eugene MD  
  
                End: 2022 activated                       Work Phone: 1(33  
0)779-8685  
   
                Start: 2022 Cardiac catheterization                 Mariann Tan MD  
  
                                                                Work Phone: 1(33  
0)621-0426  
   
                Start: 2022 Basic metabolic panel                 Karoline   
B Puliafico APRN - CNP  
  
                End: 2022 calcium total                   Work Phone: 133  
0)514-3123  
   
                Start: 2022 OPERATIVE REPORT                 Physician Gen gloria  
   
                Start: 2022 Gluc bld gluc mntr fabian Breen MD  
  
                                cleared fda spec home                 Work Phone  
: 3(931)395-4245  
  
                                use                               
   
                Start: 2022 Gluc bld gluc mntr fabian Breen MD  
  
                                cleared fda spec home                 Work Phone  
: 1(237) 340-7807  
  
                                use                               
   
                Start: 2022 Gluc bld gluc mntr dev                 Ed J   
La Fontaine MD  
  
                                cleared fda spec home                 Work Phone  
: 1(908) 534-7510  
  
                                use                               
   
                Start: 2022 Gluc bld gluc mntr fabian Breen MD  
  
                                cleared fda spec home                 Work Phone  
: 5(173)404-9433  
  
                                use                               
   
                Start: 2022 BASIC METABOLIC PANEL W/                 Nik Cardoza MD  
  
                                REFLEX TO MG FOR LOW K                 Work Phon  
e: 1(481) 482-2293  
   
                Start: 2022 Blood count complete                 Anabela Cardoza MD  
  
                                auto&auto difrntl wbc                 Work Phone  
: 6(086)090-2783  
   
                Start: 2022 Gluc bld gluc mntr fabian Breen MD  
  
                                cleared fda spec home                 Work Phone  
: 8(227)542-5421  
  
                                use                               
   
                Start: 2022 Gluc bld gluc mntr fabian Colindres MD  
  
                                cleared fda spec home                 Work Phone  
: 6(220)316-6012  
  
                                use                               
   
                Start: 2022 Gluc bld gluc mntr fabian Colindres MD  
  
                                cleared fda spec home                 Work Phone  
: 0(217)981-2264  
  
                                use                               
   
                Start: 2022 Gluc bld gluc mntr fabian Breen MD  
  
                                cleared fda spec home                 Work Phone  
: 1(997) 321-5722  
  
                                use                               
   
                Start: 2022 BASIC METABOLIC PANEL W/                 Nik Cardoza MD  
  
                                REFLEX TO MG FOR LOW K                 Work Phon  
e: 1(264) 275-3432  
   
                Start: 2022 Blood count complete                 Anabela Cardoza MD  
  
                                auto&auto difrntl wbc                 Work Phone  
: 1(744) 701-1637  
   
                Start: 2022 Gluc bld gluc mntr fabian Breen MD  
  
                                cleared fda spec home                 Work Phone  
: 4(734)624-9887  
  
                                use                               
   
                Start: 2022 Gluc bld gluc mntr fabian Colindres MD  
  
                                cleared fda spec home                 Work Phone  
: 5(192)651-8746  
  
                                use                               
   
                Start: 2022 Gluc bld gluc mntr fabian Breen MD  
  
                                cleared fda spec home                 Work Phone  
: 1(441) 915-3021  
  
                                use                               
   
                Start: 2022 Gluc bld gluc mntr fabian Breen MD  
  
                                cleared fda spec home                 Work Phone  
: 1(631)322-8376  
  
                                use                               
   
                Start: 2022 BASIC METABOLIC PANEL W/                 Nik Cardoza MD  
  
                                REFLEX TO MG FOR LOW K                 Work Phon  
e: 1(862) 680-1233  
   
                Start: 2022 Thromboplastin time                 Herrera Stein MD  
  
                                partial plasma/whole                 Work Phone:  
 1(513) 272-2417  
  
                                blood                             
   
                Start: 2022 Gluc bld gluc mntr dev                 Ed Breen MD  
  
                                cleared fda spec home                 Work Phone  
: 0(419)049-3363  
  
                                use                               
   
                Start: 2022 Gluc bld gluc mntr dev                 Maxim Colindres MD  
  
                                cleared fda spec home                 Work Phone  
: 1(691) 661-3850  
  
                                use                               
   
                Start: 2022 Gluc bld gluc mntr dev                 Maxim Colindres MD  
  
                                cleared fda spec home                 Work Phone  
: 1(799) 134-9496  
  
                                use                               
   
                Start: 2022 Gluc bld gluc mntr dev                 Maxim Colindres MD  
  
                                cleared fda spec home                 Work Phone  
: 1(293) 632-5014  
  
                                use                               
   
                Start: 2022 BASIC METABOLIC PANEL W/                 Nik Cardoza MD  
  
                                REFLEX TO MG FOR LOW K                 Work Phon  
e: 1(515) 324-1006  
   
                Start: 2022 Blood count complete                 Anabela Cardoza MD  
  
                                auto&auto difrntl wbc                 Work Phone  
: 1(417) 309-2081  
   
                Start: 2022 Gluc bld gluc mntr dev                 Ed Breen MD  
  
                                cleared fda spec home                 Work Phone  
: 1(646) 852-6844  
  
                                use                               
   
                Start: 2022 Gluc bld gluc mntr dev                 Maxim Colindres MD  
  
                                cleared fda spec home                 Work Phone  
: 1(231) 845-2252  
  
                                use                               
   
                Start: 2022 Gluc bld gluc mntr dev                 Maxim Colindres MD  
  
                                cleared fda spec home                 Work Phone  
: 1(110) 166-6764  
  
                                use                               
   
                Start: 2022 Thromboplastin time                 eHrrera Stein MD  
  
                                partial plasma/whole                 Work Phone:  
 1(118) 299-8664  
  
                                blood                             
   
                Start: 2022 Gluc bld gluc mntr dev                 Carlton Rodrigues MD  
  
                                cleared fda spec home                 Work Phone  
: 7(220)736-7119  
  
                                use                               
   
                Start: 2022 Assay of magnesium                 Anabela pop MD  
  
                End: 2022                                 Work Phone: 1(08 7)619-5624  
   
                Start: 2022 BASIC METABOLIC PANEL W/                 Nik Cardoza MD  
  
                                REFLEX TO MG FOR LOW K                 Work Phon  
e: 4(876)913-8322  
   
                Start: 2022 Gluc bld gluc mntr dev                 Carlton Rodrigues MD  
  
                                cleared fda spec home                 Work Phone  
: 1(181) 128-1384  
  
                                use                               
   
                Start: 2022 Gluc bld gluc mntr dev                 Maxim Colindres MD  
  
                End: 2022 cleared fda spec home                 Work Phone  
: 1(451) 463-5627  
  
                                use                               
   
                Start: 2022 Gluc bld gluc mntr dev                 Maxim Colindres MD  
  
                                cleared fda spec home                 Work Phone  
: 1(293) 350-6339  
  
                                use                               
   
                Start: 2022 Gluc bld gluc mntr dev                 Carlton Rodrigues MD  
  
                                cleared fda spec home                 Work Phone  
: 1(181) 143-8495  
  
                                use                               
   
                Start: 2022 OPERATIVE REPORT                 3m Scanning  
   
                Start: 2022 BASIC METABOLIC PANEL W/                 Nik Cardoza MD  
  
                                REFLEX TO MG FOR LOW K                 Work Phon  
e: 9(198)989-1332  
   
                Start: 2022 Thromboplastin time                 Anabela lucas MD  
  
                                partial plasma/whole                 Work Phone:  
 1(462) 834-5305  
  
                                blood                             
   
                Start: 2022 Gluc bld gluc mntr dev                 Carlton Rodrigues MD  
  
                                cleared fda spec home                 Work Phone  
: 1(360) 294-9421  
  
                                use                               
   
                Start: 2022 Gluc bld gluc mntr dev                 Maxim Colindres MD  
  
                                cleared fda spec home                 Work Phone  
: 1(589) 194-4644  
  
                                use                               
   
                Start: 2022 POCT ACTIVATED CLOTTING                 Maxim Colindres MD  
  
                End: 2022 TIME                            Work Phone: 3(34 6)364-6123  
   
                Start: 2022 POCT ACTIVATED CLOTTING                 Charisma Rodrigues MD  
  
                End: 2022 TIME                            Work Phone: 1(12  
0)346-0361  
   
                Start: 2022 Gluc bld gluc mntr dev                 Carlton Rodrigues MD  
  
                                cleared fda spec home                 Work Phone  
: 1(184) 661-2305  
  
                                use                               
   
                Start: 2022 Gluc bld gluc mntr dev                 Maxim Colindres MD  
  
                                cleared fda spec home                 Work Phone  
: 1(387) 192-1940  
  
                                use                               
   
                Start: 2022 Thromboplastin time                 Anabela lucas MD  
  
                                partial plasma/whole                 Work Phone:  
 1(330) 856-6834  
  
                                blood                             
   
                Start: 2022 Gluc bld gluc mntr dev                 Maxim Colindres MD  
  
                                cleared fda spec home                 Work Phone  
: 1(560) 863-5717  
  
                                use                               
   
                Start: 2022 BASIC METABOLIC PANEL W/                 Nik Cardoza MD  
  
                                REFLEX TO MG FOR LOW K                 Work Phon  
e: 1(435) 793-4017  
   
                Start: 2022 Thromboplastin time                 Anabela lucas MD  
  
                                partial plasma/whole                 Work Phone:  
 1(789) 489-9485  
  
                                blood                             
   
                Start: 2022 Gluc bld gluc mntr dev                 Maxim Colindres MD  
  
                                cleared fda spec home                 Work Phone  
: 6(656)799-1618  
  
                                use                               
   
                Start: 2022 Gluc bld gluc mntr dev                 Maxim Colindres MD  
  
                                cleared fda spec home                 Work Phone  
: 8(463)094-6255  
  
                                use                               
   
                Start: 2022 Gluc bld gluc mntr dev                 Maxim Colindres MD  
  
                End: 2022 cleared fda spec home                 Work Phone  
: 1(848) 370-8239  
  
                                use                               
   
                Start: 2022 Gluc bld gluc mntr dev                 Maxim Colindres MD  
  
                                cleared fda spec home                 Work Phone  
: 1(454) 452-6822  
  
                                use                               
   
                Start: 2022 Thromboplastin time                 Anabela lucas MD  
  
                                partial plasma/whole                 Work Phone:  
 1(704) 406-5566  
  
                                blood                             
   
                Start: 2022 BASIC METABOLIC PANEL W/                 Nik Cardoza MD  
  
                                REFLEX TO MG FOR LOW K                 Work Phon  
e: 1(671) 728-2922  
   
                Start: 2022 Thromboplastin time                 Anabela lucas MD  
  
                                partial plasma/whole                 Work Phone:  
 1(558) 968-1064  
  
                                blood                             
   
                Start: 2022 Gluc bld gluc mntr dev                 Maxim Colindres MD  
  
                                cleared fda spec home                 Work Phone  
: 1(695) 767-1699  
  
                                use                               
   
                Start: 2022 Gluc bld gluc mntr dev                 Maxim Colindres MD  
  
                                cleared fda spec home                 Work Phone  
: 1(197) 272-7804  
  
                                use                               
   
                Start: 2022 Thromboplastin time                 Anablea lucas MD  
  
                                partial plasma/whole                 Work Phone:  
 1(119) 776-9498  
  
                                blood                             
   
                Start: 2022 Gluc bld gluc mntr dev                 Maxim Cloindres MD  
  
                                cleared fda spec home                 Work Phone  
: 1(636) 289-7619  
  
                                use                               
   
                Start: 2022 Gluc bld gluc mntr dev                 Maxim Colindres MD  
  
                                cleared fda spec home                 Work Phone  
: 1(658) 254-6046  
  
                                use                               
   
                Start: 2022 Cul prsmptv pthgnc                 Vannesa A  
 West Pittsburg DO  
  
                                organism scrn w/colony                 Work Phon  
e: 1(466) 452-8137  
  
                                estimj                            
   
                Start: 2022 Thromboplastin time                 Anabela lucas MD  
  
                                partial plasma/whole                 Work Phone:  
 1(387) 994-6856  
  
                                blood                             
   
                Start: 2022 BASIC METABOLIC PANEL W/                 Nik Cardoza MD  
  
                                REFLEX TO MG FOR LOW K                 Work Phon  
e: 1(989) 457-5516  
   
                Start: 2022 Thromboplastin time                 Anabela lucas MD  
  
                                partial plasma/whole                 Work Phone:  
 1(375) 380-8321  
  
                                blood                             
   
                Start: 2022 Gluc bld gluc mntr dev                 Maxim Colindres MD  
  
                End: 2022 cleared fda spec home                 Work Phone  
: 1(452) 765-5791  
  
                                use                               
   
                Start: 2022 Gluc bld gluc mntr dev                 Maxim Colindres MD  
  
                                cleared fda spec home                 Work Phone  
: 1(125) 822-8775  
  
                                use                               
   
                Start: 2022 Gluc bld gluc mntr dev                 Maxim Colindres MD  
  
                End: 2022 cleared fda spec home                 Work Phone  
: 1(395) 597-4174  
  
                                use                               
   
                Start: 2022 Gluc bld gluc mntr dev                 Maxim Colindres MD  
  
                End: 2022 cleared fda spec home                 Work Phone  
: 1(440) 591-8672  
  
                                use                               
   
                Start: 2022 BASIC METABOLIC PANEL W/                 Nik Cardoza MD  
  
                                REFLEX TO MG FOR LOW K                 Work Phon  
e: 1(289) 537-7115  
   
                Start: 2022 Thromboplastin time                 Anabela lucas MD  
  
                                partial plasma/whole                 Work Phone:  
 7(150)512-7196  
  
                                blood                             
   
                Start: 2022 Gluc bld gluc mntr dev                 Maxim Colindres MD  
  
                                cleared fda spec home                 Work Phone  
: 7(767)239-0298  
  
                                use                               
   
                Start: 2022 Gluc bld gluc mntr dev                 Maxim Colindres MD  
  
                End: 2022 cleared fda spec home                 Work Phone  
: 1(982) 481-5673  
  
                                use                               
   
                Start: 2022 Gluc bld gluc mntr dev                 Maxim Colindres MD  
  
                                cleared fda spec home                 Work Phone  
: 1(661) 742-6272  
  
                                use                               
   
                Start: 2022 Gluc bld gluc mntr dev                 Maxim Colindres MD  
  
                End: 2022 cleared fda spec home                 Work Phone  
: 1(661) 565-1629  
  
                                use                               
   
                Start: 2022 Gluc bld gluc mntr dev                 Maxim Colindres MD  
  
                End: 2022 cleared fda spec home                 Work Phone  
: 1(558) 431-8331  
  
                                use                               
   
                Start: 2022 BASIC METABOLIC PANEL W/                 Nik Cardoza MD  
  
                                REFLEX TO MG FOR LOW K                 Work Phon  
e: 1(688) 501-8343  
   
                Start: 2022 Gluc bld gluc mntr dev                 Maxim Colindres MD  
  
                                cleared fda spec home                 Work Phone  
: 1(528) 535-1007  
  
                                use                               
   
                Start: 2022 Ecg routine ecg w/least                 Lavelle Gray MD  
  
                                12 lds w/i&r                    Work Phone: 5(30  
0)219-3305  
   
                Start: 2022 Basic metabolic panel                 Lavelle Gray MD  
  
                                calcium total                   Work Phone: 5(13  
0)387-4422  
   
                Start: 2022 GENERIC LABORATORY                 Deng nugent MD  
  
                                CHARGE                          Work Phone: 1(17 0)076-0161  
   
                Start: 2022 Hemoglobin                      Hector Tijerina   
APRN.CNP  
  
                                A1c/Hemoglobin.total in                 Work Vandana  
ne: 1(798) 904-8036  
  
                                Blood                             
   
                Start: 2022 Dup-scan lxtr art/artl                 Ed Breen MD  
  
                                bpgs uni/lmtd study                 Work Phone:   
1(393) 638-4742  
   
                Start: 2021 Non-invasive physiologic                 Maximus  
anand Pietrolungo DO  
  
                                study extremity 3 levls                 Work Vandana  
ne: 8(188)989-4122  
   
                Start: 2021 Dup-scan lxtr art/artl                 Odell Bryan DO  
  
                                bpgs uni/lmtd study                 Work Phone:   
1(130) 946-4904  
   
                Start: 2021 Dup-scan lxtr art/artl                 Odell Starkeyo DO  
  
                                bp uni/lmtd study                 Work Phone:   
8(518)489-8621  
   
                Start: 2021 Non-invasive physiologic                 Maximus  
anand Pietrolungo DO  
  
                                study extremity 3 levls                 Work Vandana  
ne: 1(445)233-5188  
   
                Start: 2021 Basic metabolic panel                 Odell FERNANDES  
iemonishaungo DO  
  
                                calcium total                   Work Phone: 133  
0)646-4338  
   
                Start: 2021 Gluc bld gluc mntr dev                 Ed Breen MD  
  
                                cleared fda spec home                 Work Phone  
: 1(532) 989-9668  
  
                                use                               
   
                Start: 2021 Basic metabolic panel                 Odell ritchieo DO  
  
                                calcium total                   Work Phone: 1(33  
0)116-3870  
   
                Start: 2021 Gluc bld gluc mntr dev                 Santino España MD  
  
                                cleared fda spec home                 Work Phone  
: 1(183) 798-6010  
  
                                use                               
   
                Start: 2021 Gluc bld gluc mntr dev                 Santino España MD  
  
                                cleared fda spec home                 Work Phone  
: 1(823) 292-2506  
  
                                use                               
   
                Start: 2021 CARDIAC CATH NURSING LOG                 Jackson County Memorial Hospital – Altus  
anning  
   
                Start: 2021 Gluc bld gluc mntr dev                 Ed Breen MD  
  
                                cleared fda spec home                 Work Phone  
: 1(633) 981-4339  
  
                                use                               
   
                Start: 2021 Cardiac catheterization                 Odell Bryan DO  
  
                                                                Work Phone: 1(32 6)788-7746  
   
                Start: 2021 BASIC METABOLIC PANEL W/                 Lamonte Agosto MD  
  
                                REFLEX TO MG FOR LOW K                 Work Phon  
e: 1(566) 768-3239  
   
                Start: 2021 Thromboplastin time                 Edwin Agosto MD  
  
                                partial plasma/whole                 Work Phone:  
 1(631) 999-9037  
  
                                blood                             
   
                Start: 2021 Gluc bld gluc mntr dev                 Ed Breen MD  
  
                                cleared fda spec home                 Work Phone  
: 4(631)187-9373  
  
                                use                               
   
                Start: 2021 Cta abdl aorta&bi                 Maxim Gautam  
son PA  
  
                                iliofem w/contrast&postp                 Work Ph  
one: 3(172)077-4768  
   
                Start: 2021 Comprehensive metabolic                 Danis Laura MD  
  
                                panel                           Work Phone: 1(15  
0)037-6600  
   
                Start: 2021 PROTIME/INR & PTT                 Danis lucio MD  
  
                                                                Work Phone: 1(07  
0)842-0179  
   
                Start: 2021 Ecg routine ecg w/least                 Danis Laura MD  
  
                                12 lds w/i&r                    Work Phone: 1(35  
0)777-5417  
   
                Start: 2021 Adult depression                 Elizabeth rodas MD  
  
                                screening assessment                 Work Phone:  
 4(696)243-6543  
   
                Start: 2021 Cta abdl aorta&bi                 Odell Cardozo  
olselwyn DO  
  
                                iliofem w/contrast&postp                 Work Ph  
one: 1(182)465-3219  
   
                Start: 2020 Echo tthrc r-t 2d                 Rut Zia  
dig  
  
                                w/wom-mode compl                 Work Phone: 1(3 00)506-3851  
  
                                spec&colr d                       
   
                Start: 10- History of placement of Status post     Odell Starkeynaveed DO  
  
                                stent for coronary insertion of drug Work Phone:  
 1(717)097-5978  
  
                                artery disease  eluting coronary   
  
                                                artery stent      
   
                Start: 10- Potassium serum                 Felipa L Jones  
  
                                plasma/whole blood                 Work Phone: 1 (814) 990-9308  
   
                Start: 10- Gluc bld gluc mntr dev                 Odell Cardozosharla  
  
                                cleared fda spec home                 Work Phone  
: 8(444)298-5101  
  
                                use                               
   
                Start: 10- CARDIAC CATH NURSING LOG                 3m Sc  
anning  
   
                Start: 10- BASIC METABOLIC PANEL W/                 Beth   
E Lilliam  
  
                                REFLEX TO MG FOR LOW K                 Work Phon  
e: 4(138)912-2204  
   
                Start: 10- Blood count complete                 Beth E Re  
a  
  
                                automated                       Work Phone: 1(43  
0)566-3188  
   
                Start: 10- Lipid panel                     Beth E Immaculata  
  
                                                                Work Phone: 1(55 0)846-0430  
   
                Start: 10- Gluc bld gluc mntr dev                 Odell Bryan  
  
                                cleared fda spec home                 Work Phone  
: 4(512)825-7988  
  
                                use                               
   
                Start: 10- Ecg routine ecg w/least                 Beth E  
 Immaculata  
  
                                12 lds w/i&r                    Work Phone: 133  
0)298-9018  
   
                Start: 10- Coagulation time                 Odell Orr  
lungo  
  
                                activated                       Work Phone: 133  
0)678-8628  
   
                Start: 10- Catheterization and                 Beth E Lilliam  
  
                                angiography procedure                 Work Phone  
: 6(409)481-2047  
  
                                details panel                     
   
                Start: 10- Catheterization and                 Odell leighlungo  
  
                                angiography procedure                 Work Phone  
: 4(657)121-2263  
  
                                details panel                     
   
                Start: 10- Gluc bld gluc mntr dev                 Odell Bryan  
  
                                cleared fda spec home                 Work Phone  
: 0(412)547-9392  
  
                                use                               
   
                Start: 10- Assay of free thyroxine                 Jacobo Moore  
  
                                                                Work Phone: 1(64 7)919-3342  
   
                Start: 10- Assay of magnesium                 Sadine D Or  
edein  
  
                                                                Work Phone: 133  
0)795-6244  
   
                Start: 10- Basic metabolic panel                 Sadine D  
 Oredein  
  
                                calcium total                   Work Phone: 1(33  
0)735-3251  
   
                Start: 10- Blood count complete                 Sadine D   
Oredein  
  
                                automated                       Work Phone: 1(33  
0)577-0963  
   
                Start: 2020 Basic metabolic panel                 Olegario Villa  
kepeace  
  
                                calcium total                   Work Phone: 1(33  
0)092-1905  
   
                Start: 2020 BASIC METABOLIC PANEL W/                 Olegario  
 Makepeace  
  
                                REFLEX TO MG FOR LOW K                 Work Phon  
e: 6(392)061-0583  
   
                Start: 2020 Blood count complete                 Olegario Smith  
epeace  
  
                                automated                       Work Phone: 1(33  
0)465-4579  
   
                Start: 2020 Hepatic function panel                 Olegario SOW  
akepeace  
  
                                                                Work Phone: 1(33  
0)831-2488  
   
                Start: 2020 Gluc bld gluc mntr dev                 Parish Welsh  
  
                                cleared fda spec home                 Work Phone  
: 8(715)773-2152  
  
                                use                               
   
                Start: 2020 Assay of troponin                 Olegario Makepe  
ace  
  
                                quantitative                    Work Phone: 1(33  
0)260-8065  
   
                Start: 2020 Assay of troponin                 Olegario Mahanpe  
ace  
  
                                quantitative                    Work Phone: 1(33  
0)026-7240  
   
                Start: 2020 Assay of troponin                 Latesha JUANJOSE  
ariel  
  
                                quantitative                    Work Phone: 133  
0)216-9779  
   
                Start: 2020 Basic metabolic panel                 Chapin Stubbs  
  
                                calcium total                   Work Phone: 133  
0)504-4644  
   
                Start: 2020 Blood count complete                 Cate  
elizabeth Stubbs  
  
                                automated                       Work Phone: 133  
0)473-2362  
   
                Start: 2020 Fibrin dgradj products                 Sanaz Stubbs  
  
                                d-dimer quantitative                 Work Phone:  
 3(786)163-4739  
   
                Start: 2020 Natriuretic peptide                 Latesha Stubbs  
  
                                                                Work Phone: 133  
0)860-6773  
   
                Start: 2020 Radiologic exam chest                 Chapin  
miriam Stubbs  
  
                                single view                     Work Phone: 133  
0)416-2033  
   
                Start: 2020 Ecg routine ecg w/least                 Karina forman Carol Ann  
  
                                12 lds w/i&r                    Work Phone: 133  
0)818-4232  
   
                Start: 08- Cta abdl aorta&bi                 Odell Cardozo  
olungnaveed  
  
                                iliofem w/contrast&postp                 Work Ph  
one: 3(842)195-6919  
   
                Start: 2019 Basic metabolic panel                 Odell DENA  
ietrolungo  
  
                                calcium total                   Work Phone: 1(79  
0)563-4863  
   
                                H/O: surgery    H/O fasciotomy  Ed Breen MD  
  
                                                                Work Phone: 1(64  
0)751-8273  
   
                                History of coronary S/P CABG (coronary Odell Grullon  
etrolungo DO  
  
                                artery bypass grafting artery bypass   Work Phon  
e: 1(899) 631-3189  
  
                                                graft)            
   
                                History of coronary S/P CABG (coronary Ed huerta MD  
  
                                artery bypass grafting artery bypass   Work Phon  
e: 1(210) 315-5264  
  
                                                graft)            
  
  
  
Plan of Treatment  
  
  
  
                Date            Care Activity   Detail          Author  
   
                Start: 2025 PROSTATE CANCER SCREENING PROSTATE CANCER SCRE  
ENING Trinity Health System  
  
                                DISCUSSION      DISCUSSION        
   
                Start: 2023 ANNUAL PCP TEAM CHRONIC ANNUAL PCP TEAM CHRONI  
C Trinity Health System  
  
                                DISEASE VISIT   DISEASE VISIT     
   
                Start: 2023 BP CONTROLLED (<130/80) BP CONTROLLED (<130/80  
) Trinity Health System  
   
                Start: 2023 SHINGRIX VACCINE (1 of 2) SHINGRIX VACCINE (1   
of 2) Trinity Health System  
  
  
  
  
                          Comment on above:         Postponed from 2007 (D  
eclined at this time)  
  
  
  
  
                Start: 10- COLORECTAL CANCER COLORECTAL CANCER Trinity Health System  
  
                                SCREENING       SCREENING         
   
                Start: 10- FECAL OCCULT BLOOD FECAL OCCULT BLOOD Cleveland Clinic Union Hospital  
   
                Start: 2023 Adult depression DEPRESSION SCREENING Cleveland Clinic Union Hospital  
  
                                screening assessment                   
   
                Start: 2023 ANNUAL PCP TEAM CHRONIC ANNUAL PCP TEAM Clevel  
and Clinic  
  
                                DISEASE VISIT   CHRONIC DISEASE VISIT   
   
                Start: 2023 BP CONTROLLED (<130/80) BP CONTROLLED   Clevel  
and Clinic  
  
                                                (<130/80)         
   
                Start: 2023 Lipid panel     Lipids          SUMMA  
   
                Start: 06- Hepatitis C antibody, DILATED RETINAL EXAM St. Rita's Hospital  
  
                                confirmatory test                   
   
                Start: 2023 Creatinine measurement Creatinine monitoring S  
UMMA  
   
                Start: 2023 Potassium monitoring Potassium monitoring SUMM  
A  
   
                Start: 03- ANNUAL PCP TEAM CHRONIC ANNUAL PCP TEAM Clevel  
and Clinic  
  
                                DISEASE VISIT   CHRONIC DISEASE VISIT   
   
                Start: 03- BP CONTROLLED (<130/80) BP CONTROLLED   Clevel  
and Clinic  
  
                                                (<130/80)         
   
                Start: 2023 Creatinine measurement                 SUMMA  
   
                Start: 2023 Potassium monitoring Potassium monitoring SUMM  
A  
   
                Start: 2023                                 SUMMA  
   
                Start: 2023 Hemoglobin A1c                  SUMMA  
  
                                measurement                       
   
                Start: 2023 Diabetic retinal exam Diabetic retinal exam KRAMER  
MMA  
   
                Start: 2023                                 SUMMA  
   
                Start: 2023 Creatinine measurement Creatinine monitoring S  
UMMA  
   
                Start: 2023 Potassium monitoring Potassium monitoring SUMM  
A  
   
                Start: 2022 ALBUMIN/CREAT RATIO RND ALBUMIN/CREAT RATIO Cl  
White Hospital  
  
                End: 2023 UR              RND UR Lab Routine Work Phone: 1 (903) 935-1001  
  
                                                Controlled type 2   
  
                                                diabetes mellitus   
  
                                                with diabetic     
  
                                                peripheral angiopathy   
  
                                                without gangrene,   
  
                                                without long-term   
  
                                                current use of    
  
                                                insulin (HCC)     
  
                                                Expected: 2022,   
  
                                                Expires: 2023   
  
  
  
  
                          Comment on above:         Expected: 2022,   
s: 2023  
  
  
  
  
                Start: 2022 Basic metabolic 2000 BASIC METABOLIC PNL University Hospitals Elyria Medical Center  
  
                End: 2023 panel - Serum or Lab Routine Controlled Work Vandana  
ne:   
3(584)986-6630  
  
                                Plasma          type 2 diabetes   
  
                                                mellitus with diabetic   
  
                                                peripheral angiopathy   
  
                                                without gangrene,   
  
                                                without long-term   
  
                                                current use of insulin   
  
                                                (HCC) Expected:   
  
                                                2022, Expires:   
  
                                                2023        
  
  
  
  
                          Comment on above:         Expected: 2022,   
s: 2023  
  
  
  
  
                Start: 2022 CBC W Auto      CBC + DIFF Lab  University Hospitals Samaritan Medical Center  
  
                End: 2023 Differential panel - Routine Controlled Work Vandana  
ne:   
8(698)017-3472  
  
                                Blood           type 2 diabetes   
  
                                                mellitus with     
  
                                                diabetic peripheral   
  
                                                angiopathy without   
  
                                                gangrene, without   
  
                                                long-term current use   
  
                                                of insulin (HCC)   
  
                                                Expected: 2022,   
  
                                                Expires: 2023   
  
  
  
  
                          Comment on above:         Expected: 2022,   
s: 2023  
  
  
  
  
                Start: 2022 PSA/PROSTSPECAG SCRN PSA/PROSTSPECAG SCRN Bellevue Hospital  
  
                End: 2023                 Lab Routine Screening Work Phone  
: 1(208) 616-8247  
  
                                                for prostate cancer   
  
                                                Expected: 2022,   
  
                                                Expires: 2023   
  
  
  
  
                          Comment on above:         Expected: 2022,   
s: 2023  
  
  
  
  
                Start: 2022 Thyrotropin     TSH BLD Lab Routine Aultman Orrville Hospital  
  
                End: 2023 [Units/volume] in Serum Controlled type 2 Work P  
angie:   
1(645) 156-8992  
  
                                or Plasma       diabetes mellitus   
  
                                                with diabetic     
  
                                                peripheral        
  
                                                angiopathy without   
  
                                                gangrene, without   
  
                                                long-term current   
  
                                                use of insulin (HCC)   
  
                                                Expected:         
  
                                                2022, Expires:   
  
                                                2023        
  
  
  
  
                          Comment on above:         Expected: 2022,   
s: 2023  
  
  
  
  
                Start: 2022 Hemoglobin A1c measurement A1C test (Diabetic   
or SUMMA  
  
                                                Prediabetic)      
   
                Start: 2022 Influenza vaccination                 SUMMA  
   
                Start: 2022 Hemoglobin      HBA1C           Ohio Valley Surgical Hospital  
  
                                A1c/Hemoglobin.total in                   
  
                                Blood                             
   
                Start: 2022 Alanine aminotransferase ALT/SGPT Lab Routine   
Aultman Orrville Hospital  
  
                End: 10- [Enzymatic      Medication management Work Phone  
: 6(948)264-5717  
  
                                activity/volume] in Serum Expected: 2022,   
  
                                or Plasma       Expires: 10/23/2022   
  
  
  
  
                          Comment on above:         Expected: 2022,   
s: 10/23/2022  
  
  
  
  
                Start: 2022 Aspartate       AST/SGOT BLD Lab Julian Cli  
parker Foundation  
  
                End: 10- aminotransferase Routine Medication Work Phone:   
7(041)528-5101  
  
                                [Enzymatic      management        
  
                                activity/volume] in Serum Expected:         
  
                                or Plasma       2022,       
  
                                                Expires: 10/23/2022   
  
  
  
  
                          Comment on above:         Expected: 2022,   
s: 10/23/2022  
  
  
  
  
                Start: 2022 Basic metabolic 2000 BASIC METABOLIC PNL University Hospitals Elyria Medical Center  
  
                End: 10- panel - Serum or Lab Routine Controlled Work Vandana  
ne:   
2(284)774-8593  
  
                                Plasma          type 2 diabetes   
  
                                                mellitus, without   
  
                                                long-term current use   
  
                                                of insulin (HCC)   
  
                                                Expected: 2022,   
  
                                                Expires: 10/23/2022   
  
  
  
  
                          Comment on above:         Expected: 2022,   
s: 10/23/2022  
  
  
  
  
                Start: 2022 CBC panel - Blood by CBC Lab Routine Aultman Orrville Hospital  
  
                End: 10- Automated count Controlled type 2 Work Phone: 1( 119.773.9356  
  
                                                diabetes mellitus,   
  
                                                without long-term   
  
                                                current use of    
  
                                                insulin (HCC)     
  
                                                Expected: 2022,   
  
                                                Expires: 10/23/2022   
  
  
  
  
                          Comment on above:         Expected: 2022,   
s: 10/23/2022  
  
  
  
  
                Start: 2022 Hemoglobin A1c in HGB A1C Lab Routine Brown Memorial Hospital  
  
                End: 10- Blood           Controlled type 2 Work Phone: 1( 715.218.7984  
  
                                                diabetes mellitus,   
  
                                                without long-term   
  
                                                current use of    
  
                                                insulin (HCC)     
  
                                                Expected: 2022,   
  
                                                Expires: 10/23/2022   
  
  
  
  
                          Comment on above:         Expected: 2022,   
s: 10/23/2022  
  
  
  
  
                Start: 2022 Lipid 1996 panel LIPID PANEL BASIC Lab Morrow County Hospital  
  
                End: 10- - Serum or Plasma Routine Hyperlipidemia Work Ph  
one:   
6(734)775-7728  
  
                                                Expected: 2022,   
  
                                                Expires: 10/23/2022   
  
  
  
  
                          Comment on above:         Expected: 2022,   
s: 10/23/2022  
  
  
  
  
                Start: 2022 SCHEDULE LAB    SCHEDULE LAB TESTING Aultman Orrville Hospital  
  
                End: 10- TESTING         Lab Routine Expected: Work Phone  
: 6(315)364-8192  
  
                                                2022, Expires:   
  
                                                10/23/2022        
  
  
  
  
                          Comment on above:         Expected: 2022,   
s: 10/23/2022  
  
  
  
  
                Start: 2022 Creatinine measurement Creatinine monitoring S  
UMMA  
   
                Start: 2022 Potassium monitoring Potassium monitoring SUMM  
A  
   
                Start: 2022 Creatinine measurement Creatinine monitoring S  
UMMA  
  
                                                                Work Phone: 1(83 8)021-1671  
   
                Start: 2022 Potassium monitoring Potassium monitoring SUMM  
A  
  
                                                                Work Phone: 4(25 8)595-5622  
   
                Start: 2022 Oklahoma Hospital Association hospital 2022 Our Lady of Fatima Hospital  
th Lab  
  
                                visit by physician Encounter IP Unit   
  
                                                88 Paul Street 31291304 591.782.3009 (Work)   
  
                                                714.940.4131 (Fax)   
   
                Start: 2022 Diabetic foot                   SUMMA  
  
                                examination                       
   
                Start: 2022 Influenza vaccination Flu vaccine (#1) SUMMA  
   
                Start: 2022 Creatinine measurement Creatinine monitoring S  
UMMA  
  
                                                                Work Phone: 5(49 1)873-9267  
   
                Start: 2022 Potassium monitoring Potassium monitoring SUMM  
A  
  
                                                                Work Phone: 1(34 7)651-7606  
   
                Start: 2022 Influenza vaccination INFLUENZA (#1)  Stacey dexter Clinic  
  
  
  
  
                          Comment on above:         Postponed from 2021 (D  
eclined at this time)  
  
  
  
  
                Start: 2022 Patient encounter 2022 Office Visit Rohith gr Vascular  
  
                End: 2022 procedure       Vascular Surgery Jamshid, Associa  
rudy, Inc.  
  
                                                Ed VARGAS  5th St NE   
  
                                                Suite 2 Stewart, OH   
  
                                                59219 429-347-6256   
  
                                                (Work) 144.211.1039   
  
                                                (Fax)             
   
                Start: 2022 Diabetic        Diabetic        SUMMA  
  
                                microalbuminuria test microalbuminuria test Work  
 Phone: 1(781) 273-8751  
   
                Start: 2022 Hemoglobin A1c  A1C test (Diabetic or SUMMA  
  
                                measurement     Prediabetic)    Work Phone: 1(71 6)301-9578  
   
                Start: 2022 Hepatitis B screening URINE ALBUMIN:CREATININE  
 Trinity Health System  
  
                                                RATIO             
   
                Start: 2022 Lipid panel                     SUMMA  
   
                Start: 2022 Urine screening for                 SUMMA  
  
                                protein                           
   
                Start: 2022 Adult depression DEPRESSION SCREENING Cleveland Clinic Union Hospital  
  
                                screening assessment                   
   
                Start: 2022 COLORECTAL CANCER COLORECTAL CANCER Trinity Health System  
  
                                SCREENING       SCREENING         
  
  
  
  
                          Comment on above:         Postponed from 2002 (D  
eclined at this time)  
  
  
  
  
                Start: 2022 COVID-19 VACCINE (1) COVID-19 VACCINE (1) J.W. Ruby Memorial Hospital  
  
  
  
  
                          Comment on above:         Postponed from 1962 (D  
eclined at this time)  
  
  
  
  
                Start: 2022 Creatinine      Creatinine monitoring SUMMA  
  
                                measurement                     Work Phone: 1(35 2)096-3743  
   
                Start: 2022 Potassium monitoring Potassium monitoring SUMM  
A  
  
                                                                Work Phone: 6(54 2)221-1538  
   
                Start: 2022 Patient encounter 2022 Office Summa Heal  
th Medical  
  
                End: 2022 procedure       Visit Plastic Surgery Group Plas  
tic ACH  
  
                                                Deng Gray MD   
  
                                                98 Gonzalez Street Lathrop, MO 64465   
  
                                                Shobha, OH 73874   
  
                                                788.748.6467 (Work)   
  
                                                601.683.2877 (Fax)   
   
                Start: 2022 Patient encounter 2022 Office NEOCS ACH  
  
                End: 2022 procedure       Visit Cardiology   
  
                                                Odell Bryan,   
  
                                                85 Chen Street   
  
                                                300 CHEKO OH 76605   
  
                                                957.282.5996 (Work)   
  
                                                531.570.5722 (Fax)   
   
                Start: 2022 Patient encounter 2022 Office Ararat Vasc  
ular  
  
                End: 2022 procedure       Visit Vascular Surgery Associate  
s, IncEd Seay    
  
                                                5th Quincy Valley Medical Center Suite 2   
  
                                                Stewart, OH 00200   
  
                                                493.275.9784 (Work)   
  
                                                221.571.2262 (Fax)   
   
                Start: 2022 Patient encounter 2022 Office Ararat Vasc  
ular  
  
                End: 2022 procedure       Visit Vascular Surgery Associate  
s, IncJohanna Shay Karoline, APRN   
  
                                                - CNP 95 Arch Street   
  
                                                Suite 215 Aurora, OH   
  
                                                04722-2175-1467 936.604.7913 (Work)   
  
                                                945.279.1697 (Fax)   
   
                Start: 2022 3 comp foot exam DIABETIC FOOT EXAM Trinity Health System  
  
                                completed                         
   
                Start: 2022 Hemoglobin A1c  A1C test (Diabetic or SUMMA  
  
                                measurement     Prediabetic)    Work Phone: 1(29 0)922-1293  
   
                Start: 2022 Hepatitis B surface LDL CHOLESTEROL Trinity Health System  
  
                                antibody level                    
   
                Start: 2022 Lipid panel     Lipid screen    SUMMA  
  
                                                                Work Phone: 1(17 4)654-8738  
   
                Start: 2022 ambulatory      2022 Anti-coag Summa Ant  
icoagulation  
  
                End: 2022                 visit Pharmacy  Management Tiffanie mccoy  
   
                Start: 2022 Oklahoma Hospital Association hospital 2022 Hospital Walla Walla General Hospital  
neral Surgery  
  
                                visit by physician Encounter General   
  
                                                Surgery Ed Breen  5th Quincy Valley Medical Center   
  
                                                Suite 2 Stewart, OH   
  
                                                47246 823-418-5826   
  
                                                (Work) 557.135.2586   
  
                                                (Fax)             
   
                Start: 2022 Patient encounter 2022 Office NEOCS ACH  
  
                End: 2022 procedure       Visit Cardiology   
  
                                                Odell Bryan,   
  
                                                DO 95 Arch Street Hector   
  
                                                300 Aurora, OH 12693   
  
                                                878.199.5826 (Work)   
  
                                                310.684.1610 (Fax)   
   
                Start: 2022 Patient encounter 2022 Office Cheko Warren  
ular  
  
                End: 2022 procedure       Visit Vascular Surgery Associate  
s, IncEd Seay    
  
                                                5th Quincy Valley Medical Center Suite 2   
  
                                                Stewart, OH 27222   
  
                                                310.767.1833 (Work)   
  
                                                120.190.9670 (Fax)   
   
                Start: 2022 ambulatory                      NEOCS ACH  
  
                End: 2022                                   
   
                Start: 2022 Patient encounter 2022 Office Janelle NEOC  
S Roots  
  
                End: 2022 procedure       Visit Cardiology   
  
                                                Odell Bryan,   
  
                                                DO 95 Arch Street Hector   
  
                                                300 Aurora, OH 01435   
  
                                                773.832.5349 (Work)   
  
                                                761.436.4075 (Fax)   
   
                Start: 2022 ambulatory      2022 Anti-coag Summa Ant  
icoagulation  
  
                End: 2022                 visit Pharmacy  Management Tiffanie mccoy  
   
                Start: 2022 Patient encounter 2022 Office Cheko agarwal  
  
                End: 2022 procedure       Visit Vascular Surgery Associate  
s, Ed Garcia    
  
                                                5th St NE Suite 2   
  
                                                Stewart, OH 68733203 578.912.9193 (Work)   
  
                                                107.624.6309 (Fax)   
   
                Start: 2022 Patient encounter 2022 Office Summa Heal  
th Medical  
  
                End: 2022 procedure       Visit Endocrinology Group Chidi  
Rogerio Mccarty MD 1260 Endo  
  
                                                Loving Ave   
  
                                                Aurora, OH 29486310 779.376.9189 505.634.9966 (Fax)   
   
                Start: 2022 Hepatitis C antibody, DILATED RETINAL EXAM St. Rita's Hospital  
  
                                confirmatory test                   
   
                Start: 2022 Pneumococcal 65+ Pneumococcal 65+ years SUMMA  
  
                                years Vaccine (2 of 2 Vaccine (2 of 2 -   
  
                                - PPSV23)       PPSV23)           
   
                Start: 2022 PNEUMOVAX AGE 65 AND PNEUMOVAX AGE 65 AND Shelby Memorial Hospital Clinic  
  
                                OVER WITH 5YR   OVER WITH 5YR LOOKBACK   
  
                                LOOKBACK (#1)   (#1)              
   
                Start: 2022                                 SUMMA  
   
                Start: 2022 Patient encounter 2022 Office Janelle NEOC  
S Roots  
  
                End: 2022 procedure       Visit Cardiology   
  
                                                Odell Bryan,   
  
                                                DO 95 Springhill Medical Center Street Hector   
  
                                                300 Aurora, OH 60972   
  
                                                881.851.9639 (Work)   
  
                                                814.876.7984 (Fax)   
   
                Start: 2022 Patient encounter 2022 Office Summa Heal  
th Medica  
  
                End: 2022 procedure       Visit Podiatry Group Cr Pod  
iatry  Jeramy Murphy PA-C 1 Franklin Woods Community Hospital Suite 330   
  
                                                Aurora, OH 99028   
  
                                                693.552.4024 (Work)   
  
                                                403.535.6497 (Fax)   
   
                Start: 2022 DEPRESSION ASSESSMENT DEPRESSION ASSESSMENT Cl  
farhad Clinic  
   
                Start: 2021 Patient encounter 2021 Appointment ACH C  
ath Lab  
  
                End: 2021 procedure       IP Unit Odell Bryan DO 95 Arch   
  
                                                Street Hector 300 CHEKO   
  
                                                OH 34750 479-071-5721   
  
                                                (Work) 395.161.3684   
  
                                                (Fax)             
   
                Start: 2021 Patient encounter 2021 Office Endocrinol  
garrick Duran  
  
                End: 2021 procedure       Visit Endocrinology Hill  
  
                                                Airam Wong,   
  
                                                APRN - CNP 1260   
  
                                                Loving Ave   
  
                                                Cheko OH 28504   
  
                                                459.197.5599 216.842.7460 (Fax)   
   
                Start: 10- Creatinine      Creatinine monitoring Kettering Health Miamisburgaleksandr Bull  
alth- OH, KY  
  
                                measurement                       
   
                Start: 10- Potassium monitoring Potassium monitoring Regional Health Services of Howard County Health- OH, KY  
   
                Start: 10- Creatinine      Creatinine monitoring Kettering Health Miamisburgy   
alth- OH, KY  
  
                                measurement                       
   
                Start: 10- Lipid panel     Lipid screen    OhioHealth Berger Hospital Health-   
OH, KY  
   
                Start: 10- Potassium monitoring Potassium monitoring Good Samaritan Hospital- OH, KY  
   
                Start: 2021 Patient encounter 2021 Office NEOCS ACH  
  
                End: 2021 procedure       Visit Cardiology   
  
                                                Odell Bryan   
  
                                                DO 95 Arch Street Hector   
  
                                                300 AKHANNAH OH 61534   
  
                                                448.176.1195 111.882.9642 (Fax)   
   
                Start: 2021 Influenza vaccination Flu vaccine (#1) SUMMA  
   
                Start: 2021                                 SUMMA  
   
                Start: 2021 Subsequent hospital 2021 Memorial Hospital of Rhode Island Ca  
th Lab  
  
                                visit by physician Encounter IP Unit   
  
                                                Odell Bryan   
  
                                                DO 95 Arch Street Hector   
  
                                                300 AKHANNAH OH 71829   
  
                                                155.380.2879 193.632.1948 (Fax)   
   
                Start: 2021 Patient encounter 2021 Appointment ACH 9  
5 Arch Vascular  
  
                End: 2021 procedure       Vascular Lab    Lab  
   
                Start: 2021 Subsequent hospital 2021 Memorial Hospital of Rhode Island 95  
 Arch Vascular  
  
                                visit by physician Encounter Vascular Lab Lab  
  
                                                Odell Bryan   
  
                                                DO 95 Arch Street Hector   
  
                                                300 AKHANNAH OH 24611   
  
                                                250.572.1589 941.886.1733 (Fax)   
   
                Start: 2021 Patient encounter 2021 Office Endocrinol  
ogaleksandr Duran  
  
                End: 2021 procedure       Visit Endocrinology Rogerio Bradley MD 1260   
  
                                                Loving Belle STILL OH 28538   
  
                                                275.262.4172 738.532.2616 (Fax)   
   
                Start: 2021 Evaluation and  2021 Office NEOCS ACH  
  
                End: 2021 management of   Visit Cardiology   
  
                                inpatient       Donalsonville HospitalOdell,   
  
                                                DO 95 Arch Street Hector   
  
                                                300 AKHANNAH OH 78159   
  
                                                564.844.5381 965.587.7513 (Fax)   
   
                Start: 2021 Patient encounter 2021 Office NEOCS ACH  
  
                End: 2021 procedure       Visit Cardiology   
  
                                                Jeffrey Pandey,   
  
                                                APRN - CNP 95 Arch   
  
                                                Street AKHANNAH OH 22811   
  
                                                168.313.9297 474.929.7515 (Fax)   
   
                Start: 2021 Creatinine      Creatinine monitoring Kettering Health MiamisburgAppScale Systems KY  
  
                                measurement                       
   
                Start: 2021 HbA1c (Bld) [Mass A1C test (Diabetic or Kettering Health MiamisburgValidus  
  
                                fraction]       Prediabetic)      
   
                Start: 2021 Lipid panel     Lipid screen    Kettering Health MiamisburgDTI - Diesel Technical Innovations KY  
   
                Start: 2021 Potassium monitoring Potassium monitoring Kettering Health Miamisburg  
REQQI, KY  
   
                Start: 2021 Annual Wellness Visit Annual Wellness Visit KRAMER  
MMA  
  
                                (AWV)           (AWV)             
   
                Start: 2021                                 SUMMA  
   
                Start: 2021 Creatinine monitoring Creatinine monitoring Access Hospital DaytonREQQI,   
KY  
   
                Start: 2021 Potassium monitoring Potassium monitoring Kettering Health Miamisburg  
REQQI, KY  
   
                Start: 2021 Diabetic retinal exam Diabetic retinal exam Access Hospital DaytonREQQI,   
KY  
   
                Start: 2020 Office Visit    2020 Office Endocrinolog  
y Chapel  
  
                End: 2020                 Visit Endocrinology Jacobo Haas, APRN   
  
                                                - CNS 1260        
  
                                                Loving Belle STILL OH 55524   
  
                                                704.355.1549 560.548.1767 (Fax)   
   
                Start: 2020 Office Visit    2020 Office NEOCS ACH  
  
                End: 2020                 Visit Cardiology   
  
                                                Donalsonville Hospital Odell,   
  
                                                DO 95 Arch Street Hector   
  
                                                300 AKHANNAH OH 13551   
  
                                                968.495.5277 611.408.8429 (Fax)   
   
                Start: 10- Office Visit    10/29/2020 Office NEOCS ACH  
  
                End: 10-                 Visit Cardiology   
  
                                                Rut Lopez APRN   
  
                                                - CNP 35 Arch Blauvelt, OH 70590   
  
                                                633.153.6208 450.708.3149 (Fax)   
   
                Start: 2020 Creatinine monitoring Creatinine monitoring Reform, KY  
   
                Start: 2020 Potassium monitoring Potassium monitoring Downingtown, KY  
   
                Start: 2020 Influenza vaccination                 Marion, KY  
   
                Start: 2020 Creatinine monitoring Creatinine monitoring Reform, KY  
   
                Start: 2020 Potassium monitoring Potassium monitoring Downingtown, KY  
   
                Start: 2020 Office Visit    2020 Office Endocrinolog  
y Chapel  
  
                End: 2020                 Visit Endocrinology Jacobo Haas, CARSON   
  
                                                - CNS 1260        
  
                                                Lebanon, OH 95630   
  
                                                487.992.3254 517.115.7529 (Fax)   
   
                Start: 2020 Lipid screen    Lipid screen    Langley, KY  
   
                Start: 2020 A1C test (Diabetic or A1C test (Diabetic or Reform, KY  
  
                                Prediabetic)    Prediabetic)      
   
                Start: 2020 Basic metabolic 2000 Basic Metabolic Panel Osage, KY  
  
                End: 2020 panel           Lab Routine Acute   
  
                                                kidney injury     
  
                                                superimposed on   
  
                                                chronic kidney disease   
  
                                                (HCC) Expected:   
  
                                                2020, Expires:   
  
                                                2020        
  
  
  
  
                          Comment on above:         Expected: 2020,   
s: 2020  
  
  
  
  
                Start: 2020 Diabetic retinal exam Diabetic retinal exam McKitrick Hospital  
  
                                                                Work Phone: 1(34 5)037-1362  
   
                Start: 2019 Office Visit    2019 Office NEOCS ACH  
  
                End: 2019                 Visit Cardiology   
  
                                                Odell Bryan DO 95 Arch Street Albuquerque Indian Health Center   
  
                                                300 Aurora, OH 59771   
  
                                                888.947.2532 915.267.4904 (Fax)   
   
                Start: 2019 Office Visit    2019 Office Endocrinolog  
y Chapel  
  
                End: 2019                 Visit Endocrinology Fredy Cool MD   
  
                                                1260 Lebanon, OH 72314310 115.391.4538 330-634-9558 (Fax)   
   
                Start: 2019 Influenza vaccination Flu vaccine (#1) Mercy Salah Foundation Children's Hospital, KY  
   
                Start: 08- Hospital Encounter 08/15/2019 Hospital SHB CT   
Scan  
  
                                                Encounter Radiology   
  
                                                NickmonishaOdell martinez DO 48 Baker Street Rusk, TX 75785   
  
                                                300 AKHANNAH OH 71690   
  
                                                880.769.1211 853.833.6518 (Fax)   
   
                Start: 2019 Annual Wellness Visit Annual Wellness Visit Nationwide Children's Hospital   
KY  
  
                                (AWV)           (AWV)             
   
                Start: 2018 Colon Cancer Screen Colon Cancer Screen Langley, KY  
  
                                FIT/FOBT        FIT/FOBT          
   
                Start: 2018 COLORECTAL CANCER COLORECTAL CANCER Trinity Health System  
  
                                SCREENING       SCREENING         
   
                Start: 2018 FECAL OCCULT BLOOD FECAL OCCULT BLOOD Cleveland Clinic Union Hospital  
   
                Start: 2018 Screening for                   SUMMA  
  
                                malignant neoplasm of                   
  
                                colon                             
   
                Start: 2017 Pneumococcal 65+ years Pneumococcal 65+ years   
SUMMA  
  
                                Vaccine (2 - PPSV23 if Vaccine (2 - PPSV23 if   
  
                                available, else PCV20) available, else PCV20)   
   
                Start: 2017 Pneumococcal 65+ years Pneumococcal 65+ years   
SUMMA  
  
                                Vaccine (2 - PPSV23 or Vaccine (2 - PPSV23 or   
  
                                PCV20)          PCV20)            
   
                Start: 2017 PNEUMOCOCCAL: 65+ (2 - PNEUMOCOCCAL: 65+ (2 -   
Sugarloaf Clinic  
  
                                PPSV23 if available, PPSV23 if available,   
  
                                else PCV20)     else PCV20)       
   
                Start: 2017 PNEUMOCOCCAL: 65+ (2 - PNEUMOCOCCAL: 65+ (2 -   
Sugarloaf Clinic  
  
                                PPSV23 or PCV20) PPSV23 or PCV20)   
   
                Start: 08- Pneumococcal 0-64 Pneumococcal 0-64 Parkview Health, KY  
  
                                years Vaccine (1 of 1 years Vaccine (1 of 1   
  
                                - PPSV23)       - PPSV23)         
   
                Start: 08- Pneumococcal 0-64 Pneumococcal 0-64 SUMMA  
  
                                years Vaccine (1 of 2 years Vaccine (1 of 2   
  
                                - PPSV23)       - PPSV23)         
   
                Start: 2007 Shingles Vaccine (1 of Shingles Vaccine (1 of   
SUMMA  
  
                                2)              2)                
   
                Start: 2007 SHINGRIX VACCINE (1 of SHINGRIX VACCINE (1 of   
Julian Clinic  
  
                                2)              2)                
   
                Start: 2007                                 SUMMA  
   
                Start: 2002 COLOGUARD (FIT-DNA) COLOGUARD (FIT-DNA) Adams County Hospital  
and Shriners Children's Twin Cities  
   
                Start: 2002 Colonoscopy     COLONOSCOPY     Ohio Valley Surgical Hospital  
   
                Start: 2002 CT COLONOGRAPHY CT COLONOGRAPHY Ohio Valley Surgical Hospital  
   
                Start: 2002 Screening for                   SUMMA  
  
                                malignant neoplasm of                   
  
                                colon                             
   
                Start: 2002 SIGMOIDOSCOPY   SIGMOIDOSCOPY   Ohio Valley Surgical Hospital  
   
                Start: 1997 Prostate specific Prostate Specific SUMMA  
  
                                antigen measurement Antigen (PSA)     
  
                                                Screening or      
  
                                                Monitoring        
   
                Start: 1987 Zoledronic acid ALPHA-1 ANTITRYPSIN Trinity Health System  
  
                                therapy         DEFICIENCY SCREENING   
   
                Start: 1976 DTaP/Tdap/Td vaccine DTaP/Tdap/Td vaccine SUMM  
A  
  
                                (1 - Tdap)      (1 - Tdap)        
   
                Start: 1976 Hepatitis B vaccine (1 Hepatitis B vaccine (1   
Langley, KY  
  
                                of 3 - Risk 3-dose of 3 - Risk 3-dose   
  
                                series)         series)           
   
                Start: 1976 Urine microalbumin DTAP,TDAP,TD (1 - Trinity Health System  
  
                                profile         Tdap)             
   
                Start: 1976                                 SUMMA  
   
                Start: 1975 HIV SCREENING   HIV SCREENING   Ohio Valley Surgical Hospital  
   
                Start: 1975 SPIROMETRY      SPIROMETRY      Adams County Regional Medical Center  
ic  
   
                Start: 1972 HIV screen      HIV screen      Langley, KY  
   
                Start: 1972 HIV screening                   SUMMA  
   
                Start: 1969 COVID-19 Vaccine (1) COVID-19 Vaccine (1) SUMM  
A  
  
                                                                Work Phone: 9(63 1)738-3135  
   
                Start: 1969 Depression Screen Depression Screen SUMMA  
   
                Start: 1969                                 SUMMA  
   
                Start: 1968 DTaP/Tdap/Td vaccine DTaP/Tdap/Td vaccine Downingtown, KY  
  
                                (1 - Tdap)      (1 - Tdap)        
   
                Start: 1967 [object Object] Diabetic foot exam Suburban Community Hospital & Brentwood Hospital, KY  
   
                Start: 1967 Diabetic foot   Diabetic foot exam Springerville, KY  
  
                                examination                       
   
                Start: 1962 COVID-19 Vaccine (1) COVID-19 Vaccine (1) SUMM  
A  
   
                Start: 1962                                 SUMMA  
   
                Start: 1957 COVID-19 VACCINE (#1) COVID-19 VACCINE (#1) Cl  
farhad Clinic  
   
                Start: 1957 ABDOMINAL AORTIC ABDOMINAL AORTIC Julian Cl  
inic  
  
                                ANEURYSM SCREENING ANEURYSM SCREENING   
   
                Start: 1957 Annual Wellness Visit Annual Wellness Visit KRAMER  
MMA  
  
                                (AWV)           (AWV)             
   
                                Activated clotting Activated clotting SUMMA  
  
                End: 2022 time            time Lab Routine One Work Phone:  
 1(728)869-6623  
  
                                                Time for 1 Occurrences   
  
                                                starting 2022   
  
                                                until 2022   
  
  
  
  
                          Comment on above:         One Time for 1 Occurrences s  
tarting 2022 until   
2022  
  
  
  
  
                                Anti-XA LMW Heparin                 SUMMA  
  
                End: 2022                                 Work Phone: 1(15 7)010-7797  
  
  
  
  
                          Comment on above:         Once for 1 Occurrences start  
ing 2022 until 2022  
  
  
  
  
                                aPTT in Blood by APTT Lab STAT Every 6 SUMMA  
  
                End: 2021 Coagulation assay Hours (Lab) for 7 Days Work Ph  
one:   
6(214)428-2801  
  
                                                starting 2021   
  
                                                until 2021, 2   
  
                                                completed         
  
  
  
  
                          Comment on above:         Every 6 Hours (Lab) for 7 Da  
ys starting 2021 until  
  
                                                    2021, 2 completed  
  
  
  
  
                                aPTT in Blood by                 SUMMA  
  
                                Coagulation assay                 Work Phone: 1( 482.703.1338  
   
                                aPTT in Blood by                 SUMMA  
  
                End: 2022 Coagulation assay                 Work Phone: 1( 961.677.3988  
   
                                aPTT in Blood by                 SUMMA  
  
                                Coagulation assay                 Work Phone: 1( 450.777.9707  
   
                                aPTT in Blood by                 SUMMA  
  
                                Coagulation assay                 Work Phone: 1( 177.327.7920  
   
                                aPTT in Blood by APTT Lab Timed Now SUMMA  
  
                End: 2022 Coagulation assay Then Every 6hr for 3 Work Phon  
e:   
4(043)465-2373  
  
                                                Days starting     
  
                                                08/10/2022 until   
  
                                                2022, 3     
  
                                                completed         
  
  
  
  
                          Comment on above:         Now Then Every 6hr for 3 Day  
s starting 08/10/2022 until   
2022,  
  
                                                    3 completed  
  
  
  
  
                                Basic metabolic 2000 Basic Metabolic Panel SUMMA  
  
                End: 2022 panel - Serum or Lab Routine Daily for Work Phon  
e:   
6(887)825-9356  
  
                                Plasma          3 Days starting   
  
                                                08/10/2022 until   
  
                                                2022, 2     
  
                                                completed         
  
  
  
  
                          Comment on above:         Daily for 3 Days starting 08  
/10/2022 until 2022, 2   
completed  
  
  
  
  
                                Basic Metabolic Panel w/ Basic Metabolic Panel w  
/ SUMMA  
  
                                Reflex to MG    Reflex to MG Lab Routine Work Ph  
one: 3(992)298-9716  
  
                                                Daily until discontinued   
  
                                                starting 2021, 1   
  
                                                completed         
  
  
  
  
                          Comment on above:         Daily until discontinued sta  
rting 2021, 1 completed  
  
  
  
  
                                Basic Metabolic Panel w/                 SUMMA  
  
                                Reflex to MG                    Work Phone: 1(84 6)673-8945  
   
                                Blood glucose - POCT Blood glucose - POCT Point   
of SUMMA  
  
                                                Care Testing Routine As Work Vandana  
ne: 5(619)731-9781  
  
                                                Needed until discontinued   
  
                                                starting 2022   
  
  
  
  
                          Comment on above:         As Needed until discontinued  
 starting 2022  
  
  
  
  
                                CBC panel - Blood by CBC Lab Routine One OhioHealth Grady Memorial HospitalA  
  
                End: 2021 Automated count Time for 1 Occurrences Work Phon  
e:   
1(289)324-6724  
  
                                                starting 2021   
  
                                                until 2021   
  
  
  
  
                          Comment on above:         One Time for 1 Occurrences s  
tarting 2021 until   
2021  
  
  
  
  
                                CBC panel - Blood by                 SUMMA  
  
                End: 2022 Automated count                 Work Phone: 1(29 0)919-1728  
   
                                CBC panel - Blood by CBC Lab Routine One SUMMA  
  
                End: 2022 Automated count Time for 1 Occurrences Work Phon  
e:   
0(819)968-0481  
  
                                                starting 2022   
  
                                                until 2022   
  
  
  
  
                          Comment on above:         One Time for 1 Occurrences s  
tarting 2022 until   
2022  
  
  
  
  
                                CBC panel - Blood by CBC Lab Routine Every SUMMA  
  
                End: 2022 Automated count Other Day for 5 Work Phone: 1(19 8)587-1999  
  
                                                Occurrences starting   
  
                                                2022 until   
  
                                                2022        
  
  
  
  
                          Comment on above:         Every Other Day for 5 Occurr  
ences starting 2022 until  
  
                                                    2022  
  
  
  
  
                                CBC W Auto Differential CBC auto differential La  
b SUMMA  
  
                                panel - Blood   Routine Daily until Work Phone:   
4(116)871-6424  
  
                                                discontinued starting   
  
                                                2021, 2 completed   
  
  
  
  
                          Comment on above:         Daily until discontinued sta  
rting 2021, 2 completed  
  
  
  
  
                                CBC W Auto Differential                 SUMMA  
  
                                panel - Blood                   Work Phone: 1(34 6)083-2285  
   
                                Continuous pulse Pulse oximetry, SUMMA  
  
                End: 08- oximetry        continuous Respiratory Work Phon  
e: 8(442)579-1513  
  
                                                Care Routine Every 4hr   
  
                                                for 24 Hours starting   
  
                                                2022 until   
  
                                                08/10/2022        
  
  
  
  
                          Comment on above:         Every 4hr for 24 Hours start  
ing 2022 until 08/10/2022  
  
  
  
  
                                COVID-19        COVID-19 Lab Routine One Time KRAMER  
MMA  
  
                End: 2021                 for 1 Occurrences starting Work   
Phone: 5(726)222-4983  
  
                                                2021 until 2021   
  
  
  
  
                          Comment on above:         One Time for 1 Occurrences s  
tarting 2021 until   
2021  
  
  
  
  
                                COVID-19        COVID-19 Lab STAT SUMMA  
  
                                                2021 4:07 PM EDT Work Phon  
e: 4(259)163-0197  
   
                                Creatinine      Creatinine, serum Lab SUMMA  
  
                End: 2022 [Mass/volume] in Serum Routine One Time for 1 Wo  
rk Phone:   
1(250)203-1444  
  
                                or Plasma       Occurrences starting   
  
                                                2022 until   
  
                                                2022        
  
  
  
  
                          Comment on above:         One Time for 1 Occurrences s  
tarting 2022 until   
2022  
  
  
  
  
                                Ct thorax w/o   CT CHEST WO IVCON University Hospitals Samaritan Medical Center  
  
                                contrast material Radiology Routine Work Phone:   
6(662)948-9375  
  
                                                Neoplasm of lung   
  
                                                2022 11:46 AM EDT   
   
                                Ct thorax w/o                   Aultman Orrville Hospital  
  
                End: 2022 contrast material                 Work Phone: 1( 193.405.1412  
  
  
  
  
                          Comment on above:         1 Occurrences starting  until 2022  
  
  
  
  
                                Culture, Anaerobic and Culture, Anaerobic and Me  
Kindred Hospital Dayton, KY  
  
                End: 2020 Aerobic         Aerobic Microbiology   
  
                                                Routine One Time for 1   
  
                                                Occurrences starting   
  
                                                2020 until   
  
                                                2020        
  
  
  
  
                          Comment on above:         One Time for 1 Occurrences s  
tarting 2020 until   
2020  
  
  
  
  
                                Culture, Anaerobic and Culture, Anaerobic and Ae  
sukhjinder Fulton County Health Center, KY  
  
                                Aerobic         Microbiology STAT 2020   
  
                                                2:59 PM EST       
   
                                Culture, Anaerobic and Culture, Anaerobic and Ae  
Naval Medical Center Portsmouth  
  
                                Aerobic         Microbiology Routine Work Phone:  
 0(631)711-5362  
  
                                                10/24/2022 1:30 PM EDT   
   
                                EKG 12 lead                     Fulton County Health Center  
, KY  
  
  
  
  
                          Comment on above:         As Needed until discontinued  
 starting 2020  
   
                                                    Daily until discontinued sta  
rting 10/22/2020, 2 completed  
  
  
  
  
                                End Tidal CO2, continuous, End Tidal CO2, contin  
uous, SUMMA  
  
                                while on PCA    while on PCA Respiratory Work Ph  
one: 8(079)977-5275  
  
                                                Care Routine Every 4hr until   
  
                                                discontinued starting   
  
                                                2022        
  
  
  
  
                          Comment on above:         Every 4hr until discontinued  
 starting 2022  
  
  
  
  
                                FL Greater Than 1 Hour                 SUMMA  
  
                End: 2022                                 Work Phone: 1(27 9)164-8051  
   
                                Glucose [Mass/volume] in Serum or                 
  SUMMA  
  
                                Plasma                          Work Phone: 1(70 4)496-6121  
   
                                Glucose [Mass/volume] in Serum or                 
  SUMMA  
  
                                Plasma                          Work Phone: 1(85 3)004-8438  
  
  
  
  
                          Comment on above:         4X Daily (AC & HS) until dis  
continued starting 2022  
   
                                                    As Needed until discontinued  
 starting 2022  
  
  
  
  
                                Glucose [Mass/volume] in Serum or Plasma          
         SUMMA  
  
                                                                Work Phone: 1(53 0)240-0272  
  
  
  
  
                          Comment on above:         4X Daily (AC & HS) until dis  
continued starting 2022  
   
                                                    As Needed until discontinued  
 starting 2022  
  
  
  
  
                                Hemoglobin                      SUMMA  
  
                End: 2022 A1c/Hemoglobin.total in                 Work Vandana  
ne: 3(909)445-2996  
  
                                Blood                             
   
                                Initiate Oxygen Therapy Initiate Oxygen Mercy Fairfield Medical Center FLORENTINO LINARES  
  
                                Protocol        Therapy Protocol   
  
                                                Respiratory Care   
  
                                                Routine Daily until   
  
                                                discontinued      
  
                                                starting 2020   
  
  
  
  
                          Comment on above:         Daily until discontinued sta  
rting 2020  
  
  
  
  
                                INITIATE PACU OXYGEN Initiate PACU Oxygen SUMMA  
  
                End: 2022 THERAPY PROTOCOL Therapy Protocol Work Phone: 1( 593.805.7229  
  
                                                Respiratory Care   
  
                                                Routine Continuous   
  
                                                until discontinued   
  
                                                starting 2022   
  
  
  
  
                          Comment on above:         Continuous until discontinue  
d starting 2022  
  
  
  
  
                                Intermittent pulse oximetry Pulse Oximetry Spot   
Check SUMMA  
  
                                                Respiratory Care Routine Work Ph  
one: 8(626)461-3865  
  
                                                Every 4hr until   
  
                                                discontinued starting   
  
                                                2021        
  
  
  
  
                          Comment on above:         Every 4hr until discontinued  
 starting 2021  
  
  
  
  
                                Intermittent pulse                 SUMMA  
  
                End: 2022 oximetry                        Work Phone: 1(00 8)020-5535  
   
                                Intermittent pulse Pulse Oximetry Spot SUMMA  
  
                End: 2022 oximetry        Check Respiratory Work Phone: 1( 718.692.5052  
  
                                                Care Routine One Time   
  
                                                for 1 Occurrences   
  
                                                starting 2022   
  
                                                until 2022   
  
  
  
  
                          Comment on above:         One Time for 1 Occurrences s  
tarting 2022 until   
2022  
  
  
  
  
                                Nasal Cannula Oxygen Nasal Cannula Oxygen Respir  
atory SUMMA  
  
                                                Care Routine As Needed until Wor  
k Phone: 6(014)549-8473  
  
                                                discontinued starting 2022  
   
  
  
  
  
                          Comment on above:         As Needed until discontinued  
 starting 2022  
  
  
  
  
                                Noninvasive Ventilation Noninvasive Ventilation   
SUMMA  
  
                                (NIV)           (NIV) Respiratory Care Work Phon  
e: 1(414) 526-2096  
  
                                                Routine Every 4hr until   
  
                                                discontinued starting   
  
                                                08/10/2022        
  
  
  
  
                          Comment on above:         Every 4hr until discontinued  
 starting 08/10/2022  
  
  
  
  
                                Nonrebreather mask oxygen Nonrebreather mask oxy  
gen SUMMA  
  
                                                Respiratory Care Routine As Work  
 Phone: 8(106)450-1698  
  
                                                Needed until discontinued   
  
                                                starting 2022   
  
  
  
  
                          Comment on above:         As Needed until discontinued  
 starting 2022  
  
  
  
  
                                Oxygen therapy [Minimum Data Set]                 
  Fulton County Health Center, KY  
  
  
  
  
                          Comment on above:         Daily until discontinued sta  
rting 10/22/2020  
   
                                                    Daily until discontinued sta  
rting 2021  
   
                                                    Daily until discontinued sta  
rting 2021  
  
  
  
  
                                Oxygen therapy [Minimum                 SUMMA  
  
                                Data Set]                       Work Phone: 1(99 1)325-9697  
   
                                Oxygen therapy [Minimum                 SUMMA  
  
                                Data Set]                       Work Phone: 1(40 7)775-6818  
   
                                Oxygen therapy [Minimum Initiate Oxygen Therapy   
SUMMA  
  
                                Data Set]       Protocol Respiratory Care Work P  
angie: 8(816)819-7381  
  
                                                Routine As Needed until   
  
                                                discontinued starting   
  
                                                2022        
  
  
  
  
                          Comment on above:         As Needed until discontinued  
 starting 2022  
  
  
  
  
                                Oxygen therapy [Minimum Initiate Oxygen Therapy   
SUMMA  
  
                                Data Set]       Protocol Respiratory Care Work P  
angie: 6(524)623-3617  
  
                                                Routine As Needed until   
  
                                                discontinued starting   
  
                                                2022        
  
  
  
  
                          Comment on above:         As Needed until discontinued  
 starting 2022  
  
  
  
  
                                Oxygen therapy [Minimum Initiate Oxygen Therapy   
SUMMA  
  
                                Data Set]       Protocol Respiratory Care Work P  
angie: 4(411)079-2551  
  
                                                Routine Daily until   
  
                                                discontinued starting   
  
                                                2022        
  
  
  
  
                          Comment on above:         Daily until discontinued sta  
rting 2022  
  
  
  
  
                                Oxygen therapy [Minimum Initiate Oxygen Therapy   
SUMMA  
  
                                Data Set]       Protocol Respiratory Care Work P  
angie: 3(359)354-8364  
  
                                                Routine As Needed until   
  
                                                discontinued starting   
  
                                                2022        
  
  
  
  
                          Comment on above:         As Needed until discontinued  
 starting 2022  
  
  
  
  
                                PBP2A TEST FOR S.                 SUMMA  
  
                End: 2022 AUREUS                          Work Phone: 1(15 2)603-7733  
   
                                Platelets [#/volume]                 SUMMA  
  
                                in Blood                        Work Phone: 1(88 2)245-0701  
   
                                Pneumococcal    PNEUMOCOCCAL VACCINE Medina Hospital  
  
                                vaccination     (PREVNAR 20)    Work Phone: 1(84 9)136-3457  
  
                                                Immunization/Injection   
  
                                                Routine Encounter for   
  
                                                immunization Ordered:   
  
                                                2022        
  
  
  
  
                          Comment on above:         Ordered: 2022  
  
  
  
  
                                POCT activated                  SUMMA  
  
                End: 2022 clotting time                   Work Phone: 1(20 7)483-6251  
   
                                POCT activated                  SUMMA  
  
                                clotting time                   Work Phone: 1(62 8)339-3046  
   
                                POCT activated  POCT activated  SUMMA  
  
                End: 2022 clotting time   clotting time Point of Work Phon  
e: 0(217)924-1451  
  
                                                Care Testing Routine   
  
                                                One Time for 1    
  
                                                Occurrences starting   
  
                                                2022 until   
  
                                                2022        
  
  
  
  
                          Comment on above:         One Time for 1 Occurrences s  
tarting 2022 until   
2022  
  
  
  
  
                                POCT activated  POCT activated  SUMMA  
  
                End: 2022 clotting time   clotting time Point of Work Phon  
e: 2(558)877-1087  
  
                                                Care Testing Routine   
  
                                                One Time for 1    
  
                                                Occurrences starting   
  
                                                2022 until   
  
                                                2022        
  
  
  
  
                          Comment on above:         One Time for 1 Occurrences s  
tarting 2022 until   
2022  
  
  
  
  
                                POCT Glucose                    Mercy Health St. Joseph Warren Hospital- OH  
, KY  
  
  
  
  
                          Comment on above:         4X Daily (AC & HS) until dis  
continued starting 2020  
   
                                                    4X Daily (AC & HS) until dis  
continued starting 2021  
   
                                                    As Needed until discontinued  
 starting 2021  
  
  
  
  
                                Pregnancy, urine POCT Pregnancy, urine POCT SUMM  
A  
  
                End: 2022                 Point of Care Testing Work Phone  
: 9(003)866-5302  
  
                                                Routine One Time for 1   
  
                                                Occurrences starting   
  
                                                2022 until   
  
                                                2022        
  
  
  
  
                          Comment on above:         One Time for 1 Occurrences s  
tarting 2022 until   
2022  
  
  
  
  
                                Protime-INR                     SUMMA  
  
                End: 2022                                 Work Phone: 1(85 8)078-0377  
   
                                Protime-INR     Protime-INR Lab Routine One SUMM  
A  
  
                End: 2022                 Time for 1 Occurrences Work Phon  
e: 8(044)384-3831  
  
                                                starting 2022 until   
  
                                                2022        
  
  
  
  
                          Comment on above:         One Time for 1 Occurrences s  
tarting 2022 until   
2022  
  
  
  
  
                                Spirometry panel Incentive spirometry Respirator  
y Care SUMMA  
  
                                                Routine Every 1hr while awake un  
til Work Phone: 3(181)326-3873  
  
                                                discontinued starting 2021  
   
  
  
  
  
                          Comment on above:         Every 1hr while awake until   
discontinued starting 2021  
  
  
  
  
                                Spirometry panel                 SUMMA  
  
                                                                Work Phone: 1(53 0)983-3637  
   
                                Spirometry panel Incentive spirometry Respirator  
y Care SUMMA  
  
                                                Routine Q1H PRN until discontinu  
ed Work Phone: 4(393)925-3398  
  
                                                starting 2022   
  
  
  
  
                          Comment on above:         Q1H PRN until discontinued s  
tarting 2022  
  
  
  
  
                                Spirometry panel Incentive spirometry Respirator  
y Care SUMMA  
  
                                                Routine Daily until discontinued  
 Work Phone: 0(655)999-6727  
  
                                                starting 2022   
  
  
  
  
                          Comment on above:         Daily until discontinued sta  
rting 2022  
  
  
  
  
                                Tdap vaccine 7 yrs/> TDAP VACCINE AGE 7+ IM Clev  
Mercy Health Tiffin Hospital  
  
                                im              Immunization/Injection Routine W  
ork Phone: 2(196)829-2785  
  
                                                Encounter for immunization Order  
ed:   
  
                                                2022        
  
  
  
  
                          Comment on above:         Ordered: 2022  
  
  
  
  
                                VL Arterial PVR Lower VL Arterial PVR Lower SUMM  
A  
  
                                                Imaging Routine Work Phone: 1(65 7)515-9377  
  
                                                Peripheral vascular   
  
                                                disease with claudication   
  
                                                (HCC) Critical lower limb   
  
                                                ischemia (HCC) 2021   
  
                                                5:07 PM EDT       
   
                                VL Arterial PVR Lower VL Arterial PVR Lower w Me  
peri St. John of God Hospital- OH, KY  
  
                End: 2019 w Exercise      Exercise Imaging Routine   
  
                                                Atheroscler of native   
  
                                                artery of both legs with   
  
                                                intermit claudication   
  
                                                (HCC) 1 Occurrences   
  
                                                starting 2019 until   
  
                                                2019        
  
  
  
  
                          Comment on above:         1 Occurrences starting  until 2019  
  
  
  
  
                                VL Arterial PVR Lower w                 Paloma Fairfield Medical Center OH, KY  
  
                                Exercise                          
   
                                VL ARTERIAL PVR LOWER W VL ARTERIAL PVR LOWER W   
SUMMA  
  
                End: 2021 EXERCISE        EXERCISE Imaging Routine Work Ph  
one: 5(213)444-0126  
  
                                                PAD (peripheral artery   
  
                                                disease) (Formerly Chesterfield General Hospital) 1   
  
                                                Occurrences starting   
  
                                                2021 until   
  
                                                2021        
  
  
  
  
                          Comment on above:         1 Occurrences starting  until 2021  
  
  
  
  
                                VL ARTERIAL PVR LOWER WO EXERCISE                 
  SUMMA  
  
                End: 2021                                 Work Phone: 1(46 9)395-6469  
  
  
  
  
                          Comment on above:         1 Occurrences starting  until 2021  
  
  
  
  
                                VL DUP LOWER EXTREMITY VL DUP LOWER EXTREMITY KRAMER  
MMA  
  
                                ARTERIES LEFT   ARTERIES LEFT Imaging Work Phone  
: 1(431)358-4722  
  
                                                Routine 2021 5:07   
  
                                                PM EDT            
   
                                VL DUP LOWER EXTREMITY                 SUMMA  
  
                End: 2021 ARTERIES LEFT                   Work Phone: 1(32 2)286-2041  
  
  
  
  
                          Comment on above:         1 Occurrences starting  until 2021  
  
  
  
  
                                VL Lower Extremity VL Lower Extremity SUMMA  
  
                End: 2021 Arteries Bilateral Arteries Bilateral Work Phone  
: 4(963)345-2404  
  
                                                Imaging Routine PAD   
  
                                                (peripheral artery   
  
                                                disease) (Formerly Chesterfield General Hospital) 1   
  
                                                Occurrences starting   
  
                                                2021 until   
  
                                                2021        
  
  
  
  
                          Comment on above:         1 Occurrences starting  until 2021  
  
  
  
  
                                VL Lower Extremity Arteries VL Lower Extremity A  
rteries SUMMA  
  
                                Bilateral       Bilateral Imaging Routine Work P  
angie: 1(779)845-3195  
  
                                                PAD (peripheral artery   
  
                                                disease) (Formerly Chesterfield General Hospital) 2021   
  
                                                4:45 PM EDT       
   
                                                                AdventHealth for Women  
  
  
  
Immunizations  
  
  
  
             Immunization Date Immunization Notes        Care Provider Facility  
   
             2022   influenza, high-dose,              Ct Summa Health Barberton Campus  
  
                          quadrivalent vaccine                             
  
                          (FLUZONE HIGH DOSE                             
  
                          QUADRIVALENT)                             
   
             10-   influenza                Odell Irvin LUIS  
  
                          injectable,                              
  
                          quadrivalent,                             
  
                          preservative free                             
   
             10-   influenza                 Odell Daly  
TGH Crystal River,  
  
                          quadrivalent split                           KY  
  
                          vaccine                                  
  
                          (FLUZONE;FLUARIX;FLUL                             
  
                          AVAL;AFLURIA) 0.5 ML                             
  
                          injection                                
   
             10-   influenza                 Odell Daly  
TGH Crystal River,  
  
                          quadrivalent split                           KY  
  
                          vaccine                                  
  
                          (FLUZONE;FLUARIX;FLUL                             
  
                          AVAL;AFLURIA)                             
  
                          injection 0.5 mL                             
   
             2018   influenza,                Odell GEE  
  
                          injectable,               Work Phone: 8(187)691-4367 W  
ork Phone: 1(514) 127-9585  
  
                          quadrivalent,                             
  
                          preservative free                             
   
             2016   pneumococcal              Odell LUIS  
  
                          conjugate vaccine, 13                             
  
                          valent                                   
   
             10-   influenza nasal,              Denise Josh LPN St. Rita's Hospital  
  
                          unspecified                              
  
                          formulation                              
   
             10-   influenza virus              Odell Bryan DO SUM  
MA  
  
                          vaccine, unspecified              Work Phone: 1(690)02 2-6194 Work Phone: 1(534) 832-1744  
  
                          formulation                              
   
             NEGATED: Highlighted pneumococcal (PCV20)              Ct OhioHealth Riverside Methodist Hospital  
  
             row has not  vaccine, 20 valent                             
  
             occurred!2022 (PREVNAR 20)                             
   
             NEGATED: Highlighted tetanus toxoid,              Ct J.W. Ruby Memorial Hospital  
  
             row has not  reduced diphtheria                             
  
             occurred!2022 toxoid, and acellular                            
   
  
                          pertussis vaccine,                             
  
                          adsorbed                                 
  
  
  
Payers  
  
  
  
                Date            Payer Category  Payer           Policy ID  
   
                2021      Medicare                        744001527381  
  
                                                                1.2.840.721647.1  
.13.239.2.  
  
                                                                7.3.906875.315  
   
                2021      Medicare        AETNA MEDICARE AETNA uynqehza741  
0  
  
                                                MEDICARE O    1.2.840.964558.1  
.13.159.2.  
  
                                                mkokcjgv6400    7.3.488504.315  
  
                                                2021-Present   
  
                                                037-711-4135 PO BOX   
  
                                                055773 Margaretville, TX   
  
                                                60600-2332 OU Medical Center – Edmond    
   
                2021      Medicare                        1.2.840.537662.1  
.13.159.2.  
  
                                                                7.3.250008.315  
   
                2019      Medicare        AETNA MEDICARE AETNA xxxxxxxx  
  
                                                MEDICARE ADVANTAGE O 1.2.840.1  
47816.1.13.239.2.  
  
                                                xxxxxxxx        7.3.223535.315  
  
                                                2019-Present PO Box   
  
                                                636528 Shenandoah, TX   
  
                                                18197-1982 Medicare   
   
                2019      Private Health Insurance                 MEBMQ49  
M  
   
                1957      Unknown                         930707648  
  
                                                                2.16.840.1.64259  
3.3.579.2.  
  
                                                                1957      Unknown                         859792443  
  
                                                                2.16.840.1.28781  
3.3.579.2.  
  
                                                                1957      Unknown                         085714756  
  
                                                                2.16.840.1.74921  
3.3.579.2.  
  
                                                                1957      Unknown                         881263626  
  
                                                                2.16.840.1.08799  
3.3.579.21957      Unknown                         299081362  
  
                                                                2.16.840.1.24011  
3.3.579.21957      Unknown                         591843851  
  
                                                                2.16.840.1.37580  
3.3.579.21957      Unknown                         316968590  
  
                                                                2.840.1.90738  
3.3.579.21957      Unknown                         578868570  
  
                                                                2.16.840.1.24013  
3.3.579.21957      Unknown                         607874316  
  
                                                                2.16.840.1.96991  
3.3.579.21957      Unknown                         668172259  
  
                                                                2.16.840.1.75865  
3.3.579.21957      Unknown                         886788384  
  
                                                                2.16840.1.17250  
3.3.579.21957      Unknown                         768675835  
  
                                                                2.16.840.1.56841  
3.3.579.21957      Unknown                         169405939  
  
                                                                2.16.840.1.51600  
3.3.579.21957      Unknown                         447004455  
  
                                                                2.16.840.1.96582  
3.3.579.21957      Unknown                         101608421  
  
                                                                2.16.840.1.63451  
3.3.579.21957      Unknown                         558864312  
  
                                                                2.16.840.1.33349  
3.3.579.2.  
  
                                                                668  
   
                1957      Unknown                         624046096  
  
                                                                2.16.840.1.21043  
3.3.579.2.  
  
                                                                668  
   
                1957      Unknown                         688159338  
  
                                                                2.16.840.1.80020  
3.3.579.2.  
  
                                                                8  
   
                1957      Unknown                         629504188  
  
                                                                2.16.840.1.98799  
3.3.579.2.  
  
                                                                668  
   
                1957      Unknown                         416107797  
  
                                                                2.16.840.1.93653  
3.3.579.2.  
  
                                                                668  
   
                1957      Unknown                         260262918  
  
                                                                2.16.840.1.20957  
3.3.579.2.  
  
                                                                8  
   
                                Private Health Insurance                   
  
  
  
Social History  
  
  
  
                Date            Type            Detail          Facility  
   
                Start: 2019 Tobacco smoking status Current every day Langley, KY  
  
                End: 2019 NHIS            smoker            
   
                Start: 1970 History of tobacco use Cigarette Smoker Langley, KY  
  
                End: 2021                                   
   
                Start: 1970 History of tobacco use Cigar Smoker    Alburtis, KY  
  
                End: 2021                                   
   
                Start: 2019 Cigarettes smoked                 Corpus Christi, KY  
  
                End: 2022 current (pack per day)                   
  
                                - Reported                        
   
                Start: 2019 Alcohol intake  Yes             Langley, KY  
   
                Start: 2019 Tobacco Comment 2-3 cigars a day now Kettering Health Miamisburgaleksandr Selma, KY  
   
                Start: 2018 Alcohol Comment special occasions Corpus Christi, KY  
   
                Start: 1957 Sex Assigned At Birth Not on file     Marion, KY  
   
                Start: 2020 Alcohol intake  Current drinker of Springerville, KY  
  
                End: 2022                 alcohol (finding)   
   
                                Exposure to SARS-CoV-2 Unable to assess Marion, KY  
  
                                (event)                           
   
                Start: 10- Tobacco use and Never used      Langley, KY  
  
                End: 2022 exposure                          
   
                Start: 10- Alcohol intake  Ex-drinker (finding) Mercy Select Medical Specialty Hospital - Columbus South OH, KY  
  
                End: 2021                                   
   
                Start: 2022 Exposure to SARS-CoV-2 Not sure        Mercy H  
eaTGH Crystal River, KY  
  
                End: 2022 (event)                           
   
                Start: 1987 Tobacco smoking status Current some day smoker  
 Corey Hospital  
  
                End: 2021 Westerly Hospital                              
   
                Start: 2021 Tobacco Comment 1 cigars a day now Corey Hospital  
  
                                                                Work Phone: 1(69 2)414-2170  
   
                Start: 2022 Tobacco smoking status Ex-smoker       Corey Hospital  
  
                End: 2022 Westerly Hospital                              
   
                Start: 1970 History of tobacco use Current smoker  Corey Hospital  
  
                End: 2021                                 Work Phone: 1(87 0)628-9985  
   
                Start: 2022 Tobacco Comment 3 cigars a day now Corey Hospital  
  
                                                                Work Phone: 1(10 4)022-0979  
   
                Start: 1957 Sex Assigned At Birth Male            Corey Hospital  
   
                Start: 2022 Tobacco Comment quit   Corey Hospital  
  
                                                                Work Phone: 1(75 1)306-3559  
   
                Start: 2022 Alcohol intake  Current non-drinker of Adams County Hospitala  
Cleveland Clinic Avon Hospital  
  
                End: 2022                 alcohol (finding)   
   
                Start: 2019 Tobacco Comment quit cigarettes 20 Julian C  
linic  
  
                                                years ago, smokes   
  
                                                cigars daily now 2-3   
  
  
  
Medical Equipment  
  
  
  
             Procedure Code Equipment Code Equipment Original Equipment Identifi  
er Dates  
  
                                       Text                        
   
                                                    8561969149   Start:  
  
                                                                 2021  
   
                                       Twice a day  1098374516   Start:  
  
                                                                 2021  
  
  
  
Goals  
  
  
  
                    Date                Patient Goal        Desired Activity/Sta  
te  
   
                                                              
  
  
  
  
                          Comment on above:         Patient Self-Management Goal  
 for Health Maintenance  
  
                                                    Goal: I will follow a health  
y diet as discussed by my provider.  
  
                                                    Barriers: none  
  
                                                    Plan for overcoming my barri  
ers: N/A  
  
                                                    Confidence: 810  
  
                                                    Anticipated Goal Completion   
Date: next visit  
   
                                                    Formatting of this note migh  
t be different from the original.  
  
                                                    Patient Self-Management Goal  
 for Health Maintenance  
  
                                                    Goal: I will follow a health  
y diet as discussed by my provider.  
  
                                                    Barriers: none  
  
                                                    Plan for overcoming my barri  
ers: N/A  
  
                                                    Confidence: 8/10  
  
                                                    Anticipated Goal Completion   
Date: next visit  
  
  
  
  
                                                              
  
  
  
Clinical Notes 2019 to 2022  
  
Usha Encarnacion MA - 2022 9:16 AM Rober Salvador PA-C - 2022 
8:00 AM Ottoniel Encarnacion MA - 2022 8:07 AM 
Tarik Abrams LPN - 2022 10:50 AM EDT  
  
                    Note Date & Type    Note                Facility  
   
                    2022 Note     HNO ID: 5867811155  Select Specialty Hospital - Northwest Indianaa  
  
  
                                        Author: Usha Encarnacion MA Center  
  
                                        Service: ?            
  
                                        Author Type: Medical Assistant   
  
                                        Type: Progress Notes   
  
                                        Filed: 2022 9:41 AM   
  
                                        Note Text:            
  
                                        Immunization History   
  
                                        - Tdap (Age 7+) (Deferred)   
  
                                        - Date: 2022    
  
                                        - Lot #:              
  
                                        - Dose:               
  
                                        - Site:               
  
                                        - :       
  
                                        - Given By:           
  
                                                    - Expiration Date: - influen  
za, high-dose, quadrivalent vaccine (FLUZONE HIGH   
DOSE                                      
  
                                        QUADRIVALENT) (Given)   
  
                                        - Date: 2022    
  
                                        - Lot #: MR725VV      
  
                                        - Dose: 0.7 mL        
  
                                        - Site: Left deltoid   
  
                                        - : Sannahomi Pasteur   
  
                                        - Given By: USHA ENCARNACION   
  
                                        - Expiration Date: 2023   
  
                                        - pneumococcal (PCV20) vaccine, 20 ally  
t (PREVNAR 20) (Deferred)   
  
                                        - Date: 2022    
  
                                        - Lot #:              
  
                                        - Dose:               
  
                                        - Site:               
  
                                        - :       
  
                                        - Given By:           
  
                                        - Expiration Date:    
   
                    2022 Note     HNO ID: 9295882975  Select Specialty Hospital - Northwest Indianaa  
  
  
                                        Author: Dulce Salvador PA-C Center  
  
                                        Service: ?            
  
                                        Author Type: Physician Assistant   
  
                                        Type: Progress Notes   
  
                                        Filed: 2022 9:41 AM   
  
                                        Note Text:            
  
                                        Trinity Health System Ararat General Primary C  
are Green   
  
                                        12 Gallagher Street Lubbock, TX 79415 02991   
  
                                        Phone: 848.331.1046   
  
                                        Fax: 718.405.2897     
  
                                        Date of Evaluation: 2022   
  
                                        Patient Name: Willow Bradshaw   
  
                                        : 1957         
  
                                        MRN: 41800530442      
  
                                        Chief Complaint:      
  
                                        Patient presents with:   
  
                                        positive blood in stool   
  
                                        Nursing Intake:       
  
                                        Nursing Notes:        
  
                                        Usha Encarnacion MA 2022 8:14 AM Si  
gned   
  
                                        Willow Bradshaw is a 65 year old male w  
ho presents to follow up for   
  
                                        positive blood in stool. Denies abdomina  
l pain, diarrhea or constipation.   
  
                                        Usha Encarnacion MA   
  
                                        Subjective            
  
                                        Mr. Bradshaw is a 65 year old male who p  
resents with the following   
  
                                        complaint(s):         
  
                                        HPI                   
  
                                        LOV 22 JUANJOSE Tijerina. - TCM - peripheral   
arterial disease, S/P left BKA -   
  
                                        hyperbaric treatment, just completed yes  
terday. Hoping to heal and get   
  
                                        prosthesis. Has chronic intermittent bac  
k pain as well.   
  
                                        Currently gets around house in wheelUofL Health - Peace Hospital  
r - has a knee scooter just for   
  
                                        getting into bathroom/shower.   
  
                                        Positive FIT - first time to have screen  
ing test. Patient has already   
  
                                        made arrangements to see Kettering Health Greene Memorial GastroParma Community General Hospital  
erology in January. Otherwise, no   
  
                                        BRBPR, melena, abdominal pain.   
  
                                        Specialists:          
  
                                        Hem/Onc - CASSANDRA neoplasm S/P radiation; We  
llbutrin for smoking cessation   
  
                                        Pulmonology - next OV 2023   
  
                                        Cardiology - LOV  - CAD S/P PCI, and  
 CABG, hyperlipidemia - maintained   
  
                                        on Pradaxa prior DVT, Effient, ACE, beta  
 blocker, statin   
  
                                        Vascular Surg - LOV  to plan for CTA  
 vs angio with Dr. Valiente   
  
                                        Endocrinology - T2DM - Kettering Health Greene Memorial provider ne  
xt OV 2023   
  
                                        Ophthalmology - no DM retinopathy - summ  
er    
  
                                        Podiatry - summer 2022   
  
                                        Review of Systems     
  
                                        Constitutional: Negative for fatigue and  
 unexpected weight change.   
  
                                        HENT: Negative for nosebleeds.   
  
                                        Eyes: Negative for redness and visual di  
sturbance.   
  
                                        Respiratory: Negative for apnea, cough a  
nd shortness of breath.   
  
                                        Cardiovascular: Negative for chest pain,  
 palpitations and leg swelling.   
  
                                        Genitourinary: Negative for hematuria.   
  
                                        Neurological: Negative for dizziness, we  
akness, light-headedness,   
  
                                        numbness and headaches.   
  
                                        Hematological: Does not bruise/bleed eas  
james.   
  
                                        Psychiatric/Behavioral: The patient is n  
ot nervous/anxious.   
  
                                        PAST MEDICAL HISTORY   
  
                                        Diagnosis Date        
  
                                        Acute deep vein thrombosis (DVT) of popl  
iteal vein of left lower   
  
                                        extremity (HCC)       
  
                                        CAD (coronary artery disease)   
  
                                        Cerebral artery occlusion with cerebral   
infarction (Formerly Chesterfield General Hospital)   
  
                                        Controlled type 2 diabetes mellitus with  
 diabetic peripheral angiopathy   
  
                                        without gangrene, without long-term curr  
ent use of insulin (Formerly Chesterfield General Hospital)   
  
                                        Controlled type 2 diabetes mellitus, wit  
hout long-term current use of   
  
                                        insulin (Formerly Chesterfield General Hospital)         
  
                                        COPD (chronic obstructive pulmonary dise  
ase) (Formerly Chesterfield General Hospital)   
  
                                        Essential hypertension   
  
                                        History of squamous cell carcinoma   
  
                                        Hyperlipidemia        
  
                                        Lung cancer (HCC)     
  
                                        adenocarcinoma CASSANDRA    
  
                                        Peripheral vascular disease (HCC)   
  
                                        Personal history of DVT (deep vein throm  
bosis)   
  
                                        PAST SURGICAL HISTORY   
  
                                        Procedure Laterality Date   
  
                                        ANGIOPLASTY FEMORAL/POP Left   
  
                                        2019              
  
                                        ANGIOPLASTY PCI 02/01/2014   
  
                                        x5                    
  
                                        BYPASS W/VEIN FEMORAL-POPLITEAL 20  
22   
  
                                        CORONARY ARTERY BYPASS GRAFT 2017   
  
                                        3 vessel              
  
                                        VASCULAR SURGERY PROCEDURE 2019   
  
                                        femoral endarterectomy   
  
                                        FAMILY HISTORY        
  
                                        Problem Relation Age of Onset   
  
                                        Cancer Mother         
  
                                        Heart disease Father   
  
                                        Social History        
  
                                        Tobacco Use           
  
                                        Smoking status: Former   
  
                                        Packs/day: 1.00       
  
                                        Years: 50.00          
  
                                        Pack years: 50.00     
  
                                        Types: Cigars, Cigarettes   
  
                                        Quit date:        
  
                                        Years since quittin.9   
  
                                        Smokeless tobacco: Never   
  
                                        Vaping Use            
  
                                        Vaping Use: Never used   
  
                                        Substance Use Topics   
  
                                        Alcohol use: No       
  
                                        Drug use: Never       
  
                                        Current Outpatient Medications   
  
                                        Medication Sig Dispense Refill   
  
                                        atorvastatin (LIPITOR) 80 mg tablet Take  
 80 mg by mouth once daily.   
  
                                        empagliflozin (JARDIANCE) 25 mg tablet T  
jadon 25 mg by mouth daily with   
  
                                        breakfast.            
  
                                        dabigatran etexilate (PRADAXA) 150 mg Ta  
ke 150 mg by mouth twice daily.   
  
                                        prasugrel (EFFIENT) 10 mg tab Take 10 mg  
 by mouth once daily.   
  
                                        VITAMIN E ORAL Take by mouth once daily.  
   
  
                                        buPROPion HCL, smoking deter, 150 mg tab  
 ER 12 hr Take 1 tablet by mouth   
  
                                        once daily. (Patient taking differently:  
 Take 150 mg by mouth twice   
  
                                        daily.) 90 tablet 3   
  
                                        oxyCODONE IR (ROXICODONE) 5 mg immediate  
 release tablet Take 5 mg by   
  
                                        mouth every 6 hours as needed.   
  
                                        ONETOUCH VERIO TEST STRIPS test strip   
  
                                        ONETOUCH VERIO REFLECT METER   
  
                                        ONETOUCH DELICA PLUS LANCET 33 gauge   
  
                                        nitroglycerin sublingual (NITROQUICK) 0.  
4 mg SL tablet Dissolve 0.4 mg   
  
                                        under the tongue every 5 minutes as need  
ed.   
  
                                        pantoprazole DR (PROTONIX) 40 mg tablet   
Take 40 mg by mouth once daily.   
  
                                        VASCEPA capsule Take 2 g by mouth twice   
daily with meals.   
  
                                        alirocumab (PRALUENT PEN) 75 mg/mL pen I  
nject 75 mg subcutaneously every   
  
                                        other week.           
  
                                        ubidecarenone Q-10 (COENZYME Q-10) 10 mg  
 cap Take by mouth once daily.   
  
                                        Magnesium 250 mg tab Take 250 mg by mout  
h once daily.   
  
                                        sildenafil (VIA (more content not includ  
ed)...   
   
                          2022 History of     Formatting of this note migh  
t be different from the   
original.                               Trinity Health System  
  
                    Present illness Narrative                       
  
                                        Immunization History   
  
                                        - Tdap (Age 7+) (Deferred)   
  
                                        - Date: 2022    
  
                                        - Lot #:              
  
                                        - Dose:               
  
                                        - Site:               
  
                                        - :       
  
                                        - Given By:           
  
                                        - Expiration Date:    
  
                                                    - influenza, high-dose, quad  
rivalent vaccine (FLUZONE HIGH DOSE QUADRIVALENT)   
(Given)                                   
  
                                        - Date: 2022    
  
                                        - Lot #: BX858IE      
  
                                        - Dose: 0.7 mL        
  
                                        - Site: Left deltoid   
  
                                        - : Sanofi Pasteur   
  
                                        - Given By: USHA ENCARNACION   
  
                                        - Expiration Date: 2023   
  
                                        - pneumococcal (PCV20) vaccine, 20 ally  
t (PREVNAR 20) (Deferred)   
  
                                        - Date: 2022    
  
                                        - Lot #:              
  
                                        - Dose:               
  
                                        - Site:               
  
                                        - :       
  
                                        - Given By:           
  
                                        - Expiration Date:    
  
                                        Electronically signed by Usha Encarnacion MA at 2022 9:41 AM EST   
  
                                        Formatting of this note is different fro  
m the original.   
  
                                        Images from the original note were not i  
ncluded.   
  
                                                              
  
                                        Trinity Health System Ararat General Primary C  
are Green   
  
                                        50 Smith Street Kincheloe, MI 49788685   
  
                                        Phone: 840.136.1943   
  
                                        Fax: 463.917.5140     
  
                                                              
  
                                        Date of Evaluation: 2022   
  
                                        Patient Name: Willow Bradshaw   
  
                                        : 1957         
  
                                        MRN: 27921851648      
  
                                                              
  
                                        Chief Complaint:      
  
                                        Patient presents with:   
  
                                        positive blood in stool   
  
                                                              
  
                                                              
  
                                        Nursing Intake:       
  
                                        Nursing Notes:        
  
                                        Usha Encarnacion MA 2022 8:14 AM Si  
gned   
  
                                                    Willow Bradshaw is a 65 yea  
r old male who presents to follow up for positive   
blood in stool. Denies abdominal pain, diarrhea or constipation.   
  
                                                              
  
                                        Usha Encarnacion MA   
  
                                                              
  
                                                              
  
                                        Subjective            
  
                                                    Mr. Bradshaw is a 65 year ol  
d male who presents with the following   
complaint(s):                             
  
                                                              
  
                                        HPI                   
  
                                                              
  
                                                    LOV 22 JUANJOSE Tijerina. - TCM -  
 peripheral arterial disease, S/P left BKA -   
hyperbaric treatment, just completed yesterday. Hoping to heal and get 
prosthesis. Has chronic intermittent back pain as well.   
  
                                                    Currently gets around house   
in wheelchair - has a knee scooter just for getting  
 into bathroom/shower.                    
  
                                                              
  
                                                    Positive FIT - first time to  
 have screening test. Patient has already made   
arrangements to see Kettering Health Greene Memorial Gastroenterology in January. Otherwise, no BRBPR, 
melena, abdominal pain.                   
  
                                                              
  
                                        Specialists:          
  
                                        Hem/Onc - CASSANDRA neoplasm S/P radiation; We  
llbutrin for smoking cessation   
  
                                        Pulmonology - next OV 2023   
  
                                                    Cardiology - LOV  - CAD   
S/P PCI, and CABG, hyperlipidemia - maintained on   
Pradaxa prior DVT, Effient, ACE, beta blocker, statin   
  
                                        Vascular Surg - LOV  to plan for CTA  
 vs angio with Dr. Valiente   
  
                                        Endocrinology - T2DM - Kettering Health Greene Memorial provider ne  
xt OV 2023   
  
                                        Ophthalmology - no DM retinopathy - summ  
er    
  
                                        Podiatry - summer 2022   
  
                                                              
  
                                        Review of Systems     
  
                                        Constitutional: Negative for fatigue and  
 unexpected weight change.   
  
                                        HENT: Negative for nosebleeds.   
  
                                        Eyes: Negative for redness and visual di  
sturbance.   
  
                                        Respiratory: Negative for apnea, cough a  
nd shortness of breath.   
  
                                        Cardiovascular: Negative for chest pain,  
 palpitations and leg swelling.   
  
                                        Genitourinary: Negative for hematuria.   
  
                                                    Neurological: Negative for d  
izziness, weakness, light-headedness, numbness and   
headaches.                                
  
                                        Hematological: Does not bruise/bleed eas  
james.   
  
                                        Psychiatric/Behavioral: The patient is n  
ot nervous/anxious.   
  
                                                              
  
                                        PAST MEDICAL HISTORY   
  
                                        Diagnosis Date        
  
                                                    Acute deep vein thrombosis (  
DVT) of popliteal vein of left lower extremity   
(HCC)                                     
  
                                        CAD (coronary artery disease)   
  
                                        Cerebral artery occlusion with cerebral   
infarction (HCC)   
  
                                                    Controlled type 2 diabetes m  
ellitus with diabetic peripheral angiopathy without  
 gangrene, without long-term current use of insulin (HCC)   
  
                                                    Controlled type 2 diabetes m  
ellitus, without long-term current use of insulin   
(HCC)                                     
  
                                        COPD (chronic obstructive pulmonary dise  
ase) (HCC)   
  
                                        Essential hypertension   
  
                                        History of squamous cell carcinoma   
  
                                        Hyperlipidemia        
  
                                        Lung cancer (HCC)     
  
                                        adenocarcinoma CASSANDRA    
  
                                        Peripheral vascular disease (HCC)   
  
                                        Personal history of DVT (deep vein throm  
bosis)   
  
                                                              
  
                                        PAST SURGICAL HISTORY   
  
                                        Procedure Laterality Date   
  
                                        ANGIOPLASTY FEMORAL/POP Left   
  
                                        2019              
  
                                        ANGIOPLASTY PCI 02/01/2014   
  
                                        x5                    
  
                                        BYPASS W/VEIN FEMORAL-POPLITEAL 20  
22   
  
                                        CORONARY ARTERY BYPASS GRAFT 2017   
  
                                        3 vessel              
  
                                        VASCULAR SURGERY PROCEDURE 2019   
  
                                        femoral endarterectomy   
  
                                                              
  
                                        FAMILY HISTORY        
  
                                        Problem Relation Age of Onset   
  
                                        Cancer Mother         
  
                                        Heart disease Father   
  
                                                              
  
                                        Social History        
  
                                                              
  
                                        Tobacco Use           
  
                                        Smoking status: Former   
  
                                        Packs/day: 1.00       
  
                                        Years: 50.00          
  
                                        Pack years: 50.00     
  
                                        Types: Cigars, Cigarettes   
  
                                        Quit date:        
  
                                        Years since quittin.9   
  
                                        Smokeless tobacco: Never   
  
                                        Vaping Use            
  
                                        Vaping Use: Never used   
  
                                        Substance Use Topics   
  
                                        Alcohol use: No       
  
                                        Drug use: Never       
  
                                                              
  
                                        Current Outpatient Medications   
  
                                        Medication Sig Dispense Refill   
  
                                        atorvastatin (LIPITOR) 80 mg tablet Take  
 80 mg by mouth once daily.   
  
                                                    empagliflozin (JARDIANCE) 25  
 mg tablet Take 25 mg by mouth daily with   
breakfast.                                
  
                                        dabigatran etexilate (PRADAXA) 150 mg Ta  
ke 150 mg by mouth twice daily.   
  
                                        prasugrel (EFFIENT) 10 mg tab Take 10 mg  
 by mouth once daily.   
  
                                        VITAMIN E ORAL Take by mouth once daily.  
   
  
                                                    buPROPion HCL, smoking deter  
, 150 mg tab ER 12 hr Take 1 tablet by mouth once   
daily. (Patient taking differently: Take 150 mg by mouth twice daily.) 90 tablet
 3                                        
  
                                                    oxyCODONE IR (ROXICODONE) 5   
mg immediate release tablet Take 5 mg by mouth   
every 6 hours as needed.                  
  
                                        ONETOUCH VERIO TEST STRIPS test strip   
  
                                        ONETOUCH VERIO REFLECT METER   
  
                                        ONETOUCH DELICA PLUS LANCET 33 gauge   
  
                                                    nitroglycerin sublingual (NI  
TROQUICK) 0.4 mg SL tablet Dissolve 0.4 mg under   
the tongue every 5 minutes as needed.     
  
                                        pantoprazole DR (PROTONIX) 40 mg tablet   
Take 40 mg by mouth once daily.   
  
                                        VASCEPA capsule Take 2 g by mouth twice   
daily with meals.   
  
                                                    alirocumab (PRALUENT PEN) 75  
 mg/mL pen Inject 75 mg subcutaneously every other   
week.                                     
  
                                        ubidecarenone Q-10 (COENZYME Q-10) 10 mg  
 cap Take by mouth once daily.   
  
                                        Magnesium 250 mg tab Take 250 mg by mout  
h once daily.   
  
                                        sildenafil (VIAGRA) 100 mg tablet Take 1  
00 mg by mouth as needed.   
  
                                                    metFORMIN ER (GLUCOPHAGE XR)  
 500 mg 24 hr tablet Take 1000mg by mouth with   
breakfast and 500mg by mouth with dinner   
  
                                        Fenofibrate (LOFIBRA) 160 mg tablet Take  
 160 mg by mouth once daily.   
  
                                        lisinopril 2.5 mg tablet Take 2.5 mg by   
mouth once daily.   
  
                                                    metoprolol succinate ER (TOP  
ROL XL) 25 mg 24 hr tablet Take 25 mg by mouth   
twice daily.                              
  
                                        cilostazol (PLETAL) 100 mg tablet Take 1  
00 mg by mouth once daily.   
  
                                                              
  
                                        No current facility-administered medicat  
ions for this visit.   
  
                                                              
  
                                                    I have confirmed and edited   
as necessary the chief complaint, medications, past  
 medical, family and social histories obtained by others.   
  
                                                              
  
                                        Objective             
  
                                                    /68   Pulse 103   Temp  
 97.3   Ht 5' 7  (1.70m)   Wt 190 lb (86.2kg)     
SpO2 91[ra]%   BMI 29.75 kg/(m^2).        
  
                                                              
  
                                                              
  
                                        Physical Exam         
  
                                        Vitals and nursing note reviewed.   
  
                                        Constitutional:       
  
                                        General: He is not in acute distress.   
  
                                                    Appearance: Normal appearanc  
e. He is well-developed and well-groomed. He is not  
 ill-appearing.                           
  
                                        Interventions: Face mask in place.   
  
                                        HENT:                 
  
                                        Head: Normocephalic and atraumatic.   
  
                                        Nose: Nose normal.    
  
                                        Mouth/Throat:         
  
                                        Lips: Pink.           
  
                                        Mouth: Mucous membranes are moist.   
  
                                        Pharynx: Uvula midline.   
  
                                        Eyes:                 
  
                                        General: Lids are normal.   
  
                                        Extraocular Movements: Extraocular movem  
ents intact.   
  
                                        Neck:                 
  
                                        Vascular: Normal carotid pulses.   
  
                                        Trachea: Trachea normal.   
  
                                        Cardiovascular:       
  
                                        Rate and Rhythm: Normal rate and regular  
 rhythm.   
  
                                        Pulses:               
  
                                        Radial pulses are 2+ on the right side a  
nd 2+ on the left side.   
  
                                        Posterior tibial pulses are 1+ on the ri  
ght side.   
  
                                        Heart sounds: Normal heart sounds.   
  
                                        Pulmonary:            
  
                                        Effort: Pulmonary effort is normal. No a  
ccessory muscle usage or retractions.   
  
                                        Breath sounds: Normal breath sounds and   
air entry.   
  
                                        Musculoskeletal:      
  
                                        General: Normal range of motion.   
  
                                        Cervical back: Normal, normal range of m  
otion and neck supple.   
  
                                        Right lower leg: No edema.   
  
                                        Left Lower Extremity: Left leg is amputa  
rere below knee.   
  
                                        Lymphadenopathy:      
  
                                        Cervical: No cervical adenopathy.   
  
                                        Skin:                 
  
                                        General: Skin is warm.   
  
                                        Capillary Refill: Capillary refill takes  
 less than 2 seconds.   
  
                                        Neurological:         
  
                                        General: No focal deficit present.   
  
                                        Mental Status: He is alert.   
  
                                        Cranial Nerves: No dysarthria or facial   
asymmetry.   
  
                                        Motor: Motor function is intact.   
  
                                        Coordination: Coordination is intact.   
  
                                        Comments: Seated in wheelchair   
  
                                        Psychiatric:          
  
                                        Mood and Affect: Mood normal.   
  
                                        Speech: Speech normal.   
  
                                                              
  
                                        Data Reviewed: Outside labs from    
  
                                                              
  
                                        ASSESSMENT/PLAN:      
  
                                        1. Positive FIT (fecal immunochemical te  
st) - ICD9: 792.1, ICD10: R19.5   
  
                                                    - patient has already secure  
d appointment with Kettering Health Greene Memorial Gastroenterology provider,  
 2023                                 
  
                                                              
  
                                        2. Peripheral vascular disease (HCC) - I  
CD9: 443.9, ICD10: I73.9   
  
                                                    - S/P femoral bypass and ext  
ensive LLE limb salvage efforts S/P left BKA, and   
maintained on chronic anticoagulation     
  
                                                              
  
                                                    3. Controlled type 2 diabete  
s mellitus with diabetic peripheral angiopathy   
without gangrene, without long-term current use of insulin (HCC) - ICD9: 250.70,
 443.81, ICD10: E11.51                    
  
                                        - management by jade Ragsdale appt 2023   
  
                                        - continue routine Ophthalmology and Pod  
iatry follow up   
  
                                        - BP goal of <130/80   
  
                                        - LDL goal of <100    
  
                                        - CBC + DIFF          
  
                                        - BASIC METABOLIC PNL   
  
                                        - TSH BLD             
  
                                        - ALBUMIN/CREAT RATIO RND UR   
  
                                                              
  
                                        4. Encounter for immunization - ICD9: V0  
3.89, ICD10: Z23   
  
                                        - PNEUMOCOCCAL VACCINE (PREVNAR 20)   
  
                                        - TDAP VACCINE AGE 7+ IM   
  
                                                              
  
                                        5. Screening for prostate cancer - ICD9:  
 V76.44, ICD10: Z12.5   
  
                                        - Risks/benefits of prostate cancer scre  
ening discussed. screening PSA ordered   
  
                                        - PSA/PROSTSPECAG SCRN   
  
                                                              
  
                                        6. Depression screening - ICD9: V79.0, I  
CD10: Z13.31   
  
                                        - DEPRESSION SCREENING/ASSESSMENT   
  
                                                              
  
                                        Dulce Salvador PA-C   
  
                                                              
  
                                        Keep follow up appointments with special  
ists as scheduled.   
  
                                                              
  
                                        Return in about 3 months (around 3/1/202  
3) for follow up.   
  
                                                              
  
                                        Discussed the above with the patient joe  
onel shared decision making.   
  
                                        The patient is in agreement with the calista  
gnostic and treatment plans.   
  
                                                              
  
                                        Electronically signed by Dulce Salvador PA-C at 2022 9:41 AM EST   
  
                                        documented in this encounter   
   
                          2022 Instructions   Dulce Salvador PA-C -  8:57 AM ESTFormatting   
of this note might be different from the original. Trinity Health System  
  
                                                    You have blood tests ordered  
 so please do those soon (or may wait until you see  
 if Endocrinology needs additional labs). You will need to be fasting for 10 
hours prior to having these drawn.        
  
                                                              
  
                                                    Our lab is located on the fi  
rst floor of this building. No appointment needed,   
just walk in at your convenience. We will contact you with results.   
  
                                                              
  
                                        HOURS                 
  
                                        Mon-Fri 6:30AM to 4:30PM   
  
                                        Saturday 8AM to 11:30PM   
  
                                                              
  
                                         Idalou, OH 00900   
  
                                                              
  
                                                              
  
                                        Electronically signed by Dulce Salvdaor PA-C at 2022 8:57 AM EST   
  
                                                              
  
                                        documented in this encounter   
   
                          2022 Nurse Note     Formatting of this note migh  
t be different from the   
original.                               Trinity Health System  
  
                                                    Willow Bradshaw is a 65 yea  
r old male who presents to follow up for positive   
blood in stool. Denies abdominal pain, diarrhea or constipation.   
  
                                                              
  
                                        Usha Encarnacion MA   
  
                                                              
  
                                                              
  
                                        Electronically signed by Usha Encarnacion MA at 2022 8:14 AM EST   
  
                                        documented in this encounter   
   
                    2022 Note     HNO ID: 6153313822  Parkview Noble Hospital  
l  
  
                                        Author: CARSON Moore.Berkshire Medical Center Center  
  
                                        Service: ?            
  
                                        Author Type: Nurse Practitioner   
  
                                        Type: Progress Notes   
  
                                        Filed: 2022 12:26 PM   
  
                                        Note Text:            
  
                                        Transitional Care Management   
  
                                        TCM Eligibility Documentation   
  
                                        The following information was gathered d  
uring the initial Patient Outreach   
  
                                        Encounter.            
  
                                        Date of Outreach: 2022   
  
                                        Outreach Attempt 1: Contact Made   
  
                                        Date of Discharge 2022   
  
                                        Some recent data might be hidden   
  
                                        Summary               
  
                                        Discharged from: Aspirus Medford Hospital   
  
                                        Admit Date: 21, multiple admissions  
   
  
                                        Admitted for: PAD, left BKA   
  
                                        CARSON Moore.CNP   
  
                                        Provider Documentation   
  
                                        Willow Bradshaw is a 65 year old male h  
ere today for a follow up to   
  
                                        recent hospitalization. I have reviewed   
the patient's hospital course   
  
                                        including diagnostic testing performed d  
uring this hospitalization, their   
  
                                        discharge medications, and my assessment  
 and plan with the patient and any   
  
                                        family members present at today's visit.  
   
  
                                        HPI:                  
  
                                        He was admitted for altered mental statu  
s and concern for lower extremity   
  
                                        wound. He has history of bilateral iliac  
 stents and femoral stents, LLE   
  
                                        embolectomy with fasciotomy, left femora  
l-popliteal bypass with RSVG which   
  
                                        later occluded and required stents. He r  
ecently had aortogram which showed   
  
                                        poor single vessel runoff. He had a nonh  
ealing fasciotomy wound. All of   
  
                                        this led to left BKA on 8/15/22. He was   
diagnosed with infection of the   
  
                                        amputation site. WBC elevated at 13.4 bu  
t lactic acid level normal. He was   
  
                                        transported to Kettering Health Greene Memorial ER for worsening ag  
itation and slurred speech since   
  
                                        being discharge after the BKA. Patient w  
as to be admitted for IV   
  
                                        antibiotics but left AMA due to poor car  
e, per patient. He states he is   
  
                                        doing well considering the fact he lost   
his leg. He is in good spirits and   
  
                                        determined to continue doing everything   
he wants to do with his life. He   
  
                                        is getting home PT and OT and tolerating  
 this well. The plan is to get a   
  
                                        prosthetic and continue working as a hugh  
ck . He denies any pain. He   
  
                                        admits to having some phantom pain at ti  
mes. He denies any redness,   
  
                                        swelling, fever, chills, or foul-smellin  
g drainage from his wound.   
  
                                        Review of Systems     
  
                                        Constitutional: Negative for chills, fev  
er, malaise/fatigue and weight   
  
                                        loss.                 
  
                                        HENT: Negative for congestion, ear pain,  
 nosebleeds, sinus pain and sore   
  
                                        throat.               
  
                                        Respiratory: Negative for cough, sputum   
production, shortness of breath   
  
                                        and wheezing.         
  
                                        Cardiovascular: Negative for chest pain,  
 palpitations and leg swelling.   
  
                                        Gastrointestinal: Negative for abdominal  
 pain, constipation, diarrhea,   
  
                                        nausea and vomiting.   
  
                                        Musculoskeletal: Negative for back pain,  
 joint pain and myalgias.   
  
                                        Skin: Negative for rash.   
  
                                        Neurological: Negative for dizziness, we  
akness and headaches.   
  
                                        Endo/Heme/Allergies: Does not bruise/ble  
ed easily.   
  
                                        Psychiatric/Behavioral: Negative for dep  
ression. The patient is not   
  
                                        nervous/anxious and does not have insomn  
ia.   
  
                                        Vitals                
  
                                        /78   Pulse 98   Temp 97.3   Ht 5'  
 7  (1.70m)   Wt 200 lb (90.7kg)     
  
                                        SpO2 93[ra]%   BMI 31.32 kg/(m2).   
  
                                        Physical Exam         
  
                                        Vitals and nursing note reviewed.   
  
                                        Constitutional:       
  
                                        Appearance: Normal appearance.   
  
                                        HENT:                 
  
                                        Head: Normocephalic and atraumatic.   
  
                                        Nose: Nose normal.    
  
                                        Mouth/Throat:         
  
                                        Mouth: Mucous membranes are moist.   
  
                                        Eyes:                 
  
                                        Conjunctiva/sclera: Conjunctivae normal.  
   
  
                                        Cardiovascular:       
  
                                        Rate and Rhythm: Normal rate and regular  
 rhythm.   
  
                                        Heart sounds: No murmur heard.   
  
                                        Pulmonary:            
  
                                        Effort: Pulmonary effort is normal.   
  
                                        Breath sounds: Normal breath sounds. No   
wheezing.   
  
                                        Skin:                 
  
                                        General: Skin is warm and dry.   
  
                                        Comments: Surgical site, left BKA covere  
d in clean bandage with no   
  
                                        signs of drainage. Skin surrounding the   
surgical site normal in color   
  
                                        without swelling. No tenderness to palpa  
tion.   
  
                                        Neurological:         
  
                                        General: No focal deficit present.   
  
                                        Mental Status: He is alert and oriented   
to person, place, and time.   
  
                                        Psychiatric:          
  
                                        Mood and Affect: Mood normal.   
  
                                        Behavior: Behavior normal.   
  
                                        Thought Content: Thought content normal.  
   
  
                                        Judgment: Judgment normal.   
  
                                        ASSESSMENT/PLAN:      
  
                                        1. Hospital discharge follow-up - ICD9:   
V67.59, ICD10: Z09 (primary   
  
                                        diagnosis)            
  
                                        - multiple recent hospitalizations due t  
o PAD   
  
                                        - doing well          
  
                                        2. Peripheral vascular disease (HCC) - I  
CD9: 443.9, ICD10: I73.9   
  
                                        - poorly controlled   
  
                                        - Continue all lipid lowering medication  
s   
  
                                        - It is important to have good blood sug  
ar control   
  
                                        - Regular follow-ups with vascular surge  
on   
  
                                        3. S/P below knee amputation, left (HCC)  
 - ICD9: V49.75, ICD10: Z89.512   
  
                                        - doing well          
  
                                        - No signs of infection present. Advised  
 to report any signs on infection   
  
                                        such as foul odor, purulent drainage, wa  
rmth and redness, fever, chills,   
  
                                        pain.                 
  
                                        CARSON Moore.JEANIE   
  
                                        2022 11:01 AM   
   
                    2022 History of Formatting of this note is different f  
rom the original.   
Trinity Health System  
  
                    Present illness Narrative Transitional Care Management   
  
                                                              
  
                                        TCM Eligibility Documentation   
  
                                                    The following information wa  
s gathered during the initial Patient Outreach   
Encounter.                                
  
                                        Date of Outreach: 2022   
  
                                        Outreach Attempt 1: Contact Made   
  
                                        Date of Discharge 2022   
  
                                        Some recent data might be hidden   
  
                                                              
  
                                        Summary               
  
                                                              
  
                                        Discharged from: Mercy Health St. Vincent Medical Center - Riverview Health Institute   
  
                                                              
  
                                        Admit Date: 21, multiple admissions  
   
  
                                                              
  
                                        Admitted for: PAD, left BKA   
  
                                                              
  
                                        CARSON Moore.CNP   
  
                                                              
  
                                        Provider Documentation   
  
                                                    Willow Bradshaw is a 65 yea  
r old male here today for a follow up to recent   
hospitalization. I have reviewed the patient's hospital course including 
diagnostic testing performed during this hospitalizat   
  
                                                    ion, their discharge medicat  
ions, and my assessment and plan with the patient   
and any family members present at today's visit.   
  
                                                              
  
                                        HPI:                  
  
                                                    He was admitted for altered   
mental status and concern for lower extremity   
wound. He has history of bilateral iliac stents and femoral stents, LLE 
embolectomy with fasciotomy, left femoral-popliteal bypa   
  
                                                    ss with RSVG which later occ  
luded and required stents. He recently had   
aortogram which showed poor single vessel runoff. He had a nonhealing fasciotomy
 wound. All of this led to left BKA on 8/15/22. He   
  
                                                    was diagnosed with infection  
 of the amputation site. WBC elevated at 13.4 but   
lactic acid level normal. He was transported to Kettering Health Greene Memorial ER for worsening agitation
 and slurred speech since being discharge af   
  
                                                    ter the BKA. Patient was to   
be admitted for IV antibiotics but left AMA due to   
poor care, per patient. He states he is doing well considering the fact he lost 
his leg. He is in good spirits and determin   
  
                                                    ed to continue doing everyth  
ing he wants to do with his life. He is getting   
home PT and OT and tolerating this well. The plan is to get a prosthetic and 
continue working as a . He denies any   
  
                                                     pain. He admits to having s  
ome phantom pain at times. He denies any redness,   
swelling, fever, chills, or foul-smelling drainage from his wound.   
  
                                                              
  
                                                              
  
                                        Review of Systems     
  
                                        Constitutional: Negative for chills, fev  
er, malaise/fatigue and weight loss.   
  
                                                    HENT: Negative for congestio  
n, ear pain, nosebleeds, sinus pain and sore   
throat.                                   
  
                                                    Respiratory: Negative for co  
ugh, sputum production, shortness of breath and   
wheezing.                                 
  
                                        Cardiovascular: Negative for chest pain,  
 palpitations and leg swelling.   
  
                                                    Gastrointestinal: Negative f  
or abdominal pain, constipation, diarrhea, nausea   
and vomiting.                             
  
                                        Musculoskeletal: Negative for back pain,  
 joint pain and myalgias.   
  
                                        Skin: Negative for rash.   
  
                                        Neurological: Negative for dizziness, we  
akness and headaches.   
  
                                        Endo/Heme/Allergies: Does not bruise/ble  
ed easily.   
  
                                                    Psychiatric/Behavioral: Nega  
tive for depression. The patient is not   
nervous/anxious and does not have insomnia.   
  
                                                              
  
                                        Vitals                
  
                                                    /78   Pulse 98   Temp   
97.3   Ht 5' 7  (1.70m)   Wt 200 lb (90.7kg)   SpO2  
 93[ra]%   BMI 31.32 kg/(m^2).            
  
                                                              
  
                                                              
  
                                        Physical Exam         
  
                                        Vitals and nursing note reviewed.   
  
                                        Constitutional:       
  
                                        Appearance: Normal appearance.   
  
                                        HENT:                 
  
                                        Head: Normocephalic and atraumatic.   
  
                                        Nose: Nose normal.    
  
                                        Mouth/Throat:         
  
                                        Mouth: Mucous membranes are moist.   
  
                                        Eyes:                 
  
                                        Conjunctiva/sclera: Conjunctivae normal.  
   
  
                                        Cardiovascular:       
  
                                        Rate and Rhythm: Normal rate and regular  
 rhythm.   
  
                                        Heart sounds: No murmur heard.   
  
                                        Pulmonary:            
  
                                        Effort: Pulmonary effort is normal.   
  
                                        Breath sounds: Normal breath sounds. No   
wheezing.   
  
                                        Skin:                 
  
                                        General: Skin is warm and dry.   
  
                                                    Comments: Surgical site, lef  
t BKA covered in clean bandage with no signs of   
drainage. Skin surrounding the surgical site normal in color without swelling. 
No tenderness to palpation.               
  
                                        Neurological:         
  
                                        General: No focal deficit present.   
  
                                        Mental Status: He is alert and oriented   
to person, place, and time.   
  
                                        Psychiatric:          
  
                                        Mood and Affect: Mood normal.   
  
                                        Behavior: Behavior normal.   
  
                                        Thought Content: Thought content normal.  
   
  
                                        Judgment: Judgment normal.   
  
                                                              
  
                                        ASSESSMENT/PLAN:      
  
                                        1. Hospital discharge follow-up - ICD9:   
V67.59, ICD10: Z09 (primary diagnosis)   
  
                                        - multiple recent hospitalizations due t  
o PAD   
  
                                        - doing well          
  
                                                              
  
                                        2. Peripheral vascular disease (HCC) - I  
CD9: 443.9, ICD10: I73.9   
  
                                        - poorly controlled   
  
                                        - Continue all lipid lowering medication  
s   
  
                                        - It is important to have good blood sug  
ar control   
  
                                        - Regular follow-ups with vascular surge  
on   
  
                                                              
  
                                        3. S/P below knee amputation, left (HCC)  
 - ICD9: V49.75, ICD10: Z89.512   
  
                                        - doing well          
  
                                                    - No signs of infection pres  
ent. Advised to report any signs on infection such   
as foul odor, purulent drainage, warmth and redness, fever, chills, pain.   
  
                                                              
  
                                        Hector Tijerina APRN.CNP   
  
                                                              
  
                                                              
  
                                        2022 11:01 AM   
  
                                                              
  
                                                              
  
                                        Electronically signed by Hector Tijerina APRN.CNP at 2022 12:26 PM EDT   
  
                                        documented in this encounter   
   
                          2022 Miscellaneous  Formatting of this note migh  
t be different from the   
original.                               Trinity Health System  
  
                    Notes               LVM with Hoa requesting more informatio  
n or an order to be faxed.   
  
                                                              
  
                                        .me                   
  
                                                              
  
                                        Electronically signed by Denise ignacio LPN at 2022 10:24 AM EDT   
  
                                        Formatting of this note might be differe  
nt from the original.   
  
                                                    This will not be able to be   
completed today since I am not even sure how to   
order this. More information is needed so I know how to even enter an order for 
this. It is not in Epic.                  
  
                                        Electronically signed by Hector Tijerina APRN.CNP at 2022 4:19 PM EDT   
  
                                        Formatting of this note might be differe  
nt from the original.   
  
                                                    Hoa from Prisma Health Baptist Easley Hospital called st  
alona Diamond's wife was told by the home care   
physical therapist that Willow needs a stump support for his wheelchair ASAP. 
Pt's wife was requesting this be done today.   
  
                                        Ph:616.578.8648       
  
                                        Fax: 888.588.2434     
  
                                        Please advise         
  
                                        Sandra Chawla            
  
                                        Electronically signed by Sandra Chawla at 0  
2022 3:27 PM EDT   
  
                                        documented in this encounter   
   
                    2022 Note     Patient Outreach (AGGPC) Ararat General M  
edical  
  
                                                      
--------------------------------------------------------------------------------
                                        Hinckley  
  
                                        WILLOW BRADSHAW (62007537103)   
7 M   
  
                                        Date Time Provider Department   
  
                                        22 DENISE ABRAMS AGGROSITA   
  
                                        During your visit today, we recorded the  
 following information about you:   
  
                                        Denise Abrams LPN 2022 11:00 AM  
 Signed   
  
                                        TRANSITIONAL CARE MANAGEMENT (TCM) CaroMont Regional Medical Center - Mount Holly MONITORING PROGRAM - CHEKO   
  
                                        Provider Action/FYI:   
  
                                        Appt scheduled with Hector Tijerina CNP  at 10:20 am.   
  
                                        Hospital Course:      
  
                                        Mr. Bradshaw presented to the office injoe mantilla on  approximately 2 weeks   
  
                                        after left below-knee amputation. In the  
 interval he had been seen in outside   
  
                                        facility with concern for wound infectio  
n and cultures that reported multiple   
  
                                        species growing without further detail.   
He had received some antibiotics   
  
                                        however given the multiple species felt   
that potential be necessary for   
  
                                        longer-term IV antibiotics. He was admit  
rere to help expedite PICC line and   
  
                                        antibiotic arrangement as well as to rev  
iew plans with infectious disease.   
  
                                        Upon admission his anticoagulation was h  
eld and PICC line inserted with   
  
                                        anticipation of IV antibiotic needs. We   
were ultimately able to receive the   
  
                                        full culture reports and sensitivities f  
rom the outside facility upon further   
  
                                        review with infectious disease is felt t  
hat given the wound appearance and   
  
                                        culture results oral antibiotics were kramer  
fficient. His PICC line was then   
  
                                        removed and anticoagulation resumed. He   
was discharged with wound care   
  
                                        instructions and prior arranged home wou  
nd care as well as prescription for   
  
                                        antibiotic provided by mario johnson. Upon discharge he had no fevers   
  
                                        chills his pain was well controlled and   
his wound was in satisfactory   
  
                                        appearance. He will follow-up in short i  
nterval in the office to confirm no   
  
                                        ongoing concerns for escalating infectio  
n.   
  
                                        SUMMARY:              
  
                                        Pt discharged from Louis Stokes Cleveland VA Medical Center on 2022.   
  
                                        Admitted for: Wound Infection   
  
                                        Contact made with patient: Yes   
  
                                        Hi my name is Denise Abrams LPN and   
I am calling from the Bethesda North Hospital on behalf of your PCP, CARSON Street.CNP I understand you   
  
                                        were recently in the hospital so I am ca  
lling to check in with you to ensure   
  
                                        you are feeling well now that you?re keyla  
e. Do you mind if I ask you a few   
  
                                        questions related to your hospital stay   
and well-being Yes   
  
                                        Contact with patient post discharge, spo  
ke to patient and spouse.   
  
                                        Patient identified by name and .   
  
                                        Do you feel your health is BETTER, WORSE  
, or the SAME since leaving the   
  
                                        hospital?             
  
                                        Better                
  
                                        ACTION TAKEN:         
  
                                        Patient indicated symptoms are better or  
 same, no action required. Continue   
  
                                        outreach.             
  
                                        MEDICATIONS:          
  
                                        Many patients have questions or concerns  
 about their medications once they are   
  
                                        home. Do you have any questions about ta  
misti your medications or which   
  
                                        medication you should be on? Yes   
  
                                        Do you need any medication refills at th  
is time, including any of the   
  
                                        medications you might take only when nee  
ded? Yes   
  
                                        ACTION TAKEN:         
  
                                        No action required    
  
                                        For RNs or Pharmacy completing outreach   
ONLY, was a medication review   
  
                                        completed? N/A        
  
                                        SOCIAL:               
  
                                                    We would like to make sure y  
ou have what you need so that your basics needs are  
                                          
  
                                        met - including your personal safety, fo  
od, housing and medications. Would you   
  
                                        like to speak with a social work team cecilia garland to help give you support for any   
  
                                        of these needs? No    
  
                                        It can be normal to feel anxious or down  
 during a time like this. Would you   
  
                                        like to talk to a mental health professi  
onal about how you have been feeling?   
  
                                        No                    
  
                                        ACTION TAKEN:         
  
                                        No action taken       
  
                                        DISCHARGE INTRUCTIONS:   
  
                                        Your discharge instructions / After Visi  
t Summary (AVS) are important in   
  
                                        guiding you through the recovery process  
. Do you have any questions related to   
  
                                        your discharge instructions? No   
  
                                        Do you have all the necessary equipment   
and supplies at home? Yes   
  
                                        ACTION TAKEN:         
  
                                        No action required    
  
                                        Thank you for talking with me today. I brianne patton like to help you schedule a   
  
                                        hospital follow-up virtual or telephone   
visit with your PCP. This is a great   
  
                                        way for you to connect with your provide  
r to ensure you have safely   
  
                                        transitioned home. If you are agreeable,  
 I will send your request to a   
  
                                         who will contact and assist yo  
u with that appointment. This will   
  
                                        give you an opportunity to ask any quest  
ions or address any concerns you may   
  
                                        have with your PCP.   
  
                                        Inform the patient that if they have any  
 questions or concerns prior to that   
  
                                        appointment, to call their PCP's office   
right away.   
  
                                        ACTION TAKEN:         
  
                                        No action required, patient already has   
an appointment scheduled.   
  
                                        Your doctor would like us to remind you   
of the recommendations regarding the   
  
                                        coronavirus (Covid19) outbreak:   
  
                                        Avoid public places as much as possible.  
   
  
                                                    Avoid close contact (within   
6 feet) with others you don't live with, especially  
                                          
  
                                        if they are sick.     
  
                                        Stay home if you are sick.   
  
                                        Wash your hands regularly for at least 2  
0 seconds with soap and water.   
  
                                        Wear a (more content not included)...   
   
                    2022 Note     HNO ID: 6214970738  Cheko General Medica  
l  
  
                                        Author: Denise Abrams LPN Center  
  
                                        Service: ?            
  
                                        Author Type: LICENSED NURSE   
  
                                        Type: Progress Notes   
  
                                        Filed: 2022 11:00 AM   
  
                                        Note Text:            
  
                                        TRANSITIONAL CARE MANAGEMENT (TCM) LUIS  
ROS MONITORING PROGRAM - CHEKO   
  
                                        Provider Action/FYI:   
  
                                        Appt scheduled with Hector Tijerina CNP  at 10:20 am.   
  
                                        Hospital Course:      
  
                                        Mr. Bradshaw presented to the office in  
annalee on  approximately 2   
  
                                        weeks after left below-knee amputation.   
In the interval he had been seen   
  
                                        in outside facility with concern for wou  
nd infection and cultures that   
  
                                        reported multiple species growing withou  
t further detail. He had received   
  
                                        some antibiotics however given the multi  
ple species felt that potential be   
  
                                        necessary for longer-term IV antibiotics  
. He was admitted to help   
  
                                        expedite PICC line and antibiotic arrang  
ement as well as to review plans   
  
                                        with infectious disease. Upon admission   
his anticoagulation was held and   
  
                                        PICC line inserted with anticipation of   
IV antibiotic needs. We were   
  
                                        ultimately able to receive the full cult  
ure reports and sensitivities from   
  
                                        the outside facility upon further review  
 with infectious disease is felt   
  
                                        that given the wound appearance and cult  
ure results oral antibiotics were   
  
                                        sufficient. His PICC line was then remov  
ed and anticoagulation resumed.   
  
                                        He was discharged with wound care instru  
ctions and prior arranged home   
  
                                        wound care as well as prescription for a  
ntibiotic provided by infectious   
  
                                        disease. Upon discharge he had no fevers  
 chills his pain was well   
  
                                        controlled and his wound was in satisfac  
tory appearance. He will   
  
                                        follow-up in short interval in the offic  
e to confirm no ongoing concerns   
  
                                        for escalating infection.   
  
                                        SUMMARY:              
  
                                        Pt discharged from Louis Stokes Cleveland VA Medical Center on 2022.   
  
                                        Admitted for: Wound Infection   
  
                                        Contact made with patient: Yes   
  
                                        Hi my name is Denise Abrams LPN and   
I am calling from the Trinity Health System Ararat General on behalf of your P  
CP, CARSON Moore.CNP I   
  
                                        understand you were recently in the hosp  
ital so I am calling to check in   
  
                                        with you to ensure you are feeling well   
now that you?re home. Do you mind   
  
                                        if I ask you a few questions related to   
your hospital stay and well-being   
  
                                        Yes                   
  
                                        Contact with patient post discharge, spo  
ke to patient and spouse.   
  
                                        Patient identified by name and .   
  
                                        Do you feel your health is BETTER, WORSE  
, or the SAME since leaving the   
  
                                        hospital?             
  
                                        Better                
  
                                        ACTION TAKEN:         
  
                                        Patient indicated symptoms are better or  
 same, no action required.   
  
                                        Continue outreach.    
  
                                        MEDICATIONS:          
  
                                        Many patients have questions or concerns  
 about their medications once they   
  
                                        are home. Do you have any questions abou  
t taking your medications or which   
  
                                        medication you should be on? Yes   
  
                                        Do you need any medication refills at th  
is time, including any of the   
  
                                        medications you might take only when nee  
ded? Yes   
  
                                        ACTION TAKEN:         
  
                                        No action required    
  
                                        For RNs or Pharmacy completing outreach   
ONLY, was a medication review   
  
                                        completed? N/A        
  
                                        SOCIAL:               
  
                                        We would like to make sure you have what  
 you need so that your basics   
  
                                        needs are met - including your personal   
safety, food, housing and   
  
                                        medications. Would you like to speak wit  
h a social work team member to   
  
                                        help give you support for any of these n  
eeds? No   
  
                                        It can be normal to feel anxious or down  
 during a time like this. Would   
  
                                        you like to talk to a mental health prof  
essional about how you have been   
  
                                        feeling? No           
  
                                        ACTION TAKEN:         
  
                                        No action taken       
  
                                        DISCHARGE INTRUCTIONS:   
  
                                        Your discharge instructions / After Visi  
t Summary (AVS) are important in   
  
                                        guiding you through the recovery process  
. Do you have any questions   
  
                                        related to your discharge instructions?   
No   
  
                                        Do you have all the necessary equipment   
and supplies at home? Yes   
  
                                        ACTION TAKEN:         
  
                                        No action required    
  
                                        Thank you for talking with me today. JOE patton like to help you schedule a   
  
                                        hospital follow-up virtual or telephone   
visit with your PCP. This is a   
  
                                        great way for you to connect with your p  
rovider to ensure you have safely   
  
                                        transitioned home. If you are agreeable,  
 I will send your request to a   
  
                                         who will contact and assist yo  
u with that appointment. This   
  
                                        will give you an opportunity to ask any   
questions or address any concerns   
  
                                        you may have with your PCP.   
  
                                        Inform the patient that if they have any  
 questions or concerns prior to   
  
                                        that appointment, to call their PCP's of  
ree right away.   
  
                                        ACTION TAKEN:         
  
                                        No action required, patient already has   
an appointment scheduled.   
  
                                        Your doctor would like us to remind you   
of the recommendations regarding   
  
                                        the coronavirus (Covid19) outbreak:   
  
                                        Avoid public places as much as possible.  
   
  
                                        Avoid close contact (within 6 feet) with  
 others you don't live with,   
  
                                        especially if they are sick.   
  
                                        Stay home if you are sick.   
  
                                        Wash your hands regularly for at least 2  
0 seconds with soap and water.   
  
                                        Wear a cloth mask in public places to he  
lp reduce community spread.   
  
                                        Do not go to your Doctor's office unless  
 instructed to do so. For any   
  
                                                    non-emergency symptoms, call  
 your Doctor's offic (more content not included)...  
                                          
   
                    2022 History of Formatting of this note is different f  
rom the original.   
Trinity Health System  
  
                          Present illness Narrative TRANSITIONAL CARE MANAGEMENT  
 (TCM) COMMUNITY   
MONITORING PROGRAM - CHEKO                
  
                                                              
  
                                        Provider Action/FYI:   
  
                                        Appt scheduled with Hector Tijerina CNP  at 10:20 am.   
  
                                                              
  
                                        Hospital Course:      
  
                                                    Mr. Bradshaw presented to University of Vermont Health Network office initially on  approximately 2 weeks   
after left below-knee amputation. In the interval he had been seen in outside 
facility with concern for wound infection and c   
  
                                                    ultures that reported multip  
le species growing without further detail. He had   
received some antibiotics however given the multiple species felt that potential
 be necessary for longer-term IV antibiotics   
  
                                                    . He was admitted to help ex  
pedite PICC line and antibiotic arrangement as well  
 as to review plans with infectious disease. Upon admission his anticoagulation 
was held and PICC line inserted with antici   
  
                                                    pation of IV antibiotic need  
s. We were ultimately able to receive the full   
culture reports and sensitivities from the outside facility upon further review 
with infectious disease is felt that given the   
  
                                                    wound appearance and culture  
 results oral antibiotics were sufficient. His PICC  
 line was then removed and anticoagulation resumed. He was discharged with wound
 care instructions and prior arranged home   
  
                                                    wound care as well as prescr  
iption for antibiotic provided by infectious   
disease. Upon discharge he had no fevers chills his pain was well controlled and
 his wound was in satisfactory appearance. He deo graves follow-up in short interva  
l in the office to confirm no ongoing concerns for   
escalating infection.                     
  
                                                              
  
                                                              
  
                                        SUMMARY:              
  
                                        Pt discharged from Louis Stokes Cleveland VA Medical Center on 2022.   
  
                                        Admitted for: Wound Infection   
  
                                                              
  
                                        Contact made with patient: Yes   
  
                                                              
  
                                                    Hi my name is Denise Iker grayson LPN and I am calling from the Trinity Health System   
Ararat General on behalf of your PCP, CARSON Moore.CNP I understand you 
were recently in the hospital so I am calling   
  
                                                     to check in with you to ens  
ure you are feeling well now that you re home. Do   
you mind if I ask you a few questions related to your hospital stay and well-
being Yes                                 
  
                                                              
  
                                        Contact with patient post discharge, spo  
ke to patient and spouse.   
  
                                        Patient identified by name and .   
  
                                                              
  
                                                    Do you feel your health is B  
ANIL, WORSE, or the SAME since leaving the   
hospital?                                 
  
                                        Better                
  
                                                              
  
                                        ACTION TAKEN:         
  
                                                    Patient indicated symptoms a  
re better or same, no action required. Continue   
outreach.                                 
  
                                                              
  
                                        MEDICATIONS:          
  
                                                    Many patients have questions  
 or concerns about their medications once they are   
home. Do you have any questions about taking your medications or which 
medication you should be on? Yes          
  
                                                    Do you need any medication r  
efills at this time, including any of the   
medications you might take only when needed? Yes   
  
                                                              
  
                                        ACTION TAKEN:         
  
                                        No action required    
  
                                                              
  
                                                    For RNs or Pharmacy completi  
ng outreach ONLY, was a medication review   
completed? N/A                            
  
                                                              
  
                                        SOCIAL:               
  
                                                    We would like to make sure y  
ou have what you need so that your basics needs are  
 met - including your personal safety, food, housing and medications. Would you 
like to speak with a social work team morena eugene to help give you support for any of th  
kit needs? No   
  
                                                              
  
                                                    It can be normal to feel anx  
ious or down during a time like this. Would you   
like to talk to a mental health professional about how you have been feeling? No
                                          
  
                                                              
  
                                        ACTION TAKEN:         
  
                                        No action taken       
  
                                                              
  
                                        DISCHARGE INTRUCTIONS:   
  
                                                    Your discharge instructions   
/ After Visit Summary (AVS) are important in   
guiding you through the recovery process. Do you have any questions related to 
your discharge instructions? No           
  
                                                              
  
                                        Do you have all the necessary equipment   
and supplies at home? Yes   
  
                                                              
  
                                        ACTION TAKEN:         
  
                                        No action required    
  
                                                              
  
                                                    Thank you for talking with m  
e today. I would like to help you schedule a   
hospital follow-up virtual or telephone visit with your PCP. This is a great way
 for you to connect with your provider to ensure   
  
                                                    you have safely transitioned  
 home. If you are agreeable, I will send your   
request to a  who will contact and assist you with that appointment. 
This will give you an opportunity to ask any quest   
  
                                        ions or address any concerns you may hav  
e with your PCP.   
  
                                                    Inform the patient that if t  
hey have any questions or concerns prior to that   
appointment, to call their PCP's office right away.   
  
                                                              
  
                                        ACTION TAKEN:         
  
                                        No action required, patient already has   
an appointment scheduled.   
  
                                                              
  
                                                    Your doctor would like us to  
 remind you of the recommendations regarding the   
coronavirus (Covid19) outbreak:           
  
                                        Avoid public places as much as possible.  
   
  
                                                    Avoid close contact (within   
6 feet) with others you don't live with, especially  
 if they are sick.                        
  
                                        Stay home if you are sick.   
  
                                        Wash your hands regularly for at least 2  
0 seconds with soap and water.   
  
                                        Wear a cloth mask in public places to he  
lp reduce community spread.   
  
                                                              
  
                                                    Do not go to your Doctor's o  
ffice unless instructed to do so. For any non-  
emergency symptoms, call your Doctor's office to get instructions on how to 
manage (we might recommend a telephone or virtual vi   
  
                                                    sit). For emergency symptoms  
, proceed to Emergency Department as usual but   
inform them of cough and fever symptoms ASAP if present (or call on the way if 
possible).                                
  
                                                              
  
                                        Electronically signed by Denise ignacio LPN at 2022 11:00 AM EDT   
  
                                        documented in this encounter   
   
                    2022 Note     Patient Outreach (Kentfield Hospital) Ararat General M  
edical  
  
                                                      
--------------------------------------------------------------------------------
                                        Hinckley  
  
                                        WILLOW BRADSHAW (56733842) 1957 M  
   
  
                                        Date Time Provider Department   
  
                                        22 HECTOR TIJERINA Kentfield Hospital   
  
                                        During your visit today, we recorded the  
 following information about you:   
  
                                        CARSON Moore.CNP 2022 11:26 A  
M Signed   
  
                                        Addended by: HECTOR TIJERINA on: 2022  
 11:26 AM   
  
                                        Modules accepted: Orders   
  
                                        Allergies As of Date: 2022 Noted A  
llergy Reaction   
  
                                        MENTHOL 2019 2 - Rash   
  
                                        Comments: Topical like vicks vapor   
  
                                        MINT CHOCOLATE CHIP FLAVOR 2017 14  
 - Other: See Comments   
  
                                        Comments: Mouth burning with mint flavor  
   
  
                                        Just MINT no chocolate chip   
  
                                        Date Reviewed: 03/10/2022   
  
                                        Reviewed by: CARSON Moore.CNP - F  
ully Assessed   
  
                                        Visit Diagnoses:Controlled type 2 diabet  
es mellitus, without long-term current   
  
                                        use of insulin (HCC) [E11.9]   
  
                                        Hyperlipidemia [E78.5]   
  
                                        Medication management [Z79.899]   
  
                                        Prescriptions as of 2022   
  
                                        - atorvastatin (LIPITOR) 80 mg tablet   
  
                                        Take 80 mg by mouth once daily.   
  
                                        - empagliflozin (JARDIANCE) 25 mg tablet  
   
  
                                        Take 25 mg by mouth daily with breakfast  
.   
  
                                        - dabigatran etexilate (PRADAXA) 150 mg   
  
                                        Take 150 mg by mouth twice daily.   
  
                                        - prasugrel (EFFIENT) 10 mg tab   
  
                                        Take 10 mg by mouth once daily.   
  
                                        - VITAMIN E ORAL      
  
                                        Take by mouth once daily.   
  
                                        - buPROPion HCL, smoking deter, 150 mg t  
ab ER 12 hr   
  
                                        Take 1 tablet by mouth once daily.   
  
                                        - oxyCODONE IR (ROXICODONE) 5 mg immedia  
te release tablet   
  
                                        Take 5 mg by mouth every 6 hours as need  
ed.   
  
                                        - ONETOUCH VERIO TEST STRIPS test strip   
  
                                        - ONETOUCH VERIO REFLECT METER   
  
                                        - ONETOUCH DELICA PLUS LANCET 33 gauge   
  
                                        - nitroglycerin sublingual (NITROQUICK)   
0.4 mg SL tablet   
  
                                        Dissolve 0.4 mg under the tongue every 5  
 minutes as needed.   
  
                                        - pantoprazole DR (PROTONIX) 40 mg table  
t   
  
                                        Take 40 mg by mouth once daily.   
  
                                        - JARDIANCE 10 mg tablet   
  
                                        - VASCEPA capsule     
  
                                        Take 2 g by mouth twice daily with meals  
.   
  
                                        - alirocumab (PRALUENT PEN) 75 mg/mL pen  
   
  
                                        Inject 75 mg subcutaneously every other   
week.   
  
                                        - ubidecarenone Q-10 (COENZYME Q-10) 10   
mg cap   
  
                                        Take by mouth once daily.   
  
                                        - Magnesium 250 mg tab   
  
                                        Take 250 mg by mouth.   
  
                                        - sildenafil (VIAGRA) 100 mg tablet   
  
                                        Take 100 mg by mouth as needed.   
  
                                        - metFORMIN ER (GLUCOPHAGE XR) 500 mg 24  
 hr tablet   
  
                                        Take 1000mg by mouth with breakfast and   
500mg by mouth with dinner   
  
                                        - Fenofibrate (LOFIBRA) 160 mg tablet   
  
                                        Take 160 mg by mouth once daily.   
  
                                        - lisinopril 2.5 mg tablet   
  
                                        Take 2.5 mg by mouth once daily.   
  
                                        - metoprolol succinate ER (TOPROL XL) 25  
 mg 24 hr tablet   
  
                                        Take 25 mg by mouth twice daily.   
  
                                        - cilostazol (PLETAL) 100 mg tablet   
  
                                        Take 100 mg by mouth once daily.   
  
                                        Problem List As Of Date 2022 Noted  
 Resolved   
  
                                        Adenocarcinoma of left lung (HCC) [C34.9  
2] 2019   
  
                                        Peripheral vascular disease (HCC) [I73.9  
] 2019   
  
                                        Centrilobular emphysema (HCC) [J43.2]    
  
                                        Controlled type 2 diabetes mellitus with  
 diabet*2019   
  
                                        Primary malignant neoplasm of bronchus o  
f left *2019   
  
                                        Essential hypertension [I10] 2022   
  
                                        Hyperlipidemia [E78.5] 2022   
  
                                        Personal history of DVT (deep vein throm  
bosis) *2022   
  
                                        Lung cancer (HCC) [C34.90] 2022   
  
                                        COPD (chronic obstructive pulmonary dise  
ase) (H*2022   
  
                                        Controlled type 2 diabetes mellitus, wit  
hout lo*2022   
  
                                        Encounter Number: 037663024   
  
                                        Encounter Status:Closed by EPIC FeZo  
R on 2022 Note     HNO ID: 0164561145  Wayne Hospital Medica  
l  
  
                                        Author: Elen Soriano MA Center  
  
                                        Service: ?            
  
                                        Author Type: Medical Assistant   
  
                                        Type: Progress Notes   
  
                                        Filed: 2022 2:55 PM   
  
                                        Note Text:            
  
                                        TRANSITIONAL CARE MANAGEMENT (TCM) CaroMont Regional Medical Center - Mount Holly MONITORING PROGRAM - Longport   
  
                                        Provider Action/FYI:   
  
                                        City Hospital SEPSIS WITHOUT ACUTE ORGAN D  
YSFUNCTION   
  
                                        22 LEFT AMA      
  
                                        SUMMARY:              
  
                                        Pt discharged from Mercy Memorial Hospital on .   
  
                                        Admitted for: Acute Sepsis without acute  
 organ dysfunction   
  
                                        Contact made with patient: Yes   
  
                                        Hi my name is Elen Soriano CMA and I  
 am calling from the Bethesda North Hospital on behalf of your P  
CP, CARSON Moore.CNP I   
  
                                        understand you were recently in the hosp  
ital so I am calling to check in   
  
                                        with you to ensure you are feeling well   
now that you?re home. Do you mind   
  
                                        if I ask you a few questions related to   
your hospital stay and well-being   
  
                                        Yes                   
  
                                        Contact with patient post discharge, spo  
ke to spouse.   
  
                                        Patient identified by name and .   
  
                                        Do you feel your health is BETTER, WORSE  
, or the SAME since leaving the   
  
                                        hospital?             
  
                                        Worse - inform patient that you recommen  
d further assessment with a   
  
                                        provider in order to review their sympto  
m(s) and that they can expect a   
  
                                        call from a Trinity Health System Cheko grady provider. End outreach.   
  
                                        ACTION TAKEN:         
  
                                        Patient indicated symptoms are better or  
 same, no action required.   
  
                                        Continue outreach.    
  
                                        MEDICATIONS:          
  
                                        Many patients have questions or concerns  
 about their medications once they   
  
                                        are home. Do you have any questions abou  
t taking your medications or which   
  
                                        medication you should be on? No   
  
                                        Do you need any medication refills at th  
is time, including any of the   
  
                                        medications you might take only when nee  
ded? No   
  
                                        ACTION TAKEN:         
  
                                        No action required    
  
                                        For RNs or Pharmacy completing outreach   
ONLY, was a medication review   
  
                                        completed? N/A        
  
                                        SOCIAL:               
  
                                        We would like to make sure you have what  
 you need so that your basics   
  
                                        needs are met - including your personal   
safety, food, housing and   
  
                                        medications. Would you like to speak wit  
h a social work team member to   
  
                                        help give you support for any of these n  
eeds? No   
  
                                        It can be normal to feel anxious or down  
 during a time like this. Would   
  
                                        you like to talk to a mental health prof  
essional about how you have been   
  
                                        feeling? No           
  
                                        ACTION TAKEN:         
  
                                        No action taken       
  
                                        DISCHARGE INTRUCTIONS:   
  
                                        Your discharge instructions / After Visi  
t Summary (AVS) are important in   
  
                                        guiding you through the recovery process  
. Do you have any questions   
  
                                        related to your discharge instructions?   
No   
  
                                        Do you have all the necessary equipment   
and supplies at home? Yes   
  
                                        ACTION TAKEN:         
  
                                        No action required    
  
                                        Thank you for talking with me today. I brianne patton like to help you schedule a   
  
                                        hospital follow-up virtual or telephone   
visit with your PCP. This is a   
  
                                        great way for you to connect with your p  
dinah to ensure you have safely   
  
                                        transitioned home. If you are agreeable,  
 I will send your request to a   
  
                                         who will contact and assist yo  
u with that appointment. This   
  
                                        will give you an opportunity to ask any   
questions or address any concerns   
  
                                        you may have with your PCP.   
  
                                        Inform the patient that if they have any  
 questions or concerns prior to   
  
                                        that appointment, to call their PCP's of  
ree right away.   
  
                                        ACTION TAKEN:         
  
                                        No action required, patient already has   
an appointment scheduled.   
  
                                        Your doctor would like us to remind you   
of the recommendations regarding   
  
                                        the coronavirus (Covid19) outbreak:   
  
                                        Avoid public places as much as possible.  
   
  
                                        Avoid close contact (within 6 feet) with  
 others you don't live with,   
  
                                        especially if they are sick.   
  
                                        Stay home if you are sick.   
  
                                        Wash your hands regularly for at least 2  
0 seconds with soap and water.   
  
                                        Wear a cloth mask in public places to he  
lp reduce community spread.   
  
                                        Do not go to your Doctor's office unless  
 instructed to do so. For any   
  
                                        non-emergency symptoms, call your Doctor  
's office to get instructions on   
  
                                        how to manage (we might recommend a tele  
phone or virtual visit). For   
  
                                        emergency symptoms, proceed to Emergency  
 Department as usual but inform   
  
                                        them of cough and fever symptoms ASAP if  
 present (or call on the way if   
  
                                        possible).            
   
                    2022 History of Formatting of this note is different f  
rom the original.   
Trinity Health System  
  
                          Present illness Narrative TRANSITIONAL CARE MANAGEMENT  
 (TCM) COMMUNITY   
MONITORING PROGRAM - Longport                
  
                                                              
  
                                        Provider Action/FYI:   
  
                                        City Hospital SEPSIS WITHOUT ACUTE ORGAN D  
YSFUNCTION   
  
                                        22 LEFT AMA      
  
                                                              
  
                                        SUMMARY:              
  
                                        Pt discharged from Mercy Memorial Hospital on .   
  
                                        Admitted for: Acute Sepsis without acute  
 organ dysfunction   
  
                                                              
  
                                        Contact made with patient: Yes   
  
                                                              
  
                                                    Hi my name is Elen eugene CMA and I am calling from the Bethesda North Hospital on behalf of your PCP, CARSON Moore.CNP I understand you 
were recently in the hospital so I am calling   
  
                                                    to check in with you to ensu  
re you are feeling well now that you re home. Do   
you mind if I ask you a few questions related to your hospital stay and well-
being Yes                                 
  
                                                              
  
                                        Contact with patient post discharge, spo  
ke to spouse.   
  
                                        Patient identified by name and .   
  
                                                              
  
                                                    Do you feel your health is B  
ANIL, WORSE, or the SAME since leaving the   
hospital?                                 
  
                                                    Worse - inform patient that   
you recommend further assessment with a provider in  
 order to review their symptom(s) and that they can expect a call from a 
Bethesda North Hospital provider. End outreach.   
  
                                                              
  
                                        ACTION TAKEN:         
  
                                                    Patient indicated symptoms a  
re better or same, no action required. Continue   
outreach.                                 
  
                                                              
  
                                        MEDICATIONS:          
  
                                                    Many patients have questions  
 or concerns about their medications once they are   
home. Do you have any questions about taking your medications or which 
medication you should be on? No           
  
                                                    Do you need any medication r  
efills at this time, including any of the   
medications you might take only when needed? No   
  
                                                              
  
                                        ACTION TAKEN:         
  
                                        No action required    
  
                                                              
  
                                                    For RNs or Pharmacy completi  
ng outreach ONLY, was a medication review   
completed? N/A                            
  
                                                              
  
                                        SOCIAL:               
  
                                                    We would like to make sure y  
ou have what you need so that your basics needs are  
 met - including your personal safety, food, housing and medications. Would you 
like to speak with a social work team morena eugene to help give you support for any of th  
kit needs? No   
  
                                                              
  
                                                    It can be normal to feel anx  
ious or down during a time like this. Would you   
like to talk to a mental health professional about how you have been feeling? No
                                          
  
                                                              
  
                                        ACTION TAKEN:         
  
                                        No action taken       
  
                                                              
  
                                        DISCHARGE INTRUCTIONS:   
  
                                                    Your discharge instructions   
/ After Visit Summary (AVS) are important in   
guiding you through the recovery process. Do you have any questions related to 
your discharge instructions? No           
  
                                                              
  
                                        Do you have all the necessary equipment   
and supplies at home? Yes   
  
                                                              
  
                                        ACTION TAKEN:         
  
                                        No action required    
  
                                                              
  
                                                    Thank you for talking with m  
elizabeth today. I would like to help you schedule a   
hospital follow-up virtual or telephone visit with your PCP. This is a great way
 for you to connect with your provider to ensure   
  
                                                    you have safely transitioned  
 home. If you are agreeable, I will send your   
request to a  who will contact and assist you with that appointment. 
This will give you an opportunity to ask any quest   
  
                                        ions or address any concerns you may hav  
e with your PCP.   
  
                                                    Inform the patient that if t  
hey have any questions or concerns prior to that   
appointment, to call their PCP's office right away.   
  
                                                              
  
                                        ACTION TAKEN:         
  
                                        No action required, patient already has   
an appointment scheduled.   
  
                                                              
  
                                                    Your doctor would like us to  
 remind you of the recommendations regarding the   
coronavirus (Covid19) outbreak:           
  
                                        Avoid public places as much as possible.  
   
  
                                                    Avoid close contact (within   
6 feet) with others you don't live with, especially  
 if they are sick.                        
  
                                        Stay home if you are sick.   
  
                                        Wash your hands regularly for at least 2  
0 seconds with soap and water.   
  
                                        Wear a cloth mask in public places to he  
lp reduce community spread.   
  
                                                              
  
                                                    Do not go to your Doctor's o  
ffice unless instructed to do so. For any non-  
emergency symptoms, call your Doctor's office to get instructions on how to 
manage (we might recommend a telephone or virtual vi   
  
                                                    sit). For emergency symptoms  
, proceed to Emergency Department as usual but   
inform them of cough and fever symptoms ASAP if present (or call on the way if 
possible).                                
  
                                                              
  
                                        Electronically signed by Elen Soriano MA at 2022 2:55 PM EDT   
  
                                        documented in this encounter   
   
                    2022 Note     Patient Outreach (AGGPC) Ararat General M  
edical  
  
                                                      
--------------------------------------------------------------------------------
                                        Hinckley  
  
                                        AUGUSTINEWILLOW (28224179963)   
7 M   
  
                                        Date Time Provider Department   
  
                                        22 HECTOR TIJERINA AGGPC   
  
                                        During your visit today, we recorded the  
 following information about you:   
  
                                        Elen Soriano MA 2022 2:55 PM Si  
serena   
  
                                        TRANSITIONAL CARE MANAGEMENT (TCM) CaroMont Regional Medical Center - Mount Holly MONITORING PROGRAM - Longport   
  
                                        Provider Action/FYI:   
  
                                        City Hospital SEPSIS WITHOUT ACUTE ORGAN D  
YSFUNCTION   
  
                                        22 LEFT AMA      
  
                                        SUMMARY:              
  
                                        Pt discharged from Mercy Memorial Hospital on .   
  
                                        Admitted for: Acute Sepsis without acute  
 organ dysfunction   
  
                                        Contact made with patient: Yes   
  
                                        Hi my name is Elen Soriano CMA and I  
 am calling from the Bethesda North Hospital on behalf of your PCP, CARSON Street.CNP I understand you   
  
                                        were recently in the hospital so I am ca  
lling to check in with you to ensure   
  
                                        you are feeling well now that you?re keyla  
e. Do you mind if I ask you a few   
  
                                        questions related to your hospital stay   
and well-being Yes   
  
                                        Contact with patient post discharge, spo  
ke to spouse.   
  
                                        Patient identified by name and .   
  
                                        Do you feel your health is BETTER, WORSE  
, or the SAME since leaving the   
  
                                        hospital?             
  
                                                    Worse - inform patient that   
you recommend further assessment with a provider in  
                                          
  
                                        order to review their symptom(s) and autumn  
t they can expect a call from a   
  
                                        Bethesda North Hospital provider.  
 End outreach.   
  
                                        ACTION TAKEN:         
  
                                        Patient indicated symptoms are better or  
 same, no action required. Continue   
  
                                        outreach.             
  
                                        MEDICATIONS:          
  
                                        Many patients have questions or concerns  
 about their medications once they are   
  
                                        home. Do you have any questions about ta  
misti your medications or which   
  
                                        medication you should be on? No   
  
                                        Do you need any medication refills at th  
is time, including any of the   
  
                                        medications you might take only when nee  
ded? No   
  
                                        ACTION TAKEN:         
  
                                        No action required    
  
                                        For RNs or Pharmacy completing outreach   
ONLY, was a medication review   
  
                                        completed? N/A        
  
                                        SOCIAL:               
  
                                                    We would like to make sure y  
ou have what you need so that your basics needs are  
                                          
  
                                        met - including your personal safety, fo  
od, housing and medications. Would you   
  
                                        like to speak with a social work team cecilia garland to help give you support for any   
  
                                        of these needs? No    
  
                                        It can be normal to feel anxious or down  
 during a time like this. Would you   
  
                                        like to talk to a mental health professi  
onal about how you have been feeling?   
  
                                        No                    
  
                                        ACTION TAKEN:         
  
                                        No action taken       
  
                                        DISCHARGE INTRUCTIONS:   
  
                                        Your discharge instructions / After Visi  
t Summary (AVS) are important in   
  
                                        guiding you through the recovery process  
. Do you have any questions related to   
  
                                        your discharge instructions? No   
  
                                        Do you have all the necessary equipment   
and supplies at home? Yes   
  
                                        ACTION TAKEN:         
  
                                        No action required    
  
                                        Thank you for talking with me today. I brianne perezld like to help you schedule a   
  
                                        hospital follow-up virtual or telephone   
visit with your PCP. This is a great   
  
                                        way for you to connect with your provide  
r to ensure you have safely   
  
                                        transitioned home. If you are agreeable,  
 I will send your request to a   
  
                                         who will contact and assist yo  
u with that appointment. This will   
  
                                        give you an opportunity to ask any quest  
ions or address any concerns you may   
  
                                        have with your PCP.   
  
                                        Inform the patient that if they have any  
 questions or concerns prior to that   
  
                                        appointment, to call their PCP's office   
right away.   
  
                                        ACTION TAKEN:         
  
                                        No action required, patient already has   
an appointment scheduled.   
  
                                        Your doctor would like us to remind you   
of the recommendations regarding the   
  
                                        coronavirus (Covid19) outbreak:   
  
                                        Avoid public places as much as possible.  
   
  
                                                    Avoid close contact (within   
6 feet) with others you don't live with, especially  
                                          
  
                                        if they are sick.     
  
                                        Stay home if you are sick.   
  
                                        Wash your hands regularly for at least 2  
0 seconds with soap and water.   
  
                                        Wear a cloth mask in public places to he  
lp reduce community spread.   
  
                                        Do not go to your Doctor's office unless  
 instructed to do so. For any   
  
                                                    non-emergency symptoms, call  
 your Doctor's office to get instructions on how to  
                                          
  
                                        manage (we might recommend a telephone o  
r virtual visit). For emergency   
  
                                                    symptoms, proceed to Emergen  
cy Department as usual but inform them of cough and  
                                          
  
                                        fever symptoms ASAP if present (or call   
on the way if possible).   
  
                                        Allergies As of Date: 2022 Noted A  
llergy Reaction   
  
                                        MENTHOL 2019 2 - Rash   
  
                                        Comments: Topical like vicks vapor   
  
                                        MINT CHOCOLATE CHIP FLAVOR 2017 14  
 - Other: See Comments   
  
                                        Comments: Mouth burning with mint flavor  
   
  
                                        Just MINT no chocolate chip   
  
                                        Date Reviewed: 03/10/2022   
  
                                        Reviewed by: CARSON Moore.CNP - F  
soco Assessed   
  
                                        Reason for Visit:     
  
                                        Transition Of Care [4074]   
  
                                        Prescriptions as of 2022   
  
                                        - buPROPion HCL, smoking deter, 150 mg t  
ab ER 12 hr   
  
                                        Take 1 tablet by mouth once daily.   
  
                                        - enoxaparin (LOVENOX) 100 mg/mL syrg   
  
                                        INJECT 1 ML INTO THE SKIN 2 TIMES DAILY   
  
                                        - oxyCODONE IR (ROXICODONE) 5 mg immedia  
te release tablet   
  
                                        Take 5 mg by mouth every 6 hours as need  
ed.   
  
                                        - ONETOUCH (more content not included)..  
.   
   
                    08- Note     Patient Outreach (AGGPC) Ararat General M  
edical  
  
                                                      
--------------------------------------------------------------------------------
                                        Hinckley  
  
                                        WILLOW BRADSHAW (34226553351)   
7 M   
  
                                        Date Time Provider Department   
  
                                        8/15/22 DENISE ABRAMS   
  
                                        During your visit today, we recorded the  
 following information about you:   
  
                                        Denise Abrams LPN 8/15/2022 9:31 AM   
Signed   
  
                                        TRANSITIONAL CARE MANAGEMENT (TCM) CaroMont Regional Medical Center - Mount Holly MONITORING PROGRAM - CHEKO   
  
                                        Provider Action/FYI:   
  
                                        1st attempt - LVM for patient to return   
call.   
  
                                        SUMMARY:              
  
                                        Pt discharged from Fort Defiance Indian Hospital on .   
  
                                        Admitted for: PAD     
  
                                        Contact made with patient: No - next out  
reach attempt will be on next business   
  
                                        day                   
  
                                        Outreach ended        
  
                                        Allergies As of Date: 08/15/2022 Noted A  
llergy Reaction   
  
                                        MENTHOL 2019 2 - Rash   
  
                                        Comments: Topical like vicks vapor   
  
                                        MINT CHOCOLATE CHIP FLAVOR 2017 14  
 - Other: See Comments   
  
                                        Comments: Mouth burning with mint flavor  
   
  
                                        Just MINT no chocolate chip   
  
                                        Date Reviewed: 03/10/2022   
  
                                        Reviewed by: CARSON Moore.CNP - COOKIE wan Assessed   
  
                                        Reason for Visit:     
  
                                        Population Health Navigation Outreach [3  
910]   
  
                                        Prescriptions as of 08/15/2022   
  
                                        - buPROPion HCL, smoking deter, 150 mg t  
ab ER 12 hr   
  
                                        Take 1 tablet by mouth once daily.   
  
                                        - enoxaparin (LOVENOX) 100 mg/mL syrg   
  
                                        INJECT 1 ML INTO THE SKIN 2 TIMES DAILY   
  
                                        - oxyCODONE IR (ROXICODONE) 5 mg immedia  
te release tablet   
  
                                        Take 5 mg by mouth every 6 hours as need  
ed.   
  
                                        - ONETOUCH VERIO TEST STRIPS test strip   
  
                                        - ONETOUCH VERIO REFLECT METER   
  
                                        - ONETOUCH DELICA PLUS LANCET 33 gauge   
  
                                        - nitroglycerin sublingual (NITROQUICK)   
0.4 mg SL tablet   
  
                                        Dissolve 0.4 mg under the tongue.   
  
                                        - pantoprazole DR (PROTONIX) 40 mg table  
t   
  
                                        - JARDIANCE 10 mg tablet   
  
                                        - VASCEPA capsule     
  
                                        TAKE 2 CAPSULES BY MOUTH 2 TIMES DAILY   
  
                                        - alirocumab (PRALUENT PEN) 75 mg/mL pen  
   
  
                                        Inject 75 mg subcutaneously.   
  
                                        - ubidecarenone Q-10 (COENZYME Q-10) 10   
mg cap   
  
                                        Take by mouth.        
  
                                        - Magnesium 250 mg tab   
  
                                        Take 250 mg by mouth.   
  
                                        - sildenafil (VIAGRA) 100 mg tablet   
  
                                        Take 100 mg by mouth.   
  
                                        - metFORMIN ER (GLUCOPHAGE XR) 500 mg 24  
 hr tablet   
  
                                        Take 1000mg by mouth with breakfast and   
500mg by mouth with dinner   
  
                                        - Fenofibrate (LOFIBRA) 160 mg tablet   
  
                                        Take 160 mg by mouth.   
  
                                        - lisinopril 2.5 mg tablet   
  
                                        Take 2.5 mg by mouth.   
  
                                        - metoprolol succinate ER (TOPROL XL) 25  
 mg 24 hr tablet   
  
                                        Take 25 mg by mouth.   
  
                                        - cilostazol (PLETAL) 100 mg tablet   
  
                                        Take 100 mg by mouth.   
  
                                        Problem List As Of Date 08/15/2022 Noted  
 Resolved   
  
                                        Adenocarcinoma of left lung (HCC) [C34.9  
2] 2019   
  
                                        Peripheral vascular disease (HCC) [I73.9  
] 2019   
  
                                        Centrilobular emphysema (HCC) [J43.2]    
  
                                        Controlled type 2 diabetes mellitus with  
 diabet*2019   
  
                                        Primary malignant neoplasm of bronchus o  
f left *2019   
  
                                        Essential hypertension [I10] 2022   
  
                                        Hyperlipidemia [E78.5] 2022   
  
                                        Personal history of DVT (deep vein throm  
bosis) *2022   
  
                                        Lung cancer (HCC) [C34.90] 2022   
  
                                        COPD (chronic obstructive pulmonary dise  
ase) (H*2022   
  
                                        Controlled type 2 diabetes mellitus, wit  
jeff lo*2022   
  
                                        Encounter Number: 884439209   
  
                                        Encounter Status:Closed by CABRERA ABRAMS on 8/15/22   
   
                    08- Note     HNO ID: 4044672941  Cheko General Medica  
l  
  
                                        Author: Denise Abrams LPN Center  
  
                                        Service: ?            
  
                                        Author Type: LICENSED NURSE   
  
                                        Type: Progress Notes   
  
                                        Filed: 8/15/2022 9:31 AM   
  
                                        Note Text:            
  
                                        TRANSITIONAL CARE MANAGEMENT (TCM) CaroMont Regional Medical Center - Mount Holly MONITORING PROGRAM - AKRON   
  
                                        Provider Action/FYI:   
  
                                        1st attempt - LVM for patient to return   
call.   
  
                                        SUMMARY:              
  
                                        Pt discharged from Fort Defiance Indian Hospital on .   
  
                                        Admitted for: PAD     
  
                                        Contact made with patient: No - next out  
reach attempt will be on next   
  
                                        business day          
  
                                        Outreach ended        
   
                    08- History of Formatting of this note is different f  
rom the original.   
Trinity Health System  
  
                          Present illness Narrative TRANSITIONAL CARE MANAGEMENT  
 (TCM) Atrium Health   
MONITORING PROGRAM - AKRON                
  
                                                              
  
                                        Provider Action/FYI:   
  
                                        1st attempt - LVM for patient to return   
call.   
  
                                                              
  
                                                              
  
                                                              
  
                                        SUMMARY:              
  
                                        Pt discharged from Fort Defiance Indian Hospital on .   
  
                                        Admitted for: PAD     
  
                                                              
  
                                                    Contact made with patient: N  
o - next outreach attempt will be on next business   
day                                       
  
                                                              
  
                                        Outreach ended        
  
                                                              
  
                                        Electronically signed by Denise ignacio LPN at 08/15/2022 9:31 AM EDT   
  
                                        documented in this encounter   
   
                          2022 History of     Formatting of this note migh  
t be different from the   
original.                               Corey Hospital  
  
                    Present illness Narrative Mercy Health St. Vincent Medical Center System Work Phone: 1( 160.808.4696  
  
                                        Respiratory Care Department   
  
                                        Progress Note         
  
                                                              
  
                                                    Patient was seen in attempts  
 to fulfill CPAP/BiPAP/AutoPAP order. Patient   
refused PAP therapy/study at this time. Patient was educated on medical need and
 reasoning for physician order to ensure patient   
  
                                                     was making an informed medi  
tenisha decision. All of the patient's questions were   
answered at this time and patient was informed that if the patient changes their
 mind regarding wearing PAP to hit their  ca   
  
                                        ll light  or inform their nurse to conta  
ct Respiratory.   
  
                                                    A second, consecutive night   
of refusing PAP therapy/study results in order   
completion in the EMR. If future CPAP/BiPAP/AutoPAP therapy or study is 
indicated please place another order in the EMR and the   
  
                                         assigned Respiratory Therapist will lilliam  
ttempt to fulfill orders.   
  
                                                              
  
                                                    Comment or reasoning for ref  
usal: patient refused-waiting for transfer to   
Eleanor Slater Hospital/Zambarano Unit.                         
  
                                                              
  
                                        Thank you for involving Respiratory in t  
he care of this patient,   
  
                                        Electronically signed by Giovany Sargent RCP on 2022 at 10:54 PM   
  
                                        Electronically signed by Giovany Sargent RCP at 2022 10:54 PM EDT   
  
                                        Formatting of this note is different fro  
m the original.   
  
                                        WESTLEYFA s fausto pulled. Looks good.   
  
                                        Plan IV heparin 500 u/hr.   
  
                                                              
  
                                                    He has stable CAD Remote CAB  
G with LIMA to LAD SVG to OM2 and SVG to OM1.   
Repeat cath in 2022 for angina. LIMA to LAD is patent, SVG to OM@ patent. 
SVG to OM1 occluded with the OM 1 filling throug   
  
                                                    h the native circulation. Ha  
d progressive LMT stenosis (95%) that was stented   
by Dr. Tan, excellent result. No further angina.   
  
                                                              
  
                                                    Has EF 50% on echo done . Has had no heart failure. All CV RFs are   
controlled.                               
  
                                                              
  
                                                    Plan LLE amp (low risk proce  
dure) at Austin with Dr Breen. He is medically   
optimized and cleared for the procedure. Continue all CV meds rosa-op.   
  
                                                              
  
                                        Dx Cath:              
  
                                        Corey Hospital CARDIOVASCULAR INSTITUTE   
  
                                                              
  
                                        ----------------------------------------  
-------------------------------   
  
                                        CARDIAC CATHETERIZATION   
  
                                                              
  
                                        Patient: Willow Bradshaw   
  
                                        Procedure Date: 2022   
  
                                        : 1957       
  
                                        Age: 65               
  
                                        Gender: M             
  
                                        MRN: 03562340         
  
                                        Patient Type: Outpatient   
  
                                        Account#: 064074622800   
  
                                        Accession#: 60607077329   
  
                                        Procedure physician: Odell Bryan DO, FS, FACC   
  
                                        Fellow: Benoit Foster   
  
                                        Referring Physician: Odell Bryan DO, Cass Medical Center, Northwest Rural Health Network   
  
                                                              
  
                                        ----------------------------------------  
-------------------------------   
  
                                        INDICATIONS: Angina-CCS class III (delmer dexter limitation of ordinary   
  
                                        activity)             
  
                                                              
  
                                        ----------------------------------------  
-------------------------------   
  
                                        Procedures performed:   
  
                                                              
  
                                        # Left coronary angiography.   
  
                                        # Right coronary angiography.   
  
                                        # Saphenous vein graft angiography.   
  
                                        # LIMA graft angiography.   
  
                                                              
  
                                        ----------------------------------------  
-------------------------------   
  
                                        SUMMARY:              
  
                                                              
  
                                        1. Left main: The vessel is heavily calc  
ified. Mid-vessel lesion: There   
  
                                        is an 85% stenosis.   
  
                                        2. LAD: Proximal vessel lesion: There is  
 a 100%chronic total occlusion.   
  
                                        3. Left circumflex: Prior intervention:   
stent, in the proximal left   
  
                                        circumflex. Proximal vessel lesion: Ther  
e is a 100%in-stent   
  
                                        recurrent chronic total occlusion.   
  
                                        4. Right coronary: Prior intervention #1  
: stent, in the mid RCA. Prior   
  
                                        intervention #2: stent, in the proximal   
RCA. The intervened segment   
  
                                        is patent. Mid-vessel lesion: There is a  
 100%in-stent recurrent   
  
                                        chronic total occlusion.   
  
                                        5. Saphenous vein graft to the mid 1st d  
iagonal, from the aorta:   
  
                                        Proximal anastomosis lesion: There is a   
100% stenosis.   
  
                                        6. LIMA graft to the proximal 2nd diagon  
al: The graft is patent.   
  
                                        7. Saphenous vein graft to the mid 2nd o  
btuse marginal, from the aorta:   
  
                                        The graft is patent.   
  
                                        IMPRESSIONS:          
  
                                                              
  
                                        1. CAD as described. Angina is likely 2/  
2 to his native LM stenosis.   
  
                                        Plan will be for PCI of his LM with CSI,  
 staged for next week.   
  
                                        2. Prior stent in mid RCA occluded. L to  
 R collaterals from distal LCx   
  
                                        to distal RCA.        
  
                                        3. Patent LIMA to D2. Patent SVG to OM2.  
 Occluded SVG to D1 (fills via   
  
                                        native).              
  
                                        RECOMMENDATIONS:      
  
                                                              
  
                                        1. Patient management should include con  
tinued aggressive risk factor   
  
                                        modification.         
  
                                        2. Patient management should include agg  
ressive medical management of   
  
                                        CAD.                  
  
                                        3. Add optimal medical therapy of the pa  
tient's disease.   
  
                                                              
  
                                        ----------------------------------------  
-------------------------------   
  
                                        HISTORY: PMH: No history of cardiac arre  
st.   
  
                                                              
  
                                        ----------------------------------------  
-------------------------------   
  
                                        LABS, PRIOR TESTS, PROCEDURES, and SURGE  
RY:   
  
                                        Hemoglobin (pre-procedure) of 10.9 g/dl.  
 Hematocrit of 35.2%. Serum   
  
                                        potassium (K) of 4.6 mEq/l. Platelet cou  
nt of 378 th/ul. Serum creatin   
  
                                        ine (current admission) of 1.41 mg/dl.   
  
                                                              
  
                                        ----------------------------------------  
-------------------------------   
  
                                        PROCEDURE IN DETAIL: Study status: Cardi  
ac cath: elective. Consent:   
  
                                        The risks, benefits, and alternatives to  
 the procedure and sedation wer   
  
                                        e explained to the patient and informed   
consent was obtained.   
  
                                        Radiation exposure: Fluoroscopy time: 13  
.9 min. Total air KERMA: 709   
  
                                        mGy. Location: AdventHealth TimberRidge ER.   
  
                                        PROCEDURE:            
  
                                                              
  
                                        1. Initial setup. The patient was gustabo  
t to the laboratory. A   
  
                                        baseline ECG was recorded. Intravenous a  
ccess was obtained. Surface   
  
                                        ECG leads, blood pressure measurements,   
and pulse oximetric signals   
  
                                        were monitored.       
  
                                        2. Skin preparation. The planned punctur  
e sites were prepped and   
  
                                        draped in the usual sterile manner.   
  
                                        3. Local anesthesia. 1% lidocaine was ad  
ministered.   
  
                                        4. Right femoral artery access. A 5Fr/12  
cm Engage sheath was advanced   
  
                                        into the vessel.      
  
                                        5. A 5Fr x 100cm JL4 catheter was placed  
.   
  
                                        6. Selective left coronary angiography.   
A 5Fr x 100cm JL4 catheter was   
  
                                        introduced. Contrast was injected. Image  
s were obtained using   
  
                                        multiple projections.   
  
                                        7. The catheter was exchanged for a 5Fr   
x 100cm JR4 catheter.   
  
                                        8. Selective right coronary angiography.  
 A 5Fr x 100cm JR4 catheter   
  
                                        was introduced. Contrast was injected. I  
mages were obtained using   
  
                                        multiple projections.   
  
                                        9. Selective angiography of the saphenou  
s vein graft to the left   
  
                                        circumflex. A 5Fr x 100cm JR4 catheter w  
as introduced. Contrast was   
  
                                        injected. Images were obtained using mul  
tiple projections.   
  
                                        10. The catheter was exchanged for a 5FR  
/100cm DxTerity LINDA catheter.   
  
                                        11. Selective angiography of the left in  
ternal mammary graft to the   
  
                                        LAD. A 5FR/100cm DxTerity LINDA catheter w  
as introduced. Contrast was   
  
                                        injected. Images were obtained using mul  
tiple projections.   
  
                                        12. Right femoral artery hemostasis. Ves  
maikol closure was achieved with a   
  
                                        5Fr Vascade device.   
  
                                        STUDY COMPLETION: All catheters inserted  
 during the procedure were   
  
                                        removed. The patient tolerated the proce  
dure well and was discharged fro   
  
                                        m the lab. There were no complications.   
Administered medications: Mid   
  
                                        azolam 3 mg IV. Fentanyl 75 mcg IV. Cont  
rast: 1. ISOVUE 300MG/CC 84   
  
                                        ml (total dose). 84 ml.   
  
                                                              
  
                                        ----------------------------------------  
-------------------------------   
  
                                        CORONARY ARTERIES: The coronary circulat  
ion is right dominant. The   
  
                                        left main bifurcates normally into the L  
AD and circumflex. The right cor   
  
                                        onary gives rise to the posterior descen  
ding artery, 2 RV marginals, and   
  
                                        1 posterolateral.     
  
                                        Left main: The vessel is heavily calcifi  
ed. Mid-vessel lesion: There   
  
                                        is an 85% stenosis.   
  
                                        LAD: Proximal vessel lesion: There is a   
100%chronic total occlusion.   
  
                                        Left circumflex: Prior intervention: hector  
nt, in the proximal left   
  
                                        circumflex. Proximal vessel lesion: Ther  
e is a 100%in-stent recurrent c   
  
                                        hronic total occlusion.   
  
                                        Right coronary: Prior intervention #1: s  
tent, in the mid RCA. Prior   
  
                                        intervention #2: stent, in the proximal   
RCA. The intervened segment is p   
  
                                        atent. Mid-vessel lesion: There is a 100  
%in-stent recurrent chronic tot   
  
                                        al occlusion. Limited collateral flow fr  
om the distalcircumflex to the   
  
                                        distal RCA.           
  
                                        CORONARY GRAFTS:      
  
                                        Saphenous vein graft to the mid 2nd obtu  
se marginal, from the aorta:   
  
                                        The graft is patent.   
  
                                                              
  
                                        LIMA graft to the proximal 2nd diagonal:  
 The graft is patent.   
  
                                        Saphenous vein graft to the mid 1st diag  
onal, from the aorta: Proximal   
  
                                        anastomosis lesion: There is a 100% sten  
osis.   
  
                                                              
  
                                        ----------------------------------------  
-------------------------------   
  
                                        Hemodynamics:         
  
                                        Circulatory function:   
  
                                                              
  
                                        +-----------------------+---------------  
------+   
  
                                        +Stage description +Condition1:Room Air   
-+   
  
                                        +-----------------------+---------------  
------+   
  
                                        +Aortic pressure s/d (m)+101/62 (79) +   
  
                                        +-----------------------+---------------  
------+   
  
                                        Prepared and electronically signed by   
  
                                                              
  
                                        Odell Bryan DO, FS, Northwest Rural Health Network   
  
                                        2022 13:13      
  
                                                              
  
                                        LMT Intervention:     
  
                                                              
  
                                        Corey Hospital CARDIOVASCULAR McConnellsburg   
  
                                                              
  
                                        ----------------------------------------  
-------------------------------   
  
                                        CARDIAC CATHETERIZATION   
  
                                                              
  
                                        Patient: Willow Bradshaw   
  
                                        Procedure Date: 2022   
  
                                        : 1957       
  
                                        Age: 65               
  
                                        Gender: M             
  
                                        MRN: 32075318         
  
                                        Patient Type: Outpatient   
  
                                        Account#: 296681924125   
  
                                        Accession#: 00176320591   
  
                                        Procedure physician: Mariann Tan MD   
  
                                        Fellow:               
  
                                        Referring Physician: Mariann Tan MD C  
amtronics_modified,   
  
                                        Camtronics_modified, ---   
  
                                                              
  
                                        ----------------------------------------  
-------------------------------   
  
                                        INDICATIONS: Angina refractory to medica  
l management.   
  
                                        Atherosclerotic coronary artery disease.  
   
  
                                                              
  
                                        ----------------------------------------  
-------------------------------   
  
                                        Procedures performed:   
  
                                                              
  
                                        # Percutaneous intervention on the 90% d  
e lexie stenosis in the mid left   
  
                                        main. Interventional IVUS. Stent placeme  
nt. Balloon angioplasty.   
  
                                                              
  
                                        ----------------------------------------  
-------------------------------   
  
                                        SUMMARY:              
  
                                                              
  
                                        1. Left main: Mid-vessel lesion: The calista  
gnostic study demonstrated a   
  
                                        90%de lexie stenosis. Stent placement was  
 performed, resulting in an   
  
                                        excellent angiographic appearance (see 1  
st lesion). Following   
  
                                        intervention, there is a residual 5% hector  
nosis with ALFIE grade 3 flow   
  
                                        (brisk flow).         
  
                                        2. LAD: Mid-vessel lesion: There is a 95  
% stenosis.   
  
                                        3. Left circumflex: Prior intervention:   
stent, in the ostial left   
  
                                        circumflex. Ostial lesion: There is a 10  
0%in-stent recurrent .   
  
                                        IMPRESSIONS:          
  
                                                              
  
                                        1. Severe coronary artery disease as sydnie  
cribed, predominantly involving   
  
                                        the left main, LAD, and circumflex.   
  
                                        2. Successful SYDNIE mid LMCA with IVUS yane  
dance   
  
                                        Subtotal occlusion mid LAD after takeoff  
 of D1   
  
                                        Chronically occluded ostial LCF in-stent  
   
  
                                        Left to right collaterals.   
  
                                        RECOMMENDATIONS:      
  
                                                              
  
                                        1. Patient management should include agg  
ressive risk factor   
  
                                        modification and a cardiac rehabilitatio  
n program.   
  
                                        2. Add optimal medical therapy of the pa  
tient's disease.   
  
                                        3. Prasugrel (Effient), with a standing   
dose of 75xrBPejyjp59   
  
                                        month(s)minimum.      
  
                                        4. Aspirin, with a standing dose of 81mg  
POdaily.   
  
                                                              
  
                                        ----------------------------------------  
-------------------------------   
  
                                        HISTORY: PMH: No history of cardiac arre  
st.   
  
                                                              
  
                                        ----------------------------------------  
-------------------------------   
  
                                        LABS, PRIOR TESTS, PROCEDURES, and SURGE  
RY:   
  
                                        Hemoglobin (pre-procedure) of 10.9 g/dl.  
 Platelet count of 378 th/ul.   
  
                                        Serum creatinine (current admission) of   
1.41 mg/dl. Hematocrit of 35.2%   
  
                                        . Serum potassium (K) of 4.6 mEq/l.   
  
                                                              
  
                                        ----------------------------------------  
-------------------------------   
  
                                        PROCEDURE IN DETAIL: Study status: Cardi  
ac cath: elective. Consent:   
  
                                        The risks, benefits, and alternatives to  
 the procedure and sedation wer   
  
                                        e explained to the patient and informed   
consent was obtained.   
  
                                        Radiation exposure: Fluoroscopy time: 11  
 min. Total air KERMA: 452   
  
                                        mGy. Location: AdventHealth TimberRidge ER.   
  
                                        PROCEDURE:            
  
                                                              
  
                                        1. Initial setup. The patient was gustabo  
t to the laboratory. A baseline   
  
                                        ECG was recorded. Intravenous access was  
 obtained. Surface ECG   
  
                                        leads, blood pressure measurements, and   
pulse oximetric signals were   
  
                                        monitored.            
  
                                        2. Skin preparation. The planned punctur  
e sites were prepped and draped   
  
                                        in the usual sterile manner.   
  
                                        3. Local anesthesia. 1% lidocaine was ad  
ministered to the right groin.   
  
                                        4. Right femoral artery access. A 6Fr/12  
cm Engage sheath was advanced   
  
                                        into the vessel.      
  
                                        5. A 5Fr x 100cm JR4 catheter was placed  
.   
  
                                        6. A stent was placed in the stenosis in  
 the distal left main. See   
  
                                        detailed description below (1st lesion i  
ntervention).   
  
                                        7. ACT was 299 sec.   
  
                                        8. Right femoral artery hemostasis. The   
sheath was sutured in place.   
  
                                        1st lesion:           
  
                                        Percutaneous intervention on the 90% de   
lexie stenosis in the mid left   
  
                                        main.                 
  
                                                              
  
                                        1. Guider placement: a 6Fr x 100cm Launc  
her EBU3.75 guiding catheter   
  
                                        was placed.           
  
                                        2. Catheter exchange. The catheter was e  
xchanged for a 6Fr x 100cm   
  
                                        Launcher EBU3.5 catheter.   
  
                                        3. Intravascular ultrasound evaluation,   
using a .014 Agua Caliente Eye Capitan Grande   
  
                                        catheter.             
  
                                        4. Successful balloon angioplasty. A 3 m  
m (D) x 15 mm (L), NC Trek RX   
  
                                        balloon was employed. The balloon was pl  
aced across the lesion and   
  
                                        given a single inflation with a maximum   
inflation pressure of 20   
  
                                        bella.                  
  
                                        5. Stent placement. A 3.5 mm (D) x 18 mm  
 (L), Xience Skypoint RX (drug   
  
                                        eluting) stent was used. The stent was a  
dvanced across the lesion   
  
                                        and deployed with a single inflation and  
 a maximum pressure of 20   
  
                                        bella.                  
  
                                        6. Successful balloon angioplasty. A 4 m  
m (D) x 12 mm (L), NC Trek RX   
  
                                        balloon was employed. The balloon was pl  
aced across the lesion and   
  
                                        given a single inflation with a maximum   
inflation pressure of 23   
  
                                        bella.                  
  
                                        STUDY COMPLETION: All catheters inserted  
 during the procedure were   
  
                                        removed. The patient tolerated the proce  
dure well and was discharged fro   
  
                                        m the lab. There were no complications.   
Administered medications: KI   
  
                                        LINTA (180 load) 180 mg PO. Heparin 1200  
0 units IV. Contrast: 1. ISO   
  
                                        MICHELLE 300MG/CC 71 ml (total dose). 71 ml.   
  
                                                              
  
                                        ----------------------------------------  
-------------------------------   
  
                                        CORONARY ARTERIES: The coronary circulat  
ion is right dominant. The   
  
                                        left main bifurcates normally into the L  
AD and circumflex.   
  
                                        Left main: Mid-vessel lesion: The diagno  
stic study demonstrated a   
  
                                        90%de lexie stenosis. This lesion appears  
 heavily calcified. There is ENOCH   
  
                                        I grade 2 flow (partial perfusion) acros  
s the lesion. Stent placement w   
  
                                        as performed, resulting in an excellent   
angiographic appearance (see 1st   
  
                                        lesion). Following intervention, there i  
s a residual 5% stenosis with T   
  
                                        IMI grade 3 flow (brisk flow).   
  
                                        LAD: Mid-vessel lesion: There is a 95% s  
tenosis.   
  
                                        Left circumflex: Prior intervention: hector  
nt, in the ostial left   
  
                                        circumflex. Ostial lesion: There is a 10  
0%in-stent recurrent .   
  
                                                              
  
                                        ----------------------------------------  
-------------------------------   
  
                                        Hemodynamics:         
  
                                        Circulatory function:   
  
                                                              
  
                                        +-----------------------+---------------  
------+   
  
                                        +Stage description +Condition1:Room Air   
-+   
  
                                        +-----------------------+---------------  
------+   
  
                                        +Aortic pressure s/d (m)+105/59 (77) +   
  
                                        +-----------------------+---------------  
------+   
  
                                        Prepared and electronically signed by   
  
                                                              
  
                                        Mariann Tan MD     
  
                                        2022 11:30      
  
                                                              
  
                                        Echo                  
  
                                        TRANSTHORACIC ECHOCARDIOGRAM   
  
                                                              
  
                                        PATIENT: Willow Bradshaw STUDY DATE:    
  
                                        E                     
  
                                        MRN: 18402022 FIN#: 218871233750   
  
                                        : 1957 ACCESSION#: 63591910703   
  
                                        AGE: 64 HT/WT: 170.2 cm (67 98 kg (215.6  
   
  
                                        in) lb)               
  
                                        GENDER: M BP: 127 / 69   
  
                                        LOCATION: Riverview Health Institute PATIENT InMan Appalachian Regional Hospital   
  
                                        main STATUS:          
  
                                        *ORDERING PHYSICIAN: * Julia Kelly                
  
                                                              
  
                                        *READING PHYSICIAN: * Mary Anne Sousa *SONOG  
RAPHER: * Marcie LAFLEUR                
  
                                                              
  
                                        ----------------------------------------  
-------------------------------   
  
                                        INDICATIONS: HYPOTENSION.   
  
                                                              
  
                                        ----------------------------------------  
-------------------------------   
  
                                        CONCLUSIONS           
  
                                                              
  
                                        SUMMARY:              
  
                                                              
  
                                        1. Technically difficult study.   
  
                                        2. Left ventricle: Systolic function is   
at the lower limits of normal   
  
                                        by visual assessment. The estimated ejec  
tion fraction is 50%.   
  
                                        Regional wall motion abnormalities canno  
t be excluded.   
  
                                        3. Right ventricle: Not well visualized.  
   
  
                                        4. No significant valve disease.   
  
                                        5. Aorta: The aorta is not well visualiz  
ed.   
  
                                                              
  
                                        ----------------------------------------  
-------------------------------   
  
                                        STUDY DATA: Complete transthoracic echoc  
ardiogram. Procedure: Image   
  
                                        quality was suboptimal. The study was te  
chnically limited due to poor   
  
                                        acoustic window availability and body ha  
bitus. Intravenous imaging   
  
                                        enhancement (Definity) was administered   
to opacify the chamber.   
  
                                        Definity lot #: 6299. M-mode, complete 2  
D, complete spectral Doppler,   
  
                                        and color flow Doppler images were acqui  
red and archived for permanent   
  
                                        storage and are available for subsequent  
 review. Study status:   
  
                                        Routine. Patient status: Inpatient.   
  
                                                              
  
                                        ----------------------------------------  
-------------------------------   
  
                                        FINDINGS              
  
                                                              
  
                                        LEFT VENTRICLE: The cavity size is issa  
l. Wall thickness is normal.   
  
                                        Systolic function is at the lower limits  
 of normal by visual   
  
                                        assessment. The estimated ejection fract  
ion is 50%. Regional wall   
  
                                        motion abnormalities cannot be excluded.  
 E/e' average: 14   
  
                                        RIGHT VENTRICLE: Not well visualized.   
  
                                        LEFT ATRIUM: Not well visualized.   
  
                                        RIGHT ATRIUM: Not well visualized.   
  
                                        ATRIAL SEPTUM: Color Doppler shows no sh  
unt.   
  
                                        MITRAL VALVE: Structurally normal valve.  
 Doppler: There is   
  
                                        trivial, less than 1+ regurgitation. The  
 peak diastolic gradient is   
  
                                        5 mm Hg.              
  
                                        AORTIC VALVE: Trileaflet; mildly thicken  
ed, mildly calcified   
  
                                        leaflets. Doppler: There is no stenosis.  
 There is no   
  
                                        regurgitation. Dimensionless index: 0.65  
. The valve area by the   
  
                                        velocity-time integral method is 2.5 cm^  
2. The valve area index by the   
  
                                        velocity-time integral method is 1.1 cm^  
2/m^2. The mean systolic   
  
                                        gradient is 5 mm Hg. The peak systolic g  
radient is 9 mm Hg. The peak   
  
                                        systolic velocity is 1.5 m/sec.   
  
                                        TRICUSPID VALVE: Structurally normal maria luz  
ve. Doppler: There is   
  
                                        trivial, less than 1+ regurgitation.   
  
                                        PULMONIC VALVE: Structurally normal valv  
e. Doppler: There is   
  
                                        trivial, less than 1+ regurgitation.   
  
                                        AORTA: The aorta is not well visualized.  
   
  
                                        SYSTEMIC VEINS: Not visualized.   
  
                                                              
  
                                        ----------------------------------------  
-------------------------------   
  
                                        Measurements          
  
                                                              
  
                                        Value 2020 Reference   
  
                                        Aortic root ID 3.4 cm ---------- <4.3   
  
                                        Aortic root ID, STJ, ED 2.9 cm ---------  
- 2.3 - 3.5   
  
                                        Aortic root ID/bsa, STJ, 1.3 cm/m^2 ----  
------ 1.1 - 1.9   
  
                                        ED                    
  
                                                              
  
                                        Left ventricle Value 2020 Referenc  
e   
  
                                        LV ID, ED (L) 3.6 cm 4.7 4.2 - 5.8   
  
                                        LV ID, ES 2.7 cm 2.9 2.5 - 4.0   
  
                                        LV ID/bsa, ED (L) 1.6 cm/m^2 2.1 2.2 - 3  
.0   
  
                                        LV ID/bsa, ES (L) 1.2 cm/m^2 1.3 1.3 - 2  
.1   
  
                                        LV PW thickness, ED 0.9 cm 1.2 0.6 - 1.0  
   
  
                                        LV PW/LV ID ratio, ED 0.26 0.26 --------  
--   
  
                                        LV wall mass (L) 90 g 225 96 - 200   
  
                                        LV wall mass/bsa (L) 41 g/m^2 102 50 - 1  
02   
  
                                        Stroke volume/bsa, 1-p 24.5 ml/m^2 -----  
----- ----------   
  
                                        A2C                   
  
                                        LV end-diastolic volume, 112 ml --------  
-- 69 - 185   
  
                                        1-p A4C               
  
                                        LV end-systolic volume, 44 ml ----------  
 22 - 78   
  
                                        1-p A4C               
  
                                        LV end-diastolic volume, 98 ml ---------  
- 62 - 150   
  
                                        2-p                   
  
                                        LV end-systolic volume, 37 ml ----------  
 21 - 61   
  
                                        2-p                   
  
                                        LV ejection fraction, 2-p (L) 50 % 60 52  
 - 72   
  
                                        LV E/e', lateral 13.1 8.1 ----------   
  
                                        LV E/e', medial 14.6 9.4 ----------   
  
                                        LV E/e', average 13.8 8.7 ----------   
  
                                                              
  
                                        Ventricular septum Value 2020 Refe  
rence   
  
                                        IVS thickness, ED 0.8 cm 1.3 0.6 - 1.0   
  
                                                              
  
                                        LVOT Value 2020 Reference   
  
                                        LVOT ID, A-P 2.2 cm 2.5 ----------   
  
                                        LVOT mean velocity, S 0.6 m/sec 0.9 ----  
------   
  
                                        LVOT peak gradient, S 3 mm Hg 7 --------  
--   
  
                                        Stroke volume (SV), LVOT 68 ml 110 -----  
-----   
  
                                        DP                    
  
                                        Stroke index (SV/bsa), 31 ml/m^2 50 ----  
------   
  
                                        LVOT DP               
  
                                                              
  
                                        Aortic valve Value 2020 Reference   
  
                                        Aortic valve peak 1.5 m/sec ---------- -  
---------   
  
                                        velocity, S           
  
                                        Aortic valve mean 1 m/sec ---------- ---  
-------   
  
                                        velocity, S           
  
                                        Aortic mean gradient, S 5 mm Hg --------  
-- ----------   
  
                                        Aortic peak gradient, S 9 mm Hg --------  
-- ----------   
  
                                        DI 0.65 ---------- ----------   
  
                                        Aortic valve area, VTI 2.5 cm^2 --------  
-- ----------   
  
                                        Aortic valve area/bsa, 1.1 cm^2/m^2 ----  
------ ----------   
  
                                        VTI                   
  
                                                              
  
                                        Left atrium Value 2020 Reference   
  
                                        LA volume/bsa, ES, 2-p 16 ml/m^2 26 16 -  
 34   
  
                                                              
  
                                        Mitral valve Value 2020 Reference   
  
                                        Mitral E-wave peak 1.1 m/sec 0.7 -------  
---   
  
                                        velocity              
  
                                        Mitral A-wave peak 0.8 m/sec 0.6 -------  
---   
  
                                        velocity              
  
                                        Mitral deceleration time 117 ms 284 ----  
------   
  
                                        Mitral peak gradient, D 5 mm Hg --------  
-- ----------   
  
                                        Mitral E/A ratio, peak 1.4 1.1 ---------  
-   
  
                                                              
  
                                        Tricuspid valve Value 2020 Referen  
ce   
  
                                        Tricuspid regurg peak 2 m/sec ----------  
 <=2.8   
  
                                        velocity              
  
                                        Tricuspid peak RV-RA 16 mm Hg ----------  
 ----------   
  
                                        gradient              
  
                                                              
  
                                        Right atrium Value 2020 Reference   
  
                                        RA area, ES, A4C 12 cm^2 19 10 - 18   
  
                                                              
  
                                        Right ventricle Value 2020 Referen  
ce   
  
                                        RV ID, minor axis, ED, 3.8 cm 3.4 2.5 -   
4.1   
  
                                        A4C base              
  
                                        RV ID, minor axis, ED, 3.1 cm 3.4 1.9 -   
3.5   
  
                                        A4C mid               
  
                                        RV s', lateral 6.4 cm/sec 8.9 6.0 - 13.4  
   
  
                                        Legend:               
  
                                        (L) and (H) beth values outside specifie  
d reference range.   
  
                                                              
  
                                        Electronically signed by   
  
                                        Mary Anne Sousa MD        
  
                                        2021 13:10      
  
                                                              
  
                                                              
  
                                                              
  
                                                              
  
                                                              
  
                                                              
  
                                        Electronically signed by Odell cassidy DO at 2022 1:59 PM EDT   
  
                                        Formatting of this note might be differe  
nt from the original.   
  
                                                    .Nutrition rescreen complete  
d. Patient referred to the Dietitian. Non-healing   
wound.ELOISA Woods                    
  
                                                              
  
                                        Electronically signed by Harika Agosto at  
 2022 1:40 PM EDT   
  
                                        Formatting of this note is different fro  
m the original.   
  
                                                    PAGING: The Acute Pain Servi  
ce providers are available via PERFECT SERVE.   
Please reference iRx Reminder for Pain Management Provider ON CALL and direct 
all questions to the provider listed. Please send   
  
                                                    pages as  urgent  from 9pm-7  
am if they require a response. All other pages will  
 be addressed next day, thank you.        
  
                                                              
  
                                                    Due to the current environme  
nt of Covid-19, PPE was worn for the duration of   
all face to face encounters including but not limited to an N95 in accordance 
with CDC and hospital guidelines.         
  
                                                              
  
                                        2022             
  
                                                              
  
                                        Referring Physician: Odell Bryan DO   
  
                                                              
  
                                        Subjective:           
  
                                                              
  
                                                    We have been asked to see th  
is 65 y.o. male for postoperative pain management   
s/p Selective LLE angio 2nd and 3rd order. On 22.   
  
                                                              
  
                                        Back to cath lab 8/10/22:   
  
                                        Procedure:            
  
                                                              
  
                                        Preoperative Diagnosis: Acute-on-chronic  
 limb threatening ischemia of   
  
                                        the left lower extremity.   
  
                                                              
  
                                        Postoperative Diagnoses:   
  
                                        1. Acute-on-chronic limb threatening isc  
hemia of the left lower   
  
                                        extremity.            
  
                                        2. Occlusion of the left superficial fem  
oral artery and popliteal   
  
                                        artery native and Viabahn stented segmen  
ts, faint reconstitution   
  
                                        of the posterior tibial artery below the  
 tibioperoneal trunk   
  
                                        with very poor in line single-vessel run  
off to the left foot.   
  
                                        3. Nonhealing fasciotomy wounds.   
  
                                                              
  
                                                              
  
                                        Reviewed EKG 22   
  
                                                              
  
                                                    NAEON. Paged yesterday after  
noon for uncontrolled pain: adjustments made to   
PCA:                                      
  
                                                              
  
                                                    Received page from William GARCIA  
, thank you for updates: Pt back from cath lab, c/o  
 increased pain, not controlled with PCA.   
  
                                        2h use: 1.2 mg (current max 2h dose woul  
d be 3.6 mg).   
  
                                        Will decrease time interval to 6 minutes  
   
  
                                        increase max dose to 3mg/hr   
  
                                        Educate and Encourage patient to press P  
CA button moree freequently.   
  
                                                    Increase Hydromorphone PCA.   
Do NOT administer any narcotics in addition to PCA   
unless directed to do so by an APS provider.   
  
                                        PCA dose: 0.3 mg      
  
                                        Lockout interval: 6 mintues   
  
                                        Basal Rate: 0 mg/hr   
  
                                        One hour limit: 3 mg   
  
                                                              
  
                                                    On arrival, pt lying in bed,  
 snoring respirations, still sedated from procedure  
 this AM. Spoke with wife at . Pt remains on PCA but hasn't been using PCA due
 to sedation. Pt being transferred to WhidbeyHealth Medical Center   
  
                                                    er today for operation with   
Dr. Breen. Plan to keep PCA running until   
transfer.                                 
  
                                                              
  
                                        PMH reviewed below    
  
                                                              
  
                                        Sedation score: sedated   
  
                                        Pain Severity: A      
  
                                        Pain Location: VITALIY    
  
                                        Pain Quality: VITALIY     
  
                                        Aggravating Factors: VITALIY   
  
                                        Alleviating Factors: VITALIY   
  
                                                              
  
                                        Social History        
  
                                                              
  
                                        Tobacco Use           
  
                                        Smoking Status Former   
  
                                        Packs/day: 0.25       
  
                                        Years: 40.00          
  
                                        Pack years: 10.00     
  
                                        Types: Cigarettes, Cigars   
  
                                        Start date:       
  
                                        Quit date: 2021   
  
                                        Years since quittin.6   
  
                                        Smokeless Tobacco Never   
  
                                        Tobacco Comments      
  
                                        quit         
  
                                                              
  
                                        Social History        
  
                                                              
  
                                        Substance and Sexual Activity   
  
                                        Alcohol Use Yes       
  
                                        Comment: special occasions   
  
                                                              
  
                                        Social History        
  
                                                              
  
                                        Substance and Sexual Activity   
  
                                        Drug Use No           
  
                                        Comment: 3-4 cups of coffee a day.   
  
                                                              
  
                                                    The patient's medical histor  
y and physical assessment, medications, allergies,   
patient's current medical condition, imaging, and labs were reviewed as part of 
this consultation.                        
  
                                                              
  
                                        [x] Patient's Medications have been revi  
ewed.   
  
                                                              
  
                                        [x] Patient's OARRS report (PDMP) have b  
een reviewed.   
  
                                        Filled Written Drug QTY Days   
  
                                        2022 Gabapentin 300 Mg   
Capsule   
  
                                        90.00 30              
  
                                        2022 Gabapentin 300 Mg   
Capsule   
  
                                        90.00 30              
  
                                        2022 Oxycodone Hcl (Ir)  
 5 Mg Tablet   
  
                                        20.00 5               
  
                                        2022 Oxycodone Hcl (Ir)  
 5 Mg Tablet   
  
                                        20.00 5               
  
                                        2022 Oxycodone Hcl (Ir)  
 5 Mg Tablet   
  
                                        20.00 5               
  
                                        2022 Oxycodone Hcl (Ir)  
 5 Mg Tablet   
  
                                        28.00 7               
  
                                        2021 Oxycodone-Acetamin  
ophen 5-325   
  
                                        28.00 7               
  
                                                              
  
                                        Objective Findings:   
  
                                                              
  
                                                              
  
                                        Weight: 220 lb 3.8 oz (99.9 kg)   
  
                                        BMI (Calculated): 0   
  
                                                              
  
                                                    Vital signs: Blood pressure   
91/72, pulse 97, temperature 97.4 F (36.3 C),   
temperature source Temporal, resp. rate 19, weight 220 lb 3.8 oz (99.9 kg), SpO2
 95 %.                                    
  
                                                              
  
                                        Allergies: Menthol    
  
                                        Past Medical History:   
  
                                        Diagnosis Date        
  
                                        CAD (coronary artery disease)   
  
                                        Cancer (HCC)          
  
                                        SCC skin cancer arms , forehead   
  
                                        Cerebral artery occlusion with cerebral   
infarction (HCC)   
  
                                        COPD (chronic obstructive pulmonary dise  
ase) (HCC)   
  
                                        Diabetes (HCC)        
  
                                        DVT (deep venous thrombosis) (Formerly Chesterfield General Hospital)   
  
                                        History of blood transfusion   
  
                                        2022              
  
                                        History of squamous cell carcinoma   
  
                                        Hx of blood clots     
  
                                        Hypercholesterolemia   
  
                                        Hyperlipidemia        
  
                                        Hypertension          
  
                                        Hypotension           
  
                                        Lung cancer (HCC)     
  
                                        Myocardial infarction (HCC)   
  
                                        x2                    
  
                                        Peripheral vascular disease with claudic  
ation (HCC)   
  
                                                              
  
                                        Past Surgical History:   
  
                                        Procedure Laterality Date   
  
                                        CORONARY ANGIOPLASTY WITH STENT PLACEMEN  
T 2014   
  
                                        SYDNIE mid-dist LMCA, SYDNIE prox LAD, SYDNIE pro  
x-mid LCF, SYDNIE prox-mid RCA   
  
                                        CORONARY ANGIOPLASTY WITH STENT PLACEMEN  
T    
  
                                        SYDNIE dist RCA          
  
                                        CORONARY ANGIOPLASTY WITH STENT PLACEMEN  
T 2009   
  
                                        SYDNIE RCA/IABP          
  
                                        CORONARY ANGIOPLASTY WITH STENT PLACEMEN  
T 2006   
  
                                        DEC OM1, SYDNIE RCA/RPDA   
  
                                        CORONARY ANGIOPLASTY WITH STENT PLACEMEN  
T    
  
                                        SYDNIE mid LCF           
  
                                        CORONARY ARTERY BYPASS GRAFT 2017   
  
                                        3 - vessel            
  
                                        DEBRIDEMENT Left 2022   
  
                                        Debridement of LLE Lateral Fasciotomy (DERRICK lazaro)   
  
                                        DIAGNOSTIC CARDIAC CATH LAB PROCEDURE Le  
ft 2017   
  
                                        DIAGNOSTIC CARDIAC CATH LAB PROCEDURE    
  
                                        DIAGNOSTIC CARDIAC CATH LAB PROCEDURE 10  
/   
  
                                        EMBOLECTOMY Left 2022   
  
                                        Open balloom embolectomy, femoral endart  
erectomy and patch angioplasty.   
  
                                        FEMORAL BYPASS 2022   
  
                                        LEG DEBRIDEMENT Left 2022   
  
                                        Left lower leg        
  
                                        OTHER SURGICAL HISTORY 2018   
  
                                        ENB/EBUS              
  
                                        OTHER SURGICAL HISTORY 2022   
  
                                        Debridement of Left Lower extremity woun  
d   
  
                                        OTHER SURGICAL HISTORY Left 2022   
  
                                        Aortogram, LLE runoff   
  
                                        VASCULAR SURGERY Left 2019   
  
                                        Femoral Endarterectomy (La Fontaine)   
  
                                        VASCULAR SURGERY 2019   
  
                                        PTA L and R TRAVIS stent ostial upto 8 mm b  
alloon - Dr Bryan   
  
                                        VASCULAR SURGERY Left 2022   
  
                                        I & D LLE Fasciotomy, instilling wound v  
ac application   
  
                                                              
  
                                        Family History        
  
                                        Problem Relation Age of Onset   
  
                                        Cancer Mother         
  
                                        Diabetes Mother       
  
                                        Heart Disease Father   
  
                                                              
  
                                        Patient Active Problem List   
  
                                        Diagnosis             
  
                                        CAD (coronary artery disease)   
  
                                        Pure hypercholesterolemia   
  
                                        Type 2 diabetes mellitus (Formerly Chesterfield General Hospital)   
  
                                        FH: CABG (coronary artery bypass surgery  
)   
  
                                        COPD (chronic obstructive pulmonary dise  
ase) (Formerly Chesterfield General Hospital)   
  
                                        Delirium, acute       
  
                                        Respiratory failure following trauma and  
 surgery (Formerly Chesterfield General Hospital)   
  
                                                    Type 2 diabetes mellitus wit  
h hyperglycemia, without long-term current use of   
insulin (Formerly Chesterfield General Hospital)                             
  
                                        S/P CABG (coronary artery bypass graft)   
  
                                                    Coronary artery disease of n  
ative artery of native heart with stable angina   
pectoris (Formerly Chesterfield General Hospital)                            
  
                                        Acute deep vein thrombosis (DVT) of dist  
al end of right lower extremity (Formerly Chesterfield General Hospital)   
  
                                        On enteral nutrition   
  
                                        Leukocytosis          
  
                                        Acute respiratory insufficiency   
  
                                        Mass of upper lobe of left lung   
  
                                        Peripheral vascular disease (Formerly Chesterfield General Hospital)   
  
                                        Hypertension          
  
                                        Hypotension           
  
                                        Hyperlipidemia        
  
                                        DVT (deep venous thrombosis) (Formerly Chesterfield General Hospital)   
  
                                        Hypercholesterolemia   
  
                                        Sepsis due to pneumonia (HCC)   
  
                                        Acute respiratory failure (Formerly Chesterfield General Hospital)   
  
                                        CKD (chronic kidney disease), stage III   
(Formerly Chesterfield General Hospital)   
  
                                        Tobacco abuse         
  
                                        Obesity, Class II, BMI 35-39.9   
  
                                        Pneumonia due to organism   
  
                                        Atheroscler of native artery of both leg  
s with intermit claudication (Formerly Chesterfield General Hospital)   
  
                                        Acute kidney injury superimposed on   
parker kidney disease (Formerly Chesterfield General Hospital)   
  
                                        Hx of blood clots     
  
                                        History of squamous cell carcinoma   
  
                                        Diabetes (HCC)        
  
                                        Cancer (Formerly Chesterfield General Hospital)          
  
                                        Claudication of left lower extremity (HC  
C)   
  
                                        Myocardial infarction (Formerly Chesterfield General Hospital)   
  
                                        Chest pain            
  
                                        Status post insertion of drug eluting co  
ronary artery stent   
  
                                                    Status post insertion of johnny  
g-eluting stent into right coronary artery for   
coronary artery disease                   
  
                                        Accelerating angina (HCC)   
  
                                        Ischemia of left lower extremity   
  
                                        Hypertension associated with diabetes (H  
CC)   
  
                                        S/P peripheral artery angioplasty   
  
                                        Tobacco dependence    
  
                                        Arterial occlusion    
  
                                                    Critical ischemia of extremi  
ty with history of revascularization of same   
extremity (Formerly Chesterfield General Hospital)                           
  
                                        Acute occlusion of artery of lower extre  
mity due to thrombosis (HCC)   
  
                                        Acute decompensated heart failure (Formerly Chesterfield General Hospital)   
  
                                        Non-pressure chronic ulcer of left lower  
 leg with necrosis of muscle (Formerly Chesterfield General Hospital)   
  
                                        H/O fasciotomy        
  
                                        Peripheral artery disease (Formerly Chesterfield General Hospital)   
  
                                        Femoral-popliteal bypass graft occlusion  
, left (Formerly Chesterfield General Hospital)   
  
                                        Moderate malnutrition (HCC)   
  
                                        Chronic ulcer of left lower extremity wi  
th necrosis of muscle (Formerly Chesterfield General Hospital)   
  
                                        Post-op pain          
  
                                        CAD S/P percutaneous coronary angioplast  
y   
  
                                        PAD (peripheral artery disease) (Formerly Chesterfield General Hospital)   
  
                                                              
  
                                        Review of Systems     
  
                                        Unable to perform ROS: Acuity of conditi  
on (sedated)   
  
                                                              
  
                                        Physical Exam         
  
                                        Vitals and nursing note reviewed.   
  
                                        Constitutional:       
  
                                        General: He is not in acute distress.   
  
                                        Interventions: He is sedated.   
  
                                        Cardiovascular:       
  
                                        Rate and Rhythm: Normal rate.   
  
                                        Pulmonary:            
  
                                        Effort: Pulmonary effort is normal.   
  
                                        Skin:                 
  
                                        General: Skin is warm and dry.   
  
                                        Psychiatric:          
  
                                        Attention and Perception: Attention norm  
al.   
  
                                        Speech: Speech normal.   
  
                                                              
  
                                                              
  
                                        Pain Management Adjuvants: 0700 --> 0700  
 2022 8/10   
  
                                        Scheduled             
  
                                        APAP 2600mg 3250mg    
  
                                        Lidocaine patches on on   
  
                                        gabapentin 600mg 900mg   
  
                                        PRN                   
  
                                        Hydromorphone PCA Cath lab 7.5mg   
  
                                        methocarbamol 0 0     
  
                                                              
  
                                                              
  
                                        Lab Results           
  
                                        Component Value Date/Time   
  
                                        CREATININE 1.24 2022 12:04 AM   
  
                                        AST 26 2022 10:04 AM   
  
                                        ALT 27 2022 10:04 AM   
  
                                                              
  
                                        Assessment / Pain Management Plan:   
  
                                                              
  
                                        Acute LLE pain        
  
                                        Multimodal pain regimen:   
  
                                                    May continue Hydromorphone P  
CA until transfer to Austin. Do NOT administer any  
 narcotics in addition to PCA unless directed to do so by an APS provider.   
  
                                        PCA dose: 0.3 mg      
  
                                        Lockout interval: 6 mintues   
  
                                        Basal Rate: 0 mg/hr   
  
                                        One hour limit: 3 mg   
  
                                                    continue Acetaminophen 650 m  
g po q4h ATC. Liver enzymes WNL, last checked:   
22                                    
  
                                        continue Lidocaine patches x 2. Cut and   
place as needed.   
  
                                        Gabapentin per primary   
  
                                        Continue Methocarbamol 1000 mg PO TID OH  
N.   
  
                                                              
  
                                        Complex LLE revascularization (multiple   
times)   
  
                                        Selective LLE angio 2nd and 2rd order   
  
                                        See #1                
  
                                                              
  
                                        Constipation          
  
                                        At risk for opioid induced constipation   
  
                                                    Patient currently receiving   
opioids for pain management necessitating a bowel   
regimen. Recommend initiating scheduled Sennakot-S 8.6/50mg, 1 tablet PO BID. 
Would also recommend Milk of Magnesia 400mg/5ml, administer 30mL by mouth daily 
PRN.                                      
  
                                                              
  
                                        Opioid Use            
  
                                        Acute: Expected to be short term post op  
 pain, see #1   
  
                                        OARRS reviewed for past two years. (No r  
ecurrent opiates RX filled)   
  
                                                    Reviewed and educated patien  
t on responsible use of opioids: after surgery, it   
can be normal to experience pain. If it is mild and you can move about without 
great difficulty or discomfort, you may not   
  
                                                    need to take pain medication  
. It is very important to take your pain medication  
 only as needed. Avoiding excessive or unnecessary medication, will enable you 
to progress your activity each day to improv   
  
                                                    e your muscle tone and movem  
ent, deep breathing, digestion, circulation and   
your body's ability to heal itself.       
  
                                                              
  
                                        Pt to transfer to Austin today, will si  
gn off.   
  
                                                              
  
                                        Plan discussed with patient who appears   
to understand and agrees.   
  
                                                              
  
                                                    PAGING: The Acute Pain Servi  
ce providers are available via PERFECT SERVE.   
Please reference iRx Reminder for Pain Management Provider ON CALL and direct 
all questions to the provider listed. Please send   
  
                                                    pages as  urgent  from 9pm-7  
am if they require a response. All other pages will  
 be addressed next day, thank you.        
  
                                                              
  
                                        Electronically signed by CARSON Jeffrey CNP on 2022 at 8:43 AM   
  
                                        Electronically signed by CARSON Jefrfey CNP at 2022 12:25 PM EDT   
  
                                        Formatting of this note might be differe  
nt from the original.   
  
                                                    Received page from William GARCIA  
, thank you for updates: Pt back from cath lab, c/o  
 increased pain, not controlled with PCA.   
  
                                        2h use: 1.2 mg (current max 2h dose woul  
d be 3.6 mg).   
  
                                        Will decrease time interval to 6 minutes  
   
  
                                        increase max dose to 3mg/hr   
  
                                        Educate and Encourage patient to press P  
CA button moree freequently.   
  
                                                              
  
                                                    Increase Hydromorphone PCA.   
Do NOT administer any narcotics in addition to PCA   
unless directed to do so by an APS provider.   
  
                                        PCA dose: 0.3 mg      
  
                                        Lockout interval: 6 mintues   
  
                                        Basal Rate: 0 mg/hr   
  
                                        One hour limit: 3 mg   
  
                                        Electronically signed by CARSON Jeffrey CNP at 08/10/2022 3:31 PM EDT   
  
                                        Formatting of this note is different fro  
m the original.   
  
                                        CARDIOLOGY PROGRESS NOTE   
  
                                                              
  
                                        Chart and interval events reviewed.   
  
                                                              
  
                                        Reason for Visit Follow up post angiogra  
phy: occluded L-SFA pop, atherectomy   
  
                                                              
  
                                        SUBJECTIVE:           
  
                                                    Willow Bradshaw III   
not having any pain this morning but severe last   
night. Resting comfortably. HR was elevated now normal range. Denies chest pain,
 shortness of breath ( O2-3L) no palpitations   
  
                                                    , dizziness or lightheadedne  
ss. Stopped TPA continues on heparin gtt. Plan for   
angiogram this afternoon to evaluate for blood flow of left leg. No doppler 
pulse pedal or tibia. Pt and wife trying to process possibility of amputation   
  
                                                              
  
                                        SCHEDULED MEDICATIONS:   
  
                                        sodium chloride flush 5-40 mL IntraVENou  
s 2 times per day   
  
                                        atorvastatin 80 mg Oral Nightly   
  
                                        gabapentin 300 mg Oral TID   
  
                                        metoprolol tartrate 25 mg Oral BID   
  
                                        pantoprazole 40 mg Oral QAM AC   
  
                                        prasugrel 10 mg Oral Daily   
  
                                        insulin lispro 0-4 Units SubCUTAneous TI  
D WC   
  
                                        insulin lispro 0-4 Units SubCUTAneous Ni  
ghtly   
  
                                        buPROPion 150 mg Oral Daily   
  
                                        acetaminophen 650 mg Oral Q4H   
  
                                        lidocaine 2 patch TransDERmal Daily   
  
                                                              
  
                                        Active Problems:      
  
                                        PAD (peripheral artery disease) (Formerly Chesterfield General Hospital)   
  
                                                    Critical limb ischemia with   
history of revascularization of same extremity   
(Formerly Chesterfield General Hospital)                                     
  
                                        Resolved Problems:    
  
                                        * No resolved hospital problems. *   
  
                                                              
  
                                        Review of Systems:    
  
                                        Review of Systems     
  
                                        Constitutional: Negative for chills, calista  
phoresis and fever.   
  
                                        Respiratory: Negative for cough, shortne  
ss of breath and wheezing.   
  
                                        Cardiovascular: Negative for chest pain,  
 palpitations and leg swelling.   
  
                                                    Gastrointestinal: Negative f  
or abdominal pain, blood in stool, constipation,   
diarrhea, nausea and vomiting.            
  
                                        Genitourinary: Negative for hematuria.   
  
                                        Musculoskeletal: Positive for gait probl  
em.   
  
                                        No left leg pain at this time.   
  
                                        Neurological: Positive for weakness. Neg  
ative for dizziness and syncope.   
  
                                                              
  
                                        VITAL SIGNS:          
  
                                        Vitals:               
  
                                        08/10/22 0800 08/10/22 0801 08/10/22 090  
0 08/10/22 1000   
  
                                        BP: (!) 121/90 102/74 113/79   
  
                                        Pulse: 81 77 82       
  
                                        Resp: 17 17 18        
  
                                        Temp: 98.3 F (36.8 C)   
  
                                        TempSrc: Oral         
  
                                        SpO2: 95% 92% 98%     
  
                                        Weight:               
  
                                                              
  
                                        Intake/Output Summary (Last 24 hours) at  
 8/10/2022 1035   
  
                                        Last data filed at 8/10/2022 0600   
  
                                        Gross per 24 hour     
  
                                        Intake 951.4 ml       
  
                                        Output 725 ml         
  
                                        Net 226.4 ml          
  
                                                              
  
                                        Patient Vitals for the past 96 hrs (Last  
 3 readings):   
  
                                        Weight                
  
                                        08/10/22 0600 224 lb 3.3 oz (101.7 kg)   
  
                                                              
  
                                        Physical Exam:        
  
                                        Physical Exam         
  
                                        Constitutional:       
  
                                        General: He is not in acute distress.   
  
                                        Appearance: He is well-developed. He is   
obese. He is not diaphoretic.   
  
                                        HENT:                 
  
                                        Mouth/Throat:         
  
                                        Pharynx: No oropharyngeal exudate.   
  
                                        Eyes:                 
  
                                        General: No scleral icterus.   
  
                                        Right eye: No discharge.   
  
                                        Left eye: No discharge.   
  
                                        Neck:                 
  
                                        Thyroid: No thyromegaly.   
  
                                        Vascular: No JVD.     
  
                                        Cardiovascular:       
  
                                        Rate and Rhythm: Normal rate and regular  
 rhythm.   
  
                                        Chest Wall: PMI is not displaced.   
  
                                        Pulses:               
  
                                        Popliteal pulses are 1+ on the left side  
.   
  
                                        Dorsalis pedis pulses are 2+ on the righ  
t side and 0 on the left side.   
  
                                        Posterior tibial pulses are 2+ on the ri  
ght side and 0 on the left side.   
  
                                        Heart sounds: Normal heart sounds. No mu  
rmur heard.   
  
                                        No gallop.            
  
                                        Pulmonary:            
  
                                        Effort: No accessory muscle usage or res  
piratory distress.   
  
                                        Breath sounds: Normal breath sounds.   
  
                                        Abdominal:            
  
                                        General: Bowel sounds are normal. There   
is no distension.   
  
                                        Palpations: Abdomen is soft.   
  
                                        Tenderness: There is no abdominal tender  
ness.   
  
                                        Musculoskeletal:      
  
                                        General: Normal range of motion.   
  
                                        Skin:                 
  
                                        General: Skin is warm and dry.   
  
                                        Coloration: Skin is pale.   
  
                                                    Comments: Left foot, cool ex  
tremities hands and left lower leg foot, warm above  
 knee,                                    
  
                                        Neurological:         
  
                                        General: No focal deficit present.   
  
                                        Mental Status: He is alert and oriented   
to person, place, and time.   
  
                                        Psychiatric:          
  
                                        Mood and Affect: Mood normal.   
  
                                                              
  
                                                              
  
                                        Data:                 
  
                                        Scheduled Meds: Reviewed   
  
                                        Continuous Infusions:   
  
                                        heparin (PORCINE) Infusion 500 Units/hr   
(08/10/22 1000)   
  
                                        sodium chloride 35 mL/hr at 08/10/22 074  
5   
  
                                        heparin (PORCINE) Infusion 500 Units/hr   
(08/10/22 0745)   
  
                                        sodium chloride       
  
                                        dextrose              
  
                                        HYDROmorphone         
  
                                                              
  
                                        CBC:                  
  
                                        Recent Labs           
  
                                        08/10/22              
  
                                        0007 08/10/22         
  
                                        0629                  
  
                                        WBC 11.5* 8.4         
  
                                        HGB 13.9 13.1         
  
                                        HCT 44.0 40.7         
  
                                         170           
  
                                                              
  
                                        BMP:                  
  
                                        Recent Labs           
  
                                        08/10/22              
  
                                        0007 08/10/22         
  
                                        0629                  
  
                                        * 135           
  
                                        K 5.4* 4.6            
  
                                         106            
  
                                        CO2 18* 19*           
  
                                        BUN 19* 18*           
  
                                        CREATININE 1.49* 1.35*   
  
                                                              
  
                                        INR:No results for input(s): INR in the   
last 72 hours.   
  
                                        No results for input(s): BNP in the last  
 72 hours.   
  
                                        TSH:                  
  
                                        Lab Results           
  
                                        Component Value Date   
  
                                        TSH 3.178 2021   
  
                                                              
  
                                                    Cardiac Injury Profile: No r  
esults for input(s): CKTOTAL, CKMB, TROPONINI in   
the last 72 hours.                        
  
                                        Lipid Profile:        
  
                                        Lab Results           
  
                                        Component Value Date/Time   
  
                                        TRIG 180 2022 10:04 AM   
  
                                        HDL 32 2022 10:04 AM   
  
                                        LDLCALC 153 07/15/2016 12:00 AM   
  
                                        CHOL 195 2022 10:04 AM   
  
                                        CHOL 220 07/15/2016 12:00 AM   
  
                                                              
  
                                                              
  
                                                              
  
                                        EKG: See Report       
  
                                        Telemetry Reviewed: SR HR 80s- last nigh  
t from 4pm till 3 am S tach -125   
  
                                        Echo:                 
  
                                                              
  
                                        Diagnostic Cath 22   
  
                                        DX:                   
  
                                                    S/p Complex L LE revasculari  
zation (multiple times). Abnormal follow-up   
arterial duplex and recurrent LLE ischmic pain.   
  
                                                              
  
                                        Dx Procedure:         
  
                                        Selective LLE angio 2nd and 3rd order.   
  
                                                              
  
                                        Findings:             
  
                                        L CFA and PFA patent.   
  
                                                    L SFA-pop (previously interv  
ened upon with Viabahn stent graft Occluded with   
recent thrombus. P2 and P3 SYDNIE occluded. Stent fracture of the P3 stent. 
Reconstitutes a distal PT faint filling into foot.   
  
                                        AT occluded with fresh thrombus.   
  
                                                              
  
                                        Intervention: Complex 3.5 hrs duration.   
  
                                        Occluded SFA pop crossed with Holloway wir  
e and 5F angled glide cath.   
  
                                        Minaya Rotarex Suction atherectomy perfor  
med. Results sub optimal   
  
                                        PTA entire SFA Pop 6 x 300 mm Balloon. L  
marci inflations. Some improved flow.   
  
                                                    PTA P2 and P3 Pop TPT and PT  
 prox with 3 x 30 balloon followed by Repeat   
stenting of the P3 pop and TPT 3 mm stents. 1m g intra arterial t-PA into 
tibials. Outflow improved.                
  
                                                    Occluded SFA remains/ EKOS 5  
0 cm catheter placed distal SFA-Pop. Rt-PA started   
at 0.5 mg/hr. Heparin through sheath 500 u/hr.   
  
                                                              
  
                                                    Plan TLX overnight. (< 1 % r  
isk CNS blled discussed with Pt and family as well   
as bleeding from vessels that were intervened upon, and RPB. Agreed to risks. 
Then re look angio in am.                 
  
                                                              
  
                                                    This ould result in a LLE am  
p. Pt and family wish to have done by Dr. Breen at  
 Austin. Continue prasugrel for now with heparin.   
  
                                                              
  
                                                              
  
                                        IMPRESSIONS/RECOMMENDATIONS:   
  
                                                              
  
                                        Critical Left leg ischemia s/p complex L  
 LE revascularization numerous times   
  
                                                    Abnormal follow up arterial   
duplex and recurrent LLE ischemic pain-plan with a   
prophylactic angio and to optimize all blood flow to that extremity for healing 
to the nonhealing wound of the distal left leg   
  
                                        Procedure 8/10/22- Dr Bryan   
  
                                        S/p LLE angio > findings L CFA and PFA p  
atent.   
  
                                                    L SFA-pop (previously interv  
ened upon with Viabahn stent graft Occluded with   
recent thrombus. P2 and P3 SYDNIE occluded. Stent fracture of the P3 stent. 
Reconstitutes a distal PT faint filling into foot.   
  
                                        AT occluded with fresh thrombus.-Suction  
 atherectomy performed   
  
                                                    Improved flow PTA P2 and P3   
Pop TPT and PT prox with 3 x 30 balloon followed by  
 Repeat stenting of the P3 pop and TPT 3 mm stents. 1m g intra arterial t-PA 
into tibials. Outflow improved            
  
                                                              
  
                                                    Referral to wound clinic and  
 Dr Gray (seen in the past) for possible skin   
grafting                                  
  
                                                    Pt will be taken back to OR   
today for angiogram to evaluate blood flow - Dr Garner                                      
  
                                                    May result in LLE amputation  
, Family and pt wish to have done by Dr Breen at   
Austin                                   
  
                                        Continues on prasugrel and heparin at th  
is time, maximum lipid therapies   
  
                                                              
  
                                                    2. Coronary artery disease -  
 Stable Functional class I Malaysian cardiovascular   
function class 0 for angina               
  
                                                    S/p Left main territory sten  
ting with graft attrition- adequate   
revascularization at this time.           
  
                                                    Continue on medical therapy;  
 asa, high intensity statin , fibrate and   
metoprolol                                
  
                                                              
  
                                        3. Hyperlipidemia     
  
                                                    Recent lipid panel with LDL1  
53 TG's 180, HDL 32 on maximal therapy -  
atorvastatin 80 mg daily, fenofibrate 150mg daily and Vascepa 2 g 2 x daily   
  
                                                    Consider that pt was not on   
any of his lipids for a month due to intermittent   
hospitalizations                          
  
                                        Recheck in 2 months after restarting the  
rapies   
  
                                                              
  
                                        Will discuss with Dr Garner in Dr Miguelito cassidy's absence today   
  
                                                              
  
                                        Electronicallysigned by CARSON Olson CNP on 8/10/2022 at 10:35 AM   
  
                                                              
  
                                                              
  
                                                    Electronically signed by CARSON Machuca CNP at 08/10/2022 10:36 AM   
EDT                                       
  
                                        documented in this encounter   
   
                    2022 Note                         Munson Healthcare Cadillac Hospital  
   
                    2022 Note     HNO ID: 6367220765  Parkview Noble Hospital  
l  
  
                                        Author: Elizabeth Miles MD Center  
  
                                        Service: ?            
  
                                        Author Type: Physician   
  
                                        Type: Progress Notes   
  
                                        Filed: 2022 10:25 AM   
  
                                        Note Text:            
  
                                        Hematology/Oncology TeleVisit   
  
                                        Willow Bradshaw      
  
                                        1957              
  
                                        Encounter Date: 2022   
  
                                        Patient name and birth date confirmed. DENA shaw's wife calling from   
  
                                        hospital. Permission for telephone visit  
 was obtained.   
  
                                        Cancer Staging        
  
                                        No matching staging information was foun  
d for the patient.   
  
                                        HPI: Willow Bradshaw is a 65 year old g  
entleman with clinical stage at   
  
                                        least IB (cT2 cN0 cM0) G1 adenocarcinoma  
 of the CASSANDRA.   
  
                                        Mr. Bradshaw initially presented 2017   
for CABG on 17 secondary to   
  
                                        significant symptomatic angina and CAD.   
His course was complicated by   
  
                                        hypoxic respiratory failure. CT of the c  
hest on 17 showed a   
  
                                        spiculated mass, 1.3 cm, in the left upp  
er lobe.   
  
                                        He reports that he did not know that he   
had this nodule until ~6 months   
  
                                        after his CABG.       
  
                                        CT chest 10/20/17 showed the CASSANDRA lesion   
had increased to 1.8 x 1.2 cm.   
  
                                        PET/CT performed on 17 showed a 1.8  
 cm CASSANDRA lesion with max SUV 2.2.   
  
                                        Otherwise LUCIANA.        
  
                                        Pulmonary function testing on 17 sh  
owed FEV1 97%, VC 99%, FEV1/FVC   
  
                                        74%, DLCO 44%.        
  
                                        Percutaneous biopsy was attempted on , however, the nodule could   
  
                                        not be safely biopsied and attempt was a  
borted. At this time, it was   
  
                                        decided to serially follow the lesion.   
  
                                        CT chest on 18 showed a stable CASSANDRA n  
odule, and an unchanged 6 x 3 mm   
  
                                        left lingual nodule.   
  
                                        CT chest on 10/4/18 showed enlargement o  
f the CASSANDRA nodule 2.5 x 1.3 cm and   
  
                                        a stable 6 mm lingula nodule.   
  
                                        CT performed to assess for pulmonary emb  
olism on 18 showed new   
  
                                        infiltrates in the RUL with a nodular ar  
e posteriorly 1.7 x 1.4 cm, new.   
  
                                        There is a small nodule in the RUL 5.4 m  
m. Spiculated mass in the CASSANDRA is   
  
                                        3.6 x 2.4 cm. +Mediastinal adenopathy, l  
argest is a right paratracheal   
  
                                        node 18.5 mm.         
  
                                        He underwent EBUS and biopsy on 18  
. 4L node insufficient sample, 4R   
  
                                        no malignant cells, 7 rare atypical cell  
s of uncertain significance, 10R   
  
                                        negative, 10L atypical cells of uncertai  
n significance (appears to be   
  
                                        artifact). CASSANDRA transbronchial biopsy nazia  
wed well differentiated   
  
                                        adenocarcinoma.       
  
                                        PET/CT 19 showed a few scattered med  
iastinal LN, not FDG avid (0.7 x   
  
                                        1.3 cm, SUV 1.9). Mildly hypermetabolic   
CASSANDRA nodule 1.2 x 2.1 cm (SUV 2.3).   
  
                                        PFTs show mild large airway obstruction   
and moderately decreased DLCO.   
  
                                        He was treated with SBRT from 3/11/19-3/  
15/19.   
  
                                        CT 19 showed stable lingula lesion a  
nd a tiny subpleural nodule in the   
  
                                        left lower lobe.      
  
                                        Inerterval History    
  
                                        Mr. Bradshaw presents today in follow up  
 of his lung cancer. He was   
  
                                        admitted with ischemia of the left lower  
 extremity recently, has had   
  
                                        repeated surgeries and is going to need   
an amputation. Has stopped   
  
                                        smoking. He is eating and drinking well.  
 He has no nausea, vomiting,   
  
                                        diarrhea, constipation. He had cardiac s  
tent placement in 10/2020.   
  
                                        CT chest 20 with stable changes. CT  
 chest 2020 stable. CT chest   
  
                                        2021 stable. CT chest 21 stab  
le architectural distortion in the   
  
                                        lingula. Subpleural density in the right  
 lower lobe, suggestive of focal   
  
                                        inflammatory or infection process. CT 22 with increasing RLL density.   
  
                                        PAST MEDICAL HISTORY   
  
                                        Diagnosis Date        
  
                                        Acute deep vein thrombosis (DVT) of popl  
iteal vein of left lower   
  
                                        extremity (HCC)       
  
                                        CAD (coronary artery disease)   
  
                                        Cerebral artery occlusion with cerebral   
infarction (HCC)   
  
                                        Controlled type 2 diabetes mellitus with  
 diabetic peripheral angiopathy   
  
                                        without gangrene, without long-term curr  
ent use of insulin (HCC)   
  
                                        Controlled type 2 diabetes mellitus, wit  
hout long-term current use of   
  
                                        insulin (HCC)         
  
                                        COPD (chronic obstructive pulmonary dise  
ase) (HCC)   
  
                                        Essential hypertension   
  
                                        History of squamous cell carcinoma   
  
                                        Hyperlipidemia        
  
                                        Lung cancer (HCC)     
  
                                        adenocarcinoma CASSANDRA    
  
                                        Peripheral vascular disease (HCC)   
  
                                        Personal history of DVT (deep vein throm  
bosis)   
  
                                        PAST SURGICAL HISTORY   
  
                                        Procedure Laterality Date   
  
                                        ANGIOPLASTY FEMORAL/POP Left   
  
                                        2019              
  
                                        ANGIOPLASTY PCI 02/01/2014   
  
                                        x5                    
  
                                        BYPASS W/VEIN FEMORAL-POPLITEAL 20  
22   
  
                                        CORONARY ARTERY BYPASS GRAFT 2017   
  
                                        3 vessel              
  
                                        VASCULAR SURGERY PROCEDURE 2019   
  
                                        femoral endarterectomy   
  
                                        Current Outpatient Medications   
  
                                        Medication Sig Dispense Refill   
  
                                        buPROPion HCL, smoking deter, 150 mg tab  
 ER 12 hr Take 1 tablet by mouth   
  
                                        once daily. 90 tablet 3   
  
                                        enoxaparin (LOVENOX) 100 mg/mL syrg INJE  
CT 1 ML INTO THE SKIN 2 TIMES   
  
                                        DAILY                 
  
                                        oxyCODONE IR (ROXICODONE) 5 mg immediate  
 release tablet Take 5 mg by   
  
                                        mouth every 6 hours as needed.   
  
                                        ONETOUCH VERIO TEST STRIPS test strip   
  
                                        ONETOUCH VERIO REFLECT METER   
  
                                        ONETOUCH DELICA PLUS LANCET 33 gauge   
  
                                        nitroglycerin sublingual (NITROQUICK) 0.  
4 mg SL tablet Dissolve 0.4 mg   
  
                                        under the tongue.     
  
                                        pantoprazole DR (PROTONIX) 40 mg tablet   
  
                                        JARDIANCE 10 mg tablet   
  
                                        VASCEPA capsule TAKE 2 CAPSULES BY MOUTH  
 2 TIMES DAILY   
  
                                        alirocumab (PRALUENT PEN) 75 mg/mL pen I  
nject 75 mg subcutaneously.   
  
                                        ubidecarenon (more content not included)  
...   
   
                          2022 Hospital course Formatting of this note is   
different from the   
original.                               SUMMA  
  
                    Narrative           Name: Willow Bradshaw III Work P  
angie: 1(904) 728-1459  
  
                                        YOB: 1957   
  
                                                              
  
                                        Medical Record Number: 50789941   
  
                                                              
  
                                        Date of Admission: 2022   
  
                                        Date of Discharge: 2022   
  
                                                              
  
                                        Admitting physician: Odell Bryan,  
 DO   
  
                                                              
  
                                        Discharge Attending:CARSON Huff CNP,   
  
                                        Primary Care Physician: CARSON Melendez CNP   
  
                                                              
  
                                                    HPI: Willow Bradshaw III was resting in bed on arrival to his room S/P   
LLE angiogram with Dr. Bryan this am. He was easily aroused to voice and 
reports he is comfortable at this time. His   
  
                                                    wife was sitting at bedside   
and plan for transfer to Upstate Golisano Children's Hospital to Dr. Breen's (   
vascular surgeon) care was discussed and she was informed that he will be 
admitted to UNC Health Johnston.                       
  
                                                              
  
                                        Review of Systems:    
  
                                        Review of Systems     
  
                                        Constitutional: Negative for chills, calista  
phoresis and fever.   
  
                                        HENT: Negative for nosebleeds.   
  
                                        Eyes: Negative for visual disturbance.   
  
                                        Respiratory: Negative for cough, shortne  
ss of breath and wheezing.   
  
                                        Cardiovascular: Negative for chest pain,  
 palpitations and leg swelling.   
  
                                                    Gastrointestinal: Negative f  
or abdominal pain, blood in stool, constipation,   
diarrhea, nausea and vomiting.            
  
                                        Genitourinary: Negative for hematuria.   
  
                                        Musculoskeletal: Negative for myalgias.   
  
                                        Skin: Negative for rash.   
  
                                        Neurological: Negative for dizziness and  
 syncope.   
  
                                        Hematological: Does not bruise/bleed eas  
james.   
  
                                        Psychiatric/Behavioral: Negative for dys  
phoric mood and suicidal ideas.   
  
                                        Denies Depression     
  
                                                              
  
                                        Physical Exam:        
  
                                        Physical Exam         
  
                                        Vitals reviewed.      
  
                                        Constitutional:       
  
                                        General: He is not in acute distress.   
  
                                                    Appearance: Normal appearanc  
e. He is well-developed. He is ill-appearing. He is  
 not diaphoretic.                         
  
                                        Interventions: Nasal cannula in place.   
  
                                        HENT:                 
  
                                        Head: Normocephalic and atraumatic.   
  
                                        Mouth/Throat:         
  
                                        Pharynx: No oropharyngeal exudate.   
  
                                        Eyes:                 
  
                                        General: No scleral icterus.   
  
                                        Right eye: No discharge.   
  
                                        Left eye: No discharge.   
  
                                        Neck:                 
  
                                        Thyroid: No thyromegaly.   
  
                                        Vascular: No JVD.     
  
                                        Cardiovascular:       
  
                                        Rate and Rhythm: Normal rate and regular  
 rhythm.   
  
                                        Chest Wall: PMI is not displaced.   
  
                                        Pulses:               
  
                                        Radial pulses are 2+ on the right side a  
nd 2+ on the left side.   
  
                                        Popliteal pulses are 1+ on the left side  
.   
  
                                        Dorsalis pedis pulses are 0 on the right  
 side and 0 on the left side.   
  
                                        Posterior tibial pulses are 0 on the rig  
ht side and 0 on the left side.   
  
                                        Heart sounds: Normal heart sounds. No mu  
rmur heard.   
  
                                        No gallop.            
  
                                        Pulmonary:            
  
                                                    Effort: Pulmonary effort is   
normal. No accessory muscle usage or respiratory   
distress.                                 
  
                                        Breath sounds: Normal breath sounds.   
  
                                        Abdominal:            
  
                                                    General: Abdomen is protuber  
ant. Bowel sounds are normal. There is no   
distension.                               
  
                                        Palpations: Abdomen is soft.   
  
                                        Tenderness: There is no abdominal tender  
ness.   
  
                                        Musculoskeletal:      
  
                                        General: Normal range of motion.   
  
                                        Right lower leg: No edema.   
  
                                        Left lower leg: No edema.   
  
                                        Skin:                 
  
                                        General: Skin is warm and dry.   
  
                                        Coloration: Skin is pale.   
  
                                                    Comments: Left foot, cool ex  
tremities hands and left lower leg foot, warm above  
 knee                                     
  
                                        Neurological:         
  
                                        Mental Status: He is alert and oriented   
to person, place, and time.   
  
                                        Psychiatric:          
  
                                        Behavior: Behavior is cooperative.   
  
                                                              
  
                                        Vitals:               
  
                                        22 1040 22 1045 22 104  
8 22 1105   
  
                                        BP: 109/61            
  
                                        Pulse: 93             
  
                                        Resp: 18 17 18 16     
  
                                        Temp:                 
  
                                        TempSrc:              
  
                                        SpO2: (!) 86% (!) 89% 91% 90%   
  
                                        Weight:               
  
                                                              
  
                                        Reason for Admission: PAD; post angiogra  
phy: occluded L-SFA pop, atherectomy   
  
                                                              
  
                                        Consultants: Vascular surgery   
  
                                                              
  
                                        HOSPITAL ADMISSION PROBLEM LIST:   
  
                                        Patient Active Problem List   
  
                                        Diagnosis             
  
                                        CAD (coronary artery disease)   
  
                                        Pure hypercholesterolemia   
  
                                        Type 2 diabetes mellitus (Formerly Chesterfield General Hospital)   
  
                                        FH: CABG (coronary artery bypass surgery  
)   
  
                                        COPD (chronic obstructive pulmonary dise  
ase) (Formerly Chesterfield General Hospital)   
  
                                        Delirium, acute       
  
                                        Respiratory failure following trauma and  
 surgery (Formerly Chesterfield General Hospital)   
  
                                                    Type 2 diabetes mellitus wit  
h hyperglycemia, without long-term current use of   
insulin (Formerly Chesterfield General Hospital)                             
  
                                        S/P CABG (coronary artery bypass graft)   
  
                                                    Coronary artery disease of n  
ative artery of native heart with stable angina   
pectoris (Formerly Chesterfield General Hospital)                            
  
                                        Acute deep vein thrombosis (DVT) of dist  
al end of right lower extremity (Formerly Chesterfield General Hospital)   
  
                                        On enteral nutrition   
  
                                        Leukocytosis          
  
                                        Acute respiratory insufficiency   
  
                                        Mass of upper lobe of left lung   
  
                                        Peripheral vascular disease (Formerly Chesterfield General Hospital)   
  
                                        Hypertension          
  
                                        Hypotension           
  
                                        Hyperlipidemia        
  
                                        DVT (deep venous thrombosis) (Formerly Chesterfield General Hospital)   
  
                                        Hypercholesterolemia   
  
                                        Sepsis due to pneumonia (Formerly Chesterfield General Hospital)   
  
                                        Acute respiratory failure (Formerly Chesterfield General Hospital)   
  
                                        CKD (chronic kidney disease), stage III   
(Formerly Chesterfield General Hospital)   
  
                                        Tobacco abuse         
  
                                        Obesity, Class II, BMI 35-39.9   
  
                                        Pneumonia due to organism   
  
                                        Atheroscler of native artery of both leg  
s with intermit claudication (Formerly Chesterfield General Hospital)   
  
                                        Acute kidney injury superimposed on   
parker kidney disease (Formerly Chesterfield General Hospital)   
  
                                        Hx of blood clots     
  
                                        History of squamous cell carcinoma   
  
                                        Diabetes (HCC)        
  
                                        Cancer (Formerly Chesterfield General Hospital)          
  
                                        Claudication of left lower extremity (HC  
C)   
  
                                        Myocardial infarction (Formerly Chesterfield General Hospital)   
  
                                        Chest pain            
  
                                        Status post insertion of drug eluting co  
ronary artery stent   
  
                                                    Status post insertion of johnny  
g-eluting stent into right coronary artery for   
coronary artery disease                   
  
                                        Accelerating angina (Formerly Chesterfield General Hospital)   
  
                                        Ischemia of left lower extremity   
  
                                        Hypertension associated with diabetes (H  
CC)   
  
                                        S/P peripheral artery angioplasty   
  
                                        Tobacco dependence    
  
                                        Arterial occlusion    
  
                                                    Critical ischemia of extremi  
ty with history of revascularization of same   
extremity (Formerly Chesterfield General Hospital)                           
  
                                        Acute occlusion of artery of lower extre  
mity due to thrombosis (Formerly Chesterfield General Hospital)   
  
                                        Acute decompensated heart failure (Formerly Chesterfield General Hospital)   
  
                                        Non-pressure chronic ulcer of left lower  
 leg with necrosis of muscle (Formerly Chesterfield General Hospital)   
  
                                        H/O fasciotomy        
  
                                        Peripheral artery disease (Formerly Chesterfield General Hospital)   
  
                                        Femoral-popliteal bypass graft occlusion  
, left (HCC)   
  
                                        Moderate malnutrition (HCC)   
  
                                        Chronic ulcer of left lower extremity wi  
th necrosis of muscle (HCC)   
  
                                        Post-op pain          
  
                                        CAD S/P percutaneous coronary angioplast  
y   
  
                                        PAD (peripheral artery disease) (HCC)   
  
                                                              
  
                                        Procedures:           
  
                                        2022:            
  
                                        Cath Summary:         
  
                                                              
  
                                        DX: Critical Limb Ischemia L leg   
  
                                                              
  
                                        DX Procedure:         
  
                                        LLE angio nonselective (L CFA) and selec  
tive (3rd order L PT)   
  
                                                              
  
                                        Intervention: None    
  
                                                              
  
                                        Findings:             
  
                                                    The LSFA remains occluded. C  
atheter in the popliteal artery a, TPT, and PT   
artery reveal patent vessels. Some residual clot in the SFA. There is severe 
small vessel disease with no flow (contrast penetr   
  
                                                    ation) into the foot despite  
 a patent vessel (no reflow). Arterial blood is   
very dark consistent with a very low oxy Hb level. This likely indicates 
stagnation of blood due to irreversibly damaged micro   
  
                                        vasculature due to chronic recurrent isc  
hemia and his vascular disease.   
  
                                                              
  
                                                    I feel that additional revas  
cularization is futile. Likely need amp. Discussed   
with Dr. Breen his lien standing Vascular Surgeon. The pt requests transfer to 
his service in Austin for the procedure.   
  
                                                              
  
                                        Plan pull sheath when ACT normalizes, wa  
it 2 hors and restart the heparin.   
  
                                                              
  
                                        Last Resulted: 22 09:48 EDT   
  
                                                              
  
                                                              
  
                                                              
  
                                                    HOSPITAL COURSE : Willow Bradshaw III III is a 65 y.o. male with a   
history of DVT, tobacco use, COPD, DM ll, HTN, hyperlipidemia,adenocarcinoma of 
Lung s/p radiation, PAD, and CAD s/p PCI ,   
  
                                                    CABG x3 2017. Coronary dis  
ease became active again. Cardiac cath 10/22/2020   
with occluded SVG-D1, patent LIMA-LAD/Dg, patent SVG to OM3 with identified 
stenosis of the RCA. Intervention of the distal   
  
                                                    RCA by Dr Vyas with 3 overlap  
ping SYDNIE and angioplasty of jailed RPL with good   
results. He has significant PAD with previous bilateral iliac stents.S/p Complex
 L LE revascularization (multiple times). Abn   
  
                                                    ormal follow-up arterial dup  
jacinda and recurrent LLE ischmic pain. He arrived to   
the hospital on 2022 for Selective LLE angio 2nd and 3rd order; complex 
intervention completed Suction atherectomy perfo   
  
                                                    rmed. Results sub optimal PT  
A entire SFA Pop 6 x 300 mm Balloon. Long   
inflations. Some improved flow.PTA P2 and P3 Pop TPT and PT prox with 3 x 30 
balloon followed by Repeat stenting of the P3 pop and T   
  
                                                    PT 3 mm stents. 1m g intra a  
rterial t-PA into tibials. Outflow improved.   
Occluded SFA remains/ EKOS 50 cm catheter placed distal SFA-Pop. Rt-PA started 
at 0.5 mg/hr. Heparin through sheath 500 u/hr. He   
  
                                                    was placed in the HLU for ov  
ernight stay with plan to re look at angio in am.   
Patient went to the cath today for LLE angio nonselective (L CFA) and selective 
(3rd order L PT) where results were severe s   
  
                                                    mall vessel disease with no   
flow (contrast penetration) into the foot despite a  
 patent vessel. Likely need amp. Discussed with Dr. Breen his long standing 
Vascular Surgeon. The pt requests transfer to   
  
                                                    his service in Austin for t  
he procedure. Transfer requested. Plan for patient   
to be admitted to UNC Health Johnston under the care of Dr. Breen. Plan for fellow to pull 
sheath when ACT normalizes, wait 2 hours and   
  
                                         restart the heparin per Dr. Bryan  
's recommendations.   
  
                                                              
  
                                                              
  
                                        Last Labs:            
  
                                        Lab Results           
  
                                        Component Value Date   
  
                                        WBC 7.4 2022    
  
                                        HGB 12.1 (L) 2022   
  
                                        HCT 37.3 (L) 2022   
  
                                        MCV 73.1 (L) 2022   
  
                                         2022    
  
                                                              
  
                                        Lab Results           
  
                                        Component Value Date/Time   
  
                                         2022 12:04 AM   
  
                                        K 3.8 2022 12:04 AM   
  
                                         2022 12:04 AM   
  
                                        CO2 22 2022 12:04 AM   
  
                                        BUN 15 2022 12:04 AM   
  
                                        CREATININE 1.24 2022 12:04 AM   
  
                                        GLUCOSE 150 2022 12:04 AM   
  
                                        CALCIUM 8.2 2022 12:04 AM   
  
                                                              
  
                                        Lab Results           
  
                                        Component Value Date   
  
                                        CHOL 195 2022   
  
                                                              
  
                                        Lab Results           
  
                                        Component Value Date   
  
                                        TRIG 180 (A) 2022   
  
                                                              
  
                                        Lab Results           
  
                                        Component Value Date   
  
                                        HDL 32 (L) 2022   
  
                                                              
  
                                        Lab Results           
  
                                        Component Value Date   
  
                                        LDLCALC 153 07/15/2016   
  
                                        LDLCHOLESTEROL 127 (A) 2022   
  
                                                              
  
                                        Final Principle Discharge Diagnosis:Crit  
ical Limb Ischemia Left leg   
  
                                                    Patient resting comfortably   
in bed with wife at bedside easily aroused to voice  
                                          
  
                                                    S/p Complex L LE revasculari  
zation (multiple times). Abnormal follow-up   
arterial duplex and recurrent LLE ischmic pain with severe small vessel disease 
with no flow into the foot s/p LLE angio this am   
  
                                                    Scheduled to transfer today   
to MetroHealth Cleveland Heights Medical Center for amputation of   
left lower leg with Dr. Breen.           
  
                                        Continues on prasugrel and heparin at th  
is time, maximum lipid therapies   
  
                                                              
  
                                        Secondary Discharge diagnosis:   
  
                                                    2. Coronary artery disease -  
 Stable Functional class I Malaysian cardiovascular   
function class 0 for angina               
  
                                                    S/p Left main territory sten  
ting with graft attrition- adequate   
revascularization at this time.           
  
                                                    Continue on medical therapy;  
 asa, high intensity statin, fibrate and metoprolol  
                                          
  
                                                              
  
                                        3. Hyperlipidemia     
  
                                                    Recent lipid panel with LDL1  
53 TG's 180, HDL 32 on maximal therapy -  
atorvastatin 80 mg daily, fenofibrate 150mg daily and Vascepa 2 g 2 x daily   
  
                                                    Consider that pt was not on   
any of his lipids for a month due to intermittent   
hospitalizations                          
  
                                        Recheck in 2 months after restarting the  
rapies   
  
                                                              
  
                                        Discharge Medications:   
  
                                                              
  
                                        Medication List       
  
                                                              
  
                                                              
  
                                        CONTINUE taking these medications   
  
                                                              
  
                                        Handicap Placard Misc   
  
                                        by Does not apply route Expires 2026   
  
                                                              
  
                                        OneTouch Delica Lancets 30G Misc   
  
                                        Twice a day           
  
                                                              
  
                                        OneTouch Verio w/Device Kit   
  
                                        once                  
  
                                                              
  
                                                              
  
                                                              
  
                                                              
  
                                        ASK your doctor about these medications   
  
                                                              
  
                                        acetaminophen 500 MG tablet   
  
                                        Commonly known as: TYLENOL   
  
                                        Take 2 tablets by mouth 3 times daily as  
 needed for Pain   
  
                                                              
  
                                        atorvastatin 80 MG tablet   
  
                                        Commonly known as: LIPITOR   
  
                                        Take 1 tablet by mouth daily   
  
                                                              
  
                                        buPROPion 150 MG extended release tablet  
   
  
                                        Commonly known as: ZYBAN   
  
                                        Take 1 tablet by mouth 2 times daily   
  
                                                              
  
                                        cilostazol 100 MG tablet   
  
                                        Commonly known as: PLETAL   
  
                                        One tablet by mouth daily   
  
                                                              
  
                                        Coenzyme Q10 100 MG Tabs   
  
                                                              
  
                                        dabigatran 150 MG capsule   
  
                                        Commonly known as: Pradaxa   
  
                                        Take 1 capsule by mouth 2 times daily   
  
                                                              
  
                                        empagliflozin 25 MG tablet   
  
                                        Commonly known as: Jardiance   
  
                                        Take 1 tablet by mouth daily   
  
                                                              
  
                                        fenofibrate 160 MG tablet   
  
                                        Commonly known as: TRIGLIDE   
  
                                        TAKE ONE (1) TABLET BY MOUTH DAILY   
  
                                                              
  
                                        gabapentin 300 MG capsule   
  
                                        Commonly known as: NEURONTIN   
  
                                        Take 1 capsule by mouth 3 times daily fo  
r 30 days. Intended supply: 30 days   
  
                                                              
  
                                        Icosapent Ethyl 1 g Caps capsule   
  
                                        Commonly known as: VASCEPA   
  
                                        Take 2 capsules by mouth 2 times daily   
  
                                                              
  
                                        magnesium 250 MG Tabs tablet   
  
                                        Commonly known as: MAGNESIUM-OXIDE   
  
                                                              
  
                                        metFORMIN 500 MG extended release tablet  
   
  
                                        Commonly known as: GLUCOPHAGE-XR   
  
                                        Take 1000mg by mouth with breakfast and   
500mg by mouth with dinner.   
  
                                        Resume Saturday.      
  
                                                              
  
                                        metoprolol tartrate 25 MG tablet   
  
                                        Commonly known as: LOPRESSOR   
  
                                        Take 1 tablet by mouth 2 times daily   
  
                                                              
  
                                        OneTouch Verio strip   
  
                                        Generic drug: blood glucose test strips   
  
                                        1 each by In Vitro route 2 times daily A  
s needed.   
  
                                                              
  
                                        pantoprazole 40 MG tablet   
  
                                        Commonly known as: PROTONIX   
  
                                        Take 1 tablet by mouth every morning (be  
fore breakfast)   
  
                                                              
  
                                        Praluent 75 MG/ML Soaj injection pen   
  
                                        Generic drug: alirocumab   
  
                                        Inject 1 mL into the skin every 14 days   
  
                                                              
  
                                        prasugrel 10 MG Tabs   
  
                                        Commonly known as: EFFIENT   
  
                                        Take 1 tablet by mouth daily   
  
                                                              
  
                                        sildenafil 100 MG tablet   
  
                                        Commonly known as: Viagra   
  
                                        Take 1 tablet by mouth as needed for Ere  
ctile Dysfunction   
  
                                                              
  
                                                              
  
                                                              
  
                                                              
  
                                        ICD Registry Information/AMI Registry In  
formation   
  
                                                              
  
                                        NYHA Functional Classification: Class 0   
  
                                                              
  
                                        Medical Therapy       
  
                                        Aspirin: Yes If NO reason for omission   
  
                                                              
  
                                        ACE/ARB: No If NO reason for omission no  
t indicated   
  
                                                              
  
                                        Statin:Yes If NO reason for omission   
  
                                                              
  
                                        Beta Blocker:Yes If NO reason for omissi  
on   
  
                                                              
  
                                        P2Y12 Inhibitors :Yes If NO reason for o  
mission   
  
                                                              
  
                                        Aldosterone inhibitor :No If NO reason f  
or omission not indicated   
  
                                                              
  
                                                    Cardiac rehab Discussed with  
 patient . No If NO reason for omission Surgical   
intervention                              
  
                                                              
  
                                        Cardiac Rehab         
  
                                                              
  
                                                    The Cardiac Rehab team at Southview Medical Center consists of highly skilled exercise   
physiologists, nurses, respiratory therapists and physicians working together 
with you. Our purpose is to help you have a full recover   
  
                                                    y and achieve the goals you   
set for yourself. Over the years many of our   
patients have returned to activities they assumed they would never do again! We 
can help restore your confidence and motivation t   
  
                                                    o make lifestyle changes autumn  
t can have a significant impact on your health and   
quality of life!                          
  
                                                    We can help answer questions  
 and concerns you may have about exercise,   
lifestyle, medications, diet, stress and anxiety which are common following a 
hospitalization. We monitor ECG and vital signs durin   
  
                                        g exercise and discuss your progress wit  
h you and report to your physician.   
  
                                                    Cardiac Rehab is proven to h  
elp reduce readmissions, improve functional   
capacity and lower recurrence of problems with your heart. We have facilities at
 both Aspirus Keweenaw Hospital and Wyandot Memorial Hospital. At both l   
  
                                                    ocations we have Audience Partners parking which is free and our sites are easily   
accessible.                               
  
                                                              
  
                                                    For both Adventist Health Tulare you can co  
ntact us at (445) 986-4804. We invite you to call   
us with your questions or to get started in our program. If you have other 
questions or concerns be sure to ask your provide   
  
                                        r during your follow up visit. We look f  
orward to seeing you there.   
  
                                                              
  
                                        Our locations:        
  
                                        Cleveland Clinic Avon Hospital   
  
                                        95 Arch St. G-25 155 5th Kindred Healthcare   
  
                                        Ground Floor Suite SZG202 - Ground floor  
   
  
                                                              
  
                                        BMI Classification:   
  
                                        Obese (BMI 30.0-39.9)   
  
                                                              
  
                                        DIET: A lowfat, low cholesterol diet was  
 discussed with the patient.   
  
                                                              
  
                                        Discharge to MetroHealth Cleveland Heights Medical Center   
  
                                                              
  
                                        Condition at Discharge: fair   
  
                                                              
  
                                        Follow up with cardiology   
  
                                                              
  
                                        If any questions call Aultman Hospital office 330-3  
   
  
                                                              
  
                                        Total time spent for Discharge time grea  
ter than 31 minutes   
  
                                                              
  
                                        Electronically signed by CARSON Thomas CNP on 22 at 11:36 AM EDT   
  
                                                              
  
                                                              
  
                                        Electronically signed by Odell cassidy DO at 2022 12:53 PM EDT   
  
                                                              
  
                                        Associated attestation - Marilin Bryan DO - 2022 12:53 PM EDT   
  
                                        Formatting of this note might be differe  
nt from the original.   
  
                                                    I personally saw and evaluat  
ed this patient prior to DC. I examined him,   
reviewed the meds and the follow-up information. I was personally involved in, 
and initiated the medical therapy to be used at DC and the follow-up to be done.
                                          
  
                                                              
  
                                        documented in this encounter   
   
                    2022 History of Formatting of this note is different f  
rom the original.   
Trinity Health System  
  
                    Present illness Narrative Hematology/Oncology TeleVisit   
  
                                                              
  
                                        Willow Bradshaw      
  
                                        1957              
  
                                        Encounter Date: 2022   
  
                                                              
  
                                                    Patient name and birth date   
confirmed. Patient's wife calling from hospital.   
Permission for telephone visit was obtained.   
  
                                                              
  
                                        Cancer Staging        
  
                                        No matching staging information was foun  
d for the patient.   
  
                                                              
  
                                                    HPI: Willow Bradshaw is a 6  
5 year old gentleman with clinical stage at least   
IB (cT2 cN0 cM0) G1 adenocarcinoma of the CASSANDRA.   
  
                                                              
  
                                                    Mr. Bradshaw initially prese  
nted 2017 for CABG on 17 secondary to   
significant symptomatic angina and CAD. His course was complicated by hypoxic 
respiratory failure. CT of the chest on 17 show   
  
                                        ed a spiculated mass, 1.3 cm, in the lef  
t upper lobe.   
  
                                                              
  
                                                    He reports that he did not k  
now that he had this nodule until ~6 months after   
his CABG.                                 
  
                                                              
  
                                        CT chest 10/20/17 showed the CASSANDRA lesion   
had increased to 1.8 x 1.2 cm.   
  
                                                              
  
                                                    PET/CT performed on 17   
showed a 1.8 cm CASSANDRA lesion with max SUV 2.2.   
Otherwise LUCIANA.                            
  
                                                              
  
                                                    Pulmonary function testing o  
n 17 showed FEV1 97%, VC 99%, FEV1/FVC 74%,   
DLCO 44%.                                 
  
                                                              
  
                                                    Percutaneous biopsy was atte  
mpted on 18, however, the nodule could not be   
safely biopsied and attempt was aborted. At this time, it was decided to 
serially follow the lesion.               
  
                                                              
  
                                                    CT chest on 18 showed a   
stable CASSANDRA nodule, and an unchanged 6 x 3 mm left   
lingual nodule.                           
  
                                                              
  
                                                    CT chest on 10/4/18 showed e  
nlargement of the CASSANDRA nodule 2.5 x 1.3 cm and a   
stable 6 mm lingula nodule.               
  
                                                              
  
                                                    CT performed to assess for p  
ulmonary embolism on 18 showed new   
infiltrates in the RUL with a nodular are posteriorly 1.7 x 1.4 cm, new. There 
is a small nodule in the RUL 5.4 mm. Spiculated mass i   
  
                                                    n the CASSANDRA is 3.6 x 2.4 cm. +  
Mediastinal adenopathy, largest is a right   
paratracheal node 18.5 mm.                
  
                                                              
  
                                                    He underwent EBUS and biopsy  
 on 18. 4L node insufficient sample, 4R no   
malignant cells, 7 rare atypical cells of uncertain significance, 10R negative, 
10L atypical cells of uncertain significance   
  
                                                    (appears to be artifact). JUANA  
L transbronchial biopsy showed well differentiated   
adenocarcinoma.                           
  
                                                              
  
                                                    PET/CT 19 showed a few s  
cattered mediastinal LN, not FDG avid (0.7 x 1.3   
cm, SUV 1.9). Mildly hypermetabolic CASSANDRA nodule 1.2 x 2.1 cm (SUV 2.3).   
  
                                                              
  
                                        PFTs show mild large airway obstruction   
and moderately decreased DLCO.   
  
                                                              
  
                                        He was treated with SBRT from 3/11/19-3/  
15/19.   
  
                                                              
  
                                                    CT 19 showed stable ling  
darlene lesion and a tiny subpleural nodule in the left  
 lower lobe.                              
  
                                                              
  
                                        Inerterval History    
  
                                                    Mr. Bradshaw presents today   
in follow up of his lung cancer. He was admitted   
with ischemia of the left lower extremity recently, has had repeated surgeries 
and is going to need an amputation. Has stoppe   
  
                                                    d smoking. He is eating and   
drinking well. He has no nausea, vomiting,   
diarrhea, constipation. He had cardiac stent placement in 10/2020.   
  
                                                              
  
                                                    CT chest 20 with stable  
 changes. CT chest 2020 stable. CT chest   
2021 stable. CT chest 21 stable architectural distortion in the 
lingula. Subpleural density in the right lower lobe,   
  
                                                    suggestive of focal inflamma  
tory or infection process. CT 22 with   
increasing RLL density.                   
  
                                                              
  
                                        PAST MEDICAL HISTORY   
  
                                        Diagnosis Date        
  
                                                    Acute deep vein thrombosis (  
DVT) of popliteal vein of left lower extremity   
(HCC)                                     
  
                                        CAD (coronary artery disease)   
  
                                        Cerebral artery occlusion with cerebral   
infarction (HCC)   
  
                                                    Controlled type 2 diabetes m  
ellitus with diabetic peripheral angiopathy without  
 gangrene, without long-term current use of insulin (HCC)   
  
                                                    Controlled type 2 diabetes m  
ellitus, without long-term current use of insulin   
(HCC)                                     
  
                                        COPD (chronic obstructive pulmonary dise  
ase) (HCC)   
  
                                        Essential hypertension   
  
                                        History of squamous cell carcinoma   
  
                                        Hyperlipidemia        
  
                                        Lung cancer (HCC)     
  
                                        adenocarcinoma CASSANDRA    
  
                                        Peripheral vascular disease (Formerly Chesterfield General Hospital)   
  
                                        Personal history of DVT (deep vein throm  
bosis)   
  
                                                              
  
                                        PAST SURGICAL HISTORY   
  
                                        Procedure Laterality Date   
  
                                        ANGIOPLASTY FEMORAL/POP Left   
  
                                        2019              
  
                                        ANGIOPLASTY PCI 02/01/2014   
  
                                        x5                    
  
                                        BYPASS W/VEIN FEMORAL-POPLITEAL 20  
22   
  
                                        CORONARY ARTERY BYPASS GRAFT 2017   
  
                                        3 vessel              
  
                                        VASCULAR SURGERY PROCEDURE 2019   
  
                                        femoral endarterectomy   
  
                                                              
  
                                        Current Outpatient Medications   
  
                                        Medication Sig Dispense Refill   
  
                                                    buPROPion HCL, smoking deter  
, 150 mg tab ER 12 hr Take 1 tablet by mouth once   
daily. 90 tablet 3                        
  
                                        enoxaparin (LOVENOX) 100 mg/mL syrg INJE  
CT 1 ML INTO THE SKIN 2 TIMES DAILY   
  
                                                    oxyCODONE IR (ROXICODONE) 5   
mg immediate release tablet Take 5 mg by mouth   
every 6 hours as needed.                  
  
                                        ONETOUCH VERIO TEST STRIPS test strip   
  
                                        ONETOUCH VERIO REFLECT METER   
  
                                        ONETOUCH DELICA PLUS LANCET 33 gauge   
  
                                                    nitroglycerin sublingual (NI  
TROQUICK) 0.4 mg SL tablet Dissolve 0.4 mg under   
the tongue.                               
  
                                        pantoprazole DR (PROTONIX) 40 mg tablet   
  
                                        JARDIANCE 10 mg tablet   
  
                                        VASCEPA capsule TAKE 2 CAPSULES BY MOUTH  
 2 TIMES DAILY   
  
                                        alirocumab (PRALUENT PEN) 75 mg/mL pen I  
nject 75 mg subcutaneously.   
  
                                        ubidecarenone Q-10 (COENZYME Q-10) 10 mg  
 cap Take by mouth.   
  
                                        Magnesium 250 mg tab Take 250 mg by mout  
h.   
  
                                        sildenafil (VIAGRA) 100 mg tablet Take 1  
00 mg by mouth.   
  
                                                    metFORMIN ER (GLUCOPHAGE XR)  
 500 mg 24 hr tablet Take 1000mg by mouth with   
breakfast and 500mg by mouth with dinner   
  
                                        Fenofibrate (LOFIBRA) 160 mg tablet Take  
 160 mg by mouth.   
  
                                        lisinopril 2.5 mg tablet Take 2.5 mg by   
mouth.   
  
                                        metoprolol succinate ER (TOPROL XL) 25 m  
g 24 hr tablet Take 25 mg by mouth.   
  
                                        cilostazol (PLETAL) 100 mg tablet Take 1  
00 mg by mouth.   
  
                                                              
  
                                        No current facility-administered medicat  
ions for this visit.   
  
                                                              
  
                                        ALLERGIES             
  
                                        Allergen Reactions    
  
                                        Menthol Rash          
  
                                        Topical like vicks vapor   
  
                                        Mint Chocolate Chip* Other: See Comments  
   
  
                                        Mouth burning with mint flavor   
  
                                        Just MINT no chocolate chip   
  
                                                              
  
                                                              
  
                                        FAMILY HISTORY        
  
                                        Problem Relation Age of Onset   
  
                                        Cancer Mother         
  
                                                              
  
                                        Social History        
  
                                                              
  
                                        Tobacco Use           
  
                                        Smoking status: Every Day   
  
                                        Types: Cigars         
  
                                        Smokeless tobacco: Never   
  
                                        Tobacco comments:     
  
                                        quit cigarettes 20 years ago, smokes cig  
ars daily now 2-3   
  
                                        Substance Use Topics   
  
                                        Alcohol use: No       
  
                                                              
  
                                        Review of Systems:    
  
                                        Negative except as noted in HPI   
  
                                                              
  
                                        Physical Exam:        
  
                                        There were no vitals taken for this visi  
t.   
  
                                        ECOG PS: 3            
  
                                                              
  
                                        Deferred today for televisit   
  
                                                              
  
                                        Lab Results           
  
                                        Component Value Date   
  
                                        WBC 11.1 (A) 2020   
  
                                        HB 15.3 2020    
  
                                        MCV 84.7 2020   
  
                                                              
  
                                        Lab Results           
  
                                        Component Value Date   
  
                                         2020     
  
                                        K 4.4 2020      
  
                                        CO2 22 2020     
  
                                        CREAT 1.57 (A) 2020   
  
                                        TPROT 6.9 2020   
  
                                        ALKPHOS 80 2020   
  
                                        ALT 23 2020     
  
                                        AST 23 2020     
  
                                                              
  
                                                    Assessment and Plan: Mr. Titus handy is a pleasant 65 year old gentleman with at   
least stage IIA (cT2 cN0 cM0) G1 adenocarcinoma of the CASSANDRA.   
  
                                                              
  
                                        CASSANDRA adenocarcinoma    
  
                                                    -Discussed role of surgery,   
radiation, and systemic therapy in the treatment of  
 lung cancer.                             
  
                                                    -We discussed that the ideal  
 treatment for early stage lung cancer is with a   
lobectomy with lymphadenectomy.           
  
                                        -Patient elected to have SBRT.   
  
                                                    -CT chest 19 with mild  
 interval increase size of 1.4 cm left hilar LN,   
otherwise stable. CT chest on 20 with no changes. CT chest 2020 stable. 
CT chest 2021 stable. CT chest 21 with inflammatory nodule   
  
                                                    -CT chest on 22 with inc  
reased size of RLL lesion, PET/CT or biopsy   
advised. Discussed PET/CT once his amputation is complete. Patient's wife to 
call.                                     
  
                                                              
  
                                        LLE ischemia          
  
                                        -Patient currently in the hospital, stephon  
g for likely amputation.   
  
                                                              
  
                                        Neuropathy after surgery   
  
                                        -Gabapentin 300 mg tid   
  
                                                              
  
                                        COVID risk            
  
                                        -Advised him to take COVID vaccine. Addr  
essed his concerns as best I could.   
  
                                        -He still does not want to take the vacc  
ine.   
  
                                                              
  
                                                    I spent 15 minutes in direct  
 conversation with the patient and >50% was in   
counseling and coordination of care.      
  
                                                              
  
                                        Elizabeth Miles MD   
  
                                                              
  
                                                              
  
                                        Electronically signed by Elizabeth rodas MD at 2022 10:25 AM EDT   
  
                                        documented in this encounter   
   
                    2022 Note                         Munson Healthcare Cadillac Hospital  
   
                          2022 History of     Formatting of this note migh  
t be different from the   
original.                               Corey Hospital  
  
                    Present illness Narrative Images from the original note were  
 not included. Work   
Phone: 7(117)204-8479  
  
                                                    Wound care consulted for wou  
nd VAC dressing change to E. Pt known to   
Inpatient Wound Care from previous admissions. Pt reports he has a F/U 
appointment with Dr Breen next .   
  
                                                    Pt's wife present in room du  
ring dressing change. Pt premedicated for pain per   
staff RN with oral pain medication, prior to wound VAC dressing change. Pt very 
familiar with wound VAC dressing change. Ol   
  
                                                    d wound VAC dressing soaked   
with saline and removed without difficulties from   
left lateral calf. Left lateral calf with full thickness surgical wound 
measuring 11.7 x 3.3 x 1cm deep. Wound bed with 98%   
  
                                                    red tissues, 2% thin yellow   
(see photo below). Periwound clear, no erythema or   
induration. Scant amount of serous drainage noted in VAC canister. Slight odor 
noted when dressing removed but resolved onc   
  
                                                    e old dressing discarded and  
 wound cleansed. No purulence noted. Wound and   
periwound cleansed with saline, patted dry and cavilon no sting applied to 
periwound. Mepitel one applied over wound bed, toppe   
  
                                                    d with 1 piece of black foam  
. Wound VAC well sealed at 125 mmHg continuous   
suction. Pt tolerated the dressing well.   
  
                                                    D/W staff RN, pt may possibl  
y be discharged later today. Staff RN aware of   
changing pt over to home wound VAC pump. Pt's home VAC in room and pt's wife 
reports it is charged.                    
  
                                                              
  
                                                    Left medial calf wound close  
d with dry thin tan eschar. No drainage or erythema  
 noted. Cleansed with saline, patted dry and left open to air.   
  
                                                    +1 doppler Left DP and PT pu  
lses. Trace pitting Left LE edema. Left heel   
intact.                                   
  
                                        Wound Care will continue to follow pt.   
  
                                                    Electronically signed by Ilene Shell RN on 2022 at 11:31 AM   
  
Electronically signed by Becky Shell RN at 2022 11:31 AM EDT  
  
Formatting of this note might be different from the original.   
  
                                        S/P PCI left main.    
  
                                                    Right femoral sheath intact   
without bleeding or hematoma. Good arterial   
waveform. Patient denies any chest pain and states he feels great. Wife at 
bedside. Verified that patient has been taking prasugre   
  
                                                    l for past 2 weeks without i  
nterruption and did take it this morning.   
  
Electronically signed by CARSON Cote CNP at 2022 1:25 PM 
EDTdocumented in this encounter           
   
                          2022 Alta View Hospital       Tish Garcia APRN - CNP   
- 2022  
  
Formatting of this note is different from the original. SUMMA  
  
                          Discharge instructions     Call your doctor with any m  
edication questions or if you  
 notice any side effects from your medications. Work Phone: 0(022)776-9415  
  
                                                     If you are unable to fill y  
our medications, please call your Cardiologist   
immediately. The office number is located with your follow-up appointment 
information.                              
  
                                         Call your doctor if any redness or drai  
nage from the wound site.   
  
                                         DO NOT stop taking your medication unle  
ss instructed to do so by your doctor.   
  
                                                     Read the drug information m  
aterial that were given to you and take medications  
 as instructed by your doctor. New drugs may have been added to your 
medications, that will strengthen your heart and prevent re-stenosis of the 
coronary arteries.                        
  
                                                     Drink 6 glasses of water (8  
 ounces each) over the next 24 hours. Water helps   
clear the dye from your body.             
  
                                         No alcoholic beverages for 24 hours. It  
 may interfere with healing.   
  
                                         No exercise or sex for 5 days.   
  
                                                     Call 911 for chest pain, ar  
m pain, nausea, neck pain, dizziness or unusual   
sweating AND your pain has not relieved with 2 doses of Nitroglycerin.   
  
                                                              
  
                                                     Call your doctor if a lump   
at the puncture site enlarges or is larger than a   
golf ball. Call your doctor for severe pain to a light touch or for numbness, 
tingling or swelling of the affected foot.   
  
                                                     Call your doctor for increa  
sed area of bruising with discoloration extending   
down the leg.                             
  
                                         Call your doctor for coolness of the le  
g or foot.   
  
                                                              
  
                                                     If bleeding occurs, lie brigid  
n on a hard surface preferably the floor and apply   
pressure to the site for 20 minutes if BLEEDING continues CALL 911.   
  
                                         OK to shower. No tub baths, swimming po  
ols, or hot tub soaking for five days.   
  
                                                     Wash site daily with soap a  
nd water, dry gently. The healing wound should   
remain soft and dry. Keep the site clean and dry.   
  
                                         Cover with large band aid and change dr  
ida for total of five days.   
  
                                         No lifting, pushing or pulling more atuumn  
n 5 pounds for 5 days.   
  
                                         No driving for three days.   
  
                                         Limit your stair climbing for three day  
s.   
  
                                                              
  
                                        GIVE PCI PACKET (FROM CATH LAB) TO MEME  
NT   
  
                                                              
  
                                        Give Coronary Artery Discharge Book.   
  
                                                              
  
                                                    PLEASE CALL YOUR HEART DOCTO  
R IF YOU CANNOT GET YOUR MEDICATIONS. THE NUMBER IS  
 LISTED WITH YOUR FOLLOW-UP APPOINTMENT.   
  
                                                              
  
                                        Procedure Sedation Instructions   
  
                                                              
  
                                        1. If you have received sedation: you mu  
st have someone drive you home   
  
                                                    2. You should not drive a ca  
r, operate machinery, drink alcohol or perform any   
activity that requires alertness for the rest of the day. The effects of the 
sedative should be gone by tomorrow.      
  
                                                              
  
                                        Blood work in 5-7 days, see orders   
  
                                                              
  
                                        Cardiac Rehab         
  
                                                              
  
                                                    The Cardiac Rehab team at Southview Medical Center consists of highly skilled exercise   
physiologists, nurses, respiratory therapists and physicians working together 
with you. Our purpose is to help you have a full recover   
  
                                                    y and achieve the goals you   
set for yourself. Over the years many of our   
patients have returned to activities they assumed they would never do again! We 
can help restore your confidence and motivation t   
  
                                                    o make lifestyle changes autumn  
t can have a significant impact on your health and   
quality of life!                          
  
                                                    We can help answer questions  
 and concerns you may have about exercise,   
lifestyle, medications, diet, stress and anxiety which are common following a 
hospitalization. We monitor ECG and vital signs eugenio siddiqui exercise and discuss your progress wit  
h you and report to your physician.   
  
                                                    Cardiac Rehab is proven to h  
elp reduce readmissions, improve functional   
capacity and lower recurrence of problems with your heart. We have facilities at
 both Aspirus Keweenaw Hospital and Wyandot Memorial Hospital. At both l   
  
                                                    ocations we have street Familybuildere  
l parking which is free and our sites are easily   
accessible.                               
  
                                                              
  
                                                    For both Adventist Health Tulare you can co  
ntact us at (044) 029-7963. We invite you to call   
us with your questions or to get started in our program. If you have other 
questions or concerns be sure to ask your provide   
  
                                        r during your follow up visit. We look f  
orward to seeing you there.   
  
                                                              
  
                                        Our locations:        
  
                                        Cleveland Clinic Avon Hospital   
  
                                        95 Arch St. G-25 155 5th Presbyterian Española Hospital NLake Martin Community Hospital   
  
                                        Ground Floor Suite SUA059 Ground floor   
  
                                        documented in this encounter   
   
                          2022 History of     Formatting of this note migh  
t be different from the   
original.                               JANELLE  
  
                          Present illness Narrative Pt pain tolerable, free from  
 nausea. Discharge   
instructions reviewed at bedside.   
  
Electronically signed by Soheila Hamlin RN at 2022 2:51 PM EDTdocumented 
in this encounter                       Work Phone: 2(237)978-4353  
   
                          2022 Alta View Hospital       Scott Pagan MD - 2022  
  
Formatting of this note might be different from the original. Corey Hospital  
  
                    Discharge instructions Blood Thinner Medication: Work Phone:  
 8(913)508-4982  
  
                                                    You may be prescribed a bloo  
d thinning medication before or after your   
procedure. It is important to start taking or resume your medication as 
instructed by your doctor.                
  
                                                              
  
                                        Cholesterol (Statin):   
  
                                                    You may be prescribed a chol  
esterol medication known as a statin. It is   
important to take this medication as instructed by your doctor. One common side 
effect of this type of medication is muscle aches. If this occurs, you should 
stop taking it.                           
  
                                                              
  
                                        Pain Control:         
  
                                                    You may be prescribed an opi  
ate pain medication after your procedure. These are  
 otherwise known as narcotics and should be taken only as prescribed. DO NOT 
operate a vehicle, heavy machinery, appliances   
  
                                                    , or drink alcohol while on   
this medication. For additional pain/swelling   
relief, you may ice and elevate the surgical site(s).   
  
                                                    Note: It is normal to have a  
 certain degree of pain after an operation. Our   
office is only able to prescribe pain medications within a short period after 
surgery with few exceptions. Due to certain limi   
  
                                                    tations, prescriptions may n  
eed to be picked up in person. If requiring a   
refill over a weekend, we ask that you please call our office before 1:00pm on 
Friday.                                   
  
                                                              
  
                                        Constipation:         
  
                                                    One of the side effects of o  
piate medications is constipation. We recommend   
that patients take precautions to prevent this:   
  
                                        1. Drink plenty of water (6-8 glasses of  
 8 oz. per day).   
  
                                        2. Avoid alcohol or excessive caffeine.   
  
                                        3. Eat plenty of fiber (fruits, vegetabl  
es and whole grains).   
  
                                                    4. Take an over the counter   
stool softener (Colace or Miralax) as instructed on  
 the bottle. You can take this each day that you continue to take opiates.   
  
                                                              
  
                                        Nausea:               
  
                                                    Some pain medications may ca  
use you to feel nauseous. If this occurs, you can   
call your doctor who may prescribe anti-nausea medication as needed.   
  
                                                              
  
                                        Diet:                 
  
                                                    Resume low-fat, low choleste  
rol diet high in vegetables. Make sure to include   
protein with each meal while recovering from surgery. If you are on a specific 
type of diet for your condition, please resume that instead.   
  
                                                              
  
                                        Activity:             
  
                                                    DO NOT lift anything over 10  
 pounds for 2 weeks. You may slowly resume normal   
activities as tolerated with certain restrictions.   
  
                                                              
  
                                        Drivin. DO NOT operate a motor vehicle unless  
 released by your doctor   
  
                                        2. DO NOT operate a motor vehicle if you  
 are still on pain medicine   
  
                                                    3. DO NOT operate a motor ve  
hicle unless you can safely press the gas and   
brake, even under emergency conditions.   
  
                                                              
  
                                        Emergency:            
  
                                                              
  
                                        1. Call 911 if you develop sudden chest   
pain or shortness of breath.   
  
                                                    2. If you develop signs of i  
nfection, you should call our office as soon as   
possible. These include: redness, warmth, severe swelling, pus-like drainage, 
and fever of >100 degrees Fahrenheit.     
  
                                                              
  
                                                    If you have any additional q  
uestions about your surgery, please call our   
office.                                   
  
                                        documented in this encounter   
   
                          2022 Miscellaneous  Formatting of this note is d  
ifferent from the original.  
                                        Trinity Health System  
  
                    Notes               Patient called requesting the following   
refill   
  
                                                              
  
                                        Refill(s) Requested:   
  
                                                              
  
                                        Pending Prescriptions Disp Refills   
  
                                        BUPROPION  MG TABLET,12 HR SUSTAI  
LUCIANA-RELEASE(SMOKING DETERRENT)   
  
                                        Sig: Take 1 tablet by mouth.   
  
                                        WENDIE: No               
  
                                                              
  
                                        ALLERGIES             
  
                                        Allergen Reactions    
  
                                         Menthol Rash         
  
                                        Topical like vicks vapor   
  
                                         Mint Chocolate Chip* Other: See Comment  
s   
  
                                        Mouth burning with mint flavor   
  
                                        Just MINT no chocolate chip   
  
                                                              
  
                                                              
  
                                        Phone Number: 162.752.4951 (home) 104-92 2-0812 (cell)   
  
                                        Last Office Visit Date: Visit date not f  
ound   
  
                                        Last Distance Health Visit: Visit date n  
ot found   
  
                                        Future Appointment: 2022   
  
                                                              
  
                                        The patients preferred pharmacy has been  
 captured for this encounter? yes   
  
                                                              
  
                                        Request is for script(s) to be escript t  
o pharmacy.   
  
                                                              
  
                                        Tonie Mcmahan          
  
                                                              
  
                                                      
  
Electronically signed by Tonie Mcmahan at 2022 1:01 PM EDTdocumented in 
this encounter                            
   
                          2022 Alta View Hospital       Susan Bella RN -   
  
Formatting of this note might be different from the original. SUMMA  
  
                          Discharge instructions    Shower with and antibacteria  
l soap such as Dial or   
Safeguard.                              Work Phone: 2(035)068-6579  
  
                                                              
  
                                                    You may use the parking in Mason General Hospital Main deck. Take the level one bridge to the    
building and follow the signs for same day surgery. Check in at the desk.   
  
                                                              
  
                                                    Please bring your Holmes County Joel Pomerene Memorial Hospital Surgical Information folder on the day of   
surgery.                                  
  
                                                              
  
                                                    Please beth the last dose ta  
herber (date and time ) on your Daily Medications List  
 provided in your After Visit Summary.    
  
                                                              
  
                                        Please bring a photo ID and insurance in  
formation   
  
                                                              
  
                                                    Do NOT take the following me  
dications on the morning of surgery lisinopril ,   
vitamins. jardiance                       
  
                                                              
  
                                        Hold one dose of pradexa   
  
                                                              
  
                                        Hold metformin 2 days prior   
  
                                                              
  
                                                    TAKE the following medicatio  
ns the morning of your surgery pletal , metoprolol,  
 wellbutrin, pantoprazole                 
  
                                                              
  
                                                    You may take your prescripti  
on pain medications. You may take Tylenol   
(Acetaminophen) if needed for pain.       
  
                                                              
  
                                                    No Motrin, Ibuprofen, or Adv  
il 24 hours prior to surgery, or longer if   
instructed by your surgeon.               
  
                                                              
  
                                                    No Aleve or Naprosyn 3 days   
prior to surgery, or longer if instructed by your   
surgeon.                                  
  
                                                              
  
                                        If you are on BLOOD THINNERS or ASPIRIN,  
 pradexa hold 1 dose   
  
                                                              
  
                                        Defer to cardiology regarding derrick osuna will call you today   
  
                                                              
  
                                                    Additional instructions foll  
ow any further instructions that your surgeon may   
have given to you                         
  
                                                              
  
                                                    You will receive a reminder   
call the day before surgery with your Same Day   
Surgery arrival time.                     
  
                                                              
  
                                        If you have specific questions, please c  
all your surgeon.   
  
                                                              
  
                                                      
  
The following attachments cannot be sent through Care Everywhere.Wound: 
Debridement: Post-op (English)Wound: Debridement: Pre-op (English)documented in 
this encounter                            
   
                    03- Note     HNO ID: 2269857884  Ararat General Medica  
l  
  
                                        Author: CARSON Moore.Berkshire Medical Center Center  
  
                                        Service: ?            
  
                                        Author Type: Nurse Practitioner   
  
                                        Type: Progress Notes   
  
                                        Filed: 3/11/2022 3:18 PM   
  
                                        Note Text:            
  
                                        Trinity Health System Ararat General Primary C  
are Green   
  
                                        1946 Idalou, OH 48280   
  
                                        Phone: 334.655.2846   
  
                                        Fax: 318.258.9578     
  
                                        Date of Evaluation: 3/10/2022   
  
                                        Patient Name: Willow Bradshaw   
  
                                        : 1957         
  
                                        MRN: 60407344901      
  
                                        Chief Complaint:      
  
                                        Patient presents with:   
  
                                        Establish Care        
  
                                        Hospital Follow Up    
  
                                        Nursing Intake:       
  
                                        Nursing Notes:        
  
                                        Elen Soriano MA 3/10/2022 10:04 AM S  
igned   
  
                                        Willow Bradshaw is a 65 year old male w  
ho presents for Rhode Island Homeopathic Hospital Care and   
  
                                        Hospital Follow Up. The patient states t  
hat he would like to get establish   
  
                                        and has no concerns.   
  
                                        Elen Soriano MA   
  
                                        Subjective            
  
                                        Mr. Bradshaw is a 65 year old male who p  
resents with the following   
  
                                        complaint(s): hospital follow-up   
  
                                        HPI                   
  
                                        Patient is being seen today for hospital  
 follow-up. He was admitted to   
  
                                        Cleveland Clinic Children's Hospital for Rehabilitation on 2022 and discharged on 3/7  
/2022. He was admitted for   
  
                                        thrombosis of left lower extremity bypas  
s. The performed a thrombectomy   
  
                                        which was successful. He was started on   
Effient and Plavix was   
  
                                        discontinued. He was sent home with Kettering Health Washington Township home health for wound care and   
  
                                        to manage wound vac. Patient had a revas  
cularization surgery on 21   
  
                                        and has been having complications since   
then. He underwent a left fem-   
  
                                        pop bypass on 2022. He then underwen  
t left lower extremity   
  
                                        fasciotomies on 2022 and left lower  
 extremity fasciotomy debridement   
  
                                        and wound VAC on 2022 due to necroti  
c tissue. CTA with bilateral   
  
                                        lower extremity runoff on 2022 show  
ed occluded left SFA and popliteal   
  
                                        artery, occluded left femoropopliteal by  
pass graft.   
  
                                        He states the pain is  tolerable  with t  
he oxycodone prescribed at the   
  
                                        hospital. He has been on warfarin, Xarel  
to, and Plavix and formed a clot   
  
                                        with all of those.    
  
                                        Review of Systems     
  
                                        Constitutional: Negative for appetite ch  
audi, chills, fatigue and fever.   
  
                                        HENT: Negative for congestion, ear pain,  
 hearing loss, postnasal drip,   
  
                                        sinus pressure, sinus pain and sore thro  
at.   
  
                                        Eyes: Negative for visual disturbance.   
  
                                        Respiratory: Negative for cough, chest t  
ightness, shortness of breath and   
  
                                        wheezing.             
  
                                        Cardiovascular: Negative for chest pain,  
 palpitations and leg swelling.   
  
                                        Gastrointestinal: Negative for abdominal  
 pain, constipation, diarrhea,   
  
                                        nausea and vomiting.   
  
                                        Genitourinary: Negative for dysuria, luis fernando  
quency, hematuria and urgency.   
  
                                        Musculoskeletal: Negative for arthralgia  
s, back pain, joint swelling,   
  
                                        myalgias and neck pain.   
  
                                        Pain at surgical site   
  
                                        Skin: Negative for rash.   
  
                                        Neurological: Negative for dizziness, we  
akness, light-headedness and   
  
                                        headaches.            
  
                                        Hematological: Negative for adenopathy.   
  
                                        Psychiatric/Behavioral: Negative for sle  
ep disturbance and suicidal ideas.   
  
                                        The patient is not nervous/anxious.   
  
                                        PAST MEDICAL HISTORY   
  
                                        Diagnosis Date        
  
                                        - CAD (coronary artery disease)   
  
                                        - COPD (chronic obstructive pulmonary di  
sease) (HCC)   
  
                                        - Hyperlipidemia      
  
                                        - Lung cancer (HCC)   
  
                                        adenocarcinoma CASSANDRA    
  
                                        - Peripheral vascular disease (HCC)   
  
                                        - Personal history of DVT (deep vein thr  
ombosis)   
  
                                        PAST SURGICAL HISTORY   
  
                                        Procedure Laterality Date   
  
                                        - ANGIOPLASTY FEMORAL/POP Left   
  
                                        2019              
  
                                        - CORONARY ARTERY BYPASS GRAFT   
7   
  
                                        Dr. Fulton           
  
                                        FAMILY HISTORY        
  
                                        Problem Relation Age of Onset   
  
                                        - Cancer Mother       
  
                                        Social History        
  
                                        Tobacco Use           
  
                                        - Smoking status: Current Every Day Smok  
er   
  
                                        Years: 50.00          
  
                                        Types: Cigars         
  
                                        - Smokeless tobacco: Never Used   
  
                                        - Tobacco comment: quit cigarettes 20 ye  
ars ago, smokes cigars daily now   
  
                                        2-3                   
  
                                        Substance Use Topics   
  
                                        - Alcohol use: No     
  
                                        - Drug use: Not on file   
  
                                        Current Outpatient Medications   
  
                                        Medication Sig Dispense Refill   
  
                                        - enoxaparin (LOVENOX) 100 mg/mL syrg IN  
JECT 1 ML INTO THE SKIN 2 TIMES   
  
                                        DAILY                 
  
                                        - polyethylene glycol 3350 (MIRALAX, GLY  
COLAX) 17 gram/dose powder Take 17   
  
                                        g by mouth.           
  
                                        - oxyCODONE IR (ROXICODONE) 5 mg immedia  
te release tablet Take 5 mg by   
  
                                        mouth every 6 hours as needed.   
  
                                        - ONETOUCH VERIO TEST STRIPS test strip   
  
                                        - ONETOUCH VERIO REFLECT METER   
  
                                        - buPROPion HCL, smoking deter, 150 mg t  
ab ER 12 hr Take 150 mg by mouth.   
  
                                        - ONETOUCH DELICA PLUS LANCET 33 gauge   
  
                                        - nitroglycerin sublingual (NITROQUICK)   
0.4 mg SL tablet Dissolve 0.4 mg   
  
                                        under the tongue.     
  
                                        - pantoprazole DR (PROTONIX) 40 mg table  
t   
  
                                        - JARDIANCE 10 mg tablet   
  
                                        - VASCEPA capsule TAKE 2 CAPSULES BY AL  
TH 2 TIMES DAILY   
  
                                        - alirocumab (PRALUENT PEN) 75 mg/mL pen  
 Inject 75 mg subcutaneously.   
  
                                        - ubidecarenone Q-10 (COENZYME Q-10) 10   
mg cap Take by mouth.   
  
                                        - Magnesium 250 mg tab Take 250 mg by mo  
uth.   
  
                                        - sildenafil (VIAGRA) 100 mg tablet Take  
 100 mg by mouth.   
  
                                        - metFORMIN ER (GLUCOPHAGE XR) 500 mg 24  
 hr tablet Take 1000mg by mouth   
  
                                        with breakfast and 500mg by mouth with d  
inner   
  
                                        - Fenofibrate (LOFIBRA) 160 mg tablet Ta  
ke 160 mg by mouth.   
  
                                        - lisinopril 2.5 mg tablet Take 2.5 mg b  
y mouth.   
  
                                        - metoprolol succinate ER (TOPROL XL) 25  
 mg 24 hr tablet Take 25 mg by   
  
                                        mouth.                
  
                                        - ci (more content not included)...   
   
                    2022 Note                         Mercy Health St. Vincent Medical Center System  
   
                          2022 Hospital       Shona Donovan RN - 2022  
 2:10 PM EST  
  
Formatting of this note might be different from the original. Corey Hospital  
  
                    Discharge instructions                     Work Phone: 1(725 )800-8117  
  
                                        ****************************************  
*******************************   
  
                                        ****************************************  
********************************   
  
                                                              
  
                                        Your physician has ordered skilled home   
care services for you.   
  
                                                              
  
                                        Your home care will be provided by:   
  
                                                              
  
                                        SUMMA HEALTH AT HOME   
  
                                                              
  
                                        464.162.6770          
  
                                                              
  
                                        ****************************************  
****************************   
  
                                                    ****************************  
******************************************   
  
Electronically signed by Shona Donovan RN at 2022 2:10 PM Scott Miles MD - 2022  
  
Formatting of this note might be different from the original.   
  
                                        Blood Thinner Medication:   
  
                                                    You may be prescribed a bloo  
d thinning medication before or after your   
procedure. It is important to start taking or resume your medication as 
instructed by your doctor.                
  
                                                              
  
                                        Cholesterol (Statin):   
  
                                                    You may be prescribed a chol  
esterol medication known as a statin. It is   
important to take this medication as instructed by your doctor. One common side 
effect of this type of medication is muscle aches. If this occurs, you should 
stop taking it.                           
  
                                                              
  
                                        Pain Control:         
  
                                                    You may be prescribed an opi  
ate pain medication after your procedure. These are  
 otherwise known as narcotics and should be taken only as prescribed. DO NOT 
operate a vehicle, heavy machinery, appliances   
  
                                                    , or drink alcohol while on   
this medication. For additional pain/swelling   
relief, you may ice and elevate the surgical site(s).   
  
                                                    Note: It is normal to have a  
 certain degree of pain after an operation. Our   
office is only able to prescribe pain medications within a short period after 
surgery with few exceptions. Due to certain limi   
  
                                                    tations, prescriptions may n  
eed to be picked up in person. If requiring a   
refill over a weekend, we ask that you please call our office before 1:00pm on 
Friday.                                   
  
                                                              
  
                                        Constipation:         
  
                                                    One of the side effects of o  
piate medications is constipation. We recommend   
that patients take precautions to prevent this:   
  
                                        1. Drink plenty of water (6-8 glasses of  
 8 oz. per day).   
  
                                        2. Avoid alcohol or excessive caffeine.   
  
                                        3. Eat plenty of fiber (fruits, vegetabl  
es and whole grains).   
  
                                                    4. Take an over the counter   
stool softener (Colace or Miralax) as instructed on  
 the bottle. You can take this each day that you continue to take opiates.   
  
                                                              
  
                                        Nausea:               
  
                                                    Some pain medications may ca  
use you to feel nauseous. If this occurs, you can   
call your doctor who may prescribe anti-nausea medication as needed.   
  
                                                              
  
                                        Diet:                 
  
                                                    Resume low-fat, low choleste  
rol diet high in vegetables. Make sure to include   
protein with each meal while recovering from surgery. If you are on a specific 
type of diet for your condition, please resume that instead.   
  
                                                              
  
                                        Activity:             
  
                                                    DO NOT lift anything over 10  
 pounds for 2 weeks. You may slowly resume normal   
activities as tolerated with certain restrictions.   
  
                                                              
  
                                        Drivin. DO NOT operate a motor vehicle unless  
 released by your doctor   
  
                                        2. DO NOT operate a motor vehicle if you  
 are still on pain medicine   
  
                                                    3. DO NOT operate a motor ve  
hicle unless you can safely press the gas and   
brake, even under emergency conditions.   
  
                                                              
  
                                        Dressing and Wound Care Instructions:   
  
                                                              
  
                                                    1. You may be discharged wit  
h staples or stitches in your groin and/or leg. The  
 bandages in these areas NEED to be changed daily or as they become saturated.   
  
                                                    2. You may have a moderate a  
mount of leg swelling and/or clear, red/yellow   
fluid drainage for several weeks after surgery. Along with changing your 
bandages, elevating your legs above the level of your heart can help relieve 
these symptoms.                           
  
                                                    3. DO NOT apply lotions, per  
oxide or alcohol. You may wash your incision or   
shower with soap and water after 72 hours. Rinse and pat dry.   
  
                                                    4. DO NOT use baths, hot tub  
s, and pools unless told otherwise. This can   
increase your risk of infection.          
  
                                                              
  
                                        Emergency:            
  
                                        1. Call 911 if you develop sudden chest   
pain or shortness of breath.   
  
                                                    2. If you develop signs of i  
nfection, you should call our office as soon as   
possible. These include: redness, warmth, severe swelling, pus-like drainage, 
and fever of >100 degrees Fahrenheit.     
  
                                                              
  
                                                    If you have any additional q  
uestions about your surgery, please call our   
office.                                   
  
                                        documented in this encounter   
   
                    2022 History of Formatting of this note is different f  
rom the original.   
SUMMA  
  
                    Present illness Narrative Images from the original note were  
 not included. Work   
Phone: 7(959)251-8063  
  
                                                    Wound Care team in to change  
 wound vac dressings to LLE. Primary nurse   
medicated pt for pain prior to dressing change.   
  
                                                              
  
                                                    Old wound vac dressing soake  
d with saline and removed without difficulties.   
Full thickness surgical wound to L medial lower leg (pictured below). Removed 1 
piece of white foam, 1 piece of black foam and   
  
                                                     Mepitel ONE. Wound bed is 1  
00% red with granulation tissue noted. Wound   
measures 16 x 3 x 1.5 cm. Small amount of serosang drainage. No purulence or 
active bleeding noted. Periwound tissue intact, no e   
  
                                                    rythema or induration noted.  
 Wound and periwound cleansed with saline, patted   
dry and cavilon no sting applied to periwound. Applied Mepitel ONE to wound bed,
 then topped with black foam x1 piece. Dress   
  
                                                    ing well sealed. NPWT pressu  
re set to -125mmHg continuous per order. Dressing   
y-connected to lateral wound.             
  
                                                              
  
                                                    Full thickness surgical woun  
d along L lateral lower leg (pictured below).   
Removed 1 piece of fenestrated cleanse choice foam, 2 pieces of gray foam and 
Mepitel ONE. Wound bed is 70% red and 30% yellow l   
  
                                                    oosely adherent yellow sloug  
h along deepest aspect of wound bed and along   
tunneling areas. Wound measures 16 x 3 x 2 cm.Tunneling along 6 oclock measuring
 9 cm, tunneling towards 12 oclock within deeper   
  
                                                     aspect of wound measures 1.  
1 cm. Small amount of serosang drainage. No   
bleeding or purulence noted. Periwound tissue intact. Erythema along distal 
periwound area has faded from marked outline. Photogra   
  
                                                    phed wound and notified Dr PETER lucas to assess wound to determine to continue   
instillation wound vac. Dr Cardoza notified Dr Breen who reviewed wound photos. 
Per Dr Cardoza, Dr Breen orders to d/c instillatio   
  
                                                    n Veraflo wound vac and plac  
e standard wound vac at -125mmHg continuous to   
lateral wound. Dr Breen will initiate home D/C planning. Notified primary 
nurse, pt and pt's wife of plan. Wound and periwound   
  
                                                     cleansed with saline, patte  
d dry and cavilon no sting applied to periwound.   
Applied 1 piece of white foam within tunneling, then applied Mepitel ONE over 
granulated area, topped with black foam x1 piec   
  
                                                    e. Dressing well sealed. NPW  
T pressure set to -125mmHg continuous per order.   
Dressing y-connected to medial wound.     
  
                                                              
  
                                                              
  
                                                    Canister changed to wound va  
c. 300cc of serosang drainage measured from   
instillation veraflo wound vac. 50cc of serosang drainage measured from medial 
wound vac. Extra supplies given to pt's wife for ho   
  
                                                    me. Pt's wife has home wound  
 vac unit in room. Informed primary nurse to   
connect tubing to home wound vac unit at time of d/c.   
  
                                                              
  
                                                    Palpable pedal pulses +2 parul  
aterally. BLE noted to habe +1 pitting edema. Skin   
intact over pressure points.              
  
                                                              
  
                                                    Until d/c from facility, Wou  
nd Care will continue to follow up with pt for   
wound vac dressing changes every MWF. For any concerns, page Wound Care Team at 
#0388, or call via THE BEARDED LADY.                
  
                                                              
  
                                                    Electronically signed by Tanika Martins, RN on 3/7/2022 at 3:46   
PM                                        
  
                                                      
  
Electronically signed by Maura Martins RN at 2022 3:50 PM 
EST  
  
Formatting of this note is different from the original.   
  
                                        CARDIOLOGY PROGRESS NOTE   
  
                                                              
  
                                        Chart and interval events reviewed.   
  
                                                              
  
                                        Reason for Visit CAD, Acute limb ischemi  
a   
  
                                                              
  
                                        SUBJECTIVE:           
  
                                                    Willow Bradshaw III state  
s he feels good today. He is eager to hear if he is  
 going home today. Pain is much better to the left leg. Not as tight with edema 
today. No chest pain, shortness of breath,   
  
                                        lightheadedness, dizziness, orthopnea, P  
ND or presyncope.   
  
                                                              
  
                                        SCHEDULED MEDICATIONS:   
  
                                         potassium chloride 40 mEq Oral BID   
  
                                         prasugrel 10 mg Oral Daily   
  
                                         enoxaparin 1 mg/kg SubCUTAneous BID   
  
                                         acetaminophen 1,000 mg Oral 3 times per  
 day   
  
                                         gabapentin 100 mg Oral TID   
  
                                         ceFAZolin 2,000 mg IntraVENous Q8H   
  
                                         insulin lispro 0-6 Units SubCUTAneous T  
ID WC   
  
                                         insulin lispro 0-3 Units SubCUTAneous N  
ightly   
  
                                         sodium chloride flush 10 mL IntraVENous  
 2 times per day   
  
                                         atorvastatin 80 mg Oral Daily   
  
                                         buPROPion 150 mg Oral BID   
  
                                         cilostazol 100 mg Oral BID AC   
  
                                         metoprolol tartrate 25 mg Oral BID   
  
                                         pantoprazole 40 mg Oral QAM AC   
  
                                                              
  
                                        Active Problems:      
  
                                                    Critical limb ischemia with   
history of revascularization of same extremity   
(HCC)                                     
  
                                        Peripheral artery disease (HCC)   
  
                                        Femoral-popliteal bypass graft occlusion  
, left (HCC)   
  
                                        Moderate malnutrition (HCC)   
  
                                        Resolved Problems:    
  
                                        * No resolved hospital problems. *   
  
                                                              
  
                                        Review of Systems:    
  
                                        Review of Systems     
  
                                                    Constitutional: Positive for  
 activity change (du eto wound vac on left lower   
ext ). Negative for chills, diaphoresis and fever.   
  
                                        HENT: Negative for nosebleeds.   
  
                                        Eyes: Negative for visual disturbance.   
  
                                        Respiratory: Negative for cough, shortne  
ss of breath and wheezing.   
  
                                                    Cardiovascular: Positive for  
 leg swelling (improving ). Negative for chest pain  
 and palpitations.                        
  
                                                    Gastrointestinal: Negative f  
or abdominal pain, blood in stool, constipation,   
diarrhea, nausea and vomiting.            
  
                                        Genitourinary: Negative for hematuria. T  
esticular pain: Left lower extremity.   
  
                                        Musculoskeletal: Negative for myalgias.   
  
                                        Pain to left lower extremity   
  
                                        Skin: Negative for rash.   
  
                                                    Neurological: Positive for l  
ight-headedness (after IV pain medications ).   
Negative for dizziness and syncope.       
  
                                        Hematological: Does not bruise/bleed eas  
james.   
  
                                        Psychiatric/Behavioral: Negative for dys  
phoric mood and suicidal ideas.   
  
                                        Denies Depression     
  
                                                              
  
                                        VITAL SIGNS:          
  
                                        Vitals:               
  
                                        22 2005 22 2338 22 034  
3 22 0844   
  
                                        BP: 115/69 116/66 (!) 108/57 105/69   
  
                                        Pulse: 94 82 94 89    
  
                                        Resp: 16 19 21 16     
  
                                        Temp: 97.6 F (36.4 C) 97.7 F (36.5 C) 97  
.6 F (36.4 C) 97.1 F (36.2 C)   
  
                                        TempSrc: Temporal Temporal Temporal Temp  
oral   
  
                                        SpO2: 92% 95% 92% 94%   
  
                                        Weight:               
  
                                        Height:               
  
                                                              
  
                                        Intake/Output Summary (Last 24 hours) at  
 3/7/2022 0917   
  
                                        Last data filed at 3/7/2022 0653   
  
                                        Gross per 24 hour     
  
                                        Intake                
  
                                        Output 1750 ml        
  
                                        Net -1750 ml          
  
                                                              
  
                                        No data found.        
  
                                        Physical Exam:        
  
                                        Physical Exam         
  
                                        Constitutional:       
  
                                        General: He is not in acute distress.   
  
                                                    Appearance: Normal appearanc  
e. He is well-developed. He is obese. He is not   
diaphoretic.                              
  
                                        HENT:                 
  
                                        Mouth/Throat:         
  
                                        Pharynx: No oropharyngeal exudate.   
  
                                        Eyes:                 
  
                                        General:              
  
                                        Right eye: No discharge.   
  
                                        Left eye: No discharge.   
  
                                        Neck:                 
  
                                        Thyroid: No thyromegaly.   
  
                                        Vascular: No JVD.     
  
                                        Cardiovascular:       
  
                                        Rate and Rhythm: Normal rate and regular  
 rhythm.   
  
                                        Chest Wall: PMI is not displaced.   
  
                                        Pulses:               
  
                                        Dorsalis pedis pulses are 1+ on the left  
 side.   
  
                                        Posterior tibial pulses are 1+ on the le  
ft side.   
  
                                        Heart sounds: Normal heart sounds. No mu  
rmur heard.   
  
                                        No gallop.            
  
                                        Comments: Right groin site stable   
  
                                        Pulmonary:            
  
                                        Effort: No accessory muscle usage or res  
piratory distress.   
  
                                        Breath sounds: Normal breath sounds.   
  
                                        Abdominal:            
  
                                        General: Bowel sounds are normal. There   
is no distension.   
  
                                        Palpations: Abdomen is soft.   
  
                                        Tenderness: There is no abdominal tender  
ness.   
  
                                        Musculoskeletal:      
  
                                        General: Normal range of motion.   
  
                                        Left lower leg: Edema present.   
  
                                        Skin:                 
  
                                        General: Skin is warm and dry.   
  
                                                    Comments: Wound Vac to left   
medical lower ext with compressed sponge. Left   
lateral lower ext wound with soft uncompressed sponge. Redness noted to 
periphery of lateral wound. Larger amount of redness to distal wound   
  
                                        Neurological:         
  
                                        Mental Status: He is alert and oriented   
to person, place, and time.   
  
                                                              
  
                                        Data:                 
  
                                        Scheduled Meds: Reviewed   
  
                                        Continuous Infusions:   
  
                                         sodium chloride      
  
                                         sodium chloride      
  
                                         dextrose             
  
                                                              
  
                                        CBC:                  
  
                                        Recent Labs           
  
                                        22              
  
                                        0352 22         
  
                                        0059                  
  
                                        WBC 11.0* 12.5*       
  
                                        HGB 9.5* 9.7*         
  
                                        HCT 30.5* 30.5*       
  
                                         472*          
  
                                                              
  
                                        BMP:                  
  
                                        Recent Labs           
  
                                        22              
  
                                        0352 22         
  
                                        0059                  
  
                                         132*           
  
                                        K 4.6 4.6             
  
                                         103            
  
                                        CO2 23 24             
  
                                        BUN 14 17             
  
                                        CREATININE 1.34* 1.01   
  
                                                              
  
                                        INR:No results for input(s): INR in the   
last 72 hours.   
  
                                        No results for input(s): BNP in the last  
 72 hours.   
  
                                        TSH:                  
  
                                        Lab Results           
  
                                        Component Value Date   
  
                                        TSH 3.178 2021   
  
                                                              
  
                                                    Cardiac Injury Profile: No r  
esults for input(s): CKTOTAL, CKMB, TROPONINI in   
the last 72 hours.                        
  
                                        Lipid Profile:        
  
                                        Lab Results           
  
                                        Component Value Date   
  
                                        TRIG 231 2021   
  
                                        HDL 29 2021     
  
                                        LDLCALC 153 07/15/2016   
  
                                        CHOL 174 2021   
  
                                        CHOL 220 07/15/2016   
  
                                                              
  
                                                              
  
                                                              
  
                                        EKG: See Report       
  
                                                              
  
                                        Telemetry Reviewed: NSR . Mostly 9  
0's   
  
                                                              
  
                                        Echo: 2021      
  
                                                              
  
                                        SUMMARY:              
  
                                        1. Technically difficult study.   
  
                                        2. Left ventricle: Systolic function is   
at the lower limits of normal   
  
                                        by visual assessment. The estimated ejec  
tion fraction is 50%.   
  
                                        Regional wall motion abnormalities canno  
t be excluded.   
  
                                        3. Right ventricle: Not well visualized.  
   
  
                                        4. No significant valve disease.   
  
                                        5. Aorta: The aorta is not well visualiz  
ed.   
  
                                                              
  
                                        PERIPHERAL PERCUTANEOUS ARTERIAL INTERVE  
NTION [IRP6071 (Custom)]   
  
                                                              
  
                                        Dx Procedure: LLE angiogram up to 3rd or  
dmitri vessel from RCFA approach   
  
                                                              
  
                                        Findings: Occluded native SFA and poplit  
eal artery.   
  
                                                     Occluded SVG Fem AK poplite  
al bypass graft. Occluded TPT Peroneal, AT and PT.   
Severe small vessel disease.              
  
                                                              
  
                                        Access : Contralateral sheath placement   
(6F Ajay sheath)   
  
                                         Native SFA crossed with 5F angled Arbuckle  
 cath and Benston wire   
  
                                         Ross 6 EPD placed L PT   
  
                                                              
  
                                        Intervention: Rotarex 6F 2 passes entire  
 SFA into TPT.   
  
                                                     ROSS 6 filter packed and no   
flow. Filter retrieved and removed. Lost access. Re  
crossed with Stiff Glide Wire. Transitioned to 0.014 Command Wire in PT   
  
                                         PTA of the SFA and Pop 6 x 250 mm Minaya  
 Balloon long inflations ( 5 min)   
  
                                          Inadequate effect in popliteal artery   
  
                                                     Viabahn stent graft 6 x 100  
 mm deployed in the distal SAF into proximal TPT.   
Excellent result.                         
  
                                                     Angio showed residual TPT a  
nd proximal PT narrowing. IVUS confirmed Severe in   
apparent disease on the TPT and prox PT with small lumen.   
  
                                         SYDNIE placed Xience 3 mm x 38 mm brought   
to 3.2 mm with excellent result.   
  
                                          Wore withdrawn and used to cross into   
the AT brought to foot.   
  
                                                     POBA with a 2 mm x 300 mm C  
ook Vhkz7rk. Vessel opened to the foot. Excellent   
flow.                                     
  
                                                              
  
                                                    Calf Debiedimont performed b  
y Dr Breen. R CFA sheat\h removed and pressure   
held.                                     
  
                                                              
  
                                        Warm pink tissue with strongly Dopperabl  
e PT and AT pulses.   
  
                                                              
  
                                                    Plan Change to Prasugrel for  
 possible clopidogrel resistance and IV to LMW   
heparin                                   
  
                                                              
  
                                                              
  
                                        IMPRESSIONS/RECOMMENDATIONS:   
  
                                                              
  
                                                    Severe PVD with acute limb i  
schemia- s/p extensive endovascular and surgical   
revascularizationwith fasciotomy and debridement with left fem-pop bypass 2022
s/p LLE fasciotomies 1722 and 22 now w   
  
                                                    ith occluded bypass graft s/  
p Rotational aspirational atherectomy with emboli   
protection,left SFA/ popliteal artery LLE angioplasty with multiple stent 
placement, distal popliteal, TP trunk, PT LLE debr   
  
                                        idement 3/1/22. 2 vessel runoff to the L  
 foot (full report above)   
  
                                        -Pain Controlled. Appears much more comf  
ortable today   
  
                                                    -Per prior cardiology note p  
ossible clopidogrel resistance plan prasugrel for   
now. Plan Lovenox 2-4 weeks post op and transition to full dose Xarelto and P2Y-
12 Inhibitor at Dr. Breen's discretion. Pat   
  
                                        yonas and wife comfortable with home admi  
nstration.   
  
                                        -CHARISSA's following      
  
                                        -Continue pletal, ASA, atorvastatin   
  
                                        -Lasix PRN. Recommend home dose 40mg audra  
ly PRN for edema   
  
                                        -Wound care following   
  
                                                              
  
                                                    CAD s/p CABG x 3 in 2017 and  
 PCI/SYDNIE x 3 to the distal RCA and angioplasty of   
the jailed RPL 10/22/20                   
  
                                        -Stable denies symptoms of angina   
  
                                        -Continue ASA, atorvastatin, metoprolol   
  
                                                              
  
                                        Hyperlipidemia        
  
                                        -Continue high intensity statin with fernanda  
rvastatin   
  
                                                              
  
                                        DM type 2             
  
                                        -Controlled- last HgbA1c was 5.9%   
2   
  
                                                              
  
                                        Will discuss with Dr. Bryan   
  
                                                              
  
                                        Electronicallysigned by CARSON Lira CNP on 3/7/2022 at 11:17 AM   
  
                                                              
  
                                                      
  
Electronically signed by CARSON Lira CNP at 2022 12:43 PM EST  
  
Formatting of this note is different from the original.   
  
                                        Physical Therapy      
  
                                        Facility/Department: Department of Veterans Affairs Medical Center-Erie CAPACITY MAN  
AGEMENT   
  
                                        Daily Treatment Note   
  
                                        NAME: Willow Bradshaw III   
  
                                        : 1957         
  
                                        MRN: 17233568         
  
                                                              
  
                                        Date of Service: 3/7/2022   
  
                                                              
  
                                        Discharge Recommendations:   
  
                                        Home with assist PRN   
  
                                        PT Equipment Recommendations   
  
                                        Equipment Needed: No   
  
                                                              
  
                                        Assessment            
  
                                                    Body structures, Functions,   
Activity limitations: Decreased functional mobility  
;Decreased ROM;Decreased strength;Decreased ADL status;Decreased 
endurance;Decreased sensation;Decreased posture;Increased pain;Decreased balance
                                          
  
                                                    Assessment: Pt currently huang  
ited by 2 wound vacs. Still not able to achieve   
flat foot on LLE but definately could tolerate more weight on it. Anticipate 
home with assist as needed at discharge.   
  
                                        Prognosis: Good       
  
                                        Decision Making: Medium Complexity   
  
                                        REQUIRES PT FOLLOW UP: Yes   
  
                                        Activity Tolerance    
  
                                        Activity Tolerance: Patient Tolerated tr  
eatment well   
  
                                                              
  
                                        Patient Diagnosis(es): There were no enc  
ounter diagnoses.   
  
                                                              
  
                                                    has a past medical history o  
f CAD (coronary artery disease), Cancer (HCC),   
Cerebral artery occlusion with cerebral infarction (HCC), COPD (chronic 
obstructive pulmonary disease) (HCC), Diabetes (HCC), D   
  
                                                    VT (deep venous thrombosis)   
(HCC), History of squamous cell carcinoma, Hx of   
blood clots, Hypercholesterolemia, Hyperlipidemia, Hypertension, Hypotension, 
Lung cancer (HCC), Myocardial infarction (HCC),   
  
                                         and Peripheral vascular disease with cl  
audication (HCC).   
  
                                                    has a past surgical history   
that includes Coronary angioplasty with stent   
(2014); Coronary angioplasty with stent (); Coronary angioplasty with 
stent (2009); Coronary angioplasty with stent (); Coronary angioplasty w  
ith stent (); Coronary artery bypass graft   
(2017); other surgical history (2018); vascular surgery (Left, 
2019); vascular surgery (2019); Optizen labst   
  
                                                    ic Cardiac Cath Lab Procedur  
e (Left, 2017); Diagnostic Cardiac Cath Lab   
Procedure (2017); Diagnostic Cardiac Cath Lab Procedure (10/22/2020); 
femoral bypass (2022); embolectomy (Left,   
  
                                                    2022); other surgical   
history (2022); vascular surgery (Left,   
2022); Leg Debridement (Left, 2022); and other surgical history 
(Left, 2022).                       
  
                                                              
  
                                        Restrictions          
  
                                        Restrictions/Precautions   
  
                                        Restrictions/Precautions: Fall Risk   
  
                                        Required Braces or Orthoses?: No   
  
                                        Position Activity Restriction   
  
                                        Other position/activity restrictions: wo  
und vac LLE x 2   
  
                                                              
  
                                        Subjective            
  
                                        General               
  
                                        Chart Reviewed: Yes   
  
                                        Additional Pertinent Hx: COPD, CAD, DVT,  
 PVD   
  
                                                    Response To Previous Treatme  
nt: Patient with no complaints from previous   
session.                                  
  
                                        Family / Caregiver Present: Yes (wife)   
  
                                        Subjective            
  
                                                    Subjective: Pt supine in bed  
 with HOB up. Agreeable to PT. Expressed   
frustration with still being in the hospital.   
  
                                        Pain Screening        
  
                                        Patient Currently in Pain: Yes   
  
                                        Pain Assessment       
  
                                        Pain Assessment: (did not rate on a pain  
 scale, states  its not bad )   
  
                                        Pain Type: Acute pain   
  
                                        Pain Location: Leg    
  
                                        Pain Orientation: Left   
  
                                        Vital Signs           
  
                                        Patient Currently in Pain: Yes   
  
                                                              
  
                                        Orientation           
  
                                        Orientation           
  
                                        Overall Orientation Status: Within Funct  
ional Limits   
  
                                                              
  
                                        Cognition             
  
                                        Cognition             
  
                                        Overall Cognitive Status: WFL   
  
                                                              
  
                                        Objective             
  
                                        Bed mobility          
  
                                        Supine to Sit: Modified independent   
  
                                        Sit to Supine: Modified independent   
  
                                        Scooting: Modified independent   
  
                                        Comment: HOB up       
  
                                                              
  
                                        Transfers             
  
                                        Sit to Stand: Supervision   
  
                                        Stand to sit: Supervision   
  
                                                              
  
                                        Ambulation            
  
                                        Ambulation?: Yes      
  
                                        Ambulation 1          
  
                                        Surface: level tile   
  
                                        Device: Rolling Walker   
  
                                        Assistance: Supervision   
  
                                        Quality of Gait: walks on toes of LLE   
  
                                        Gait Deviations: Slow Eden   
  
                                        Distance: 10 ft x 2   
  
                                        Comments: shoe donned on right foot for   
ambulation   
  
                                        Stairs/Curb           
  
                                        Stairs?: No           
  
                                                              
  
                                        Balance               
  
                                        Comments: Pt sat EOB to perform ther ex.  
   
  
                                                              
  
                                        Exercises             
  
                                        Hip Flexion: seated marching x 10 reps,   
BLE, AROM   
  
                                        Knee Long Arc Quad: sitting EOB x 10 rep  
s, BLE   
  
                                        Ankle Pumps: PROM with gait belt x 10 re  
ps, LLE   
  
                                                              
  
                                                              
  
                                        AM-PAC Score          
  
                                        AM-PAC Inpatient Mobility Raw Score : 19  
 (22)   
  
                                        AM-PAC Inpatient T-Scale Score : 45.44 (  
22)   
  
                                        Mobility Inpatient CMS 0-100% Score: 41.  
77 (22)   
  
                                        Mobility Inpatient CMS G-Code Modifier :  
 CK (22)   
  
                                                              
  
                                                              
  
                                                              
  
                                        Goals                 
  
                                        Short term goals      
  
                                        Time Frame for Short term goals: 2 weeks  
   
  
                                        Short term goal 1: Pt will perform bed m  
obility independently, PROGRESSING   
  
                                        Short term goal 2: Pt will transfer inde  
pendently, PROGRESSING   
  
                                                    Short term goal 3: Pt will a  
mbulate 150 feet with least restrictive device and   
supervision placing weight through BLEs, PROGRESSING   
  
                                        Patient Goals         
  
                                        Patient goals : to return home   
  
                                                              
  
                                        Plan                  
  
                                        Plan                  
  
                                        Times per week: 3-5 days   
  
                                        Plan weeks: 2 weeks   
  
                                                    Current Treatment Recommenda  
tions: Strengthening,ROM,Functional Mobility   
Training,Transfer Training,Gait Training,Endurance Training,Balance 
Training,Home Exercise Program,Safety Education & Training   
  
                                        Plan Comment: Cont with POC   
  
                                        Safety Devices        
  
                                                    Type of devices: Gait belt,A  
ll fall risk precautions in place,Call light within  
reach,Left in bed                         
  
                                        Restraints            
  
                                        Initially in place: No   
  
                                                              
  
                                        Therapy Time          
  
                                        Individual Concurrent Group Co-treatment  
   
  
                                        Time In 0829          
  
                                        Time Out 0841         
  
                                        Minutes 12            
  
                                        Timed Code Treatment Minutes: 12 Minutes  
 (gait)   
  
                                                              
  
                                        *PPE worn per facility policy during ses  
sara*   
  
                                        Alison Roberson PTA     
  
                                                              
  
                                                              
  
                                                      
  
Electronically signed by Scooter Dowell PT at 2022 10:11 AM EST  
  
Formatting of this note is different from the original.   
  
                                        Kettering Health Greene Memorial Anticoagulation Management Service  
 (TREVOR)   
  
                                        Inpatient Warfarin Consult   
  
                                                              
  
                                        HPI:                  
  
                                                              
  
                                                    Willow Bradshaw III is a   
65 y.o. male admitted on 2022 for Peripheral   
artery disease (HCC) [I73.9].             
  
                                                              
  
                                        Past Medical History:   
  
                                        Diagnosis Date        
  
                                         CAD (coronary artery disease)   
  
                                         Cancer (HCC)         
  
                                        SCC                   
  
                                         Cerebral artery occlusion with cerebral  
 infarction (HCC)   
  
                                         COPD (chronic obstructive pulmonary dis  
ease) (HCC)   
  
                                         Diabetes (HCC)       
  
                                         DVT (deep venous thrombosis) (HCC)   
  
                                         History of squamous cell carcinoma   
  
                                         Hx of blood clots    
  
                                         Hypercholesterolemia   
  
                                         Hyperlipidemia       
  
                                         Hypertension         
  
                                         Hypotension          
  
                                         Lung cancer (HCC)    
  
                                         Myocardial infarction (HCC)   
  
                                        x2                    
  
                                         Peripheral vascular disease with claudi  
cation (HCC)   
  
                                                              
  
                                                    Patient is known to TREVOR pre  
viously treated with warfarin for Acute occlusion   
of artery of lower extremity due to thrombosis and has a goal INR 2.0 - 3.0 with
 preference for 2.5-3.0. Warfarin is current   
  
                                                    ly managed by TREVOR. Pt's keyla  
e dose of warfarin is 2.5mg daily. Pt's last INR in  
 the clinic was 2.2 on .              
  
                                                              
  
                                                    Complicated vascular history  
 including recent left fem-pop bypass on 22 and  
 subsequent LLE fasciotomies 22 and 22 presents with occluded bypass 
graft. This admission, had arterectomy/thromb   
  
                                        ectomy with multiple stent placement and  
 LLE wound debridement.   
  
                                                              
  
                                                    Due to complicated recent hi  
story, vascular plans to have patient take Lovenox   
only (no warfarin) for at least 2 months s/p surgeries. Also on cilostazol and 
prasugrel.                                
  
                                                              
  
                                        S/sx of bleeding= none   
  
                                        Interacting medications with Lovenox = c  
ilostazol and prasugrel   
  
                                                              
  
                                        Labs:                 
  
                                        Recent Labs           
  
                                        22              
  
                                        0232 22         
  
                                        0352 22         
  
                                        0059                  
  
                                        HGB 9.5* 9.5* 9.7*    
  
                                        HCT 30.4* 30.5* 30.5*   
  
                                         436 472*      
  
                                                              
  
                                        No results for input(s): INR in the last  
 72 hours.   
  
                                                              
  
                                        Assessment/Plan:      
  
                                                    1. Plan per vascular is to u  
tilize Lovenox for at least 2 months - no warfarin.  
 Recommend continuing Lovenox 90mg BID at discharge - could also modify dose to 
100mg BID for ease of use (comes in 100mg s   
  
                                                    yringes). TREVOR will follow p  
atient at discharge to ensure continuation of   
therapy. Will also be discharged on cilostazol and prasugrel.   
  
                                        2. Will facilitate TREVOR follow-up upon d  
ischarge.   
  
                                                              
  
                                        Antonia Gunn RP, PharmD   
  
                                                              
  
                                                    TREVOR Consult Service is bernie arias daily 0494-3546. Please search for covering   
pharmacist by name via iRx Reminder or Groups --> Pharmacy --> Anti-Coagulation 
Consult Pharmacist (on 3rd page). If no response via PerfectServe, please page 
0919.                                     
  
                                                      
  
Electronically signed by Antonia Gunn RPH at 2022 8:48 AM EST  
  
Formatting of this note is different from the original.   
  
                                        Occupational Therapy   
  
                                                              
  
                                        Occupational Therapy Initial Assessment/  
Discharge   
  
                                                              
  
                                        Date: 3/6/2022        
  
                                        Patient Name: Willow Bradshaw III   
  
                                        MRN: 62562166         
  
                                        : 1957         
  
                                                              
  
                                        **OT wearing mask and gloves throughout   
entire session**   
  
                                                              
  
                                        Date of Service: 3/6/2022   
  
                                                              
  
                                        Discharge Recommendations:   
  
                                        Home with assist PRN   
  
                                                              
  
                                                              
  
                                        Assessment            
  
                                                    Assessment: OT eval complete  
d. Pt supv for basic ADL tasks and modif indep with  
 transfers and mobility with FWW. Pt safe to return home with wife's assist once
 medically ready. No further OT intervention. Discharge acute OT.   
  
                                        Prognosis: Good       
  
                                        Decision Making: Low Complexity   
  
                                        Exam: Clarion Hospital           
  
                                                              
  
                                        OT Education: OT Role;Plan of Care;ADL A  
daptive Strategies;Transfer Training   
  
                                                    No Skilled OT: Safe to retur  
n home;No OT goals identified;Independent with   
functional mobility;Independent with ADL's   
  
                                        REQUIRES OT FOLLOW UP: No   
  
                                        Activity Tolerance    
  
                                        Activity Tolerance: Patient Tolerated tr  
eatment well   
  
                                        Safety Devices        
  
                                        Safety Devices in place: Yes   
  
                                        Type of devices: Left in bed;Call light   
within reach;Nurse notified   
  
                                        Restraints            
  
                                        Initially in place: No   
  
                                                              
  
                                                              
  
                                        Patient Diagnosis(es): There were no enc  
ounter diagnoses.   
  
                                                              
  
                                                    has a past medical history o  
f CAD (coronary artery disease), Cancer (Formerly Chesterfield General Hospital),   
Cerebral artery occlusion with cerebral infarction (Formerly Chesterfield General Hospital), COPD (chronic 
obstructive pulmonary disease) (Formerly Chesterfield General Hospital), Diabetes (Formerly Chesterfield General Hospital), D   
  
                                                    VT (deep venous thrombosis)   
(Formerly Chesterfield General Hospital), History of squamous cell carcinoma, Hx of   
blood clots, Hypercholesterolemia, Hyperlipidemia, Hypertension, Hypotension, 
Lung cancer (Formerly Chesterfield General Hospital), Myocardial infarction (Formerly Chesterfield General Hospital),   
  
                                         and Peripheral vascular disease with cl  
audication (Formerly Chesterfield General Hospital).   
  
                                                    has a past surgical history   
that includes Coronary angioplasty with stent   
(2014); Coronary angioplasty with stent (); Coronary angioplasty with 
stent (2009); Coronary angioplasty with stent (); Coronary angioplasty w  
ith stent (); Coronary artery bypass graft   
(2017); other surgical history (2018); vascular surgery (Left, 
2019); vascular surgery (2019); Diagnost   
  
                                                    ic Cardiac Cath Lab Procedur  
e (Left, 2017); Diagnostic Cardiac Cath Lab   
Procedure (2017); Diagnostic Cardiac Cath Lab Procedure (10/22/2020); 
femoral bypass (2022); embolectomy (Left,   
  
                                                    2022); other surgical   
history (2022); vascular surgery (Left,   
2022); Leg Debridement (Left, 2022); and other surgical history 
(Left, 2022).                       
  
                                                              
  
                                                              
  
                                                              
  
                                        Restrictions          
  
                                        Restrictions/Precautions   
  
                                        Restrictions/Precautions: Fall Risk   
  
                                        Required Braces or Orthoses?: No   
  
                                        Position Activity Restriction   
  
                                        Other position/activity restrictions: IV  
, wound vac LLE x2   
  
                                                              
  
                                        Subjective            
  
                                        General               
  
                                        Chart Reviewed: Yes   
  
                                        Patient assessed for rehabilitation serv  
ices?: Yes   
  
                                                    Additional Pertinent Hx:   
D, CAD, DVT, PVD, L fem-pop bypass , LLE   
fasciotomies  & , LLE balloon angioplasty with multiple stent placement 
3/1                                       
  
                                                    Diagnosis: Pt S/P LLE balloo  
n angioplasty with multiple stent placement and   
thrombectomy 3/1/33                       
  
                                        Subjective            
  
                                        Subjective: Pt reclined in bed, agreeabl  
e to OT eval.   
  
                                        General Comment       
  
                                        Comments: R hand dominant   
  
                                                    Patient Currently in Pain: Y  
es (L 2nd toe from overgrown toe nail, no level   
given)                                    
  
                                                              
  
                                        Social/Functional History   
  
                                        Social/Functional History   
  
                                        Lives With: Spouse    
  
                                        Type of Home: House   
  
                                        Home Layout: One level (lower level is g  
arage, stair lift to main floor)   
  
                                        Bathroom Shower/Tub: Tub/Shower unit   
  
                                        Bathroom Toilet: Handicap height   
  
                                        Bathroom Equipment: Grab bars in shower,  
Shower chair   
  
                                        Bathroom Accessibility: Accessible   
  
                                        Home Equipment: Rolling walker,Cane,Whee  
lchair-manual   
  
                                        Receives Help From: Family   
  
                                        ADL Assistance: Independent   
  
                                        Homemaking Assistance: Independent   
  
                                                    Ambulation Assistance: Indep  
endent (only styarted using devices after surgery   
in  and had progressed to just a cane prior to this admit.)   
  
                                        Transfer Assistance: Independent   
  
                                        Active : No     
  
                                        Patient's  Info: wife transports   
  
                                                              
  
                                                              
  
                                        Objective             
  
                                        Vision: Within Functional Limits   
  
                                        Vision Exceptions: Wears glasses at all   
times   
  
                                        Hearing: Within functional limits   
  
                                                              
  
                                                              
  
                                        Orientation           
  
                                        Overall Orientation Status: Within Issa  
l Limits   
  
                                                              
  
                                        Observation/Palpation   
  
                                        Posture: Fair         
  
                                        Observation: LLE wound vac x2, L foot in  
 dorsi flexion   
  
                                                              
  
                                        Balance               
  
                                        Sitting Balance: Independent   
  
                                        Standing Balance: Modified independent (  
with FWW)   
  
                                        Functional Mobility   
  
                                        Functional - Mobility Device: Rolling Wa  
lker   
  
                                        Activity: To/from bathroom;Other   
  
                                        Assist Level: Modified independent   
  
                                                    Functional Mobility Comments  
: Modif indep functional amb in room using FWW.   
Focus on more wt into LLE and stretching heel towards floor. Pt unable to get 
heel on floor due to heel cord tightness.Overall balance good. NO LOB with amb.   
  
                                        Toilet Transfers      
  
                                        Toilet - Technique: Ambulating   
  
                                        Equipment Used: Standard toilet   
  
                                        Toilet Transfer: Modified independent   
  
                                                              
  
                                        ADL                   
  
                                                    Additional Comments: Per pt   
and wife supv to don pants after RN unhooked wound   
vac. Pt demo'd supv to don/doff bilat slipper sock. Supv grooming task standing 
at sink. Supv simulated toileting. Estimate   
  
                                         pt is supv for all ADL tasks. Treatment  
 initiated.   
  
                                                              
  
                                        Tone RUE              
  
                                        RUE Tone: Normotonic   
  
                                        Tone LUE              
  
                                        LUE Tone: Normotonic   
  
                                        Coordination          
  
                                        Movements Are Fluid And Coordinated: Yes  
   
  
                                                              
  
                                                              
  
                                        Bed mobility          
  
                                        Supine to Sit: Modified independent   
  
                                        Sit to Supine: Modified independent   
  
                                        Scooting: Modified independent   
  
                                        Comment: HOb fat but used bed rail   
  
                                                              
  
                                        Transfers             
  
                                        Sit to stand: Modified independent   
  
                                        Stand to sit: Modified independent   
  
                                                              
  
                                                              
  
                                        Cognition             
  
                                        Overall Cognitive Status: WFL   
  
                                                              
  
                                                              
  
                                        Sensation             
  
                                        Overall Sensation Status: Impaired (decr  
eased sensation to LLE)   
  
                                                              
  
                                        Type of ROM/Therapeutic Exercise   
  
                                                    Comment: Pt educated in heel  
 cord stretch with sheet. Pt completed 3 stretches   
for 30 secs L foot.                       
  
                                                              
  
                                        LUE AROM (degrees)    
  
                                        LUE AROM : WFL        
  
                                        RUE AROM (degrees)    
  
                                        RUE AROM : WFL        
  
                                                              
  
                                        LUE Strength          
  
                                        Gross LUE Strength: WFL   
  
                                        RUE Strength          
  
                                        Gross RUE Strength: WFL   
  
                                                              
  
                                                              
  
                                        Plan                  
  
                                        Plan                  
  
                                        Times per week: 1x    
  
                                        Plan weeks: 1 day     
  
                                        Plan Comment: Discharge acute OT   
  
                                                              
  
                                                              
  
                                        OutComes Score        
  
                                        AM-PAC Daily Activity Inpatient   
  
                                        How much help for putting on and taking   
off regular lower body clothing?: None   
  
                                        How much help for Bathing?: None   
  
                                        How much help for Toileting?: None   
  
                                        How much help for putting on and taking   
off regular upper body clothing?: None   
  
                                        How much help for taking care of persona  
l grooming?: None   
  
                                        How much help for eating meals?: None   
  
                                        AM-PAC Inpatient Daily Activity Raw Scor  
e: 24   
  
                                        AM-PAC Inpatient ADL T-Scale Score : 57.  
54   
  
                                        ADL Inpatient CMS 0-100% Score: 0   
  
                                        ADL Inpatient CMS G-Code Modifier : CH   
  
                                                              
  
                                                              
  
                                                              
  
                                        Therapy Time          
  
                                        Individual Concurrent Group Co-treatment  
   
  
                                        Time In 1328          
  
                                        Time Out 1354         
  
                                        Minutes 26            
  
                                        Timed Code Treatment Minutes: 9 Minutes   
(ADL)   
  
                                                              
  
                                                              
  
                                                    Goals and/or treatment plan   
was established in collaboration with   
patient/family/other representatives.     
  
                                                              
  
                                        Sheryl Montesinos OTR/L   
  
                                                              
  
                                                      
  
Electronically signed by Sheryl Montesinos OT at 2022 2:34 PM EST  
  
Formatting of this note is different from the original.   
  
                                                              
  
                                                              
  
                                        Department of Surgery - Progress Note -   
Surg V   
  
                                                              
  
                                        PATIENT NAME: Willow Bradshaw III   
  
                                        : 1957         
  
                                        MRN: 57636258         
  
                                        ATTENDING PHYSICIAN: Ed Breen MD   
  
                                        ADMIT DATE: 2022   
  
                                        TODAY'S DATE: 3/6/2022   
  
                                                              
  
                                        SUBJECTIVE            
  
                                                    Patient doing well this am.   
Pain in his LLE is well controlled. Moving his feet  
 without issues. Wound vacuums in place LLE.   
  
                                                              
  
                                        OBJECTIVE             
  
                                                    VITALS: /70   Pulse 69  
   Temp 98.1 F (36.7 C) (Oral)   Resp 14   Ht 5' 7   
 (1.702 m)   Wt 200 lb (90.7 kg)   SpO2 98%   BMI 31.32 kg/m   
  
                                                              
  
                                        INTAKE/OUTPUT:        
  
                                        I/O last 3 completed shifts:   
  
                                        In: 400 [IV Piggyback:400]   
  
                                        Out: 650 [Urine:650]   
  
                                        No intake/output data recorded.   
  
                                                              
  
                                        CONSTITUTIONAL: NAD, A&O X3.   
  
                                        HEAD: Normocephalic, atraumatic   
  
                                        NECK: Supple, trachea midline   
  
                                        LUNGS: Resp effort easy and unlabored, c  
hest rise equal bilaterally   
  
                                        ABDOMEN: soft, nontender, nondistended   
  
                                        EXT: Moving all extremities, no edema   
  
                                        SKIN: Warm and Dry, no rashes or lesions  
   
  
                                                    NEURO: CN 2-12 grossly intac  
t, no focal neurologic deficits, sensation intact   
bilaterally                               
  
                                                    VASC: LT PT and AT signal, R  
T PT/DP, L fasciotomy sites with wound vacuums   
intact.                                   
  
                                                              
  
                                        Data                  
  
                                        Recent Labs           
  
                                        22              
  
                                        0023 222 22         
  
                                        0352                  
  
                                        WBC 10.1 10.6 11.0*   
  
                                        HGB 8.8* 9.5* 9.5*    
  
                                        HCT 27.4* 30.4* 30.5*   
  
                                         426 436       
  
                                                              
  
                                        Recent Labs           
  
                                        22              
  
                                        0023 22         
  
                                        0232 22         
  
                                        0352                  
  
                                         134* 137       
  
                                        K 3.8 4.3 4.6         
  
                                         103 106        
  
                                        CO2 24 27 23          
  
                                        BUN 11 14 14          
  
                                        CREATININE 0.97 1.22 1.34*   
  
                                        GLUCOSE 129* 149* 118*   
  
                                                              
  
                                        No results for input(s): AST, ALT, ALB,   
BILITOT, ALKPHOS in the last 72 hours.   
  
                                                              
  
                                        Current Inpatient Medications   
  
                                                    Current Facility-Administere  
d Medications: potassium chloride (KLOR-CON M)   
extended release tablet 40 mEq, 40 mEq, Oral, BID   
  
                                        prasugrel (EFFIENT) tablet 10 mg, 10 mg,  
 Oral, Daily   
  
                                        enoxaparin (LOVENOX) injection 90 mg, 1   
mg/kg, SubCUTAneous, BID   
  
                                        acetaminophen (TYLENOL) tablet 1,000 mg,  
 1,000 mg, Oral, 3 times per day   
  
                                        gabapentin (NEURONTIN) capsule 100 mg, 1  
00 mg, Oral, TID   
  
                                                    sodium chloride 0.9 % irriga  
tion 1,000 mL, 1,000 mL, Irrigation, Continuous PRN  
                                          
  
                                                    ceFAZolin (ANCEF) 2000 mg in  
 dextrose 4 % 100 mL IVPB (premix), 2,000 mg,   
IntraVENous, Q8H                          
  
                                        diphenhydrAMINE (BENADRYL) injection 25   
mg, 25 mg, IntraVENous, Q6H PRN   
  
                                        hydrOXYzine (VISTARIL) injection 25 mg,   
25 mg, IntraMUSCular, Q6H PRN   
  
                                                    insulin lispro (HUMALOG) inj  
ection vial 0-6 Units, 0-6 Units, SubCUTAneous, TID  
 WC                                       
  
                                                    insulin lispro (HUMALOG) inj  
ection vial 0-3 Units, 0-3 Units, SubCUTAneous,   
Nightly                                   
  
                                                    sodium chloride flush 0.9 %   
injection 10 mL, 10 mL, IntraVENous, 2 times per   
day                                       
  
                                        sodium chloride flush 0.9 % injection 10  
 mL, 10 mL, IntraVENous, PRN   
  
                                        0.9 % sodium chloride infusion, 25 mL, I  
ntraVENous, PRN   
  
                                        labetalol (NORMODYNE;TRANDATE) injection  
 10 mg, 10 mg, IntraVENous, Q2H PRN   
  
                                        hydrALAZINE (APRESOLINE) injection 10 mg  
, 10 mg, IntraVENous, Q1H PRN   
  
                                                    HYDROmorphone (DILAUDID) inj  
ection 0.5 mg, 0.5 mg, IntraVENous, Q3H PRN **OR**   
HYDROmorphone (DILAUDID) injection 1 mg, 1 mg, IntraVENous, Q3H PRN   
  
                                        polyethylene glycol (GLYCOLAX) packet 17  
 g, 17 g, Oral, Daily PRN   
  
                                                    oxyCODONE (ROXICODONE) immed  
iate release tablet 5 mg, 5 mg, Oral, Q4H PRN   
**OR** oxyCODONE (ROXICODONE) immediate release tablet 10 mg, 10 mg, Oral, Q4H 
PRN                                       
  
                                        atorvastatin (LIPITOR) tablet 80 mg, 80   
mg, Oral, Daily   
  
                                        buPROPion (WELLBUTRIN) tablet 150 mg, 15  
0 mg, Oral, BID   
  
                                        cilostazol (PLETAL) tablet 100 mg, 100 m  
g, Oral, BID AC   
  
                                        metoprolol tartrate (LOPRESSOR) tablet 2  
5 mg, 25 mg, Oral, BID   
  
                                        pantoprazole (PROTONIX) tablet 40 mg, 40  
 mg, Oral, QAM AC   
  
                                        Glucose (TRUEPLUS) oral gel 15 g, 15 g,   
Oral, PRN   
  
                                        dextrose 50 % IV solution, 12.5 g, Intra  
VENous, PRN   
  
                                        glucagon (rDNA) injection 1 mg, 1 mg, In  
traMUSCular, PRN   
  
                                        dextrose 5 % solution, 100 mL/hr, IntraV  
ENous, PRN   
  
                                                              
  
                                        ASSESSMENT AND PLAN   
  
                                                              
  
                                        Active Problems:      
  
                                                    Critical limb ischemia with   
history of revascularization of same extremity   
(HCC)                                     
  
                                        Peripheral artery disease (HCC)   
  
                                        Femoral-popliteal bypass graft occlusion  
, left (HCC)   
  
                                        Moderate malnutrition (HCC)   
  
                                        Resolved Problems:    
  
                                        * No resolved hospital problems. *   
  
                                                              
  
                                                    This is a 65 y.o. male with   
recent left fem-pop bypass on 22 and subsequent  
 LLE fasciotomies 22 and 22 presents with occluded bypass graft. POD1 
arterectomy/thrombectomy with multiple stent placement and LLE wound debridement
                                          
  
                                                              
  
                                        - Continue regular diet   
  
                                        - Wound care following, appreciate recom  
mendations   
  
                                        - Ancef               
  
                                        - Continue lovenox    
  
                                        - plavix/ASA/statin/pletal   
  
                                        - Pain control        
  
                                        - Dispo pending vac change 3/7   
  
                                                              
  
                                        D/W Dr. Pamela Stein MD PGY-4   
  
                                                      
  
Electronically signed by Odell Scott MD at 2022 9:53 AM EST  
  
Formatting of this note is different from the original.   
  
                                                              
  
                                                              
  
                                        Department of Surgery - Progress Note -   
Surg V   
  
                                                              
  
                                        PATIENT NAME: Willow Bradshaw III   
  
                                        : 1957         
  
                                        MRN: 26372902         
  
                                        ATTENDING PHYSICIAN: Ed Breen MD   
  
                                        ADMIT DATE: 2022   
  
                                        TODAY'S DATE: 3/5/2022   
  
                                                              
  
                                        SUBJECTIVE            
  
                                                    Patient doing well this am.   
Pain in his LLE is well controlled. Moving his feet  
 without issues. Wound vacuums in place LLE.   
  
                                                              
  
                                        OBJECTIVE             
  
                                                    VITALS: /69   Pulse 92  
   Temp 98.1 F (36.7 C) (Oral)   Resp 14   Ht 5' 7   
 (1.702 m)   Wt 200 lb (90.7 kg)   SpO2 96%   BMI 31.32 kg/m   
  
                                                              
  
                                        INTAKE/OUTPUT:        
  
                                        I/O last 3 completed shifts:   
  
                                        In: -                 
  
                                        Out: 2550 [Urine:2550]   
  
                                        No intake/output data recorded.   
  
                                                              
  
                                        CONSTITUTIONAL: NAD, A&O X3.   
  
                                        HEAD: Normocephalic, atraumatic   
  
                                        NECK: Supple, trachea midline   
  
                                        LUNGS: Resp effort easy and unlabored, c  
hest rise equal bilaterally   
  
                                        ABDOMEN: soft, nontender, nondistended   
  
                                        EXT: Moving all extremities, no edema   
  
                                        SKIN: Warm and Dry, no rashes or lesions  
   
  
                                                    NEURO: CN 2-12 grossly intac  
t, no focal neurologic deficits, sensation intact   
bilaterally                               
  
                                                    VASC: LT PT and AT signal, R  
T PT/DP, L fasciotomy sites with wound vacuums   
intact.                                   
  
                                                              
  
                                        Data                  
  
                                        Recent Labs           
  
                                        223 22         
  
                                        023                  
  
                                        WBC 9.4 10.1 10.6     
  
                                        HGB 8.7* 8.8* 9.5*    
  
                                        HCT 27.2* 27.4* 30.4*   
  
                                         380 426       
  
                                                              
  
                                        Recent Labs           
  
                                        223 22         
  
                                        0232                  
  
                                        * 136 134*      
  
                                        K 3.2* 3.8 4.3        
  
                                         107 103        
  
                                        CO2 25 24 27          
  
                                        BUN 15 11 14          
  
                                        CREATININE 1.18 0.97 1.22   
  
                                        GLUCOSE 143* 129* 149*   
  
                                                              
  
                                        No results for input(s): AST, ALT, ALB,   
BILITOT, ALKPHOS in the last 72 hours.   
  
                                                              
  
                                        Current Inpatient Medications   
  
                                                    Current Facility-Administere  
d Medications: potassium chloride (KLOR-CON M)   
extended release tablet 40 mEq, 40 mEq, Oral, BID   
  
                                        prasugrel (EFFIENT) tablet 10 mg, 10 mg,  
 Oral, Daily   
  
                                        enoxaparin (LOVENOX) injection 90 mg, 1   
mg/kg, SubCUTAneous, BID   
  
                                        acetaminophen (TYLENOL) tablet 1,000 mg,  
 1,000 mg, Oral, 3 times per day   
  
                                        gabapentin (NEURONTIN) capsule 100 mg, 1  
00 mg, Oral, TID   
  
                                                    sodium chloride 0.9 % irriga  
tion 1,000 mL, 1,000 mL, Irrigation, Continuous PRN  
                                          
  
                                                    ceFAZolin (ANCEF) 2000 mg in  
 dextrose 4 % 100 mL IVPB (premix), 2,000 mg,   
IntraVENous, Q8H                          
  
                                        diphenhydrAMINE (BENADRYL) injection 25   
mg, 25 mg, IntraVENous, Q6H PRN   
  
                                        hydrOXYzine (VISTARIL) injection 25 mg,   
25 mg, IntraMUSCular, Q6H PRN   
  
                                                    insulin lispro (HUMALOG) inj  
ection vial 0-6 Units, 0-6 Units, SubCUTAneous, TID  
 WC                                       
  
                                                    insulin lispro (HUMALOG) inj  
ection vial 0-3 Units, 0-3 Units, SubCUTAneous,   
Nightly                                   
  
                                                    sodium chloride flush 0.9 %   
injection 10 mL, 10 mL, IntraVENous, 2 times per   
day                                       
  
                                        sodium chloride flush 0.9 % injection 10  
 mL, 10 mL, IntraVENous, PRN   
  
                                        0.9 % sodium chloride infusion, 25 mL, I  
ntraVENous, PRN   
  
                                        labetalol (NORMODYNE;TRANDATE) injection  
 10 mg, 10 mg, IntraVENous, Q2H PRN   
  
                                        hydrALAZINE (APRESOLINE) injection 10 mg  
, 10 mg, IntraVENous, Q1H PRN   
  
                                                    HYDROmorphone (DILAUDID) inj  
ection 0.5 mg, 0.5 mg, IntraVENous, Q3H PRN **OR**   
HYDROmorphone (DILAUDID) injection 1 mg, 1 mg, IntraVENous, Q3H PRN   
  
                                        polyethylene glycol (GLYCOLAX) packet 17  
 g, 17 g, Oral, Daily PRN   
  
                                                    oxyCODONE (ROXICODONE) immed  
iate release tablet 5 mg, 5 mg, Oral, Q4H PRN   
**OR** oxyCODONE (ROXICODONE) immediate release tablet 10 mg, 10 mg, Oral, Q4H 
PRN                                       
  
                                        atorvastatin (LIPITOR) tablet 80 mg, 80   
mg, Oral, Daily   
  
                                        buPROPion (WELLBUTRIN) tablet 150 mg, 15  
0 mg, Oral, BID   
  
                                        cilostazol (PLETAL) tablet 100 mg, 100 m  
g, Oral, BID AC   
  
                                        metoprolol tartrate (LOPRESSOR) tablet 2  
5 mg, 25 mg, Oral, BID   
  
                                        pantoprazole (PROTONIX) tablet 40 mg, 40  
 mg, Oral, QAM AC   
  
                                        Glucose (TRUEPLUS) oral gel 15 g, 15 g,   
Oral, PRN   
  
                                        dextrose 50 % IV solution, 12.5 g, Intra  
VENous, PRN   
  
                                        glucagon (rDNA) injection 1 mg, 1 mg, In  
traMUSCular, PRN   
  
                                        dextrose 5 % solution, 100 mL/hr, IntraV  
ENous, PRN   
  
                                                              
  
                                        ASSESSMENT AND PLAN   
  
                                                              
  
                                        Active Problems:      
  
                                                    Critical limb ischemia with   
history of revascularization of same extremity   
(HCC)                                     
  
                                        Peripheral artery disease (HCC)   
  
                                        Femoral-popliteal bypass graft occlusion  
, left (HCC)   
  
                                        Moderate malnutrition (HCC)   
  
                                        Resolved Problems:    
  
                                        * No resolved hospital problems. *   
  
                                                              
  
                                                    This is a 65 y.o. male with   
recent left fem-pop bypass on 22 and subsequent  
 LLE fasciotomies 22 and 22 presents with occluded bypass graft. POD1 
arterectomy/thrombectomy with multiple stent placement and LLE wound debridement
                                          
  
                                                              
  
                                        - Continue regular diet   
  
                                        - Wound care following, appreciate recom  
mendations   
  
                                        - Ancef               
  
                                        - Continue lovenox    
  
                                        - plavix/ASA/statin/pletal   
  
                                        - Pain control        
  
                                                              
  
                                        D/W Dr. Pamela Stein MD PGY-4   
  
                                                      
  
Electronically signed by Odell Scott MD at 2022 9:58 AM EST  
  
Formatting of this note might be different from the original.   
  
                                                              
  
                                        Comprehensive Nutrition Assessment   
  
                                                              
  
                                        Type and Reason for Visit: Initial,Posit  
franky Nutrition Screen   
  
                                                              
  
                                        Nutrition Recommendations/Plan:   
  
                                        Pt meets ASPEN/AND criteria for Moderate  
 Malnutrition.   
  
                                                    1. Pt currently ordered Carb  
 Control (5 CHO) diet. Recommend including Cardiac   
(3-4gm) diet restriction additionally considering PMHx.   
  
                                                    2. Per MNT protocol, initiat  
e ONS Ensure HP once daily to provide 160 kcals and  
 16g protein.                             
  
                                        3. Please document % meal intakes under   
I/O flowsheet.   
  
                                                    4. Monitor nutrition status,  
 intakes, wt trends, labs, healing, and fluid   
balance. RD will continue to follow.      
  
                                                              
  
                                                    Nutrition Assessment: Pt hx   
CAD, COPD, lung cancer, DM, HTN, HLD. Recently   
underwent left fem-pop bypass on 22. Subsequently, underwent LLE 
fasciotomies 22 and LLE Fasciotomy debridement and wo   
  
                                                    und vac 22 due to necrot  
ic muscle tissue. Now admitted w/ L leg pain and   
follow up w/ vascular. CTA with bilateral LE runoff today demonstrated occluded 
left SFA and popliteal artery, occluded left   
  
                                                    fem-pop bypass graft with re  
constitution of calf arteries and occlusion of   
anterior tibial and peroneal arteries. Pt now POD3 arterectomy/thrombectomy with
 multiple stent placement and LLE wound debride   
  
                                                    ment. Pt and wife present at  
 time of RD assessment. Endorse poor appetite since  
 December. Pt states  naturally don't eat as much when I can;t move as much . 
Pt's wife states normally consumes 2 meals/da   
  
                                                    y at home w/ ONS once daily.  
 Pt endorses wt loss during this time, stating UBW   
~220# in Dec and now 195# PTA. Pt is consuming and tolerating meals 3x/day 
during admission, wife somtimes providing dinner   
  
                                                    s for pt. RD encouraged/revi  
ewed protein sources to promote wound healing. Pt   
agreeable to Vanilla Ensure during admission.   
  
                                                              
  
                                        Malnutrition Assessment:   
  
                                        Malnutrition Status: Moderate malnutriti  
on   
  
                                        Context: Chronic Illness   
  
                                        Findings of the 6 clinical characteristi  
cs of malnutrition:   
  
                                                    Energy Intake: Mild decrease  
 in energy intake (Comment) (pt endorses good PO   
itnake during admission while meals provided to him, however ~2 smaller 
meals/day at home w/ ONS once daily, often forcing PO intake)   
  
                                        Weight Loss: 7 - Greater than 7.5% over   
3 months   
  
                                        Body Fat Loss: 1 - Mild body fat loss Or  
bital   
  
                                                    Muscle Mass Loss: 1 - Mild m  
uscle mass loss Temples (temporalis),Clavicles   
(pectoralis & deltoids),Thigh (quadraceps),Calf (gastrocnemius)   
  
                                        Fluid Accumulation: No significant fluid  
 accumulation   
  
                                         Strength: Not Performed   
  
                                                              
  
                                        Estimated Daily Nutrient Needs:   
  
                                        Energy (kcal): 3755-4652; Weight Used fo  
r Energy Requirements: Ideal   
  
                                        Protein (g): ; Weight Used for Pro  
tein Requirements: Ideal   
  
                                        Fluid (ml/day): per MD; Method Used for   
Fluid Requirements: 1 ml/kcal   
  
                                                              
  
                                        Nutrition Related Findings: +BS. +1 maxwell  
ing edema LLE. Florencio 19. Glucose 110   
  
                                                              
  
                                        Wounds: Surgical Incision,Wound Vac   
  
                                                              
  
                                        Current Nutrition Therapies:   
  
                                        ADULT DIET; Regular; 5 carb choices (75   
gm/meal)   
  
                                                              
  
                                        Anthropometric Measures:   
  
                                         Height: 5' 7  (170.2 cm)   
  
                                         Current Body Weight: 200 lb (90.7 kg) (  
)   
  
                                         Usual Body Weight: 220 lb (99.8 kg)   
  
                                         Ideal Body Weight: 148 lbs; % Ideal Bod  
y Weight 135.1 %   
  
                                         BMI: 31.3            
  
                                         Adjusted Body Weight: ; No Adjustment   
  
                                         BMI Categories: Obese Class 1 (BMI 30.0  
-34.9)   
  
                                                              
  
                                        Nutrition Diagnosis:   
  
                                                     Moderate malnutrition,In co  
ntext of chronic illness related to inadequate   
protein-energy intake as evidenced by poor intake prior to admission,weight loss
 7.5% in 3 months,mild muscle loss,mild loss of subcutaneous fat   
  
                                                              
  
                                        Nutrition Interventions:   
  
                                                    Food and/or Nutrient Deliver  
y: Modify Current Diet,Start Oral Nutrition   
Supplement                                
  
                                        Nutrition Education/Counseling: No recom  
mendation at this time   
  
                                        Coordination of Nutrition Care: Continue  
 to monitor while inpatient   
  
                                                              
  
                                        Goals:                
  
                                        pt to consume >75% of meals and ONS   
  
                                                              
  
                                        Nutrition Monitoring and Evaluation:   
  
                                        Behavioral-Environmental Outcomes: None   
Identified   
  
                                                    Food/Nutrient Intake Outcome  
s: Diet Advancement/Tolerance,Food and Nutrient   
Intake,Supplement Intake                  
  
                                                    Physical Signs/Symptoms Outc  
omes: Biochemical Data,GI Status,Fluid Status or   
Edema,Nutrition Focused Physical Findings,Skin,Weight   
  
                                                              
  
                                        Discharge Planning:   
  
                                        Too soon to determine   
  
                                                              
  
                                        Electronically signed by Aniya Clement MS  
, RD, LD on 3/4/22 at 1:44 PM EST   
  
                                                              
  
                                        Contact: 85252        
  
                                                              
  
                                                      
  
Electronically signed by Aniya Clement MS, RD, LD at 2022 1:46 PM EST  
  
Formatting of this note might be different from the original.   
  
                                        Images from the original note were not i  
ncluded.   
  
                                                    Was notified by primary nurs  
e who spoke with Dr Beren regarding L lateral leg   
wound. Dr Breen gave orders to continue instillation NPWT to wound. Wound Care 
team in to apply Veraflo instillation wound   
  
                                                     vac dressing to L lateral l  
ower leg wound. Primary nurse medicated pt for pain  
 prior to dressing change.                
  
                                                              
  
                                                    Removed ABD pad and gauze pa  
cking from L lateral lower leg wound. Wound and   
periwound cleansed with saline, patted dry and cavilon no sting applied to 
periwound. Applied 1 piece of fenestrated cleanse c   
  
                                                    hoice foam in deepest aspect  
 of wound bed. Mepitel ONE applied over granulated   
tissue, topped with 2 pieces of gray foam. Dressing well sealed. Pt tolerated 
dressing change very well.                
  
                                        Veraflo Instillation Wound Vac settings:  
   
  
                                        - Suction at -125mmHg for 3.5 hrs   
  
                                        - Instillation volume 32cc of Saline   
  
                                        - Soak time 10 min    
  
                                        - Single Trac Pad     
  
                                                              
  
                                                    Wound Care to follow up with  
 pt for wound vac dressing changes every MWF. For   
any concerns, page Wound Care Team at #1866, or call via THE BEARDED LADY.   
  
                                                              
  
                                                    Electronically signed by Tanika Martins RN on 3/4/2022 at 2:36   
PM                                        
  
                                                      
  
Electronically signed by Maura Martins RN at 2022 2:40 PM 
EST  
  
Formatting of this note is different from the original.   
  
                                                              
  
                                                              
  
                                        Department of Surgery - Progress Note -   
Surg V   
  
                                                              
  
                                        PATIENT NAME: Willow Bradshaw III   
  
                                        : 1957         
  
                                        MRN: 81218483         
  
                                        ATTENDING PHYSICIAN: Ed Breen MD   
  
                                        ADMIT DATE: 2022   
  
                                        TODAY'S DATE: 3/4/2022   
  
                                                              
  
                                        SUBJECTIVE            
  
                                                    Patient doing well this am.   
Pain in his LLE is well controlled. Moving his feet  
 without issues. Wound vacuums in place LLE.   
  
                                                              
  
                                        OBJECTIVE             
  
                                                    VITALS: BP (!) 85/59   Pulse  
 95   Temp 97.6 F (36.4 C) (Temporal)   Resp 24     
Ht 5' 7  (1.702 m)   Wt 200 lb (90.7 kg)   SpO2 91%   BMI 31.32 kg/m   
  
                                                              
  
                                        INTAKE/OUTPUT:        
  
                                        I/O last 3 completed shifts:   
  
                                        In: 233 [I.V.:233]    
  
                                        Out: 800 [Urine:800]   
  
                                        No intake/output data recorded.   
  
                                                              
  
                                        CONSTITUTIONAL: NAD, A&O X3.   
  
                                        HEAD: Normocephalic, atraumatic   
  
                                        NECK: Supple, trachea midline   
  
                                        LUNGS: Resp effort easy and unlabored, c  
hest rise equal bilaterally   
  
                                        ABDOMEN: soft, nontender, nondistended   
  
                                        EXT: Moving all extremities, no edema   
  
                                        SKIN: Warm and Dry, no rashes or lesions  
   
  
                                                    NEURO: CN 2-12 grossly intac  
t, no focal neurologic deficits, sensation intact   
bilaterally                               
  
                                                    VASC: LT PT and AT signal, R  
T PT/DP, L fasciotomy sites with wound vacuums   
intact.                                   
  
                                                              
  
                                        Data                  
  
                                        Recent Labs           
  
                                        22              
  
                                        0143 22         
  
                                        0237 22         
  
                                        0023                  
  
                                        WBC 8.1 9.4 10.1      
  
                                        HGB 9.6* 8.7* 8.8*    
  
                                        HCT 30.4* 27.2* 27.4*   
  
                                         382 380       
  
                                                              
  
                                        Recent Labs           
  
                                        22              
  
                                        0143 22         
  
                                        0237 22         
  
                                        0023                  
  
                                         134* 136       
  
                                        K 4.2 3.2* 3.8        
  
                                         104 107        
  
                                        CO2 23 25 24          
  
                                        BUN 10 15 11          
  
                                        CREATININE 1.10 1.18 0.97   
  
                                        GLUCOSE 153* 143* 129*   
  
                                                              
  
                                        No results for input(s): AST, ALT, ALB,   
BILITOT, ALKPHOS in the last 72 hours.   
  
                                                              
  
                                        Current Inpatient Medications   
  
                                                    Current Facility-Administere  
d Medications: potassium chloride (KLOR-CON M)   
extended release tablet 40 mEq, 40 mEq, Oral, BID   
  
                                        prasugrel (EFFIENT) tablet 10 mg, 10 mg,  
 Oral, Daily   
  
                                        enoxaparin (LOVENOX) injection 90 mg, 1   
mg/kg, SubCUTAneous, BID   
  
                                        acetaminophen (TYLENOL) tablet 1,000 mg,  
 1,000 mg, Oral, 3 times per day   
  
                                        gabapentin (NEURONTIN) capsule 100 mg, 1  
00 mg, Oral, TID   
  
                                                    sodium chloride 0.9 % irriga  
tion 1,000 mL, 1,000 mL, Irrigation, Continuous PRN  
                                          
  
                                                    ceFAZolin (ANCEF) 2000 mg in  
 dextrose 4 % 100 mL IVPB (premix), 2,000 mg,   
IntraVENous, Q8H                          
  
                                        diphenhydrAMINE (BENADRYL) injection 25   
mg, 25 mg, IntraVENous, Q6H PRN   
  
                                        hydrOXYzine (VISTARIL) injection 25 mg,   
25 mg, IntraMUSCular, Q6H PRN   
  
                                                    insulin lispro (HUMALOG) inj  
ection vial 0-6 Units, 0-6 Units, SubCUTAneous, TID  
 WC                                       
  
                                                    insulin lispro (HUMALOG) inj  
ection vial 0-3 Units, 0-3 Units, SubCUTAneous,   
Nightly                                   
  
                                                    sodium chloride flush 0.9 %   
injection 10 mL, 10 mL, IntraVENous, 2 times per   
day                                       
  
                                        sodium chloride flush 0.9 % injection 10  
 mL, 10 mL, IntraVENous, PRN   
  
                                        0.9 % sodium chloride infusion, 25 mL, I  
ntraVENous, PRN   
  
                                        labetalol (NORMODYNE;TRANDATE) injection  
 10 mg, 10 mg, IntraVENous, Q2H PRN   
  
                                        hydrALAZINE (APRESOLINE) injection 10 mg  
, 10 mg, IntraVENous, Q1H PRN   
  
                                                    HYDROmorphone (DILAUDID) inj  
ection 0.5 mg, 0.5 mg, IntraVENous, Q3H PRN **OR**   
HYDROmorphone (DILAUDID) injection 1 mg, 1 mg, IntraVENous, Q3H PRN   
  
                                        polyethylene glycol (GLYCOLAX) packet 17  
 g, 17 g, Oral, Daily PRN   
  
                                                    oxyCODONE (ROXICODONE) immed  
iate release tablet 5 mg, 5 mg, Oral, Q4H PRN   
**OR** oxyCODONE (ROXICODONE) immediate release tablet 10 mg, 10 mg, Oral, Q4H 
PRN                                       
  
                                        atorvastatin (LIPITOR) tablet 80 mg, 80   
mg, Oral, Daily   
  
                                        buPROPion (WELLBUTRIN) tablet 150 mg, 15  
0 mg, Oral, BID   
  
                                        cilostazol (PLETAL) tablet 100 mg, 100 m  
g, Oral, BID AC   
  
                                        metoprolol tartrate (LOPRESSOR) tablet 2  
5 mg, 25 mg, Oral, BID   
  
                                        pantoprazole (PROTONIX) tablet 40 mg, 40  
 mg, Oral, QAM AC   
  
                                        Glucose (TRUEPLUS) oral gel 15 g, 15 g,   
Oral, PRN   
  
                                        dextrose 50 % IV solution, 12.5 g, Intra  
VENous, PRN   
  
                                        glucagon (rDNA) injection 1 mg, 1 mg, In  
traMUSCular, PRN   
  
                                        dextrose 5 % solution, 100 mL/hr, IntraV  
ENous, PRN   
  
                                                              
  
                                        ASSESSMENT AND PLAN   
  
                                                              
  
                                        Active Problems:      
  
                                                    Critical limb ischemia with   
history of revascularization of same extremity   
(HCC)                                     
  
                                        Peripheral artery disease (HCC)   
  
                                        Femoral-popliteal bypass graft occlusion  
, left (HCC)   
  
                                        Resolved Problems:    
  
                                        * No resolved hospital problems. *   
  
                                                              
  
                                                    This is a 65 y.o. male with   
recent left fem-pop bypass on 22 and subsequent  
 LLE fasciotomies 22 and 22 presents with occluded bypass graft. POD1 
arterectomy/thrombectomy with multiple stent placement and LLE wound debridement
                                          
  
                                                              
  
                                        - Continue regular diet   
  
                                        - Wound care following, appreciate recom  
mendations   
  
                                        - Ancef               
  
                                        - Low dose heparin    
  
                                        - plavix/ASA/statin/pletal   
  
                                        - Pain control        
  
                                                              
  
                                        D/W MD Anabela Ahuja MD    
  
                                        General Surgery PGY-1   
  
                                        Pager # 5225          
  
                                                              
  
                                        Attending Supervising Physician's Attest  
ation Statement   
  
                                                    I performed a history and ph  
ysical examination on the patient and discussed the  
 management with the resident physician. I reviewed and agree with the findings 
and plan as documented in her note .      
  
                                                              
  
                                        -vac changed; base still with some necro  
tic debris, cont veraflow   
  
                                        -lovenox, effient     
  
                                        -plan to stop atbx at day 10   
  
                                                              
  
                                        Electronically signed by Ed Breen MD on 3/4/22 at 2:04 PM EST   
  
                                                      
  
Electronically signed by Ed Breen MD at 2022 2:06 PM EST  
  
Formatting of this note is different from the original.   
  
                                        CARDIOLOGY PROGRESS NOTE   
  
                                                              
  
                                        Chart and interval events reviewed.   
  
                                                              
  
                                        Reason for Visit Acute limb ischemia   
  
                                                              
  
                                        SUBJECTIVE:           
  
                                                    Willow Bradshaw III state  
s He feels ok today, he was looking forward to   
going home but understands that he may have to stay until the weekend. He has no
 chest pain, shortness of breath, palpitations.   
  
                                                     Orthopnea or PND. He feels   
lightheaded after administration of IV pain   
medications. Wife is at bedside.          
  
                                                              
  
                                        SCHEDULED MEDICATIONS:   
  
                                         potassium chloride 40 mEq Oral BID   
  
                                         prasugrel 10 mg Oral Daily   
  
                                         enoxaparin 1 mg/kg SubCUTAneous BID   
  
                                         acetaminophen 1,000 mg Oral 3 times per  
 day   
  
                                         gabapentin 100 mg Oral TID   
  
                                         ceFAZolin 2,000 mg IntraVENous Q8H   
  
                                         insulin lispro 0-6 Units SubCUTAneous T  
ID WC   
  
                                         insulin lispro 0-3 Units SubCUTAneous N  
ightly   
  
                                         sodium chloride flush 10 mL IntraVENous  
 2 times per day   
  
                                         atorvastatin 80 mg Oral Daily   
  
                                         buPROPion 150 mg Oral BID   
  
                                         cilostazol 100 mg Oral BID AC   
  
                                         metoprolol tartrate 25 mg Oral BID   
  
                                         pantoprazole 40 mg Oral QAM AC   
  
                                                              
  
                                        Active Problems:      
  
                                                    Critical limb ischemia with   
history of revascularization of same extremity   
(HCC)                                     
  
                                        Peripheral artery disease (HCC)   
  
                                        Femoral-popliteal bypass graft occlusion  
, left (HCC)   
  
                                        Resolved Problems:    
  
                                        * No resolved hospital problems. *   
  
                                                              
  
                                        Review of Systems:    
  
                                        Review of Systems     
  
                                                    Constitutional: Positive for  
 activity change. Negative for chills, diaphoresis   
and fever.                                
  
                                        HENT: Negative for nosebleeds.   
  
                                        Eyes: Negative for visual disturbance.   
  
                                        Respiratory: Negative for cough, shortne  
ss of breath and wheezing.   
  
                                                    Cardiovascular: Positive for  
 leg swelling. Negative for chest pain and   
palpitations.                             
  
                                                    Gastrointestinal: Negative f  
or abdominal pain, blood in stool, constipation,   
diarrhea, nausea and vomiting.            
  
                                        Genitourinary: Negative for hematuria. T  
esticular pain: Left lower extremity.   
  
                                        Musculoskeletal: Negative for myalgias.   
  
                                        Pain to left lower extremity   
  
                                        Skin: Negative for rash.   
  
                                                    Neurological: Positive for l  
ight-headedness (after IV pain medications ).   
Negative for dizziness and syncope.       
  
                                        Hematological: Does not bruise/bleed eas  
james.   
  
                                        Psychiatric/Behavioral: Negative for dys  
phoric mood and suicidal ideas.   
  
                                        Denies Depression     
  
                                                              
  
                                        VITAL SIGNS:          
  
                                        Vitals:               
  
                                        22 0355 22 0654 22 081  
8 22 1052   
  
                                        BP: 109/71 109/64 116/70 (!) 85/59   
  
                                        Pulse: 83 85 95       
  
                                        Resp: 18 14 24        
  
                                        Temp: 96.1 F (35.6 C) 96.7 F (35.9 C) 97  
.6 F (36.4 C)   
  
                                        TempSrc: Temporal Temporal Temporal   
  
                                        SpO2: 92% 96% 91%     
  
                                        Weight:               
  
                                        Height:               
  
                                                              
  
                                        No intake or output data in the 24 hours  
 ending 22 1303   
  
                                                              
  
                                        No data found.        
  
                                        Physical Exam:        
  
                                        Physical Exam         
  
                                        Constitutional:       
  
                                        General: He is not in acute distress.   
  
                                        Appearance: Normal appearance. He is wel  
l-developed. He is not diaphoretic.   
  
                                        HENT:                 
  
                                        Mouth/Throat:         
  
                                        Pharynx: No oropharyngeal exudate.   
  
                                        Eyes:                 
  
                                        General: No scleral icterus.   
  
                                        Right eye: No discharge.   
  
                                        Left eye: No discharge.   
  
                                        Neck:                 
  
                                        Thyroid: No thyromegaly.   
  
                                        Vascular: No JVD.     
  
                                        Cardiovascular:       
  
                                        Rate and Rhythm: Normal rate and regular  
 rhythm.   
  
                                        Chest Wall: PMI is not displaced.   
  
                                        Pulses:               
  
                                        Dorsalis pedis pulses are 1+ on the left  
 side.   
  
                                        Posterior tibial pulses are 1+ on the le  
ft side.   
  
                                        Heart sounds: Normal heart sounds. No mu  
rmur heard.   
  
                                        No gallop.            
  
                                        Comments: Groin without hematoma or oozi  
ng   
  
                                        Nursing dopplering pulses   
  
                                        Left foot pink and warm   
  
                                        Pulmonary:            
  
                                        Effort: No accessory muscle usage or res  
piratory distress.   
  
                                        Breath sounds: Normal breath sounds.   
  
                                        Abdominal:            
  
                                        General: Bowel sounds are normal. There   
is no distension.   
  
                                        Palpations: Abdomen is soft.   
  
                                        Tenderness: There is no abdominal tender  
ness.   
  
                                        Musculoskeletal:      
  
                                        General: Swelling present. Normal range   
of motion.   
  
                                        Skin:                 
  
                                        General: Skin is warm and dry.   
  
                                        Comments: Incisions with intact wound VA  
C to left lower extremity   
  
                                        Neurological:         
  
                                        Mental Status: He is alert and oriented   
to person, place, and time.   
  
                                                              
  
                                                              
  
                                        Data:                 
  
                                        Scheduled Meds: Reviewed   
  
                                        Continuous Infusions:   
  
                                         sodium chloride      
  
                                         sodium chloride      
  
                                         dextrose             
  
                                                              
  
                                        CBC:                  
  
                                        Recent Labs           
  
                                        22              
  
                                        0237 22         
  
                                        0023                  
  
                                        WBC 9.4 10.1          
  
                                        HGB 8.7* 8.8*         
  
                                        HCT 27.2* 27.4*       
  
                                         380           
  
                                                              
  
                                        BMP:                  
  
                                        Recent Labs           
  
                                        22              
  
                                        0237 22         
  
                                        0023                  
  
                                        * 136           
  
                                        K 3.2* 3.8            
  
                                         107            
  
                                        CO2 25 24             
  
                                        BUN 15 11             
  
                                        CREATININE 1.18 0.97   
  
                                                              
  
                                        INR:No results for input(s): INR in the   
last 72 hours.   
  
                                        No results for input(s): BNP in the last  
 72 hours.   
  
                                        TSH:                  
  
                                        Lab Results           
  
                                        Component Value Date   
  
                                        TSH 3.178 2021   
  
                                                              
  
                                                    Cardiac Injury Profile: No r  
esults for input(s): CKTOTAL, CKMB, TROPONINI in   
the last 72 hours.                        
  
                                        Lipid Profile:        
  
                                        Lab Results           
  
                                        Component Value Date   
  
                                        TRIG 231 2021   
  
                                        HDL 29 2021     
  
                                        LDLCALC 153 07/15/2016   
  
                                        CHOL 174 2021   
  
                                        CHOL 220 07/15/2016   
  
                                                              
  
                                                              
  
                                                              
  
                                        EKG: See Report       
  
                                        Telemetry Reviewed: NSR heart rate 84-10  
6 bpm occasional PVC's   
  
                                        Echo: 2021      
  
                                                              
  
                                        SUMMARY:              
  
                                        1. Technically difficult study.   
  
                                        2. Left ventricle: Systolic function is   
at the lower limits of normal   
  
                                        by visual assessment. The estimated ejec  
tion fraction is 50%.   
  
                                        Regional wall motion abnormalities canno  
t be excluded.   
  
                                        3. Right ventricle: Not well visualized.  
   
  
                                        4. No significant valve disease.   
  
                                        5. Aorta: The aorta is not well visualiz  
ed.   
  
                                                              
  
                                        PERIPHERAL PERCUTANEOUS ARTERIAL INTERVE  
NTION [QNZ9145 (Custom)]   
  
                                                              
  
                                        Dx Procedure: LLE angiogram up to 3rd or  
dmitri vessel from RCFA approach   
  
                                                              
  
                                        Findings: Occluded native SFA and poplit  
eal artery.   
  
                                                     Occluded SVG Fem AK poplite  
al bypass graft. Occluded TPT Peroneal, AT and PT.   
Severe small vessel disease.              
  
                                                              
  
                                        Access : Contralateral sheath placement   
(6F Ajay sheath)   
  
                                         Native SFA crossed with 5F angled Arbuckle  
 cath and Benston wire   
  
                                         Ross 6 EPD placed L PT   
  
                                                              
  
                                        Intervention: Rotarex 6F 2 passes entire  
 SFA into TPT.   
  
                                                      ROSS 6 filter packed and no  
 flow. Filter retrieved and removed. Lost access.   
Re crossed with Stiff Glide Wire. Transitioned to 0.014 Command Wire in PT   
  
                                         PTA of the SFA and Pop 6 x 250 mm Minaya  
 Balloon long inflations ( 5 min)   
  
                                         Inadequate effect in popliteal artery   
  
                                                     Viabahn stent graft 6 x 100  
 mm deployed in the distal SAF into proximal TPT.   
Excellent result.                         
  
                                                     Angio showed residual TPT a  
nd proximal PT narrowing. IVUS confirmed Severe in   
apparent disease on the TPT and prox PT with small lumen.   
  
                                         SYDNIE placed Xience 3 mm x 38 mm brought   
to 3.2 mm with excellent result.   
  
                                         Wore withdrawn and used to cross into t  
he AT brought to foot.   
  
                                                     POBA with a 2 mm x 300 mm C  
ook Capv1kv. Vessel opened to the foot. Excellent   
flow.                                     
  
                                                              
  
                                                    Calf Debiedimont performed b  
y Dr Breen. R CFA sheat\h removed and pressure   
held.                                     
  
                                                              
  
                                        Warm pink tissue with strongly Dopperabl  
e PT and AT pulses.   
  
                                                              
  
                                                    Plan Change to Prasugrel for  
 possible clopidogrel resistance and IV to LMW   
heparin                                   
  
                                                              
  
                                                              
  
                                        IMPRESSIONS/RECOMMENDATIONS:   
  
                                                              
  
                                                    Severe PVD with acute limb i  
schemia- s/p extensive endovascular and surgical   
revascularizationwith fasciotomy and debridement with left fem-pop bypass 2022
s/p LLE fasciotomies 1722 and 22 now w   
  
                                                    ith occluded bypass graft s/  
p Rotational aspirational atherectomy with emboli   
protection,left SFA/ popliteal artery LLE angioplasty with multiple stent 
placement, distal popliteal, TP trunk, PT LLE debr   
  
                                        idement 3/1/22. 2 vessel runoff to the L  
 foot (full report above)   
  
                                        -Pain Controlled.     
  
                                                    -Per prior cardiology note p  
ossible clopidogrel resistance plan prasugrel for   
now. Plan Lovenox 2-4 weeks post op and transition to full dose Xarelto and P2Y-
12 Inhibitor at Dr. Breen's discretion. Pat   
  
                                        ient and wife comfortable with home admi  
nstration.   
  
                                        -Continue pletal, ASA, atorvastatin   
  
                                        -Lasix PRN. Will give 40mg IV today   
  
                                                              
  
                                                    CAD s/p CABG x 3 in  and  
 PCI/SYDNIE x 3 to the distal RCA and angioplasty of   
the jailed RPL 10/22/20                   
  
                                        -Stable denies symptoms of angina   
  
                                        -Continue ASA, atorvastatin, metoprolol   
  
                                                              
  
                                        Hyperlipidemia        
  
                                        -Continue high intensity statin with fernanda  
rvastatin   
  
                                                              
  
                                        DM type 2             
  
                                        -Controlled- last HgbA1c was 5.9%   
2   
  
                                                              
  
                                        Discussed with Dr. Bryan   
  
                                                              
  
                                        Electronicallysigned by CARSON Lira CNP on 3/4/2022 at 10:03 PM   
  
                                                              
  
                                                      
  
Electronically signed by CARSON Lira CNP at 2022 1:12 PM EST  
  
Formatting of this note might be different from the original.   
  
                                        Images from the original note were not i  
ncluded.   
  
                                                    Wound Care team in to change  
 wound vac dressings to LLE. Primary nurse   
medicated pt for pain prior to dressing change.   
  
                                                              
  
                                                    Old wound vac dressing soake  
d with saline and removed without difficulties.   
Full thickness surgical wound to L medial lower leg (pictured below). Removed 1 
piece of white foam, 1 piece of black foam and   
  
                                                     Mepitel ONE. Wound bed is 9  
5% red with granulation tissue noted and 5% yellow   
fibrinous tissue along deepest aspect of wound bed. For wound measurements, 
please see wound care note from 3/2. Small amou   
  
                                                    nt of serosang drainage. No   
purulence or active bleeding noted. Periwound   
tissue intact, no erythema or induration noted. Wound and periwound cleansed 
with saline, patted dry and cavilon no sting applie   
  
                                                    d to periwound. Applied whit  
e foam x1 piece along deepest aspect of wound bed,   
topped with black foam and Mepitel ONE along edges. Dressing well sealed. NPWT 
pressure set to -125mmHg continuous per order. Pt tolerated dressing change 
well.                                     
  
                                                              
  
                                                    Full thickness surgical woun  
d along L lateral lower leg (pictured below).   
Removed 1 piece of fenestrated cleanse choice foam and 1 piece of gray foam. 
Wound bed is 50% red and 50% yellow. Thin yellow fi   
  
                                                    lm noted along wound bed and  
 loosely adherent yellow slough along deepest   
aspect of wound bed and along tunneling areas. Tunneling along 6 oclock now 
measuring deeper than last assessment at 6.5 cm. No   
  
                                                    changed in other wound measu  
rements, please see wound care note from 3/2 for   
recorded measurements. Moderate amount of serosang drainage. Small amount of 
bleeding noted from wound. Applied pressure x5 m   
  
                                                    in, achieved hemostasis. Per  
iwound tissue along edges appear white and   
macerated. Erythema noted along distal periwound area. Outlined area of erythema
with marker to monitor. No odor present from wound   
  
                                                    . Applied WTD dressing tempo  
rarily until wound can be further assessed by   
Vascular Team and evaluate if Instillation NPWT should be continued or change to
standard NPWT. Cleaned wound with saline, packe   
  
                                        d with saline-moistened gauze and covere  
d with ABD pad, secured with netting.   
  
                                                              
  
                                        Palpable pedal pulses +1. LLE noted to h  
ave +2 pitting edema.   
  
                                                              
  
                                        Paged Dr Breen to discuss lateral wound  
. Awaiting response.   
  
                                                              
  
                                                    Wound Care to follow up with  
 pt for wound vac dressing changes every MWF. For   
any concerns, page Wound Care Team at #4918, or call via THE BEARDED LADY.   
  
                                                              
  
                                                    Electronically signed by Tanika Martins RN on 3/4/2022 at 10:20   
AM                                        
  
                                                      
  
Electronically signed by Maura Martins RN at 2022 10:22 AM
EST  
  
Formatting of this note is different from the original.   
  
                                        CARDIOLOGY PROGRESS NOTE   
  
                                                              
  
                                        Chart and interval events reviewed.   
  
                                                              
  
                                        Reason for Visit Acute limb ischemia   
  
                                                              
  
                                        SUBJECTIVE:           
  
                                                    Willow Bradshaw III state  
s he feels okay today. Pain is much better after   
recently getting pain medication. He continues with pain 5/10 to left lower 
extremity. He denies chest pain, palpitations, li   
  
                                                    ghtheadedness, dizziness, or  
thopnea, PND and presyncope. He remains on   
supplemental oxygen per nasal cannula. His wife is at bedside.   
  
                                                              
  
                                        SCHEDULED MEDICATIONS:   
  
                                         potassium chloride 40 mEq Oral BID   
  
                                         prasugrel 10 mg Oral Daily   
  
                                         sodium chloride flush 5-40 mL IntraVENo  
us 2 times per day   
  
                                         ceFAZolin 2,000 mg IntraVENous Q8H   
  
                                         insulin lispro 0-6 Units SubCUTAneous T  
ID WC   
  
                                         insulin lispro 0-3 Units SubCUTAneous N  
ightly   
  
                                         sodium chloride flush 10 mL IntraVENous  
 2 times per day   
  
                                         acetaminophen 1,000 mg Oral Q6H   
  
                                         atorvastatin 80 mg Oral Daily   
  
                                         buPROPion 150 mg Oral BID   
  
                                         cilostazol 100 mg Oral BID AC   
  
                                         metoprolol tartrate 25 mg Oral BID   
  
                                         pantoprazole 40 mg Oral QAM AC   
  
                                                              
  
                                        Active Problems:      
  
                                                    Critical limb ischemia with   
history of revascularization of same extremity   
(HCC)                                     
  
                                        Peripheral artery disease (HCC)   
  
                                        Femoral-popliteal bypass graft occlusion  
, left (HCC)   
  
                                        Resolved Problems:    
  
                                        * No resolved hospital problems. *   
  
                                                              
  
                                        Review of Systems:    
  
                                        Review of Systems     
  
                                                    Constitutional: Positive for  
 activity change. Negative for chills, diaphoresis   
and fever.                                
  
                                        HENT: Negative for nosebleeds.   
  
                                        Eyes: Negative for visual disturbance.   
  
                                        Respiratory: Negative for cough, shortne  
ss of breath and wheezing.   
  
                                                    Cardiovascular: Positive for  
 leg swelling. Negative for chest pain and   
palpitations.                             
  
                                                    Gastrointestinal: Negative f  
or abdominal pain, blood in stool, constipation,   
diarrhea, nausea and vomiting.            
  
                                        Genitourinary: Negative for hematuria. T  
esticular pain: Left lower extremity.   
  
                                        Musculoskeletal: Negative for myalgias.   
  
                                        Pain to left lower extremity   
  
                                        Skin: Negative for rash.   
  
                                        Neurological: Negative for dizziness and  
 syncope.   
  
                                        Hematological: Does not bruise/bleed eas  
james.   
  
                                        Psychiatric/Behavioral: Negative for dys  
phoric mood and suicidal ideas.   
  
                                        Denies Depression     
  
                                                              
  
                                        VITAL SIGNS:          
  
                                        Vitals:               
  
                                        22 1925 22 2340 22 022  
9 22 0746   
  
                                        BP: (!) 101/55 (!) 87/57 (!) 84/56 112/7  
8   
  
                                        Pulse: 107 91 85 87   
  
                                        Resp: 26 16 16 16     
  
                                        Temp: 96.8 F (36 C) 96.9 F (36.1 C) 97 F  
 (36.1 C) 98.1 F (36.7 C)   
  
                                        TempSrc: Temporal Temporal Temporal Temp  
oral   
  
                                        SpO2: 92% 97% 93% 93%   
  
                                        Weight:               
  
                                        Height:               
  
                                                              
  
                                        Intake/Output Summary (Last 24 hours) at  
 3/3/2022 1013   
  
                                        Last data filed at 3/3/2022 0616   
  
                                        Gross per 24 hour     
  
                                        Intake 233 ml         
  
                                        Output 800 ml         
  
                                        Net -567 ml           
  
                                                              
  
                                        No data found.        
  
                                        Physical Exam:        
  
                                        Physical Exam         
  
                                        Constitutional:       
  
                                        General: He is not in acute distress.   
  
                                        Appearance: Normal appearance. He is wel  
l-developed. He is not diaphoretic.   
  
                                        HENT:                 
  
                                        Mouth/Throat:         
  
                                        Pharynx: No oropharyngeal exudate.   
  
                                        Eyes:                 
  
                                        General: No scleral icterus.   
  
                                        Right eye: No discharge.   
  
                                        Left eye: No discharge.   
  
                                        Neck:                 
  
                                        Thyroid: No thyromegaly.   
  
                                        Vascular: No JVD.     
  
                                        Cardiovascular:       
  
                                        Rate and Rhythm: Normal rate and regular  
 rhythm.   
  
                                        Chest Wall: PMI is not displaced.   
  
                                        Pulses:               
  
                                        Dorsalis pedis pulses are 1+ on the left  
 side.   
  
                                        Posterior tibial pulses are 1+ on the le  
ft side.   
  
                                        Heart sounds: Normal heart sounds. No mu  
rmur heard.   
  
                                        No gallop.            
  
                                        Comments: Groin without hematoma or oozi  
ng   
  
                                        Nursing dopplering pulses   
  
                                        Left foot pink and warm   
  
                                        Pulmonary:            
  
                                        Effort: No accessory muscle usage or res  
piratory distress.   
  
                                        Breath sounds: Normal breath sounds.   
  
                                        Abdominal:            
  
                                        General: Bowel sounds are normal. There   
is no distension.   
  
                                        Palpations: Abdomen is soft.   
  
                                        Tenderness: There is no abdominal tender  
ness.   
  
                                        Musculoskeletal:      
  
                                        General: Swelling present. Normal range   
of motion.   
  
                                        Skin:                 
  
                                        General: Skin is warm and dry.   
  
                                        Comments: Incisions with intact wound VA  
C to left lower extremity   
  
                                        Neurological:         
  
                                        Mental Status: He is alert and oriented   
to person, place, and time.   
  
                                                              
  
                                        Data:                 
  
                                        Scheduled Meds: Reviewed   
  
                                        Continuous Infusions:   
  
                                         heparin (PORCINE) Infusion 15 Units/kg/  
hr (22 1001)   
  
                                         sodium chloride      
  
                                         sodium chloride      
  
                                         sodium chloride      
  
                                         dextrose             
  
                                                              
  
                                        CBC:                  
  
                                        Recent Labs           
  
                                        22              
  
                                        0143 22         
  
                                        0237                  
  
                                        WBC 8.1 9.4           
  
                                        HGB 9.6* 8.7*         
  
                                        HCT 30.4* 27.2*       
  
                                         382           
  
                                                              
  
                                        BMP:                  
  
                                        Recent Labs           
  
                                        22              
  
                                        0143 22         
  
                                        0237                  
  
                                         134*           
  
                                        K 4.2 3.2*            
  
                                         104            
  
                                        CO2 23 25             
  
                                        BUN 10 15             
  
                                        CREATININE 1.10 1.18   
  
                                                              
  
                                        INR:No results for input(s): INR in the   
last 72 hours.   
  
                                        No results for input(s): BNP in the last  
 72 hours.   
  
                                        TSH:                  
  
                                        Lab Results           
  
                                        Component Value Date   
  
                                        TSH 3.178 2021   
  
                                                              
  
                                                    Cardiac Injury Profile: No r  
esults for input(s): CKTOTAL, CKMB, TROPONINI in   
the last 72 hours.                        
  
                                        Lipid Profile:        
  
                                        Lab Results           
  
                                        Component Value Date   
  
                                        TRIG 231 2021   
  
                                        HDL 29 2021     
  
                                        LDLCALC 153 07/15/2016   
  
                                        CHOL 174 2021   
  
                                        CHOL 220 07/15/2016   
  
                                                              
  
                                                              
  
                                                              
  
                                        EKG: See Report       
  
                                        Telemetry Reviewed: 80-90's NSR   
  
                                        Echo: 2021:             
  
                                        SUMMARY:              
  
                                        1. Technically difficult study.   
  
                                        2. Left ventricle: Systolic function is   
at the lower limits of normal   
  
                                        by visual assessment. The estimated ejec  
tion fraction is 50%.   
  
                                        Regional wall motion abnormalities canno  
t be excluded.   
  
                                        3. Right ventricle: Not well visualized.  
   
  
                                        4. No significant valve disease.   
  
                                        5. Aorta: The aorta is not well visualiz  
ed.   
  
                                                              
  
                                        PERIPHERAL PERCUTANEOUS ARTERIAL INTERVE  
NTION [XZG6803 (Custom)]   
  
                                                              
  
                                        Dx Procedure: LLE angiogram up to 3rd or  
dmitri vessel from RCFA approach   
  
                                                              
  
                                        Findings: Occluded native SFA and poplit  
eal artery.   
  
                                                    Occluded SVG Fem AK poplitea  
l bypass graft. Occluded TPT Peroneal, AT and PT.   
Severe small vessel disease.              
  
                                                              
  
                                        Access : Contralateral sheath placement   
(6F Ajay sheath)   
  
                                        Native SFA crossed with 5F angled Arbuckle   
cath and Benston wire   
  
                                        Ross 6 EPD placed L PT   
  
                                                              
  
                                        Intervention: Rotarex 6F 2 passes entire  
 SFA into TPT.   
  
                                                    ROSS 6 filter packed and no f  
low. Filter retrieved and removed. Lost access. Re   
crossed with Stiff Glide Wire. Transitioned to 0.014 Command Wire in PT   
  
                                        PTA of the SFA and Pop 6 x 250 mm Minaya   
Balloon long inflations ( 5 min)   
  
                                        Inadequate effect in popliteal artery   
  
                                                    Viabahn stent graft 6 x 100   
mm deployed in the distal SAF into proximal TPT.   
Excellent result.                         
  
                                                    Angio showed residual TPT an  
d proximal PT narrowing. IVUS confirmed Severe in   
apparent disease on the TPT and prox PT with small lumen.   
  
                                        SYDNIE placed Xience 3 mm x 38 mm brought t  
o 3.2 mm with excellent result.   
  
                                        Wore withdrawn and used to cross into th  
e AT brought to foot.   
  
                                                    POBA with a 2 mm x 300 mm Co  
ok Wkeu9jz. Vessel opened to the foot. Excellent   
flow.                                     
  
                                                              
  
                                                    Calf Debiedimont performed b  
y Dr Breen. R CFA sheat\h removed and pressure   
held.                                     
  
                                                              
  
                                        Warm pink tissue with strongly Dopperabl  
e PT and AT pulses.   
  
                                                              
  
                                                    Plan Change to Prasugrel for  
 possible clopidogrel resistance and IV to LMW   
heparin                                   
  
                                                              
  
                                        IMPRESSIONS/RECOMMENDATIONS:   
  
                                                              
  
                                                    Severe PVD with acute limb i  
schemia- s/p extensive endovascular and surgical   
revascularizationwith fasciotomy and debridement with left fem-pop bypass 2022
s/p LLE fasciotomies 1722 and 22 now w   
  
                                                    ith occluded bypass graft s/  
p Rotational aspirational atherectomy with emboli   
protection,left SFA/ popliteal artery LLE angioplasty with multiple stent 
placement, distal popliteal, TP trunk, PT LLE debr   
  
                                        idement 3/1/22. 2 vessel runoff to the L  
 foot (full report above)   
  
                                        -Pain fairly well-controlled today.   
  
                                                    -Per prior cardiology note p  
ossible clopidogrel resistance plan prasugrel for   
now. Plan transition UF heparin to Lovenox 2-4 weeks post op and transition to 
full dose Xarelto and P2Y-12 Inhibitor at Dr. Breen's discretion.   
  
                                        -Continue pletal, ASA, atorvastatin   
  
                                        -Lasix PRN            
  
                                                              
  
                                                    CAD s/p CABG x 3 in 2017 and  
 PCI/SYDNIE x 3 to the distal RCA and angioplasty of   
the jailed RPL 10/22/20                   
  
                                        -Stable denies symptoms of angina   
  
                                        -Continue ASA, atorvastatin, metoprolol   
  
                                                              
  
                                        Hyperlipidemia        
  
                                        -Continue high intensity statin with fernanda  
rvastatin   
  
                                                              
  
                                        DM type 2             
  
                                        -Controlled- last HgbA1c was 5.9%   
2   
  
                                                              
  
                                                              
  
                                        Will discuss with Irvin Peraza   
  
                                        Electronicallysigned by CARSON Lira - CNP on 3/3/2022 at 10:13 AM   
  
                                                              
  
                                                      
  
Electronically signed by Odell Bryan DO at 2022 2:20 PM EST   
  
                                                      
  
Associated attestation - Odell Bryan DO - 2022 2:20 PM EST  
  
Formatting of this note is different from the original.   
  
                                                              
  
                                        This patient was seen and personally exa  
mined by me on 3/3/22   
  
                                                    Labs, imaging studies and el  
ectronic medical record reviewed. See [x]progress   
note []H&P []Consult documented by [x]KIESHA which reflects my hpi, pmh, psh, ros, 
fh, sh as well with my additions, as I d   
  
                                        iscussed with the [x]KIESHA. For my exam, a  
ssessment and plan see below.   
  
                                                              
  
                                        PHYSICAL EXAM:        
  
                                                              
  
                                                              
  
                                        General Appearance: []WDWN [x]Obese []Ca  
chectic []Thin []ill   
  
                                                              
  
                                                              
  
                                        Neck: Jvd []Present [x]Absent   
  
                                                              
  
                                        Lungs: [x]Clear []Crackles []Wheezes []R  
honchi /   
  
                                        Respiratory effort []Labored []Non-Labor  
ed   
  
                                                              
  
                                        Heart: [x]RRR []Irregularly Irregular []  
murmur present []murmur absent/   
  
                                        Peripheral Edema []Absent [x]Present   
  
                                        Pulses: Warm foot 1-2/3 DP and PT.   
  
                                                              
  
                                        Assessment/Plan       
  
                                                              
  
                                                    CLI. Mr. Bradshaw continues   
to improve. His L foot is warm and he has palpable   
pulses in DP and PT distribution. Plan to convert to LMW heparin today. Continue
 Prasugrel and stop aspirin. Diuresis for edema from hydration and reperfusion.   
  
                                        Continue wound care. Needs skin grafts   
  
                                        DC planning.          
  
                                                              
  
                                        Odell Bryan DO   
  
                                                              
  
                                                      
  
Formatting of this note is different from the original.   
  
                                        Physical Therapy      
  
                                        Facility/Department: Department of Veterans Affairs Medical Center-Erie CAPACITY MAN  
AGEMENT   
  
                                        Daily Treatment Note   
  
                                        NAME: Willow Bradshaw III   
  
                                        : 1957         
  
                                        MRN: 91813800         
  
                                                              
  
                                        Date of Service: 3/3/2022   
  
                                                              
  
                                        Discharge Recommendations:   
  
                                        Home with assist PRN   
  
                                        PT Equipment Recommendations   
  
                                        Equipment Needed: No   
  
                                                              
  
                                        Assessment            
  
                                                    Assessment: Pt currently huang  
ited by pain and declined to get OOB. Good effort   
with ther ex but does hold his breath resulting in O2 sats dropping. O2 sats 
returned to normal after activity. Anticipate home with assist as needed at 
Salt Lake Regional Medical Center.                                
  
                                        Prognosis: Good       
  
                                        Decision Making: Medium Complexity   
  
                                        REQUIRES PT FOLLOW UP: Yes   
  
                                        Activity Tolerance    
  
                                        Activity Tolerance: Patient limited by p  
ain;Patient limited by endurance   
  
                                                    Activity Tolerance: O2 sats   
dropped to 86% while doing ther ex. Cues not to   
hold breathe.                             
  
                                                              
  
                                        Patient Diagnosis(es): There were no enc  
ounter diagnoses.   
  
                                                              
  
                                                    has a past medical history o  
f CAD (coronary artery disease), Cancer (HCC),   
Cerebral artery occlusion with cerebral infarction (HCC), COPD (chronic 
obstructive pulmonary disease) (HCC), Diabetes (HCC), D   
  
                                                    VT (deep venous thrombosis)   
(HCC), History of squamous cell carcinoma, Hx of   
blood clots, Hypercholesterolemia, Hyperlipidemia, Hypertension, Hypotension, 
Lung cancer (HCC), Myocardial infarction (HCC),   
  
                                         and Peripheral vascular disease with cl  
audication (Formerly Chesterfield General Hospital).   
  
                                                    has a past surgical history   
that includes Coronary angioplasty with stent   
(2014); Coronary angioplasty with stent (); Coronary angioplasty with 
stent (2009); Coronary angioplasty with stent (); Coronary angioplasty w  
ith stent (); Coronary artery bypass graft   
(2017); other surgical history (2018); vascular surgery (Left, 
2019); vascular surgery (2019); Diagnost   
  
                                                    ic Cardiac Cath Lab Procedur  
e (Left, 2017); Diagnostic Cardiac Cath Lab   
Procedure (2017); Diagnostic Cardiac Cath Lab Procedure (10/22/2020); 
femoral bypass (2022); embolectomy (Left,   
  
                                                    2022); other surgical   
history (2022); vascular surgery (Left,   
2022); Leg Debridement (Left, 2022); and other surgical history 
(Left, 2022).                       
  
                                                              
  
                                        Restrictions          
  
                                        Restrictions/Precautions   
  
                                        Restrictions/Precautions: Fall Risk   
  
                                        Required Braces or Orthoses?: No   
  
                                        Position Activity Restriction   
  
                                        Other position/activity restrictions: IV  
, wound vac LLE   
  
                                                              
  
                                        Subjective            
  
                                        General               
  
                                        Chart Reviewed: Yes   
  
                                        Additional Pertinent Hx: COPD, CAD, DVT,  
 PVD   
  
                                                    Response To Previous Treatme  
nt: Patient with no complaints from previous   
session.                                  
  
                                        Family / Caregiver Present: Yes (wife)   
  
                                        Subjective            
  
                                                    Subjective: Pt supine in bed  
 with HOB up. Received pain meds at beginning of   
session. Refused any OOB activity but willing to do ther ex.   
  
                                        Pain Screening        
  
                                        Patient Currently in Pain: Yes   
  
                                        Pain Assessment       
  
                                        Pain Assessment: 0-10   
  
                                        Pain Level: 6         
  
                                        Pain Type: Chronic pain;Surgical pain   
  
                                        Pain Location: Leg;Ankle;Foot   
  
                                        Pain Orientation: Left   
  
                                        Vital Signs           
  
                                        Patient Currently in Pain: Yes   
  
                                                              
  
                                        Orientation           
  
                                        Orientation           
  
                                        Overall Orientation Status: Within Funct  
ional Limits   
  
                                                              
  
                                        Cognition             
  
                                        Cognition             
  
                                        Overall Cognitive Status: WFL   
  
                                                              
  
                                        Objective             
  
                                        Exercises             
  
                                        Straight Leg Raise: supine, BLE x 10 rep  
s, AROM   
  
                                        Quad Sets: LLE x 10 reps   
  
                                        Heelslides: BLE x 10 reps, AROM, small r  
audi   
  
                                        Gluteal Sets: supine x 10 reps   
  
                                        Hip Abduction: supine, BLE x 10 rep   
  
                                        Ankle Pumps: supine BLE x 10 reps, AROM,  
 x 10 reps PROM for left only.   
  
                                                              
  
                                        AM-PAC Score          
  
                                        AM-PAC Inpatient Mobility Raw Score : 19  
 (22 0910)   
  
                                        AM-PAC Inpatient T-Scale Score : 45.44 (  
22)   
  
                                        Mobility Inpatient CMS 0-100% Score: 41.  
77 (22)   
  
                                        Mobility Inpatient CMS G-Code Modifier :  
 CK (22)   
  
                                                              
  
                                                              
  
                                                              
  
                                        Goals                 
  
                                        Short term goals      
  
                                        Time Frame for Short term goals: 2 weeks  
   
  
                                        Short term goal 1: Pt will perform bed m  
obility independently, PROGRESSING   
  
                                        Short term goal 2: Pt will transfer inde  
pendently, NOT ADDRESSED   
  
                                                    Short term goal 3: Pt will a  
mbulate 150 feet with least restrictive device and   
supervision placing weight through BLEs, NOT ADDRESSED   
  
                                        Patient Goals         
  
                                        Patient goals : to return home   
  
                                                              
  
                                        Plan                  
  
                                        Plan                  
  
                                        Times per week: 3-5 days   
  
                                        Plan weeks: 2 weeks   
  
                                                    Current Treatment Recommenda  
tions: Strengthening,ROM,Functional Mobility   
Training,Transfer Training,Gait Training,Endurance Training,Balance 
Training,Home Exercise Program,Safety Education & Training   
  
                                        Plan Comment: Cont with POC   
  
                                        Safety Devices        
  
                                                    Type of devices: All fall ri  
sk precautions in place,Call light within   
reach,Left in bed                         
  
                                        Restraints            
  
                                        Initially in place: No   
  
                                                              
  
                                        Therapy Time          
  
                                        Individual Concurrent Group Co-treatment  
   
  
                                        Time In 902          
  
                                        Time Out 0915         
  
                                        Minutes 13            
  
                                        Timed Code Treatment Minutes: 13 Minutes  
 (TP)   
  
                                                              
  
                                        *PPE worn per facility policy during ses  
sara*   
  
                                        Alison Roberson PTA     
  
                                                              
  
                                                              
  
                                                      
  
Electronically signed by Scooter Dowell PT at 2022 11:59 AM EST  
  
Formatting of this note is different from the original.   
  
                                                              
  
                                                              
  
                                        Department of Surgery - Progress Note -   
Surg V   
  
                                                              
  
                                        PATIENT NAME: Willow Bradshaw III   
  
                                        : 1957         
  
                                        MRN: 39587886         
  
                                        ATTENDING PHYSICIAN: Carlton Rodrigues MD  
   
  
                                        ADMIT DATE: 2022   
  
                                        TODAY'S DATE: 3/3/2022   
  
                                                              
  
                                        SUBJECTIVE            
  
                                                    Patient doing well this am.   
Pain in his LLE is well controlled. Moving his feet  
 without issues. Wound vacuums in place LLE.   
  
                                                              
  
                                        OBJECTIVE             
  
                                                    VITALS: BP (!) 84/56   Pulse  
 85   Temp 97 F (36.1 C) (Temporal)   Resp 16   Ht   
5' 7  (1.702 m)   Wt 200 lb (90.7 kg)   SpO2 93%   BMI 31.32 kg/m   
  
                                                              
  
                                        INTAKE/OUTPUT:        
  
                                        I/O last 3 completed shifts:   
  
                                        In: 1433 [I.V.:1433]   
  
                                        Out: 1900 [Urine:1750; Blood:150]   
  
                                        No intake/output data recorded.   
  
                                                              
  
                                        CONSTITUTIONAL: NAD, A&O X3.   
  
                                        HEAD: Normocephalic, atraumatic   
  
                                        NECK: Supple, trachea midline   
  
                                        LUNGS: Resp effort easy and unlabored, c  
hest rise equal bilaterally   
  
                                        ABDOMEN: soft, nontender, nondistended   
  
                                        EXT: Moving all extremities, no edema   
  
                                        SKIN: Warm and Dry, no rashes or lesions  
   
  
                                                    NEURO: CN 2-12 grossly intac  
t, no focal neurologic deficits, sensation intact   
bilaterally                               
  
                                                    VASC: LT PT and AT signal, R  
T PT/DP, L fasciotomy sites with wound vacuums   
intact.                                   
  
                                                              
  
                                        Data                  
  
                                        Recent Labs           
  
                                        22              
  
                                        0041 22         
  
                                        0143 22         
  
                                        023                  
  
                                        WBC 8.0 8.1 9.4       
  
                                        HGB 9.2* 9.6* 8.7*    
  
                                        HCT 28.7* 30.4* 27.2*   
  
                                         365 382       
  
                                                              
  
                                        Recent Labs           
  
                                        22              
  
                                        0041 22         
  
                                        0143 22                  
  
                                         135 134*       
  
                                        K 3.7 4.2 3.2*        
  
                                        * 104 104       
  
                                        CO2 20* 23 25         
  
                                        BUN 10 10 15          
  
                                        CREATININE 1.17 1.10 1.18   
  
                                        GLUCOSE 121* 153* 143*   
  
                                                              
  
                                        No results for input(s): AST, ALT, ALB,   
BILITOT, ALKPHOS in the last 72 hours.   
  
                                                              
  
                                        Current Inpatient Medications   
  
                                                    Current Facility-Administere  
d Medications: potassium chloride (KLOR-CON M)   
extended release tablet 40 mEq, 40 mEq, Oral, BID   
  
                                        heparin (porcine) injection 4,000 Units,  
 4,000 Units, IntraVENous, PRN   
  
                                        heparin (porcine) injection 2,000 Units,  
 2,000 Units, IntraVENous, PRN   
  
                                                    heparin 25,000 units in dext  
leon 5% 250 mL (premix) infusion, 11 Units/kg/hr,   
IntraVENous, Continuous                   
  
                                                    sodium chloride 0.9 % irriga  
tion 1,000 mL, 1,000 mL, Irrigation, Continuous PRN  
                                          
  
                                                    sodium chloride flush 0.9 %   
injection 5-40 mL, 5-40 mL, IntraVENous, 2 times   
per day                                   
  
                                        sodium chloride flush 0.9 % injection 5-  
40 mL, 5-40 mL, IntraVENous, PRN   
  
                                        0.9 % sodium chloride infusion, 25 mL, I  
ntraVENous, PRN   
  
                                                    ceFAZolin (ANCEF) 2000 mg in  
 dextrose 4 % 100 mL IVPB (premix), 2,000 mg,   
IntraVENous, Q8H                          
  
                                        diphenhydrAMINE (BENADRYL) injection 25   
mg, 25 mg, IntraVENous, Q6H PRN   
  
                                        hydrOXYzine (VISTARIL) injection 25 mg,   
25 mg, IntraMUSCular, Q6H PRN   
  
                                        aspirin EC tablet 81 mg, 81 mg, Oral, Da  
james   
  
                                                    insulin lispro (HUMALOG) inj  
ection vial 0-6 Units, 0-6 Units, SubCUTAneous, TID  
WC                                        
  
                                                    insulin lispro (HUMALOG) inj  
ection vial 0-3 Units, 0-3 Units, SubCUTAneous,   
Nightly                                   
  
                                                    sodium chloride flush 0.9 %   
injection 10 mL, 10 mL, IntraVENous, 2 times per   
day                                       
  
                                        sodium chloride flush 0.9 % injection 10  
 mL, 10 mL, IntraVENous, PRN   
  
                                        0.9 % sodium chloride infusion, 25 mL, I  
ntraVENous, PRN   
  
                                        labetalol (NORMODYNE;TRANDATE) injection  
 10 mg, 10 mg, IntraVENous, Q2H PRN   
  
                                        hydrALAZINE (APRESOLINE) injection 10 mg  
, 10 mg, IntraVENous, Q1H PRN   
  
                                        acetaminophen (TYLENOL) tablet 1,000 mg,  
 1,000 mg, Oral, Q6H   
  
                                                    HYDROmorphone (DILAUDID) inj  
ection 0.5 mg, 0.5 mg, IntraVENous, Q3H PRN **OR**   
HYDROmorphone (DILAUDID) injection 1 mg, 1 mg, IntraVENous, Q3H PRN   
  
                                        polyethylene glycol (GLYCOLAX) packet 17  
 g, 17 g, Oral, Daily PRN   
  
                                                    oxyCODONE (ROXICODONE) immed  
iate release tablet 5 mg, 5 mg, Oral, Q4H PRN   
**OR** oxyCODONE (ROXICODONE) immediate release tablet 10 mg, 10 mg, Oral, Q4H 
PRN                                       
  
                                        atorvastatin (LIPITOR) tablet 80 mg, 80   
mg, Oral, Daily   
  
                                        buPROPion (WELLBUTRIN) tablet 150 mg, 15  
0 mg, Oral, BID   
  
                                        cilostazol (PLETAL) tablet 100 mg, 100 m  
g, Oral, BID AC   
  
                                        metoprolol tartrate (LOPRESSOR) tablet 2  
5 mg, 25 mg, Oral, BID   
  
                                        pantoprazole (PROTONIX) tablet 40 mg, 40  
 mg, Oral, QAM AC   
  
                                        glucose (GLUTOSE) 40 % oral gel 15 g, 15  
 g, Oral, PRN   
  
                                        dextrose 50 % IV solution, 12.5 g, Intra  
VENous, PRN   
  
                                        glucagon (rDNA) injection 1 mg, 1 mg, In  
traMUSCular, PRN   
  
                                        dextrose 5 % solution, 100 mL/hr, IntraV  
ENous, PRN   
  
                                                              
  
                                        ASSESSMENT AND PLAN   
  
                                                              
  
                                        Active Problems:      
  
                                                    Critical limb ischemia with   
history of revascularization of same extremity   
(HCC)                                     
  
                                        Peripheral artery disease (HCC)   
  
                                        Femoral-popliteal bypass graft occlusion  
, left (HCC)   
  
                                        Resolved Problems:    
  
                                        * No resolved hospital problems. *   
  
                                                              
  
                                                    This is a 65 y.o. male with   
recent left fem-pop bypass on 22 and subsequent  
LLE fasciotomies 22 and 22 presents with occluded bypass graft. POD1 
arterectomy/thrombectomy with multiple stent placement and LLE wound debridement
                                          
  
                                                              
  
                                        - Continue regular diet   
  
                                        - Wound care following,appreciate recomm  
endations   
  
                                        - Ancef               
  
                                        - Low dose heparin    
  
                                        - plavix/ASA/statin/pletal   
  
                                        - Pain control        
  
                                                              
  
                                        D/W MD Anabela Ahuja MD    
  
                                        General Surgery PGY-1   
  
                                        Pager # 0832          
  
                                                              
  
                                        Foot doing well, palpable DP   
  
                                        Lovenox today         
  
                                                    Cont vac   
  
Electronically signed by Ed Breen MD at 2022 12:37 PM EST  
  
Formatting of this note might be different from the original.   
  
                                                              
  
                                                    .Nutrition update completed.  
 Chart reviewed. Refer patient to the   
Dietitian..ELOISA Woods               
  
                                                      
  
Electronically signed by Harika Agosto at 2022 7:08 AM EST  
  
Formatting of this note is different from the original.   
  
                                        Physical Therapy      
  
                                                              
  
                                        Facility/Department: Department of Veterans Affairs Medical Center-Erie CAPACITY MAN  
AGEMENT   
  
                                        Initial Assessment    
  
                                                              
  
                                        NAME: Willow Bradshaw III   
  
                                        : 1957         
  
                                        MRN: 84115228         
  
                                                              
  
                                        Date of Service: 3/2/2022   
  
                                                              
  
                                        Discharge Recommendations:   
  
                                        Home with assist PRN (Will continue to a  
ssess with ambulation progress.)   
  
                                                              
  
                                                              
  
                                        Assessment            
  
                                                    Body structures, Functions,   
Activity limitations: Decreased functional mobility  
;Decreased ROM;Decreased strength;Decreased ADL status;Decreased 
endurance;Decreased sensation;Decreased posture;Increased pain;Decreased balance
                                          
  
                                                    Assessment: Pt admitted s/p   
peripheral artery desease with L fem-pop bypass   
followed by LLE fasciotomies  and  and LLE balloon angioplasty with 
multiple stent placement 3/1. Pt alert, oriente   
  
                                                    d, and able to follow all co  
mmands. Pt performed bed mobility, transfers, and   
ambulation with supervision. Pt ambulated 5 feet foward, 5 feet backwards and 3 
feet to the R with FWW and only placing weig   
  
                                                    ht through RLE. Pt able to n  
avigate with FWW and heavy use on UEs and RLE. Pt   
unable to place weight through LLE without tremendous increase in pain. Wound 
vac being placed immediately after this sessio   
  
                                                    n. Pt was independent prior   
to these encounters. PT/OT was coming to house   
1x/week and wife is able to help as needed. Upon discharge, recommend home with 
assist as needed. Will continue to assess with ambulation progress.   
  
                                        Prognosis: Good       
  
                                        Decision Making: Medium Complexity   
  
                                        PT Education: PT Role;Plan of Care   
  
                                        REQUIRES PT FOLLOW UP: Yes   
  
                                        Activity Tolerance    
  
                                        Activity Tolerance: Patient limited by p  
ain;Patient limited by endurance   
  
                                                              
  
                                        Patient Diagnosis(es): There were no enc  
ounter diagnoses.   
  
                                                              
  
                                                    has a past medical history o  
f CAD (coronary artery disease), Cancer (Formerly Chesterfield General Hospital),   
Cerebral artery occlusion with cerebral infarction (Formerly Chesterfield General Hospital), COPD (chronic 
obstructive pulmonary disease) (Formerly Chesterfield General Hospital), Diabetes (Formerly Chesterfield General Hospital), D   
  
                                                    VT (deep venous thrombosis)   
(Formerly Chesterfield General Hospital), History of squamous cell carcinoma, Hx of   
blood clots, Hypercholesterolemia, Hyperlipidemia, Hypertension, Hypotension, 
Lung cancer (Formerly Chesterfield General Hospital), Myocardial infarction (Formerly Chesterfield General Hospital),   
  
                                         and Peripheral vascular disease with cl  
audication (Formerly Chesterfield General Hospital).   
  
                                                    has a past surgical history   
that includes Coronary angioplasty with stent   
(2014); Coronary angioplasty with stent (); Coronary angioplasty with 
stent (2009); Coronary angioplasty with stent (); Coronary angioplasty w  
ith stent (); Coronary artery bypass graft   
(2017); other surgical history (2018); vascular surgery (Select Specialty Hospital, 
2019); vascular surgery (2019); Diagnost   
  
                                                    ic Cardiac Cath Lab Procedur  
e (Left, 2017); Diagnostic Cardiac Cath Lab   
Procedure (2017); Diagnostic Cardiac Cath Lab Procedure (10/22/2020); 
femoral bypass (2022); embolectomy (Left,   
  
                                                    2022); other surgical   
history (2022); vascular surgery (Left,   
2022); Leg Debridement (Left, 2022); and other surgical history 
(Left, 2022).                       
  
                                                              
  
                                        Restrictions          
  
                                        Restrictions/Precautions   
  
                                        Restrictions/Precautions: Fall Risk   
  
                                        Required Braces or Orthoses?: No   
  
                                        Position Activity Restriction   
  
                                                    Other position/activity rest  
rictions: IV, wound vac LLE being placed   
immediately after this session            
  
                                        Vision/Hearing        
  
                                        Vision: Impaired      
  
                                        Vision Exceptions: Wears glasses at all   
times   
  
                                        Hearing: Within functional limits   
  
                                        Subjective            
  
                                        General               
  
                                        Chart Reviewed: Yes   
  
                                        Patient assessed for rehabilitation serv  
ices?: Yes   
  
                                        Additional Pertinent Hx: COPD, CAD, DVT,  
 PVD   
  
                                        Response To Previous Treatment: Not appl  
icable   
  
                                        Family / Caregiver Present: Yes (wife)   
  
                                        Diagnosis: peripheral artery disease   
  
                                        Follows Commands: Within Functional Limi  
ts   
  
                                                    Other (Comment): L fem-pop b  
ypass , LLE fasciotomies  & , LLE balloon   
angioplasty with multiple stent placement 3/1   
  
                                        Subjective            
  
                                                    Subjective: Pt supine in bed  
 upon arrival. Pt agreed to treatment. Pts wife   
present. RN cleared for therapy.          
  
                                        Pain Screening        
  
                                        Patient Currently in Pain: Yes   
  
                                        Pain Assessment       
  
                                        Pain Location: Leg    
  
                                        Pain Orientation: Left   
  
                                        Vital Signs           
  
                                        Patient Currently in Pain: Yes   
  
                                                              
  
                                                              
  
                                        Orientation           
  
                                        Orientation           
  
                                        Overall Orientation Status: Within Funct  
ional Limits   
  
                                        Social/Functional History   
  
                                        Social/Functional History   
  
                                        Lives With: Spouse    
  
                                        Type of Home: House   
  
                                        Home Layout: One level (basement is gara  
ge - stair lift)   
  
                                        Bathroom Shower/Tub: Tub/Shower unit   
  
                                        Bathroom Toilet: Standard   
  
                                        Bathroom Equipment: Grab bars in shower,  
Shower chair   
  
                                        Bathroom Accessibility: Accessible   
  
                                        Home Equipment: Rolling walker,Cane,Whee  
lchair-manual   
  
                                        Receives Help From: Family   
  
                                        ADL Assistance: Independent   
  
                                        Homemaking Assistance: Independent   
  
                                        Ambulation Assistance: Independent   
  
                                        Transfer Assistance: Independent   
  
                                        Active : No     
  
                                        Patient's  Info: wife transports   
  
                                        Cognition             
  
                                        Cognition             
  
                                        Overall Cognitive Status: WFL   
  
                                                              
  
                                        Objective             
  
                                                              
  
                                        Observation/Palpation   
  
                                        Posture: Fair         
  
                                        Observation: LLE gauze wrapped on calf   
  
                                                              
  
                                        AROM RLE (degrees)    
  
                                        RLE General AROM: 50% seen   
  
                                        AROM LLE (degrees)    
  
                                        LLE General AROM: 50% seen   
  
                                        Strength RLE          
  
                                        Comment: 3-/5 grossly   
  
                                        Strength LLE          
  
                                        Comment: 3-/5 grossly   
  
                                        Tone RLE              
  
                                        RLE Tone: Normotonic   
  
                                        Tone LLE              
  
                                        LLE Tone: Normotonic   
  
                                        Motor Control         
  
                                        Gross Motor?: WFL     
  
                                        Sensation             
  
                                                    Overall Sensation Status: Im  
paired (Pt states LLE is numb secondary to surgery)  
                                          
  
                                        Bed mobility          
  
                                        Supine to Sit: Modified independent   
  
                                        Sit to Supine: Modified independent   
  
                                        Scooting: Modified independent   
  
                                                    Comment: Pt able to perform   
bed mobility with modif indep using UEs on bed   
rails. HOB was elevated. Pt took increased time to complete.   
  
                                        Transfers             
  
                                        Sit to Stand: Supervision   
  
                                        Stand to sit: Supervision   
  
                                                    Comment: Pt able to transfer  
 with supervision. Pt used UEs to push off EOB and   
then relied on FWW upon standing.         
  
                                        Ambulation            
  
                                        Ambulation?: Yes      
  
                                        More Ambulation?: No   
  
                                        Ambulation 1          
  
                                        Surface: level tile   
  
                                        Device: Rolling Walker   
  
                                        Assistance: Supervision   
  
                                        Gait Deviations: Shuffles;Decreased step  
 length;Decreased step height   
  
                                        Distance: 5 feet forward, 5 feet backwar  
ds, 3 feet to the R   
  
                                                    Comments: Pt unable to place  
 weight through LLE d/t increase in pain upon doing  
so. Pt able to ambulate with RLE and UE use on FWW with supervision.   
  
                                                              
  
                                        Balance               
  
                                        Posture: Fair         
  
                                        Sitting - Static: Good   
  
                                        Standing - Static: Fair   
  
                                        Standing - Dynamic: Fair   
  
                                                    Comments: Pt able to sit EOB  
 with no UE use for support. Upon standing, pt   
grasps FWW to balance d/t not wanting to place weight through LLE.   
  
                                                              
  
                                                              
  
                                        Plan                  
  
                                        Plan                  
  
                                        Times per week: 3-5 days   
  
                                        Plan weeks: 2 weeks   
  
                                                    Current Treatment Recommenda  
tions: Strengthening,ROM,Functional Mobility   
Training,Transfer Training,Gait Training,Endurance Training,Balance 
Training,Home Exercise Program,Safety Education & Training   
  
                                        Safety Devices        
  
                                                    Type of devices: All fall ri  
sk precautions in place,Call light within   
reach,Gait belt,Left in bed,Patient at risk for falls   
  
                                        Restraints            
  
                                        Initially in place: No   
  
                                                              
  
                                        AM-PAC Score          
  
                                        AM-PAC Inpatient Mobility Raw Score : 21  
 (22)   
  
                                        AM-PAC Inpatient T-Scale Score : 50.25 (  
22)   
  
                                        Mobility Inpatient CMS 0-100% Score: 28.  
97 (22)   
  
                                        Mobility Inpatient CMS G-Code Modifier :  
 CJ (22)   
  
                                                              
  
                                                              
  
                                                              
  
                                        Goals                 
  
                                        Short term goals      
  
                                        Time Frame for Short term goals: 2 weeks  
   
  
                                        Short term goal 1: Pt will perform bed m  
obility independently   
  
                                        Short term goal 2: Pt will transfer inde  
pendently   
  
                                                    Short term goal 3: Pt will a  
mbulate 150 feet with least restrictive device and   
supervision placing weight through BLEs   
  
                                        Patient Goals         
  
                                        Patient goals : to return home   
  
                                                              
  
                                                              
  
                                        Therapy Time          
  
                                        Individual Concurrent Group Co-treatment  
   
  
                                        Time In 1122          
  
                                        Time Out 1142         
  
                                        Minutes 20            
  
                                                              
  
                                                              
  
                                                    This physical therapist wore  
 N95 mask, goggles/mask & gloves during therapy   
session.                                  
  
                                                    Patient's Physical Therapy P  
chris of Care supervision is transferred to Kettering Health Greene Memorial   
Rehab Department Physical Therapist.      
  
                                                    Goals and/or treatment plan   
was established in collaboration with   
patient/family/other representatives.     
  
                                        Sofiya Brown, SPT      
  
                                                              
  
                                                              
  
                                                      
  
Electronically signed by Scooter Dowell PT at 2022 2:22 PM EST  
  
Formatting of this note might be different from the original.   
  
                                        Images from the original note were not i  
ncluded.   
  
                                                    Wound Care consulted for kiesha  
lication of wound vacs to LLE. D/W Dr Breen   
regarding plan for irrigation wound vac for lateral calf wound, and standard 
wound vac to medial calf wound. Pt and pt's wife joel riojas informed of plan for wound  
 vacs, both verbalized understanding. Primary nurse  
medicated pt for pain prior to dressing change.   
  
                                                              
  
                                                    Removed ABD pads and gauze p  
acking from LLE wounds x2. Full thickness surgical   
wound to L medial calf (picture below) measures 17 x 4.3 x 1 cm. Wound bed is 
95% red tissue with granulation noted, 5% yel   
  
                                                    low tissue along deepest asp  
ect of wound bed. Moderate amount of serosang   
drainage. No purulence or active bleeding noted. Periwound tissue intact, no 
erythema or induration noted. Wound and periwound c   
  
                                                    leansed with saline, patted   
dry and cavilon no sting applied to periwound.   
Applied white foam x1 piece to deepest aspect of wound bed, then topped with 
black foam x1 piece. Mepitel ONE applied along edg   
  
                                                    es. Dressing well sealed. NP  
WT pressure set to -125mmHg continuous per order.   
Pt tolerated dressing change well.        
  
                                                              
  
                                                    Full thickness surgical woun  
d to L lateral calf (picture below) measures 16 x   
4.7 x 2 cm. Tunneling at 12 oclock along deepest aspect of wound bed measures 2 
cm, tunneling at 6 oclock at 3.5 cm. Wound b   
  
                                                    ed is 70% red tissue and 30%  
 yellow/tan colored tissue. Tendon and muscle   
exposed. Moderate amount of serosang drainage. No purulence or active bleeding 
noted. Periwound tissue intact, no erythema or in   
  
                                                    duration noted. No odor note  
d. Notified Dr Breen regarding lack of odor and   
recommended using saline for irrigation solution through wound vac. Wound and 
periwound cleansed with saline, patted dry and   
  
                                                    cavilon no sting applied to   
periwound. Applied 1 piece of fenestrated cleanse   
choice foam in deepest aspect of wound bed, then topped with 1 piece of gray 
foam. Dressing well sealed. Pt tolerated dressing change very well.   
  
                                        Veraflo Instillation Wound Vac settings:  
   
  
                                        - Suction at -125mmHg for 3.5 hrs   
  
                                        - Instillation volume 32cc of Saline   
  
                                        - Soak time 10 min    
  
                                        - Single Trac Pad     
  
                                                              
  
                                                    Pedal pulses auscultated via  
 doppler, +2 PT and DP pulses. LLE noted to have +2  
pitting edema. Pt is able to lift heels off of bed, turn and reposition in bed 
independently.                            
  
                                                              
  
                                                    Pt's wife stated she had bro  
ught pt's home vac in. Instructed pt's wife to keep  
home wound vac unit here until pt is ready for d/c at which time the wound vac 
will be available to set up for home.     
  
                                                              
  
                                                    Wound Care to follow up with  
 pt for wound vac dressing changes every MWF. For   
any concerns, page Wound Care Team at #9036, or call via THE BEARDED LADY.   
  
                                                              
  
                                                    Electronically signed by Tanika Martins RN on 3/2/2022 at 1:26   
PM                                        
  
                                                              
  
                                                      
  
Electronically signed by Maura Martins RN at 2022 1:28 PM 
EST  
  
Formatting of this note is different from the original.   
  
                                                              
  
                                                              
  
                                        Department of Surgery - Progress Note -   
Surg V   
  
                                                              
  
                                        PATIENT NAME: Willow Bradshaw III   
  
                                        : 1957         
  
                                        MRN: 57293155         
  
                                        ATTENDING PHYSICIAN: Carlton Rodrigues MD  
   
  
                                        ADMIT DATE: 2022   
  
                                        TODAY'S DATE: 3/2/2022   
  
                                                              
  
                                        SUBJECTIVE            
  
                                        Patient doing well this am. Pain in his   
LLE is well controlled.   
  
                                                              
  
                                        OBJECTIVE             
  
                                                    VITALS: BP 98/66   Pulse 87   
  Temp 97.4 F (36.3 C) (Temporal)   Resp 16   Ht 5'  
7  (1.702 m)   Wt 200 lb (90.7 kg)   SpO2 91%   BMI 31.32 kg/m   
  
                                                              
  
                                        INTAKE/OUTPUT:        
  
                                        I/O last 3 completed shifts:   
  
                                        In: 1200 [I.V.:1200]   
  
                                        Out: 1100 [Urine:950; Blood:150]   
  
                                        No intake/output data recorded.   
  
                                                              
  
                                        CONSTITUTIONAL: NAD, A&O X3.   
  
                                        HEAD: Normocephalic, atraumatic   
  
                                        NECK: Supple, trachea midline   
  
                                        LUNGS: Resp effort easy and unlabored, c  
hest rise equal bilaterally   
  
                                        ABDOMEN: soft, nontender, nondistended   
  
                                        EXT: Moving all extremities, no edema   
  
                                        SKIN: Warm and Dry, no rashes or lesions  
   
  
                                                    NEURO: CN 2-12 grossly intac  
t, no focal neurologic deficits, sensation intact   
bilaterally                               
  
                                        VASC: LT PT and AT signal, RT PT/DP, L f  
asciotomy sites CDI   
  
                                                              
  
                                        Data                  
  
                                        Recent Labs           
  
                                        22              
  
                                        2310 22         
  
                                        0041 22         
  
                                        0143                  
  
                                        WBC 8.7 8.0 8.1       
  
                                        HGB 8.9* 9.2* 9.6*    
  
                                        HCT 27.9* 28.7* 30.4*   
  
                                         386 365       
  
                                                              
  
                                        Recent Labs           
  
                                        22              
  
                                        2310 22         
  
                                        0041 22         
  
                                        0143                  
  
                                        * 139 135       
  
                                        K 3.7 3.7 4.2         
  
                                         111* 104       
  
                                        CO2 22 20* 23         
  
                                        BUN 12 10 10          
  
                                        CREATININE 1.24 1.17 1.10   
  
                                        GLUCOSE 144* 121* 153*   
  
                                                              
  
                                        No results for input(s): AST, ALT, ALB,   
BILITOT, ALKPHOS in the last 72 hours.   
  
                                                              
  
                                        Current Inpatient Medications   
  
                                                    Current Facility-Administere  
d Medications: heparin (porcine) injection 5,440   
Units, 60 Units/kg, IntraVENous, Once     
  
                                        heparin (porcine) injection 5,440 Units,  
 60 Units/kg, IntraVENous, PRN   
  
                                        heparin (porcine) injection 2,720 Units,  
 30 Units/kg, IntraVENous, PRN   
  
                                                    heparin 25,000 units in dext  
leon 5% 250 mL (premix) infusion, 5-30 Units/kg/hr,  
IntraVENous, Continuous                   
  
                                                    sodium chloride flush 0.9 %   
injection 5-40 mL, 5-40 mL, IntraVENous, 2 times   
per day                                   
  
                                        sodium chloride flush 0.9 % injection 5-  
40 mL, 5-40 mL, IntraVENous, PRN   
  
                                        0.9 % sodium chloride infusion, 25 mL, I  
ntraVENous, PRN   
  
                                                    ceFAZolin (ANCEF) 2000 mg in  
 dextrose 4 % 100 mL IVPB (premix), 2,000 mg,   
IntraVENous, Q8H                          
  
                                        diphenhydrAMINE (BENADRYL) injection 25   
mg, 25 mg, IntraVENous, Q6H PRN   
  
                                        hydrOXYzine (VISTARIL) injection 25 mg,   
25 mg, IntraMUSCular, Q6H PRN   
  
                                        aspirin EC tablet 81 mg, 81 mg, Oral, Da  
james   
  
                                                    insulin lispro (HUMALOG) inj  
ection vial 0-6 Units, 0-6 Units, SubCUTAneous, TID  
WC                                        
  
                                                    insulin lispro (HUMALOG) inj  
ection vial 0-3 Units, 0-3 Units, SubCUTAneous,   
Nightly                                   
  
                                                    sodium chloride flush 0.9 %   
injection 10 mL, 10 mL, IntraVENous, 2 times per   
day                                       
  
                                        sodium chloride flush 0.9 % injection 10  
 mL, 10 mL, IntraVENous, PRN   
  
                                        0.9 % sodium chloride infusion, 25 mL, I  
ntraVENous, PRN   
  
                                        labetalol (NORMODYNE;TRANDATE) injection  
 10 mg, 10 mg, IntraVENous, Q2H PRN   
  
                                        hydrALAZINE (APRESOLINE) injection 10 mg  
, 10 mg, IntraVENous, Q1H PRN   
  
                                        acetaminophen (TYLENOL) tablet 1,000 mg,  
 1,000 mg, Oral, Q6H   
  
                                                    HYDROmorphone (DILAUDID) inj  
ection 0.5 mg, 0.5 mg, IntraVENous, Q3H PRN **OR**   
HYDROmorphone (DILAUDID) injection 1 mg, 1 mg, IntraVENous, Q3H PRN   
  
                                        polyethylene glycol (GLYCOLAX) packet 17  
 g, 17 g, Oral, Daily PRN   
  
                                                    oxyCODONE (ROXICODONE) immed  
iate release tablet 5 mg, 5 mg, Oral, Q4H PRN   
**OR** oxyCODONE (ROXICODONE) immediate release tablet 10 mg, 10 mg, Oral, Q4H 
PRN                                       
  
                                        atorvastatin (LIPITOR) tablet 80 mg, 80   
mg, Oral, Daily   
  
                                        buPROPion (WELLBUTRIN) tablet 150 mg, 15  
0 mg, Oral, BID   
  
                                        cilostazol (PLETAL) tablet 100 mg, 100 m  
g, Oral, BID AC   
  
                                        metoprolol tartrate (LOPRESSOR) tablet 2  
5 mg, 25 mg, Oral, BID   
  
                                        pantoprazole (PROTONIX) tablet 40 mg, 40  
 mg, Oral, QAM AC   
  
                                        glucose (GLUTOSE) 40 % oral gel 15 g, 15  
 g, Oral, PRN   
  
                                        dextrose 50 % IV solution, 12.5 g, Intra  
VENous, PRN   
  
                                        glucagon (rDNA) injection 1 mg, 1 mg, In  
traMUSCular, PRN   
  
                                        dextrose 5 % solution, 100 mL/hr, IntraV  
ENous, PRN   
  
                                                              
  
                                        ASSESSMENT AND PLAN   
  
                                                              
  
                                        Active Problems:      
  
                                                    Critical limb ischemia with   
history of revascularization of same extremity   
(HCC)                                     
  
                                        Peripheral artery disease (HCC)   
  
                                        Femoral-popliteal bypass graft occlusion  
, left (HCC)   
  
                                        Resolved Problems:    
  
                                        * No resolved hospital problems. *   
  
                                                              
  
                                                    This is a 65 y.o. male with   
recent left fem-pop bypass on 22 and subsequent  
LLE fasciotomies 22 and 22 presents with occluded bypass graft. POD1 
arterectomy/thrombectomy with multiple stent placement and LLE wound debridement
                                          
  
                                                              
  
                                        - Advance to regular diet   
  
                                        - Wound care consult, consider irrigatin  
g wound vacuum   
  
                                        - Vanc/zosyn          
  
                                        - Low dose heparin    
  
                                        - plavix/ASA/statin/pletal   
  
                                        - Pain control        
  
                                                              
  
                                        D/W MD Anabela Ahuja MD    
  
                                        General Surgery PGY-1   
  
                                        Pager # 8394          
  
                                                              
  
                                        Doing well, foot feels better   
  
                                        Multiphasic DP/PT signals, normal capill  
keeagn refill   
  
                                        Irrigating vac in place   
  
                                                    Will assess appearance Bailey  
y, may have plastic surgery consider skin graft   
while admitted   
  
Electronically signed by Ed Breen MD at 2022 11:42 AM EST  
  
Formatting of this note is different from the original.   
  
                                        CARDIOLOGY PROGRESS NOTE   
  
                                                              
  
                                        Chart and interval events reviewed.   
  
                                                              
  
                                        Reason for Visit critical limb ischemia   
  
                                                              
  
                                        SUBJECTIVE:           
  
                                                    Willow Bradshaw III state  
s he is not currently having any pain in his LLE.   
He denies chest pain, sob, palps, dizziness.   
  
                                                              
  
                                        SCHEDULED MEDICATIONS:   
  
                                         heparin (porcine) 4,000 Units IntraVENo  
us Once   
  
                                         sodium chloride flush 5-40 mL IntraVENo  
us 2 times per day   
  
                                         ceFAZolin 2,000 mg IntraVENous Q8H   
  
                                         aspirin 81 mg Oral Daily   
  
                                         insulin lispro 0-6 Units SubCUTAneous T  
ID WC   
  
                                         insulin lispro 0-3 Units SubCUTAneous N  
ightly   
  
                                         sodium chloride flush 10 mL IntraVENous  
 2 times per day   
  
                                         acetaminophen 1,000 mg Oral Q6H   
  
                                         atorvastatin 80 mg Oral Daily   
  
                                         buPROPion 150 mg Oral BID   
  
                                         cilostazol 100 mg Oral BID AC   
  
                                         metoprolol tartrate 25 mg Oral BID   
  
                                         pantoprazole 40 mg Oral QAM AC   
  
                                                              
  
                                        Active Problems:      
  
                                                    Critical limb ischemia with   
history of revascularization of same extremity   
(HCC)                                     
  
                                        Peripheral artery disease (HCC)   
  
                                        Femoral-popliteal bypass graft occlusion  
, left (HCC)   
  
                                        Resolved Problems:    
  
                                        * No resolved hospital problems. *   
  
                                                              
  
                                        Review of Systems:    
  
                                        Review of Systems     
  
                                        Constitutional: Negative for chills, calista  
phoresis and fever.   
  
                                        HENT: Negative for nosebleeds.   
  
                                        Eyes: Negative for visual disturbance.   
  
                                        Respiratory: Negative for cough, shortne  
ss of breath and wheezing.   
  
                                        Cardiovascular: Negative for chest pain,  
 palpitations and leg swelling.   
  
                                                    Gastrointestinal: Negative f  
or abdominal pain, blood in stool, constipation,   
diarrhea, nausea and vomiting.            
  
                                        Genitourinary: Negative for hematuria.   
  
                                        Musculoskeletal: Negative for myalgias.   
  
                                        Skin: Negative for rash.   
  
                                        Neurological: Negative for dizziness and  
 syncope.   
  
                                        Hematological: Does not bruise/bleed eas  
james.   
  
                                        Psychiatric/Behavioral: Negative for dys  
phoric mood and suicidal ideas.   
  
                                        Denies Depression     
  
                                                              
  
                                        VITAL SIGNS:          
  
                                        Vitals:               
  
                                        22 0600 22 0700 22 080  
0 22 0803   
  
                                        BP: 128/68 106/68 98/66   
  
                                        Pulse: 82 87 87       
  
                                        Resp: 21 14 14 16     
  
                                        Temp: 97.4 F (36.3 C)   
  
                                        TempSrc: Temporal     
  
                                        SpO2: 93% 96% 91%     
  
                                        Weight:               
  
                                        Height:               
  
                                                              
  
                                        Intake/Output Summary (Last 24 hours) at  
 3/2/2022 1044   
  
                                        Last data filed at 3/1/2022 2202   
  
                                        Gross per 24 hour     
  
                                        Intake 1200 ml        
  
                                        Output 1100 ml        
  
                                        Net 100 ml            
  
                                                              
  
                                        No data found.        
  
                                        Physical Exam:        
  
                                        Physical Exam         
  
                                        Vitals reviewed.      
  
                                        Constitutional:       
  
                                        Appearance: He is well-developed.   
  
                                        HENT:                 
  
                                        Head: Normocephalic and atraumatic.   
  
                                        Eyes:                 
  
                                        Conjunctiva/sclera: Conjunctivae normal.  
   
  
                                        Neck:                 
  
                                        Vascular: No JVD.     
  
                                        Cardiovascular:       
  
                                        Rate and Rhythm: Normal rate and regular  
 rhythm.   
  
                                        Heart sounds: No murmur heard.   
  
                                        No friction rub. No gallop.   
  
                                        Comments: Bandage intact to the LLE   
  
                                        Pulmonary:            
  
                                        Effort: Pulmonary effort is normal.   
  
                                        Breath sounds: No wheezing or rales.   
  
                                        Chest:                
  
                                        Chest wall: No tenderness.   
  
                                        Abdominal:            
  
                                        General: Bowel sounds are normal.   
  
                                        Palpations: Abdomen is soft.   
  
                                        Tenderness: There is no abdominal tender  
ness.   
  
                                        Musculoskeletal:      
  
                                        Cervical back: Neck supple.   
  
                                        Left lower le+ Edema present.   
  
                                        Skin:                 
  
                                        General: Skin is warm and dry.   
  
                                        Findings: No rash.    
  
                                        Neurological:         
  
                                        Mental Status: He is alert and oriented   
to person, place, and time.   
  
                                                              
  
                                                              
  
                                        Data:                 
  
                                        Scheduled Meds: Reviewed   
  
                                        Continuous Infusions:   
  
                                         heparin (PORCINE) Infusion   
  
                                         sodium chloride      
  
                                         sodium chloride      
  
                                         dextrose             
  
                                                              
  
                                        CBC:                  
  
                                        Recent Labs           
  
                                        22              
  
                                        0041 22         
  
                                        0143                  
  
                                        WBC 8.0 8.1           
  
                                        HGB 9.2* 9.6*         
  
                                        HCT 28.7* 30.4*       
  
                                         365           
  
                                                              
  
                                        BMP:                  
  
                                        Recent Labs           
  
                                        22              
  
                                        0041 22         
  
                                        0143                  
  
                                         135            
  
                                        K 3.7 4.2             
  
                                        * 104           
  
                                        CO2 20* 23            
  
                                        BUN 10 10             
  
                                        CREATININE 1.17 1.10   
  
                                                              
  
                                        INR:No results for input(s): INR in the   
last 72 hours.   
  
                                        No results for input(s): BNP in the last  
 72 hours.   
  
                                        TSH:                  
  
                                        Lab Results           
  
                                        Component Value Date   
  
                                        TSH 3.178 2021   
  
                                                              
  
                                                    Cardiac Injury Profile: No r  
esults for input(s): CKTOTAL, CKMB, TROPONINI in   
the last 72 hours.                        
  
                                        Lipid Profile:        
  
                                        Lab Results           
  
                                        Component Value Date   
  
                                        TRIG 231 2021   
  
                                        HDL 29 2021     
  
                                        LDLCALC 153 07/15/2016   
  
                                        CHOL 174 2021   
  
                                        CHOL 220 07/15/2016   
  
                                                              
  
                                                              
  
                                                              
  
                                        EKG: See Report       
  
                                        Telemetry Reviewed: SR with HR in 80s   
  
                                        Echo 21:        
  
                                        SUMMARY:              
  
                                        1. Technically difficult study.   
  
                                        2. Left ventricle: Systolic function is   
at the lower limits of normal   
  
                                        by visual assessment. The estimated ejec  
tion fraction is 50%.   
  
                                        Regional wall motion abnormalities canno  
t be excluded.   
  
                                        3. Right ventricle: Not well visualized.  
   
  
                                        4. No significant valve disease.   
  
                                        5. Aorta: The aorta is not well visualiz  
ed.   
  
                                                              
  
                                        IMPRESSIONS/RECOMMENDATIONS:   
  
                                                    1. Severe PVD with critical   
limb ischemia- s/p extensive endovascular and   
surgical revascularization with fasciotomy and debridement with left fem-pop 
bypass 2022 s/p LLE fasciotomies 1722 and    
  
                                                    2 now with occluded bypass g  
raft s/p left SFA/ popliteal artery LLE angioplasty  
with multiple stent placement, distal popliteal, TP trunk, PT LLE debridement 
3/1/22- on pletal, ASA, atorvastatin      
  
                                                              
  
                                                    2. CAD s/p CABG x 3 in 2017   
and PCI/SYDNIE x 3 to the distal RCA and angioplasty   
of the jailed RPL 10/22/20- stable- currently denies symptoms of angina- cont 
ASA, atorvastatin, metoprolol             
  
                                                              
  
                                        3. Hyperlipidemia- on high intensity sta  
tin with atorvastatin   
  
                                                              
  
                                        4. DM type 2- controlled- last HgbA1c wa  
s 5.9% 22   
  
                                                              
  
                                        Electronicallysigned by Jocelyne Williamson PA-C on 3/2/2022 at 10:44 AM   
  
                                                              
  
                                                    Seen in am. Doing well overn  
ight no pain and no major complaints. L foot feel   
good. BP stable. Hb has been stable as well. Cr looks unchanged.   
  
                                                              
  
                                                    This patient was seen and pe  
rsonally examined by me on 3/2/22. Labs, imaging   
studies and electronic medical record reviewed. See [x]progress note []H&P 
[]Consult documented by [x]KIESHA which reflects   
  
                                                    my hpi, pmh, psh, ros, fh, s  
h as well with my additions, as I discussed with   
the [x]KIESHA. For my exam, assessment and plan see below.   
  
                                                              
  
                                        PHYSICAL EXAM:        
  
                                                              
  
                                                              
  
                                                    General Appearance: []WDWN [  
x]Obese []Cachectic []Thin []ill. No pain or   
distress.                                 
  
                                                              
  
                                        Neck: Jvd []Present [x]Absent   
  
                                                              
  
                                        Lungs: [x]Clear []Crackles []Wheezes []R  
honchi /   
  
                                        Respiratory effort []Labored [x]Non-Labo  
red   
  
                                                              
  
                                        Heart: [x]RRR []Irregularly Irregular []  
murmur present [x]murmur absent/   
  
                                        Peripheral Edema []Absent [x]Present 1+   
LLE.   
  
                                                    Pulses: 1_ DP and PT. Foot i  
s warm and well perfused. Excellent Doppler signals  
 obtained.                                
  
                                        R groin looks good, no hmatoma and issa  
l pulse.   
  
                                                              
  
                                        Assessment/Plan       
  
                                                              
  
                                                    1. Acute limb ischemia. He o  
ccluded the r fem-pop bypass graft. We were   
successful in reopening the native SFA. Rotational aspirational atherectomy with
 emboli protection, and SFA pop anioplasty followe   
  
                                                    d by Popliteal to TPT Vianah  
n stent grafting. PTA of the PT and AT (opened   
chronic occlusion) and SYDNIE of the TPT into the PT. Excellent result. 2 vessel 
runoff to the L foot. Wound debridement by Dr Breen.   
  
                                                    a. Possible clopidogrel resi  
stence. Plan load and maintenance of prasugrel for   
now.                                      
  
                                                    b. Plan transition UF hepari  
n to Lovenox 2-4 weeks post op and transition to   
full dose Xarelto and P2Y-12 Inhibitor at Dr. Breen's discretion.   
  
                                                    c. Continue other meds, keep  
 BP with mean 65-70 mmHg for now. Gentle diuresis   
to decongest the leg.                     
  
                                        2. Stable CAD. NYHA FC 1 and CC FC 0 for  
 angina.   
  
                                                              
  
                                        Odell Bryan DO   
  
                                                              
  
                                                      
  
Electronically signed by Odell Bryan DO at 2022 1:00 PM EST  
  
Formatting of this note might be different from the original.   
  
                                                    Transport here to take patie  
nt to pre-op. Heparin drip paused. Electronically   
signed by Sandra Christie RN on 3/1/2022 at 2:59 PM   
  
                                                      
  
Electronically signed by Sandra Christie RN at 2022 2:59 PM EST  
  
Formatting of this note might be different from the original.   
  
                                        Physical Therapy      
  
                                                    PT orders received. Per Porterville Developmental Center  
ular notes, plan is for angiogram today with   
possible debridement. Will return as appropriate to see.   
  
                                                      
  
Electronically signed by Alejandrina Pritchard PT at 2022 2:22 PM EST  
  
Formatting of this note might be different from the original.   
  
                                                    RN paged Dr. Cardoza in regard  
s to when the heparin drip should be paused today   
prior to patient angiogram. Waiting for response   
  
                                                              
  
                                                    1402: Stop heparin drip when  
 patient is picked up for preop per Dr. Cardoza   
  
Electronically signed by Sandra Christie RN at 2022 2:28 PM EST  
  
Formatting of this note might be different from the original.   
  
                                        Occupational Therapy   
  
                                                    Pt going for procedure today  
, will follow up tomorrow.   
  
Electronically signed by Edwin Silva OT at 2022 11:25 AM EST  
  
Formatting of this note might be different from the original.   
  
                                                    Verified with Dr. Cardoza that  
 patient is to receive all morning meds this AM   
prior to angiogram. Ok to give per MD. Also asked when heparin drip is to be 
discontinued, per Dr. Cardoza, they will let RN kno   
  
                                                    w later today. Will continue  
 to monitor. Electronically signed by Sandra Christie RN on 3/1/2022 at 8:02 AM                 
  
                                                      
  
Electronically signed by Sandra Christie RN at 2022 8:02 AM EST  
  
Formatting of this note is different from the original.   
  
                                                              
  
                                                              
  
                                        Department of Surgery - Progress Note -   
Surg V   
  
                                                              
  
                                        PATIENT NAME: Willow Bradshaw III   
  
                                        : 1957         
  
                                        MRN: 00008629         
  
                                        ATTENDING PHYSICIAN: Maxim Colindres MD   
  
                                        ADMIT DATE: 2022   
  
                                        TODAY'S DATE: 3/1/2022   
  
                                                              
  
                                        SUBJECTIVE            
  
                                                    Patient doing well this am.   
Pain is controlled. Denies any acute events. Denies  
 recurrent rash. Tolerating dressing changes.   
  
                                                              
  
                                        OBJECTIVE             
  
                                                    VITALS: BP (!) 115/59   Puls  
e 110   Temp 98.6 F (37 C) (Temporal)   Resp 18     
Ht 5' 7  (1.702 m)   Wt 200 lb (90.7 kg)   SpO2 97%   BMI 31.32 kg/m   
  
                                                              
  
                                        INTAKE/OUTPUT:        
  
                                        I/O last 3 completed shifts:   
  
                                        In: -                 
  
                                        Out: 2250 [Urine:2250]   
  
                                        No intake/output data recorded.   
  
                                                              
  
                                        CONSTITUTIONAL: NAD, A&O X3.   
  
                                        HEAD: Normocephalic, atraumatic   
  
                                        NECK: Supple, trachea midline   
  
                                        LUNGS: Resp effort easy and unlabored, c  
hest rise equal bilaterally   
  
                                        ABDOMEN: soft, nontender, nondistended   
  
                                        EXT: Moving all extremities, no edema   
  
                                        SKIN: Warm and Dry, no rashes or lesions  
   
  
                                                    NEURO: CN 2-12 grossly intac  
t, no focal neurologic deficits, sensation intact   
bilaterally                               
  
                                                    VASC: LT PT signal, RT PT/DP  
, L fasciotomy sites CDI, some ischemia of distal   
lateral fasciotomy site.                  
  
                                                              
  
                                        Data                  
  
                                        Recent Labs           
  
                                        22         
  
                                        2310 22         
  
                                        0041                  
  
                                        WBC 8.3 8.7 8.0       
  
                                        HGB 10.0* 8.9* 9.2*   
  
                                        HCT 31.0* 27.9* 28.7*   
  
                                         382 386       
  
                                                              
  
                                        Recent Labs           
  
                                        220 22         
  
                                        0041                  
  
                                         134* 139       
  
                                        K 4.0 3.7 3.7         
  
                                         107 111*       
  
                                        CO2 22 22 20*         
  
                                        BUN 15 12 10          
  
                                        CREATININE 1.32* 1.24 1.17   
  
                                        GLUCOSE 116* 144* 121*   
  
                                                              
  
                                        No results for input(s): AST, ALT, ALB,   
BILITOT, ALKPHOS in the last 72 hours.   
  
                                                              
  
                                        Current Inpatient Medications   
  
                                                    Current Facility-Administere  
d Medications: ceFAZolin (ANCEF) 2000 mg in   
dextrose 4 % 100 mL IVPB (premix), 2,000 mg, IntraVENous, Q8H   
  
                                        diphenhydrAMINE (BENADRYL) injection 25   
mg, 25 mg, IntraVENous, Q6H PRN   
  
                                        hydrOXYzine (VISTARIL) injection 25 mg,   
25 mg, IntraMUSCular, Q6H PRN   
  
                                        aspirin EC tablet 81 mg, 81 mg, Oral, Da  
james   
  
                                                    insulin lispro (HUMALOG) inj  
ection vial 0-6 Units, 0-6 Units, SubCUTAneous, TID  
 WC                                       
  
                                                    insulin lispro (HUMALOG) inj  
ection vial 0-3 Units, 0-3 Units, SubCUTAneous,   
Nightly                                   
  
                                        0.9 % sodium chloride infusion, 25 mL, I  
ntraVENous, PRN   
  
                                                    sodium chloride flush 0.9 %   
injection 5-40 mL, 5-40 mL, IntraVENous, 2 times   
per day                                   
  
                                        sodium chloride flush 0.9 % injection 5-  
40 mL, 5-40 mL, IntraVENous, PRN   
  
                                                    sodium chloride flush 0.9 %   
injection 10 mL, 10 mL, IntraVENous, 2 times per   
day                                       
  
                                        sodium chloride flush 0.9 % injection 10  
 mL, 10 mL, IntraVENous, PRN   
  
                                        0.9 % sodium chloride infusion, 25 mL, I  
ntraVENous, PRN   
  
                                        labetalol (NORMODYNE;TRANDATE) injection  
 10 mg, 10 mg, IntraVENous, Q2H PRN   
  
                                        hydrALAZINE (APRESOLINE) injection 10 mg  
, 10 mg, IntraVENous, Q1H PRN   
  
                                        lactated ringers infusion, , IntraVENous  
, Continuous   
  
                                        acetaminophen (TYLENOL) tablet 1,000 mg,  
 1,000 mg, Oral, Q6H   
  
                                                    HYDROmorphone (DILAUDID) inj  
ection 0.5 mg, 0.5 mg, IntraVENous, Q3H PRN **OR**   
HYDROmorphone (DILAUDID) injection 1 mg, 1 mg, IntraVENous, Q3H PRN   
  
                                        polyethylene glycol (GLYCOLAX) packet 17  
 g, 17 g, Oral, Daily PRN   
  
                                                    oxyCODONE (ROXICODONE) immed  
iate release tablet 5 mg, 5 mg, Oral, Q4H PRN   
**OR** oxyCODONE (ROXICODONE) immediate release tablet 10 mg, 10 mg, Oral, Q4H 
PRN                                       
  
                                        heparin (porcine) injection 4,000 Units,  
 4,000 Units, IntraVENous, PRN   
  
                                        heparin (porcine) injection 2,000 Units,  
 2,000 Units, IntraVENous, PRN   
  
                                                    heparin 25,000 units in dext  
leon 5% 250 mL (premix) infusion, 11 Units/kg/hr,   
IntraVENous, Continuous                   
  
                                        atorvastatin (LIPITOR) tablet 80 mg, 80   
mg, Oral, Daily   
  
                                        buPROPion (WELLBUTRIN) tablet 150 mg, 15  
0 mg, Oral, BID   
  
                                        cilostazol (PLETAL) tablet 100 mg, 100 m  
g, Oral, BID AC   
  
                                        clopidogrel (PLAVIX) tablet 75 mg, 75 mg  
, Oral, Daily   
  
                                        metoprolol tartrate (LOPRESSOR) tablet 2  
5 mg, 25 mg, Oral, BID   
  
                                        pantoprazole (PROTONIX) tablet 40 mg, 40  
 mg, Oral, QAM AC   
  
                                        glucose (GLUTOSE) 40 % oral gel 15 g, 15  
 g, Oral, PRN   
  
                                        dextrose 50 % IV solution, 12.5 g, Intra  
VENous, PRN   
  
                                        glucagon (rDNA) injection 1 mg, 1 mg, In  
traMUSCular, PRN   
  
                                        dextrose 5 % solution, 100 mL/hr, IntraV  
ENous, PRN   
  
                                                              
  
                                        ASSESSMENT AND PLAN   
  
                                                              
  
                                        Active Problems:      
  
                                                    Critical limb ischemia with   
history of revascularization of same extremity   
(HCC)                                     
  
                                        Peripheral artery disease (HCC)   
  
                                        Femoral-popliteal bypass graft occlusion  
, left (HCC)   
  
                                        Resolved Problems:    
  
                                        * No resolved hospital problems. *   
  
                                                              
  
                                                    This is a 65 y.o. male with   
recent left fem-pop bypass on 22 and subsequent  
 LLE fasciotomies 22 and 22 presents with occluded bypass graft.   
  
                                                              
  
                                        - NPO, IVFs           
  
                                        - Culture fasciotomy wound   
  
                                        - Vanc/zosyn          
  
                                        - Heparin drip        
  
                                        - plavix/ASA/statin/pletal   
  
                                        - Pain control        
  
                                        - Regular diet        
  
                                        - Wound care for fasciotomy site   
  
                                        - Plan for angio gram with possible debr  
idement today   
  
                                                              
  
                                        D/W MD Anabela Ahuja MD    
  
                                        General Surgery PGY-1   
  
                                        Pager # 9193          
  
                                                              
  
                                                      
  
Electronically signed by Anabela Cardoza MD at 2022 7:39 AM EST  
  
Formatting of this note might be different from the original.   
  
                                        Vancomycin therapy has been discontinued  
 by Dr. Anabela Cardoza on 22.   
  
                                        Thank you for the consult. Pharmacy sign  
ing off for vancomycin dosing.   
  
                                        Danny Shay RPH, PharmD.   
  
                                                    Date: 22 Time: 7:14 AM  
   
  
Electronically signed by Danny Shay RPH at 2022 7:15 AM EST  
  
Formatting of this note might be different from the original.   
  
                                                    APTT resulting @ 51.7, per H  
eparin order, keep Heparin at same rate. Will re   
draw aPTT @0000. Will continue to monitor.    
  
Electronically signed by Elen Figueroa RN at 2022 2:17 PM EST  
  
                                          
  
                                        Formatting of this note is different fro  
m the original.   
  
                                                              
  
                                                              
  
                                        Department of Surgery - Progress Note -   
Surg V   
  
                                                              
  
                                        PATIENT NAME: Willow Bradshaw III   
  
                                        : 1957         
  
                                        MRN: 40670961         
  
                                        ATTENDING PHYSICIAN: Maxim Colindres MD   
  
                                        ADMIT DATE: 2022   
  
                                        TODAY'S DATE: 2022   
  
                                                              
  
                                        SUBJECTIVE            
  
                                                    Patient doing well this am.   
Pain is controlled. Denies any acute events. Denies  
 recurrent rash. Tolerating dressing changes.   
  
                                                              
  
                                        OBJECTIVE             
  
                                                    VITALS: /64   Pulse 10  
7   Temp 98.4 F (36.9 C) (Temporal)   Resp 18   Ht   
5' 7  (1.702 m)   Wt 200 lb (90.7 kg)   SpO2 98%   BMI 31.32 kg/m   
  
                                                              
  
                                        INTAKE/OUTPUT:        
  
                                        I/O last 3 completed shifts:   
  
                                        In: -                 
  
                                        Out: 3800 [Urine:3800]   
  
                                        No intake/output data recorded.   
  
                                                              
  
                                        CONSTITUTIONAL: NAD, A&O X3.   
  
                                        HEAD: Normocephalic, atraumatic   
  
                                        NECK: Supple, trachea midline   
  
                                        LUNGS: Resp effort easy and unlabored, c  
hest rise equal bilaterally   
  
                                        ABDOMEN: soft, nontender, nondistended   
  
                                        EXT: Moving all extremities, no edema   
  
                                        SKIN: Warm and Dry, no rashes or lesions  
   
  
                                                    NEURO: CN 2-12 grossly intac  
t, no focal neurologic deficits, sensation intact   
bilaterally                               
  
                                                    VASC: LT PT signal, RT PT/DP  
, L fasciotomy sites CDI, some ischemia of distal   
lateral fasciotomy site.                  
  
                                                              
  
                                        Data                  
  
                                        Recent Labs           
  
                                        22              
  
                                        0042 22         
  
                                        0117 22         
  
                                        2310                  
  
                                        WBC 8.5 8.3 8.7       
  
                                        HGB 10.3* 10.0* 8.9*   
  
                                        HCT 31.9* 31.0* 27.9*   
  
                                         407 382       
  
                                                              
  
                                        Recent Labs           
  
                                        22              
  
                                        0042 22         
  
                                        0117 22         
  
                                        2310                  
  
                                         136 134*       
  
                                        K 4.3 4.0 3.7         
  
                                         106 107        
  
                                        CO2 21* 22 22         
  
                                        BUN 19* 15 12         
  
                                        CREATININE 1.28* 1.32* 1.24   
  
                                        GLUCOSE 117* 116* 144*   
  
                                                              
  
                                        No results for input(s): AST, ALT, ALB,   
BILITOT, ALKPHOS in the last 72 hours.   
  
                                                              
  
                                        Current Inpatient Medications   
  
                                                    Current Facility-Administere  
d Medications: diphenhydrAMINE (BENADRYL) injection  
 25 mg, 25 mg, IntraVENous, Q6H PRN       
  
                                                    piperacillin-tazobactam (ZOS  
YN) 3375 mg in dextrose 50 mL IVPB extended   
infusion (premix), 3,375 mg, IntraVENous, Q8H   
  
                                                    vancomycin (VANCOCIN) 1000 m  
g in dextrose 5% 200 mL IVPB, 1,000 mg,   
IntraVENous, Q12H                         
  
                                        hydrOXYzine (VISTARIL) injection 25 mg,   
25 mg, IntraMUSCular, Q6H PRN   
  
                                        aspirin EC tablet 81 mg, 81 mg, Oral, Da  
james   
  
                                                    insulin lispro (HUMALOG) inj  
ection vial 0-6 Units, 0-6 Units, SubCUTAneous, TID  
 WC                                       
  
                                                    insulin lispro (HUMALOG) inj  
ection vial 0-3 Units, 0-3 Units, SubCUTAneous,   
Nightly                                   
  
                                        0.9 % sodium chloride infusion, 25 mL, I  
ntraVENous, PRN   
  
                                                    sodium chloride flush 0.9 %   
injection 5-40 mL, 5-40 mL, IntraVENous, 2 times   
per day                                   
  
                                        sodium chloride flush 0.9 % injection 5-  
40 mL, 5-40 mL, IntraVENous, PRN   
  
                                                    sodium chloride flush 0.9 %   
injection 10 mL, 10 mL, IntraVENous, 2 times per   
day                                       
  
                                        sodium chloride flush 0.9 % injection 10  
 mL, 10 mL, IntraVENous, PRN   
  
                                        0.9 % sodium chloride infusion, 25 mL, I  
ntraVENous, PRN   
  
                                        labetalol (NORMODYNE;TRANDATE) injection  
 10 mg, 10 mg, IntraVENous, Q2H PRN   
  
                                        hydrALAZINE (APRESOLINE) injection 10 mg  
, 10 mg, IntraVENous, Q1H PRN   
  
                                        lactated ringers infusion, , IntraVENous  
, Continuous   
  
                                        acetaminophen (TYLENOL) tablet 1,000 mg,  
 1,000 mg, Oral, Q6H   
  
                                                    HYDROmorphone (DILAUDID) inj  
ection 0.5 mg, 0.5 mg, IntraVENous, Q3H PRN **OR**   
HYDROmorphone (DILAUDID) injection 1 mg, 1 mg, IntraVENous, Q3H PRN   
  
                                        polyethylene glycol (GLYCOLAX) packet 17  
 g, 17 g, Oral, Daily PRN   
  
                                                    oxyCODONE (ROXICODONE) immed  
iate release tablet 5 mg, 5 mg, Oral, Q4H PRN   
**OR** oxyCODONE (ROXICODONE) immediate release tablet 10 mg, 10 mg, Oral, Q4H 
PRN                                       
  
                                        heparin (porcine) injection 4,000 Units,  
 4,000 Units, IntraVENous, PRN   
  
                                        heparin (porcine) injection 2,000 Units,  
 2,000 Units, IntraVENous, PRN   
  
                                                    heparin 25,000 units in dext  
leon 5% 250 mL (premix) infusion, 11 Units/kg/hr,   
IntraVENous, Continuous                   
  
                                        atorvastatin (LIPITOR) tablet 80 mg, 80   
mg, Oral, Daily   
  
                                        buPROPion (WELLBUTRIN) tablet 150 mg, 15  
0 mg, Oral, BID   
  
                                        cilostazol (PLETAL) tablet 100 mg, 100 m  
g, Oral, BID AC   
  
                                        clopidogrel (PLAVIX) tablet 75 mg, 75 mg  
, Oral, Daily   
  
                                        metoprolol tartrate (LOPRESSOR) tablet 2  
5 mg, 25 mg, Oral, BID   
  
                                        pantoprazole (PROTONIX) tablet 40 mg, 40  
 mg, Oral, QAM AC   
  
                                        glucose (GLUTOSE) 40 % oral gel 15 g, 15  
 g, Oral, PRN   
  
                                        dextrose 50 % IV solution, 12.5 g, Intra  
VENous, PRN   
  
                                        glucagon (rDNA) injection 1 mg, 1 mg, In  
traMUSCular, PRN   
  
                                        dextrose 5 % solution, 100 mL/hr, IntraV  
ENous, PRN   
  
                                                              
  
                                        ASSESSMENT AND PLAN   
  
                                                              
  
                                        Active Problems:      
  
                                        Peripheral artery disease (HCC)   
  
                                        Femoral-popliteal bypass graft occlusion  
, left (HCC)   
  
                                        Resolved Problems:    
  
                                        * No resolved hospital problems. *   
  
                                                              
  
                                                    This is a 65 y.o. male with   
recent left fem-pop bypass on 22 and subsequent  
 LLE fasciotomies 22 and 22 presents with occluded bypass graft.   
  
                                                              
  
                                        - Culture fasciotomy wound   
  
                                        - Vanc/zosyn          
  
                                        - Heparin drip        
  
                                        - plavix/ASA/statin/pletal   
  
                                        - Pain control        
  
                                        - Regular diet        
  
                                        - Wound care for fasciotomy site   
  
                                        - Plan for angio gram  pending sched  
uling   
  
                                                              
  
                                        D/W Dr. Jamshid Stein MD         
  
                                        General Surgery PGY-4   
  
                                                              
  
                                        Discussed with patient, family, Dr. Ba espinal   
  
                                        Grim prospects at limb salvage, but lily  
ent would like one further attempt   
  
                                                    Foot less ischemic than with  
 prior issues, but still with NENA 0.3. level of   
current perfusion not adequate to heal fasciotomies   
  
                                        Will attempt endo-salvage 3/1, debride w  
ounds if successful   
  
                                                      
  
Electronically signed by Ed Breen MD at 2022 12:40 PM EST  
  
Formatting of this note might be different from the original.   
  
                                                    APTT resulting @ 50.7, per H  
eparin orders, continue dose at same rate. Will   
recheck aPTT @1300.   
  
Electronically signed by Elen Figueroa RN at 2022 9:07 AM EST  
  
Formatting of this note is different from the original.   
  
                                                              
  
                                                              
  
                                        Department of Surgery - Progress Note -   
Surg V   
  
                                                              
  
                                        PATIENT NAME: Willow Bradshaw III   
  
                                        : 1957         
  
                                        MRN: 88214143         
  
                                        ATTENDING PHYSICIAN: Maxim Colindres MD   
  
                                        ADMIT DATE: 2022   
  
                                        TODAY'S DATE: 2022   
  
                                                              
  
                                        SUBJECTIVE            
  
                                        - New rash overnight - responded to bene  
 tico   
  
                                        - L leg pain getting worse   
  
                                        - T max of 100.6      
  
                                                              
  
                                        OBJECTIVE             
  
                                                    VITALS: BP 94/65   Pulse 122  
   Temp 99.1 F (37.3 C) (Oral)   Resp 18   Ht 5' 7   
 (1.702 m)   Wt 200 lb (90.7 kg)   SpO2 96%   BMI 31.32 kg/m   
  
                                                              
  
                                        INTAKE/OUTPUT:        
  
                                        I/O last 3 completed shifts:   
  
                                        In: 1250 [I.V.:1250]   
  
                                        Out: 700 [Urine:700]   
  
                                        I/O this shift:       
  
                                        In: -                 
  
                                        Out: 850 [Urine:850]   
  
                                                              
  
                                        CONSTITUTIONAL: NAD, A&O X3.   
  
                                        HEAD: Normocephalic, atraumatic   
  
                                        NECK: Supple, trachea midline   
  
                                        LUNGS: Resp effort easy and unlabored, c  
hest rise equal bilaterally   
  
                                        ABDOMEN: soft, nontender, nondistended   
  
                                        EXT: Moving all extremities, no edema   
  
                                        SKIN: Warm and Dry, no rashes or lesions  
   
  
                                                    NEURO: CN 2-12 grossly intac  
t, no focal neurologic deficits, sensation intact   
bilaterally                               
  
                                        VASC: LT PT signal, RT PT/DP   
  
                                                              
  
                                        Data                  
  
                                        Recent Labs           
  
                                        22              
  
                                        0342 22         
  
                                        0042 22         
  
                                        0117                  
  
                                        WBC 9.3 8.5 8.3       
  
                                        HGB 11.1* 10.3* 10.0*   
  
                                        HCT 35.1* 31.9* 31.0*   
  
                                         428 407       
  
                                                              
  
                                        Recent Labs           
  
                                        22              
  
                                        0342 22         
  
                                        0042 22         
  
                                        0117                  
  
                                        * 135 136       
  
                                        K 4.1 4.3 4.0         
  
                                         104 106        
  
                                        CO2 20* 21* 22        
  
                                        BUN 17 19* 15         
  
                                        CREATININE 1.24 1.28* 1.32*   
  
                                        GLUCOSE 102* 117* 116*   
  
                                                              
  
                                        No results for input(s): AST, ALT, ALB,   
BILITOT, ALKPHOS in the last 72 hours.   
  
                                                              
  
                                        Current Inpatient Medications   
  
                                                    Current Facility-Administere  
d Medications: diphenhydrAMINE (BENADRYL) injection  
 25 mg, 25 mg, IntraVENous, Q6H PRN       
  
                                                    piperacillin-tazobactam (ZOS  
YN) 3375 mg in dextrose 50 mL IVPB extended   
infusion (premix), 3,375 mg, IntraVENous, Q8H   
  
                                                    vancomycin (VANCOCIN) 1000 m  
g in dextrose 5% 200 mL IVPB, 1,000 mg,   
IntraVENous, Q12H                         
  
                                        aspirin EC tablet 81 mg, 81 mg, Oral, Da  
james   
  
                                                    insulin lispro (HUMALOG) inj  
ection vial 0-6 Units, 0-6 Units, SubCUTAneous, TID  
 WC                                       
  
                                                    insulin lispro (HUMALOG) inj  
ection vial 0-3 Units, 0-3 Units, SubCUTAneous,   
Nightly                                   
  
                                        0.9 % sodium chloride infusion, 25 mL, I  
ntraVENous, PRN   
  
                                                    sodium chloride flush 0.9 %   
injection 5-40 mL, 5-40 mL, IntraVENous, 2 times   
per day                                   
  
                                        sodium chloride flush 0.9 % injection 5-  
40 mL, 5-40 mL, IntraVENous, PRN   
  
                                                    sodium chloride flush 0.9 %   
injection 10 mL, 10 mL, IntraVENous, 2 times per   
day                                       
  
                                        sodium chloride flush 0.9 % injection 10  
 mL, 10 mL, IntraVENous, PRN   
  
                                        0.9 % sodium chloride infusion, 25 mL, I  
ntraVENous, PRN   
  
                                        labetalol (NORMODYNE;TRANDATE) injection  
 10 mg, 10 mg, IntraVENous, Q2H PRN   
  
                                        hydrALAZINE (APRESOLINE) injection 10 mg  
, 10 mg, IntraVENous, Q1H PRN   
  
                                        lactated ringers infusion, , IntraVENous  
, Continuous   
  
                                        acetaminophen (TYLENOL) tablet 1,000 mg,  
 1,000 mg, Oral, Q6H   
  
                                                    HYDROmorphone (DILAUDID) inj  
ection 0.5 mg, 0.5 mg, IntraVENous, Q3H PRN **OR**   
HYDROmorphone (DILAUDID) injection 1 mg, 1 mg, IntraVENous, Q3H PRN   
  
                                        polyethylene glycol (GLYCOLAX) packet 17  
 g, 17 g, Oral, Daily PRN   
  
                                                    oxyCODONE (ROXICODONE) immed  
iate release tablet 5 mg, 5 mg, Oral, Q4H PRN   
**OR** oxyCODONE (ROXICODONE) immediate release tablet 10 mg, 10 mg, Oral, Q4H 
PRN                                       
  
                                        heparin (porcine) injection 4,000 Units,  
 4,000 Units, IntraVENous, PRN   
  
                                        heparin (porcine) injection 2,000 Units,  
 2,000 Units, IntraVENous, PRN   
  
                                                    heparin 25,000 units in dext  
leon 5% 250 mL (premix) infusion, 11 Units/kg/hr,   
IntraVENous, Continuous                   
  
                                        atorvastatin (LIPITOR) tablet 80 mg, 80   
mg, Oral, Daily   
  
                                        buPROPion (WELLBUTRIN) tablet 150 mg, 15  
0 mg, Oral, BID   
  
                                        cilostazol (PLETAL) tablet 100 mg, 100 m  
g, Oral, BID AC   
  
                                        clopidogrel (PLAVIX) tablet 75 mg, 75 mg  
, Oral, Daily   
  
                                        metoprolol tartrate (LOPRESSOR) tablet 2  
5 mg, 25 mg, Oral, BID   
  
                                        pantoprazole (PROTONIX) tablet 40 mg, 40  
 mg, Oral, QAM AC   
  
                                        glucose (GLUTOSE) 40 % oral gel 15 g, 15  
 g, Oral, PRN   
  
                                        dextrose 50 % IV solution, 12.5 g, Intra  
VENous, PRN   
  
                                        glucagon (rDNA) injection 1 mg, 1 mg, In  
traMUSCular, PRN   
  
                                        dextrose 5 % solution, 100 mL/hr, IntraV  
ENous, PRN   
  
                                                              
  
                                        ASSESSMENT AND PLAN   
  
                                                              
  
                                        Active Problems:      
  
                                        Peripheral artery disease (HCC)   
  
                                        Femoral-popliteal bypass graft occlusion  
, left (HCC)   
  
                                        Resolved Problems:    
  
                                        * No resolved hospital problems. *   
  
                                                              
  
                                                    This is a 65 y.o. male with   
recent left fem-pop bypass on 22 and subsequent  
 LLE fasciotomies 22 and 22 presents with occluded bypass graft.   
  
                                                              
  
                                        - Culture fasciotomy wound   
  
                                        - Vanc/zosyn          
  
                                        - Heparin drip        
  
                                        - plavix/ASA/statin/pletal   
  
                                        - Pain control        
  
                                        - Regular diet        
  
                                        - Wound care for fasciotomy site   
  
                                        - Patient likely will need BKA   
  
                                                              
  
                                        D/W Dr. Brigid Hassan,    
  
                                        General Surgery PGY-3   
  
                                        22 11:05 AM     
  
                                        Pager # x2838         
  
                                                              
  
                                                      
  
Electronically signed by Janene Rainey MD at 2022 1:20 PM EST   
  
                                                      
  
Associated attestation - Janene Rainey MD - 2022 1:20 PM EST  
  
Formatting of this note might be different from the original.   
  
                                                    I have evaluated the patient  
 and agree with the assessment and plan.   
  
Formatting of this note might be different from the original.   
  
                                                              
  
                                                    Nutrition rescreen completed  
. Chart reviewed. Patient to be monitored and   
followed by the diet technician.          
  
                                        Electronically signed by Lidya arias DTR on 2022 at 9:47 AM   
  
                                                              
  
                                                      
  
Electronically signed by Lidya Bnuch DTR at 2022 9:47 AM EST  
  
Formatting of this note is different from the original.   
  
                                                              
  
                                                              
  
                                        Department of Surgery - Progress Note -   
Surg V   
  
                                                              
  
                                        PATIENT NAME: Willow Bradshaw III   
  
                                        : 1957         
  
                                        MRN: 45348201         
  
                                        ATTENDING PHYSICIAN: Maxim Colindres MD   
  
                                        ADMIT DATE: 2022   
  
                                        TODAY'S DATE: 2022   
  
                                                              
  
                                        SUBJECTIVE            
  
                                        SANIYA                   
  
                                        Frustrated with post-operative course an  
d news of occluded bypass   
  
                                        Pain in LLE           
  
                                                              
  
                                        OBJECTIVE             
  
                                                    VITALS: /77   Pulse 10  
9   Temp 99.3 F (37.4 C) (Temporal)   Resp 16   Ht   
5' 7  (1.702 m)   Wt 200 lb (90.7 kg)   SpO2 92%   BMI 31.32 kg/m   
  
                                                              
  
                                        INTAKE/OUTPUT:        
  
                                        I/O last 3 completed shifts:   
  
                                        In: 1318.7 [P.O.:250; I.V.:1068.7]   
  
                                        Out: 1550 [Urine:1550]   
  
                                        No intake/output data recorded.   
  
                                                              
  
                                        CONSTITUTIONAL: NAD, A&O X3.   
  
                                        HEAD: Normocephalic, atraumatic   
  
                                        NECK: Supple, trachea midline   
  
                                        LUNGS: Resp effort easy and unlabored, c  
hest rise equal bilaterally   
  
                                        ABDOMEN: soft, nontender, nondistended   
  
                                        EXT: Moving all extremities, no edema   
  
                                        SKIN: Warm and Dry, no rashes or lesions  
   
  
                                                    NEURO: CN 2-12 grossly intac  
t, no focal neurologic deficits, sensation intact   
bilaterally                               
  
                                        VASC: LT PT signal, RT PT/DP   
  
                                                              
  
                                        Data                  
  
                                        Recent Labs           
  
                                        22         
  
                                        0342 22         
  
                                        0042                  
  
                                        WBC 12.5* 9.3 8.5     
  
                                        HGB 10.7* 11.1* 10.3*   
  
                                        HCT 33.9* 35.1* 31.9*   
  
                                        * 429 428      
  
                                                              
  
                                        Recent Labs           
  
                                        22              
  
                                        2103 22         
  
                                        0342 22         
  
                                        0042                  
  
                                        * 134* 135      
  
                                        K 4.3 4.1 4.3         
  
                                         103 104        
  
                                        CO2 20* 20* 21*       
  
                                        BUN 18* 17 19*        
  
                                        CREATININE 1.28* 1.24 1.28*   
  
                                        GLUCOSE 109* 102* 117*   
  
                                                              
  
                                        No results for input(s): AST, ALT, ALB,   
BILITOT, ALKPHOS in the last 72 hours.   
  
                                                              
  
                                        Current Inpatient Medications   
  
                                                    Current Facility-Administere  
d Medications: aspirin EC tablet 81 mg, 81 mg,   
Oral, Daily                               
  
                                                    insulin lispro (HUMALOG) inj  
ection vial 0-6 Units, 0-6 Units, SubCUTAneous, TID  
 WC                                       
  
                                                    insulin lispro (HUMALOG) inj  
ection vial 0-3 Units, 0-3 Units, SubCUTAneous,   
Nightly                                   
  
                                        0.9 % sodium chloride infusion, 25 mL, I  
ntraVENous, PRN   
  
                                                    sodium chloride flush 0.9 %   
injection 5-40 mL, 5-40 mL, IntraVENous, 2 times   
per day                                   
  
                                        sodium chloride flush 0.9 % injection 5-  
40 mL, 5-40 mL, IntraVENous, PRN   
  
                                                    sodium chloride flush 0.9 %   
injection 10 mL, 10 mL, IntraVENous, 2 times per   
day                                       
  
                                        sodium chloride flush 0.9 % injection 10  
 mL, 10 mL, IntraVENous, PRN   
  
                                        0.9 % sodium chloride infusion, 25 mL, I  
ntraVENous, PRN   
  
                                        labetalol (NORMODYNE;TRANDATE) injection  
 10 mg, 10 mg, IntraVENous, Q2H PRN   
  
                                        hydrALAZINE (APRESOLINE) injection 10 mg  
, 10 mg, IntraVENous, Q1H PRN   
  
                                        lactated ringers infusion, , IntraVENous  
, Continuous   
  
                                        acetaminophen (TYLENOL) tablet 1,000 mg,  
 1,000 mg, Oral, Q6H   
  
                                                    HYDROmorphone (DILAUDID) inj  
ection 0.5 mg, 0.5 mg, IntraVENous, Q3H PRN **OR**   
HYDROmorphone (DILAUDID) injection 1 mg, 1 mg, IntraVENous, Q3H PRN   
  
                                        polyethylene glycol (GLYCOLAX) packet 17  
 g, 17 g, Oral, Daily PRN   
  
                                                    oxyCODONE (ROXICODONE) immed  
iate release tablet 5 mg, 5 mg, Oral, Q4H PRN   
**OR** oxyCODONE (ROXICODONE) immediate release tablet 10 mg, 10 mg, Oral, Q4H 
PRN                                       
  
                                        heparin (porcine) injection 4,000 Units,  
 4,000 Units, IntraVENous, PRN   
  
                                        heparin (porcine) injection 2,000 Units,  
 2,000 Units, IntraVENous, PRN   
  
                                                    heparin 25,000 units in dext  
leon 5% 250 mL (premix) infusion, 11 Units/kg/hr,   
IntraVENous, Continuous                   
  
                                        atorvastatin (LIPITOR) tablet 80 mg, 80   
mg, Oral, Daily   
  
                                        buPROPion (WELLBUTRIN) tablet 150 mg, 15  
0 mg, Oral, BID   
  
                                        cilostazol (PLETAL) tablet 100 mg, 100 m  
g, Oral, BID AC   
  
                                        clopidogrel (PLAVIX) tablet 75 mg, 75 mg  
, Oral, Daily   
  
                                        metoprolol tartrate (LOPRESSOR) tablet 2  
5 mg, 25 mg, Oral, BID   
  
                                        pantoprazole (PROTONIX) tablet 40 mg, 40  
 mg, Oral, QAM AC   
  
                                        glucose (GLUTOSE) 40 % oral gel 15 g, 15  
 g, Oral, PRN   
  
                                        dextrose 50 % IV solution, 12.5 g, Intra  
VENous, PRN   
  
                                        glucagon (rDNA) injection 1 mg, 1 mg, In  
traMUSCular, PRN   
  
                                        dextrose 5 % solution, 100 mL/hr, IntraV  
ENous, PRN   
  
                                                              
  
                                        ASSESSMENT AND PLAN   
  
                                                              
  
                                        Active Problems:      
  
                                        Peripheral artery disease (HCC)   
  
                                        Femoral-popliteal bypass graft occlusion  
, left (HCC)   
  
                                        Resolved Problems:    
  
                                        * No resolved hospital problems. *   
  
                                                              
  
                                                    This is a 65 y.o. male with   
recent left fem-pop bypass on 22 and subsequent  
 LLE fasciotomies 22 and 22 presents with occluded bypass graft.   
  
                                                              
  
                                        - Heparin drip        
  
                                        - plavix/ASA/statin/pletal   
  
                                        - Pain control        
  
                                        - Regular diet        
  
                                        - Wound care for fasciotomy site   
  
                                        - Patient likely will need BKA   
  
                                                              
  
                                        D/W Dr. Brigid Hassan,    
  
                                        General Surgery PGY-3   
  
                                        22 12:05 PM     
  
                                        Pager # x2838         
  
                                                              
  
                                                      
  
Electronically signed by Janene Rainey MD at 2022 12:12 PM EST   
  
                                                      
  
Associated attestation - Janene Rainey MD - 2022 12:12 PM EST  
  
Formatting of this note might be different from the original.   
  
                                                    I have evaluated the patient  
 and agree with the assessment and plan.   
  
Formatting of this note is different from the original.   
  
                                                              
  
                                                              
  
                                        Department of Surgery - Progress Note -   
Surg V   
  
                                                              
  
                                        PATIENT NAME: Willow Bradshaw III   
  
                                        : 1957         
  
                                        MRN: 24493665         
  
                                        ATTENDING PHYSICIAN: Maxim Colindres MD   
  
                                        ADMIT DATE: 2022   
  
                                        TODAY'S DATE: 2022   
  
                                                              
  
                                        SUBJECTIVE            
  
                                        SANIYA                   
  
                                        Frustrated with post-operative course an  
d news of occluded bypass   
  
                                        Pain in LLE           
  
                                        Lateral fasciotomy site foul smelling   
  
                                                              
  
                                        OBJECTIVE             
  
                                                    VITALS: BP 96/66   Pulse 100  
   Temp 97.1 F (36.2 C) (Temporal)   Resp 16   Ht   
5' 7  (1.702 m)   Wt 200 lb (90.7 kg)   SpO2 91%   BMI 31.32 kg/m   
  
                                                              
  
                                        INTAKE/OUTPUT:        
  
                                        I/O last 3 completed shifts:   
  
                                        In: 1318.7 [P.O.:250; I.V.:1068.7]   
  
                                        Out: 800 [Urine:800]   
  
                                        I/O this shift:       
  
                                        In: -                 
  
                                        Out: 750 [Urine:750]   
  
                                                              
  
                                        CONSTITUTIONAL: NAD, A&O X3.   
  
                                        HEAD: Normocephalic, atraumatic   
  
                                        NECK: Supple, trachea midline   
  
                                        LUNGS: Resp effort easy and unlabored, c  
hest rise equal bilaterally   
  
                                        ABDOMEN: soft, nontender, nondistended   
  
                                        EXT: Moving all extremities, no edema   
  
                                        SKIN: Warm and Dry, no rashes or lesions  
   
  
                                                    NEURO: CN 2-12 grossly intac  
t, no focal neurologic deficits, sensation intact   
bilaterally                               
  
                                        VASC: LT PT signal, RT PT/DP   
  
                                                              
  
                                        Data                  
  
                                        Recent Labs           
  
                                        22         
  
                                        0342                  
  
                                        WBC 12.5* 9.3         
  
                                        HGB 10.7* 11.1*       
  
                                        HCT 33.9* 35.1*       
  
                                        * 429          
  
                                                              
  
                                        Recent Labs           
  
                                        22              
  
                                        0904 22         
  
                                        0342                  
  
                                        NA -- 134* 134*       
  
                                        K -- 4.3 4.1          
  
                                        CL -- 101 103         
  
                                        CO2 -- 20* 20*        
  
                                        BUN -- 18* 17         
  
                                        CREATININE 1.4 1.28* 1.24   
  
                                        GLUCOSE -- 109* 102*   
  
                                                              
  
                                        No results for input(s): AST, ALT, ALB,   
BILITOT, ALKPHOS in the last 72 hours.   
  
                                                              
  
                                        Current Inpatient Medications   
  
                                                    Current Facility-Administere  
d Medications: 0.9 % sodium chloride infusion, 25   
mL, IntraVENous, PRN                      
  
                                                    sodium chloride flush 0.9 %   
injection 5-40 mL, 5-40 mL, IntraVENous, 2 times   
per day                                   
  
                                        sodium chloride flush 0.9 % injection 5-  
40 mL, 5-40 mL, IntraVENous, PRN   
  
                                                    sodium chloride flush 0.9 %   
injection 10 mL, 10 mL, IntraVENous, 2 times per   
day                                       
  
                                        sodium chloride flush 0.9 % injection 10  
 mL, 10 mL, IntraVENous, PRN   
  
                                        0.9 % sodium chloride infusion, 25 mL, I  
ntraVENous, PRN   
  
                                        labetalol (NORMODYNE;TRANDATE) injection  
 10 mg, 10 mg, IntraVENous, Q2H PRN   
  
                                        hydrALAZINE (APRESOLINE) injection 10 mg  
, 10 mg, IntraVENous, Q1H PRN   
  
                                        lactated ringers infusion, , IntraVENous  
, Continuous   
  
                                        acetaminophen (TYLENOL) tablet 1,000 mg,  
 1,000 mg, Oral, Q6H   
  
                                                    HYDROmorphone (DILAUDID) inj  
ection 0.5 mg, 0.5 mg, IntraVENous, Q3H PRN **OR**   
HYDROmorphone (DILAUDID) injection 1 mg, 1 mg, IntraVENous, Q3H PRN   
  
                                        polyethylene glycol (GLYCOLAX) packet 17  
 g, 17 g, Oral, Daily PRN   
  
                                                    oxyCODONE (ROXICODONE) immed  
iate release tablet 5 mg, 5 mg, Oral, Q4H PRN   
**OR** oxyCODONE (ROXICODONE) immediate release tablet 10 mg, 10 mg, Oral, Q4H 
PRN                                       
  
                                        heparin (porcine) injection 4,000 Units,  
 4,000 Units, IntraVENous, PRN   
  
                                        heparin (porcine) injection 2,000 Units,  
 2,000 Units, IntraVENous, PRN   
  
                                                    heparin 25,000 units in dext  
leon 5% 250 mL (premix) infusion, 11 Units/kg/hr,   
IntraVENous, Continuous                   
  
                                        atorvastatin (LIPITOR) tablet 80 mg, 80   
mg, Oral, Daily   
  
                                        buPROPion (WELLBUTRIN) tablet 150 mg, 15  
0 mg, Oral, BID   
  
                                        cilostazol (PLETAL) tablet 100 mg, 100 m  
g, Oral, BID AC   
  
                                        clopidogrel (PLAVIX) tablet 75 mg, 75 mg  
, Oral, Daily   
  
                                        metoprolol tartrate (LOPRESSOR) tablet 2  
5 mg, 25 mg, Oral, BID   
  
                                        pantoprazole (PROTONIX) tablet 40 mg, 40  
 mg, Oral, QAM AC   
  
                                        glucose (GLUTOSE) 40 % oral gel 15 g, 15  
 g, Oral, PRN   
  
                                        dextrose 50 % IV solution, 12.5 g, Intra  
VENous, PRN   
  
                                        glucagon (rDNA) injection 1 mg, 1 mg, In  
traMUSCular, PRN   
  
                                        dextrose 5 % solution, 100 mL/hr, IntraV  
ENous, PRN   
  
                                                    insulin lispro (HUMALOG) inj  
ection vial 0-6 Units, 0-6 Units, SubCUTAneous, Q6H  
                                          
  
                                                              
  
                                        ASSESSMENT AND PLAN   
  
                                                              
  
                                        Active Problems:      
  
                                        * No active hospital problems. *   
  
                                        Resolved Problems:    
  
                                        * No resolved hospital problems. *   
  
                                                              
  
                                                    This is a 65 y.o. male with   
recent left fem-pop bypass on 22 and subsequent  
 LLE fasciotomies 22 and 22 presents with occluded bypass graft.   
  
                                                              
  
                                        - Heparin drip        
  
                                        - plavix/ASA/statin/pletal   
  
                                        - Pain control        
  
                                        - NPO for possible intervention today, p  
chris pending   
  
                                                              
  
                                        WDW Dr. Colindres        
  
                                                              
  
                                        Electronically signed by ODELL KEYES MD on 2022 at 10:12 AM   
  
                                        PGY-1                 
  
                                        General surgery       
  
                                                      
  
Electronically signed by Maxim Colindres MD at 2022 4:19 PM EST   
  
                                                      
  
Associated attestation - Bernadine, Maxim AMAYA MD - 2022 4:19 PM EST  
  
Formatting of this note might be different from the original.   
  
                                        documented in this encounter   
   
                    2022 Note     HNO ID: 5123173076  Ararat General Medica  
l  
  
                                        Author: Elizabeth Miles MD Center  
  
                                        Service: ?            
  
                                        Author Type: Physician   
  
                                        Type: Progress Notes   
  
                                        Filed: 2/15/2022 4:37 PM   
  
                                        Note Text:            
  
                                        Hematology/Oncology Progress Note   
  
                                        Willow Bradshaw      
  
                                        1957              
  
                                        Encounter Date: 2022   
  
                                        Cancer Staging        
  
                                        No matching staging information was foun  
d for the patient.   
  
                                        HPI: Willow Bradshaw is a 61 year old g  
entleman with clinical stage at   
  
                                        least IB (cT2 cN0 cM0) G1 adenocarcinoma  
 of the CASSANDRA.   
  
                                        Mr. Bradshaw initially presented 2017   
for CABG on 17 secondary to   
  
                                        significant symptomatic angina and CAD.   
His course was complicated by   
  
                                        hypoxic respiratory failure. CT of the c  
hest on 17 showed a   
  
                                        spiculated mass, 1.3 cm, in the left upp  
er lobe.   
  
                                        He reports that he did not know that he   
had this nodule until ~6 months   
  
                                        after his CABG.       
  
                                        CT chest 10/20/17 showed the CASSANDRA lesion   
had increased to 1.8 x 1.2 cm.   
  
                                        PET/CT performed on 17 showed a 1.8  
 cm CASSANDRA lesion with max SUV 2.2.   
  
                                        Otherwise LUCIANA.        
  
                                        Pulmonary function testing on 17 sh  
owed FEV1 97%, VC 99%, FEV1/FVC   
  
                                        74%, DLCO 44%.        
  
                                        Percutaneous biopsy was attempted on , however, the nodule could   
  
                                        not be safely biopsied and attempt was a  
borted. At this time, it was   
  
                                        decided to serially follow the lesion.   
  
                                        CT chest on 18 showed a stable CASSANDRA n  
odule, and an unchanged 6 x 3 mm   
  
                                        left lingual nodule.   
  
                                        CT chest on 10/4/18 showed enlargement o  
f the CASSANDRA nodule 2.5 x 1.3 cm and   
  
                                        a stable 6 mm lingula nodule.   
  
                                        CT performed to assess for pulmonary emb  
olism on 18 showed new   
  
                                        infiltrates in the RUL with a nodular ar  
e posteriorly 1.7 x 1.4 cm, new.   
  
                                        There is a small nodule in the RUL 5.4 m  
m. Spiculated mass in the CASSANDRA is   
  
                                        3.6 x 2.4 cm. +Mediastinal adenopathy, l  
argest is a right paratracheal   
  
                                        node 18.5 mm.         
  
                                        He underwent EBUS and biopsy on 18  
. 4L node insufficient sample, 4R   
  
                                        no malignant cells, 7 rare atypical cell  
s of uncertain significance, 10R   
  
                                        negative, 10L atypical cells of uncertai  
n significance (appears to be   
  
                                        artifact). CASSANDRA transbronchial biopsy nazia  
wed well differentiated   
  
                                        adenocarcinoma.       
  
                                        PET/CT 19 showed a few scattered med  
iastinal LN, not FDG avid (0.7 x   
  
                                        1.3 cm, SUV 1.9). Mildly hypermetabolic   
CASSANDRA nodule 1.2 x 2.1 cm (SUV 2.3).   
  
                                        PFTs show mild large airway obstruction   
and moderately decreased DLCO.   
  
                                        He was treated with SBRT from 3/11/19-3/  
15/19.   
  
                                        CT 19 showed stable lingula lesion a  
nd a tiny subpleural nodule in the   
  
                                        left lower lobe.      
  
                                        Inerterval History    
  
                                        Mr. Bradshaw presents today in follow up  
 of his lung cancer. He was   
  
                                        admitted with ischemia of the left lower  
 extremity s/p fasciotomy. He   
  
                                        reports that he is slowly healing. Has s  
topped smoking. He is eating and   
  
                                        drinking well. He has no nausea, vomitin  
g, diarrhea, constipation. He had   
  
                                        cardiac stent placement in 10/2020.   
  
                                        CT chest 20 with stable changes. CT  
 chest 2020 stable. CT chest   
  
                                        2021 stable. CT chest 21 stab  
le architectural distortion in the   
  
                                        lingula. Subpleural density in the right  
 lower lobe, suggestive of focal   
  
                                        inflammatory or infection process.   
  
                                        PAST MEDICAL HISTORY   
  
                                        Diagnosis Date        
  
                                        - CAD (coronary artery disease)   
  
                                        - COPD (chronic obstructive pulmonary di  
sease) (HCC)   
  
                                        - Hyperlipidemia      
  
                                        - Lung cancer (HCC)   
  
                                        adenocarcinoma CASSANDRA    
  
                                        - Peripheral vascular disease (HCC)   
  
                                        - Personal history of DVT (deep vein thr  
ombosis)   
  
                                        PAST SURGICAL HISTORY   
  
                                        Procedure Laterality Date   
  
                                        - ANGIOPLASTY FEMORAL/POP Left   
  
                                        2019              
  
                                        - CORONARY ARTERY BYPASS GRAFT   
7   
  
                                        Dr. Fulton           
  
                                        Current Outpatient Medications   
  
                                        Medication Sig Dispense Refill   
  
                                        - warfarin (COUMADIN) 2.5 mg tablet TAKE  
 AS DIRECTED BY Palmdale Regional Medical Center   
  
                                        ANTICOAGULATION CLINIC (30 TABLETS= 30 D  
AY SUPPLY)   
  
                                        - oxyCODONE IR (ROXICODONE) 5 mg immedia  
te release tablet Take 5 mg by   
  
                                        mouth every 6 hours as needed.   
  
                                        - ONETOUCH VERIO TEST STRIPS test strip   
  
                                        - ONETOUCH VERIO REFLECT METER   
  
                                        - buPROPion HCL, smoking deter, 150 mg t  
ab ER 12 hr Take 150 mg by mouth.   
  
                                        - clopidogrel (PLAVIX) 75 mg tablet Take  
 75 mg by mouth.   
  
                                        - ONETOUCH DELICA PLUS LANCET 33 gauge   
  
                                        - Nicotine Polacrilex 2 mg lozenge Take   
2 mg by mouth.   
  
                                        - nitroglycerin sublingual (NITROQUICK)   
0.4 mg SL tablet Dissolve 0.4 mg   
  
                                        under the tongue.     
  
                                        - pantoprazole DR (PROTONIX) 40 mg table  
t   
  
                                        - JARDIANCE 10 mg tablet   
  
                                        - VASCEPA capsule TAKE 2 CAPSULES BY AL  
TH 2 TIMES DAILY   
  
                                        - alirocumab (PRALUENT PEN) 75 mg/mL pen  
 Inject 75 mg subcutaneously.   
  
                                        - ubidecarenone Q-10 (COENZYME Q-10) 10   
mg cap Take by mouth.   
  
                                        - Magnesium 250 mg tab Take 250 mg by mo  
uth.   
  
                                        - sildenafil (VIAGRA) 100 mg tablet Take  
 100 mg by mouth.   
  
                                        - metFORMIN ER (GLUCOPHAGE XR) 500 mg 24  
 hr tablet Take 1000mg by mouth   
  
                                        with breakfast and 500mg by mouth with d  
inner   
  
                                        - atorvastatin (LIPITOR) 80 mg tablet Ta  
ke 1 tablet by mouth.   
  
                                        - Fenofibrate (LOFIBRA) 160 mg tablet Ta  
ke 160 mg by mouth.   
  
                                        - lisinopril 2.5 mg tablet Take 2.5 mg b  
y mouth.   
  
                                        - metoprolol succinate ER (TOPROL XL) 25  
 mg 24 hr tablet Take 25 mg by   
  
                                        mouth.                
  
                                                    - cilostazol (PLETAL) 100 mg  
 tablet Take 100 mg by mouth (more content not   
included)...                              
   
                    2022 Note     HNO ID: 5324063173  Ararat General Medica  
l  
  
                                        Author: Abigail Yusuf MA Center  
  
                                        Service: ?            
  
                                        Author Type: Medical Assistant   
  
                                        Type: Progress Notes   
  
                                        Filed: 2022 4:10 PM   
  
                                        Note Text:            
  
                                        TRANSITIONAL CARE MANAGEMENT (TCM) COMMU  
NIT MONITORING PROGRAM - Longport   
  
                                        Provider Action/FYI:   
  
                                        Wound care nurse established   
  
                                        PT 3 times a week.    
  
                                        F/u with surgeon in 2 weeks. Will have a  
nother surgery then.   
  
                                        Stopped Eliquis- taking Coumadin 2.5 mg   
now.   
  
                                        Patient will call to schedule an appoint  
ment once fully recovered from   
  
                                        multiple surgeries.   
  
                                        SUMMARY:              
  
                                        Pt discharged from Von Voigtlander Women's Hospital o  
n 22.   
  
                                        Admitted for: Acute occlusion of artery   
of lower extremity due to   
  
                                        thrombosis            
  
                                        Contact made with patient: Yes   
  
                                        Hi my name is Abigail and I am calling f  
rom the Mary Rutan Hospital   
  
                                        General on behalf of your PCP, CARSON Warren.CNP I understand you   
  
                                        were recently in the hospital so I am ca  
lling to check in with you to   
  
                                        ensure you are feeling well now that you  
?re home. Do you mind if I ask you   
  
                                        a few questions related to your hospital  
 stay and well-being Yes   
  
                                        Contact with patient post discharge, spo  
ke to patient.   
  
                                        Patient identified by name and .   
  
                                        Do you feel your health is BETTER, WORSE  
, or the SAME since leaving the   
  
                                        hospital?             
  
                                        Better                
  
                                        ACTION TAKEN:         
  
                                        Patient indicated symptoms are better or  
 same, no action required.   
  
                                        Continue outreach.    
  
                                        MEDICATIONS:          
  
                                        Many patients have questions or concerns  
 about their medications once they   
  
                                        are home. Do you have any questions abou  
t taking your medications or which   
  
                                        medication you should be on? No   
  
                                        Do you need any medication refills at th  
is time, including any of the   
  
                                        medications you might take only when nee  
ded? No   
  
                                        ACTION TAKEN:         
  
                                        No action required    
  
                                        For RNs or Pharmacy completing outreach   
ONLY, was a medication review   
  
                                        completed? N/A        
  
                                        SOCIAL:               
  
                                        We would like to make sure you have what  
 you need so that your basics   
  
                                        needs are met - including your personal   
safety, food, housing and   
  
                                        medications. Would you like to speak wit  
h a social work team member to   
  
                                        help give you support for any of these n  
eeds? No   
  
                                        It can be normal to feel anxious or down  
 during a time like this. Would   
  
                                        you like to talk to a mental health prof  
essional about how you have been   
  
                                        feeling? No           
  
                                        ACTION TAKEN:         
  
                                        No action taken       
  
                                        DISCHARGE INTRUCTIONS:   
  
                                        Your discharge instructions / After Visi  
t Summary (AVS) are important in   
  
                                        guiding you through the recovery process  
. Do you have any questions   
  
                                        related to your discharge instructions?   
No   
  
                                        Do you have all the necessary equipment   
and supplies at home? Yes   
  
                                        ACTION TAKEN:         
  
                                        No action required    
  
                                        Thank you for talking with me today. I brianne patton like to help you schedule a   
  
                                        hospital follow-up virtual or telephone   
visit with your PCP. This is a   
  
                                        great way for you to connect with your p  
dinah to ensure you have safely   
  
                                        transitioned home. If you are agreeable,  
 I will send your request to a   
  
                                         who will contact and assist yo  
echo with that appointment. This   
  
                                        will give you an opportunity to ask any   
questions or address any concerns   
  
                                        you may have with your PCP.   
  
                                        Inform the patient that if they have any  
 questions or concerns prior to   
  
                                        that appointment, to call their PCP's of  
ree right away.   
  
                                        ACTION TAKEN:         
  
                                        No action required, patient declines kiesha  
ointment.   
  
                                        Your doctor would like us to remind you   
of the recommendations regarding   
  
                                        the coronavirus (Covid19) outbreak:   
  
                                        ? Avoid public places as much as possibl  
e.   
  
                                        ? Avoid close contact (within 6 feet) wi  
th others you don't live with,   
  
                                        especially if they are sick.   
  
                                        ? Stay home if you are sick.   
  
                                        ? Wash your hands regularly for at least  
 20 seconds with soap and water.   
  
                                        ? Wear a cloth mask in public places to   
help reduce community spread.   
  
                                        Do not go to your Doctor's office unless  
 instructed to do so. For any   
  
                                        non-emergency symptoms, call your Doctor  
's office to get instructions on   
  
                                        how to manage (we might recommend a tele  
phone or virtual visit). For   
  
                                        emergency symptoms, proceed to Emergency  
 Department as usual but inform   
  
                                        them of cough and fever symptoms ASAP if  
 present (or call on the way if   
  
                                        possible).            
   
                    2022 Note     Patient Outreach (AGGPC) Ararat General M  
edical  
  
                                                      
--------------------------------------------------------------------------------
                                        WILLOW Walton (54439599556)   
7 M   
  
                                        Date Time Provider Department   
  
                                        22 SERVANDO ANG AGGPC   
  
                                        During your visit today, we recorded the  
 following information about you:   
  
                                        Abigail Yusuf MA 2022 4:10 PM Dipti dexter   
  
                                        TRANSITIONAL CARE MANAGEMENT (TCM) REY SOMMER MONITORING PROGRAM - Longport   
  
                                        Provider Action/FYI:   
  
                                        Wound care nurse established   
  
                                        PT 3 times a week.    
  
                                        F/u with surgeon in 2 weeks. Will have a  
nother surgery then.   
  
                                        Stopped Eliquis- taking Coumadin 2.5 mg   
now.   
  
                                                    Patient will call to juanita johnson an appointment once fully recovered from multiple  
                                          
  
                                        surgeries.            
  
                                        SUMMARY:              
  
                                        Pt discharged from Von Voigtlander Women's Hospital o  
n 22.   
  
                                        Admitted for: Acute occlusion of artery   
of lower extremity due to thrombosis   
  
                                        Contact made with patient: Yes   
  
                                        Hi my name is Abigail and I am calling f  
rom the Mary Rutan Hospital General   
  
                                        on behalf of your PCP, MONIQUE Warren  
PRN.CNP I understand you were recently   
  
                                        in the hospital so I am calling to check  
 in with you to ensure you are feeling   
  
                                        well now that you?re home. Do you mind i  
f I ask you a few questions related to   
  
                                        your hospital stay and well-being Yes   
  
                                        Contact with patient post discharge, spo  
ke to patient.   
  
                                        Patient identified by name and .   
  
                                        Do you feel your health is BETTER, WORSE  
, or the SAME since leaving the   
  
                                        hospital?             
  
                                        Better                
  
                                        ACTION TAKEN:         
  
                                        Patient indicated symptoms are better or  
 same, no action required. Continue   
  
                                        outreach.             
  
                                        MEDICATIONS:          
  
                                        Many patients have questions or concerns  
 about their medications once they are   
  
                                        home. Do you have any questions about ta  
misti your medications or which   
  
                                        medication you should be on? No   
  
                                        Do you need any medication refills at th  
is time, including any of the   
  
                                        medications you might take only when nee  
ded? No   
  
                                        ACTION TAKEN:         
  
                                        No action required    
  
                                        For RNs or Pharmacy completing outreach   
ONLY, was a medication review   
  
                                        completed? N/A        
  
                                        SOCIAL:               
  
                                                    We would like to make sure y  
ou have what you need so that your basics needs are  
                                          
  
                                        met - including your personal safety, fo  
od, housing and medications. Would you   
  
                                        like to speak with a social work team me  
mber to help give you support for any   
  
                                        of these needs? No    
  
                                        It can be normal to feel anxious or down  
 during a time like this. Would you   
  
                                        like to talk to a mental health professi  
onal about how you have been feeling?   
  
                                        No                    
  
                                        ACTION TAKEN:         
  
                                        No action taken       
  
                                        DISCHARGE INTRUCTIONS:   
  
                                        Your discharge instructions / After Visi  
t Summary (AVS) are important in   
  
                                        guiding you through the recovery process  
. Do you have any questions related to   
  
                                        your discharge instructions? No   
  
                                        Do you have all the necessary equipment   
and supplies at home? Yes   
  
                                        ACTION TAKEN:         
  
                                        No action required    
  
                                        Thank you for talking with me today. I brianne patton like to help you schedule a   
  
                                        hospital follow-up virtual or telephone   
visit with your PCP. This is a great   
  
                                        way for you to connect with your provide  
r to ensure you have safely   
  
                                        transitioned home. If you are agreeable,  
 I will send your request to a   
  
                                         who will contact and assist ciro  
u with that appointment. This will   
  
                                        give you an opportunity to ask any quest  
ions or address any concerns you may   
  
                                        have with your PCP.   
  
                                        Inform the patient that if they have any  
 questions or concerns prior to that   
  
                                        appointment, to call their PCP's office   
right away.   
  
                                        ACTION TAKEN:         
  
                                        No action required, patient declines kiesha  
ointment.   
  
                                        Your doctor would like us to remind you   
of the recommendations regarding the   
  
                                        coronavirus (Covid19) outbreak:   
  
                                        ? Avoid public places as much as possibl  
e.   
  
                                        ? Avoid close contact (within 6 feet) wi  
th others you don't live with,   
  
                                        especially if they are sick.   
  
                                        ? Stay home if you are sick.   
  
                                        ? Wash your hands regularly for at least  
 20 seconds with soap and water.   
  
                                        ? Wear a cloth mask in public places to   
help reduce community spread.   
  
                                        Do not go to your Doctor's office unless  
 instructed to do so. For any   
  
                                                    non-emergency symptoms, call  
 your Doctor's office to get instructions on how to  
                                          
  
                                        manage (we might recommend a telephone o  
r virtual visit). For emergency   
  
                                                    symptoms, proceed to Emergen  
cy Department as usual but inform them of cough and  
                                          
  
                                        fever symptoms ASAP if present (or call   
on the way if possible).   
  
                                        Allergies As of Date: 2022 Noted A  
llergy Reaction   
  
                                        MENTHOL 2019 2 - Rash   
  
                                        Comments: Topical like vicks vapor   
  
                                        MINT CHOCOLATE CHIP FLAVOR 2017 14  
 - Other: See Comments   
  
                                        Comments: Mouth burning with mint flavor  
   
  
                                        Just MINT no chocolate chip   
  
                                        Date Reviewed: 2021   
  
                                        Reviewed by: Elen Soriano MA - Fully  
 Assessed   
  
                                        Reason for Visit:     
  
                                        Transition Of Care [4074] Cmt: Ararat Cit  
y Hospital D/C 22   
  
                                        Prescriptions as of 2022   
  
                                        - ONETOUCH VERIO TEST STRIPS test strip   
  
                                        - ONETOUCH VERIO REFLECT METER   
  
                                        - buPROPion HCL, smoking deter, 150 mg t  
ab ER 12 hr   
  
                                        Take 150 mg by mouth.   
  
                                        - clopidogrel (PLAVIX) 75 mg tablet   
  
                                        Take 75 mg by mouth.   
  
                                        - ONETOUCH DELICA PLUS LANCET 33 gauge   
  
                                        - nicotine (NICODERM) (more content not   
included)...   
   
                    2022 Note                         Munson Healthcare Cadillac Hospital  
   
                    2021 Note     HNO ID: 3463026403  Trinity Health System  
  
                                        Author: CARSON Root.JEANIE dexter  
  
                                        Service: ?            
  
                                        Author Type: Nurse Practitioner   
  
                                        Type: Progress Notes   
  
                                        Filed: 2021 6:32 PM   
  
                                        Note Text:            
  
                                        Patient presents to Southern Hills Hospital & Medical Center for eval  
uation of shortness of breath,   
  
                                        wheezing and URI symptoms. He has a hist  
ory of COPD and usually has a   
  
                                        SPO2 of 93%. He is becoming increasingly  
 short of breath with being   
  
                                        exposed to his wife's cold. He has requi  
red increasing use of his   
  
                                        albuterol nebulizer because of this whic  
h does not seem to alleviate his   
  
                                        symptoms. He is now starting to have sub  
jective fever and rigors. He   
  
                                        does not have known exposure to COVID-19  
 and he is not vaccinated. His   
  
                                        symptoms are moderate in severity and pe  
rsistent nature.   
  
                                        The history is provided by the patient.   
No  was used.   
  
                                        Review of Systems     
  
                                        Constitutional: Positive for chills, fev  
er and malaise/fatigue. Negative   
  
                                        for diaphoresis.      
  
                                        HENT: Positive for congestion and sore t  
hroat. Negative for ear pain.   
  
                                        Respiratory: Positive for cough, sputum   
production, shortness of breath   
  
                                        and wheezing. Negative for hemoptysis.   
  
                                        Cardiovascular: Positive for leg swellin  
g (left - vascular procedure 4   
  
                                        weeks ago). Negative for chest pain and   
palpitations.   
  
                                        Musculoskeletal: Positive for myalgias.   
  
                                        Neurological: Negative for dizziness and  
 loss of consciousness.   
  
                                        Physical Exam         
  
                                        Vitals reviewed.      
  
                                        Constitutional:       
  
                                        Appearance: He is well-developed. He is   
not toxic-appearing.   
  
                                        HENT:                 
  
                                        Head: Normocephalic and atraumatic.   
  
                                        Right Ear: Hearing normal.   
  
                                        Left Ear: Hearing normal.   
  
                                        Nose: Rhinorrhea present.   
  
                                        Mouth/Throat:         
  
                                        Mouth: Mucous membranes are moist.   
  
                                        Cardiovascular:       
  
                                        Rate and Rhythm: Normal rate and regular  
 rhythm.   
  
                                        Pulses:               
  
                                        Radial pulses are 2+ on the left side.   
  
                                        Pulmonary:            
  
                                        Effort: Pulmonary effort is normal. Prol  
onged expiration present.   
  
                                        Breath sounds: Decreased breath sounds a  
nd wheezing present.   
  
                                        Comments: Talking in full sentences.   
  
                                        Musculoskeletal:      
  
                                        Cervical back: Full passive range of mot  
ion without pain.   
  
                                        Right lower leg: No edema.   
  
                                        Left lower leg: Edema present.   
  
                                        Skin:                 
  
                                        General: Skin is warm and dry.   
  
                                        Capillary Refill: Capillary refill takes  
 less than 2 seconds.   
  
                                        Coloration: Skin is not cyanotic or pale  
.   
  
                                        Nails: There is clubbing.   
  
                                        Neurological:         
  
                                        Mental Status: He is alert and oriented   
to person, place, and time.   
  
                                        Sensory: Sensation is intact.   
  
                                        Motor: Motor function is intact.   
  
                                        Gait: Gait is intact.   
  
                                        Psychiatric:          
  
                                        Mood and Affect: Mood and affect normal.  
   
  
                                        /60   Pulse 80   Temp (Src) 99 (Ty  
mpanic)   Resp 20   Ht 5' 7    
  
                                        (1.70m)   Wt 213 lb 12.8 oz (97.0kg)   S  
pO2 88%   BMI 33.48 kg/(m2).   
  
                                        -I have reviewed and updated with the cristobal ferraro: allergies, VS, current   
  
                                        medications, past medical history, past   
surgical history, past family   
  
                                        medical history, AND past social history  
.   
  
                                        MDM: Given the patient's complaint of sh  
ortness of breath with pulse of of   
  
                                        88-90% and recent surgical procedure in   
the 4 weeks, they are appropriate   
  
                                        for further evaluation and treatment in   
the emergency department setting.   
  
                                        The patient elects to drive to Heritage Hospital ER by POV as both he and   
  
                                        his wife decline the need for Ambulance   
transport. Attempted to call   
  
                                        report to the ER x2 and unsuccessful as   
I was placed on hold twice.   
  
                                        Additionally I tried to call the direct   
line to the charge nurse with no   
  
                                        answer. The patient was given a Patient   
Emergency Letter, Google map to   
  
                                        the ER and red tag to alert the ER staff  
 of their arrival.   
  
                                        ASSESSMENT/PLAN:      
  
                                        1. SOB (shortness of breath) - ICD9: 786  
.05, ICD10: R06.02   
  
                                        - Go to the ER for further evaluation an  
d treatment.   
  
                                        CARSON Root.CNP   
  
                                        This note was partially generated using   
Dragon voice recognition system,   
  
                                        any errors noted are unintentional and a  
re due to this technology.   
   
                          2021 History of     Formatting of this note migh  
t be different from the   
original.                               SUMMA  
  
                          Present illness Narrative Stopped by to see patient an  
d go over discharge   
medications s/p lower extremity revascularization. He feels well, a little 
soreness in lower leg but much improved. F/U labs are stable. VSS. Reviewed    
Work Phone: 7(755)969-4630  
  
                                                     Irvin's plan. F/u rudy  
ting and appointment with SEME arranged. Him and his   
wife were counseled on importance of xarelto 2.5mg BID, plavix 75mg and aspirin 
81mg use for 6 weeks. Samples of xarelto pr   
  
                                                    ovided. Due to triple therap  
y, PPI ordered. Rx for xarelto and pantoprazole   
printed and given to them. They are driving today to Forest Hills. Advised to take
 frequent rest breaks and walk. Advised smoking cessation   
  
                                          
  
                                                    Electronically signed by CARSON Lyons ra, CNP at 2021 9:10 AM EDT  
  
Formatting of this note might be different from the original.   
  
                                                              
  
                                                    Nutrition rescreen completed  
. Chart reviewed. Patient to be monitored and   
followed by the diet technician.          
  
                                        ELOISA Cleveland    
  
                                                      
  
Electronically signed by Maura Gant at 2021 7:39 AM EDT  
  
Formatting of this note is different from the original.   
  
                                        Progress Note         
  
                                                              
  
                                        Subjective:           
  
                                                              
  
                                                    Improving left foot pain. Im  
proving sensation and motor function. Denies new   
complaints.                               
  
                                                              
  
                                        Objective:            
  
                                        Vitals:               
  
                                        21 0329         
  
                                        BP: (!) 106/57        
  
                                        Pulse: 83             
  
                                        Resp: 18              
  
                                        Temp: 96.8 F (36 C)   
  
                                        SpO2: 93%             
  
                                                              
  
                                        Physical Exam         
  
                                        Constitutional:       
  
                                        Appearance: Normal appearance. He is not  
 ill-appearing.   
  
                                        Cardiovascular:       
  
                                        Rate and Rhythm: Normal rate and regular  
 rhythm.   
  
                                        Pulses:               
  
                                        Femoral pulses are 2+ on the right side   
and 1+ on the left side.   
  
                                                    Dorsalis pedis pulses are de  
tected w/ Doppler on the right side and 0 on the   
left side.                                
  
                                                    Posterior tibial pulses are   
detected w/ Doppler on the right side and 0 on the   
left side.                                
  
                                        Musculoskeletal:      
  
                                        Comments: Mildly erythematous left foot.  
 Able to move all 5 toes   
  
                                        Neurological:         
  
                                        Mental Status: He is alert and oriented   
to person, place, and time.   
  
                                        Comments: Improving sensation on the precious  
sum of the left foot   
  
                                                              
  
                                        LABS:                 
  
                                                              
  
                                        Lab Results           
  
                                        Component Value Date   
  
                                         (L) 2021   
  
                                        K 4.0 2021      
  
                                         2021     
  
                                        CO2 19 (L) 2021   
  
                                        BUN 19 2021     
  
                                        CREATININE 1.23 2021   
  
                                        GLUCOSE 111 (H) 2021   
  
                                        CALCIUM 8.8 2021   
  
                                                              
  
                                        Lab Results           
  
                                        Component Value Date   
  
                                        PROT 7.6 2021   
  
                                        LABALBU 4.2 2021   
  
                                        BILITOT 0.8 2021   
  
                                        ALKPHOS 83 2021   
  
                                        AST 25 2021     
  
                                        ALT 15 2021     
  
                                                              
  
                                        Lab Results           
  
                                        Component Value Date   
  
                                        WBC 10.9 (H) 2021   
  
                                        HGB 15.3 2021   
  
                                        HCT 45.7 2021   
  
                                        MCV 85.4 2021   
  
                                         2021    
  
                                        APTT 56.1 (H) 2021   
  
                                                              
  
                                        Lab Results           
  
                                        Component Value Date   
  
                                        MG 2.3 10/13/2020     
  
                                                              
  
                                        Lab and Diagnostic studies were reviewed  
 and discussed with the patient.   
  
                                                              
  
                                        ASSESSMENT:           
  
                                                              
  
                                        64 M with limb threatening ischemia of L  
LE   
  
                                                              
  
                                        PLAN:                 
  
                                        - OR with Dr. Bryan this AM   
  
                                        - NPO IVF             
  
                                        - Heparin gtt         
  
                                        - prn pain meds       
  
                                                              
  
                                        CANELO RAINEY MD   
  
                                                              
  
                                                      
  
Electronically signed by Ed Breen MD at 2021 6:21 PM EDTdocumented 
in this encounter                         
  
  
  
  
                                        2019 History of Past illness Narra  
tive  
  
  
  
  
                    Problem             Noted Date          Resolved Date  
   
                    Adenocarcinoma of left lung 2019  
  
  
  
  
                                        Cancer Staging:Clinical: cT2, cN0, cM0 -  
 Signed by Elizabeth Miles on   
2019  
  
documented as of this encounter (statuses as of 2022)Trinity Health System
2019 History of Past illness Narrative  
  
  
                    Problem             Noted Date          Resolved Date  
   
                    Adenocarcinoma of left lung 2019  
  
  
  
  
                                        Cancer Staging:Clinical: cT2, cN0, cM0 -  
 Signed by Elizabeth Miles on   
2019  
  
documented as of this encounter (statuses as of 2022)Trinity Health System
2019 History of Past illness Narrative  
  
  
                    Problem             Noted Date          Resolved Date  
   
                    Adenocarcinoma of left lung 2019  
  
  
  
  
                                        Cancer Staging:Clinical: cT2, cN0, cM0 -  
 Signed by Elizabeth Miles on   
2019  
  
documented as of this encounter (statuses as of 2022)Trinity Health System
2019 History of Past illness Narrative  
  
  
                    Problem             Noted Date          Resolved Date  
   
                    Adenocarcinoma of left lung 2019  
  
  
  
  
                                        Cancer Staging:Clinical: cT2, cN0, cM0 -  
 Signed by Elizabeth Miles on   
2019  
  
documented as of this encounter (statuses as of 2022)Trinity Health System
2019 History of Past illness Narrative  
  
  
                    Problem             Noted Date          Resolved Date  
   
                    Adenocarcinoma of left lung 2019  
  
  
  
  
                                        Cancer Staging:Clinical: cT2, cN0, cM0 -  
 Signed by Elizabeth iMles on   
2019  
  
documented as of this encounter (statuses as of 08/15/2022)Trinity Health System
2019 History of Past illness Narrative  
  
  
                    Problem             Noted Date          Resolved Date  
   
                    Adenocarcinoma of left lung 2019  
  
  
  
  
                                        Cancer Staging:Clinical: cT2, cN0, cM0 -  
 Signed by Elizabeth Miles on   
2019  
  
documented as of this encounter (statuses as of 2022)Trinity Health System
2019 History of Past illness Narrative  
  
  
                    Problem             Noted Date          Resolved Date  
   
                    Adenocarcinoma of left lung 2019  
  
  
  
  
                                        Cancer Staging:Clinical: cT2, cN0, cM0 -  
 Signed by Elizabeth Miles on   
2019  
  
documented as of this encounter (statuses as of 2022)Trinity Health System
2019 History of Past illness Narrative  
  
  
                    Problem             Noted Date          Resolved Date  
   
                    Adenocarcinoma of left lung 2019  
  
  
  
  
                                        Cancer Staging:Clinical: cT2, cN0, cM0 -  
 Signed by Elizabeth Miles on   
2019  
  
documented as of this encounter (statuses as of 2022)Trinity Health System
2019 History of Past illness Narrative  
  
  
                    Problem             Noted Date          Resolved Date  
   
                    Adenocarcinoma of left lung 2019  
  
  
  
  
                                        Cancer Staging:Clinical: cT2, cN0, cM0 -  
 Signed by Elizabeth Miles on   
2019  
  
documented as of this encounter (statuses as of 2022)Trinity Health System
2019 History of Past illness Narrative  
  
  
                    Problem             Noted Date          Resolved Date  
   
                    Adenocarcinoma of left lung 2019  
  
  
  
  
                                        Cancer Staging:Clinical: cT2, cN0, cM0 -  
 Signed by Elizabeth Miles on   
2019  
  
documented as of this encounter (statuses as of 2022)Trinity Health System
2019 History of Past illness Narrative  
  
  
                    Problem             Noted Date          Resolved Date  
   
                    Adenocarcinoma of left lung 2019  
  
  
  
  
                                        Cancer Staging:Clinical: cT2, cN0, cM0 -  
 Signed by Elizabeth Miles on   
2019  
  
documented as of this encounter (statuses as of 2022)Trinity Health System
2019 History of Past illness Narrative  
  
  
                    Problem             Noted Date          Resolved Date  
   
                    Adenocarcinoma of left lung 2019  
  
  
  
  
                                        Cancer Staging:Clinical: cT2, cN0, cM0 -  
 Signed by Elizabeth Miles on   
2019  
  
documented as of this encounter (statuses as of 2022)Trinity Health System
2019 History of Past illness Narrative  
  
  
                    Problem             Noted Date          Resolved Date  
   
                    Adenocarcinoma of left lung 2019  
  
  
  
  
                                        Cancer Staging:Clinical: cT2, cN0, cM0 -  
 Signed by Elizabeth Miles on   
2019  
  
documented as of this encounter (statuses as of 12/15/2022)Trinity Health System
2019 History of Past illness Narrative  
  
  
                    Problem             Noted Date          Resolved Date  
   
                    Adenocarcinoma of left lung 2019  
  
  
  
  
                                        Cancer Staging:Clinical: cT2, cN0, cM0 -  
 Signed by Elizabeth Miles on   
2019  
  
documented as of this encounter (statuses as of 2022)Trinity Health System
Evaluation note  
  
  
                                        Diagnosis  
   
                                        Peripheral vascular disease, unspecified  
 (HCC)  
  
  
  
                                        Peripheral vascular disease, unspecified  
   
                                        Peripheral vascular disease with claudic  
ation (HCC)  
  
  
  
                                        Peripheral vascular disease, unspecified  
  
documented in this encounterSUMMA  
Work Phone: 1(646) 685-9084Evaluation note  
  
  
                                        Diagnosis  
   
                                        Ischemia of left lower extremity- Primar  
y  
  
documented in this encounterSUMMA  
Work Phone: 1(925) 973-2857Evaluation note  
  
  
                                        Diagnosis  
   
                                        Peripheral vascular disease, unspecified  
 (HCC)  
  
  
  
                                        Peripheral vascular disease, unspecified  
   
                                        PAD (peripheral artery disease) (HCC)  
  
  
  
                                        Unspecified disorders of arteries and ar  
terioles  
   
                                        Claudication of left lower extremity (HC  
C)- Primary  
  
  
  
                                        Peripheral vascular disease, unspecified  
   
                                        S/P CABG (coronary artery bypass graft)  
  
  
  
                                        Postsurgical aortocoronary bypass status  
   
                                        Coronary artery disease involving native  
 coronary artery of native heart without  
  
                                        angina pectoris  
   
                                        Peripheral vascular disease with claudic  
ation (HCC)  
  
  
  
                                        Peripheral vascular disease, unspecified  
   
                                        Essential hypertension  
  
  
  
                                        Unspecified essential hypertension  
   
                                        Type 2 diabetes mellitus with hyperglyce  
colleen, without long-term current use of   
insulin  
  
                                        (HCC)  
   
                                        Mixed hyperlipidemia  
   
                                        Tobacco abuse  
  
  
  
                                        Tobacco use disorder  
  
documented in this Electro-PetroleumUMMA  
Work Phone: 1(878) 140-8959Evaluation note  
  
  
                                        Diagnosis  
   
                                        Acute upper respiratory infection- Prima  
ry  
  
  
  
                                        Acute upper respiratory infections of un  
specified site  
  
documented in this Electro-PetroleumUMInclude Fitness Phone: 1(223) 166-8927Evaluation note  
  
  
                                        Diagnosis  
   
                                        Peripheral vascular disease with claudic  
ation (HCC)  
  
  
  
                                        Peripheral vascular disease, unspecified  
   
                                        Critical lower limb ischemia (HCC)  
  
  
  
                                        Unspecified circulatory system disorder  
  
documented in this Electro-PetroleumUMMA  
Work Phone: 1(604) 253-7923Evaluation note  
  
  
                                        Diagnosis  
   
                                        Peripheral vascular disease with claudic  
ation (HCC)  
  
  
  
                                        Peripheral vascular disease, unspecified  
   
                                        Critical lower limb ischemia (HCC)  
  
  
  
                                        Unspecified circulatory system disorder  
   
                                        Claudication of left lower extremity (HC  
C)  
  
  
  
                                        Peripheral vascular disease, unspecified  
   
                                        Ischemic rest pain of lower extremity  
  
  
  
                                        Peripheral vascular disease, unspecified  
   
                                        PAD (peripheral artery disease) (HCC)  
  
  
  
                                        Unspecified disorders of arteries and ar  
terioles  
  
documented in this Electro-PetroleumUMMA  
Work Phone: 1(741) 601-6248Evaluation note  
  
  
                                        Diagnosis  
   
                                        Critical lower limb ischemia (HCC)  
  
  
  
                                        Unspecified circulatory system disorder  
   
                                        Peripheral vascular disease with claudic  
ation (HCC)  
  
  
  
                                        Peripheral vascular disease, unspecified  
  
documented in this Electro-PetroleumInclude Fitness Phone: 1(915) 567-8664Evaluation note  
  
  
                                        Diagnosis  
   
                                        Atherosclerosis of native arteries of ex  
tremities with rest pain, unspecified  
  
                                        extremity (HCC)  
   
                                        Atherosclerosis of native arteries of ex  
tremities with rest pain, left leg (HCC)  
   
                                        Other specified postprocedural states  
   
                                        Critical limb ischemia with history of r  
evascularization of same extremity (HCC)  
  
documented in this Electro-PetroleumMA  
Work Phone: 1(764) 274-3937Evaluation note  
  
  
                                        Diagnosis  
   
                                        Atherosclerosis of native artery of left  
 lower extremity with intermittent  
  
                                        claudication (HCC)  
  
  
  
                                        Atherosclerosis of native arteries of th  
e extremities with intermittent   
claudication  
  
documented in this Electro-PetroleumInclude Fitness Phone: 1(568) 500-5672Evaluation note  
  
  
                                        Diagnosis  
   
                                        Post-op pain- Primary  
  
  
  
                                        Other acute postoperative pain  
   
                                        Peripheral artery disease (HCC)  
  
  
  
                                        Unspecified disorders of arteries and ar  
terioles  
   
                                        Femoral-popliteal bypass graft occlusion  
, left (HCC)  
  
  
  
                                        Other complications due to other vascula  
r device, implant, and graft  
   
                                        Critical limb ischemia with history of r  
evascularization of same extremity (HCC)  
   
                                        Moderate malnutrition (HCC)  
  
  
  
                                        Malnutrition of moderate degree  
  
documented in this encounterSUMMA  
Work Phone: 1(600) 262-9670Evaluation note  
  
  
                                        Diagnosis  
   
                                        Femoropopliteal arterial thrombosis of l  
eft lower extremity (HCC)  
  
documented in this encounterSBarnesville Hospital  
Work Phone: 1(555) 311-4308Evaluation note  
  
  
                                        Diagnosis  
   
                                        Post-op pain- Primary  
  
  
  
                                        Other acute postoperative pain  
  
documented in this encounterSUMMA  
Work Phone: 1(637) 684-2079Evaluation note  
  
  
                                        Diagnosis  
   
                                        CAD S/P percutaneous coronary angioplast  
y- Primary  
  
  
  
                                        Coronary atherosclerosis of native coron  
keegan artery  
   
                                        Type 2 diabetes mellitus with hyperglyce  
colleen, without long-term current use of   
insulin  
  
                                        (HCC)  
  
documented in this encounterSUMMA  
Work Phone: 1(695) 594-6061Evaluation note  
  
  
                                        Diagnosis  
   
                                        Atherosclerosis of native arteries of ex  
tremities with rest pain, left leg (HCC)  
  
documented in this encounterSBarnesville Hospital  
Work Phone: 1(125) 755-7225Evaluation note  
  
  
                                        Diagnosis  
   
                                        Neoplasm of lung  
  
  
  
                                        Neoplasm of unspecified nature of respir  
atory system  
  
documented in this encounterTrinity Health SystemEvaluation note  
  
  
                                        Diagnosis  
   
                                        Malignant neoplasm of upper lobe of left  
 lung (HCC)- Primary  
  
documented in this encounterTrinity Health SystemEvaluation note  
  
  
                                        Diagnosis  
   
                                        PAD (peripheral artery disease) (HCC)- P  
rimary  
  
  
  
                                        Unspecified disorders of arteries and ar  
terioles  
   
                                        Critical ischemia of extremity with hist  
ory of revascularization of same   
extremity  
  
                                        (HCC)  
  
documented in this Regency Hospital Cleveland East  
Work Phone: 1(808) 834-1659Evaluation note  
  
  
                                        Diagnosis  
   
                                        Controlled type 2 diabetes mellitus, wit  
hout long-term current use of insulin   
(HCC)  
   
                                        Hyperlipidemia  
  
  
  
                                        Other and unspecified hyperlipidemia  
   
                                        Medication management  
  
  
  
                                        Encounter for long-term (current) use of  
 other medications  
  
documented in this encounterTrinity Health SystemEvaluation note  
  
  
                                        Diagnosis  
   
                                        Hospital discharge follow-up- Primary  
  
  
  
                                        Other follow-up examination  
   
                                        Peripheral vascular disease (HCC)  
  
  
  
                                        Peripheral vascular disease, unspecified  
   
                                        S/P below knee amputation, left (HCC)  
  
documented in this encounterTrinity Health SystemEvaluBayhealth Hospital, Kent Campus note  
  
  
                                        Diagnosis  
   
                                        Fall, initial encounter- Primary  
   
                                        S/P below knee amputation, left (HCC)  
  
documented in this Regency Hospital Cleveland East  
Work Phone: 1(382) 417-4762Evaluation note  
  
  
                                        Diagnosis  
   
                                        Neoplasm of lung  
  
  
  
                                        Neoplasm of unspecified nature of respir  
atory system  
  
documented in this encounterSugarloaf ClinicEvaluation note  
  
  
                                        Diagnosis  
   
                                        Positive FIT (fecal immunochemical test)  
- Primary  
  
  
  
                                        Nonspecific abnormal finding in stool co  
ntents  
   
                                        Peripheral vascular disease (HCC)  
  
  
  
                                        Peripheral vascular disease, unspecified  
   
                                        Controlled type 2 diabetes mellitus with  
 diabetic peripheral angiopathy without  
  
                                        gangrene, without long-term current use   
of insulin (HCC)  
   
                                        Encounter for immunization  
  
  
  
                                        Need for other specified prophylactic va  
ccination against single bacterial   
disease  
   
                                        Screening for prostate cancer  
  
  
  
                                        Special screening for malignant neoplasm  
 of prostate  
   
                                        Depression screening  
  
  
  
                                        Screening for depression  
  
documented in this encounterProMedica Fostoria Community Hospital Discharge instructions
Cece Galicia MD - 2021Formatting of this note might be
 different from the original.  
Blood Thinner Medication:  
You may be prescribed a blood thinning medication before or after your 
procedure. It is important tostart or taking resume your current medication as 
instructed by your doctor.  
  
Cholesterol (Statin):  
You may be prescribed a cholesterol medication known as a statin. It is 
important to take this medication as instructed by your doctor. One common side 
effect of this type of medication is muscle aches. If this occurs, you should 
stop taking it.  
  
Pain Control:  
You may be prescribed an opiate pain medication after your procedure. These are 
otherwise known as narcotics and should be taken only as prescribed. DO NOT 
operate a vehicle, heavy machinery, appliances, or drink alcohol while on this 
medication. For additional pain/swelling relief, you may ice and elevate the 
surgical site(s).  
Note: It is normal to have a certain degree of pain after an operation. Our 
office is only able to prescribe pain medications within a short period after 
surgery with few exceptions. Due to certain limitations, prescriptions may need 
to be picked up in person. If requiring a refill over a weekend, we ask that you
 please call our office before 1:00pm on Friday.  
  
Constipation:  
One of the side effects of opiate medications is constipation. We recommend that
 patients take precautions to prevent this:  
1. Drink plenty of water (6-8 glasses of 8 oz. per day).  
2. Avoid alcohol or excessive caffeine.  
3. Eat plenty of fiber (fruits, vegetables and whole grains).  
4. Take an over the counter stool softener (Colace or Miralax) as instructed on 
the bottle. You can take this each day that you continue to take opiates.  
  
Nausea:  
Some pain medications may cause you to feel nauseous. If this occurs, you can 
call your doctor who may prescribe anti-nausea medication as needed.  
  
Diet:  
Resume low-fat, low cholesterol diet high in vegetables. Make sure to include 
protein with each mealwhile recovering from surgery. If you are on a specific 
type of diet for your condition, please resume that instead.  
  
Activity:  
DO NOT lift anything over 5 pounds for 2 weeks. You may slowly resume normal 
activities as toleratedwith certain restrictions.  
  
Drivin. DO NOT operate a motor vehicle unless released by your doctor  
2. DO NOT operate a motor vehicle if you are still on pain medicine  
3. DO NOT operate a motor vehicle unless you can safely press the gas and brake,
 even under emergency conditions.  
  
Dressing and Wound Care Instructions:  
  
1. You will be discharged with a small bandage at the site of access. You may 
take it off the next day.  
2. You may wash the area or shower after 24 hours.  
  
Emergency:  
  
1. Call 911 if you develop sudden chest pain or shortness of breath.  
2. If you develop signs of infection, you should call our office as soon as 
possible. These include:redness, warmth, severe swelling, pus-like drainage, and
 fever of >100 degrees Fahrenheit.  
  
If you have any additional questions about your surgery, please call our office.  
  
documented in this encounterSUMMA  
Work Phone: 1(219) 776-6269Hospital Discharge instructionsAttachmentsThe 
following attachments cannot be sent through Care Everywhere.URI (Upper 
Respiratory Infection): Viral (English)documented in this Electro-PetroleumUMMA  
Work Phone: 1(580) 336-8676Hospital Discharge instructionsAttachmentsThe 
following attachments cannot be sent through Care Everywhere.Fall Prevention 
(English)RICE: General Info (English)documented in this Electro-PetroleumMA  
Work Phone: 1(212) 379-6710  
  
Summary Purpose  
  
  
  
                                          
  
  
  
Family History  
No Family History Records FoundNo Family History Records FoundNo Family History 
Records FoundNo Family History Records FoundNo Family History Records FoundNo 
Family History Records FoundNo Family History Records FoundNo Family History 
Records Found  
  
Advance Directives  
No Advanced Directives Records Found  
Documents on File  
  
                Type            Date Recorded   Patient Representative Explanati  
on  
   
                Advance Directives and Living                                   
  
                Will                                              
   
                Advance Directives and Living 2017 10:39 AM                  
   
  
                Will                                              
   
                Power of                                    
  
  
Latest Code Status on File  
  
                Code Status     Date Activated  Date Inactivated Comments  
   
                Full Code       2019 10:37 PM 2019 2:49 PM   
  
  
  
  
                                                                  
   
                Full Code       2019 10:51 AM 2019 9:53 PM   
  
  
  
  
                                                                  
   
                Full Code       2018 12:16 PM 2018 7:39 PM   
  
  
  
  
                                                                  
   
                Full Code       2018 11:49 PM 2018 12:16 PM   
  
  
  
  
                                                                  
   
                Full Code       2018 10:31 AM 2018 9:26 AM   
  
  
Documents on File  
  
                Type            Date Recorded   Patient Representative Explanati  
on  
   
                Advance Directives and Living                                   
  
                Will                                              
   
                Advance Directives and Living 2017 10:39 AM                  
   
  
                Will                                              
   
                Power of                                    
  
  
Latest Code Status on File  
  
                Code Status     Date Activated  Date Inactivated Comments  
   
                Full Code       2019 10:37 PM 2019 2:49 PM   
  
  
  
  
                                                                  
   
                Full Code       2019 10:51 AM 2019 9:53 PM   
  
  
  
  
                                                                  
   
                Full Code       2018 12:16 PM 2018 7:39 PM   
  
  
  
  
                                                                  
   
                Full Code       2018 11:49 PM 2018 12:16 PM   
  
  
  
  
                                                                  
   
                Full Code       2018 10:31 AM 2018 9:26 AM   
  
  
Latest Code Status on File  
  
                Code Status     Date Activated  Date Inactivated Comments  
   
                Full Code       2019 11:25 AM 2019 6:52 PM   
  
  
  
  
                                                                  
   
                Full Code       2019 7:27 AM 2019 11:25 AM   
  
  
  
  
                                                                  
   
                Full Code       2019 10:37 PM 2019 2:49 PM   
  
  
Latest Code Status on File  
  
                Code Status     Date Activated  Date Inactivated Comments  
   
                Full Code       2020 4:09 PM                   
  
  
  
  
                                                                  
   
                Full Code       2019 11:25 AM 2019 6:52 PM   
  
  
Documents on File  
  
                Type            Date Recorded   Patient Representative Explanati  
on  
   
                ACP-Advance Directive                                   
   
                ACP-Advance Directive 2017 10:39 AM                   
   
                ACP-Power of                                    
  
  
Latest Code Status on File  
  
                Code Status     Date Activated  Date Inactivated Comments  
   
                Full Code       10/22/2020 2:58 PM                   
  
  
  
  
                                                                  
   
                Full Code       10/22/2020 10:23 AM 10/22/2020 2:57 PM   
  
  
  
  
                                                                  
   
                Full Code       2020 4:09 PM 2020 7:25 PM   
  
  
Documents on File  
  
                Type            Date Recorded   Patient Representative Explanati  
on  
   
                ACP-Advance Directive                                   
   
                ACP-Advance Directive 2017 10:39 AM                   
   
                ACP-Power of                                    
  
  
Latest Code Status on File  
  
                Code Status     Date Activated  Date Inactivated Comments  
   
                Full Code       10/22/2020 2:58 PM 10/23/2020 1:25 PM   
  
  
  
  
                                                                  
   
                Full Code       10/22/2020 10:23 AM 10/22/2020 2:57 PM   
  
  
  
  
                                                                  
   
                Full Code       2020 4:09 PM 2020 7:25 PM   
  
  
  
  
                                                                  
   
                Full Code       2019 11:25 AM 2019 6:52 PM   
  
  
  
  
                                                                  
   
                Full Code       2019 7:27 AM 2019 11:25 AM   
  
  
Latest Code Status on File  
  
                Code Status     Date Activated  Date Inactivated Comments  
   
                Full Code       2019 11:25 AM 2019 6:52 PM   
  
  
  
  
                                                                  
   
                Full Code       2019 10:37 PM 2019 2:49 PM   
  
  
Latest Code Status on File  
  
                Code Status     Date Activated  Date Inactivated Comments  
   
                Full Code       2020 4:09 PM 2020 7:25 PM   
  
  
Documents on File  
  
                Type            Date Recorded   Patient Representative Explanati  
on  
   
                ACP-Advance Directive                                   
   
                ACP-Power of                                    
   
                ACP-Advance Directive 2017 10:39 AM                   
  
  
Documents on File  
  
                Type            Date Recorded   Patient Representative Explanati  
on  
   
                ACP-Advance Directive                                   
   
                ACP-Power of                                    
   
                ACP-Advance Directive 2017 10:39 AM                   
  
  
Latest Code Status on File  
  
                Code Status     Date Activated  Date Inactivated Comments  
   
                Full Code       2021 6:56 PM                   
  
  
  
  
                                                                  
   
                Full Code       2021 7:06 PM 2021 6:56 PM   
  
  
  
  
                                                                  
   
                Full Code       10/22/2020 2:58 PM 10/23/2020 1:25 PM   
  
  
Latest Code Status on File  
  
                Code Status     Date Activated  Date Inactivated Comments  
   
                Full Code       2021 6:56 PM 2021 12:47 PM   
  
  
  
  
                                                                  
   
                Full Code       2021 7:06 PM 2021 6:56 PM   
  
  
  
  
                                                                  
   
                Full Code       10/22/2020 2:58 PM 10/23/2020 1:25 PM   
  
  
Latest Code Status on File  
  
                Code Status     Date Activated  Date Inactivated Comments  
   
                Full Code       2021 6:56 PM 2021 12:47 PM   
  
  
Latest Code Status on File  
  
                Code Status     Date Activated  Date Inactivated Comments  
   
                Full Code       2021 3:51 PM 2021 7:08 PM   
  
  
  
  
                                                                  
   
                Full Code       2021 8:59 AM 2021 3:51 PM   
  
  
  
  
                                                                  
   
                Full Code       2021 6:56 PM 2021 12:47 PM   
  
  
Latest Code Status on File  
  
                Code Status     Date Activated  Date Inactivated Comments  
   
                Full Code       2022 1:04 PM 2022 7:54 PM   
  
  
  
  
                                                                  
   
                Full Code       1/3/2022 1:14 PM 2022 9:12 PM   
  
  
  
  
                                                                  
   
                Full Code       2021 7:10 PM 1/3/2022 1:14 PM   
  
  
  
  
                                                                  
   
                Full Code       2021 7:17 AM 2021 7:10 PM   
  
  
  
  
                                                                  
   
                Full Code       2021 3:51 PM 2021 7:08 PM   
  
  
Latest Code Status on File  
  
                Code Status     Date Activated  Date Inactivated Comments  
   
                Full Code       2022 8:34 PM                   
  
  
  
  
                                                                  
   
                Full Code       2022 8:22 PM 2022 8:34 PM   
  
  
  
  
                                                                  
   
                Full Code       2022 1:04 PM 2022 7:54 PM   
  
  
  
  
                                                                  
   
                Full Code       1/3/2022 1:14 PM 2022 9:12 PM   
  
  
  
  
                                                                  
   
                Full Code       2021 7:10 PM 1/3/2022 1:14 PM   
  
  
Latest Code Status on File  
  
                Code Status     Date Activated  Date Inactivated Comments  
   
                Full Code       2022 8:34 PM 3/7/2022 7:48 PM   
  
  
Latest Code Status on File  
  
                Code Status     Date Activated  Date Inactivated Comments  
   
                Full Code       3/29/2022 10:20 AM                   
  
  
  
  
                                                                  
   
                Full Code       2022 8:34 PM 3/7/2022 7:48 PM   
  
  
Latest Code Status on File  
  
                Code Status     Date Activated  Date Inactivated Comments  
   
                Full Code       2022 9:22 AM                   
  
  
  
  
                                                                  
   
                Full Code       2022 6:20 AM 2022 2:11 PM   
  
  
  
  
                                                                  
   
                Full Code       3/29/2022 10:20 AM 3/29/2022 4:54 PM   
  
  
  
  
                                                                  
   
                Full Code       2022 8:34 PM 3/7/2022 7:48 PM   
  
  
  
  
                                                                  
   
                Full Code       2022 8:22 PM 2022 8:34 PM   
  
  
Latest Code Status on File  
  
                Code Status     Date Activated  Date Inactivated Comments  
   
                Full Code       2022 9:22 AM 2022 1:58 PM   
  
  
Documents on File  
  
                Type            Date Recorded   Patient Representative Explanati  
on  
   
                ACP-Advance Directive 2017 10:39 AM                   
  
  
Latest Code Status on File  
  
                Code Status     Date Activated  Date Inactivated Comments  
   
                Full Code       2022 9:22 AM 2022 1:58 PM   
  
  
  
  
                                                                  
   
                Full Code       2022 6:20 AM 2022 2:11 PM   
  
  
  
  
                                                                  
   
                Full Code       3/29/2022 10:20 AM 3/29/2022 4:54 PM   
  
  
Documents on File  
  
                Type            Date Recorded   Patient Representative Explanati  
on  
   
                ACP-Advance Directive 2017 10:39 AM                   
  
  
Latest Code Status on File  
  
                Code Status     Date Activated  Date Inactivated Comments  
   
                Full Code       2022 9:28 AM                   
  
  
  
  
                                                                  
   
                Full Code       2022 9:22 AM 2022 1:58 PM   
  
  
Latest Code Status on File  
  
                Code Status     Date Activated  Date Inactivated Comments  
   
                Full Code       2022 9:28 AM 2022 1:29 AM   
  
  
  
  
                                                                  
   
                Full Code       2022 9:22 AM 2022 1:58 PM   
  
  
  
Assessments  
  
  
  
                                        Diagnosis  
   
                                        Intermittent claudication of both lower   
extremities due to atherosclerosis (HCC)  
  
  
  
  
                                        Diagnosis  
   
                                        Gluteal abscess - Primary  
  
  
  
                                        Cellulitis and abscess of buttock  
  
  
  
  
                                        Diagnosis  
   
                                        Chest pain, unspecified type  
   
                                        Acute kidney injury superimposed on   
parker kidney disease (HCC)  
  
  
  
  
                                        Diagnosis  
   
                                        Coronary artery disease of native artery  
 of native heart with stable angina   
pectoris  
  
                                        (Formerly Chesterfield General Hospital)  
   
                                        Essential hypertension  
  
  
  
                                        Unspecified essential hypertension  
   
                                        Pure hypercholesterolemia  
   
                                        Type 2 diabetes mellitus (Formerly Chesterfield General Hospital)  
  
  
  
                                        Type II or unspecified type diabetes roderick  
litus without mention of complication,   
not  
  
                                        stated as uncontrolled  
   
                                        COPD (chronic obstructive pulmonary dise  
ase) (Formerly Chesterfield General Hospital)  
  
  
  
                                        Chronic airway obstruction, not elsewher  
e classified  
   
                                        S/P CABG (coronary artery bypass graft)  
  
  
  
                                        Postsurgical aortocoronary bypass status  
   
                                        Status post insertion of drug eluting co  
ronary artery stent  
  
  
  
  
                                        Diagnosis  
   
                                        Coronary artery disease involving native  
 coronary artery of native heart without  
  
                                        angina pectoris  
   
                                        Status post insertion of drug-eluting st  
ent into right coronary artery for   
coronary  
  
                                        artery disease  
   
                                        Essential hypertension  
  
  
  
                                        Unspecified essential hypertension  
  
  
  
  
                                        Diagnosis  
   
                                        Atheroscler of native artery of both leg  
s with intermit claudication (HCC)  
  
  
  
                                        Atherosclerosis of native arteries of th  
e extremities with intermittent   
claudication  
  
  
  
  
                                        Diagnosis  
   
                                        Muscle spasms of both lower extremities  
   
                                        Coronary artery disease involving hirsch  
ry bypass graft of native heart without  
  
                                        angina pectoris  
   
                                        Abnormal thyroid blood test  
  
  
  
                                        Nonspecific abnormal results of thyroid   
function study  
   
                                        Subclinical hypothyroidism  
  
  
  
                                        Other specified acquired hypothyroidism  
  
  
  
Reason for Referral  
  
  
  
           Status     Reason     Specialty  Diagnoses / Procedures Referred By JABIER chandraerred To  
  
                                                       Contact    Contact  
   
                Closed                          Radiology       Diagnoses  
  
Intermittent claudication of both lower extremities due to atherosclerosis (HCC)
                          Pietrolungnaveed,                
  
                                                                  
  
Procedures  
  
CTA Abdominal Aorta W Bilat Runoff W Wo Contrast Exeter, Billy Ville 83927304  
  
                                          
  
                                                       Phone:       
  
                                                                 981.120.5255  
  
                                          
  
                                                       Fax: 331.676.2976   
  
  
  
  
           Status     Reason     Specialty  Diagnoses / Procedures Referred By PETER jones Referred To  
  
                                                                  Contact  
   
                Closed                          Cardiology      Diagnoses  
  
Coronary artery disease involving native coronary artery of native heart without
 angina pectoris  
  
Status post insertion of drug-eluting stent into right coronary artery for 
coronary artery disease  
  
Essential hypertension    Rut Lopez,           
  
                                                                  
  
Procedures  
  
Echo 2D Doppler Color                   APRN - CNP  
  
                                          
  
                                                                 35 Arch St  
  
                                          
  
                                                                 Pisgah Forest, NC 28768  
  
                                          
  
                                                                 Phone: 330-120- 7298  
  
                                          
  
                                                       Fax: 594.708.6894   
  
  
  
  
           Status     Reason     Specialty  Diagnoses / Procedures Referred By PETER jones Referred To   
Contact  
   
                Closed                          Radiology       Diagnoses  
  
Atheroscler of native artery of both legs with intermit claudication (HCC)   
Abimbola Díaz,                         
  
                                                                  
  
Procedures  
  
VL Arterial PVR Lower w Exercise        APRN - CNP  
  
                                          
  
                                                                 95 Arch   
  
                                          
  
                                                                 HECTOR 300  
  
                                          
  
                                                                 San Antonio, NM 87832  
  
                                          
  
                                                                 Phone: 330-830-  
8153  
  
                                          
  
                                                       Fax: 356.968.1689   
  
  
  
  
           Status     Reason     Specialty  Diagnoses / Procedures Referred By PETER jones Referred To   
Contact  
   
                Closed                          Radiology       Diagnoses  
  
Peripheral vascular disease with claudication (HCC) Irvni,                
  
                                                                  
  
Procedures  
  
CTA ABDOMINAL AORTA W BILAT RUNOFF W WO CONTRAST 33 Graham Street  
  
                                          
  
                                                                 Hector 19 Castillo Street La Push, WA 98350  
  
                                          
  
                                                                 Phone: 495-511- 8144  
  
                                          
  
                                                       Fax: 562-795-2844   
  
  
  
  
           Status     Reason     Specialty  Diagnoses / Procedures Referred By PETER jones Referred To   
Contact  
   
                Closed                          Radiology       Diagnoses  
  
PAD (peripheral artery disease) (HCC) Felipa Jones APRN        
  
                                                                  
  
Procedures  
  
VL Lower Extremity Arteries Bilateral   - CNP  
  
                                          
  
                                                                 95 United Hospital  
  
                                          
  
                                                                 Suite 19 Castillo Street La Push, WA 98350  
  
                                          
  
                                                                 Phone: 280-716- 1135  
  
                                          
  
                                                       Fax: 755.332.5602   
  
  
  
  
           Status     Reason     Specialty  Diagnoses / Procedures Referred By PETER jones Referred To   
Contact  
   
                Closed                          Radiology       Diagnoses  
  
PAD (peripheral artery disease) (HCC) Felipa Jones APRN        
  
                                                                  
  
Procedures  
  
VL ARTERIAL PVR LOWER W EXERCISE        - CNP  
  
                                          
  
                                                                 95 Arch Street  
  
                                          
  
                                                                 Suite 19 Castillo Street La Push, WA 98350  
  
                                          
  
                                                                 Phone: 113-339- 9963  
  
                                          
  
                                                       Fax: 562-509-3866   
  
  
  
  
           Status     Reason     Specialty  Diagnoses / Procedures Referred By PETER jones Referred To   
Contact  
   
                Closed                          Radiology       Diagnoses  
  
Peripheral vascular disease with claudication (HCC)  
  
Critical lower limb ischemia (HCC) Piejeremiaholungo,                
  
                                                                  
  
Procedures  
  
VL Arterial PVR Lower                   Exeter, Mercy Hospital Arch Street  
  
                                          
  
                                                                 Hector 19 Castillo Street La Push, WA 98350  
  
                                          
  
                                                                 Phone: 547-940- 1769  
  
                                          
  
                                                       Fax: 817-674-6261   
  
  
  
  
           Status     Reason     Specialty  Diagnoses / Procedures Referred By PETER jones Referred To   
Contact  
   
                Closed                          Radiology       Diagnoses  
  
Critical lower limb ischemia (HCC)  
  
Peripheral vascular disease with claudication (HCC) Pietrolungo,                
  
                                                                  
  
Procedures  
  
VL DUP LOWER EXTREMITY ARTERIES LEFT    Odell, DO  
  
                                          
  
                                                                 95 Arch Street  
  
                                          
  
                                                                 Hector 300  
  
                                          
  
                                                                 Aurora, OH 68517  
  
                                          
  
                                                                 Phone: 061-239- 9172  
  
                                          
  
                                                       Fax: 743.118.4246   
  
  
  
  
           Status     Reason     Specialty  Diagnoses / Procedures Referred By C  
purviact Referred To   
Contact  
   
                Closed                          Radiology       Diagnoses  
  
Critical lower limb ischemia (HCC)  
  
Peripheral vascular disease with claudication (HCC) Pietrolungo,                
  
                                                                  
  
Procedures  
  
VL Arterial PVR Lower                   Odell, DO  
  
                                          
  
                                                                 95 Arch Street  
  
                                          
  
                                                                 Hector 300  
  
                                          
  
                                                                 Aurora, OH 75378  
  
                                          
  
                                                                 Phone: 584-959- 4799  
  
                                          
  
                                                       Fax: 836.902.4459   
  
  
  
  
                Specialty       Diagnoses / Procedures Referred By Contact Refer  
red To Contact  
   
                          Radiology                 Diagnoses  
  
Critical limb ischemia with history of revascularization of same extremity (HCC)  
  
Atherosclerosis of native arteries of extremities with rest pain, left leg (HCC)
                                        Caslisao, Odell, DO  
  
                                          
  
                                                      
  
Procedures  
  
VL DUP LOWER EXTREMITY ARTERIES LEFT    95 Arch Street  
  
                                          
  
                                                            Hector 300  
  
                                          
  
                                                            Aurora, OH 47809  
  
                                          
  
                                                            Phone: 564.234.4380  
  
                                          
  
                                                Fax: 613.926.5189   
  
  
  
  
          Referral ID Status    Reason    Start Date Expiration Date Visits Requ  
ested Visits  
  
                                                                      Authorized  
   
          79712404  Closed              2021 1         1  
  
  
  
  
                Specialty       Diagnoses / Procedures Referred By Contact Refer  
red To Contact  
   
                          Radiology                 Diagnoses  
  
Critical limb ischemia with history of revascularization of same extremity (HCC)  
  
Atherosclerosis of native arteries of extremities with rest pain, left leg (HCC)
                                        Irvin Odell, DO  
  
                                          
  
                                                      
  
Procedures  
  
VL ARTERIAL PVR LOWER WO EXERCISE       95 Arch Street  
  
                                          
  
                                                            Hector 300  
  
                                          
  
                                                            Aurora, OH 12104  
  
                                          
  
                                                            Phone: 237.941.5167  
  
                                          
  
                                                Fax: 687.463.5962   
  
  
  
  
          Referral ID Status    Reason    Start Date Expiration Date Visits Requ  
ested Visits  
  
                                                                      Authorized  
   
          63545223  Closed              2021 1         1  
  
  
  
  
                Specialty       Diagnoses / Procedures Referred By Contact Refer  
red To Contact  
   
                          Radiology                 Diagnoses  
  
Atherosclerosis of native artery of left lower extremity with intermittent 
claudication (HCC)                      Ed Breen MD  
  
                                          
  
                                                      
  
Procedures  
  
VL Lower Extremity Arteries Left        201 5th St NE  
  
                                          
  
                                                            Suite 2  
  
                                          
  
                                                            Stewart, OH 15810  
  
                                          
  
                                                            Phone: 585.546.6002  
  
                                          
  
                                                Fax: 568.792.3995   
  
  
  
  
          Referral ID Status    Reason    Start Date Expiration Date Visits Requ  
ested Visits  
  
                                                                      Authorized  
   
          24026591  Open                2022 1         1  
  
  
  
  
                Specialty       Diagnoses / Procedures Referred By Contact Refer  
red To Contact  
   
                          Cardiac Rehabilitation    Diagnoses  
  
CAD S/P percutaneous coronary angioplasty Tish Garcia,          Ach 95 Arch   
Card  
  
                                                            APRN - CNP  
  
                                        Rehab  
  
  
  
                                                            95 Arch Street  
  
                                        95 Arch St  
  
  
  
                                                            Suite 300  
  
                                        Aurora, OH 20715  
  
  
  
                                                            Aurora, OH 48525  
  
                                        Phone: 932.173.4444  
  
                                                            Phone: 951.265.8196  
  
                                          
  
                                                Fax: 685.947.3915   
  
  
  
  
          Referral ID Status    Reason    Start Date Expiration Date Visits    V  
isits  
  
                                                            Requested Authorized  
   
          65779029  Open      Specialty 2022 1         1  
  
                              Services                                  
  
                              Required                                  
  
  
  
  
                                        Scheduling Instructions  
   
                                        Corey Hospitala Cardiac Rehab  
  
  
  
                                        95 Arch St Suite G25  
  
  
  
                                        Salisbury, OH 88283  
  
  
  
                                        Ph: 325.669.5547  
  
  
  
  
                Specialty       Diagnoses / Procedures Referred By Contact Refer  
red To Contact  
   
                          Radiology                 Diagnoses  
  
Atherosclerosis of native arteries of extremities with rest pain, left leg (HCC)
                                        Ed Breen MD  
  
                                          
  
                                                      
  
Procedures  
  
VL Lower Extremity Arteries Left        201 5th St NE  
  
                                          
  
                                                            Suite 2  
  
                                          
  
                                                            Stewart, OH 73350  
  
                                          
  
                                                            Phone: 513.999.8659  
  
                                          
  
                                                Fax: 566.247.1093   
  
  
  
  
          Referral ID Status    Reason    Start Date Expiration Date Visits Requ  
ested Visits  
  
                                                                      Authorized  
   
          27164904  Closed              2022  1         1  
  
  
  
  
                Specialty       Diagnoses / Procedures Referred By Contact Refer  
red To Contact  
   
                          CT IMAGING                Diagnoses  
  
Neoplasm of lung                        Elizabeth Miles MD  
  
                                        Ct Imaging  
  
                                                      
  
Procedures  
  
CT CHEST WO IVCON  
  
DIAGNOSTIC COMPUTED TOMOGRAPHY THORAX W/O CNTRST 224 W EXCHANGE ST  
  
                                          
  
                                                            HECTOR 160  
  
                                          
  
                                                            Aurora, OH 73460  
  
                                          
  
                                                            Phone: 898.501.9915  
  
                                          
  
                                                Fax: 566.787.3656   
  
  
  
  
          Referral ID Status    Reason    Start Date Expiration Date Visits    V  
isits  
  
                                                            Requested Authorized  
   
          28745949  Closed    Auto-Generate 2022 3/16/2023 1         1  
  
                              d Referral                                 
  
  
  
  
                Specialty       Diagnoses / Procedures Referred By Contact Refer  
red To Contact  
   
                          IP Unit                   Diagnoses  
  
PAD (peripheral artery disease) (HCC)   Ach Heart & Lung  
  
                                        Sth Wnd Ostmy Hyperbrc  
  
  
  
                                                            525 Memorial Hospital of Sheridan County Stre  
et  
  
                                        444 Irvine, OH 26265  
  
                                        Aurora, OH 69916  
  
  
  
                                                Phone: 937.968.9917 Phone: 385-8   
  
  
  
  
          Referral ID Status    Reason    Start     Expiration Visits    Visits  
  
                                        Date      Date      Requested Authorized  
   
          34808028  Pending   Specialty 2022 1         1  
  
                    Review    Services                                  
  
                              Required                                  
  
  
  
  
                                        Scheduling Instructions  
   
                                        Corey Hospitala Wound Care/Hyperbaric - St Donald  
  
  
  
                                        444 Afton, OH 20784  
  
  
  
                                        904.248.7256  
  
  
  
  
                                        Comments  
   
                                        Please use the parking lot located on   
franky Wyst or eBooks in Motion. There are  
  
                                        handicap parking spots located in a smal  
l lot beside the wound care entrance off  
 of  
  
                                        Hill City street. Please be advised there is  
 a small incline from those handicap   
spots to  
  
                                        our main door. Bring photo ID and insura  
nce card to photocopy. Wear loose   
fitting  
  
                                        clothing (to easily access wound). Bring  
 list of medications (or can be sent by  
  
                                        office). Check in at Registration for yo  
ur first visit. Please call us directly   
with  
  
                                        any questions 376-982-0846. We look forw  
marleny to helping you heal.  
  
  
  
  
          Referral ID Status    Reason    Start Date Expiration Date Visits    V  
isits  
  
                                                            Requested Authorized  
   
          55582974  Closed    Auto-Generate 2022 1         1  
  
                              d Referral                                 
  
  
  
Discharge Instructions  
Lewis Sosa MD - 2020Keep the appointment with primary
 care next week for recheck. Warm moist compresses to the area for 20-30 minutes
 several times a day then apply bacitracin. Take antiemetic as directed. Use the
 pain medication if needed otherwise use Tylenol and/or Advil. Return if new 
concerns.  
documented in this encounterInstructionsMaOlegario justice DO - 2020Please 
return to the lab to have your kidney function rechecked in 3-5 days. Please do 
not take yourlisinopril for the next 3 days.  
documented in this encounter  
  
Hospital Course  
Olegario Bhagat DO - 2020 3:07 PM EDTFormatting of this note might be 
different from the original.  
Mercy Health St. Vincent Medical Center Medical Group Discharge Summary and Transition Note  
  
Willow Bradshaw III : 1957 MRN: 60141706  
  
ADMIT DATE: 2020 DISCHARGE DATE: 2020  
  
PRIMARY CARE PHYSICIAN: Payton Copeland MD  
  
VISIT STATUS: Observation  
  
CODE STATUS: Full Code  
  
DISCHARGE DIAGNOSES:  
Principal Problem:  
 Chest pain  
Resolved Problems:  
 * No resolved hospital problems. *  
  
HOSPITAL COURSE:  
Mr. Bradshaw is a 64 yo M with PMHx of lung CA, HTN, PVD, HLD, CAD (s/p MI, 
CABG), prior CVA, T2DM coming in with CP. Pt had troponins checked which were 
negative. Due to being high risk, cardiology was consulted. They did not 
recommend any further testing at this time.  
  
Pt did develop a mild SAMMY overnight while in the hospital. He was eager to be 
discharged and was advised to return to the lab to have BMP rechecked within the
 week. I suspect His worsening kidney function was due to mild dehydration as he
 was not able to take PO while awaiting decision on whether a procedure would be
 necessary.  
  
PROCEDURES:  
None  
  
CONSULTANTS:  
Cardiology  
  
DISCHARGE MEDICATIONS:  
  
Significant Medication Changes: Advised pt to hold lisinopril for 3 days given 
mild SAMMY  
  
 Willow Bradshaw III  
Home Medication Instructions CADY:EP759385165141  
 Printed on:20 0679  
Medication Information  
  
aspirin 81 MG tablet  
Take 81 mg by mouth daily  
  
atorvastatin (LIPITOR) 80 MG tablet  
Take 1 tablet by mouth daily  
  
buPROPion (WELLBUTRIN SR) 150 MG extended release tablet  
Take 150 mg by mouth 2 times daily  
  
cilostazol (PLETAL) 100 MG tablet  
Take 1 tablet by mouth 2 times daily  
  
clopidogrel (PLAVIX) 75 MG tablet  
Take 1 tablet by mouth daily  
  
Coenzyme Q10 (CO Q 10) 10 MG CAPS  
Take 1 capsule by mouth daily  
  
Elastic Bandages & Supports MISC  
Knee high stockings 15-20 mm Hg  
  
Elastic Bandages & Supports MISC  
Knee high stocking 15-20 mm Hg  
  
fenofibrate 160 MG tablet  
Take 1 tablet by mouth daily  
  
fluticasone (FLONASE) 50 MCG/ACT nasal spray  
2 sprays by Each Nare route daily  
  
lisinopril (PRINIVIL;ZESTRIL) 2.5 MG tablet  
Take 1 tablet by mouth daily  
  
magnesium (MAGNESIUM-OXIDE) 250 MG TABS tablet  
Take 250 mg by mouth daily  
  
metFORMIN (GLUCOPHAGE-XR) 500 MG extended release tablet  
Take 1000mg by mouth with breakfast and 500mg by mouth with dinner  
  
metoprolol tartrate (LOPRESSOR) 25 MG tablet  
Take 1 tablet by mouth 2 times daily  
  
sildenafil (VIAGRA) 100 MG tablet  
Take 1 tablet by mouth as needed for Erectile Dysfunction  
  
  
DIET: cardiac  
  
ACTIVITY: resume regular activity  
  
SIGNIFICANT DIAGNOSTIC STUDIES:  
Slight worsening of Cr. See labs.  
  
COMPLEXITY OF FOLLOW UP:  
 [] Moderate Complexity: follow up within 7-14 calendar days (18787)  
 [x] Severe Complexity: follow up within 7 calendar days (31254)  
  
FOLLOW UP TESTING, PENDING RESULTS OR REFERRALS AT TRANSITIONAL CARE VISIT:  
 [x] Yes - Follow up with PCP, cardiology. Please follow up repeat BMP  
 [] No  
  
DISPOSITION: Home  
  
FACILITY/HOME CARE AGENCY NAME: Home  
  
Follow up with Payton Copeland MD to be scheduled ASAP  
  
Notification (telephone encounter) to PCP initiated:  
 [x] Yes  
 [] No  
  
INSTRUCTIONS TO MA/SW: Please call patient on day after discharge (must document
 patient contacted within 2 business days of discharge).  
  
FOLLOW UP QUESTIONS FOR MA/SW:  
1. Did you get medications filled and taking them as instructed from discharge?  
2. Are you following your discharge instructions from your hospital stay?  
3. Please confirm patient is scheduled for a follow up appointment within the 
above time frame.  
  
DISCHARGE TIME: > 30 minutes  
  
Electronically signed by Olegario Bhagat DO on 20 at 3:07 PM EDT
Electronically signed by Olegario Bhagat DO at 2020 3:44 PM EDTdocumented
 in this encounter  
  
History of Present Illness  
Olegario Bhagat DO - 2020 8:00 AM EDTFormatting of this note might be 
different from the original.  
Riverview Health Institute Medical Group Progress Note  
  
  
Willow Bradshaw III : 1957(63 y.o.) MRN: 07388348  
  
Date: 20  
Subjective:  
Chief complaint: CP  
  
HPI  
Mr. Bradshaw is a 64 yo M with PMHx of lung CA, HTN, PVD, HLD, CAD (s/p MI, 
CABG), prior CVA, T2DM coming in with CP. Pt complains of CP which was L sided, 
intermittent and radiating to the L arm, which has become progressively more 
constant. Pt states he has had intermittent CP ever since his CABG about 2 years
 ago  
  
Pt seen and examined. The patient feels their symptoms are improving. Pt states 
that he now no longer has chest pain and is feeling better. He only gets mild 
pain when he moves his arm.  
  
No overnight events.  
  
Scheduled Meds:  
 aspirin 81 mg Oral Daily  
 atorvastatin 80 mg Oral Daily  
 cilostazol 100 mg Oral BID  
 clopidogrel 75 mg Oral Daily  
 lisinopril 2.5 mg Oral Daily  
 metoprolol tartrate 25 mg Oral BID  
 sodium chloride flush 10 mL Intravenous 2 times per day  
 enoxaparin 40 mg Subcutaneous Daily  
 insulin lispro 0-6 Units Subcutaneous TID WC  
 insulin lispro 0-3 Units Subcutaneous Nightly  
  
Continuous Infusions:  
PRN Meds:sodium chloride flush, acetaminophen **OR** acetaminophen, polyethylene
 glycol, promethazine **OR** ondansetron, ketorolac  
  
Review of Systems  
Constitutional: Negative for chills and fever.  
Respiratory: Negative for shortness of breath.  
Cardiovascular: Negative for chest pain.  
Gastrointestinal: Negative for nausea.  
  
Interval Pertinent History:  
Social History  
  
Tobacco Use  
 Smoking status: Current Every Day Smoker  
 Packs/day: 0.25  
 Years: 40.00  
 Pack years: 10.00  
 Types: Cigars, Cigarettes  
 Start date: 1987  
 Smokeless tobacco: Never Used  
 Tobacco comment: 2-3 cigars a day now  
Substance Use Topics  
 Alcohol use: Yes  
 Comment: special occasions  
  
Objective:  
  
Patient Vitals for the past 24 hrs:  
 BP Temp Temp src Pulse Resp SpO2 Height Weight  
20 0743 110/69 97.6 F (36.4 C) Temporal 91 18 93 %  
20 0400 222 lb 8 oz (100.9 kg)  
20 0314 83/62 97.6 F (36.4 C) Temporal 89 12 91 %  
20 0300 89  
20 2300 112/84 98.1 F (36.7 C) Temporal 93 18  
20 2025 95/73 98.3 F (36.8 C) Temporal 107 20 92 %  
20 1900 107  
20 1519 135/86 98.2 F (36.8 C) Temporal 113 22 92 %  
20 1400 108/71 102 16 96 %  
20 1106 132/87 98.1 F (36.7 C) Oral 116 20 94 % 5' 7  (1.702 m) 220 lb 
(99.8 kg)  
  
Physical Exam  
Constitutional: Pt is well appearing, in no acute distress.  
Head: Normocephalic, atraumatic.  
Neck: Supple, full ROM. No elevated JVD  
Cardiovascular: RRR, without murmurs, rubs, or gallops  
Respiratory: Lungs clear to auscultation bilaterally. No wheezing. No 
respiratory distress  
Abdomen: Soft, nontender. No masses appreciated.  
LE: No edema  
  
Average, Min, and Max for last 24 hours Vitals:  
TEMPERATURE: Temp Av F (36.7 C) Min: 97.6 F (36.4 C) Max: 98.3 F (36.8 C)  
  
RESPIRATIONS RANGE: Resp Av Min: 12 Max: 22  
  
PULSE RANGE: Pulse Av.8 Min: 89 Max: 116  
  
BLOOD PRESSURE RANGE: Systolic (24hrs), Av , Min:83 , Max:135  
; Diastolic (24hrs), Av, Min:62, Max:87  
  
PULSE OXIMETRY RANGE: SpO2 Av % Min: 91 % Max: 96 %  
  
I/O last 3 completed shifts:  
In: 600 [P.O.:600]  
Out: -  
  
Lab Results  
Component Value Date  
 WBC 11.0 (H) 2020  
 HGB 15.3 2020  
 HCT 45.6 2020  
 MCV 84.4 2020  
  2020  
  
Lab Results  
Component Value Date  
  2020  
 K 4.6 2020  
  2020  
 CO2 21 2020  
 BUN 23 2020  
 CREATININE 1.81 2020  
 GLUCOSE 121 2020  
 CALCIUM 9.3 2020  
  
  
Lab Results  
Component Value Date  
 LABA1C 6.9 2019  
  
Additional results of the last 24 hours have been reviewed.  
  
Assessment and Plan:  
  
  
  
Active Problems:  
 Chest pain  
Resolved Problems:  
 * No resolved hospital problems. *  
  
Chest pain  
- Pt with extensive hx of CAD, including prior MI, CABG who is coming with CP 
which was initially intermittent and now more constant. The CP sounds 
musculoskeletal in nature, however pt is very high risk. Troponins negative.  
- Troponins negative  
- Cardiology consult  
- Chest pain order set  
- Continue ASA, statin, metoprolol  
  
SAMMY on CKD  
- Pt developing mild SAMMY overnight. CXR was suggestive of volume overload 
however pt appears euvolemic on exam  
- Will recheck BMP this afternoon; if worsening, consider Lasix  
- Hold lisinopril due to SAMMY  
  
Elevated D Dimer  
- Pt with mildly elevated D dimer. He does work as a  with long 
periods of immobility  
- Obtain LE Dopplers  
  
COPD - stable  
PVD - continue cilostazol  
T2DM - Hold metformin; give SSI  
  
ADDENDUM  
- Discussed with cardiology who is ok with discharging the pt. I suspect his 
mild SAMMY is due to dehydration as he was not able to take PO while awaiting 
decision on a procedure. I have advised him to hold lisinopril for the next 3 
days and to return to the lab to have BMP rechecked in 3-5 days.  
  
DVTProphylaxis: lovenox 40 q 24hr - creatinine clearance >30  
  
Disposition:await test results, await consultant recommendations and await 
clinical improvement. Possible discharge later today  
  
I spent over 51% of total time providing counseling or incoordination of care: 
25 minutes  
discussed with pt, I personally examined the patient and I personally reviewed 
chart, data, labs radiology reports  
  
6AM-6PM please page:  
Electronically signed by Olegario Bhagat DO  
  
6PM-6AM please page:  
AllianceHealth Woodward – Woodward Internal Medicine  
  
Electronically signed by Olegario Bhagat DO at 2020 3:42 PM EDTdocumented
 in this encounterRojas Nichols MD - 10/23/2020 4:05 AM EDTNotified about 
labs. Potassium hemolyzed falsely high reading- needs to be repeated in AM. 
Triglycerides to be addressed in AM whether pt needs vascepa as seems to be on 
atorvastatin at home for >4 weeks\  
Rojas Nichols MD  
Electronically signed by Rojas Nichols MD at 10/23/2020 4:07 AM Janessa Swenson RN - 10/23/2020 3:03 AM JJI8700  paged d/t pts potassium coming back
 at 5.9 and lipid panel elevation.  stated  nothing we can do at 3am for
 pt . Continue to monitor.Electronically signed by Janessa Bowen RN at 
10/23/2020 3:05 AM Beth Russo MD - 10/22/2020 2:53 PM EDT  
Mercy Health St. Vincent Medical Center Cardiac Catheterization Laboratory  
Post-Procedure Note  
  
 Patient Name: Willow Bradshaw III  
  
Date: 10/22/2020, 2:54 PM  
  
Pre-Operative Diagnosis: Progressive angina; h/o CABG and PCI  
  
 Post-Operative Diagnosis: same; severe diffuse ISR of pnja-lld-ccaxjn RCA  
  
 Procedure: PCI qits-gzj-rceulx RCA into ostial RPDA with 3 long SYDNIE; PTCA only 
of jailed ostial RPL; IVUS  
  
 Findings: multifocal severe ISR of dominant RCA  
PCI as above with 3 SYDNIE; Perclose RFA  
  
 Complications: none  
  
 Plan: ASA/Plavix; med mgmt CAD and ischemic CMO; IVF hydration given CKD  
  
Physician Signature: Beth Vyas MD  
  
Electronically signed by Beth Vyas MD at 10/22/2020 2:56 PM EDOdell Banda DO - 10/22/2020 12:08 PM EDTBypass grafts  
  
CORONARY ARTERY BYPASS X3 (LEFT INTERNAL MAMMARY BYPASS TO  
SECOND DIAGONAL BRANCH OF LAD, SAPHENOUS VEIN BYPASS TO FIRST DIAGONAL  
BRANCH OF LAD, AND POSTEROLATERAL BRANCH OF CIRCUMFLEX).Electronically signed by
 Odell Bryan DO at 10/22/2020 12:09 PM EDTdocumented in this encounter  
  
Additional Source Comments  
  
  
  
  
                                        PREGNANCY (unrecognized section and cont  
ent)  
   
                                        No Pregnancy Status Records FoundNo Preg  
george Status Records FoundNo Pregnancy   
Status  
  
                                        Records FoundNo Pregnancy Status Records  
 FoundNo Pregnancy Status Records   
FoundNo  
  
                                        Pregnancy Status Records FoundNo Pregnan  
cy Status Records FoundNo Pregnancy   
Status  
  
                                        Records Found  
  
  
  
  
  
                                        INFORMATION SOURCE (unrecognized section  
 and content)  
  
  
  
  
                                DATE CREATED    AUTHOR          AUTHOR'S ORGANIZ  
ATION  
  
  
  
  
                    2018                              Castle Rock Hospital District  
  
  
  
  
                    DATE CREATED        AUTHOR              AUTHOR'S ORGANIZATIO  
N  
   
                    2020                              JFK Johnson Rehabilitation Institute  
  
  
  
  
                    DATE CREATED        AUTHOR              AUTHOR'S ORGANIZATIO  
N  
   
                    09/10/2021                              University Hospitals Samaritan Medical Center  
  
  
  
  
                    DATE CREATED        AUTHOR              AUTHOR'S ORGANIZATIO  
N  
   
                    2022                              Parkview Health Montpelier Hospital  
  
  
  
  
                    DATE CREATED        AUTHOR              AUTHOR'S ORGANIZATIO  
N  
   
                    2022                              Munson Healthcare Cadillac Hospital  
  
  
  
  
                    DATE CREATED        AUTHOR              AUTHOR'S ORGANIZATIO  
N  
   
                    10/29/2022                              Munson Healthcare Cadillac Hospital  
  
  
  
  
                    DATE CREATED        AUTHOR              AUTHOR'S ORGANIZATIO  
N  
   
                    2022                              Munson Healthcare Cadillac Hospital   
SHS  
  
  
  
  
                    DATE CREATED        AUTHOR              AUTHOR'S ORGANIZATIO  
N  
   
                    2022                              York Hospital  
  
  
  
  
  
                                        Reason for Visit (unrecognized section a  
nd content)  
  
  
  
  
                          Reason                    Comments  
   
                          Abscess                     
  
  
  
  
                          Reason                    Comments  
   
                          Chest Pain                Since Thursday, with back pa  
in and left arm pain. Was intemitent and   
ia  
  
                                                    now more constant. Stabbing   
in Nature 4/10. Worse with exhertion.  
  
  
  
  
                          Reason                    Comments  
   
                          Leg Pain                  Patient sent in by cardiolog  
ist. Patient states that his cariologist   
states  
  
                                                    that there is  severe narrow  
ing of artery in leg  Patient states that the  
  
                                                    color change has started abo  
ut 15 minutes ago and styarted having severe  
  
                                                    pain.  
  
  
  
  
                          Reason                    Comments  
   
                          Cough                       
   
                          Nasal Congestion            
  
  
  
  
                          Reason                    Comments  
   
                          Refill Request              
  
  
  
  
                          Reason                    Comments  
   
                          Outside Lab Results         
  
  
  
  
                Specialty       Diagnoses / Procedures Referred By Contact Refer  
red To Contact  
   
                          CT IMAGING                Diagnoses  
  
Neoplasm of lung                        Elizabeth Miles MD  
  
                                        Ct Imaging  
  
                                                      
  
Procedures  
  
CT CHEST WO IVCON  
  
DIAGNOSTIC COMPUTED TOMOGRAPHY THORAX W/O CNTRST 224 W EXCHANGE ST  
  
                                          
  
                                                            HECTOR 160  
  
                                          
  
                                                            Aurora, OH 50822  
  
                                          
  
                                                            Phone: 514.857.8385  
  
                                          
  
                                                Fax: 501.351.7499   
  
  
  
  
          Referral ID Status    Reason    Start Date Expiration Date Visits    V  
isits  
  
                                                            Requested Authorized  
   
          72272883  Closed    Auto-Generate 2022 3/16/2023 1         1  
  
                              d Referral                                 
  
  
  
  
                          Reason                    Comments  
   
                          Established Patient         
  
  
  
  
                Specialty       Diagnoses / Procedures Referred By Contact Refer  
red To Contact  
   
                          Hematology/Oncology /     Diagnoses  
  
CT results Return in about 6 months (around 2022). Elizabeth Miles Kathryn  
  
                          HEMATOLOGY/ONCOLOGY         
  
Procedures  
  
OFFICE/OUTPATIENT ESTABLISHED MOD MDM 30-39 MIN  
  
EST PATIENT                             MD JUANJOSE SOW MD  
  
  
  
                                                 Temple Community Hospital 224 W EXCHAN  
GE ST  
  
  
  
                                                            HECTOR 310  
  
                                        HECTOR 160  
  
  
  
                                                            Reno, OH 21899  
  
                                        Aurora, OH 29837  
  
  
  
                                                            Phone: 837.690.7976  
  
                                        Phone: 967.450.2685  
  
  
  
                                                Fax: 492.782.6610 Fax: 210-850-9 334  
  
  
  
  
          Referral ID Status    Reason    Start Date Expiration Date Visits    V  
isits  
  
                                                            Requested Authorized  
   
          97343527  Authorized           2022 99        98  
  
  
  
  
                    Reason              Onset Date          Comments  
   
                    Population Health Navigation Outreach 08/15/2022            
  
  
  
  
                    Reason              Onset Date          Comments  
   
                    Transition Of Care  2022            
  
  
  
  
                    Reason              Onset Date          Comments  
   
                    Population Health Navigation Outreach 2022            
  
  
  
  
                          Reason                    Comments  
   
                          Transition Of Care        sepsis  
  
  
  
  
                          Reason                    Comments  
   
                          Wound Check                 
  
  
  
  
                          Reason                    Comments  
   
                          Patient Question            
  
  
  
  
                          Reason                    Comments  
   
                          Outside Labs Results        
  
  
  
  
          Referral ID Status    Reason    Start Date Expiration Date Visits    V  
isits  
  
                                                            Requested Authorized  
   
          70952758  Closed    Auto-Generate 2022 1         1  
  
                              d Referral                                 
  
  
  
  
                          Reason                    Comments  
   
                          positive blood in stool     
  
  
  
  
  
                                        Ordered Prescriptions (unrecognized sect  
ion and content)  
  
  
  
  
           Prescription Sig        Dispensed  Refills    Start Date End Date  
   
           rivaroxaban (XARELTO) 2.5 Take 1 tablet by 60 tablet  1          2021   
  
           MG TABS tablet mouth 2 times daily                                    
   
           pantoprazole (PROTONIX) Take 1 tablet by 30 tablet  3          2021   
  
           40 MG tablet mouth every morning                                    
  
                      (before breakfast)                                    
   
           oxyCODONE-acetaminophen Take 1 tablet by 28 tablet  0          2021  
  
           (PERCOCET) 5-325 MG per mouth every 6 hours                            
          
  
           tabletIndications: as needed for Pain                                  
    
  
           Ischemia of left lower for up to 7 days.                               
       
  
           extremity  Intended supply: 7                                    
  
                      days. Take lowest                                    
  
                      dose possible to                                    
  
                      manage pain                                    
   
           rivaroxaban 15 & 20 MG Take as directed on 1 Package  0          0  
2021  
  
           Starter Pack package.                                      
  
  
  
  
           Prescription Sig        Dispensed  Refills    Start Date End Date  
   
           enoxaparin (LOVENOX) Inject 1 mL into the 120 mL     0          2022  
  
           100 MG/ML injection skin 2 times daily                                 
     
  
                      Start 22 in AM.                                    
  
                      Stop 22 in AM.                                    
  
                      Resume after procedure                                    
  
                      as directed by                                    
  
                      surgeon.                                      
   
           prasugrel (EFFIENT) 10 Take 1 tablet by mouth 90 tablet  0          0  
3/08/2022 2022  
  
           MG TABS    daily                                         
   
           polyethylene glycol Take 17 g by mouth 510 g      0          20  
  
           (GLYCOLAX) 17 GM/SCOOP daily as needed (as                             
         
  
           powder     needed for                                    
  
                      constipation with                                    
  
                      narcotic pain                                    
  
                      medications)                                    
   
           oxyCODONE (ROXICODONE) Take 1 tablet by mouth 20 tablet  0          0  
3/07/2022 2022  
  
           5 MG immediate release every 6 hours as                                
      
  
           tabletIndications: needed for Pain for up                              
        
  
           Post-op pain to 5 days. Intended                                    
  
                      supply: 5 days. Take                                    
  
                      lowest dose possible                                    
  
                      to manage pain                                    
  
  
  
  
           Prescription Sig        Dispensed  Refills    Start Date End Date  
   
           oxyCODONE (ROXICODONE) 5 Take 1 tablet by 20 tablet  0          2022  
  
           MG immediate release mouth every 6 hours                               
       
  
           tabletIndications: as needed for Pain                                  
    
  
           Post-op pain for up to 5 days.                                    
  
                      Intended supply: 5                                    
  
                      days. Take lowest                                    
  
                      dose possible to                                    
  
                      manage pain                                    
  
  
  
  
           Prescription Sig        Dispensed  Refills    Start Date End Date  
   
                          metFORMIN (GLUCOPHAGE-XR) Take 1000mg by mouth with br  
eakfast and 500mg by mouth  
 with dinner.   270 tablet      5               2022        
  
           500 MG extended release Resume Saturday.                               
       
  
           tabletIndications:  Type                                               
  
           2 diabetes mellitus with                                               
  
           hyperglycemia, without                                               
  
           long-term current use of                                               
  
           insulin (Formerly Chesterfield General Hospital)                                               
   
           aspirin 81 MG chewable Take 1 tablet by 30 tablet  3            
022   
  
           tablet     mouth daily Continue                                    
  
                      for 1 week only, then                                    
  
                      stop                                          
  
  
  
  
           Prescription Sig        Dispensed  Refills    Start Date End Date  
   
           oxyCODONE (ROXICODONE) 5 Take 1 tablet by 12 tablet  0          2022  
  
           MG immediate release mouth every 6 hours                               
       
  
           tabletIndications: Fall, as needed for Pain                            
          
  
           initial encounter, S/P for up to 3 days.                               
       
  
           below knee amputation, Intended supply: 3                              
        
  
           left (HCC) days. Take lowest                                    
  
                      dose possible to                                    
  
                      manage pain                                    
  
  
  
Scheduled  
  
                                        Active and Recently Administered Medicat  
ions (unrecognized section and content)  
  
  
  
  
                Medication Order 2021  
   
                          acetaminophen (TYLENOL) tablet 650 mg  (Given - Pr  
ovider: Eros Orourke RN)  
                                        0138 (Not Given - Provider: Eros graves RN - Reason: Patient/family   
refused)0558 (Given - Provider: Eros Orourke RN)1358 (Given - Provider: Tuyet Kowalski RN)194 (Given - Provider: Janessa Bowen RN) 0055 (Not Given -   
Provider: Janessa Bowen RN - Reason: Patient/family refused)0447 (Not Given - 
Provider: Janessa Bowen RN - Reason: Patient/family refused)1330 (Due) 
(Due)  
  
                                        650 mg, Oral, EVERY 6 HOURS, First dose   
on 21 at 1930, Maximum dose of   
acetaminophen is 4000 mg from all sources in 24 hours.                            
                 
   
                    aspirin chewable tablet 81 mg  (Given - Provider: Eros Orourke RN) 08   
(Given - Provider: Tuyet Kowalski RN) 075 (Given - Provider: Tuyet Kowalski, JOSE)  
  
                81 mg, Oral, DAILY, First dose on 21 at 1930             
                        
   
                    atorvastatin (LIPITOR) tablet 80 mg  (Given - Provider:   
Eros Orourke RN)   
 (Given - Provider: Janessa Bowen RN)  (Due)  
  
                80 mg, Oral, DAILY, First dose on 21 at 2100             
                        
   
                    cilostazol (PLETAL) tablet 100 mg  (Given - Provider: Gordy Orourke RN)   
0833 (Given - Provider: Tuyet Kowalski, JOSE) (Given - Provider: Janessa Bowen RN)                             075 (Given - Provider: Tuyet Kowalski RN)2100 (Due)  
  
                                        100 mg, Oral, 2 TIMES DAILY, First dose   
on 21 at 2100, give 30mins   
before or 2 hours after meals                                           
   
                    clopidogrel (PLAVIX) tablet 75 mg  (Given - Provider: Gordy Orourke RN)   
0833 (Given - Provider: Tuyet L. Camden, RN) 0759 (Given - Provider: Tuyet Kowalski RN)  
  
                75 mg, Oral, DAILY, First dose on 21 at 1930             
                        
   
                          heparin (porcine) injection 7,910 Units (COMPLETED) 18  
17 (Given - Provider: Mireya Nguyễn RN)                                           
  
                                        7,910 Units (rounded from 7,912 Units =   
80 Units/kg 98.9 kg), Intravenous, ONCE,  
 On 21 at 1715, For 1 dose, Initial one time bolus                       
                      
   
                          insulin lispro (HUMALOG) injection vial 0-12 Units 210  
1 (Not Given - Provider:   
Eros Orourke RN - Reason: Other)      0800 (Due)1339 (Not Given - Provider: St kodak Kowalski RN - Reason: Order parameters not met)1826 (Given - Provider: Megan Marroquin RN)                         0800 (Not Given - Provider: Tuyet gomez RN - Reason: Order  
 parameters not met)1200 (Due)1700 (Due)  
  
                                        0-12 Units, Subcutaneous, 3 TIMES DAILY   
WITH MEALS, First dose on 21 at  
 1930, **Medium Dose Corrective Algorithm** Glucose: Dose: If <139 No Insulin 
140-199 2 Units 200-249 4 Units 250-299 6                                         
    
  
                 Units 300-349 8 Units 350-400 10 Units Above 400 12 Units        
                             
   
                          insulin lispro (HUMALOG) injection vial 0-6 Units   
 (Not Given - Provider:   
Eros Orourke RN - Reason: Patient/family refused - Comment: ) 1934 (Not   
Given - Provider: Janessa Bowen RN - Reason: Patient/family refused) 2100 (Due)  
  
                                        0-6 Units, Subcutaneous, NIGHTLY, First   
dose on 21 at 2100, If   
continuous tube feedings/TPN/NPO, give correction dose based on result, no 
reduction in dose. If eating or bolus tube feeding: **M                           
                  
  
                                        edium Dose Corrective Algorithm** Glucos  
e: Dose: If <139 No Insulin 140-199 1   
Unit 200-249 2 Units 250-299 3 Units 300-349 4 Units 350-400 5 Units Above 400 6
 Units                                                        
   
                          metoprolol tartrate (LOPRESSOR) tablet 25 mg  (Giv  
en - Provider: Eros Orourke RN)                            0833 (Given - Provider: Tuyet Kowalski RN)1942 (Not Given -   
Provider: Janessa Bowen RN - Reason: Contraindicated) 0759 (Given - Provider:   
Tuyet Kowalski RN) (Due)  
  
                25 mg, Oral, 2 TIMES DAILY, First dose on 21 at 2100     
                                
   
                    rivaroxaban (XARELTO) tablet 2.5 mg                     1457  
 (Given - Provider: Tuyet Kowalski RN - Comment: mediation not available)  0535 (Given - Provider: Janessa Bowen RN)1800 (Due - Provider: Janessa Bowen RN)2100 (Due)  
  
                                        2.5 mg, Oral, 2 TIMES DAILY, First dose   
on 21 at 1345, ANTICOAGULANT!   
Doses of GREATER THAN or EQUAL to 15 mg/day must be administered with food.       
                                      
   
                          sodium chloride flush 0.9 % injection 10 mL  (Not   
Given - Provider: Eros Orourke RN - Reason: IV Fluid Infusing) 0900 (Due)192 (Not Given - Provider:   
Janessa Bowen RN - Reason: IV Fluid Infusing) 08 (Not Given - Provider: Tuyet Kowalski RN - Reason: IV Fluid Infusing) (Due)  
  
                                        10 mL, Intravenous, EVERY 12 HOURS SCHED  
ULED (2 times per day), First dose on   
21 at 2100                                           
   
                    sodium chloride flush 0.9 % injection 5-40 mL                 
      192 (Not Given - Provider: Janessa Bowen RN - Reason: IV Fluid Infusing) 08 (Not Given - Provider: Tuyet Kowalski RN - Reason: IV Fluid Infusing) (Due)  
  
                                        5-40 mL, Intravenous, EVERY 12 HOURS CHANDLER  
EDULED (2 times per day), First dose on   
21 at 2100, For Line Patency: Peripheral IV = 5 mL; Midline or Central 
Line = 10 mL/lumen. If following IV push me                                       
      
  
                                        dication, administer flush at same rate   
as the IV push. Flush volume is   
determined by type of infusion therapy being given. For non-viscous solutions 
use: Peripheral IV = 5 mL Midline or Central Line =                               
              
  
                                        10 mL/lumen For viscous solutions (i.e.   
blood components, parenteral nutrition,   
contrast media, or after obtaining blood sample) use: Peripheral IV = 10 mL 
Midline or Central Line = 20 mL/lumen, Recovery(Cath)                             
                
  
  
Continuous  
  
                Medication Order 2021  
   
                    0.9 % sodium chloride infusion ()                      
 1359 (New Bag - Provider: Tuyet Kowalski, RN)1945 (Stopped - Provider: Janessa Bowen, JOSE)   
  
                                        25 mL, Intravenous, at 100 mL/hr, CONTIN  
UOUS, Starting on Thu 21 at 1345,   
For 6 hours, Administer at the same rate as the piggyback being infused.          
                                   
   
                    heparin 25,000 units in dextrose 5 % 250 mL infusion (rate b  
ased)                     1803 (New Bag  
 - Provider: Tuyet Kowalski RN)        
  
                                        500 Units/hr (5 mL/hr), Intravenous, at   
5 mL/hr, CONTINUOUS, Starting on Thu   
21 at 1745, Fixed Rate                                           
   
                          heparin 25,000 units in dextrose 5% 250 mL (premix) in  
fusion (CANCELED) 1818   
(New Bag - Provider: Mireya Nguyễn RN)    0645 (New Bag - Provider: Nano pérez,  
 RN)0711 (Stopped - Provider: Nano Larson RN)   
  
                                        21 Units/kg/hr 98.9 kg (20.769 mL/hr, ro  
unded to 20.8 mL/hr), Intravenous, at   
20.8 mL/hr, CONTINUOUS, Starting on 21 at 1715, Initial dose 18 
units/kg/hr. HIGH Dose Heparin Weight Based Dosing                                
             
  
                                        (DVT/PE/A-fib) Bolus 80 units/kg IV x 1   
(max 10,000 units), then 18 units/kg/hr   
UNLESS due to patient weight rate exceeds 2100 units/hour (max initial rate) 
Initial rate for this patient: 21 units/kg/hr                                     
        
  
                                         aPTT < 30 Heparin Full re-bolus Increas  
e infusion by 2 units/kg/hr; notify   
prescriber. aPTT 31-45.9 Heparin Half re-bolus Increase infusion by 1 unit/kg/hr
 aPTT 46-80.9 No bolus No change aPTT 81-10                                       
      
  
                                        0.9 Hold heparin for 60 min Decrease inf  
usion by 1 unit/kg/hr aPTT > 100.9 Hold   
heparin for 60 min Decrease infusion by 2 units/kg/hr; notify prescriber Check 
aPTT 6 hours after initiation and 6 hour                                          
   
  
                                        s after every dose change; every 12 hrs   
x 1 day after 2 consecutive therapeutic   
aPTT; daily after every 12 hrs x 1 day is complete.                               
              
   
                          lactated ringers infusion (CANCELED)  (New Bag - P  
rovider: Eros Orourke RN)                                                   
  
                Intravenous, at 100 mL/hr, CONTINUOUS, Starting on 21 a  
t 193                                   
  
  
PRN  
  
                Medication Order 2021  
   
                0.9 % sodium chloride infusion                                   
  
                                        25 mL, Intravenous, at 100 mL/hr, PRN, I  
f patient receiving piggyback infusions   
without ordered maintenance IV fluids or with frequent/long duration piggyback 
infusions, Starting on 21 at 1856,                                        
     
  
                Administer at the same rate as the piggyback being infused., Rec  
overy(Cath)                                   
   
                dextrose 5 % solution                                   
  
                                        100 mL/hr, Intravenous, at 100 mL/hr, OH  
N, Low blood sugar, Starting on 21 at 0911, Start infusion following administration of dextrose 50% or 
glucagon.                                                     
   
                dextrose 50 % IV solution                                   
  
                                        12.5 g, Intravenous, PRN, Low blood suga  
r, Blood glucose less than 70 mg/dL and   
patient NOT ALERT or NPO., Starting on 21 at 0911, If patient does not 
respond within 5 minutes, repeat dose x1. S                                       
      
  
                                        tart D5W at 100 mL/hour until ordering p  
rovider can be reached. Repeat blood   
glucose in 15 minutes. If blood glucose is less than 70 mg/dL, repeat treatment 
and recheck blood glucose in 15 minutes x2. I                                     
        
  
                f using Glucostabilizer, dose as instructed per system.           
                          
   
                glucagon (rDNA) injection 1 mg                                   
  
                                        1 mg, Intramuscular, PRN, Low blood suga  
r, Blood glucose less than 70 mg/dL and   
patient NOT ALERT or NPO and does not have IV access., Starting on 21 at
 0911, After administration, attempt intra                                        
     
  
                                        venous access and start D5W at 100 mL/hr  
. Repeat blood glucose in 15 minutes x2   
and notify provider.                                           
   
                glucose (GLUTOSE) 40 % oral gel 15 g                              
       
  
                                        15 g, Oral, PRN, Low blood sugar, Starti  
ng on 21 at 0911, If blood   
glucose less than 50 mg/dL and patient ALERT and TOLERATING PO, give 2 tubes 
glucose gel. If blood glucose less than 70 mg/dL a                                
             
  
                                        nd patient ALERT and TOLERATING PO, give  
 1 tube glucose gel. Repeat blood   
glucose in 15 minutes. If blood glucose is less than 70 mg/dL, repeat treatment 
and recheck blood glucose in 15 minutes x2 and notify provider.                   
                          
   
                morphine (PF) injection 4 mg(Linked Group 1)                      
               
  
                                        4 mg, Intravenous, EVERY 2 HOURS PRN, Pa  
in Severe (7-10), Starting on 21 at 1906, If oral and IV narcotics ordered, use oral first and only use 
IV if oral is ineffective or cannot take oral. Do                                 
            
  
                     Not give oral and IV within 1 hour of each other unless spe  
cifically ordered.                       
                                          
   
                morphine injection 2 mg(Linked Group 1)                           
          
  
                                        2 mg, Intravenous, EVERY 2 HOURS PRN, Pa  
in Moderate (4-6), Starting on 21 at 1906, If oral and IV narcotics ordered, use oral first and only use 
IV if oral is ineffective or cannot take oral. D                                  
           
  
                          o Not give oral and IV within 1 hour of each other unl  
ess specifically ordered.   
                                                      
   
                ondansetron (ZOFRAN) injection 4 mg(Linked Group 2)               
                      
  
                                        4 mg, Intravenous, EVERY 6 HOURS PRN, Na  
usea, Vomiting, Starting on 21   
at 1906, Administer if oral route cannot be used.                                 
            
   
                ondansetron (ZOFRAN-ODT) disintegrating tablet 4 mg(Linked Group  
 2)                                   
  
                                        4 mg, Oral, EVERY 8 HOURS PRN, Nausea, V  
omiting, Starting on 21 at 1906  
                                                              
   
                oxyCODONE (ROXICODONE) immediate release tablet 10 mg(Linked Julito  
up 3)                                   
  
                                        10 mg, Oral, EVERY 4 HOURS PRN, Pain Sev  
ere (7-10), Starting on 21 at   
1906                                                          
   
                oxyCODONE (ROXICODONE) immediate release tablet 5 mg(Linked Grou  
p 3)                                   
  
                                        5 mg, Oral, EVERY 4 HOURS PRN, Pain Mode  
rate (4-6), Starting on 21 at   
1906                                                          
   
                sodium chloride flush 0.9 % injection 10 mL                       
              
  
                                        10 mL, Intravenous, PRN, Line Care, Afte  
r every IV line use, Starting on 21 at 1906                                               
   
                sodium chloride flush 0.9 % injection 5-40 mL                     
                
  
                                        5-40 mL, Intravenous, PRN, Line Care, St  
arting on 21 at 1856, For Line   
Patency: Peripheral IV = 5 mL; Midline or Central Line = 10 mL/lumen. If 
following IV push medication, administer flush at                                 
            
  
                                        same rate as the IV push. Flush volume i  
s determined by type of infusion therapy  
 being given. For non-viscous solutions use: Peripheral IV = 5 mL Midline or 
Central Line = 10 mL/lumen For viscous soluti                                     
        
  
                                        ons (i.e. blood components, parenteral n  
utrition, contrast media, or after   
obtaining blood sample) use: Peripheral IV = 10 mL Midline or Central Line = 20 
mL/lumen, Recovery(Cath)                                           
  
  
No Frequency  
  
                Medication Order 2021  
   
                    insulin glargine (LANTUS) 100 UNIT/ML injection vial          
              (Not Given -   
Provider: Janessa Bowen RN - Reason: Other - Comment: removed by accident)   
  
                Starting on 21 at 1940, For 1 dose, Janessa Bowen: yohana meza override                                   
  
  
Linked Groups  
  
                                        Order  
   
                                        Group 1:  
  
morphine injection 2 mgJump to med  
  
                                        2 mg, Intravenous, EVERY 2 HOURS PRN, Pa  
in Moderate (4-6), Starting on 21 at 1906<br>If oral and IV narcotics ordered, use oral first and only use
 IV if oral is ineffective or cannot take  
  
                                         oral.&nbsp;&nbsp;Do Not give oral and I  
V within 1 hour of each other unless   
specifically ordered.<br>  
   
                                        Or  
  
morphine (PF) injection 4 mgJump to med  
  
                                        4 mg, Intravenous, EVERY 2 HOURS PRN, Pa  
in Severe (7-10), Starting on 21 at 1906<br>If oral and IV narcotics ordered, use oral first and only use
 IV if oral is ineffective or cannot take  
  
                                        oral.&nbsp;&nbsp;Do Not give oral and IV  
 within 1 hour of each other unless   
specifically ordered.<br>  
   
                                        Group 2:  
  
ondansetron (ZOFRAN-ODT) disintegrating tablet 4 mgJump to med  
  
                                        4 mg, Oral, EVERY 8 HOURS PRN, Nausea, V  
omiting, Starting on 21 at 1906  
   
                                        Or  
  
ondansetron (ZOFRAN) injection 4 mgJump to med  
  
                                        4 mg, Intravenous, EVERY 6 HOURS PRN, Na  
usea, Vomiting, Starting on 21   
at 1906<br>Administer if oral route cannot be used.<br>  
   
                                        Group 3:  
  
oxyCODONE (ROXICODONE) immediate release tablet 5 mgJump to med  
  
                                        5 mg, Oral, EVERY 4 HOURS PRN, Pain Mode  
rate (4-6), Starting on 21 at   
1906  
   
                                        Or  
  
oxyCODONE (ROXICODONE) immediate release tablet 10 mgJump to med  
  
                                        10 mg, Oral, EVERY 4 HOURS PRN, Pain Sev  
ere (7-10), Starting on 21 at   
1906  
  
  
Scheduled  
  
                Medication Order 2022  
   
                          acetaminophen (TYLENOL) tablet 1,000 mg 0602 (Given -   
Provider: Elen Espinoza RN)1350 (Given - Provider: Tuyet Kowalski RN - Comment: he changed 
his mind and wants it) (Given - Provider: Maddie Ayala RN) 0605 (Given  
 - Provider: Maddie Ayala RN)1318 (Given - Provider: Tuyet Kowalski, 
RN) (Given - Provider: Maddie Ayala RN) 0515 (Given - Provider: Maddie Ayala RN)1400 (Due)2200 (Due)  
  
                                        1,000 mg, Oral, EVERY 8 HOURS SCHEDULED   
(3 times per day), First dose (after   
last modification) on Thu 3/3/22 at 2200, Maximum dose of acetaminophen is 4000 
mg from all sources in 24 hours.                                           
   
                          atorvastatin (LIPITOR) tablet 80 mg  (Given - Prov  
ider: Maddie Ayala RN)                        (Given - Provider: Maddie Ayala RN) 2100 (D  
ue)  
  
                80 mg, Oral, DAILY, First dose on 22 at 2100             
                        
   
                          buPROPion (WELLBUTRIN) tablet 150 mg 0924 (Given - Pro  
vider: Jeremy Miles RN) (Given - Provider: Maddie Ayala RN) 0816 (Given - Provider:   
Jeremy Miles RN)2020 (Given - Provider: Maddie Ayala RN) 0853 (Given  
 - Provider: Leti Russell, JOSE)2100 (Due)  
  
                150 mg, Oral, 2 TIMES DAILY, First dose on 22 at 2100    
                                 
   
                          ceFAZolin (ANCEF) 2000 mg in dextrose 4 % 100 mL IVPB   
(premix) 0108 (New Bag -   
Provider: Elen Espinoza RN)0153 (Stopped - Provider: Elen Espinoza RN)0926 (New Bag - Provider: Jeremy Miles RN)1000 (Stopped - Provider: 
Tuyet Kowalski, RN)1658 (New Bag - Provider: Tuyet Kowalski, JOSE) 0115 (New   
Bag - Provider: Maddie Ayala RN)0145 (Stopped - Provider: Maddie Ayala RN)0817 (New Bag - Provider: Jeremy Miles, RN)0900 (Stopped - 
Provider: Tuyet Kowalski, JOSE)1722 (New Bag - Provider: Tuyet Kowalski RN)   
0058 (New Bag - Provider: Maddie Ayala RN)0157 (Stopped - Provider: Maddie Ayala RN)0853 (New Bag - Provider: Leti Russell, RN)0945 
(Stopped - Provider: Leti Russell, JOSE)1700 (Due)  
  
                                        2,000 mg, IntraVENous, EVERY 8 HOURS, Fi  
rst dose on 22 at 1700, Until   
Discontinued              1925 (Stopped - Provider: Maddie Ayala RN) 1900   
(Stopped -   
Provider: Maddie Ayala RN)          
   
                          cilostazol (PLETAL) tablet 100 mg 0602 (Given - Provid  
er: Elen Espinoza RN)1658 (Given - Provider: Tuyet Kowalski RN) 0606 (Given - Provider: Maddie Ayala RN)1722 (Given - Provider: Tuyet Kowalski, JOSE) 0515 (Given -   
Provider: Maddie Ayala RN)0853 (Canceled Entry - Provider: Leti Russell RN)1600 (Due)  
  
                                        100 mg, Oral, 2 TIMES DAILY BEFORE MEALS  
, First dose on 22 at 0700,   
give 30mins before or 2 hours after meals                                         
    
   
                          enoxaparin (LOVENOX) injection 90 mg 09 (Given - Pro  
vider: Jeremy Mlies RN) (Given - Provider: Maddie Ayala RN) 0818 (Given - Provider:   
Jeremy Miles RN) (Given - Provider: Maddie Ayala RN) 0854 (Given  
 - Provider: Leti Russell RN) (Due)  
  
                                        90 mg (rounded from 90.7 mg = 1 mg/kg 90  
.7 kg), SubCUTAneous, 2 TIMES DAILY,   
First dose on Thu 3/3/22 at 1445                                           
   
                          gabapentin (NEURONTIN) capsule 100 mg 927 (Given - Pr  
ovider: Jeremy Miles RN)1350 (Given - Provider: Jeremy Miles RN) (Given - Provider: Maddie Ayala RN)                        08 (Given - Provider: Jeremy meza RN)1318 (Given -   
Provider: Tuyet Kowalski, JOSE) (Given - Provider: Maddie Ayala RN)   
0947 (Given - Provider: Leti Russell RN)1400 (Due)2100 (Due)  
  
                100 mg, Oral, 3 TIMES DAILY, First dose on Thu 3/3/22 at 1545     
                                
   
                          insulin lispro (HUMALOG) injection vial 0-3 Units   
 (Given - Provider: Maddie Ayala RN)                         (Not Given - Provider: Maddie tang RN - Reason:   
Order parameters not met - Comment: 138)  (Due)  
  
                                        0-3 Units, SubCUTAneous, NIGHTLY, First   
dose on 22 at 2100, If   
continuous tube feedings/TPN/NPO, give correction dose based on result, no 
reduction in dose. If eating or bolus tube feeding: **C                           
                  
  
                                        orrective Bedtime (50%) Low Dose Algorit  
hm** Glucose: Dose:  No Insulin   
140-249 1 Unit 250-349 2 Units Over 350 3 Units                                   
          
   
                          insulin lispro (HUMALOG) injection vial 0-6 Units 0800  
 (Not Given - Provider:   
Tuyet Kowalski RN - Reason: Order parameters not met)1152 (Given - Provider: 
Jeremy Miles RN)1654 (Not Given - Provider: Tuyet Kowalski RN - Reason:
 Order parameters not met)              0821 (Not Given - Provider: Jeremy coon RN -   
Reason: Order parameters not met)1133 (Not Given - Provider: Tuyet Kowalski RN - Reason: Order parameters not met)  0843 (Not Given - Provider: Leti Russell RN - Reason: Order parameters not met)1200 (Not Given - Provider: 
Leti Russell RN - Reason: Order parameters not met)1700 (Due)  
  
                                        0-6 Units, SubCUTAneous, 3 TIMES DAILY W  
ITH MEALS, First dose on Sat 22 at   
0800, **Corrective Low Dose Algorithm** Glucose: Dose:  No Insulin 140-199
 1 Unit 200-249 2 Units 250-299 3 Units 300-349 4 Units 350-399 5 Units Over 399
 6 Units                                            1726 (Not Given - Provider:   
Tuyet Kowalski RN - Reason: Order   
parameters not met)                       
   
                          metoprolol tartrate (LOPRESSOR) tablet 25 mg 0928 (Giv  
en - Provider: Jeremy Miles RN) (Given - Provider: Maddie Ayala RN) 0819 (Given -   
Provider: Jeremy Miles RN) (Given - Provider: Maddie Ayala RN)   
0854 (Given - Provider: Leti Russell RN)2100 (Due)  
  
                                        25 mg, Oral, 2 TIMES DAILY, First dose o  
n Thu 22 at 2100, Hold if HR is <   
65 and SBP is < 90                                            
   
                          pantoprazole (PROTONIX) tablet 40 mg 0602 (Given - Pro  
vider: Elen Espinoza RN)                       0606 (Given - Provider: Maddie Ayala RN) 0516 (G  
iven - Provider: Maddie Ayala RN)  
  
                                        40 mg, Oral, DAILY BEFORE BREAKFAST, Fir  
st dose on Fri 22 at 0700, Do not   
crush or break.                                               
   
                          potassium chloride (KLOR-CON M) extended release table  
t 40 mEq 0928 (Given -   
Provider: Jeremy Miles RN) (Given - Provider: Maddie Ayala RN)   
08 (Given - Provider: Jeremy Miles RN) (Given - Provider: Maddie Ayala RN)                        0854 (Given - Provider: Leti arriola RN)2100 (Due)  
  
                                        40 mEq, Oral, 2 TIMES DAILY, First dose   
on Thu 3/3/22 at 0900, Do not crush,   
chew, or suck on tablet. Tablet may also be broken in half and each half 
swallowed separately.                                           
   
                    prasugrel (EFFIENT) tablet 10 mg 929 (Given - Provider: Alannah Miles RN)   
08 (Given - Provider: Jeremy Miles RN) 08 (Given - Provider: Leti Russell RN)  
  
                10 mg, Oral, DAILY, First dose on Thu 3/3/22 at 1030              
                       
   
                          sodium chloride flush 0.9 % injection 10 mL 931 (Give  
n - Provider: Jeremy Miles RN) (Given - Provider: Maddie Ayala RN) 822 (Given -   
Provider: Jeremy Miles RN) (Given - Provider: Maddie Ayala RN)   
0900 (Due)2100 (Due)  
  
                                        10 mL, IntraVENous, EVERY 12 HOURS SCHED  
ULED (2 times per day), First dose on   
Thu 22 at 2100                                           
  
  
PRN  
  
                Medication Order 2022  
   
                0.9 % sodium chloride infusion                                   
  
                                        25 mL, IntraVENous, at 100 mL/hr, PRN, I  
f patient receiving piggyback infusions   
without ordered maintenance IV fluids or with frequent/long duration piggyback 
infusions, Starting on Thu 22 at ,                                       
      
  
                 Administer at the same rate as the piggyback being infused.      
                               
   
                dextrose 5 % solution                                   
  
                                        100 mL/hr, IntraVENous, PRN, Low blood s  
ugar, Starting on Thu 22 at ,   
Start infusion following administration of dextrose 50% or glucagon.              
                               
   
                dextrose 50 % IV solution                                   
  
                                        12.5 g, IntraVENous, PRN, Low blood suga  
r, Blood glucose less than 70 mg/dL and   
patient NOT ALERT or NPO., Starting on Thu 22 at , If patient does not 
respond within 5 minutes, repeat dose x1.                                         
    
  
                                        Start D5W at 100 mL/hour until ordering   
provider can be reached. Repeat blood   
glucose in 15 minutes. If blood glucose is less than 70 mg/dL, repeat treatment 
and recheck blood glucose in 15 minutes x2.                                       
      
  
                If using Glucostabilizer, dose as instructed per system.          
                           
   
                diphenhydrAMINE (BENADRYL) injection 25 mg                        
             
  
                          25 mg, IntraVENous, EVERY 6 HOURS PRN, Itching, Starti  
ng on Sun 22 at 0152   
                                                      
   
                glucagon (rDNA) injection 1 mg                                   
  
                                        1 mg, IntraMUSCular, PRN, Low blood suga  
r, Blood glucose less than 70 mg/dL and   
patient NOT ALERT or NPO and does not have IV access., Starting on Thu 22 
at , After administration, attempt intr                                       
      
  
                                        avenous access and start D5W at 100 mL/h  
r. Repeat blood glucose in 15 minutes x2  
 and notify provider.                                           
   
                Glucose (TRUEPLUS) oral gel 15 g                                  
   
  
                                        15 g, Oral, PRN, Low blood sugar, Starti  
ng on Thu 22 at , If blood   
glucose less than 50 mg/dL and patient ALERT and TOLERATING PO, give 2 tubes 
glucose gel. If blood glucose less than 70 mg/dL                                  
           
  
                                        and patient ALERT and TOLERATING PO, giv  
e 1 tube glucose gel. Repeat blood   
glucose in 15 minutes. If blood glucose is less than 70 mg/dL, repeat treatment 
and recheck blood glucose in 15 minutes x2 and notify provider.                   
                          
   
                hydrALAZINE (APRESOLINE) injection 10 mg                          
           
  
                                        10 mg, IntraVENous, EVERY 1 HOUR PRN, Hi  
gh Blood Pressure, Starting on Thu   
22 at , 2nd Line: Give for SBP greater than 140 mmHG and HR <110 BPM     
                                        
   
                HYDROmorphone (DILAUDID) injection 0.5 mg(Linked Group 1)         
                          1304 (See   
Alternative - Provider: Sandra Ward RN)  
  
                                        0.5 mg, IntraVENous, EVERY 3 HOURS PRN,   
Pain Moderate (4-6), Starting on Thu   
22 at , If oral and IV narcotics ordered, use oral first and only use 
IV if oral is ineffective or cannot take oral.                                    
         
  
                                         Do Not give oral and IV within 1 hour o  
f each other unless specifically   
ordered.                                                      
   
                HYDROmorphone (DILAUDID) injection 1 mg(Linked Group 1)           
                        1304 (Given -   
Provider: Sandra Ward, RN)  
  
                                        1 mg, IntraVENous, EVERY 3 HOURS PRN, Pa  
in Severe (7-10), Starting on u   
22 at , If oral and IV narcotics ordered, use oral first and only use 
IV if oral is ineffective or cannot take oral. Do                                 
            
  
                     Not give oral and IV within 1 hour of each other unless spe  
cifically ordered.                       
                                          
   
                hydrOXYzine (VISTARIL) injection 25 mg                            
         
  
                                        25 mg, IntraMUSCular, EVERY 6 HOURS PRN,  
 Anxiety, Starting on Sun 22 at   
1220                                                          
   
                labetalol (NORMODYNE;TRANDATE) injection 10 mg                    
                 
  
                                        10 mg, IntraVENous, EVERY 2 HOURS PRN, H  
igh Blood Pressure, Starting on u   
22 at , 1st Line: Give for SBP greater than 140 mmHG and HR >60 BPM      
                                       
   
                          oxyCODONE (ROXICODONE) immediate release tablet 10 mg(  
Linked Group 2) 1414 (See   
Alternative - Provider: Tuyet Kowalski RN) (Given - Provider: Maddie Ayala RN)                        0350 (Given - Provider: Maddie dexter, JOSE)1320 (See   
Alternative - Provider: Tuyet Kowalski RN) (Given - Provider: Maddie Ayala RN)                        0106 (Given - Provider: Maddie dexter RN)0947 (Given -   
Provider: Leti Russell, JOSE)  
  
                                        10 mg, Oral, EVERY 4 HOURS PRN, Pain Sev  
ere (7-10), Starting on u 22 at   
                                                          
   
                          oxyCODONE (ROXICODONE) immediate release tablet 5 mg(L  
inked Group 2) 1414 (Given  
 - Provider: Tuyet Kowalski RN) (See Alternative - Provider: Maddie Ayala RN)                        0350 (See Alternative - Provider: Maddie Ayala RN)1320   
(Given - Provider: Tuyet Kowalski RN) (See Alternative - Provider: Maddie Ayala RN)                        0106 (See Alternative - Provider: Maddie Ayala RN)0947   
(See Alternative - Provider: Leti Russell RN)  
  
                                        5 mg, Oral, EVERY 4 HOURS PRN, Pain Mode  
rate (4-6), Starting on 22 at   
                                                          
   
                polyethylene glycol (GLYCOLAX) packet 17 g                        
             
  
                                        17 g, Oral, DAILY PRN, Constipation, Sta  
rting on 22 at , First line  
 therapy for constipation                                           
   
                sodium chloride 0.9 % irrigation 1,000 mL                         
            
  
                                        1,000 mL, Irrigation, CONTINUOUS PRN, fo  
r irrigation with Wound V.A.C device,   
Starting on Wed 3/2/22 at 1210                                           
   
                sodium chloride flush 0.9 % injection 10 mL                       
              
  
                                        10 mL, IntraVENous, PRN, Line Care, Star  
ting on 22 at , After every  
 IV line use                                                  
  
  
Linked Groups  
  
                                        Order  
   
                                        Group 1:  
  
HYDROmorphone (DILAUDID) injection 0.5 mgJump to med  
  
                                        0.5 mg, IntraVENous, EVERY 3 HOURS PRN,   
Pain Moderate (4-6), Starting on 22 at <br&gt;If oral and IV narcotics ordered, use oral first and only 
use IV if oral is ineffective or cannot ta  
  
                                        ke oral.&nbsp;&nbsp;Do Not give oral and  
 IV within 1 hour of each other unless   
specifically ordered.<br>  
   
                                        Or  
  
HYDROmorphone (DILAUDID) injection 1 mgJump to med  
  
                                        1 mg, IntraVENous, EVERY 3 HOURS PRN, Pa  
in Severe (7-10), Starting on 22 at <br>If oral and IV narcotics ordered, use oral first and only use
 IV if oral is ineffective or cannot take  
  
                                        oral.&nbsp;&nbsp;Do Not give oral and IV  
 within 1 hour of each other unless   
specifically ordered.<br>  
   
                                        Group 2:  
  
oxyCODONE (ROXICODONE) immediate release tablet 5 mgJump to med  
  
                                        5 mg, Oral, EVERY 4 HOURS PRN, Pain Mode  
rate (4-6), Starting on 22 at   
  
   
                                        Or  
  
oxyCODONE (ROXICODONE) immediate release tablet 10 mgJump to med  
  
                                        10 mg, Oral, EVERY 4 HOURS PRN, Pain Sev  
ere (7-10), Starting on 22 at   
  
  
  
Scheduled  
  
                Medication Order 2022  
   
                acetaminophen (TYLENOL) tablet 1,000 mg (COMPLETED)               
                    1037 (Given - Provider:   
Becki Weller RN)  
  
                                        1,000 mg, Oral, ONCE, 1 dose, On Tue 3/2  
9/22 at 1045, Maximum dose of   
acetaminophen is 4000 mg from all sources in 24 hours. Do not administer if 
patient has taken tylenol <4 hours earlier. Do not gi                             
                
  
                                        ve if contraindicated ie. patient has ac  
tive liver disease or cirrhosis., Pre-op  
 (day of surgery)                                             
   
                ceFAZolin (ANCEF) 2000 mg in dextrose 4 % 100 mL IVPB (premix) (  
COMPLETED)                                   
1304 (Given by Other Clinician - Provider: Mckenzie Curry RN)  
  
                                        2,000 mg, IntraVENous, ON CALL TO O.R.,   
1 dose, On Tue 3/29/22 at 1045,   
Antimicrobial Indications: Surgical Prophylaxis, Administer within 1 hour prior 
to incision. Recommend to repeat in 3-4 hours afte                                
             
  
                r initial dose if still intra-op., Pre-op (day of surgery)        
                             
   
                famotidine (PEPCID) tablet 20 mg (COMPLETED)                      
             1037 (Given - Provider: Becki Weller RN)  
  
                20 mg, Oral, ONCE, 1 dose, On Tue 3/29/22 at 1045, Pre-op (day o  
f surgery)                                   
   
                gabapentin (NEURONTIN) capsule 100 mg (COMPLETED)                 
                  1037 (Given - Provider:   
Becki Weller RN)  
  
                                        100 mg, Oral, ONCE, 1 dose, On Tue 3/29/  
22 at 1045, For Age >69, or Low GFR,   
Pre-op (day of surgery)                                           
   
                sodium chloride flush 0.9 % injection 5-40 mL                     
              1045 (Due)2100 (Due)  
  
                                        5-40 mL, IntraVENous, EVERY 12 HOURS CHANDLER  
EDULED (2 times per day), First dose on   
Tue 3/29/22 at 1045, Until Discontinued, For Line Patency: Peripheral IV = 5 mL;
 Midline or Central Line = 10 mL/lumen. If                                        
     
  
                                         following IV push medication, administe  
r flush at same rate as the IV push.   
Flush volume is determined by type of infusion therapy being given. For non-
viscous solutions use: Peripheral IV = 5 mL Midli                                 
            
  
                                        ne or Central Line = 10 mL/lumen For vis  
cous solutions (i.e. blood components,   
parenteral nutrition, contrast media, or after obtaining blood sample) use: 
Peripheral IV = 10 mL Midline or Central Line = 20 mL/lumen, Pre-op (day of 
surgery)                                                      
   
                sodium chloride flush 0.9 % injection 5-40 mL                     
              1115 (Due)2100 (Due)  
  
                                        5-40 mL, IntraVENous, EVERY 12 HOURS CHANDLER  
EDULED (2 times per day), First dose on   
Tue 3/29/22 at 1115, Until Discontinued, For Line Patency: Peripheral IV = 5 mL;
 Midline or Central Line = 10 mL/lumen. If                                        
     
  
                                         following IV push medication, administe  
r flush at same rate as the IV push.   
Flush volume is determined by type of infusion therapy being given. For non-
viscous solutions use: Peripheral IV = 5 mL Midli                                 
            
  
                                        ne or Central Line = 10 mL/lumen For vis  
cous solutions (i.e. blood components,   
parenteral nutrition, contrast media, or after obtaining blood sample) use: 
Peripheral IV = 10 mL Midline or Central Line = 20 mL/lumen, PACU only            
                                 
  
  
Continuous  
  
                Medication Order 2022  
   
                0.9 % sodium chloride infusion                                 1  
045 (Due)  
  
                                        IntraVENous, at 50 mL/hr, CONTINUOUS, St  
arting on Tue 3/29/22 at 1045, Upon   
admission to sameday - please start iv if patient does not have iv access., Pre-
op (day of surgery)                                           
  
  
PRN  
  
                Medication Order 2022  
   
                0.9 % sodium chloride bolus                                   
  
                                        500 mL (5.62 mL/kg), IntraVENous, at 1,0  
00 mL/hr, Administer over 0.5 Hours,   
PRN, Anti-nausea, Starting on Tue 3/29/22 at 1055, PACU only                      
                       
   
                0.9 % sodium chloride infusion                                   
  
                                        25 mL, IntraVENous, at 100 mL/hr, PRN, I  
f patient receiving piggyback infusions   
without ordered maintenance IV fluids or with frequent/long duration piggyback 
infusions, Starting on Tue 3/29/22 at 1019,                                       
      
  
                                         Administer at the same rate as the urbano  
yback being infused., Pre-op (day of   
surgery)                                                      
   
                0.9 % sodium chloride infusion                                   
  
                                        25 mL, IntraVENous, at 100 mL/hr, PRN, I  
f patient receiving piggyback infusions   
without ordered maintenance IV fluids or with frequent/long duration piggyback 
infusions, Starting on Tue 3/29/22 at 1055,                                       
      
  
                 Administer at the same rate as the piggyback being infused., PA  
CU only                                   
   
                ALPRAZolam (NIRAVAM) dissolvable tablet 0.25 mg                   
                  
  
                                        0.25 mg, Oral, PRN, Starting on Tue 3/29  
/22 at 1019, Until Discontinued,   
Anxiety, Pre-op (day of surgery)                                           
   
                diphenhydrAMINE (BENADRYL) injection 12.5 mg                      
               
  
                                        12.5 mg, IntraVENous, ONCE PRN, 1 dose,   
Starting on Tue 3/29/22 at 1055, Until   
Tue 3/29/22 at 2359, Itching, for use Sameday and, PACU only                      
                       
   
                fentaNYL (SUBLIMAZE) injection 25 mcg                             
        
  
                                        25 mcg, IntraVENous, EVERY 5 MIN PRN, 3   
doses, Starting on Tue 3/29/22 at 1055,   
Until Discontinued, Pain Moderate (4-6), Phase I and Phase II- Initial therapy 
for moderate pain (4-6). Restricted to a 90                                       
      
  
                                         minute time frame starting when the pat  
ient can verbally state their pain   
score. If after 2 doses the pain score does not decrease by more than one point,
 then call the provider. If oral meds are utili                                   
          
  
                zed, do not return to initial therapy medications. SDS and, PACU  
 only                                   
   
                fentaNYL (SUBLIMAZE) injection 50 mcg                             
      1348 (Given - Provider: Mckenzie Curry RN)  
  
                                        50 mcg, IntraVENous, EVERY 5 MIN PRN, 3   
doses, Starting on Tue 3/29/22 at 1055,   
Until Discontinued, Pain Severe (7-10), Phase I or Phase II- Initial therapy for
 severe pain (7-10). Restricted to a 50 mi                                        
     
  
                                        nute time frame starting when the patien  
t can verbally state their pain score.   
If after 2 doses the pain score does not decrease by more than one point, then 
call the provider. If oral meds are utilized                                      
       
  
                , do not return to initial therapy medications. SDS and, PACU on  
ly                                   
   
                hydrALAZINE (APRESOLINE) injection 10 mg(Linked Group 1)          
                           
  
                                        10 mg, IntraVENous, EVERY 15 MIN PRN, 2   
doses, Starting on Tue 3/29/22 at 1055,   
Until Discontinued, High Blood Pressure, for SBP greater than 160 mmHg for 2 
consecutive measurements taken from different                                     
        
  
                                         sites, If heart rate is greater than 60  
 bpm, hold hydralazine and use labetalol  
 if ordered, otherwise contact provider. Inform provider if SBP is still greater
 than 160 mmHg 10 minutes after second antihypertensive dose is administered., 
PACU only                                                     
   
                insulin lispro (HUMALOG) injection vial 0-12 Units                
                     
  
                                        0-12 Units, SubCUTAneous, PRN, 3 doses,   
Starting on Tue 3/29/22 at 1019, Until   
Discontinued, Based on Glucose results, **Corrective Low Dose Algorithm** 
Glucose: Dose: If <180 No Insulin 181-240 4 Un                                    
         
  
                                        its 241-300 6 Units 301-350 8 Units 350-  
340 10 Units Over 400 12 Units, Pre-op   
(day of surgery)                                              
   
                labetalol (NORMODYNE;TRANDATE) injection 10 mg(Linked Group 1)    
                                 
  
                                        10 mg, IntraVENous, EVERY 15 MIN PRN, 2   
doses, Starting on Tue 3/29/22 at 1055,   
Until Discontinued, High Blood Pressure, for SBP greater than 160 mmHg for 2 
consecutive measurements taken from different                                     
        
  
                                         sites., If heart rate is 60 bpm or less  
 hold labetalol and use hydralazine if   
ordered, otherwise contact provider. Inform provider if SBP is still greater 
than 160 mmHg, 10 minutes after second antihypertensive dose is administered., 
PACU only                                                     
   
                lidocaine PF 1 % injection 1 mL                                   
  
                                        1 mL, IntraDERmal, ONCE PRN, 1 dose, Sta  
rting on Tue 3/29/22 at 1019, Until Tue   
3/29/22 at 2359, IV start, Pre-op (day of surgery)                                
             
   
                meperidine (DEMEROL) injection 12.5 mg                            
         
  
                                        12.5 mg, IntraVENous, EVERY 5 MIN PRN, S  
tarting on Tue 3/29/22 at 1055, Until   
Discontinued, Shivering, , May give every 5 minutes to max of 50mg. for use 
Sameday and, PACU only                                           
   
                ondansetron (ZOFRAN) injection 4 mg                               
      
  
                                        4 mg, IntraVENous, ONCE PRN, 1 dose, Sta  
rting on Tue 3/29/22 at 1055, Until Tue   
3/29/22 at 2359, Nausea, Initial antiemetic therapy. For use sameday and, PACU 
only                                                          
   
                oxyCODONE (ROXICODONE) immediate release tablet 10 mg (COMPLETED  
)                                 1423 (Given   
- Provider: Soheila Hamlin RN)  
  
                                        10 mg, Oral, PRN, 1 dose, Starting on Tu  
e 3/29/22 at 1055, Until Tue 3/29/22 at   
2359, Pain Severe (7-10), PHASE II, PACU only                                     
        
   
                sodium chloride flush 0.9 % injection 5-40 mL                     
                
  
                                        5-40 mL, IntraVENous, PRN, Starting on T  
ue 3/29/22 at 1019, Until Discontinued,   
Line Care, For Line Patency: Peripheral IV = 5 mL; Midline or Central Line = 10 
mL/lumen. If following IV push medication,                                        
     
  
                                         administer flush at same rate as the IV  
 push. Flush volume is determined by   
type of infusion therapy being given. For non-viscous solutions use: Peripheral 
IV = 5 mL Midline or Central Line = 10 mL/lum                                     
        
  
                                        en For viscous solutions (i.e. blood com  
ponents, parenteral nutrition, contrast   
media, or after obtaining blood sample) use: Peripheral IV = 10 mL Midline or 
Central Line = 20 mL/lumen, Pre-op (day of surgery)                               
              
   
                sodium chloride flush 0.9 % injection 5-40 mL                     
                
  
                                        5-40 mL, IntraVENous, PRN, Starting on T  
ue 3/29/22 at 1055, Until Discontinued,   
Line Care, After every IV line use, For Line Patency: Peripheral IV = 5 mL; 
Midline or Central Line = 10 mL/lumen. If foll                                    
         
  
                                        owing IV push medication, administer flu  
sh at same rate as the IV push. Flush   
volume is determined by type of infusion therapy being given. For non-viscous 
solutions use: Peripheral IV = 5 mL Midline or                                    
         
  
                                         Central Line = 10 mL/lumen For viscous   
solutions (i.e. blood components,   
parenteral nutrition, contrast media, or after obtaining blood sample) use: 
Peripheral IV = 10 mL Midline or Central Line = 20 mL/lumen, PACU only            
                                 
  
  
Linked Groups  
  
                                        Order  
   
                                        Group 1:  
  
labetalol (NORMODYNE;TRANDATE) injection 10 mgJump to med  
  
                                        10 mg, IntraVENous, EVERY 15 MIN PRN, 2   
doses, Starting on Tue 3/29/22 at 1055,   
Until Discontinued, High Blood Pressure, for SBP greater than 160 mmHg for 2 
consecutive measurements taken from different  
  
                                         sites.<br>If heart rate is 60 bpm or le  
ss hold labetalol and use hydralazine if  
 ordered, otherwise contact provider. Inform provider if SBP is still greater 
than 160 mmHg, 10 minutes after second antihypertensive dose is 
administered.<br>PACU only  
   
                                        Or  
  
hydrALAZINE (APRESOLINE) injection 10 mgJump to med  
  
                                        10 mg, IntraVENous, EVERY 15 MIN PRN, 2   
doses, Starting on Tue 3/29/22 at 1055,   
Until Discontinued, High Blood Pressure, for SBP greater than 160 mmHg for 2 
consecutive measurements taken from different  
  
                                         sites<br>If heart rate is greater than   
60 bpm, hold hydralazine and use   
labetalol if ordered, otherwise contact provider.&nbsp;&nbsp;Inform provider if 
SBP is still greater than 160 mmHg  
  
                                        10 minutes after second antihypertensive  
 dose is administered.<br>PACU only  
  
  
Scheduled  
  
                Medication Order 2022  
   
                    aspirin chewable tablet 81 mg                     1141 (Not   
Given - Provider: Chantel Torrez RN -  
 Reason: Other - Comment: got in prep and recovery) 08 (Given - Provider:   
Rene Arboleda RN)  
  
                                        81 mg, Oral, DAILY, First dose on  at 1200, Until Discontinued,   
Recovery(Cath)                                                
   
                aspirin tablet 325 mg (COMPLETED)                 926 (Given -   
Provider: Christy Josue RN)   
  
                325 mg, Oral, ONCE, 1 dose, On 22 at 0945, Pre-Procedur  
e(Cath)                                   
   
                    atorvastatin (LIPITOR) tablet 80 mg                       
 (Given - Provider: Megan Vides RN)                                     2100 (Due)  
  
                                        80 mg, Oral, DAILY, First dose on  at 2100, Until Discontinued,   
Recovery(Cath)                                                
   
                    buPROPion (WELLBUTRIN XL) extended release tablet 150 mg      
                 1226 (Given -   
Provider: Chantel Torrez RN) (Given - Provider: Megan Vides RN) 08   
(Given - Provider: Rene Arboleda RN)2100 (Due)  
  
                                        150 mg, Oral, 2 TIMES DAILY, First dose   
on 22 at 1200, Until   
Discontinued, Recovery(Cath)                                           
   
                cilostazol (PLETAL) tablet 100 mg                 1722 (Given -   
Provider: Chantel Torrez RN)   
0552 (Given - Provider: Megan Vides RN)1600 (Due)  
  
                                        100 mg, Oral, 2 TIMES DAILY BEFORE MEALS  
, First dose on 22 at 1600,   
Until Discontinued, give 30mins before or 2 hours after meals, Recovery(Cath)     
                                        
   
                dabigatran (PRADAXA) capsule 150 mg                               
    828 (Given - Provider: Rene Arboleda RN) (Due)  
  
                                        ANTICOAGULANT! Renal dose is 75 mg for C  
rCl 15-30 mL/min., 150 mg, Oral, 2 TIMES  
 DAILY, First dose on 22 at 0900, Until Discontinued, Administer with a
 full glass of water without regard to gene                                        
     
  
                                        ls. Do not break, chew, or open capsules  
, as this will lead to 75% increase in   
absorption and potentially serious adverse reactions., Recovery(Cath)             
                                
   
                    empagliflozin (JARDIANCE) tablet TABS 25 mg                   
    1151 (Not Given - Provider: Chantel Torrez RN - Reason: Other)          1107 (Not Given - Provider: Rene lewis RN - Reason: Medication not available)  
  
                25 mg, Oral, DAILY, First dose on 22 at 1200, Recovery(  
Cath)                                   
   
                    fenofibrate (TRIGLIDE) tablet 160 mg                     122  
6 (Given - Provider: Chantel Torrez RN)                                     08 (Given - Provider: Rene moulton RN)  
  
                                        160 mg, Oral, DAILY, First dose on  at 1200, Until Discontinued,   
Substituted for Fenofibrate (Non-Formulary Dose)., Recovery(Cath)                 
                            
   
                    gabapentin (NEURONTIN) capsule 300 mg                     12  
 (Given - Provider: Chantel Torrez RN) (Given - Provider: Megan Vides RN) 08 (Given - Provider: Rene Arboleda RN)1400 (Due) (Due)  
  
                                        300 mg, Oral, 3 TIMES DAILY, First dose   
on 22 at 1400, Until   
Discontinued, Recovery(Cath)                                           
   
                    insulin lispro (HUMALOG) injection vial 0-3 Units             
           (Given - Provider: Megan Vides RN)                          (Due)  
  
                                        0-3 Units, SubCUTAneous, NIGHTLY, First   
dose on 22 at 2100, Until   
Discontinued, If continuous tube feedings/TPN/NPO, give correction dose based on
 result, no reduction in dose. If eating or herminio                                   
          
  
                                        us tube feeding: **Corrective Bedtime (5  
0%) Low Dose Algorithm** Glucose: Dose:   
 No Insulin 140-249 1 Unit 250-349 2 Units Over 350 3 Units                 
                            
   
                    insulin lispro (HUMALOG) injection vial 0-6 Units             
          1232 (Not Given - Provider:   
Chantel Torrez RN - Reason: Order parameters not met)1722 (Given - Provider: 
Chantel Torrez RN)                    0829 (Not Given - Provider: Rene lewis RN -   
Reason: Order parameters not met)1200 (Due)1700 (Due)  
  
                                        0-6 Units, SubCUTAneous, 3 TIMES DAILY W  
ITH MEALS, First dose on 22 at   
1230, Until Discontinued, **Corrective Low Dose Algorithm** Glucose: Dose: 70-
139 No Insulin 140-199 1 Unit 200-249 2 Unit                                      
       
  
                s 250-299 3 Units 300-349 4 Units 350-399 5 Units Over 399 6 Uni  
ts                                   
   
                    lisinopril (PRINIVIL;ZESTRIL) tablet 2.5 mg                   
    1225 (Given - Provider: Chantel Torrez RN)                          0825 (Given - Provider: Rene moulton RN)  
  
                                        2.5 mg, Oral, DAILY, First dose on  at 1200, Until Discontinued,   
Recovery(Cath)                                                
   
                    metoprolol tartrate (LOPRESSOR) tablet 25 mg                  
     1225 (Given - Provider: Chantel Torrez RN)204 (Held - Provider: Megan Vides RN - Reason: Order 
parameters not met - Comment: BP low and HR stable- DR. EMANUEL approved) 08   
(Given - Provider: Rene Arboleda RN)2100 (Due)  
  
                                        25 mg, Oral, 2 TIMES DAILY, First dose o  
n 22 at 1200, Until   
Discontinued, Recovery(Cath)                                           
   
                oxyCODONE (ROXICODONE) immediate release tablet 5 mg (COMPLETED)  
                                 921 (Given -  
 Provider: Rene Arboleda RN)  
  
                                        5 mg, Oral, ONCE, 1 dose, On 22  
 at 0930, Prior to wound vac dressing   
change                                                        
   
                pantoprazole (PROTONIX) tablet 40 mg                              
     0552 (Given - Provider: Megan Vides RN)  
  
                                        40 mg, Oral, DAILY BEFORE BREAKFAST, Fir  
st dose on 22 at 0700, Until   
Discontinued, Do not crush or break., Recovery(Cath)                              
               
   
                    prasugrel (EFFIENT) tablet 10 mg                     1152 (N  
ot Given - Provider: Chantel Torrez RN - Reason: Patient took at home)      08 (Given - Provider: Rene moulton,   
RN)  
  
                                        10 mg, Oral, DAILY, First dose on  at 1200, Until Discontinued,   
Recovery(Cath)                                                
   
                    sodium chloride flush 0.9 % injection 5-40 mL                 
      1140 (Not Given - Provider: Chantel Torrez RN - Reason: Other) (Not Given - Provider: Megan Vides RN -
 Reason: Patient/family refused)        830 (Not Given - Provider: Rene Arboleda RN - Reason: Other)2100 (Due)  
  
                                        5-40 mL, IntraVENous, EVERY 12 HOURS CHANDLER  
EDULED (2 times per day), First dose on   
22 at 0945, Until Discontinued, For Line Patency: Peripheral IV = 5 mL;
 Midline or Central Line = 10 mL/lumen. If                                        
     
  
                                         following IV push medication, administe  
r flush at same rate as the IV push.   
Flush volume is determined by type of infusion therapy being given. For non-
viscous solutions use: Peripheral IV = 5 mL Midli                                 
            
  
                                        ne or Central Line = 10 mL/lumen For vis  
cous solutions (i.e. blood components,   
parenteral nutrition, contrast media, or after obtaining blood sample) use: 
Peripheral IV = 10 mL Midline or Central Line = 20 mL/lumen, Pre-Procedure(Cath)
                                                              
   
                    sodium chloride flush 0.9 % injection 5-40 mL                 
       (Given - Provider: Megan Vides RN)                            829 (Given - Provider: Rene moulton RN)2100 (Due)  
  
                                        5-40 mL, IntraVENous, EVERY 12 HOURS CHANDLER  
EDULED (2 times per day), First dose on   
22 at 2100, Until Discontinued, For Line Patency: Peripheral IV = 5 mL;
 Midline or Central Line = 10 mL/lumen. If                                        
     
  
                                         following IV push medication, administe  
r flush at same rate as the IV push.   
Flush volume is determined by type of infusion therapy being given. For non-
viscous solutions use: Peripheral IV = 5 mL Midli                                 
            
  
                                        ne or Central Line = 10 mL/lumen For vis  
cous solutions (i.e. blood components,   
parenteral nutrition, contrast media, or after obtaining blood sample) use: 
Peripheral IV = 10 mL Midline or Central Line = 20 mL/lumen, Recovery(Cath)       
                                      
  
  
Continuous  
  
                Medication Order 2022  
   
                0.9 % sodium chloride infusion                 1140 (Stopped - P  
rovider: Chantel Torrez RN)   
  
                                        IntraVENous, at 30 mL/hr, CONTINUOUS, St  
arting on 22 at 0945,   
Pre-Procedure(Cath)                                           
  
  
PRN  
  
                Medication Order 2022  
   
                0.9 % sodium chloride infusion                                   
  
                                        25 mL, IntraVENous, at 100 mL/hr, PRN, I  
f patient receiving piggyback infusions   
without ordered maintenance IV fluids or with frequent/long duration piggyback 
infusions, Starting on 22 at 0922,                                       
      
  
                                         Administer at the same rate as the urbano  
yback being infused.,   
Pre-Procedure(Cath)                                           
   
                0.9 % sodium chloride infusion                                   
  
                                        25 mL, IntraVENous, at 100 mL/hr, PRN, I  
f patient receiving piggyback infusions   
without ordered maintenance IV fluids or with frequent/long duration piggyback 
infusions, Starting on 22 at 1132,                                       
      
  
                 Administer at the same rate as the piggyback being infused., Re  
covery(Cath)                                   
   
                acetaminophen (TYLENOL) tablet 650 mg                             
        
  
                                        650 mg, Oral, EVERY 4 HOURS PRN, Startin  
g on 22 at 1132, Until   
Discontinued, Pain Mild (1-3), Fever, Fever >100.5 F (38 C), Maximum dose of 
acetaminophen is 4000 mg from all sources in 24 hours., Recovery(Cath)            
                                 
   
                dextrose 5 % solution                                   
  
                                        100 mL/hr, IntraVENous, PRN, Low blood s  
ugar, Starting on 22 at 1208,   
Start infusion following administration of dextrose 50% or glucagon.              
                               
   
                dextrose 50 % IV solution                                   
  
                                        12.5 g, IntraVENous, PRN, Starting on 22 at 1208, Until Discontinued,   
Low blood sugar, Blood glucose less than 70 mg/dL and patient NOT ALERT or NPO.,
 If patient does not respond within 5 minut                                       
      
  
                                        es, repeat dose x1. Start D5W at 100 mL/  
hour until ordering provider can be   
reached. Repeat blood glucose in 15 minutes. If blood glucose is less than 70 
mg/dL, repeat treatment and recheck blood glucos                                  
           
  
                e in 15 minutes x2. If using Glucostabilizer, dose as instructed  
 per system.                                   
   
                glucagon (rDNA) injection 1 mg                                   
  
                                        1 mg, IntraMUSCular, PRN, Starting on   
d 22 at 1208, Until Discontinued,   
Low blood sugar, Blood glucose less than 70 mg/dL and patient NOT ALERT or NPO 
and does not have IV access., After administ                                      
       
  
                                        ration, attempt intravenous access and s  
tart D5W at 100 mL/hr. Repeat blood   
glucose in 15 minutes x2 and notify provider.                                     
        
   
                Glucose (TRUEPLUS) oral gel 15 g                                  
   
  
                                        15 g, Oral, PRN, Starting on Wed 22  
 at 1208, Until Discontinued, Low blood   
sugar, If blood glucose less than 50 mg/dL and patient ALERT and TOLERATING PO, 
give 2 tubes glucose gel. If blood glucose                                        
     
  
                                         less than 70 mg/dL and patient ALERT an  
d TOLERATING PO, give 1 tube glucose   
gel. Repeat blood glucose in 15 minutes. If blood glucose is less than 70 mg/dL,
 repeat treatment and recheck blood glucose in 15 minutes x2 and notify 
provider.                                                     
   
                sodium chloride flush 0.9 % injection 5-40 mL                     
                
  
                                        5-40 mL, IntraVENous, PRN, Starting on W  
ed 22 at 0922, Until Discontinued,   
Line Care, After every IV line use, For Line Patency: Peripheral IV = 5 mL; 
Midline or Central Line = 10 mL/lumen. If foll                                    
         
  
                                        owing IV push medication, administer flu  
sh at same rate as the IV push. Flush   
volume is determined by type of infusion therapy being given. For non-viscous 
solutions use: Peripheral IV = 5 mL Midline or                                    
         
  
                                         Central Line = 10 mL/lumen For viscous   
solutions (i.e. blood components,   
parenteral nutrition, contrast media, or after obtaining blood sample) use: 
Peripheral IV = 10 mL Midline or Central Line = 20 mL/lumen, Pre-Procedure(Cath)
                                                              
   
                sodium chloride flush 0.9 % injection 5-40 mL                     
                
  
                                        5-40 mL, IntraVENous, PRN, Starting on W  
ed 22 at 1132, Until Discontinued,   
Line Care, For Line Patency: Peripheral IV = 5 mL; Midline or Central Line = 10 
mL/lumen. If following IV push medication,                                        
     
  
                                         administer flush at same rate as the IV  
 push. Flush volume is determined by   
type of infusion therapy being given. For non-viscous solutions use: Peripheral 
IV = 5 mL Midline or Central Line = 10 mL/lum                                     
        
  
                                        en For viscous solutions (i.e. blood com  
ponents, parenteral nutrition, contrast   
media, or after obtaining blood sample) use: Peripheral IV = 10 mL Midline or 
Central Line = 20 mL/lumen, Recovery(Cath)                                        
     
  
  
Scheduled  
  
                Medication Order 2022      08/10/2022      2022  
   
                          acetaminophen (TYLENOL) tablet 650 mg 1803 (Given - Pr  
ovider: Travis Jacobs RN)2229 (Given - Provider: Annie Villar RN) 0135 (Given - Provider:   
Annie Villar RN)0624 (Given - Provider: Annie Villar RN)0958 (Given - 
Provider: William Hobbs RN)1445 (Given - Provider: William Ha RN)1812 (Given - Provider: William Hobbs RN) 0140 (Not Given   
- Provider: Annie Villar RN - Reason: Patient/family refused - Comment: Pt 
requested not to be woken up)0606 (Given - Provider: Annie Villar RN)0950 
(Given - Provider: Eros Patel RN)  
  
                                        650 mg, Oral, EVERY 4 HOURS, First dose   
on 22 at 1800, Until   
Discontinued, Maximum dose of acetaminophen is 4000 mg from all sources in 24 
hours.                                  2244 (Given - Provider: Annie Villar RN) 1513 (Given - Provider:   
William Hobbs RN)1804 (Given - Provider: William Hobbs RN)2150
 (Given - Provider: Annie Villar RN)  
   
                    atorvastatin (LIPITOR) tablet 80 mg  (Given - Provider:   
Annie Villar RN)   
2019 (Given - Provider: Annie Villar RN) 194 (Given - Provider: Annie Villar RN)  
  
                80 mg, Oral, NIGHTLY, First dose on 22 at 2100, Until Di  
scontinued                                   
   
                    buPROPion (WELLBUTRIN XL) extended release tablet 150 mg 170  
0 (Due)          1446 (Given   
- Provider: William Hobbs RN - Comment: Med sent up to unit while pt 
was in cath lab)                        1209 (Given - Provider: William issa RN - Comment:   
medication not available from pharmacy when scheduled)  
  
                                        150 mg, Oral, DAILY, First dose (after l  
ast modification) on 22 at 1700,  
 Until Discontinued                                           
   
                collagenase ointment                                 1305 (Not G  
iven - Provider: Eros Patel RN - Reason:   
Other - Comment: Dressing already changed by wound care nurse.)  
  
                Topical, DAILY, First dose on 22 at 1315, Apply to left  
 shin                                   
   
                          gabapentin (NEURONTIN) capsule 300 mg 1646 (Given - Pr  
ovider: Travis Jacobs RN)2035 (Given - Provider: Annie Villar RN) 0957 (Given - Provider: William Hobbs RN)1813 (Given - Provider: William Hobbs RN) 0001   
(Given - Provider: Annie Villar RN - Comment: Dose rescheduled)0950 (Given - 
Provider: Eros Patel RN - Comment: Patient is cath lab for procedure when 
medication scheduled.)1513 (Given - Provider: William Hobbs RN)  
  
                                        300 mg, Oral, 3 TIMES DAILY, First dose   
on 22 at 1645, Until   
Discontinued                                                1940 (Given - Provid  
er: Annie Villar RN)  
   
                          insulin lispro (HUMALOG) injection vial 0-4 Units 1825  
 (Not Given - Provider:   
Travis Jacobs RN - Reason: Order parameters not met) 1011 (Not Given -   
Provider: William Hobbs RN - Reason: Order parameters not met - 
Comment: )1517 (Not Given - Provider: William Hobbs RN - Reason: 
Order parameters not met - Comment: P   0955 (Not Given - Provider: Eros eugene RN - Reason: Order parameters not met - Comment: Glucose: 131.)1210 (Not Given 
- Provider: William Hobbs RN - Reason: Order parameters not met - Com
ment: )  
  
                                        0-4 Units, SubCUTAneous, 3 TIMES DAILY W  
ITH MEALS, First dose on 22 at   
1700, Until Discontinued, **Corrective Low Dose Algorithm** Glucose: Dose: 70-
199 No Insulin 200-249 1 Unit 250-299 2 Units                           t was in  
 cath lab at time med was  
 due. BG now was 152, no insulin needed.) (Not Given - Provider: William Hobbs RN - Reason: Order parameters not met - Comment: )    
(Not Given - Provider: Eros Patel RN - Reason: Order parameters not met - 
Comment: Blood sugar is 149.)  
  
                 300-349 3 Units Over 349 4 Units and notify physician            
                         
   
                          insulin lispro (HUMALOG) injection vial 0-4 Units   
 (Not Given - Provider:   
Annie Villar RN - Reason: Order parameters not met - Comment: )    
(Not Given - Provider: Annie Villar RN - Reason: Order parameters not met)   
 (Not Given - Provider: Annie Villar RN - Reason: Order parameters not 
met)  
  
                                        0-4 Units, SubCUTAneous, NIGHTLY, First   
dose on 22 at 2100, Until   
Discontinued, If continuous tube feedings/TPN/NPO, give correction dose based on
 result, no reduction in dose. If eating or bolu                                  
           
  
                                        s tube feeding: **Corrective Bedtime Alg  
orithm** Glucose: Dose:  No   
Insulin 300-349 4 Units Over 349 4 Units and notify physician                     
                        
   
                          lidocaine 4 % external patch 2 patch  (Patch Appli  
ed - Provider: Travis Jacobs RN)                            06 (Patch Removed - Provider: Fer Villar RN)0958 (Patch   
Applied - Provider: William Hobbs, RN) (Patch Removed - Provider: 
Annie Villar RN)                     0950 (Patch Applied - Provider: Eros Patel RN)   
(Patch Removed - Provider: Annie Villar RN)  
  
                                        2 patch, TransDERmal, Administer over 12  
 Hours, DAILY, First dose on 22   
at 1800, Apply patch to affected area. Patch may remain in place for up to 12 
hours in any 24 hour period.                                           
   
                          metoprolol tartrate (LOPRESSOR) tablet 25 mg  (Giv  
en - Provider: Annie Villar RN)                               0957 (Given - Provider: William issa RN) (Given -   
Provider: Annie Villar RN)           0950 (Given - Provider: JABIER Edwards)194   
(Given - Provider: Annie Villar RN)  
  
                                        25 mg, Oral, 2 TIMES DAILY, First dose o  
n 22 at 2100, Until Discontinued  
                                                              
   
                    pantoprazole (PROTONIX) tablet 40 mg                     062  
4 (Given - Provider: Annie Villar RN)                                     06 (Given - Provider: Annie Villar RN)  
  
                                        40 mg, Oral, DAILY BEFORE BREAKFAST, Fir  
st dose on Wed 8/10/22 at 0700, Until   
Discontinued, Do not crush or break.                                           
   
                    prasugrel (EFFIENT) tablet 10 mg                     0957 (G  
iven - Provider: William Hobbs RN)                                    0950 (Given - Provider: JABIER Edwards)  
  
                10 mg, Oral, DAILY, First dose on Wed 8/10/22 at 0900, Until Dis  
continued                                   
   
                          sodium chloride flush 0.9 % injection 5-40 mL  (Gi  
mirella - Provider: Annie Villar RN)                               151 (Not Given - Provider: William Vargas RN - Reason: IV   
Fluid Infusing) (Given - Provider: Annie Villar RN) 0925 (Given -   
Provider: Eros Patel RN)194 (Given - Provider: Annie Villar RN)  
  
                                        5-40 mL, IntraVENous, EVERY 12 HOURS CHANDLER  
EDULED (2 times per day), First dose on   
22 at 2100, Until Discontinued, For Line Patency: Peripheral IV = 5 mL; 
Midline or Central Line = 10 mL/lumen. If                                         
    
  
                                        following IV push medication, administer  
 flush at same rate as the IV push.   
Flush volume is determined by type of infusion therapy being given. For non-
viscous solutions use: Peripheral IV = 5 mL Midlin                                
             
  
                                        e or Central Line = 10 mL/lumen For visc  
ous solutions (i.e. blood components,   
parenteral nutrition, contrast media, or after obtaining blood sample) use: 
Peripheral IV = 10 mL Midline or Central Line = 20 mL/lumen, Recovery(Cath)       
                                      
  
  
Continuous  
  
                Medication Order 2022      08/10/2022      2022  
   
                          0.9 % sodium chloride infusion (CANCELED) 1515 (New Ba  
g - Provider: Sun Dawson, JOSE - Comment: Started in cath lab) 0745 (Rate/Dose Verify - Provider:   
William Hobbs RN)1000 (Stopped - Provider: William Hobbs RN - 
Comment: Per Dr. Calhoun, Cardiology fellow, turning EKOS off.)   
  
                                        IntraCATHeter, at 35 mL/hr, CONTINUOUS,   
Starting on 22 at 1445, 35 mL/hr  
 via EKOS catheter, coolant                                           
   
                          alteplase (CATHFLO) 12.5 mg in sodium chloride 0.9 % 2  
50 mL infusion ()   
1513 (New Bag - Provider: Sun Dawson RN - Comment: Started in cath lab)   
0745 (Rate/Dose Verify - Provider: William Hobbs RN - Comment: RN paged
 Dr. Bryan about TPA drip still running, and clarified that heparin drip 
should be started; tpa stopped.)          
  
                                        0.5 mg/hr (10 mL/hr), IntraCATHeter, CON  
TINUOUS, Starting on 22 at 1500,  
 Until Wed 8/10/22 at 0659, If another bag of alteplase needed, nurse to request
 from pharmacy at least 2 hours in advance                           1000 (Stopp  
ed - Provider: William Hobbs RN - Comment: RN paged Dr. Bryan about TPA drip still 
running, and clarified that heparin drip should be started; tpa stopped.)   
  
                . 0.5 mg/hr infusion via peripheral EKOS catheter, to run until   
0700 8/10/22                                   
   
                heparin 25,000 units in dextrose 5 % 250 mL infusion (rate based  
)                                 1805 (New   
Bag - Provider: William Hobbs RN)1930 (Handoff - Provider: Eros Patel RN - Comment: GALLITO Villar RN.)  
  
                                        500 Units/hr (5 mL/hr), IntraVENous, CON  
TINUOUS, Starting on 22 at   
1700, Until Discontinued, Fixed Rate                                           
   
                          heparin 25,000 units in dextrose 5% 250 mL (premix) in  
fusion (CANCELED) 1515   
(New Bag - Provider: Sun Dawson RN - Comment: Started in cath lab) 0745   
(Rate/Dose Verify - Provider: William Hobbs RN)1415 (Stopped - 
Provider: William Hobbs RN - Comment: Pt no longer on EKOS)   
  
                                        500 Units/hr (5 mL/hr), IntraCATHeter, C  
ONTINUOUS, Starting on 22 at   
1500, Until Wed 8/10/22 at 1604, Infuse through side arm of sheath. Do NOT use 
heparin guidelines or titrate. Infusion via side-port of sheath for EKOS 
catheter                                                      
   
                    heparin 25,000 units in dextrose 5% 250 mL (premix) infusion  
 (CANCELED)                     1000   
(New Bag - Provider: William Hobbs RN)1714 (Stopped - Provider: William Hobbs RN)                       
  
                                        500 Units/hr (5 mL/hr), IntraVENous, CON  
TINUOUS, Starting on Wed 8/10/22 at   
1000, Until Wed 8/10/22 at 1604, No bolus; through peripheral IV                  
                           
   
                    heparin 25,000 units in dextrose 5% 250 mL (premix) infusion  
 (CANCELED)                     1714   
(New Bag - Provider: William Hobbs RN)1751 (Rate/Dose Verify - 
Provider: William Hobbs RN - Comment: aPTT was 46.7; no change) 0144   
(Rate/Dose Change - Provider: Annie Villar RN)0606 (New Bag - Provider: 
Annie Villar RN)0925 (Stopped - Provider: Eros Patel RN - Comment: 
Infusion not running when patient arrived back  
  
                                        18 Units/kg/hr 101.7 kg (18.306 mL/hr, r  
ounded to 18.3 mL/hr), IntraVENous,   
CONTINUOUS, Starting on Wed 8/10/22 at 1630, Until Thu 22 at 1348, HIGH 
Dose Heparin Weight Based Dosing (DVT / PE) Bolus                                 
           from cath lab procedure.   
Arterial sheath planned to be removed.)  
  
                                         80 units/kg IV x 1 (max 10,000 units),   
then 18 units/kg/hr UNLESS due to   
patient weight rate exceeds 2100 units/hour (max initial rate) Initial rate for 
this patient: 18 units/kg/hr aPTT < 30 Hepari                                     
        
  
                                        n Full re-bolus Increase infusion by 2 u  
nits/kg/hr; notify prescriber. aPTT 30-  
45.9 Heparin Half re-bolus Increase infusion by 1 unit/kg/hr aPTT 46-80.9 No 
bolus No change aPTT .9 Hold heparin for                                    
         
  
                                         60 min Decrease infusion by 1 unit/kg/h  
r aPTT > 100.9 Hold heparin for 60 min   
Decrease infusion by 2 units/kg/hr; notify prescriber Check aPTT 6 hours after 
initiation and 6 hours after every dose c                                         
    
  
                                        hange; every 12 hrs x 1 day after 2 cons  
ecutive therapeutic aPTT; daily after   
every 12 hrs x 1 day is complete.                                           
   
                          HYDROmorphone (DILAUDID) 30 mg in sodium chloride 0.9   
% 30 mL PCA 1813 (New Bag   
- Provider: Travis Jacobs RN)      0743 (Handoff - Provider: Anali menjivar RN)1535 (Rate/Dose Change - Provider: William Hobbs RN)1924 (Handoff - 
Provider: William Hobbs RN)    0925 (Rate/Dose Verify - Provider: Eros Patel RN - Comment: Reviewed PCA pump/programming when patient arrived back 
from cath lab preocedure. Pump programmed as ordered.)193 (Handoff - Provider: 
Eros Patel RN - Comment: GALLITO Villar RN oncoming.)  
  
                                        IntraVENous, CONTINUOUS, Starting on 22 at 1800, PCA dose: 0.3 mg   
Lockout Interval: 6 minutes Basal dose: 0 mg/Hr One hour limit: 3 mg              
                               
  
  
PRN  
  
                Medication Order 2022      08/10/2022      2022  
   
                0.9 % sodium chloride infusion                                   
  
                                        25 mL, IntraVENous, at 100 mL/hr, PRN, I  
f patient receiving piggyback infusions   
without ordered maintenance IV fluids or with frequent/long duration piggyback 
infusions, Starting on 22 at 1614,                                        
     
  
                Administer at the same rate as the piggyback being infused., Rec  
overy(Cath)                                   
   
                dextrose 5 % solution                                   
  
                                        100 mL/hr, IntraVENous, PRN, Low blood s  
ugar, Starting on 22 at 1617,   
Start infusion following administration of dextrose 50% or glucagon.              
                               
   
                dextrose 50 % IV solution                                   
  
                                        12.5 g, IntraVENous, PRN, Starting on 22 at 1617, Until Discontinued,   
Low blood sugar, Blood glucose less than 70 mg/dL and patient NOT ALERT or NPO.,
 If patient does not respond within 5 minute                                      
       
  
                                        s, repeat dose x1. Start D5W at 100 mL/h  
our until ordering provider can be   
reached. Repeat blood glucose in 15 minutes. If blood glucose is less than 70 
mg/dL, repeat treatment and recheck blood glucose                                 
            
  
                 in 15 minutes x2. If using Glucostabilizer, dose as instructed   
per system.                                   
   
                glucagon (rDNA) injection 1 mg                                   
  
                                        1 mg, IntraMUSCular, PRN, Starting on 22 at 1617, Until Discontinued,   
Low blood sugar, Blood glucose less than 70 mg/dL and patient NOT ALERT or NPO 
and does not have IV access., After administr                                     
        
  
                                        ation, attempt intravenous access and st  
art D5W at 100 mL/hr. Repeat blood   
glucose in 15 minutes x2 and notify provider.                                     
        
   
                Glucose (TRUEPLUS) oral gel 15 g                                  
   
  
                                        15 g, Oral, PRN, Starting on 22   
at 1617, Until Discontinued, Low blood   
sugar, If blood glucose less than 50 mg/dL and patient ALERT and TOLERATING PO, 
give 2 tubes glucose gel. If blood glucose                                        
     
  
                                        less than 70 mg/dL and patient ALERT and  
 TOLERATING PO, give 1 tube glucose gel.  
 Repeat blood glucose in 15 minutes. If blood glucose is less than 70 mg/dL, 
repeat treatment and recheck blood glucose in 15 minutes x2 and notify provider.
                                                              
   
                heparin (porcine) injection 4,070 Units                           
        0144 (Given - Provider: Annie Villar RN)  
  
                                        4,070 Units (rounded from 4,068 Units =   
40 Units/kg 101.7 kg), IntraVENous, PRN,  
 Starting on Wed 8/10/22 at 1602, Until Discontinued, Other, heparin dosing 
algorithm, Half dose re-bolus based on pharmacy algorithm                         
                    
   
                heparin (porcine) injection 8,140 Units                           
          
  
                                        8,140 Units (rounded from 8,136 Units =   
80 Units/kg 101.7 kg), IntraVENous, PRN,  
 Starting on Wed 8/10/22 at 1602, Until Discontinued, Other, heparin dosing 
algorithm, Full dose re-bolus based on pharmacy algorithm                         
                    
   
                          HYDROmorphone (DILAUDID) injection 0.5 mg (CANCELED) 1  
803 (Given - Provider:   
Travis Jacobs RN)                                
  
                                        HYDROmorphone (DILAUDID) 1.5mg IV is equ  
ivalent to morphine 10mg IV, 0.5 mg,   
IntraVENous, PRN, 3 doses, Starting on 22 at 1743, Until Wed 8/10/22 at 
1119, Pain Severe (7-10), WHILE AWAITING PCA,                                     
        
  
                                         If oral and IV narcotics ordered, use o  
ral first and only use IV if oral is   
ineffective or cannot take oral. Do Not give oral and IV within 1 hour of each 
other unless specifically ordered.                                           
   
                methocarbamol (ROBAXIN) tablet 1,000 mg                           
          
  
                                        1,000 mg, Oral, 3 TIMES DAILY PRN, Start  
ing on 22 at 1738, Until   
Discontinued, Muscle spasms                                           
   
                naloxone 0.4 mg in 10 mL sodium chloride syringe                  
                   
  
                                        IntraVENous, PRN, Opioid Reversal, Start  
ing on 22 at 1736, PRN if   
respiratory rate is less than 6/min and patient is difficult to arouse then 
notify physician STAT. Mix 9 mL of sodium chloride 0                              
               
  
                                        .9% with 0.4 mg (1 mL) of naloxone (NARC  
AN) in 10 mL syringe. (Note: dilution is  
 0.04 mg/mL) Give 0.08 mg (2 mL of special dilution), slow IV push, repeat up to
 0.4 mg (10 mL) or until patient is respon                                        
     
  
                                        sive to physical stimulation and respira  
tory rate is equal to or greater than 6   
breaths/min. Continue to observe, if no response within 3 minutes of 
administration of 0.4 mg (10 mL) total, repeat dose (                             
                
  
                0.4 mg as administered previously). Concentration 0.04 mg/mL      
                               
   
                ondansetron (ZOFRAN) injection 4 mg(Linked Group 1)               
                      
  
                                        4 mg, IntraVENous, EVERY 6 HOURS PRN, St  
arting on 22 at 1614, Until   
Discontinued, Nausea, Vomiting, Administer if oral route cannot be used., 
Recovery(Cath)                                                
   
                ondansetron (ZOFRAN-ODT) disintegrating tablet 4 mg(Linked Group  
 1)                                   
  
                                        4 mg, Oral, EVERY 8 HOURS PRN, Starting   
on 22 at 1614, Until   
Discontinued, Nausea, Vomiting, Recovery(Cath)                                    
         
   
                polyethylene glycol (GLYCOLAX) packet 17 g                        
             
  
                                        17 g, Oral, DAILY PRN, Starting on  at 1736, Until Discontinued,   
Constipation, First line therapy for constipation                                 
            
   
                sodium chloride flush 0.9 % injection 5-40 mL                     
                
  
                                        5-40 mL, IntraVENous, PRN, Starting on 22 at 1614, Until Discontinued,   
Line Care, For Line Patency: Peripheral IV = 5 mL; Midline or Central Line = 10 
mL/lumen. If following IV push medication,                                        
     
  
                                        administer flush at same rate as the IV   
push. Flush volume is determined by type  
 of infusion therapy being given. For non-viscous solutions use: Peripheral IV =
 5 mL Midline or Central Line = 10 mL/lume                                        
     
  
                                        n For viscous solutions (i.e. blood comp  
onents, parenteral nutrition, contrast   
media, or after obtaining blood sample) use: Peripheral IV = 10 mL Midline or 
Central Line = 20 mL/lumen, Recovery(Cath)                                        
     
  
  
Linked Groups  
  
                                        Order  
   
                                        Group 1:  
  
ondansetron (ZOFRAN-ODT) disintegrating tablet 4 mgJump to med  
  
                                        4 mg, Oral, EVERY 8 HOURS PRN, Starting   
on 22 at 1614, Until   
Discontinued, Nausea, Vomiting, Recovery(Cath)  
   
                                        Or  
  
ondansetron (ZOFRAN) injection 4 mgJump to med  
  
                                        4 mg, IntraVENous, EVERY 6 HOURS PRN, St  
arting on 22 at 1614, Until   
Discontinued, Nausea, Vomiting<br>Administer if oral route cannot be 
used.<br>Recovery(Cath)  
  
  
Scheduled  
  
                Medication Order 2022  
   
                oxyCODONE (ROXICODONE) immediate release tablet 5 mg (COMPLETED)  
                                 1204 (Given -  
 Provider: Nusrat Pantoja RN)  
  
                5 mg, Oral, ONCE, 1 dose, On Sun 9/18/22 at 1215                  
                   
  
  
  
  
  
                                        Care Teams (unrecognized section and con  
tent)  
  
  
  
  
             Team Member  Relationship Specialty    Start Date   End Date  
   
                                        Servando Ang  
  
                PCP - General                   20           
  
                                        194 Kadoka, OH 97056  
  
                                                                  
  
                                        488.783.3932 (Work)  
  
                                                                  
  
             146.229.6826 (Fax)                                          
  
  
  
  
             Team Member  Relationship Specialty    Start Date   End Date  
   
                                        Suyapa Angt  
  
                PCP - General                   20 Kadoka, OH 00657  
  
                                                                  
  
                                        817.570.1491 (Work)  
  
                                                                  
  
             917.448.3270 (Fax)                                          
  
  
  
  
             Team Member  Relationship Specialty    Start Date   End Date  
   
                                        Suyapa Angt  
  
                PCP - General                   20 Kadoka, OH 90275  
  
                                                                  
  
                                        704.432.9170 (Work)  
  
                                                                  
  
             619.134.7448 (Fax)                                          
  
  
  
  
             Team Member  Relationship Specialty    Start Date   End Date  
   
                                        Hector Tijerina APRN - CNP  
  
                PCP - General                   3/14/22           
  
                                        525 Midvale, OH 68084  
  
                                                                  
  
                                        Aurora, OH 28413  
  
                                                                  
  
                                        187.967.2516 (Work)  
  
                                                                  
  
             656.688.1059 (Fax)                                          
  
  
  
  
             Team Member  Relationship Specialty    Start Date   End Date  
   
                                        Hector Tijerina APRN - JEANIE  
  
                PCP - General                   3/14/22           
  
                                        525 Midvale, OH 29794  
  
                                                                  
  
                                        Aurora, OH 36920  
  
                                                                  
  
                                        867.630.4715 (Work)  
  
                                                                  
  
             445.354.1712 (Fax)                                          
  
  
  
  
             Team Member  Relationship Specialty    Start Date   End Date  
   
                                        Hector Tijerina APRN - CNP  
  
                PCP - General                   3/14/22           
  
                                        525 Midvale, OH 50905  
  
                                                                  
  
                                        Aurora, OH 94387  
  
                                                                  
  
                                        719.243.6611 (Work)  
  
                                                                  
  
             341.313.6525 (Fax)                                          
  
  
  
  
             Team Member  Relationship Specialty    Start Date   End Date  
   
                                        Hector Tijerina APRN.CNP  
  
                PCP - General   Family Practice 3/10/22           
  
                                        1946 St. Anthony's Healthcare Center 200  
  
                                                                  
  
                                        Reno, OH 07858  
  
                                                                  
  
                                        502.854.1026 (Work)  
  
                                                                  
  
             748.950.1607 (Fax)                                          
   
                                        Elizabeth Miles MD  
  
                Referring       Hematology/Oncology 19           
  
                                        224 W EXCHANGE ST  
  
                                                                  
  
                                        Presbyterian Medical Center-Rio Rancho 160  
  
                                                                  
  
                                        Aurora, OH 01283  
  
                                                                  
  
                                        646.870.3142 (Work)  
  
                                                                  
  
             699.870.6201 (Fax)                                          
   
                                        Blayne Lorenz  
  
                                Thoracic Surgery 19           
  
                                        75 ARCH Long Island College Hospital 412  
  
                                                                  
  
                                        Aurora, OH 39250-7815  
  
                                                                  
  
                                        817.756.4611 (Work)  
  
                                                                  
  
             427.394.4675 (Fax)                                          
  
  
  
  
             Team Member  Relationship Specialty    Start Date   End Date  
   
                                        Hector Tijerina APRN - CNP  
  
                PCP - General   Nurse Practitioner 22           
  
                                        04 Marshall Street Filion, MI 48432 MedCoPorterville, OH 35305  
  
                                                                  
  
             664.969.5298 (Work)                                          
  
  
  
  
             Team Member  Relationship Specialty    Start Date   End Date  
   
                                        Hector Tijerina APRN - CNP  
  
                PCP - General   Nurse Practitioner 22           
  
                                        2500 Mahnomen Health Center MedAdrian, OH 84099  
  
                                                                  
  
             574.734.4318 (Work)                                          
  
  
  
  
             Team Member  Relationship Specialty    Start Date   End Date  
   
                                        Hector Tijerina APRN.CNP  
  
                PCP - General   Family Practice 3/10/22           
  
                                        1946 Pacifica Hospital Of The ValleyVD HECTOR 200  
  
                                                                  
  
                                        Reno, OH 82272  
  
                                                                  
  
                                        107.924.7069 (Work)  
  
                                                                  
  
             688-359-0857 (Fax)                                          
   
                                        Elizabeth Miles MD  
  
                Referring       Hematology/Oncology 19           
  
                                        224 W EXCHANGE ST  
  
                                                                  
  
                                        HECTOR 160  
  
                                                                  
  
                                        AKRON, OH 75754  
  
                                                                  
  
                                        746-709-6605 (Work)  
  
                                                                  
  
             990-083-4020 (Fax)                                          
   
                                        Saint Luke's Hospital Cy  
  
                                Thoracic Surgery 19           
  
                                        75 ARCH ST  
  
                                                                  
  
                                        HECTOR 412  
  
                                                                  
  
                                        Longport, OH 24073-45192 928-637-0366 (Work)  
  
                                                                  
  
             382-572-8360 (Fax)                                          
  
  
  
  
             Team Member  Relationship Specialty    Start Date   End Date  
   
                                        Hector Tijerina APRN.JEANIE  
  
                PCP - General   Family Practice 3/10/22           
  
                                        1946 Pacifica Hospital Of The ValleyVD HECTOR 200  
  
                                                                  
  
                                        Reno, OH 73738  
  
                                                                  
  
                                        673.520.3650 (Work)  
  
                                                                  
  
             853-556-4570 (Fax)                                          
   
                                        Elizabeth Miles MD  
  
                Referring       Hematology/Oncology 19           
  
                                        224 W EXCHANGE ST  
  
                                                                  
  
                                        HECTOR 160  
  
                                                                  
  
                                        AKRON, OH 79569  
  
                                                                  
  
                                        755-282-6326 (Work)  
  
                                                                  
  
             314-647-6491 (Fax)                                          
   
                                        BarcalebSaint Joseph Hospital Cy  
  
                                Thoracic Surgery 19           
  
                                        75 ARCH ST  
  
                                                                  
  
                                        HECTOR 412  
  
                                                                  
  
                                        Longport, OH 31401-8767  
  
                                                                  
  
                                        494-748-1156 (Work)  
  
                                                                  
  
             987-911-8420 (Fax)                                          
  
  
  
  
             Team Member  Relationship Specialty    Start Date   End Date  
   
                                        Hector Tijerina APRN - CNP  
  
                PCP - General   Nurse Practitioner 22           
  
                                        91 Odonnell Street Greenville, MS 38702, OH 09787  
  
                                                                  
  
             380.882.1792 (Work)                                          
  
  
  
  
             Team Member  Relationship Specialty    Start Date   End Date  
   
                                        Hector Tijerina APRN.CNP  
  
                PCP - General   Family Practice 3/10/22           
  
                                        1946 Century City Hospital HECTOR 200  
  
                                                                  
  
                                        Reno, OH 04250  
  
                                                                  
  
                                        463-711-8125 (Work)  
  
                                                                  
  
             578-958-1794 (Fax)                                          
   
                                        Elizabeth Miles MD  
  
                Referring       Hematology/Oncology 19           
  
                                        224 W EXCHANGE ST  
  
                                                                  
  
                                        HECTOR 160  
  
                                                                  
  
                                        AKRON, OH 71793  
  
                                                                  
  
                                        349-354-7739 (Work)  
  
                                                                  
  
             201-921-7231 (Fax)                                          
   
                                        Saint Luke's Hospital Cy  
  
                                Thoracic Surgery 19           
  
                                        75 ARCH ST  
  
                                                                  
  
                                        HCETOR 412  
  
                                                                  
  
                                        AKRON, OH 60883-7082  
  
                                                                  
  
                                        579-034-9271 (Work)  
  
                                                                  
  
             878-539-8343 (Fax)                                          
  
  
  
  
             Team Member  Relationship Specialty    Start Date   End Date  
   
                                        Hector Tijerina APRN - CNP  
  
                PCP - General   Nurse Practitioner 22           
  
                                        04 Marshall Street Filion, MI 48432 MedCorp  
  
                                                                  
  
                                        Louisville, OH 82627  
  
                                                                  
  
             935.397.4227 (Work)                                          
  
  
  
  
             Team Member  Relationship Specialty    Start Date   End Date  
   
                                        Hector Tijerina APRN.CNP  
  
                PCP - General   Family Practice 3/10/22           
  
                                        1946 Pacifica Hospital Of The ValleyVD HECTOR 200  
  
                                                                  
  
                                        Reno, OH 92321  
  
                                                                  
  
                                        455.319.1253 (Work)  
  
                                                                  
  
             094-133-3039 (Fax)                                          
   
                                        Elizabeth Miles MD  
  
                Referring       Hematology/Oncology 19           
  
                                        224 W EXCHANGE ST  
  
                                                                  
  
                                        HECTOR 160  
  
                                                                  
  
                                        Longport, OH 93659  
  
                                                                  
  
                                        406-126-2063 (Work)  
  
                                                                  
  
             293-037-3040 (Fax)                                          
   
                                        Saint Luke's Hospital Cy  
  
                                Thoracic Surgery 19           
  
                                        75 ARCH ST  
  
                                                                  
  
                                        HECTOR 412  
  
                                                                  
  
                                        Longport, OH 90677-4259  
  
                                                                  
  
                                        893-925-6804 (Work)  
  
                                                                  
  
             287-379-4401 (Fax)                                          
  
  
  
  
             Team Member  Relationship Specialty    Start Date   End Date  
   
                                        Hector Tijerina, CARSON.CNP  
  
                PCP - General   Family Practice 3/10/22           
  
                                        1946 Pacifica Hospital Of The ValleyVD HECTOR 200  
  
                                                                  
  
                                        Reno, OH 37311  
  
                                                                  
  
                                        288-299-1909 (Work)  
  
                                                                  
  
             871-636-3179 (Fax)                                          
   
                                        Elizabeth Miles MD  
  
                Referring       Hematology/Oncology 19           
  
                                        224 W EXCHANGE ST  
  
                                                                  
  
                                        HECTOR 160  
  
                                                                  
  
                                        AKRON, OH 40633  
  
                                                                  
  
                                        536-997-5019 (Work)  
  
                                                                  
  
             254-197-7104 (Fax)                                          
   
                                        Flagstaff Medical Center, Blayne Cy  
  
                                Thoracic Surgery 19           
  
                                        75 ARCH ST  
  
                                                                  
  
                                        HECTOR 412  
  
                                                                  
  
                                        AKRON, OH 24980-5553  
  
                                                                  
  
                                        684-054-7680 (Work)  
  
                                                                  
  
             691-808-6238 (Fax)                                          
  
  
  
  
             Team Member  Relationship Specialty    Start Date   End Date  
   
                                        Hector Tijerina APRN.CNP  
  
                PCP - General   Family Practice 3/10/22           
  
                                        1946 Pacifica Hospital Of The ValleyVD HECTOR 200  
  
                                                                  
  
                                        Reno, OH 95220  
  
                                                                  
  
                                        316.352.9909 (Work)  
  
                                                                  
  
             659.816.1295 (Fax)                                          
   
                                        Elizabeth Miles MD  
  
                Referring       Hematology/Oncology 19           
  
                                        224 W EXCHANGE ST  
  
                                                                  
  
                                        HECTOR 160  
  
                                                                  
  
                                        Longport, OH 91810  
  
                                                                  
  
                                        326-079-2176 (Work)  
  
                                                                  
  
             309.398.9473 (Fax)                                          
   
                                        Barcalebk, Blayne Cy  
  
                                Thoracic Surgery 19           
  
                                        75 ARCH ST  
  
                                                                  
  
                                        HECTOR 412  
  
                                                                  
  
                                        Longport, OH 71525-66318 278-366-0366 (Work)  
  
                                                                  
  
             608-869-4854 (Fax)                                          
  
  
  
  
             Team Member  Relationship Specialty    Start Date   End Date  
   
                                        Hector Tijerina APRN.CNP  
  
                PCP - General   Family Practice 3/10/22           
  
                                        1946 Pacifica Hospital Of The ValleyVD HECTOR 200  
  
                                                                  
  
                                        Reno, OH 26716  
  
                                                                  
  
                                        610.385.3495 (Work)  
  
                                                                  
  
             504-512-3118 (Fax)                                          
   
                                        Elizabeth Miles MD  
  
                Referring       Hematology/Oncology 19           
  
                                        224 W EXCHANGE ST  
  
                                                                  
  
                                        HECTOR 160  
  
                                                                  
  
                                        Longport, OH 83006  
  
                                                                  
  
                                        981.874.4377 (Work)  
  
                                                                  
  
             857.643.7645 (Fax)                                          
   
                                        Barcaleb Blayne Cy  
  
                                Thoracic Surgery 19           
  
                                        75 ARCH ST  
  
                                                                  
  
                                        HECTOR 412  
  
                                                                  
  
                                        Longport, OH 23217-81751 215-916-0366 (Work)  
  
                                                                  
  
             090-138-5136 (Fax)                                          
  
  
  
  
             Team Member  Relationship Specialty    Start Date   End Date  
   
                                        Hector Tijerina APRN - CNP  
  
                PCP - General   Nurse Practitioner 22           
  
                                        04 Marshall Street Filion, MI 48432 MedCoPorterville, OH 55917  
  
                                                                  
  
             204.274.7946 (Work)                                          
  
  
  
  
             Team Member  Relationship Specialty    Start Date   End Date  
   
                                        Hector Tijerina, APRN.CNP  
  
                PCP - General   Family Medicine 3/10/22           
  
                                        1946 Pacifica Hospital Of The ValleyVD HECTOR 200  
  
                                                                  
  
                                        Reno, OH 19825  
  
                                                                  
  
                                        845.540.2255 (Work)  
  
                                                                  
  
             219.850.4487 (Fax)                                          
   
                                        Elizabeth Miles MD  
  
                Referring       Hematology/Oncology 19           
  
                                        224 W EXCHANGE ST  
  
                                                                  
  
                                        HECTOR 160  
  
                                                                  
  
                                        AKRON, OH 28468  
  
                                                                  
  
                                        767-772-6164 (Work)  
  
                                                                  
  
             795.362.2963 (Fax)                                          
   
                                        Lucretiak, Blayne Cy  
  
                                Thoracic Surgery 19           
  
                                        75 ARCH ST  
  
                                                                  
  
                                        HECTOR 412  
  
                                                                  
  
                                        Aurora, OH 35611-8310  
  
                                                                  
  
                                        955-823-0055 (Work)  
  
                                                                  
  
             272.399.9672 (Fax)                                          
  
  
  
  
             Team Member  Relationship Specialty    Start Date   End Date  
   
                                        Hector Tijerina APRN - CNP  
  
                PCP - General   Nurse Practitioner 22           
  
                                        2500 Mahnomen Health Center MedCoGreystone Park Psychiatric Hospital, OH 42194  
  
                                                                  
  
             799.931.7826 (Work)                                          
  
  
  
  
             Team Member  Relationship Specialty    Start Date   End Date  
   
                                        Hector Tijerina APRN - CNP  
  
                PCP - General   Nurse Practitioner 22           
  
                                        2500 Mahnomen Health Center MedCoShore Memorial Hospital OH 87348  
  
                                                                  
  
             693.696.8737 (Work)                                          
  
  
  
  
             Team Member  Relationship Specialty    Start Date   End Date  
   
                                        Hector Tijerina APRN.CNP  
  
                PCP - General   Family Medicine 3/10/22           
  
                                        1946 Century City Hospital HECTOR 200  
  
                                                                  
  
                                        Reno, OH 20330  
  
                                                                  
  
                                        257.566.4404 (Work)  
  
                                                                  
  
             129.834.4600 (Fax)                                          
   
                                        Elizabeth Miles MD  
  
                Referring       Hematology/Oncology 19           
  
                                        224 W EXCHANGE ST  
  
                                                                  
  
                                        HECTOR 160  
  
                                                                  
  
                                        Aurora, OH 36851  
  
                                                                  
  
                                        967.640.6585 (Work)  
  
                                                                  
  
             851.633.1589 (Fax)                                          
   
                                        LucretiakBlayne Cy  
  
                                Thoracic Surgery 19           
  
                                        75 ARCH ST  
  
                                                                  
  
                                        HECTOR 412  
  
                                                                  
  
                                        Aurora, OH 23727-94797 076-604-0366 (Work)  
  
                                                                  
  
             294-158-0540 (Fax)                                          
  
  
  
  
             Team Member  Relationship Specialty    Start Date   End Date  
   
                                        Charity Duong DO  
  
                PCP - General   Family Medicine 22 Temple Community Hospital HECTOR 200  
  
                                                                  
  
                                        Reno, OH 14333  
  
                                                                  
  
                                        328.547.7798 (Work)  
  
                                                                  
  
             786-977-7373 (Fax)                                          
   
                                        Elizabeth Miles MD  
  
                Referring       Hematology/Oncology 19           
  
                                        224 W EXCHANGE ST  
  
                                                                  
  
                                        HECTOR 160  
  
                                                                  
  
                                        Longport, OH 44132  
  
                                                                  
  
                                        899-724-4486 (Work)  
  
                                                                  
  
             625-394-3790 (Fax)                                          
   
                                        Barcalebk Blayne Cy  
  
                                Thoracic Surgery 19           
  
                                        75 ARCH ST  
  
                                                                  
  
                                        HECTOR 412  
  
                                                                  
  
                                        Aurora, OH 12214-2651  
  
                                                                  
  
                                        154-494-7552 (Work)  
  
                                                                  
  
             366-705-8125 (Fax)                                          
  
  
  
  
             Team Member  Relationship Specialty    Start Date   End Date  
   
                                        Charity Duong DO  
  
                PCP - General   Family Medicine 22 Temple Community Hospital HECTOR 200  
  
                                                                  
  
                                        Reno, OH 34874  
  
                                                                  
  
                                        529.812.6532 (Work)  
  
                                                                  
  
             465.130.6426 (Fax)                                          
   
                                        Elizabeth Miles MD  
  
                Referring       Hematology/Oncology 19           
  
                                        224 W EXCHANGE ST  
  
                                                                  
  
                                        HECTOR 160  
  
                                                                  
  
                                        Longport, OH 45552302 912.667.8324 (Work)  
  
                                                                  
  
             281.995.6404 (Fax)                                          
   
                                        BarcalebSaint Joseph Hospital Cy  
  
                                Thoracic Surgery 19           
  
                                        75 ARCH ST  
  
                                                                  
  
                                        HECTOR 412  
  
                                                                  
  
                                        Aurora, OH 44304-1433 123.584.4431 (Work)  
  
                                                                  
  
             920.354.2004 (Fax)                                          
  
  
  
  
             Team Member  Relationship Specialty    Start Date   End Date  
   
                                        Charity Duong DO  
  
                PCP - General   Family Medicine 22          
  
                                        1946 Temple Community Hospital HECTOR 200  
  
                                                                  
  
                                        Reno, OH 17291  
  
                                                                  
  
                                        330.494.7764 (Work)  
  
                                                                  
  
             975.515.5278 (Fax)                                          
   
                                        Elizabeth Miles MD  
  
                Referring       Hematology/Oncology 19           
  
                                        224 W EXCHANGE ST  
  
                                                                  
  
                                        Presbyterian Medical Center-Rio Rancho 160  
  
                                                                  
  
                                        Aurora, OH 95892302 429.693.2123 (Work)  
  
                                                                  
  
             993.176.4212 (Fax)                                          
   
                                        BarcalebCaldwell Medical Center  
  
                                Thoracic Surgery 19           
  
                                        75 ARCH ST  
  
                                                                  
  
                                        HECTOR 412  
  
                                                                  
  
                                        Aurora, OH 44304-1433 406.994.4700 (Work)  
  
                                                                  
  
             693.670.4532 (Fax)                                          
  
  
  
  
  
                                        Source Comments (unrecognized section an  
d content)  
   
                                        In the event this information is protect  
ed by the Hospital Sisters Health System Sacred Heart Hospital Confidentiality of   
Alcohol  
  
                                        and Drug Abuse Patient Records regulatio  
ns: This information has been disclosed   
to  
  
                                        you from records protected by Federal co  
nfidentiality rules (42 CFR Part 2). The  
  
                                        Federal rules prohibit you from making a  
ny further disclosure of this   
information  
  
                                        unless further disclosure is expressly p  
ermitted by the written consent of the   
person  
  
                                        to whom it pertains or as otherwise perm  
itted by 42 CFR Part 2. A general  
  
                                        authorization for the release of medical  
 or other information is NOT sufficient   
for  
  
                                        this purpose. The Federal rules restrict  
 any use of the information to   
criminally  
  
                                        investigate or prosecute any alcohol or   
drug abuse patient. Trinity Health SystemIn   
the  
  
                                        event this information is protected by t  
SSM Saint Mary's Health Center Confidentiality of Alcohol   
and  
  
                                        Drug Abuse Patient Records regulations:   
This information has been disclosed to   
you  
  
                                        from records protected by Federal confid  
entiality rules (42 CFR Part 2). The   
Federal  
  
                                        rules prohibit you from making any furth  
er disclosure of this information unless  
  
                                        further disclosure is expressly permitte  
d by the written consent of the person   
to  
  
                                        whom it pertains or as otherwise permitt  
ed by 42 CFR Part 2. A general   
authorization  
  
                                        for the release of medical or other info  
rmation is NOT sufficient for this   
purpose.  
  
                                        The Federal rules restrict any use of th  
e information to criminally investigate   
or  
  
                                        prosecute any alcohol or drug abuse lily  
ent. Trinity Health SystemIn the event this  
  
                                        information is protected by the Federal   
Confidentiality of Alcohol and Drug   
Abuse  
  
                                        Patient Records regulations: This inform  
ation has been disclosed to you from   
records  
  
                                        protected by Federal confidentiality rul  
es (42 CFR Part 2). The Federal rules  
  
                                        prohibit you from making any further dis  
closure of this information unless   
further  
  
                                        disclosure is expressly permitted by the  
 written consent of the person to whom   
it  
  
                                        pertains or as otherwise permitted by 42  
 CFR Part 2. A general authorization for  
 the  
  
                                        release of medical or other information   
is NOT sufficient for this purpose. The  
  
                                        Federal rules restrict any use of the in  
formation to criminally investigate or  
  
                                        prosecute any alcohol or drug abuse lily  
ent. Trinity Health SystemIn the event this  
  
                                        information is protected by the Federal   
Confidentiality of Alcohol and Drug   
Abuse  
  
                                        Patient Records regulations: This inform  
ation has been disclosed to you from   
records  
  
                                        protected by Federal confidentiality rul  
es (42 CFR Part 2). The Federal rules  
  
                                        prohibit you from making any further dis  
closure of this information unless   
further  
  
                                        disclosure is expressly permitted by the  
 written consent of the person to whom   
it  
  
                                        pertains or as otherwise permitted by 42  
 CFR Part 2. A general authorization for  
 the  
  
                                        release of medical or other information   
is NOT sufficient for this purpose. The  
  
                                        Federal rules restrict any use of the in  
formation to criminally investigate or  
  
                                        prosecute any alcohol or drug abuse lily  
ent. Trinity Health SystemIn the event this  
  
                                        information is protected by the Federal   
Confidentiality of Alcohol and Drug   
Abuse  
  
                                        Patient Records regulations: This inform  
ation has been disclosed to you from   
records  
  
                                        protected by Federal confidentiality rul  
es (42 CFR Part 2). The Federal rules  
  
                                        prohibit you from making any further dis  
closure of this information unless   
further  
  
                                        disclosure is expressly permitted by the  
 written consent of the person to whom   
it  
  
                                        pertains or as otherwise permitted by 42  
 CFR Part 2. A general authorization for  
 the  
  
                                        release of medical or other information   
is NOT sufficient for this purpose. The  
  
                                        Federal rules restrict any use of the in  
formation to criminally investigate or  
  
                                        prosecute any alcohol or drug abuse lily  
ent.  Trinity Health SystemIn the event this  
  
                                        information is protected by the Federal   
Confidentiality of Alcohol and Drug   
Abuse  
  
                                        Patient Records regulations: This inform  
ation has been disclosed to you from   
records  
  
                                        protected by Federal confidentiality rul  
es (42 CFR Part 2). The Federal rules  
  
                                        prohibit you from making any further dis  
closure of this information unless   
further  
  
                                        disclosure is expressly permitted by the  
 written consent of the person to whom   
it  
  
                                        pertains or as otherwise permitted by 42  
 CFR Part 2. A general authorization for  
 the  
  
                                        release of medical or other information   
is NOT sufficient for this purpose. The  
  
                                        Federal rules restrict any use of the in  
formation to criminally investigate or  
  
                                        prosecute any alcohol or drug abuse lily  
ent. Trinity Health SystemIn the event this  
  
                                        information is protected by the Federal   
Confidentiality of Alcohol and Drug   
Abuse  
  
                                        Patient Records regulations: This inform  
ation has been disclosed to you from   
records  
  
                                        protected by Federal confidentiality rul  
es (42 CFR Part 2). The Federal rules  
  
                                        prohibit you from making any further dis  
closure of this information unless   
further  
  
                                        disclosure is expressly permitted by the  
 written consent of the person to whom   
it  
  
                                        pertains or as otherwise permitted by 42  
 CFR Part 2. A general authorization for  
 the  
  
                                        release of medical or other information   
is NOT sufficient for this purpose. The  
  
                                        Federal rules restrict any use of the in  
formation to criminally investigate or  
  
                                        prosecute any alcohol or drug abuse lily  
ent. Trinity Health SystemIn the event this  
  
                                        information is protected by the Federal   
Confidentiality of Alcohol and Drug   
Abuse  
  
                                        Patient Records regulations: This inform  
ation has been disclosed to you from   
records  
  
                                        protected by Federal confidentiality rul  
es (42 CFR Part 2). The Federal rules  
  
                                        prohibit you from making any further dis  
closure of this information unless   
further  
  
                                        disclosure is expressly permitted by the  
 written consent of the person to whom   
it  
  
                                        pertains or as otherwise permitted by 42  
 CFR Part 2. A general authorization for  
 the  
  
                                        release of medical or other information   
is NOT sufficient for this purpose. The  
  
                                        Federal rules restrict any use of the in  
formation to criminally investigate or  
  
                                        prosecute any alcohol or drug abuse lily  
ent. Trinity Health SystemIn the event this  
  
                                        information is protected by the Federal   
Confidentiality of Alcohol and Drug   
Abuse  
  
                                        Patient Records regulations: This inform  
ation has been disclosed to you from   
records  
  
                                        protected by Federal confidentiality rul  
es (42 CFR Part 2). The Federal rules  
  
                                        prohibit you from making any further dis  
closure of this information unless   
further  
  
                                        disclosure is expressly permitted by the  
 written consent of the person to whom   
it  
  
                                        pertains or as otherwise permitted by 42  
 CFR Part 2. A general authorization for  
 the  
  
                                        release of medical or other information   
is NOT sufficient for this purpose. The  
  
                                        Federal rules restrict any use of the in  
formation to criminally investigate or  
  
                                        prosecute any alcohol or drug abuse lily  
ent. Trinity Health SystemIn the event this  
  
                                        information is protected by the Federal   
Confidentiality of Alcohol and Drug   
Abuse  
  
                                        Patient Records regulations: This inform  
ation has been disclosed to you from   
records  
  
                                        protected by Federal confidentiality rul  
es (42 CFR Part 2). The Federal rules  
  
                                        prohibit you from making any further dis  
closure of this information unless   
further  
  
                                        disclosure is expressly permitted by the  
 written consent of the person to whom   
it  
  
                                        pertains or as otherwise permitted by 42  
 CFR Part 2. A general authorization for  
 the  
  
                                        release of medical or other information   
is NOT sufficient for this purpose. The  
  
                                        Federal rules restrict any use of the in  
formation to criminally investigate or  
  
                                        prosecute any alcohol or drug abuse lily  
ent. Trinity Health SystemIn the event this  
  
                                        information is protected by the Federal   
Confidentiality of Alcohol and Drug   
Abuse  
  
                                        Patient Records regulations: This inform  
ation has been disclosed to you from   
records  
  
                                        protected by Federal confidentiality rul  
es (42 CFR Part 2). The Federal rules  
  
                                        prohibit you from making any further dis  
closure of this information unless   
further  
  
                                        disclosure is expressly permitted by the  
 written consent of the person to whom   
it  
  
                                        pertains or as otherwise permitted by 42  
 CFR Part 2. A general authorization for  
 the  
  
                                        release of medical or other information   
is NOT sufficient for this purpose. The  
  
                                        Federal rules restrict any use of the in  
formation to criminally investigate or  
  
                                        prosecute any alcohol or drug abuse lily  
ent. Trinity Health SystemIn the event this  
  
                                        information is protected by the Federal   
Confidentiality of Alcohol and Drug   
Abuse  
  
                                        Patient Records regulations: This inform  
ation has been disclosed to you from   
records  
  
                                        protected by Federal confidentiality rul  
es (42 CFR Part 2). The Federal rules  
  
                                        prohibit you from making any further dis  
closure of this information unless   
further  
  
                                        disclosure is expressly permitted by the  
 written consent of the person to whom   
it  
  
                                        pertains or as otherwise permitted by 42  
 CFR Part 2. A general authorization for  
 the  
  
                                        release of medical or other information   
is NOT sufficient for this purpose. The  
  
                                        Federal rules restrict any use of the in  
formation to criminally investigate or  
  
                                        prosecute any alcohol or drug abuse lily  
ent. Trinity Health SystemIn the event this  
  
                                        information is protected by the Federal   
Confidentiality of Alcohol and Drug   
Abuse  
  
                                        Patient Records regulations: This inform  
ation has been disclosed to you from   
records  
  
                                        protected by Federal confidentiality rul  
es (42 CFR Part 2). The Federal rules  
  
                                        prohibit you from making any further dis  
closure of this information unless   
further  
  
                                        disclosure is expressly permitted by the  
 written consent of the person to whom   
it  
  
                                        pertains or as otherwise permitted by 42  
 CFR Part 2. A general authorization for  
 the  
  
                                        release of medical or other information   
is NOT sufficient for this purpose. The  
  
                                        Federal rules restrict any use of the in  
formation to criminally investigate or  
  
                                        prosecute any alcohol or drug abuse lily  
entHolzer Health SystemIn the event this  
  
                                        information is protected by the Federal   
Confidentiality of Alcohol and Drug   
Abuse  
  
                                        Patient Records regulations: This inform  
ation has been disclosed to you from   
records  
  
                                        protected by Federal confidentiality rul  
es (42 CFR Part 2). The Federal rules  
  
                                        prohibit you from making any further dis  
closure of this information unless   
further  
  
                                        disclosure is expressly permitted by the  
 written consent of the person to whom   
it  
  
                                        pertains or as otherwise permitted by 42  
 CFR Part 2. A general authorization for  
 the  
  
                                        release of medical or other information   
is NOT sufficient for this purpose. The  
  
                                        Federal rules restrict any use of the in  
formation to criminally investigate or  
  
                                        prosecute any alcohol or drug abuse lily  
Mercy Health Lorain Hospital  
  
FOR RECORDS PERTAINING TO PATIENTS WHO ARE OR HAVE BEEN ENROLLED IN A CHEMICAL 
DEPENDENCY/SUBSTANCE ABUSE PROGRAM, SOME INFORMATION MAY BE OMITTED. This 
clinical summary was aggregated from multiple sources. Caution should be 
exercised in using it in the provision of clinical care. This summary normalizes
 information from multiple sources, and as a consequence, information in this 
document may materially change the coding, format and clinical context of 
patient data. In addition, data may be omittedin some cases. CLINICAL DECISIONS 
SHOULD BE BASED ON THE PRIMARY CLINICAL RECORDS. BigCalc Calais Regional Hospital. provides 
no warranty or guarantee of the accuracy or completeness of information in this 
document.

## 2022-12-28 NOTE — PCM.PN.SRG
Subjective
Subjective
Patient became combative, confused shortly after arrival to PACU. Followed by hypoxia and unresponsiveness and ultimately cardiac arrhythmia. Code blue activated, ACLS including defibrillation and rounds of Epi. Spontaneous circulation regained, 
patient intubated. Remained hypoxic with slow return to normal saturation with bag ventilation. At this point hemodynamically stable. Left radial arterial line placed, labs drawn. After period of time of stability transported to ICU.
As patient became more awake he developed further hypoxia and was further sedated and paralyzed. ICU consulted for further recommendations and management.


Underlying cause of cardiopulmonary arrest currently unclear. Hypoxia preceded cardiac rhythm changes. Troponin normal, elevated d-dimer however patient on anticoagulation for PAD held only 48 hours for procedure. CXR bilateral ground glass 
opacification, possible pulm edema.


Continue support, resuscitation. 

Objective Data
Objective Data
Vital Signs: 
Vital Signs

Temp Pulse Resp BP Pulse Ox O2 Del Method O2 Flow Rate
 97.0 F L  83   25 H  174/95 H  74   Mechanical Ventilator   15 
 12/28/22 11:25  12/28/22 16:10  12/28/22 16:10  12/28/22 16:10  12/28/22 13:50  12/28/22 16:10  12/28/22 13:50



Oxygen Flow Rate (L/min)       15                                               
Oxygen Delivery Method         Mechanical Ventilator                            
Weight:                        216 lb 0.848 oz                                  
Body Mass Index (BMI)          33.8                                             


Intake & Output: 
Intake and Output for Last 24 Hours

 12/26/22 12/27/22 12/28/22
 23:59 23:59 23:59
Intake Total   173.5 / 173.5
Balance   173.5 / 173.5


Lab / Micro Data

Result Diagrams: 
12/28/22 14:14          

12/28/22 14:14          

Labs: 
Laboratory Results - last 24 hr

12/28/22 11:15: Hemoglobin A1c 7.7 H
12/28/22 11:15: WBC 9.5, RBC 5.45, Hgb 13.7, Hct 41.9, MCV 76.9 L, MCH 25.1 L, MCHC 32.7, RDW Std Deviation 49.9 H, RDW Coeff of Tiny 18.6 H, Plt Count 333, MPV 8.9
12/28/22 11:15: Sodium 134 L, Potassium 4.8, Chloride 102, Carbon Dioxide 22.0, Anion Gap 10, BUN 21 H, Creatinine 1.49 H, Estim Creat Clear Calc 46.21, Est GFR (MDRD) Af Amer 61, Est GFR (MDRD) Non-Af 50 L, BUN/Creatinine Ratio 14.1, Glucose 165 H, 
Calcium 9.6
12/28/22 11:23: POC Glucose 166 H
12/28/22 14:14: D-Dimer Quant (PE/DVT) 2.76 H*
12/28/22 14:14: WBC 11.9 H, RBC 5.32, Hgb 13.2, Hct 42.8, MCV 80.5, MCH 24.8 L, MCHC 30.8 L D, RDW Std Deviation 52.3 H, RDW Coeff of Tiny 18.2 H, Plt Count 243, MPV 9.1, Immature Gran % (Auto) 4.300 H, Neut % (Auto) 47.7, Lymph % (Auto) 39.3, Mono % 
(Auto) 6.0, Eos % (Auto) 2.2, Baso % (Auto) 0.5, Absolute Neuts (auto) 5.7, Absolute Lymphs (auto) 4.68 H, Nucleated RBC % 0.7
12/28/22 14:14: Sodium 138, Potassium 3.5, Chloride 106, Carbon Dioxide 18.0 L, Anion Gap 14, BUN 20 H, Creatinine 1.61 H, Estim Creat Clear Calc 42.77, Est GFR (MDRD) Af Amer 56 L, Est GFR (MDRD) Non-Af 46 L, BUN/Creatinine Ratio 12.4, Glucose 255 
H, Calcium 8.5, Total Bilirubin 0.20,  H, ALT 80 H, Alkaline Phosphatase 81, Troponin I High Sens 23, Total Protein 6.2 L, Albumin 2.8 L, Globulin 3.4, Albumin/Globulin Ratio 0.8 L


ABG Data
ABG results: 
ABG

  12/28/22 12/28/22
  14:30 15:54
Specimen Type  ART  ART
Sample Site  R Fem  L Radial
pH  7.09 L*  7.04 L*
Bicarbonate Actual  14.6 L  15.7 L
Total CO2  16  18
Base Excess  -15 L  -15 L
O2 Saturation  97  70 L
O2 %   100
ABG pCO2  48.4 H  58.9 H
ABG pO2  125 H  53 L
Cy Test   Positive
Respiration Rate   12
O2 Delivery Device   Adult Vent
Vent Mode   AC
Tidal Volume   450
POC PEEP   13
Crit Call To/Read Back  Yes  Yes


Radiography
Diagnostic Testing: 
Radiology Impression

Chest X-Ray  12/28/22 15:05
IMPRESSION:
Mild/moderate cardiomegaly status post sternotomy with multilevel broken
sternotomy wires.
 
Endotracheal tube is 5.7 cm above the ramon slightly high could be
advanced 2 cm.
 
NG tube in satisfactory position.
 
Overall pattern of interstitial thickening suspicious for pulmonary
edema/CHF without exclusion of atypical infiltrates.
 
Electronically Signed:
Mariana Velazquez MD
2022/12/28 at 15:27 EST
Reading Location ID and State: [address] / [place]
Tel [phone], Service support  [phone], Fax [phone]
 


Chest X-Ray  12/28/22 15:25
IMPRESSION:
Improved positioning of endotracheal tube. Now in satisfactory position. NG
tube in satisfactory position.
 
Findings suspicious for diffuse edema possible CHF and/or potentially
multifocal pneumonia.
 
Mild cardiomegaly multifocal broken sternotomy wire centrally.
 
Electronically Signed:
Mariana Velazquez MD
2022/12/28 at 15:43 EST
Reading Location ID and State: [address] / [place]
Tel [phone], Service support  [phone], Fax [phone]

## 2022-12-28 NOTE — RAD_ITS
STUDY:   X-RAY CHEST  
  
REASON FOR EXAM:   Male, 67 years old.  ETT placement  
  
TECHNIQUE:   Single AP portable view of the chest.  
  
COMPARISON:   December 28, 2022 3:06 PM  
___________________________________  
  
FINDINGS:  
An endotracheal tube is placed 3.2 cm above the ramon. This is improved  
since prior study. An N G-tube is present the tip is in the stomach. There  
is a pattern of increased interstitial markings thickening of the right  
minor fissure. There is mild cardiac enlargement. There are multilevel  
broken sternotomy wires.   Normal mediastinum and jessica.  Normal visualized  
pulmonary arteries.  There is atherosclerotic tortuosity of the aortic arch  
and descending thoracic aorta.  
  
There are diffuse degenerative changes of the visualized thoracic spine.  
Normal visualized ribs, clavicles, and shoulders.  
  
There is no demonstrated abnormality of the visualized soft tissue  
structures of the upper abdomen.  
___________________________________  
  
ORDER #: [id number] RAD/Chest 1 View (Portable)  
IMPRESSION:  
Improved positioning of endotracheal tube. Now in satisfactory position. NG  
tube in satisfactory position.  
 
  
Findings suspicious for diffuse edema possible CHF and/or potentially  
multifocal pneumonia.  
 
  
Mild cardiomegaly multifocal broken sternotomy wire centrally.  
 
  
Electronically Signed:  
Mariana Velazquez MD  
2022/12/28 at 15:43 EST  
Reading Location ID and State: [facility] / [place]  
Tel [phone], Service support  [phone], Fax [phone]

## 2022-12-28 NOTE — CM.ED
SW Note

BRUCE and BRUCE Mukherjee responded to code blue. Emotional support provided to patient's wife.

SW called ICU to check on status of patient. Per ICU staff patient had .

BRUCE and BRUCE Mukherjee met with patient's wife until patient's family came to be a support with her. SW's provided support in patient's family seeing patient's  body. No other issues or needs at this time identified. SW remains available if 
needs arise.

Plan: Emotional support provided

Elizabeth LYLES

## 2022-12-28 NOTE — PCM.CODE.SUM
Code Blue Report
Code Blue Summary
Code Blue Summary: 
A CODE BLUE was called at 1610 today in the ICU, ICU bed 2, patient had been noted to be bradycardic and when his pulse was taken there was no pulse and a CODE BLUE was initiated with chest compressions.  I arrived shortly after that time and the 
patient was administered 2 doses of 1 mg epinephrine approximately 3 to 4 minutes apart, patient's wife arrived in the ICU and requested that the chest compressions be stopped, she stated that it was patient's wishes not to be on a machine or have 
chest compressions.  The code was stopped at 1615, patient had a pulse at that time and was noted to have spontaneous respirations. 

Physical Exam
Narrative
Patient was unresponsive to verbal or tactile stimuli, he did have spontaneous respirations at the cessation of the code, patient had a palpable pulse at that time.

Assessment & Plan
Assessment/Plan
(1) Cardiopulmonary arrest: 
PLAN: 
Plan
Return of spontaneous respirations and circulation was achieved after brief CPR.  Patient's wife requested CPR be stopped.  The reason for the patient's cardiopulmonary arrest was unknown.

## 2022-12-28 NOTE — RAD_ITS
STUDY:   X-RAY CHEST  
  
REASON FOR EXAM:   Male, 67 years old.  ETT placement  
  
TECHNIQUE:   Single AP portable view of the chest.  
  
COMPARISON:   August 29, 2022 x-ray chest  
___________________________________  
  
FINDINGS:  
Endotracheal tube is present 5.7 cm above the ramon. An NG tube is present  
the tip is in the stomach.  
  
Since the prior studies there is a prominent appearance of the interstitial  
markings. There is thickening of the right minor fissure. There is  
groundglass opacity within the left lung periphery.  
  
There is mild cardiac enlargement. There are persistent multilevel broken  
sternotomy wires.   Normal mediastinum and jessica.  Normal visualized  
pulmonary arteries.  There is atherosclerotic tortuosity of the aortic arch  
and descending thoracic aorta.  
  
There are diffuse degenerative changes of the visualized thoracic spine.  
Normal visualized ribs, clavicles, and shoulders.  
  
There is no demonstrated abnormality of the visualized soft tissue  
structures of the upper abdomen.  
___________________________________  
  
ORDER #: [id number] RAD/Chest 1 View (Portable)  
IMPRESSION:  
Mild/moderate cardiomegaly status post sternotomy with multilevel broken  
sternotomy wires.  
 
  
Endotracheal tube is 5.7 cm above the ramon slightly high could be  
advanced 2 cm.  
 
  
NG tube in satisfactory position.  
 
  
Overall pattern of interstitial thickening suspicious for pulmonary  
edema/CHF without exclusion of atypical infiltrates.  
 
  
Electronically Signed:  
Mariana Velazquez MD  
2022/12/28 at 15:27 EST  
Reading Location ID and State: [facility] / [place]  
Tel [phone], Service support  [phone], Fax [phone]

## 2022-12-28 NOTE — SUR.PHASEI
Addendum entered by Giovany Velez  12/28/22 15:01: 

ERROR PATIENT'S INITIAL SPO2 WAS 77% ON ROOM AIR NOT IN THE 80'S.

Original Note:

PATIENT GOT TO PACU AT 1337. HE WAS VERY RESTLESS, BUT WAS NOT  RESPONDING TO HIS NAME. HE WAS MOVING ALL OVER THE BED AS WE ATTEMPTED TO HOOK PATIENT UP TO THE MONITOR. WE REPLACED LEADS AND PUT A FINGER SP02 ON PATIENT. PATIENT SPO2 WAS IN THE 
80'S AT THIS TIME. WE ORIGINALLY PUT HIM ON 4 L NC AND TRIED TO AROUSE HIM TO TAKE DEEP BREATHS. HIS IV GOT PULLED OUT WITH HIS THRASHING AROUND. SINDI RN GOT ANOTHER IV IN HIS LEFT UPPER ARM. PATIENT WAS STILL NOT SATING WELL SO DR. TY WHO 
WAS AT THE BEDSIDE AT THIS TIME HELPING US ORDERED A DUONEB TREATMENT. A NON REBREATHER MASK WAS APPLIED TO THE PATIENT. PATIENT STILL SATING IN THE MID 80'S. PATIENT THEN OPENED HIS EYES , PUPILS DILATED, STARING, RIGID POSTURE, PATIENT NOT 
BREATHING, UNABLE TO FEEL A PULSE, COMPRESSIONS STARTED. OTHER STAFF AT BEDSIDE,  ACLS PROTOCOLS STARTED. SEE CODE DOCUMENTATION. PATIENT WAS BROUGHT TO A PALPABLE RHYTHM, INTUBATED, ARTLINE INSERTED, ON MONITORS AND TRANSFERRED TO ICU AT 1445 BY 
PAYTON POLO RN, AND SAUL GARCIA WITH RESPIRATORY THERAPY BAGGING PATIENT.

## 2022-12-28 NOTE — PRO.PCM_ITS
Procedure Report    
Date of Procedure: 12/28/22    
Arterial line placement    
    
Date of procedure 12/28/2022    
    
Timeout: Emergent    
    
Diagnosis: CPA    
    
Right femoral artery was identified with ultrasound.  Area cleaned with   
chlorhexidine.  Sterile gloves placed after hands washed.  Sterile dressing   
placed in area reprepped with chlorhexidine.  Probe cover placed on ultrasound.   
Right femoral artery was reidentified with ultrasound and finder needle placed   
into right femoral artery with pulsatile flow.  Using Seldinger technique wire   
was placed and femoral catheter placed over top with removal of wire.  Pulsatile  
flow continue to be noted.  Art line was sutured in an area was recleaned after   
was placed over end of arterial line.  Area preprepped and dressing placed   
sterilely after art line was hooked up to pressure bag with good arterial   
waveform.    
    
Procedures Hospitalists    
Procedures: [id number] Insertion Catheter Artery

## 2022-12-28 NOTE — PN.SURG_ITS
Subjective    
Subjective    
Patient became combative, confused shortly after arrival to PACU. Followed by   
hypoxia and unresponsiveness and ultimately cardiac arrhythmia. Code blue   
activated, ACLS including defibrillation and rounds of Epi. Spontaneous   
circulation regained, patient intubated. Remained hypoxic with slow return to   
normal saturation with bag ventilation. At this point hemodynamically stable.   
Left radial arterial line placed, labs drawn. After period of time of stability   
transported to ICU.    
As patient became more awake he developed further hypoxia and was further   
sedated and paralyzed. ICU consulted for further recommendations and management.    
    
    
Underlying cause of cardiopulmonary arrest currently unclear. Hypoxia preceded   
cardiac rhythm changes. Troponin normal, elevated d-dimer however patient on   
anticoagulation for PAD held only 48 hours for procedure. CXR bilateral ground   
glass opacification, possible pulm edema.    
    
    
Continue support, resuscitation.     
    
Objective Data    
Objective Data    
Vital Signs:     
Vital Signs    
    
    
    
Temp Pulse Resp BP Pulse Ox O2 Del Method O2 Flow Rate    
     
 97.0 F L  83   25 H  174/95 H  74   Mechanical Ventilator   15     
     
 12/28/22 11:25  12/28/22 16:10  12/28/22 16:10  12/28/22 16:10  12/28/22 13:50   
12/28/22 16:10  12/28/22 13:50    
    
    
    
    
Oxygen Flow Rate (L/min)         15                                               
       
Oxygen Delivery Method           Mechanical Ventilator                            
       
Weight:                          216 lb 0.848 oz                                  
       
Body Mass Index (BMI)            33.8                                             
       
    
    
Intake & Output:     
                       Intake and Output for Last 24 Hours    
    
    
    
 12/26/22 12/27/22 12/28/22    
    
 23:59 23:59 23:59    
     
Intake Total   173.5 / 173.5    
     
Balance   173.5 / 173.5    
    
    
    
Lab / Micro Data    
    
Result Diagrams:     
                                                        12/28/22 14:14              
    
                                                        12/28/22 14:14              
    
Labs:     
                         Laboratory Results - last 24 hr    
    
12/28/22 11:15: Hemoglobin A1c 7.7 H    
12/28/22 11:15: WBC 9.5, RBC 5.45, Hgb 13.7, Hct 41.9, MCV 76.9 L, MCH 25.1 L,   
MCHC 32.7, RDW Std Deviation 49.9 H, RDW Coeff of Tiny 18.6 H, Plt Count 333, MPV  
8.9    
12/28/22 11:15: Sodium 134 L, Potassium 4.8, Chloride 102, Carbon Dioxide 22.0,   
Anion Gap 10, BUN 21 H, Creatinine 1.49 H, Estim Creat Clear Calc 46.21, Est GFR  
(MDRD) Af Amer 61, Est GFR (MDRD) Non-Af 50 L, BUN/Creatinine Ratio 14.1, Glucos  
e 165 H, Calcium 9.6    
12/28/22 11:23: POC Glucose 166 H    
12/28/22 14:14: D-Dimer Quant (PE/DVT) 2.76 H*    
12/28/22 14:14: WBC 11.9 H, RBC 5.32, Hgb 13.2, Hct 42.8, MCV 80.5, MCH 24.8 L,   
MCHC 30.8 L D, RDW Std Deviation 52.3 H, RDW Coeff of Tiny 18.2 H, Plt Count 243,  
MPV 9.1, Immature Gran % (Auto) 4.300 H, Neut % (Auto) 47.7, Lymph % (Auto)   
39.3, Mono % (Auto) 6.0, Eos % (Auto) 2.2, Baso % (Auto) 0.5, Absolute Neuts   
(auto) 5.7, Absolute Lymphs (auto) 4.68 H, Nucleated RBC % 0.7    
12/28/22 14:14: Sodium 138, Potassium 3.5, Chloride 106, Carbon Dioxide 18.0 L,   
Anion Gap 14, BUN 20 H, Creatinine 1.61 H, Estim Creat Clear Calc 42.77, Est GFR  
(MDRD) Af Amer 56 L, Est GFR (MDRD) Non-Af 46 L, BUN/Creatinine Ratio 12.4,   
Glucose 255 H, Calcium 8.5, Total Bilirubin 0.20,  H, ALT 80 H, Alkaline   
Phosphatase 81, Troponin I High Sens 23, Total Protein 6.2 L, Albumin 2.8 L,   
Globulin 3.4, Albumin/Globulin Ratio 0.8 L    
    
    
ABG Data    
ABG results:     
                                       ABG    
    
    
    
  12/28/22 12/28/22    
    
  14:30 15:54    
     
Specimen Type  ART  ART    
     
Sample Site  R Fem  L Radial    
     
pH  7.09 L*  7.04 L*    
     
Bicarbonate Actual  14.6 L  15.7 L    
     
Total CO2  16  18    
     
Base Excess  -15 L  -15 L    
     
O2 Saturation  97  70 L    
     
O2 %   100    
     
ABG pCO2  48.4 H  58.9 H    
     
ABG pO2  125 H  53 L    
     
Cy Test   Positive    
     
Respiration Rate   12    
     
O2 Delivery Device   Adult Vent    
     
Vent Mode   AC    
     
Tidal Volume   450    
     
POC PEEP   13    
     
Crit Call To/Read Back  Yes  Yes    
    
    
    
Radiography    
Diagnostic Testing:     
                              Radiology Impression    
    
Chest X-Ray  12/28/22 15:05    
IMPRESSION:    
Mild/moderate cardiomegaly status post sternotomy with multilevel broken    
sternotomy wires.    
     
Endotracheal tube is 5.7 cm above the ramon slightly high could be    
advanced 2 cm.    
     
NG tube in satisfactory position.    
     
Overall pattern of interstitial thickening suspicious for pulmonary    
edema/CHF without exclusion of atypical infiltrates.    
     
Electronically Signed:    
Mariana Velazquez MD    
2022/12/28 at 15:27 EST    
Reading Location ID and State: [address] / [place]    
Tel [phone], Service support  [phone], Fax [phone]    
     
    
    
Chest X-Ray  12/28/22 15:25    
IMPRESSION:    
Improved positioning of endotracheal tube. Now in satisfactory position. NG    
tube in satisfactory position.    
     
Findings suspicious for diffuse edema possible CHF and/or potentially    
multifocal pneumonia.    
     
Mild cardiomegaly multifocal broken sternotomy wire centrally.    
     
Electronically Signed:    
Mariana Velazquez MD    
2022/12/28 at 15:43 EST    
Reading Location ID and State: [address] / [place]    
Tel [phone], Service support  [phone], Fax [phone]

## 2022-12-28 NOTE — PCM.HP.BLA
History and Physical
Intake
Vital Signs
? 08/30/2213:19 12/09/2213:03
Height 5 ft 7 in ?
BP ? 108/61
Blood Pressure Location ? Rt brachial
Position ? Sitting
Pulse ? 106 H
Pulse Oximetry (%) ? 95
Oxygen Delivery Method ? room air

Intake
Visit Reasons:?wound not healing
Chief Complaint: WOUND INFECTION
Allergies

menthol Adverse Reaction (Verified 11/08/22 10:19)
Rash

Medications

atorvastatin 80 mg tablet 80 mg PO QHS Check with primary doctor 08/11/22 [History Confirmed 12/09/22]
bupropion HCl 150 mg tablet,12 hr sustained-release 150 mg PO BID Check with primary doctor 08/11/22 [History Confirmed 12/09/22]
dabigatran etexilate 150 mg capsule (Pradaxa) 150 mg PO BID Check with primary doctor 08/11/22 [History Confirmed 12/09/22]
empagliflozin 25 mg tablet (Jardiance) 25 mg PO DAILY Check with primary doctor 08/11/22 [History Confirmed 12/09/22]
fenofibrate 160 mg tablet 160 mg PO DAILY Check with primary doctor 08/11/22 [History Confirmed 12/09/22]
icosapent ethyl 1 gram capsule (Vascepa) 2 g PO BID Check with primary doctor 08/11/22 [History Confirmed 12/09/22]
metformin 500 mg tablet 500 mg PO BID Check with primary doctor 08/11/22 [History Confirmed 12/09/22]
pantoprazole 40 mg tablet,delayed release 40 mg PO DAILY Check with primary doctor 08/11/22 [History Confirmed 12/09/22]
prasugrel 10 mg tablet 10 mg PO DAILY Check with primary doctor 08/11/22 [History Confirmed 12/09/22]
metoprolol tartrate 25 mg tablet 25 mg PO DAILY Check with primary doctor 09/13/22 [History Confirmed 12/09/22]
alirocumab 75 mg/mL subcutaneous pen injector (Praluent Pen) 75 mg subcut Q14D 09/20/22 [History Confirmed 12/09/22]
gabapentin 100 mg capsule 300 mg PO TID 09/20/22 [History Confirmed 12/09/22]
melatonin 10 mg capsule 10 mg PO HS PRN sleep #30 caps 11/08/22 [Rx Confirmed 12/09/22]



Subjective
Details:
Patient is doing okay overall, but very frustrated with stalled healing of remaining wound. Also very frustrated with process of getting fitted for a prosthesis. He was told last week that he has reduced range of motion and will need to do some PT 
to address this, at risk of not being able to obtain a prosthesis. He and wife are both frustrated to still be off of work, it is becoming increasingly difficult financially and mentally.

They report that he has completed hyperbaric oxygen therapy and wounds are just being minimally debrided, cleaned, and covered with collagen hydrogel and gauze. No longer using Santyl. He is also being treated with doxycycline as recent cultures 
came back positive for MRSA. Patient states he had an XR a couple days ago as the [facility] was concerned for osteomyelitis and potential retained staple from the surgery.

Patient states that the small, anterior wound is not any larger, but just not improving. He notes a moderate amount of drainage, none of it purulent or foul-smelling. He notes they do not consider the lateral wounds fully healed yet as they remove 
the scab and debride there each week at the wound center. He is still having pain, but it is better than it was 1 month ago. He is sleeping better now.

Objective
Details:
A&Ox3, no apparent distress
Clear to auscultation bilaterally, RRR
Left BKA, somewhat tender to palpation along incision lines. Lateral incisions/wounds are superficial and scabbed over, nearly healed. Anteriorly, wound remains, about 1 cm in diameter, tracks about 1.5-2cm deep. Small amount of murky fluid 
draining, no purulence, erythema, or foul odor.

Coding
Level of Care Code
Off vis,est,level 1

Diagnoses
Amputation below knee? S88.119A

[facility]
Medical History?(Reviewed 10/11/22 @ 11:17 by Lauren Byrnes)

Anxiety and depression
CAD (coronary artery disease)
CKD (chronic kidney disease), stage III
COPD (chronic obstructive pulmonary disease)
Diabetes mellitus, type 2
Former tobacco use
GERD (gastroesophageal reflux disease)
History of adenocarcinoma of lung
History of venous thromboembolism
Obesity
PAOD (peripheral arterial occlusive disease)
PVD (peripheral vascular disease)


Surgical History?(Reviewed 10/11/22 @ 11:17 by Lauren Byrnes)

H/O peripheral artery bypass
History of coronary artery bypass graft x 3
History of fasciotomy
Hx of coronary angioplasty
S/P peripheral artery angioplasty with stent placement


Family History?(Reviewed 10/11/22 @ 11:17 by Lauren Byrnes)
Father
Heart disease
Hypertension
CVA (cerebral vascular accident)
Myocardial infarctionMother
Cancer
?? ? Possibly stomach cancer.
Diabetes

Social History?(Reviewed 10/11/22 @ 11:17 by Lauren Byrnes)
household members:? spouse
Smoking Status:? Former smoker
how long ago did patient quit smoking:? Hx 0.3 ppd starting 1970, quit 12/27/21.
alcohol intake:? current alcohol intake frequency: holidays/special occasions only
substance use type:? does not use



Assessment and Plan (No Qualifiers)
Assessment and Plan
(1) Amputation below knee:
? ? ??Status:?Chronic

Plan
The anterior wound remains open despite several months of local wound care and hyperbaric oxygen therapy. Continues to be infected and now with concerns for osteomyelitis. Washout in the OR with possible bone biopsy and axiofill application would be 
beneficial at this point.

## 2022-12-28 NOTE — NURSING
Return ROSC, CPR stopped, +pulse
1406 Bicarb 50meq IV given, 
1408 , Amioderone 150mg IV, pt bagged via ETT, labs drawn, attempting artline
1413 12lead EKG at bedside, , +pulse confirmed, 74% pox, color pale pink
1418 , pox 83%, CO2 33
1422  pox 94%, 125/99, CO2 28
1431 Amioderone 150mg IV, , pox 95%, 138/110
1432  Amioderone gtt intiated per protocol, Art line inserted by Robotham
1435 preparing pt for transport, hr 104, pox 96
1440 , pox 97% 152/138
1442 pt transported to ICU2

## 2022-12-28 NOTE — HP.PCM_ITS
History and Physical    
Intake    
Vital Signs    
    
    
?                         08/30/2213:19  12/09/2213:03    
     
Height                             5 ft 7 in ?    
     
BP ?                                 108/61    
     
Blood Pressure Location ?          Rt brachial    
     
Position ?                           Sitting    
     
Pulse ?                               106 H    
     
Pulse Oximetry (%) ?                   95    
     
Oxygen Delivery Method ?            room air    
    
    
                                     Intake    
                        Visit Reasons:?wound not healing    
                        Chief Complaint: WOUND INFECTION    
                                    Allergies    
                                            
               menthol Adverse Reaction (Verified 11/08/22 10:19)    
Rash    
    
Medications    
    
atorvastatin 80 mg tablet 80 mg PO QHS Check with primary doctor 08/11/22   
[History Confirmed 12/09/22]    
bupropion HCl 150 mg tablet,12 hr sustained-release 150 mg PO BID Check with   
primary doctor 08/11/22 [History Confirmed 12/09/22]    
dabigatran etexilate 150 mg capsule (Pradaxa) 150 mg PO BID Check with primary   
doctor 08/11/22 [History Confirmed 12/09/22]    
empagliflozin 25 mg tablet (Jardiance) 25 mg PO DAILY Check with primary doctor   
08/11/22 [History Confirmed 12/09/22]    
fenofibrate 160 mg tablet 160 mg PO DAILY Check with primary doctor 08/11/22   
[History Confirmed 12/09/22]    
icosapent ethyl 1 gram capsule (Vascepa) 2 g PO BID Check with primary doctor   
08/11/22 [History Confirmed 12/09/22]    
metformin 500 mg tablet 500 mg PO BID Check with primary doctor 08/11/22   
[History Confirmed 12/09/22]    
pantoprazole 40 mg tablet,delayed release 40 mg PO DAILY Check with primary   
doctor 08/11/22 [History Confirmed 12/09/22]    
prasugrel 10 mg tablet 10 mg PO DAILY Check with primary doctor 08/11/22   
[History Confirmed 12/09/22]    
metoprolol tartrate 25 mg tablet 25 mg PO DAILY Check with primary doctor   
09/13/22 [History Confirmed 12/09/22]    
alirocumab 75 mg/mL subcutaneous pen injector (Praluent Pen) 75 mg subcut Q14D   
09/20/22 [History Confirmed 12/09/22]    
gabapentin 100 mg capsule 300 mg PO TID 09/20/22 [History Confirmed 12/09/22]    
melatonin 10 mg capsule 10 mg PO HS PRN sleep #30 caps 11/08/22 [Rx Confirmed   
12/09/22]    
    
    
    
Subjective    
Details:    
Patient is doing okay overall, but very frustrated with stalled healing of   
remaining wound. Also very frustrated with process of getting fitted for a   
prosthesis. He was told last week that he has reduced range of motion and will   
need to do some PT to address this, at risk of not being able to obtain a   
prosthesis. He and wife are both frustrated to still be off of work, it is   
becoming increasingly difficult financially and mentally.    
    
They report that he has completed hyperbaric oxygen therapy and wounds are just   
being minimally debrided, cleaned, and covered with collagen hydrogel and gauze.  
No longer using Santyl. He is also being treated with doxycycline as recent   
cultures came back positive for MRSA. Patient states he had an XR a couple days   
ago as the [facility] was concerned for osteomyelitis and potential retained   
staple from the surgery.    
    
Patient states that the small, anterior wound is not any larger, but just not   
improving. He notes a moderate amount of drainage, none of it purulent or foul-  
smelling. He notes they do not consider the lateral wounds fully healed yet as   
they remove the scab and debride there each week at the wound center. He is   
still having pain, but it is better than it was 1 month ago. He is sleeping bett  
er now.    
    
Objective    
Details:    
A&Ox3, no apparent distress    
Clear to auscultation bilaterally, RRR    
Left BKA, somewhat tender to palpation along incision lines. Lateral   
incisions/wounds are superficial and scabbed over, nearly healed. Anteriorly,   
wound remains, about 1 cm in diameter, tracks about 1.5-2cm deep. Small amount   
of murky fluid draining, no purulence, erythema, or foul odor.    
    
Coding    
Level of Care Code    
Off vis,est,level 1    
    
Diagnoses    
Amputation below knee? S88.119A    
    
[facility]    
Medical History?(Reviewed 10/11/22 @ 11:17 by Lauren Byrnes)    
    
Anxiety and depression    
CAD (coronary artery disease)    
CKD (chronic kidney disease), stage III    
COPD (chronic obstructive pulmonary disease)    
Diabetes mellitus, type 2    
Former tobacco use    
GERD (gastroesophageal reflux disease)    
History of adenocarcinoma of lung    
History of venous thromboembolism    
Obesity    
PAOD (peripheral arterial occlusive disease)    
PVD (peripheral vascular disease)    
    
    
Surgical History?(Reviewed 10/11/22 @ 11:17 by Lauren Byrnes)    
    
H/O peripheral artery bypass    
History of coronary artery bypass graft x 3    
History of fasciotomy    
Hx of coronary angioplasty    
S/P peripheral artery angioplasty with stent placement    
    
    
Family History?(Reviewed 10/11/22 @ 11:17 by Lauren Byrnes)    
Father    
Heart disease    
Hypertension    
CVA (cerebral vascular accident)    
Myocardial infarctionMother    
Cancer    
?? ? Possibly stomach cancer.    
Diabetes    
    
Social History?(Reviewed 10/11/22 @ 11:17 by Lauren Byrnes)    
household members:? spouse    
Smoking Status:? Former smoker    
how long ago did patient quit smoking:? Hx 0.3 ppd starting 1970, quit 12/27/21.    
alcohol intake:? current alcohol intake frequency: holidays/special occasions   
only    
substance use type:? does not use    
    
    
    
Assessment and Plan (No Qualifiers)    
Assessment and Plan    
(1) Amputation below knee:    
? ? ??Status:?Chronic    
    
Plan    
The anterior wound remains open despite several months of local wound care and   
hyperbaric oxygen therapy. Continues to be infected and now with concerns for   
osteomyelitis. Washout in the OR with possible bone biopsy and axiofill   
application would be beneficial at this point.

## 2022-12-28 NOTE — PN.HOSP_ITS
Subjective    
Subjective    
67-year-old male who presented to the hospital for treatment of a wound on his   
left BKA.  While recovering in PACU he had a CODE BLUE event and he had to be   
intubated and transferred to the ICU.  Both of the hospitalist team and the ICU   
team were consulted, prior to my evaluation he had another CODE BLUE event in [facility] ICU.  He had a few rounds of CPR and had return of spontaneous circulation.    
He appears to have had an issue with ARDS postoperatively.  Repeat chest x-ray   
does not show pneumothorax but it does show bilateral fluffy infiltrates   
consistent with ARDS.  He was becoming hypoxic which is why the chest x-ray was   
obtained to evaluate for possible pneumothorax.  He has been progressively   
increasing on his vent settings and his pressures have been getting softer.  An   
A-line was placed and at the time of evaluation his mean arterial pressures were  
in the mid 60s.    
    
Objective Data    
Objective Data    
Vital Signs:     
Vital Signs    
    
    
    
Temp Pulse Resp BP Pulse Ox O2 Del Method O2 Flow Rate    
     
 97.5 F L  104 H  104 H  74/56 L  81   Mechanical Ventilator   15     
     
 22 14:45  22 17:00  22 17:00  22 17:00  22 17:00   
22 17:00  22 13:50    
    
    
    
FiO2    
     
 100     
     
 22 17:00    
    
    
    
    
Oxygen Flow Rate (L/min)         15                                               
       
Oxygen Delivery Method           Mechanical Ventilator                            
       
Weight:                          216 lb 0.848 oz                                  
       
Body Mass Index (BMI)            33.9                                             
       
    
    
Intake & Output:     
                       Intake and Output for Last 24 Hours    
    
    
    
 22    
    
 03:59 03:59 03:59    
     
Intake Total   232.61 / 232.61    
     
Balance   232.61 / 232.61    
    
    
    
Lab / Micro Data    
    
Result Diagrams:     
                                                        22 14:14              
    
                                                        22 14:14              
    
Labs:     
                         Laboratory Results - last 24 hr    
    
22 11:15: Hemoglobin A1c 7.7 H    
22 11:15: WBC 9.5, RBC 5.45, Hgb 13.7, Hct 41.9, MCV 76.9 L, MCH 25.1 L,   
MCHC 32.7, RDW Std Deviation 49.9 H, RDW Coeff of Tiny 18.6 H, Plt Count 333, MPV  
8.9    
22 11:15: Sodium 134 L, Potassium 4.8, Chloride 102, Carbon Dioxide 22.0,   
Anion Gap 10, BUN 21 H, Creatinine 1.49 H, Estim Creat Clear Calc 46.21, Est GFR  
(MDRD) Af Amer 61, Est GFR (MDRD) Non-Af 50 L, BUN/Creatinine Ratio 14.1,   
Glucose 165 H, Calcium 9.6    
22 11:23: POC Glucose 166 H    
22 14:14: D-Dimer Quant (PE/DVT) 2.76 H*    
22 14:14: WBC 11.9 H, RBC 5.32, Hgb 13.2, Hct 42.8, MCV 80.5, MCH 24.8 L,   
MCHC 30.8 L D, RDW Std Deviation 52.3 H, RDW Coeff of Tiny 18.2 H, Plt Count 243,  
MPV 9.1, Immature Gran % (Auto) 4.300 H, Neut % (Auto) 47.7, Lymph % (Auto)   
39.3, Mono % (Auto) 6.0, Eos % (Auto) 2.2, Baso % (Auto) 0.5, Absolute Neuts   
(auto) 5.7, Absolute Lymphs (auto) 4.68 H, Nucleated RBC % 0.7    
22 14:14: Sodium 138, Potassium 3.5, Chloride 106, Carbon Dioxide 18.0 L,   
Anion Gap 14, BUN 20 H, Creatinine 1.61 H, Estim Creat Clear Calc 42.77, Est GFR  
(MDRD) Af Amer 56 L, Est GFR (MDRD) Non-Af 46 L, BUN/Creatinine Ratio 12.4,   
Glucose 255 H, Calcium 8.5, Total Bilirubin 0.20,  H, ALT 80 H, Alkaline   
Phosphatase 81, Troponin I High Sens 23, Total Protein 6.2 L, Albumin 2.8 L,   
Globulin 3.4, Albumin/Globulin Ratio 0.8 L    
22 16:50: COVID-19 (YONATHAN) Not Detected    
    
    
ABG Data    
ABG results:     
                                       ABG    
    
    
    
  22    
    
  14:30 15:54    
     
Specimen Type  ART  ART    
     
Sample Site  R Fem  L Radial    
     
pH  7.09 L*  7.04 L*    
     
Bicarbonate Actual  14.6 L  15.7 L    
     
Total CO2  16  18    
     
Base Excess  -15 L  -15 L    
     
O2 Saturation  97  70 L    
     
O2 %   100    
     
ABG pCO2  48.4 H  58.9 H    
     
ABG pO2  125 H  53 L    
     
Cy Test   Positive    
     
Respiration Rate   12    
     
O2 Delivery Device   Adult Vent    
     
Vent Mode   AC    
     
Tidal Volume   450    
     
POC PEEP   13    
     
Crit Call To/Read Back  Yes  Yes    
    
    
    
Radiography    
Diagnostic Testing:     
                              Radiology Impression    
    
Chest X-Ray  22 15:05    
IMPRESSION:    
Mild/moderate cardiomegaly status post sternotomy with multilevel broken    
sternotomy wires.    
     
Endotracheal tube is 5.7 cm above the ramon slightly high could be    
advanced 2 cm.    
     
NG tube in satisfactory position.    
     
Overall pattern of interstitial thickening suspicious for pulmonary    
edema/CHF without exclusion of atypical infiltrates.    
     
Electronically Signed:    
Mariana Velazquez MD    
 at 15:27 EST    
Reading Location ID and State: [id number] / [place]    
Tel [phone], Service support  [phone], Fax [phone]    
     
    
    
Chest X-Ray  22 15:25    
IMPRESSION:    
Improved positioning of endotracheal tube. Now in satisfactory position. NG    
tube in satisfactory position.    
     
Findings suspicious for diffuse edema possible CHF and/or potentially    
multifocal pneumonia.    
     
Mild cardiomegaly multifocal broken sternotomy wire centrally.    
     
Electronically Signed:    
Mariana Velazquez MD    
 at 15:43 EST    
Reading Location ID and State: [address] / [place]    
Tel [phone], Service support  [phone], Fax [phone]    
     
    
    
Chest X-Ray  22 16:40    
IMPRESSION:    
Moderate bilateral interstitial infiltrates unchanged    
     
Electronically Signed:    
Irwin Coleman MD    
 at 17:16 EST    
Reading Location ID and State: [id number] / [place]    
Tel [phone], Service support  [phone], Fax [phone]    
     
    
    
    
    
Physical Exam    
Const    
General Appearance: intubated and patient mechanically ventilated    
HEENT    
normocephalic    
Eyes    
PERRL and conjunctivae normal    
Neck    
supple and no JVD    
Resp    
normal respiratory effort, no retractions and no use of accessory muscles    
Auscultation: rhonchi; Negative for crackles, rales or wheezes    
Cardio    
regular rate, regular rhythm, S1 normal heart sound, S2 normal heart sound and   
no murmurs    
GI    
soft to palpation and non-distended; Negative for hepatosplenomegaly    
Extremity    
no clubbing, cyanosis or edema    
Skin    
no rashes or lesions noted    
Neuro    
Sensorium / Orientation: sedated on vent    
Psych    
Appearance: intubated    
    
Assessment & Plan    
Assessment/Plan    
(1) Acute respiratory failure with hypoxia:     
(2) Cardiopulmonary arrest:     
PLAN:     
Plan    
    
1.  Peripheral arterial occlusive disease status post BKA presenting for wound   
debridement of that BKA with a cardiac arrest in PACU/acute hypoxic respiratory   
failure secondary to ARDS    
? He has an extensive history with blood clots which has led to his BKA    
? PACU cardiac arrest appears to be in part due to acute hypoxic respiratory   
failure from ARDS.  Anesthesia was performed with an LMA and by all accounts   
surgery was uneventful    
? Once he arrived to PACU he had another CODE BLUE event at around 1610, he had   
CPR performed as well as epinephrine administered and he had return of   
spontaneous circulation.  Family was present at bedside and did not want to go   
through another code event so they made him DNR CCA at that time.  Ventilator   
settings were increased and a chest x-ray was obtained that did not demonstrate   
a pneumothorax but did demonstrate ARDS    
? A-line was placed however his condition continued to be tenuous and I was   
notified at 1727 on 2022 that Mr. Bradshaw had     
    
2.  Hypertension, hyperlipidemia, diabetes, chronic heart failure, coronary   
artery disease status post CABG, COPD with a history of adenocarcinoma, anxiety,  
depression, GERD are all chronic medical conditions which complicated his care.    
    
Charges/Coding    
Visit Charges    
Inpatient E&M: 22885 Subs Hosp L3

## 2022-12-28 NOTE — OP.PCM_ITS
Report of Operation    
Date of Procedure: 12/28/22    
Pre-Operative Diagnosis: chronic infection, draining sinus of left below knee a  
mputation    
Post-Operative Diagnosis: same    
Surgery/Procedure Performed:: Irrigation/debridement left below knee amputation   
5mm x 5mm, bone biopsy, placement Axiofill    
Description of Surgical Findings::     
purulent drainage, sinus tract down to tibia    
Surgeon: Ed Mendoaz    
Type of Anesthesia: General    
Specimen's removed: bone    
Estimated Blood Loss (mL): 5    
Description of Procedure:     
HPI: Patient is a 67-year-old male with extensive peripheral vascular disease   
history of prior left below-knee amputation.  He had a chronic draining sinus   
from the mid aspect of the wound that has intermittently been positive for MRSA.  
 He had plain x-ray which revealed a sinus tract that appeared to extend down to  
the cut edge of the tibia.  He is taken now for debridement irrigation and appl  
ication of axiofil in hopes of promoting health the healing environment.  Plan   
is also to biopsy the bone and if positive for anaerobic growth plan for longer-  
term antibiotics.    
    
Description of procedure: Upon obtaining form consent and verification.  Patient  
procedure site he was taken to the operating room where he was placed under   
general anesthesia.  He was then positioned prepped and draped in usual sterile   
fashion a time was performed.  A curette was used to sharply debride the sinus   
tract down to the bone.  All nonviable and exudative tissue were removed, and   
healthy bleeding edge visualized throughout.  A rongeur was then used to take   
bone sample for culture, and debride back flush with the base of the cavity.    
The wound was then copiously irrigated with vancomycin irrigation and inspected   
for hemostasis. Axiofil placenta derived topical stem cell adjunct was then   
placed in the sinus tract and packed down to the base.  Adaptic nonadherent   
dressing was then applied followed by dry gauze.  Patient was then awake from   
anesthesia and taken recovery room with anticipated discharged home.

## 2022-12-28 NOTE — DCINST_ITS
Discharge Instructions    
Diet    
Discharge Diet: No restrictions    
Activity    
May shower in (days): 2    
Additional Activity Instructions::     
Dressing / Incision    
Call your doctor if your incision/area has: Increased Redness and Foul Smelling   
Discharge    
Call your doctor if you observe: Fever of 101 or Higher    
Remove Dressing in: 5 days    
Cleanse incision/area with: Soap & Water    
Additional Dressing/Incision Instructions:: cover incision with bag for showers   
until dressing removal    
Follow Up Care    
Test Results:     
Test results from this visit will be discussed in further detail at your follow-  
up appointment, if applicable.    
    
    
Discharge Plan    
Admission    
Primary Reason for Your Visit: irrigation/drainage left below knee amputation   
wound     
    
Attending Provider: Ed Mendoza    
    
Primary Care Provider: HECTOR TIJERINA    
    
Discharge Orders/Prescriptions    
Prescriptions:    
New    
  oxycodone 5 mg tablet     
   5 mg PO Q8H PRN (Reason: pain) 5 Days Qty: 15 0RF    
    
Continued    
  Praluent Pen 75 mg/mL pen injector     
   75 mg subcut Q14D     
  gabapentin 100 mg capsule     
   300 mg PO TID     
  metformin 500 mg Tablet     
   1,000 mg PO 0800     
  bupropion HCl 150 mg Tablet Sustained-Release 12 Hr     
   150 mg PO BID     
  atorvastatin 80 mg Tablet     
   80 mg PO QHS     
  pantoprazole 40 mg Tablet,Delayed Release (Dr/Ec)     
   40 mg PO DAILY     
  fenofibrate 160 mg Tablet     
   160 mg PO DAILY     
  prasugrel 10 mg Tablet     
   10 mg PO DAILY     
  dabigatran etexilate [Pradaxa] 150 mg Capsule     
   150 mg PO BID     
   Label Comments:    
   LAST DOSE 12/19/22    
  icosapent ethyl [Vascepa] 1 gram Capsule     
   2 g PO BID     
  Jardiance 25 mg Tablet     
   25 mg PO DAILY     
  metoprolol tartrate 25 mg tablet     
   25 mg PO DAILY     
  metformin 500 mg tablet extended release 24 hr     
   500 mg PO 2000     
  isosorbide mononitrate 30 mg tablet extended release 24 hr     
   30 mg PO DAILY     
  albuterol sulfate 90 mcg/actuation Hfa Aerosol Inhaler     
   2 puff INHALATION PRN PRN (Reason: COPD)     
    
Referrals / Follow Up:    
HECTOR TIJERINA NP-C [Primary Care Provider] -     
    
Disposition    
Disposition (needs filled in before D/C Order can be placed): Home, Self Care

## 2022-12-28 NOTE — PCM.PN.HOSP
Subjective
Subjective
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  While recovering in PACU he had a CODE BLUE event and he had to be intubated and transferred to the ICU.  Both of the hospitalist team and the ICU team were 
consulted, prior to my evaluation he had another CODE BLUE event in the ICU.  He had a few rounds of CPR and had return of spontaneous circulation.  He appears to have had an issue with ARDS postoperatively.  Repeat chest x-ray does not show 
pneumothorax but it does show bilateral fluffy infiltrates consistent with ARDS.  He was becoming hypoxic which is why the chest x-ray was obtained to evaluate for possible pneumothorax.  He has been progressively increasing on his vent settings and 
his pressures have been getting softer.  An A-line was placed and at the time of evaluation his mean arterial pressures were in the mid 60s.

Objective Data
Objective Data
Vital Signs: 
Vital Signs

Temp Pulse Resp BP Pulse Ox O2 Del Method O2 Flow Rate
 97.5 F L  104 H  104 H  74/56 L  81   Mechanical Ventilator   15 
 22 14:45  22 17:00  22 17:00  22 17:00  22 17:00  22 17:00  22 13:50

FiO2
 100 
 22 17:00



Oxygen Flow Rate (L/min)       15                                               
Oxygen Delivery Method         Mechanical Ventilator                            
Weight:                        216 lb 0.848 oz                                  
Body Mass Index (BMI)          33.9                                             


Intake & Output: 
Intake and Output for Last 24 Hours

 22
 03:59 03:59 03:59
Intake Total   232.61 / 232.61
Balance   232.61 / 232.61


Lab / Micro Data

Result Diagrams: 
22 14:14          

22 14:14          

Labs: 
Laboratory Results - last 24 hr

22 11:15: Hemoglobin A1c 7.7 H
22 11:15: WBC 9.5, RBC 5.45, Hgb 13.7, Hct 41.9, MCV 76.9 L, MCH 25.1 L, MCHC 32.7, RDW Std Deviation 49.9 H, RDW Coeff of Tiny 18.6 H, Plt Count 333, MPV 8.9
22 11:15: Sodium 134 L, Potassium 4.8, Chloride 102, Carbon Dioxide 22.0, Anion Gap 10, BUN 21 H, Creatinine 1.49 H, Estim Creat Clear Calc 46.21, Est GFR (MDRD) Af Amer 61, Est GFR (MDRD) Non-Af 50 L, BUN/Creatinine Ratio 14.1, Glucose 165 H, 
Calcium 9.6
22 11:23: POC Glucose 166 H
22 14:14: D-Dimer Quant (PE/DVT) 2.76 H*
22 14:14: WBC 11.9 H, RBC 5.32, Hgb 13.2, Hct 42.8, MCV 80.5, MCH 24.8 L, MCHC 30.8 L D, RDW Std Deviation 52.3 H, RDW Coeff of Tiny 18.2 H, Plt Count 243, MPV 9.1, Immature Gran % (Auto) 4.300 H, Neut % (Auto) 47.7, Lymph % (Auto) 39.3, Mono % 
(Auto) 6.0, Eos % (Auto) 2.2, Baso % (Auto) 0.5, Absolute Neuts (auto) 5.7, Absolute Lymphs (auto) 4.68 H, Nucleated RBC % 0.7
22 14:14: Sodium 138, Potassium 3.5, Chloride 106, Carbon Dioxide 18.0 L, Anion Gap 14, BUN 20 H, Creatinine 1.61 H, Estim Creat Clear Calc 42.77, Est GFR (MDRD) Af Amer 56 L, Est GFR (MDRD) Non-Af 46 L, BUN/Creatinine Ratio 12.4, Glucose 255 
H, Calcium 8.5, Total Bilirubin 0.20,  H, ALT 80 H, Alkaline Phosphatase 81, Troponin I High Sens 23, Total Protein 6.2 L, Albumin 2.8 L, Globulin 3.4, Albumin/Globulin Ratio 0.8 L
22 16:50: COVID-19 (YONATHAN) Not Detected


ABG Data
ABG results: 
ABG

  22
  14:30 15:54
Specimen Type  ART  ART
Sample Site  R Fem  L Radial
pH  7.09 L*  7.04 L*
Bicarbonate Actual  14.6 L  15.7 L
Total CO2  16  18
Base Excess  -15 L  -15 L
O2 Saturation  97  70 L
O2 %   100
ABG pCO2  48.4 H  58.9 H
ABG pO2  125 H  53 L
Cy Test   Positive
Respiration Rate   12
O2 Delivery Device   Adult Vent
Vent Mode   AC
Tidal Volume   450
POC PEEP   13
Crit Call To/Read Back  Yes  Yes


Radiography
Diagnostic Testing: 
Radiology Impression

Chest X-Ray  22 15:05
IMPRESSION:
Mild/moderate cardiomegaly status post sternotomy with multilevel broken
sternotomy wires.
 
Endotracheal tube is 5.7 cm above the ramon slightly high could be
advanced 2 cm.
 
NG tube in satisfactory position.
 
Overall pattern of interstitial thickening suspicious for pulmonary
edema/CHF without exclusion of atypical infiltrates.
 
Electronically Signed:
Mariana Velazquez MD
 at 15:27 EST
Reading Location ID and State: [address] / [place]
Tel [phone], Service support  [phone], Fax [phone]
 


Chest X-Ray  22 15:25
IMPRESSION:
Improved positioning of endotracheal tube. Now in satisfactory position. NG
tube in satisfactory position.
 
Findings suspicious for diffuse edema possible CHF and/or potentially
multifocal pneumonia.
 
Mild cardiomegaly multifocal broken sternotomy wire centrally.
 
Electronically Signed:
Mariana Velazquez MD
 at 15:43 EST
Reading Location ID and State: [address] / [place]
Tel [phone], Service support  [phone], Fax [phone]
 


Chest X-Ray  22 16:40
IMPRESSION:
Moderate bilateral interstitial infiltrates unchanged
 
Electronically Signed:
Irwin Coleman MD
 at 17:16 EST
Reading Location ID and State: [id number] / [place]
Tel [phone], Service support  [phone], Fax [phone]
 




Physical Exam
Const
General Appearance: intubated and patient mechanically ventilated
HEENT
normocephalic
Eyes
PERRL and conjunctivae normal
Neck
supple and no JVD
Resp
normal respiratory effort, no retractions and no use of accessory muscles
Auscultation: rhonchi; Negative for crackles, rales or wheezes
Cardio
regular rate, regular rhythm, S1 normal heart sound, S2 normal heart sound and no murmurs
GI
soft to palpation and non-distended; Negative for hepatosplenomegaly
Extremity
no clubbing, cyanosis or edema
Skin
no rashes or lesions noted
Neuro
Sensorium / Orientation: sedated on vent
Psych
Appearance: intubated

Assessment & Plan
Assessment/Plan
(1) Acute respiratory failure with hypoxia: 
(2) Cardiopulmonary arrest: 
PLAN: 
Plan

1.  Peripheral arterial occlusive disease status post BKA presenting for wound debridement of that BKA with a cardiac arrest in PACU/acute hypoxic respiratory failure secondary to ARDS
? He has an extensive history with blood clots which has led to his BKA
? PACU cardiac arrest appears to be in part due to acute hypoxic respiratory failure from ARDS.  Anesthesia was performed with an LMA and by all accounts surgery was uneventful
? Once he arrived to PACU he had another CODE BLUE event at around 1610, he had CPR performed as well as epinephrine administered and he had return of spontaneous circulation.  Family was present at bedside and did not want to go through another 
code event so they made him DNR CCA at that time.  Ventilator settings were increased and a chest x-ray was obtained that did not demonstrate a pneumothorax but did demonstrate ARDS
? A-line was placed however his condition continued to be tenuous and I was notified at 1727 on 2022 that Mr. Bradshaw had 

2.  Hypertension, hyperlipidemia, diabetes, chronic heart failure, coronary artery disease status post CABG, COPD with a history of adenocarcinoma, anxiety, depression, GERD are all chronic medical conditions which complicated his care.

Charges/Coding
Visit Charges
Inpatient E&M: 74407 Subs Hosp L3

## 2022-12-28 NOTE — EX.PCM.DISCH
Discharge Instructions
Diet
Discharge Diet: No restrictions
Activity
May shower in (days): 2
Additional Activity Instructions:: 
Dressing / Incision
Call your doctor if your incision/area has: Increased Redness and Foul Smelling Discharge
Call your doctor if you observe: Fever of 101 or Higher
Remove Dressing in: 5 days
Cleanse incision/area with: Soap & Water
Additional Dressing/Incision Instructions:: cover incision with bag for showers until dressing removal
Follow Up Care
Test Results: 
Test results from this visit will be discussed in further detail at your follow-up appointment, if applicable.


Discharge Plan
Admission
Primary Reason for Your Visit: irrigation/drainage left below knee amputation wound 

Attending Provider: Ed Mendoza

Primary Care Provider: HECTOR TIJERINA

Discharge Orders/Prescriptions
Prescriptions:
New
  oxycodone 5 mg tablet 
   5 mg PO Q8H PRN (Reason: pain) 5 Days Qty: 15 0RF

Continued
  Praluent Pen 75 mg/mL pen injector 
   75 mg subcut Q14D 
  gabapentin 100 mg capsule 
   300 mg PO TID 
  metformin 500 mg Tablet 
   1,000 mg PO 0800 
  bupropion HCl 150 mg Tablet Sustained-Release 12 Hr 
   150 mg PO BID 
  atorvastatin 80 mg Tablet 
   80 mg PO QHS 
  pantoprazole 40 mg Tablet,Delayed Release (Dr/Ec) 
   40 mg PO DAILY 
  fenofibrate 160 mg Tablet 
   160 mg PO DAILY 
  prasugrel 10 mg Tablet 
   10 mg PO DAILY 
  dabigatran etexilate [Pradaxa] 150 mg Capsule 
   150 mg PO BID 
   Label Comments:
   LAST DOSE 12/19/22
  icosapent ethyl [Vascepa] 1 gram Capsule 
   2 g PO BID 
  Jardiance 25 mg Tablet 
   25 mg PO DAILY 
  metoprolol tartrate 25 mg tablet 
   25 mg PO DAILY 
  metformin 500 mg tablet extended release 24 hr 
   500 mg PO 2000 
  isosorbide mononitrate 30 mg tablet extended release 24 hr 
   30 mg PO DAILY 
  albuterol sulfate 90 mcg/actuation Hfa Aerosol Inhaler 
   2 puff INHALATION PRN PRN (Reason: COPD) 

Referrals / Follow Up:
HECTOR TIJERINA NP-C [Primary Care Provider] - 

Disposition
Disposition (needs filled in before D/C Order can be placed): Home, Self Care

## 2023-01-09 NOTE — EXP.PCM_ITS
Preliminary Cause of Death    
Preliminary Cause of Death    
Preliminary Cause of Death:     
Hypoxia, cardiac arrest    
    
Date of Admission: 22    
Date of Death: 22    
Principle Diagnosis    
Left lower extremity non-healing amputation.    
Atherosclerosis native vessel    
DM    
Problem List:     
Active and Suspected Problems (Updated 22 @ 18:54 by Dr. Roderick Clarke,   
DO)    
    
Cardiopulmonary arrest (Acute)    
Acute respiratory failure with hypoxia (Acute)    
    
    
    
Hospital Course    
Patient is a 67-year-old male who presented for outpatient procedure debridement  
of left below-knee amputation nonhealing wound with application of axiofil with   
plans for discharge after the procedure.  In the recovery room he became increa  
singly agitated and ultimately unresponsive followed by a ventricular   
arrhythmia.  He underwent ACLS with ultimate return of spontaneous circulation.   
He was hemodynamically stable at this point, intubated, with slow return to   
normal oxygenation.  It was unclear what instigated both the mental status   
changes and the cardiac event.  He was then transported to the intensive care   
unit where as he awakened from paralysis and sedation he became more difficult   
to ventilate and his hypoxia returned.  Despite maximum ventilatory support   
patient's hypoxia persisted and in fact worsened.  He suffered a second   
arrhythmia, this time bradycardia down to PEA which again responded after ACLS.   
At this point at this after discussions with the wife she was certain he would   
not want any further heroic efforts undertaken and she stated that if he were to  
suffer a third cardiac arrest that she would not want any further compressions   
or medications administered.  His hypoxia persisted and a short time later he   
again became pulseless and ultimately .  Time of death was 1727 hrs. on   
2022.

## 2023-01-09 NOTE — PCM.DEATH
Preliminary Cause of Death
Preliminary Cause of Death
Preliminary Cause of Death: 
Hypoxia, cardiac arrest

Date of Admission: 22
Date of Death: 22
Principle Diagnosis
Left lower extremity non-healing amputation.
Atherosclerosis native vessel
DM
Problem List: 
Active and Suspected Problems (Updated 22 @ 18:54 by Dr. Roderick Clarke, DO)

Cardiopulmonary arrest (Acute)
Acute respiratory failure with hypoxia (Acute)



Hospital Course
Patient is a 67-year-old male who presented for outpatient procedure debridement of left below-knee amputation nonhealing wound with application of axiofil with plans for discharge after the procedure.  In the recovery room he became increasingly 
agitated and ultimately unresponsive followed by a ventricular arrhythmia.  He underwent ACLS with ultimate return of spontaneous circulation.  He was hemodynamically stable at this point, intubated, with slow return to normal oxygenation.  It was 
unclear what instigated both the mental status changes and the cardiac event.  He was then transported to the intensive care unit where as he awakened from paralysis and sedation he became more difficult to ventilate and his hypoxia returned.  
Despite maximum ventilatory support patient's hypoxia persisted and in fact worsened.  He suffered a second arrhythmia, this time bradycardia down to PEA which again responded after ACLS.  At this point at this after discussions with the wife she 
was certain he would not want any further heroic efforts undertaken and she stated that if he were to suffer a third cardiac arrest that she would not want any further compressions or medications administered.  His hypoxia persisted and a short time 
later he again became pulseless and ultimately .  Time of death was 1727 hrs. on 2022.